# Patient Record
Sex: FEMALE | Race: WHITE | Employment: OTHER | URBAN - METROPOLITAN AREA
[De-identification: names, ages, dates, MRNs, and addresses within clinical notes are randomized per-mention and may not be internally consistent; named-entity substitution may affect disease eponyms.]

---

## 2017-01-16 DIAGNOSIS — I48.92 ATRIAL FLUTTER (H): Primary | ICD-10-CM

## 2017-01-16 RX ORDER — DILTIAZEM HYDROCHLORIDE 180 MG/1
180 CAPSULE, COATED, EXTENDED RELEASE ORAL 2 TIMES DAILY
Qty: 180 CAPSULE | Refills: 2 | Status: SHIPPED | OUTPATIENT
Start: 2017-01-16 | End: 2017-09-29

## 2017-01-17 ENCOUNTER — ANTICOAGULATION THERAPY VISIT (OUTPATIENT)
Dept: ANTICOAGULATION | Facility: CLINIC | Age: 77
End: 2017-01-17
Payer: MEDICARE

## 2017-01-17 VITALS — HEART RATE: 90 BPM | DIASTOLIC BLOOD PRESSURE: 77 MMHG | SYSTOLIC BLOOD PRESSURE: 139 MMHG

## 2017-01-17 DIAGNOSIS — I48.91 ATRIAL FIBRILLATION WITH RVR (H): ICD-10-CM

## 2017-01-17 DIAGNOSIS — Z79.01 LONG-TERM (CURRENT) USE OF ANTICOAGULANTS: Primary | ICD-10-CM

## 2017-01-17 DIAGNOSIS — Z95.2 S/P AVR (AORTIC VALVE REPLACEMENT): ICD-10-CM

## 2017-01-17 LAB — INR PPP: 3.3

## 2017-01-17 PROCEDURE — 99207 ZZC NO CHARGE NURSE ONLY: CPT

## 2017-01-17 NOTE — PROGRESS NOTES
ANTICOAGULATION FOLLOW-UP CLINIC VISIT    Patient Name:  Khalida Rouse  Date:  1/17/2017  Contact Type:  Face to Face    SUBJECTIVE:     Patient Findings     Positives Diet Changes (less greens recently- will increase them)           OBJECTIVE    INR   Date Value Ref Range Status   01/17/2017 3.3  Final       ASSESSMENT / PLAN  INR assessment SUPRA    Recheck INR In: 4 WEEKS    INR Location Clinic      Anticoagulation Summary as of 1/17/2017     INR goal 2.0-3.0   Selected INR 3.3! (1/17/2017)   Maintenance plan 2.5 mg (2.5 mg x 1) on Tue, Thu, Sat; 5 mg (2.5 mg x 2) all other days   Full instructions 2.5 mg on Tue, Thu, Sat; 5 mg all other days   Weekly total 27.5 mg   No change documented Venessa Moura RN   Plan last modified Venessa Moura RN (9/30/2016)   Next INR check 2/14/2017   Target end date     Indications   Long-term (current) use of anticoagulants [Z79.01] [Z79.01]  Atrial fibrillation with RVR (H) [I48.91]  S/P AVR (aortic valve replacement) [Z95.2]         Anticoagulation Episode Summary     INR check location     Preferred lab     Send INR reminders to [facility] POOL    Comments 2.5 mg tablets      Anticoagulation Care Providers     Provider Role Specialty Phone number    Dorcas Fisher MD [facility] [phone]            See the Encounter Report to view Anticoagulation Flowsheet and Dosing Calendar (Go to Encounters tab in chart review, and find the Anticoagulation Therapy Visit)    Dosage adjustment made based on physician directed care plan.    Will increase greens and keep dose the same      Venessa Moura RN

## 2017-02-14 ENCOUNTER — ANTICOAGULATION THERAPY VISIT (OUTPATIENT)
Dept: ANTICOAGULATION | Facility: CLINIC | Age: 77
End: 2017-02-14
Payer: MEDICARE

## 2017-02-14 VITALS — DIASTOLIC BLOOD PRESSURE: 90 MMHG | HEART RATE: 88 BPM | SYSTOLIC BLOOD PRESSURE: 135 MMHG

## 2017-02-14 DIAGNOSIS — I48.91 ATRIAL FIBRILLATION WITH RVR (H): ICD-10-CM

## 2017-02-14 DIAGNOSIS — Z95.2 S/P AVR (AORTIC VALVE REPLACEMENT): ICD-10-CM

## 2017-02-14 DIAGNOSIS — Z79.01 LONG-TERM (CURRENT) USE OF ANTICOAGULANTS: ICD-10-CM

## 2017-02-14 LAB — INR POINT OF CARE: 3.2 (ref 0.86–1.14)

## 2017-02-14 PROCEDURE — 36416 COLLJ CAPILLARY BLOOD SPEC: CPT

## 2017-02-14 PROCEDURE — 85610 PROTHROMBIN TIME: CPT | Mod: QW

## 2017-02-14 PROCEDURE — 99207 ZZC NO CHARGE NURSE ONLY: CPT

## 2017-02-14 RX ORDER — WARFARIN SODIUM 2.5 MG/1
TABLET ORAL
Qty: 135 TABLET | Refills: 2 | COMMUNITY
Start: 2017-02-14 | End: 2017-03-02

## 2017-02-14 NOTE — PROGRESS NOTES
ANTICOAGULATION FOLLOW-UP CLINIC VISIT    Patient Name:  Khalida Rouse  Date:  2/14/2017  Contact Type:  Face to Face    SUBJECTIVE:     Patient Findings     Positives No Problem Findings           OBJECTIVE    INR Protime   Date Value Ref Range Status   02/14/2017 3.2 (A) 0.86 - 1.14 Final       ASSESSMENT / PLAN  INR assessment THER    Recheck INR In: 4 WEEKS    INR Location Clinic      Anticoagulation Summary as of 2/14/2017     INR goal 2.0-3.0   Today's INR 3.2!   Maintenance plan 5 mg (2.5 mg x 2) on Mon, Wed, Fri; 2.5 mg (2.5 mg x 1) all other days   Full instructions 5 mg on Mon, Wed, Fri; 2.5 mg all other days   Weekly total 25 mg   Plan last modified Venessa Moura RN (2/14/2017)   Next INR check 3/14/2017   Target end date     Indications   Long-term (current) use of anticoagulants [Z79.01] [Z79.01]  Atrial fibrillation with RVR (H) [I48.91]  S/P AVR (aortic valve replacement) [Z95.2]         Anticoagulation Episode Summary     INR check location     Preferred lab     Send INR reminders to [place] [facility] POOL    Comments 2.5 mg tablets      Anticoagulation Care Providers     Provider Role Specialty Phone number    Dorcas Fisher MD [facility] [phone]            See the Encounter Report to view Anticoagulation Flowsheet and Dosing Calendar (Go to Encounters tab in chart review, and find the Anticoagulation Therapy Visit)    Dosage adjustment made based on physician directed care plan.    Dose slightly decrease since INR was 3.3 at last visit and 3.2 today      Venessa Moura RN

## 2017-02-14 NOTE — MR AVS SNAPSHOT
Khalida ARMAAN Rouse   2/14/2017 1:30 PM   Anticoagulation Therapy Visit    Description:  76 year old female   Provider:  JOSE ANTI COAG   Department:  [facility]           INR as of 2/14/2017     Today's INR 3.2!      Anticoagulation Summary as of 2/14/2017     INR goal 2.0-3.0   Today's INR 3.2!   Full instructions 5 mg on Mon, Wed, Fri; 2.5 mg all other days   Next INR check 3/14/2017    Indications   Long-term (current) use of anticoagulants [Z79.01] [Z79.01]  Atrial fibrillation with RVR (H) [I48.91]  S/P AVR (aortic valve replacement) [Z95.2]         Your next Anticoagulation Clinic appointment(s)     Mar 14, 2017  1:00 PM CDT   Anticoagulation [facility] with JOSE ANTI COANOBLE   [facility]    [address] [place] [address]   [phone]              Contact Numbers     Clinic Number:         February 2017 Details    Sun Mon Tue Wed Thu Fri Sat        1               2               3               4                 5               6               7               8               9               10               11                 12               13               14      2.5 mg   See details      15      5 mg         16      2.5 mg         17      5 mg         18      2.5 mg           19      2.5 mg         20      5 mg         21      2.5 mg         22      5 mg         23      2.5 mg         24      5 mg         25      2.5 mg           26      2.5 mg         27      5 mg         28      2.5 mg              Date Details   02/14 This INR check               How to take your warfarin dose     To take:  2.5 mg Take 1 of the 2.5 mg tablets.    To take:  5 mg Take 2 of the 2.5 mg tablets.           March 2017 Details    Sun Mon Tue Wed Thu Fri Sat        1      5 mg         2      2.5 mg         3      5 mg         4      2.5 mg           5      2.5 mg         6      5 mg         7      2.5 mg         8      5 mg         9      2.5 mg         10      5 mg         11       2.5 mg           12      2.5 mg         13      5 mg         14            15               16               17               18                 19               20               21               22               23               24               25                 26               27               28               29               30               31                 Date Details   No additional details    Date of next INR:  3/14/2017         How to take your warfarin dose     To take:  2.5 mg Take 1 of the 2.5 mg tablets.    To take:  5 mg Take 2 of the 2.5 mg tablets.

## 2017-02-18 DIAGNOSIS — I48.91 ATRIAL FIBRILLATION WITH RVR (H): Primary | ICD-10-CM

## 2017-02-20 NOTE — TELEPHONE ENCOUNTER
Atenolol        Last Written Prescription Date: 9/29/16  Last Fill Quantity: 90, # refills: 2    Last Office Visit with G, P or [facility] prescribing provider:  6/30/16   Future Office Visit:        BP Readings from Last 3 Encounters:   02/14/17 135/90   01/17/17 139/77   10/19/16 136/70

## 2017-02-21 RX ORDER — ATENOLOL 50 MG/1
TABLET ORAL
Qty: 90 TABLET | Refills: 1 | Status: SHIPPED | OUTPATIENT
Start: 2017-02-21 | End: 2017-05-14

## 2017-03-02 DIAGNOSIS — Z79.01 LONG-TERM (CURRENT) USE OF ANTICOAGULANTS: ICD-10-CM

## 2017-03-02 DIAGNOSIS — I48.91 ATRIAL FIBRILLATION WITH RVR (H): Primary | ICD-10-CM

## 2017-03-02 RX ORDER — WARFARIN SODIUM 2.5 MG/1
TABLET ORAL
Qty: 135 TABLET | Refills: 2 | Status: SHIPPED | OUTPATIENT
Start: 2017-03-02 | End: 2017-03-14

## 2017-03-02 NOTE — TELEPHONE ENCOUNTER
warfarin (COUMADIN) 2.5 MG tablet  Last Written Prescription Date: 2/14/17  Last Fill Qty: 135, # refills: 2  Last Office Visit with G, P or [facility] prescribing provider: 6/30/16       Date and Result of Last PT/INR:   Lab Results   Component Value Date    INR 3.2 02/14/2017    INR 3.3 01/17/2017    INR 2.6 12/12/2016    INR 3.51 09/20/2016    INR 3.1 06/09/2014    INR 3.6 04/06/2014

## 2017-03-14 ENCOUNTER — ANTICOAGULATION THERAPY VISIT (OUTPATIENT)
Dept: ANTICOAGULATION | Facility: CLINIC | Age: 77
End: 2017-03-14
Payer: MEDICARE

## 2017-03-14 VITALS — SYSTOLIC BLOOD PRESSURE: 123 MMHG | HEART RATE: 74 BPM | DIASTOLIC BLOOD PRESSURE: 72 MMHG

## 2017-03-14 DIAGNOSIS — Z79.01 LONG-TERM (CURRENT) USE OF ANTICOAGULANTS: ICD-10-CM

## 2017-03-14 DIAGNOSIS — Z95.2 S/P AVR (AORTIC VALVE REPLACEMENT): ICD-10-CM

## 2017-03-14 DIAGNOSIS — I48.91 ATRIAL FIBRILLATION WITH RVR (H): ICD-10-CM

## 2017-03-14 LAB — INR POINT OF CARE: 1.5 (ref 0.86–1.14)

## 2017-03-14 PROCEDURE — 85610 PROTHROMBIN TIME: CPT | Mod: QW

## 2017-03-14 PROCEDURE — 36416 COLLJ CAPILLARY BLOOD SPEC: CPT

## 2017-03-14 PROCEDURE — 99207 ZZC NO CHARGE NURSE ONLY: CPT

## 2017-03-14 RX ORDER — WARFARIN SODIUM 2.5 MG/1
TABLET ORAL
Qty: 135 TABLET | Refills: 2 | COMMUNITY
Start: 2017-03-14 | End: 2017-04-11

## 2017-03-14 NOTE — MR AVS SNAPSHOT
Khalida Rouse   3/14/2017 1:00 PM   Anticoagulation Therapy Visit    Description:  77 year old female   Provider:  JOSE ANTI LESYL   Department:  [facility]           INR as of 3/14/2017     Today's INR 1.5!      Anticoagulation Summary as of 3/14/2017     INR goal 2.0-3.0   Today's INR 1.5!   Full instructions 3/14: 7.5 mg; Otherwise 2.5 mg on Tue, Thu, Sat; 5 mg all other days   Next INR check 4/11/2017    Indications   Long-term (current) use of anticoagulants [Z79.01] [Z79.01]  Atrial fibrillation with RVR (H) [I48.91]  S/P AVR (aortic valve replacement) [Z95.2]         Your next Anticoagulation Clinic appointment(s)     Apr 11, 2017  1:00 PM CDT   Anticoagulation [facility] with JOSE ANTI LESLY   [facility]    [address] [place] [id number]   [phone]              Contact Numbers     Clinic Number:         March 2017 Details    Sun Mon Tue Wed Thu Fri Sat        1               2               3               4                 5               6               7               8               9               10               11                 12               13               14      7.5 mg   See details      15      5 mg         16      2.5 mg         17      5 mg         18      2.5 mg           19      5 mg         20      5 mg         21      2.5 mg         22      5 mg         23      2.5 mg         24      5 mg         25      2.5 mg           26      5 mg         27      5 mg         28      2.5 mg         29      5 mg         30      2.5 mg         31      5 mg           Date Details   03/14 This INR check               How to take your warfarin dose     To take:  2.5 mg Take 1 of the 2.5 mg tablets.    To take:  5 mg Take 2 of the 2.5 mg tablets.    To take:  7.5 mg Take 3 of the 2.5 mg tablets.           April 2017 Details    Sun Mon Tue Wed Thu Fri Sat           1      2.5 mg           2      5 mg         3      5 mg         4      2.5 mg          5      5 mg         6      2.5 mg         7      5 mg         8      2.5 mg           9      5 mg         10      5 mg         11            12               13               14               15                 16               17               18               19               20               21               22                 23               24               25               26               27               28               29                 30                      Date Details   No additional details    Date of next INR:  4/11/2017         How to take your warfarin dose     To take:  2.5 mg Take 1 of the 2.5 mg tablets.    To take:  5 mg Take 2 of the 2.5 mg tablets.

## 2017-04-11 ENCOUNTER — ANTICOAGULATION THERAPY VISIT (OUTPATIENT)
Dept: ANTICOAGULATION | Facility: CLINIC | Age: 77
End: 2017-04-11
Payer: MEDICARE

## 2017-04-11 ENCOUNTER — TELEPHONE (OUTPATIENT)
Dept: ANTICOAGULATION | Facility: CLINIC | Age: 77
End: 2017-04-11

## 2017-04-11 DIAGNOSIS — Z79.01 LONG-TERM (CURRENT) USE OF ANTICOAGULANTS: ICD-10-CM

## 2017-04-11 DIAGNOSIS — I48.20 CHRONIC ATRIAL FIBRILLATION (H): Primary | ICD-10-CM

## 2017-04-11 DIAGNOSIS — Z95.2 S/P AVR (AORTIC VALVE REPLACEMENT): ICD-10-CM

## 2017-04-11 DIAGNOSIS — Z95.2 HEART VALVE REPLACED: ICD-10-CM

## 2017-04-11 DIAGNOSIS — I48.91 ATRIAL FIBRILLATION WITH RVR (H): ICD-10-CM

## 2017-04-11 LAB — INR POINT OF CARE: 1.6 (ref 0.86–1.14)

## 2017-04-11 PROCEDURE — 99207 ZZC NO CHARGE NURSE ONLY: CPT

## 2017-04-11 PROCEDURE — 85610 PROTHROMBIN TIME: CPT | Mod: QW

## 2017-04-11 PROCEDURE — 36416 COLLJ CAPILLARY BLOOD SPEC: CPT

## 2017-04-11 RX ORDER — WARFARIN SODIUM 2.5 MG/1
TABLET ORAL
Qty: 135 TABLET | Refills: 2 | COMMUNITY
Start: 2017-04-11 | End: 2017-04-25

## 2017-04-11 NOTE — PROGRESS NOTES
ANTICOAGULATION FOLLOW-UP CLINIC VISIT    Patient Name:  Khalida Rouse  Date:  4/11/2017  Contact Type:  Face to Face    SUBJECTIVE:     Patient Findings     Positives Change in diet/appetite (wants to increase greens ), No Problem Findings           OBJECTIVE    INR Protime   Date Value Ref Range Status   04/11/2017 1.6 (A) 0.86 - 1.14 Final       ASSESSMENT / PLAN  INR assessment SUB    Recheck INR In: 2 WEEKS    INR Location Clinic      Anticoagulation Summary as of 4/11/2017     INR goal 2.0-3.0   Today's INR 1.6!   Maintenance plan 2.5 mg (2.5 mg x 1) on Mon; 5 mg (2.5 mg x 2) all other days   Full instructions 2.5 mg on Mon; 5 mg all other days   Weekly total 32.5 mg   Plan last modified Venessa Moura RN (4/11/2017)   Next INR check 4/25/2017   Target end date     Indications   Long-term (current) use of anticoagulants [Z79.01] [Z79.01]  Atrial fibrillation with RVR (H) [I48.91]  S/P AVR (aortic valve replacement) [Z95.2]         Anticoagulation Episode Summary     INR check location     Preferred lab     Send INR reminders to [facility]    Comments 2.5 mg tablets      Anticoagulation Care Providers     Provider Role Specialty Phone number    Dorcas Fisher MD [facility] [phone]            See the Encounter Report to view Anticoagulation Flowsheet and Dosing Calendar (Go to Encounters tab in chart review, and find the Anticoagulation Therapy Visit)    Dosage adjustment made based on physician directed care plan.        Venessa Moura RN

## 2017-04-11 NOTE — TELEPHONE ENCOUNTER
Please review and renew this patients INR referral, orders pending. Thank you!      Venessa Moura RN

## 2017-04-17 ENCOUNTER — HOSPITAL ENCOUNTER (OUTPATIENT)
Dept: CARDIOLOGY | Facility: CLINIC | Age: 77
Discharge: HOME | End: 2017-04-17
Attending: INTERNAL MEDICINE | Admitting: INTERNAL MEDICINE
Payer: MEDICARE

## 2017-04-17 DIAGNOSIS — Z95.2 S/P AVR (AORTIC VALVE REPLACEMENT): ICD-10-CM

## 2017-04-17 PROCEDURE — 93306 TTE W/DOPPLER COMPLETE: CPT | Mod: 26 | Performed by: INTERNAL MEDICINE

## 2017-04-17 PROCEDURE — 93306 TTE W/DOPPLER COMPLETE: CPT

## 2017-04-18 ENCOUNTER — OFFICE VISIT (OUTPATIENT)
Dept: CARDIOLOGY | Facility: CLINIC | Age: 77
End: 2017-04-18
Attending: INTERNAL MEDICINE
Payer: MEDICARE

## 2017-04-18 VITALS
HEART RATE: 78 BPM | HEIGHT: 66 IN | DIASTOLIC BLOOD PRESSURE: 66 MMHG | SYSTOLIC BLOOD PRESSURE: 126 MMHG | WEIGHT: 139.2 LBS | BODY MASS INDEX: 22.37 KG/M2

## 2017-04-18 DIAGNOSIS — Z95.2 S/P AVR (AORTIC VALVE REPLACEMENT): ICD-10-CM

## 2017-04-18 PROCEDURE — 99214 OFFICE O/P EST MOD 30 MIN: CPT | Performed by: INTERNAL MEDICINE

## 2017-04-18 NOTE — PROGRESS NOTES
HPI and Plan:   See dictation(#[id number])    Orders Placed This Encounter   Procedures     Follow-Up with Cardiologist     Echocardiogram       No orders of the defined types were placed in this encounter.      There are no discontinued medications.      Encounter Diagnosis   Name Primary?     S/P AVR (aortic valve replacement)        CURRENT MEDICATIONS:  Current Outpatient Prescriptions   Medication Sig Dispense Refill     warfarin (COUMADIN) 2.5 MG tablet Take 2.5 mg on Monday and 5 mg all other days, or as directed by the coumadin clinic. 135 tablet 2     atenolol (TENORMIN) 50 MG tablet TAKE ONE TABLET BY MOUTH TWO TIMES A DAY 90 tablet 1     diltiazem 180 MG 24 hr capsule Take 1 capsule (180 mg) by mouth 2 times daily 180 capsule 2     calcium carbonate-vitamin D 500-400 MG-UNIT TABS tablt Take 1 tablet by mouth 3 times daily       multivitamins with minerals (CERTAVITE) LIQD Take 15 mLs by mouth daily       aspirin EC 81 MG tablet Take 1 tablet (81 mg) by mouth daily 30 tablet 6     torsemide (DEMADEX) 20 MG tablet TAKE ONE TABLET BY MOUTH ONCE DAILY AS NEEDED (Patient not taking: Reported on 4/18/2017) 30 tablet 11       ALLERGIES     Allergies   Allergen Reactions     Xarelto [Rivaroxaban] Diarrhea     Ace Inhibitors Cough       PAST MEDICAL HISTORY:  Past Medical History:   Diagnosis Date     Aortic valve stenosis      Atrial flutter (H)      Cardiomyopathy (H)      Severe aortic stenosis 4/7/2014     Shoulder fracture, left 3/22/15       PAST SURGICAL HISTORY:  Past Surgical History:   Procedure Laterality Date     CATARACT IOL, RT/LT Right      ENT SURGERY      glaucoma surgery     REPLACE VALVE AORTIC  4/10/2014    Procedure: REPLACE VALVE AORTIC;  Median sternotomy, Replace Aortic Valve on pump oxygenation. ;  Surgeon: Wesley Fong MD;  Location:  OR       FAMILY HISTORY:  Family History   Problem Relation Age of Onset     Dementia Brother      Alzheimer Disease Sister        SOCIAL  "HISTORY:  Social History     Social History     Marital status:      Spouse name: N/A     Number of children: N/A     Years of education: N/A     Social History Main Topics     Smoking status: Never Smoker     Smokeless tobacco: Never Used     Alcohol use No     Drug use: No     Sexual activity: No     Other Topics Concern     Caffeine Concern No     Special Diet No     Exercise No     walking     Social History Narrative       Review of Systems:  Skin:  Negative       Eyes:  Positive for glasses    ENT:  Negative      Respiratory:  Negative       Cardiovascular:  Negative      Gastroenterology: Negative      Genitourinary:  Negative      Musculoskeletal:  Negative      Neurologic:  Negative      Psychiatric:  Negative      Heme/Lymph/Imm:  Positive for allergies    Endocrine:  Negative        Physical Exam:  Vitals: /66 (BP Location: Left arm, Patient Position: Fowlers, Cuff Size: Adult Regular)  Pulse 78  Ht 1.676 m (5' 6\")  Wt 63.1 kg (139 lb 3.2 oz)  BMI 22.47 kg/m2    Constitutional:           Skin:           Head:           Eyes:           ENT:           Neck:           Chest:             Cardiac:                    Abdomen:           Vascular:                                          Extremities and Back:                 Neurological:                 CC  Jason Flanagan MD   [facility]  [address]  [place] [address]                  "

## 2017-04-18 NOTE — MR AVS SNAPSHOT
"              After Visit Summary   4/18/2017    Khalida Rouse    MRN: [id number]           Patient Information     Date Of Birth          1940        Visit Information        Provider Department      4/18/2017 11:30 AM Jason Flanagan MD [facility]        Today's Diagnoses     S/P AVR (aortic valve replacement)           Follow-ups after your visit        Additional Services     Follow-Up with Cardiologist                 Your next 10 appointments already scheduled     Apr 25, 2017  1:00 PM CDT   Anticoagulation Visit with PH ANTI COAG   [facility]    [address] [place] [id number]   [phone]              Future tests that were ordered for you today     Open Future Orders        Priority Expected Expires Ordered    Echocardiogram Routine 11/15/2017 4/18/2018 4/18/2017    Follow-Up with Cardiologist Routine 11/15/2017 4/18/2018 4/18/2017            Who to contact     If you have questions or need follow up information about today's clinic visit or your schedule please contact [facility] directly at [phone].  Normal or non-critical lab and imaging results will be communicated to you by CyberSponsehart, letter or phone within 4 business days after the clinic has received the results. If you do not hear from us within 7 days, please contact the clinic through [employer]Sp[employer]hart or phone. If you have a critical or abnormal lab result, we will notify you by phone as soon as possible.  Submit refill requests through VYou or call your pharmacy and they will forward the refill request to us. Please allow 3 business days for your refill to be completed.          Additional Information About Your Visit        MyChart Information     VYou lets you send messages to your doctor, view your test results, renew your prescriptions, schedule appointments and more. To sign up, go to [email or web address] . Click on \"Log in\" on the left side " "of the screen, which will take you to the Welcome page. Then click on \"Sign up Now\" on the right side of the page.     You will be asked to enter the access code listed below, as well as some personal information. Please follow the directions to create your username and password.     Your access code is: [email or web address]  Expires: 2017 12:13 PM     Your access code will  in 90 days. If you need help or a new code, please call your [facility] clinic or [phone].        Care EveryWhere ID     This is your Care EveryWhere ID. This could be used by other organizations to access your [facility] medical records  [id number]        Your Vitals Were     Pulse Height BMI (Body Mass Index)             78 1.676 m (5' 6\") 22.47 kg/m2          Blood Pressure from Last 3 Encounters:   17 126/66   17 123/72   17 135/90    Weight from Last 3 Encounters:   17 63.1 kg (139 lb 3.2 oz)   10/11/16 62.6 kg (138 lb 1.6 oz)   16 63 kg (139 lb)              We Performed the Following     Follow-Up with Cardiologist        Primary Care Provider Office Phone # Fax #    Dorcas Starks Mai, [phone]       [address] [place] [address]        Thank you!     Thank you for choosing [facility]  for your care. Our goal is always to provide you with excellent care. Hearing back from our patients is one way we can continue to improve our services. Please take a few minutes to complete the written survey that you may receive in the mail after your visit with us. Thank you!             Your Updated Medication List - Protect others around you: Learn how to safely use, store and throw away your medicines at www.disposemymeds.org.          This list is accurate as of: 17 12:16 PM.  Always use your most recent med list.                   Brand Name Dispense Instructions for use    aspirin EC 81 MG EC tablet     30 tablet    Take 1 tablet (81 mg) by mouth " daily       atenolol 50 MG tablet    TENORMIN    90 tablet    TAKE ONE TABLET BY MOUTH TWO TIMES A DAY       calcium carbonate-vitamin D 500-400 MG-UNIT Tabs per tablet      Take 1 tablet by mouth 3 times daily       diltiazem 180 MG 24 hr capsule     180 capsule    Take 1 capsule (180 mg) by mouth 2 times daily       multivitamins with minerals Liqd liquid      Take 15 mLs by mouth daily       torsemide 20 MG tablet    DEMADEX    30 tablet    TAKE ONE TABLET BY MOUTH ONCE DAILY AS NEEDED       warfarin 2.5 MG tablet    COUMADIN    135 tablet    Take 2.5 mg on Monday and 5 mg all other days, or as directed by the coumadin clinic.

## 2017-04-18 NOTE — LETTER
4/18/2017      RE: Khalida Rouse  [address]  [facility] [address]       Dear Colleague,    Thank you for the opportunity to participate in the care of your patient, Khalida Rouse, at the [facility]. Please see a copy of my visit note below.    HPI and Plan:   See dictation(#[id number])    Orders Placed This Encounter   Procedures     Follow-Up with Cardiologist     Echocardiogram       No orders of the defined types were placed in this encounter.      There are no discontinued medications.      Encounter Diagnosis   Name Primary?     S/P AVR (aortic valve replacement)        CURRENT MEDICATIONS:  Current Outpatient Prescriptions   Medication Sig Dispense Refill     warfarin (COUMADIN) 2.5 MG tablet Take 2.5 mg on Monday and 5 mg all other days, or as directed by the coumadin clinic. 135 tablet 2     atenolol (TENORMIN) 50 MG tablet TAKE ONE TABLET BY MOUTH TWO TIMES A DAY 90 tablet 1     diltiazem 180 MG 24 hr capsule Take 1 capsule (180 mg) by mouth 2 times daily 180 capsule 2     calcium carbonate-vitamin D 500-400 MG-UNIT TABS tablt Take 1 tablet by mouth 3 times daily       multivitamins with minerals (CERTAVITE) LIQD Take 15 mLs by mouth daily       aspirin EC 81 MG tablet Take 1 tablet (81 mg) by mouth daily 30 tablet 6     torsemide (DEMADEX) 20 MG tablet TAKE ONE TABLET BY MOUTH ONCE DAILY AS NEEDED (Patient not taking: Reported on 4/18/2017) 30 tablet 11       ALLERGIES     Allergies   Allergen Reactions     Xarelto [Rivaroxaban] Diarrhea     Ace Inhibitors Cough       PAST MEDICAL HISTORY:  Past Medical History:   Diagnosis Date     Aortic valve stenosis      Atrial flutter (H)      Cardiomyopathy (H)      Severe aortic stenosis 4/7/2014     Shoulder fracture, left 3/22/15       PAST SURGICAL HISTORY:  Past Surgical History:   Procedure Laterality Date     CATARACT IOL, RT/LT Right      ENT SURGERY      glaucoma surgery     REPLACE VALVE  "AORTIC  4/10/2014    Procedure: REPLACE VALVE AORTIC;  Median sternotomy, Replace Aortic Valve on pump oxygenation. ;  Surgeon: Wesley Fong MD;  Location:  OR       FAMILY HISTORY:  Family History   Problem Relation Age of Onset     Dementia Brother      Alzheimer Disease Sister        SOCIAL HISTORY:  Social History     Social History     Marital status:      Spouse name: N/A     Number of children: N/A     Years of education: N/A     Social History Main Topics     Smoking status: Never Smoker     Smokeless tobacco: Never Used     Alcohol use No     Drug use: No     Sexual activity: No     Other Topics Concern     Caffeine Concern No     Special Diet No     Exercise No     walking     Social History Narrative       Review of Systems:  Skin:  Negative       Eyes:  Positive for glasses    ENT:  Negative      Respiratory:  Negative       Cardiovascular:  Negative      Gastroenterology: Negative      Genitourinary:  Negative      Musculoskeletal:  Negative      Neurologic:  Negative      Psychiatric:  Negative      Heme/Lymph/Imm:  Positive for allergies    Endocrine:  Negative        Physical Exam:  Vitals: /66 (BP Location: Left arm, Patient Position: Fowlers, Cuff Size: Adult Regular)  Pulse 78  Ht 1.676 m (5' 6\")  Wt 63.1 kg (139 lb 3.2 oz)  BMI 22.47 kg/m2    Constitutional:           Skin:           Head:           Eyes:           ENT:           Neck:           Chest:             Cardiac:                    Abdomen:           Vascular:                                          Extremities and Back:                 Neurological:             CC  Jason Flanagan MD   [facility]  [address] [facility] [address]  [place] [address]        Please do not hesitate to contact me if you have any questions/concerns.     Sincerely,     Jason Flanagan MD    "

## 2017-04-19 NOTE — PROGRESS NOTES
REASON FOR CLINIC VISIT:  Followup AVR, tricuspid regurgitation.      HISTORY OF PRESENT ILLNESS:  Ms. 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  Today, I am seeing the patient for routine followup.  To be noted, patient had a recent echocardiogram yesterday that showed normal functioning bioprosthetic aortic valve with LVEF of 55%-60%.  There is 3 to 4+ tricuspid regurgitation with normal RV size and function, and there is severe biatrial enlargement.  Compared to prior echo dated 01/2016, no significant changes are seen.  The patient is coming to clinic accompanied by her daughter.  She tells me overall she is doing quite well.  She is living with her daughter, and they have a very steep stair, about 17 steps, and she climbs them up and down all the time without any shortness of breath or any chest pain or any fatigue.  She also does a lot of gardening and picks some cherries and berries without any symptoms.  No tobacco use, no daytime somnolence or snoring to suggest sleep apnea.      PHYSICAL EXAMINATION:   VITAL SIGNS:  Blood pressure 126/66, heart rate 78 regular, weight 139 pounds, BMI 22.51.   GENERAL:  The patient appears pleasant, comfortable.   NECK:  No bruit.  There is positive c-v waves.   CARDIOVASCULAR SYSTEM:  Grade 3/6 systolic murmur heard best over the left lower sternal border, no S3, S4, rub or gallop.   RESPIRATORY SYSTEM:  Clear to auscultation bilaterally.   ABDOMEN:  Soft, nontender.   EXTREMITIES:  No pitting pedal edema.   NEUROLOGICAL:  Alert, oriented x3.   PSYCH:  Normal affect.   SKIN:  No obvious rash.   HEENT:  No pallor or icterus.      Echocardiogram images personally reviewed by me and also compared to previous echocardiogram.  Normal LVEF, borderline reduced RV  systolic function to normal RV systolic function, 3 to 4+ tricuspid regurgitation, biatrial severe enlargement.  There is tricuspid annulus dilatation, normal functioning prosthetic aortic valve.  RVSP is 35 mmHg plus RA.      IMPRESSION AND PLAN:  A very delightful 77-year-old female status post bioprosthetic aortic valve replacement, chronic atrial fibrillation on rate control strategy on Coumadin for stroke prophylaxis, now with 3 to 4+ tricuspid regurgitation with borderline reduced RV systolic function to normal RV systolic function on echocardiogram.  I had a long discussion with the patient regarding the echocardiogram findings, especially 3 to 4+ tricuspid regurgitation.  I think part of the reason why tricuspid regurgitation is there is because of annular dilatation likely because of severe right atrial enlargement in the setting of atrial fibrillation.  She is not having any symptoms suggestive of right-sided congestive heart failure, no symptoms of dyspnea or fatigue on exertion.  I had a long discussion with patient and her daughter regarding the tricuspid regurgitation and indication for tricuspid valve surgery.  I also discussed option of right heart catheterization in view of pulmonary hypertension noted on echocardiogram.  Right heart catheterization will help us delineate the intensity of pulmonary hypertension and the etiology.  We discussed risk of right heart catheterization.  Patient tells me that she does not want to undergo right heart catheterization at this time.  If she changes her mind, she will let us know.  I am planning to see her back in about 6 months' time with a repeat echocardiogram.  In the interim, if patient notices any pedal edema or dyspnea or fatigue on exertion, she will call us and we would like to see her sooner.      1.  Status post bioprosthetic aortic valve replacement.  Normal functioning aortic valve.  Normal LVEF.   2.  Chronic atrial fibrillation.  CHADS2-VASc score  of 3, on Coumadin for stroke prophylaxis, on rate control strategy.  Asymptomatic from atrial fibrillation.  To be noted, she is on both atenolol and diltiazem, and she is tolerating the combination quite well without any evidence of bradyarrhythmias or any dizziness, presyncope or syncope.   3.  Three to 4+ tricuspid regurgitation with near normal RV systolic function.  Clinically, not in congestive heart failure.   4.  Pulmonary hypertension.  Likely from left side, as evidence of elevated E:E prime on echocardiogram.  I discussed option of right heart catheterization, which patient declined as noted above.      RECOMMENDATIONS:  Follow up in Cardiology Clinic in 6 months' time with a repeat echocardiogram.         MADI TEJEDA MD             D: 2017 13:02   T: 2017 02:42   MT: HERMINIA      Name:     JJ RAMACHANDRAN   MRN:      [id number]        Account:      [id number]   :      1940           Service Date: 2017      Document: [id number]

## 2017-04-21 ENCOUNTER — TELEPHONE (OUTPATIENT)
Dept: FAMILY MEDICINE | Facility: CLINIC | Age: 77
End: 2017-04-21

## 2017-04-21 NOTE — TELEPHONE ENCOUNTER
Reason for Call:  Other call back    Detailed comments: Venessa put a note on her instructions and she has a question about it. The note said to take 2 tablets daily instead of 3 days a week. She is wondering if she needs to do that all of the time now, or is it just for a certain amount of time. Please call her back as soon as you are able.     Phone Number Patient can be reached at: Home number on file [phone] (home)    Best Time: any    Can we leave a detailed message on this number? YES    Call taken on 4/21/2017 at 8:50 AM by Carla Lorenzana

## 2017-04-25 ENCOUNTER — ANTICOAGULATION THERAPY VISIT (OUTPATIENT)
Dept: ANTICOAGULATION | Facility: CLINIC | Age: 77
End: 2017-04-25
Payer: MEDICARE

## 2017-04-25 VITALS — HEART RATE: 75 BPM | DIASTOLIC BLOOD PRESSURE: 74 MMHG | SYSTOLIC BLOOD PRESSURE: 125 MMHG

## 2017-04-25 DIAGNOSIS — Z79.01 LONG-TERM (CURRENT) USE OF ANTICOAGULANTS: ICD-10-CM

## 2017-04-25 DIAGNOSIS — Z95.2 S/P AVR (AORTIC VALVE REPLACEMENT): ICD-10-CM

## 2017-04-25 DIAGNOSIS — I48.91 ATRIAL FIBRILLATION WITH RVR (H): ICD-10-CM

## 2017-04-25 LAB — INR POINT OF CARE: 3 (ref 0.86–1.14)

## 2017-04-25 PROCEDURE — 85610 PROTHROMBIN TIME: CPT | Mod: QW

## 2017-04-25 PROCEDURE — 36416 COLLJ CAPILLARY BLOOD SPEC: CPT

## 2017-04-25 PROCEDURE — 99207 ZZC NO CHARGE NURSE ONLY: CPT

## 2017-04-25 RX ORDER — WARFARIN SODIUM 2.5 MG/1
TABLET ORAL
Qty: 144 TABLET | Refills: 0 | Status: SHIPPED | OUTPATIENT
Start: 2017-04-25 | End: 2017-05-14

## 2017-04-25 RX ORDER — WARFARIN SODIUM 2.5 MG/1
TABLET ORAL
Qty: 144 TABLET | Refills: 0 | Status: SHIPPED | OUTPATIENT
Start: 2017-04-25 | End: 2017-04-25

## 2017-04-25 NOTE — MR AVS SNAPSHOT
Khalida Rouse   4/25/2017 1:00 PM   Anticoagulation Therapy Visit    Description:  77 year old female   Provider:  JOSE ANTI COANOBLE   Department:  [facility]           INR as of 4/25/2017     Today's INR 3.0      Anticoagulation Summary as of 4/25/2017     INR goal 2.0-3.0   Today's INR 3.0   Full instructions 2.5 mg on Mon, Fri; 5 mg all other days   Next INR check 5/16/2017    Indications   Long-term (current) use of anticoagulants [Z79.01] [Z79.01]  Atrial fibrillation with RVR (H) [I48.91]  S/P AVR (aortic valve replacement) [Z95.2]         Your next Anticoagulation Clinic appointment(s)     May 16, 2017  1:00 PM CDT   [facility]    [address] [place] [id number]   [phone]              Contact Numbers     Clinic Number:         April 2017 Details    Sun Mon Tue Wed Thu Fri Sat           1                 2               3               4               5               6               7               8                 9               10               11               12               13               14               15                 16               17               18               19               20               21               22                 23               24               25      5 mg   See details      26      5 mg         27      5 mg         28      2.5 mg         29      5 mg           30      5 mg                Date Details   04/25 This INR check               How to take your warfarin dose     To take:  2.5 mg Take 1 of the 2.5 mg tablets.    To take:  5 mg Take 2 of the 2.5 mg tablets.           May 2017 Details    Sun Mon Tue Wed Thu Fri Sat      1      2.5 mg         2      5 mg         3      5 mg         4      5 mg         5      2.5 mg         6      5 mg           7      5 mg         8      2.5 mg         9      5 mg         10      5 mg         11      5 mg         12      2.5  mg         13      5 mg           14      5 mg         15      2.5 mg         16            17               18               19               20                 21               22               23               24               25               26               27                 28               29               30               31                   Date Details   No additional details    Date of next INR:  5/16/2017         How to take your warfarin dose     To take:  2.5 mg Take 1 of the 2.5 mg tablets.    To take:  5 mg Take 2 of the 2.5 mg tablets.

## 2017-04-25 NOTE — PROGRESS NOTES
ANTICOAGULATION FOLLOW-UP CLINIC VISIT    Patient Name:  Khalida Rouse  Date:  4/25/2017  Contact Type:  Face to Face    SUBJECTIVE:        OBJECTIVE    INR Protime   Date Value Ref Range Status   04/25/2017 3.0 (A) 0.86 - 1.14 Final       ASSESSMENT / PLAN  INR assessment THER    Recheck INR In: 3 WEEKS    INR Location Clinic      Anticoagulation Summary as of 4/25/2017     INR goal 2.0-3.0   Today's INR 3.0   Maintenance plan 2.5 mg (2.5 mg x 1) on Mon, Fri; 5 mg (2.5 mg x 2) all other days   Full instructions 2.5 mg on Mon, Fri; 5 mg all other days   Weekly total 30 mg   Plan last modified Venessa Moura RN (4/25/2017)   Next INR check 5/16/2017   Target end date     Indications   Long-term (current) use of anticoagulants [Z79.01] [Z79.01]  Atrial fibrillation with RVR (H) [I48.91]  S/P AVR (aortic valve replacement) [Z95.2]         Anticoagulation Episode Summary     INR check location     Preferred lab     Send INR reminders to [facility] JOMAR    Comments 2.5 mg tablets      Anticoagulation Care Providers     Provider Role Specialty Phone number    Dorcas Fisher MD [facility] [phone]            See the Encounter Report to view Anticoagulation Flowsheet and Dosing Calendar (Go to Encounters tab in chart review, and find the Anticoagulation Therapy Visit)    Dosage adjustment made based on physician directed care plan.      Venessa Moura RN

## 2017-05-14 DIAGNOSIS — I48.91 ATRIAL FIBRILLATION WITH RVR (H): ICD-10-CM

## 2017-05-14 DIAGNOSIS — Z79.01 LONG-TERM (CURRENT) USE OF ANTICOAGULANTS: ICD-10-CM

## 2017-05-15 RX ORDER — ATENOLOL 50 MG/1
TABLET ORAL
Qty: 90 TABLET | Refills: 0 | Status: SHIPPED | OUTPATIENT
Start: 2017-05-15 | End: 2017-06-29

## 2017-05-15 RX ORDER — WARFARIN SODIUM 2.5 MG/1
TABLET ORAL
Qty: 144 TABLET | Refills: 0 | Status: SHIPPED | OUTPATIENT
Start: 2017-05-15 | End: 2017-06-28

## 2017-05-15 NOTE — TELEPHONE ENCOUNTER
Atenolol,   Last Written Prescription Date: 2/21/17  Last Fill Quantity: 90, # refills: 1    Last Office Visit with [facility], [facility] or [facility] prescribing provider:  6/30/16   Future Office Visit:        BP Readings from Last 3 Encounters:   04/25/17 125/74   04/18/17 126/66   03/14/17 123/72         Warfarin        Last Written Prescription Date: 4/25/17  Last Fill Qty: 144, # refills: 0  Last Office Visit with [facility], [facility] or [facility] prescribing provider: 6/30/16       Date and Result of Last PT/INR:   Lab Results   Component Value Date    INR 3.0 04/25/2017    INR 1.6 04/11/2017    INR 3.3 01/17/2017    INR 3.51 09/20/2016

## 2017-05-16 ENCOUNTER — ANTICOAGULATION THERAPY VISIT (OUTPATIENT)
Dept: ANTICOAGULATION | Facility: CLINIC | Age: 77
End: 2017-05-16
Payer: MEDICARE

## 2017-05-16 VITALS — HEART RATE: 76 BPM | SYSTOLIC BLOOD PRESSURE: 123 MMHG | DIASTOLIC BLOOD PRESSURE: 68 MMHG

## 2017-05-16 DIAGNOSIS — Z79.01 LONG-TERM (CURRENT) USE OF ANTICOAGULANTS: ICD-10-CM

## 2017-05-16 DIAGNOSIS — Z95.2 S/P AVR (AORTIC VALVE REPLACEMENT): ICD-10-CM

## 2017-05-16 DIAGNOSIS — I48.91 ATRIAL FIBRILLATION WITH RVR (H): ICD-10-CM

## 2017-05-16 LAB — INR POINT OF CARE: 4.4 (ref 0.86–1.14)

## 2017-05-16 PROCEDURE — 99207 ZZC NO CHARGE NURSE ONLY: CPT

## 2017-05-16 PROCEDURE — 85610 PROTHROMBIN TIME: CPT | Mod: QW

## 2017-05-16 PROCEDURE — 36416 COLLJ CAPILLARY BLOOD SPEC: CPT

## 2017-05-16 NOTE — PROGRESS NOTES
ANTICOAGULATION FOLLOW-UP CLINIC VISIT    Patient Name:  Khalida Rouse  Date:  5/16/2017  Contact Type:  Face to Face    SUBJECTIVE:     Patient Findings     Positives Change in diet/appetite (change in appetite - eating less after having a cold)           OBJECTIVE    INR Protime   Date Value Ref Range Status   05/16/2017 4.4 (A) 0.86 - 1.14 Final       ASSESSMENT / PLAN  INR assessment SUPRA    Recheck INR In: 10 DAYS    INR Location Clinic      Anticoagulation Summary as of 5/16/2017     INR goal 2.0-3.0   Today's INR 4.4!   Maintenance plan 2.5 mg (2.5 mg x 1) on Mon, Wed, Fri; 5 mg (2.5 mg x 2) all other days   Full instructions 5/23: Hold; Otherwise 2.5 mg on Mon, Wed, Fri; 5 mg all other days   Weekly total 27.5 mg   Plan last modified Venessa Moura RN (5/16/2017)   Next INR check 5/26/2017   Target end date     Indications   Long-term (current) use of anticoagulants [Z79.01] [Z79.01]  Atrial fibrillation with RVR (H) [I48.91]  S/P AVR (aortic valve replacement) [Z95.2]         Anticoagulation Episode Summary     INR check location     Preferred lab     Send INR reminders to [facility] JOMAR    Comments 2.5 mg tablets      Anticoagulation Care Providers     Provider Role Specialty Phone number    Dorcas Fisher MD [facility] [phone]            See the Encounter Report to view Anticoagulation Flowsheet and Dosing Calendar (Go to Encounters tab in chart review, and find the Anticoagulation Therapy Visit)    Dosage adjustment made based on physician directed care plan.    Hold Tuesday then decrease to 2.5 mg Mon, Wed, Fri and 5 mg ROW    Venessa Moura, RN

## 2017-05-16 NOTE — MR AVS SNAPSHOT
Khalida Rouse   5/16/2017 1:00 PM   Anticoagulation Therapy Visit    Description:  77 year old female   Provider:  PH ANTI COAG   Department:  [facility]           INR as of 5/16/2017     Today's INR 4.4!      Anticoagulation Summary as of 5/16/2017     INR goal 2.0-3.0   Today's INR 4.4!   Full instructions 5/23: Hold; Otherwise 2.5 mg on Mon, Wed, Fri; 5 mg all other days   Next INR check 5/26/2017    Indications   Long-term (current) use of anticoagulants [Z79.01] [Z79.01]  Atrial fibrillation with RVR (H) [I48.91]  S/P AVR (aortic valve replacement) [Z95.2]         Your next Anticoagulation Clinic appointment(s)     May 26, 2017  1:00 PM CDT   An[facility]agulation [facility] with PH ANTI COAG   [facility]    [address] [place] [id number]   [phone]              Contact Numbers     Clinic Number:         May 2017 Details    Sun Mon Tue Wed Thu Fri Sat      1               2               3               4               5               6                 7               8               9               10               11               12               13                 14               15               16      5 mg   See details      17      2.5 mg         18      5 mg         19      2.5 mg         20      5 mg           21      5 mg         22      2.5 mg         23      Hold         24      2.5 mg         25      5 mg         26            27                 28               29               30               31                   Date Details   05/16 This INR check       Date of next INR:  5/26/2017         How to take your warfarin dose     To take:  2.5 mg Take 1 of the 2.5 mg tablets.    To take:  5 mg Take 2 of the 2.5 mg tablets.    Hold Do not take your warfarin dose. See the Details table to the right for additional instructions.

## 2017-05-31 ENCOUNTER — ANTICOAGULATION THERAPY VISIT (OUTPATIENT)
Dept: ANTICOAGULATION | Facility: CLINIC | Age: 77
End: 2017-05-31
Payer: MEDICARE

## 2017-05-31 VITALS — SYSTOLIC BLOOD PRESSURE: 129 MMHG | HEART RATE: 80 BPM | DIASTOLIC BLOOD PRESSURE: 80 MMHG

## 2017-05-31 DIAGNOSIS — I48.91 ATRIAL FIBRILLATION WITH RVR (H): ICD-10-CM

## 2017-05-31 DIAGNOSIS — Z79.01 LONG-TERM (CURRENT) USE OF ANTICOAGULANTS: ICD-10-CM

## 2017-05-31 DIAGNOSIS — Z95.2 S/P AVR (AORTIC VALVE REPLACEMENT): ICD-10-CM

## 2017-05-31 LAB — INR POINT OF CARE: 3.7 (ref 0.86–1.14)

## 2017-05-31 PROCEDURE — 85610 PROTHROMBIN TIME: CPT | Mod: QW

## 2017-05-31 PROCEDURE — 36416 COLLJ CAPILLARY BLOOD SPEC: CPT

## 2017-05-31 PROCEDURE — 99207 ZZC NO CHARGE NURSE ONLY: CPT

## 2017-05-31 NOTE — PROGRESS NOTES
ANTICOAGULATION FOLLOW-UP CLINIC VISIT    Patient Name:  Khalida Rouse  Date:  5/31/2017  Contact Type:  Face to Face    SUBJECTIVE:     Patient Findings     Positives Unexplained INR or factor level change           OBJECTIVE    INR Protime   Date Value Ref Range Status   05/31/2017 3.7 (A) 0.86 - 1.14 Final       ASSESSMENT / PLAN  INR assessment SUPRA    Recheck INR In: 10 DAYS    INR Location Clinic      Anticoagulation Summary as of 5/31/2017     INR goal 2.0-3.0   Today's INR 3.7!   Maintenance plan 5 mg (2.5 mg x 2) on Mon, Fri; 2.5 mg (2.5 mg x 1) all other days   Full instructions 5 mg on Mon, Fri; 2.5 mg all other days   Weekly total 22.5 mg   Plan last modified Venessa Moura RN (5/31/2017)   Next INR check 6/9/2017   Target end date     Indications   Long-term (current) use of anticoagulants [Z79.01] [Z79.01]  Atrial fibrillation with RVR (H) [I48.91]  S/P AVR (aortic valve replacement) [Z95.2]         Anticoagulation Episode Summary     INR check location     Preferred lab     Send INR reminders to [facility] POOL    Comments 2.5 mg tablets      Anticoagulation Care Providers     Provider Role Specialty Phone number    Dorcas Fisher MD [facility] [phone]            See the Encounter Report to view Anticoagulation Flowsheet and Dosing Calendar (Go to Encounters tab in chart review, and find the Anticoagulation Therapy Visit)    Dosage adjustment made based on physician directed care plan.      Venessa Moura RN

## 2017-05-31 NOTE — MR AVS SNAPSHOT
Khalida Rouse   5/31/2017 3:00 PM   Anticoagulation Therapy Visit    Description:  77 year old female   Provider:  PH ANTI COAG   Department:  [facility]           INR as of 5/31/2017     Today's INR 3.7!      Anticoagulation Summary as of 5/31/2017     INR goal 2.0-3.0   Today's INR 3.7!   Full instructions 5 mg on Mon, Fri; 2.5 mg all other days   Next INR check 6/9/2017    Indications   Long-term (current) use of anticoagulants [Z79.01] [Z79.01]  Atrial fibrillation with RVR (H) [I48.91]  S/P AVR (aortic valve replacement) [Z95.2]         Your next Anticoagulation Clinic appointment(s)     May 31, 2017  3:00 PM CDT   Anticoagulation Visit with PH ANTI COAG   [facility] ([address] [address]   [phone]            Jun 09, 2017  1:00 PM CDT   Anticoagulation Visit with PH ANTI COAG   [facility] ([address] [address]   [phone]              Contact Numbers     Clinic Number:         May 2017 Details    Sun Mon Tue Wed Thu Fri Sat      1               2               3               4               5               6                 7               8               9               10               11               12               13                 14               15               16               17               18               19               20                 21               22               23               24               25               26               27                 28               29               30               31      2.5 mg   See details          Date Details   05/31 This INR check               How to take your warfarin dose     To take:  2.5 mg Take 1 of the 2.5 mg tablets.           June 2017 Details    Sun Mon Tue Wed Thu Fri Sat         1      2.5 mg         2      5 mg         3      2.5 mg           4      2.5 mg         5      5 mg          6      2.5 mg         7      2.5 mg         8      2.5 mg         9            10                 11               12               13               14               15               16               17                 18               19               20               21               22               23               24                 25               26               27               28               29               30                 Date Details   No additional details    Date of next INR:  6/9/2017         How to take your warfarin dose     To take:  2.5 mg Take 1 of the 2.5 mg tablets.    To take:  5 mg Take 2 of the 2.5 mg tablets.

## 2017-06-14 ENCOUNTER — ANTICOAGULATION THERAPY VISIT (OUTPATIENT)
Dept: ANTICOAGULATION | Facility: CLINIC | Age: 77
End: 2017-06-14
Payer: MEDICARE

## 2017-06-14 DIAGNOSIS — I48.91 ATRIAL FIBRILLATION WITH RVR (H): ICD-10-CM

## 2017-06-14 DIAGNOSIS — Z79.01 LONG-TERM (CURRENT) USE OF ANTICOAGULANTS: ICD-10-CM

## 2017-06-14 DIAGNOSIS — Z95.2 S/P AVR (AORTIC VALVE REPLACEMENT): ICD-10-CM

## 2017-06-14 LAB — INR POINT OF CARE: 2.5 (ref 0.86–1.14)

## 2017-06-14 PROCEDURE — 85610 PROTHROMBIN TIME: CPT | Mod: QW

## 2017-06-14 PROCEDURE — 36416 COLLJ CAPILLARY BLOOD SPEC: CPT

## 2017-06-14 PROCEDURE — 99207 ZZC NO CHARGE NURSE ONLY: CPT

## 2017-06-14 NOTE — MR AVS SNAPSHOT
Khalida ARMAAN Merrillman   6/14/2017 11:00 AM   Anticoagulation Therapy Visit    Description:  77 year old female   Provider:  JOSE ANTI LESLY   Department:  [facility]           INR as of 6/14/2017     Today's INR 2.5      Anticoagulation Summary as of 6/14/2017     INR goal 2.0-3.0   Today's INR 2.5   Full instructions 5 mg on Mon, Fri; 2.5 mg all other days   Next INR check 6/28/2017    Indications   Long-term (current) use of anticoagulants [Z79.01] [Z79.01]  Atrial fibrillation with RVR (H) [I48.91]  S/P AVR (aortic valve replacement) [Z95.2]         Your next Anticoagulation Clinic appointment(s)     Jun 28, 2017  1:00 PM CDT   Anticoagulation Visit with PH ANTI COAG   [facility]    [address] [place] [phone]   [phone]              Contact Numbers     Clinic Number:         June 2017 Details    Sun Mon Tue Wed Thu Fri Sat         1               2               3                 4               5               6               7               8               9               10                 11               12               13               14      2.5 mg   See details      15      2.5 mg         16      5 mg         17      2.5 mg           18      2.5 mg         19      5 mg         20      2.5 mg         21      2.5 mg         22      2.5 mg         23      5 mg         24      2.5 mg           25      2.5 mg         26      5 mg         27      2.5 mg         28            29               30                 Date Details   06/14 This INR check       Date of next INR:  6/28/2017         How to take your warfarin dose     To take:  2.5 mg Take 1 of the 2.5 mg tablets.    To take:  5 mg Take 2 of the 2.5 mg tablets.

## 2017-06-14 NOTE — PROGRESS NOTES
ANTICOAGULATION FOLLOW-UP CLINIC VISIT    Patient Name:  Khalida Rouse  Date:  6/14/2017  Contact Type:  Face to Face    SUBJECTIVE:     Patient Findings     Positives No Problem Findings           OBJECTIVE    INR Protime   Date Value Ref Range Status   06/14/2017 2.5 (A) 0.86 - 1.14 Final       ASSESSMENT / PLAN  INR assessment THER    Recheck INR In: 2 WEEKS    INR Location Clinic      Anticoagulation Summary as of 6/14/2017     INR goal 2.0-3.0   Today's INR 2.5   Maintenance plan 5 mg (2.5 mg x 2) on Mon, Fri; 2.5 mg (2.5 mg x 1) all other days   Full instructions 5 mg on Mon, Fri; 2.5 mg all other days   Weekly total 22.5 mg   No change documented Venessa Hood RN   Plan last modified Venessa Hood RN (5/31/2017)   Next INR check 6/28/2017   Target end date     Indications   Long-term (current) use of anticoagulants [Z79.01] [Z79.01]  Atrial fibrillation with RVR (H) [I48.91]  S/P AVR (aortic valve replacement) [Z95.2]         Anticoagulation Episode Summary     INR check location     Preferred lab     Send INR reminders to [facility]    Comments 2.5 mg tablets      Anticoagulation Care Providers     Provider Role Specialty Phone number    Dorcas Fisher MD [facility] [phone]            See the Encounter Report to view Anticoagulation Flowsheet and Dosing Calendar (Go to Encounters tab in chart review, and find the Anticoagulation Therapy Visit)    Dosage adjustment made based on physician directed care plan.      Venessa Hood RN

## 2017-06-28 ENCOUNTER — ANTICOAGULATION THERAPY VISIT (OUTPATIENT)
Dept: ANTICOAGULATION | Facility: CLINIC | Age: 77
End: 2017-06-28
Payer: MEDICARE

## 2017-06-28 DIAGNOSIS — Z95.2 S/P AVR (AORTIC VALVE REPLACEMENT): ICD-10-CM

## 2017-06-28 DIAGNOSIS — I48.91 ATRIAL FIBRILLATION WITH RVR (H): ICD-10-CM

## 2017-06-28 DIAGNOSIS — Z79.01 LONG-TERM (CURRENT) USE OF ANTICOAGULANTS: ICD-10-CM

## 2017-06-28 LAB — INR POINT OF CARE: 1.6 (ref 0.86–1.14)

## 2017-06-28 PROCEDURE — 36416 COLLJ CAPILLARY BLOOD SPEC: CPT

## 2017-06-28 PROCEDURE — 99207 ZZC NO CHARGE NURSE ONLY: CPT

## 2017-06-28 PROCEDURE — 85610 PROTHROMBIN TIME: CPT | Mod: QW

## 2017-06-28 RX ORDER — WARFARIN SODIUM 2.5 MG/1
TABLET ORAL
Qty: 144 TABLET | Refills: 0 | COMMUNITY
Start: 2017-06-28 | End: 2018-10-02

## 2017-06-28 NOTE — MR AVS SNAPSHOT
Khalidasal Rouse   6/28/2017 1:00 PM   Anticoagulation Therapy Visit    Description:  77 year old female   Provider:  JOSE ANTI COAG   Department:  [facility]           INR as of 6/28/2017     Today's INR 1.6!      Anticoagulation Summary as of 6/28/2017     INR goal 2.0-3.0   Today's INR 1.6!   Full instructions 5 mg on Mon, Wed, Fri; 2.5 mg all other days   Next INR check 7/12/2017    Indications   Long-term (current) use of anticoagulants [Z79.01] [Z79.01]  Atrial fibrillation with RVR (H) [I48.91]  S/P AVR (aortic valve replacement) [Z95.2]         Your next Anticoagulation Clinic appointment(s)     Jul 12, 2017  1:15 PM CDT   Anticoagulation Visit with JOSE ANTI LESLY   [facility]    [address] [place] [phone]   [phone]              Contact Numbers     Clinic Number:         June 2017 Details    Sun Mon Tue Wed Thu Fri Sat         1               2               3                 4               5               6               7               8               9               10                 11               12               13               14               15               16               17                 18               19               20               21               22               23               24                 25               26               27               28      5 mg   See details      29      2.5 mg         30      5 mg           Date Details   06/28 This INR check               How to take your warfarin dose     To take:  2.5 mg Take 1 of the 2.5 mg tablets.    To take:  5 mg Take 2 of the 2.5 mg tablets.           July 2017 Details    Sun Mon Tue Wed Thu Fri Sat           1      2.5 mg           2      2.5 mg         3      5 mg         4      2.5 mg         5      5 mg         6      2.5 mg         7      5 mg         8      2.5 mg           9      2.5 mg         10      5 mg         11      2.5 mg         12             13               14               15                 16               17               18               19               20               21               22                 23               24               25               26               27               28               29                 30               31                     Date Details   No additional details    Date of next INR:  7/12/2017         How to take your warfarin dose     To take:  2.5 mg Take 1 of the 2.5 mg tablets.    To take:  5 mg Take 2 of the 2.5 mg tablets.

## 2017-06-28 NOTE — PROGRESS NOTES
ANTICOAGULATION FOLLOW-UP CLINIC VISIT    Patient Name:  Khalida Rouse  Date:  6/28/2017  Contact Type:  Face to Face    SUBJECTIVE:     Patient Findings     Positives Change in diet/appetite (more greens- anticipates keeping this up)           OBJECTIVE    INR Protime   Date Value Ref Range Status   06/28/2017 1.6 (A) 0.86 - 1.14 Final       ASSESSMENT / PLAN  INR assessment SUB    Recheck INR In: 2 WEEKS    INR Location Clinic      Anticoagulation Summary as of 6/28/2017     INR goal 2.0-3.0   Today's INR 1.6!   Maintenance plan 5 mg (2.5 mg x 2) on Mon, Wed, Fri; 2.5 mg (2.5 mg x 1) all other days   Full instructions 5 mg on Mon, Wed, Fri; 2.5 mg all other days   Weekly total 25 mg   Plan last modified Venessa Hood RN (6/28/2017)   Next INR check 7/12/2017   Target end date     Indications   Long-term (current) use of anticoagulants [Z79.01] [Z79.01]  Atrial fibrillation with RVR (H) [I48.91]  S/P AVR (aortic valve replacement) [Z95.2]         Anticoagulation Episode Summary     INR check location     Preferred lab     Send INR reminders to [facility] POOL    Comments 2.5 mg tablets      Anticoagulation Care Providers     Provider Role Specialty Phone number    Dorcas Fisher MD [facility] [phone]            See the Encounter Report to view Anticoagulation Flowsheet and Dosing Calendar (Go to Encounters tab in chart review, and find the Anticoagulation Therapy Visit)    Dosage adjustment made based on physician directed care plan.        Venessa Hood RN

## 2017-06-29 DIAGNOSIS — I48.91 ATRIAL FIBRILLATION WITH RVR (H): ICD-10-CM

## 2017-06-29 RX ORDER — ATENOLOL 50 MG/1
TABLET ORAL
Qty: 90 TABLET | Refills: 0 | Status: SHIPPED | OUTPATIENT
Start: 2017-06-29 | End: 2017-08-12

## 2017-06-29 NOTE — TELEPHONE ENCOUNTER
Medication is being filled for 1 time refill only due to:  Has been a year since LOV. Need annual visit for med use.   CAITLIN Courtney

## 2017-06-29 NOTE — TELEPHONE ENCOUNTER
Atenolol       Last Written Prescription Date: 5/15/2017  Last Fill Quantity: 90, # refills: 0    Last Office Visit with [facility], [facility] or [facility] prescribing provider:  6/30/2016   Future Office Visit:        BP Readings from Last 3 Encounters:   05/31/17 129/80   05/16/17 123/68   04/25/17 125/74

## 2017-07-12 ENCOUNTER — ANTICOAGULATION THERAPY VISIT (OUTPATIENT)
Dept: ANTICOAGULATION | Facility: CLINIC | Age: 77
End: 2017-07-12
Payer: MEDICARE

## 2017-07-12 DIAGNOSIS — Z95.2 S/P AVR (AORTIC VALVE REPLACEMENT): ICD-10-CM

## 2017-07-12 DIAGNOSIS — Z79.01 LONG-TERM (CURRENT) USE OF ANTICOAGULANTS: ICD-10-CM

## 2017-07-12 DIAGNOSIS — I48.91 ATRIAL FIBRILLATION WITH RVR (H): ICD-10-CM

## 2017-07-12 LAB — INR POINT OF CARE: 2.2 (ref 0.86–1.14)

## 2017-07-12 PROCEDURE — 85610 PROTHROMBIN TIME: CPT | Mod: QW

## 2017-07-12 PROCEDURE — 99207 ZZC NO CHARGE NURSE ONLY: CPT

## 2017-07-12 PROCEDURE — 36416 COLLJ CAPILLARY BLOOD SPEC: CPT

## 2017-07-12 NOTE — PROGRESS NOTES
ANTICOAGULATION FOLLOW-UP CLINIC VISIT    Patient Name:  Khalida Rouse  Date:  7/12/2017  Contact Type:  Face to Face    SUBJECTIVE:     Patient Findings     Positives No Problem Findings           OBJECTIVE    INR Protime   Date Value Ref Range Status   07/12/2017 2.2 (A) 0.86 - 1.14 Final       ASSESSMENT / PLAN  INR assessment THER    Recheck INR In: 3 WEEKS    INR Location Clinic      Anticoagulation Summary as of 7/12/2017     INR goal 2.0-3.0   Today's INR 2.2   Maintenance plan 5 mg (2.5 mg x 2) on Mon, Wed, Fri; 2.5 mg (2.5 mg x 1) all other days   Full instructions 5 mg on Mon, Wed, Fri; 2.5 mg all other days   Weekly total 25 mg   No change documented Venessa Hood RN   Plan last modified Venessa Hood RN (6/28/2017)   Next INR check 8/2/2017   Target end date     Indications   Long-term (current) use of anticoagulants [Z79.01] [Z79.01]  Atrial fibrillation with RVR (H) [I48.91]  S/P AVR (aortic valve replacement) [Z95.2]         Anticoagulation Episode Summary     INR check location     Preferred lab     Send INR reminders to [facility] POOL    Comments 2.5 mg tablets      Anticoagulation Care Providers     Provider Role Specialty Phone number    Dorcas Fisher MD [facility] [phone]            See the Encounter Report to view Anticoagulation Flowsheet and Dosing Calendar (Go to Encounters tab in chart review, and find the Anticoagulation Therapy Visit)    Dosage adjustment made based on physician directed care plan.    Took a fall a week ago. Went to an out of network up [place]- no breaks found.     Venessa Hood RN

## 2017-07-31 ENCOUNTER — OFFICE VISIT (OUTPATIENT)
Dept: INTERNAL MEDICINE | Facility: CLINIC | Age: 77
End: 2017-07-31
Payer: MEDICARE

## 2017-07-31 VITALS
OXYGEN SATURATION: 95 % | TEMPERATURE: 97.7 F | HEART RATE: 99 BPM | DIASTOLIC BLOOD PRESSURE: 62 MMHG | SYSTOLIC BLOOD PRESSURE: 112 MMHG | WEIGHT: 139 LBS | BODY MASS INDEX: 22.44 KG/M2

## 2017-07-31 DIAGNOSIS — Z79.01 LONG-TERM (CURRENT) USE OF ANTICOAGULANTS: ICD-10-CM

## 2017-07-31 DIAGNOSIS — Z12.31 ENCOUNTER FOR SCREENING MAMMOGRAM FOR BREAST CANCER: ICD-10-CM

## 2017-07-31 DIAGNOSIS — I48.91 ATRIAL FIBRILLATION WITH RVR (H): ICD-10-CM

## 2017-07-31 DIAGNOSIS — Z12.11 SPECIAL SCREENING FOR MALIGNANT NEOPLASMS, COLON: Primary | ICD-10-CM

## 2017-07-31 DIAGNOSIS — Z95.2 S/P AVR (AORTIC VALVE REPLACEMENT): ICD-10-CM

## 2017-07-31 LAB
ANION GAP SERPL CALCULATED.3IONS-SCNC: 6 MMOL/L (ref 3–14)
BUN SERPL-MCNC: 17 MG/DL (ref 7–30)
CALCIUM SERPL-MCNC: 8.7 MG/DL (ref 8.5–10.1)
CHLORIDE SERPL-SCNC: 104 MMOL/L (ref 94–109)
CO2 SERPL-SCNC: 32 MMOL/L (ref 20–32)
CREAT SERPL-MCNC: 0.61 MG/DL (ref 0.52–1.04)
ERYTHROCYTE [DISTWIDTH] IN BLOOD BY AUTOMATED COUNT: 15.2 % (ref 10–15)
GFR SERPL CREATININE-BSD FRML MDRD: ABNORMAL ML/MIN/1.7M2
GLUCOSE SERPL-MCNC: 127 MG/DL (ref 70–99)
HCT VFR BLD AUTO: 42.9 % (ref 35–47)
HGB BLD-MCNC: 13.7 G/DL (ref 11.7–15.7)
MCH RBC QN AUTO: 28.7 PG (ref 26.5–33)
MCHC RBC AUTO-ENTMCNC: 31.9 G/DL (ref 31.5–36.5)
MCV RBC AUTO: 90 FL (ref 78–100)
PLATELET # BLD AUTO: 153 10E9/L (ref 150–450)
POTASSIUM SERPL-SCNC: 4.3 MMOL/L (ref 3.4–5.3)
RBC # BLD AUTO: 4.77 10E12/L (ref 3.8–5.2)
SODIUM SERPL-SCNC: 142 MMOL/L (ref 133–144)
WBC # BLD AUTO: 4.6 10E9/L (ref 4–11)

## 2017-07-31 PROCEDURE — 36415 COLL VENOUS BLD VENIPUNCTURE: CPT | Performed by: INTERNAL MEDICINE

## 2017-07-31 PROCEDURE — 99213 OFFICE O/P EST LOW 20 MIN: CPT | Performed by: INTERNAL MEDICINE

## 2017-07-31 PROCEDURE — 80048 BASIC METABOLIC PNL TOTAL CA: CPT | Performed by: INTERNAL MEDICINE

## 2017-07-31 PROCEDURE — 85027 COMPLETE CBC AUTOMATED: CPT | Performed by: INTERNAL MEDICINE

## 2017-07-31 ASSESSMENT — PAIN SCALES - GENERAL: PAINLEVEL: NO PAIN (0)

## 2017-07-31 NOTE — LETTER
Khalida Rouse  [address] [facility] [address]        August 8, 2017          Dear ,    We are writing to inform you of your test results.  Resulted Orders   CBC with platelets   Result Value Ref Range    WBC 4.6 4.0 - 11.0 10e9/L    RBC Count 4.77 3.8 - 5.2 10e12/L    Hemoglobin 13.7 11.7 - 15.7 g/dL    Hematocrit 42.9 35.0 - 47.0 %    MCV 90 78 - 100 fl    MCH 28.7 26.5 - 33.0 pg    MCHC 31.9 31.5 - 36.5 g/dL    RDW 15.2 (H) 10.0 - 15.0 %    Platelet Count 153 150 - 450 10e9/L   Basic metabolic panel   Result Value Ref Range    Sodium 142 133 - 144 mmol/L    Potassium 4.3 3.4 - 5.3 mmol/L    Chloride 104 94 - 109 mmol/L    Carbon Dioxide 32 20 - 32 mmol/L    Anion Gap 6 3 - 14 mmol/L    Glucose 127 (H) 70 - 99 mg/dL    Urea Nitrogen 17 7 - 30 mg/dL    Creatinine 0.61 0.52 - 1.04 mg/dL    GFR Estimate >90  Non  GFR Calc   >60 mL/min/1.7m2    GFR Estimate If Black >90   GFR Calc   >60 mL/min/1.7m2    Calcium 8.7 8.5 - 10.1 mg/dL         Notes Recorded by Mazin Larsen DO on 8/7/2017 at 5:38 PM  The chemistry panel showed a blood sugar to be mildly elevated at 127.  The blood cell count was normal. No anemia or infection is noted.      If you have any questions or concerns, please call the clinic at the number listed above.           Thank you.  DO RAYNE SabillonOI

## 2017-07-31 NOTE — MR AVS SNAPSHOT
After Visit Summary   7/31/2017    Khalida Rouse    MRN: [id number]           Patient Information     Date Of Birth          1940        Visit Information        Provider Department      7/31/2017 1:40 PM Mazin Larsen DO [facility]        Today's Diagnoses     Special screening for malignant neoplasms, colon    -  1    Atrial fibrillation with RVR (H)        S/P AVR (aortic valve replacement)        Long-term (current) use of anticoagulants [Z79.01]        Encounter for screening mammogram for breast cancer          Care Instructions     Patient Instructions:  ++++++++++++++++++++++++++++++++++++++++++++   I have asked the patient to :    We will contact you to set up your colonoscopy at a time of your convenience    Please allow us to help set up your mammogram    Please continue your medications as current    Please stop by the laboratory before you leave    Thank you for allowing me to participate in your medical care.  Have a great day. Suzie                Follow-ups after your visit        Additional Services     GASTROENTEROLOGY ADULT REF PROCEDURE ONLY       Last Lab Result: Creatinine (mg/dL)       Date                     Value                 09/20/2016               0.61             ----------  Body mass index is 22.44 kg/(m^2).     Needed:  No  Language:  English    Patient will be contacted to schedule procedure.     Please be aware that coverage of these services is subject to the terms and limitations of your health insurance plan.  Call member services at your health plan with any benefit or coverage questions.  Any procedures must be performed at a [place] facility OR coordinated by your clinic's referral office.    Please bring the following with you to your appointment:    (1) Any X-Rays, CTs or MRIs which have been performed.  Contact the facility where they were done to arrange for  prior to your scheduled appointment.     (2) List of current medications   (3) This referral request   (4) Any documents/labs given to you for this referral                  Your next 10 appointments already scheduled     Aug 02, 2017  1:00 PM CDT   [facility] with PH ANTI COAG   [facility]    [address] [place] [address] [id number] [phone]            Aug 28, 2017 10:30 AM CDT   MA SCREENING DIGITAL BILATERAL with PHMA1   [facility] ([facility])    [address] [place] [id number] [phone]           Do not use any powder, lotion or deodorant under your arms or on your breast. If you do, we will ask you to remove it before your exam.  Wear comfortable, two-piece clothing.  If you have any allergies, tell your care team.  Bring any previous mammograms from other facilities or have them mailed to the breast center.              Future tests that were ordered for you today     Open Future Orders        Priority Expected Expires Ordered    *MA Screening Digital Bilateral Routine  7/31/2018 7/31/2017            Who to contact     If you have questions or need follow up information about today's clinic visit or your schedule please contact [facility] directly at [phone].  Normal or non-critical lab and imaging results will be communicated to you by IntroBridgehart, letter or phone within 4 business days after the clinic has received the results. If you do not hear from us within 7 days, please contact the clinic through IntroBridgehart or phone. If you have a critical or abnormal lab result, we will notify you by phone as soon as possible.  Submit refill requests through Mad Mimi or call your pharmacy and they will forward the refill request to us. Please allow 3 business days for your refill to be completed.          Additional Information About Your Visit        Mad Mimi Information     Mad Mimi lets you send messages to your doctor, view  "your test results, renew your prescriptions, schedule appointments and more. To sign up, go to [email or web address] . Click on \"Log in\" on the left side of the screen, which will take you to the Welcome page. Then click on \"Sign up Now\" on the right side of the page.     You will be asked to enter the access code listed below, as well as some personal information. Please follow the directions to create your username and password.     Your access code is: [email or web address]  Expires: 10/29/2017  2:06 PM     Your access code will  in 90 days. If you need help or a new code, please call your [place] clinic or [phone].        Care EveryWhere ID     This is your Care EveryWhere ID. This could be used by other organizations to access your [place] medical records  [id number]        Your Vitals Were     Pulse Temperature Pulse Oximetry BMI (Body Mass Index)          99 97.7  F (36.5  C) (Temporal) 95% 22.44 kg/m2         Blood Pressure from Last 3 Encounters:   17 112/62   17 129/80   17 123/68    Weight from Last 3 Encounters:   17 139 lb (63 kg)   17 139 lb 3.2 oz (63.1 kg)   10/11/16 138 lb 1.6 oz (62.6 kg)              We Performed the Following     Basic metabolic panel     CBC with platelets     GASTROENTEROLOGY ADULT REF PROCEDURE ONLY        Primary Care Provider    None Specified       No primary provider on file.        Equal Access to Services     RAJWINDER HERNANDEZ : Hadii irene nowako Donna, waaxda shenqadaha, qaybta kaalmada amira, nubia romero . So [facility] [phone].    ATENCIÓN: Si habla español, tiene a louis disposición servicios gratuitos de asistencia lingüística. Llame al [phone].    We comply with applicable federal civil rights laws and [place] laws. We do not discriminate on the basis of race, color, national origin, age, disability sex, sexual orientation or gender identity.            Thank you!     Thank you for choosing " [facility]  for your care. Our goal is always to provide you with excellent care. Hearing back from our patients is one way we can continue to improve our services. Please take a few minutes to complete the written survey that you may receive in the mail after your visit with us. Thank you!             Your Updated Medication List - Protect others around you: Learn how to safely use, store and throw away your medicines at www.disposemymeds.org.          This list is accurate as of: 7/31/17  2:06 PM.  Always use your most recent med list.                   Brand Name Dispense Instructions for use Diagnosis    aspirin EC 81 MG EC tablet     30 tablet    Take 1 tablet (81 mg) by mouth daily    S/P AVR (aortic valve replacement), Atrial flutter (H)       atenolol 50 MG tablet    TENORMIN    90 tablet    TAKE ONE TABLET BY MOUTH TWICE A DAY    Atrial fibrillation with RVR (H)       calcium carbonate-vitamin D 500-400 MG-UNIT Tabs per tablet      Take 1 tablet by mouth 3 times daily    Medicare annual wellness visit, subsequent, Osteoporosis       diltiazem 180 MG 24 hr capsule     180 capsule    Take 1 capsule (180 mg) by mouth 2 times daily    Atrial flutter (H)       multivitamins with minerals Liqd liquid      Take 15 mLs by mouth daily    Medicare annual wellness visit, subsequent       torsemide 20 MG tablet    DEMADEX    30 tablet    TAKE ONE TABLET BY MOUTH ONCE DAILY AS NEEDED    Acute pulmonary edema (H)       warfarin 2.5 MG tablet    COUMADIN    144 tablet    Take 5 mg on Monday, Wednesday, Friday and 2.5 mg all other days, or as directed by the coumadin clinic.    Long-term (current) use of anticoagulants, Atrial fibrillation with RVR (H)

## 2017-07-31 NOTE — PATIENT INSTRUCTIONS
Patient Instructions:  ++++++++++++++++++++++++++++++++++++++++++++   I have asked the patient to :    We will contact you to set up your colonoscopy at a time of your convenience    Please allow us to help set up your mammogram    Please continue your medications as current    Please stop by the laboratory before you leave    Thank you for allowing me to participate in your medical care.  Have a great day. Suzie

## 2017-07-31 NOTE — PROGRESS NOTES
SUBJECTIVE:                                                    Khalida Rouse is a 77 year old female who presents to clinic today for the following health issues:    New Patient/Transfer of Care        CHIEF COMPLAINT:    The patient is a pleasant 77-year-old male who appears to be substantially younger than her stated age. She does have a history of chronic atrial fibrillation as well as a bioprosthetic aortic valve. She is on warfarin despite the recommendations of her natural path. We discussed atrial fibrillation in detail and the risks and benefits of chronic anticoagulation and she would like to continue with it. She is a little bit behind on her health maintenance, and would like to set up a colonoscopy and mammogram. She takes her medications compliantly and has had excellent rate control as well as blood pressure control. She's had no syncope or nursing BP. She's had no chest pain, bruising or bleeding.                         PAST, FAMILY,SOCIAL HISTORY:     Medical  History:   has a past medical history of Aortic valve stenosis; Atrial flutter (H); Cardiomyopathy (H); Severe aortic stenosis (4/7/2014); and Shoulder fracture, left (3/22/15).     Surgical History:   has a past surgical history that includes ENT surgery; Replace valve aortic (4/10/2014); and cataract iol, rt/lt (Right).     Social History:   reports that she has never smoked. She has never used smokeless tobacco. She reports that she does not drink alcohol or use illicit drugs.     Family History:  family history includes Alzheimer Disease in her sister; Dementia in her brother.            MEDICATIONS  Current Outpatient Prescriptions   Medication Sig Dispense Refill     atenolol (TENORMIN) 50 MG tablet TAKE ONE TABLET BY MOUTH TWICE A DAY 90 tablet 0     warfarin (COUMADIN) 2.5 MG tablet Take 5 mg on Monday, Wednesday, Friday and 2.5 mg all other days, or as directed by the coumadin clinic. 144 tablet 0     diltiazem 180 MG 24 hr capsule  Take 1 capsule (180 mg) by mouth 2 times daily 180 capsule 2     torsemide (DEMADEX) 20 MG tablet TAKE ONE TABLET BY MOUTH ONCE DAILY AS NEEDED 30 tablet 11     calcium carbonate-vitamin D 500-400 MG-UNIT TABS tablt Take 1 tablet by mouth 3 times daily       multivitamins with minerals (CERTAVITE) LIQD Take 15 mLs by mouth daily       aspirin EC 81 MG tablet Take 1 tablet (81 mg) by mouth daily 30 tablet 6         --------------------------------------------------------------------------------------------------------------------                          REVIEW OF SYSTEMS:         LUNGS: Pt denies: cough,excess sputum, hemoptysis, or shortness of breath.   HEART: Pt denies: chest pain, arrythmia, syncope, tachy or bradyarrhythmia or excess edema.   GI: Pt denies: nausea, vomitting, diarrhea, constipation, melena, or hematochezia.   NEURO: Pt denies: seizures, strokes, diplopia, weakness, paraesthesias, or paralysis.   SKIN: Pt denies: itching, rashes, discoloration, or specific lesions of concern. Denies recent hair loss.                          EXAMINATION:         /62 (BP Location: Left arm, Patient Position: Chair, Cuff Size: Adult Regular)  Pulse 99  Temp 97.7  F (36.5  C) (Temporal)  Wt 139 lb (63 kg)  SpO2 95%  BMI 22.44 kg/m2   Constitutional: The patient appears to be in no acute distress. The patient appears to be adequately hydrated. No acute respiratory or hemodynamic distress is noted at this time.   LUNGS: clear bilaterally, airflow is brisk, no intercostal retraction or stridor is noted. No coughing is noted during visit.   HEART:  Irregular without rubs, clicks, gallops, or murmurs. PMI is nondisplaced. Upstrokes are brisk. S1,S2 are heard.   GI: Abdomen is soft, without rebound, guarding or tenderness. Bowel sounds are appropriate. No renal bruits are heard.    NEURO: Pt is alert and appropriate. No neurologic lateralization is noted. Cranial nerves 2-12 are intact. Peripheral sensory and  motor function are grossly normal                        DECISION MAKIN. Special screening for malignant neoplasms, colon  We'll set up colonoscopy  - GASTROENTEROLOGY ADULT REF PROCEDURE ONLY    2. Atrial fibrillation with RVR (H)  Continue anticoagulation  - Basic metabolic panel    3. S/P AVR (aortic valve replacement)  Currently stable, follows with cardiology    4. Long-term (current) use of anticoagulants [Z79.01]  Check for occult anemia  - CBC with platelets    5. Encounter for screening mammogram for breast cancer  Set up mammography  - *MA Screening Digital Bilateral; Future                             FOLLOW UP    I have asked the patient to make an appointment for follow up with me in 6 months or as needed        I have carefully explained the diagnosis and treatment options with the patient. The patient has displayed an understanding of the above, and all subsequent questions were answered.         DO MARIA D Sabillon    Portions of this note were produced using [employer]  Although every attempt at real-time proof reading has been made, occasional grammar/syntax errors may have been missed.

## 2017-07-31 NOTE — NURSING NOTE
"Chief Complaint   Patient presents with     Establish Care       Initial /62 (BP Location: Left arm, Patient Position: Chair, Cuff Size: Adult Regular)  Pulse 99  Temp 97.7  F (36.5  C) (Temporal)  Wt 139 lb (63 kg)  SpO2 95%  BMI 22.44 kg/m2 Estimated body mass index is 22.44 kg/(m^2) as calculated from the following:    Height as of 4/18/17: 5' 6\" (1.676 m).    Weight as of this encounter: 139 lb (63 kg).  Medication Reconciliation: complete  Krupa MORENO    "

## 2017-08-01 ENCOUNTER — TELEPHONE (OUTPATIENT)
Dept: INTERNAL MEDICINE | Facility: CLINIC | Age: 77
End: 2017-08-01

## 2017-08-01 NOTE — LETTER
Deakarishma Gaxiola,    At [facility] we care about your health and are committed to providing quality patient care, which includes staying current on preventive cancer screenings.  You can increase your chances of finding and treating cancers through regular screenings.     Our records indicate you may be due for the following preventive screening(s):    Colonoscopy    Colonoscopy is recommended every ten years for everyone age 50 and older. We strongly urge our patient's to consider having a colonoscopy done, which is the best screening test available and only needs to be done every 10 years if results are normal. If you are unwilling or unable to have a colonoscopy then we recommend the annual stool testing for blood. This test is called a FIT test and it looks for blood in the stool.       To schedule your colonoscopy, you may contact us by phone at [phone]    If you have had any of the screenings listed above at another facility, please call us so that we may update your chart.          Thank you,    Your [facility] Team

## 2017-08-01 NOTE — TELEPHONE ENCOUNTER
Called patient to schedule scope. Patient wasn't at home so she was unable to schedule. States she will call later.

## 2017-08-02 ENCOUNTER — ANTICOAGULATION THERAPY VISIT (OUTPATIENT)
Dept: ANTICOAGULATION | Facility: CLINIC | Age: 77
End: 2017-08-02
Payer: MEDICARE

## 2017-08-02 VITALS — DIASTOLIC BLOOD PRESSURE: 71 MMHG | HEART RATE: 79 BPM | SYSTOLIC BLOOD PRESSURE: 123 MMHG

## 2017-08-02 DIAGNOSIS — I48.91 ATRIAL FIBRILLATION WITH RVR (H): ICD-10-CM

## 2017-08-02 DIAGNOSIS — Z79.01 LONG-TERM (CURRENT) USE OF ANTICOAGULANTS: ICD-10-CM

## 2017-08-02 DIAGNOSIS — Z95.2 S/P AVR (AORTIC VALVE REPLACEMENT): ICD-10-CM

## 2017-08-02 LAB — INR POINT OF CARE: 2.4 (ref 0.86–1.14)

## 2017-08-02 PROCEDURE — 36416 COLLJ CAPILLARY BLOOD SPEC: CPT

## 2017-08-02 PROCEDURE — 99207 ZZC NO CHARGE NURSE ONLY: CPT

## 2017-08-02 PROCEDURE — 85610 PROTHROMBIN TIME: CPT | Mod: QW

## 2017-08-02 NOTE — PROGRESS NOTES
ANTICOAGULATION FOLLOW-UP CLINIC VISIT    Patient Name:  Khalida Rouse  Date:  8/2/2017  Contact Type:  Face to Face    SUBJECTIVE:     Patient Findings     Positives No Problem Findings           OBJECTIVE    INR Protime   Date Value Ref Range Status   08/02/2017 2.4 (A) 0.86 - 1.14 Final       ASSESSMENT / PLAN  INR assessment THER    Recheck INR In: 3 WEEKS    INR Location Clinic      Anticoagulation Summary as of 8/2/2017     INR goal 2.0-3.0   Today's INR 2.4   Maintenance plan 5 mg (2.5 mg x 2) on Mon, Wed, Fri; 2.5 mg (2.5 mg x 1) all other days   Full instructions 5 mg on Mon, Wed, Fri; 2.5 mg all other days   Weekly total 25 mg   No change documented Venessa Hood RN   Plan last modified Venessa Hood RN (6/28/2017)   Next INR check 8/28/2017   Target end date     Indications   Long-term (current) use of anticoagulants [Z79.01] [Z79.01]  Atrial fibrillation with RVR (H) [I48.91]  S/P AVR (aortic valve replacement) [Z95.2]         Anticoagulation Episode Summary     INR check location     Preferred lab     Send INR reminders to [facility] POOL    Comments 2.5 mg tablets      Anticoagulation Care Providers     Provider Role Specialty Phone number    Dorcas Fisher MD [facility] [phone]            See the Encounter Report to view Anticoagulation Flowsheet and Dosing Calendar (Go to Encounters tab in chart review, and find the Anticoagulation Therapy Visit)    Dosage adjustment made based on physician directed care plan.      Venessa Hood RN

## 2017-08-02 NOTE — MR AVS SNAPSHOT
Khalida ARMAAN Rouse   8/2/2017 1:00 PM   Anticoagulation Therapy Visit    Description:  77 year old female   Provider:  JOSE ANTI COAG   Department:  [facility]           INR as of 8/2/2017     Today's INR 2.4      Anticoagulation Summary as of 8/2/2017     INR goal 2.0-3.0   Today's INR 2.4   Full instructions 5 mg on Mon, Wed, Fri; 2.5 mg all other days   Next INR check 8/28/2017    Indications   Long-term (current) use of anticoagulants [Z79.01] [Z79.01]  Atrial fibrillation with RVR (H) [I48.91]  S/P AVR (aortic valve replacement) [Z95.2]         Your next Anticoagulation Clinic appointment(s)     Aug 28, 2017 10:15 AM CDT   Anticoagulation Visit with PH ANTI COAG   [facility]    [address] [place] [id number]   [phone]              Contact Numbers     Clinic Number:         August 2017 Details    Sun Mon Tue Wed Thu Fri Sat       1               2      5 mg   See details      3      2.5 mg         4      5 mg         5      2.5 mg           6      2.5 mg         7      5 mg         8      2.5 mg         9      5 mg         10      2.5 mg         11      5 mg         12      2.5 mg           13      2.5 mg         14      5 mg         15      2.5 mg         16      5 mg         17      2.5 mg         18      5 mg         19      2.5 mg           20      2.5 mg         21      5 mg         22      2.5 mg         23      5 mg         24      2.5 mg         25      5 mg         26      2.5 mg           27      2.5 mg         28            29               30               31                  Date Details   08/02 This INR check       Date of next INR:  8/28/2017         How to take your warfarin dose     To take:  2.5 mg Take 1 of the 2.5 mg tablets.    To take:  5 mg Take 2 of the 2.5 mg tablets.

## 2017-08-07 NOTE — TELEPHONE ENCOUNTER
Left message for patient to return call to schedule colonoscopy or EGD. If Juliane or Laura are unavailable, please transfer to the surgery center.     OK to schedule with Cooper

## 2017-08-07 NOTE — PROGRESS NOTES
Please contact the patient and notify her of the following:  The chemistry panel showed a blood sugar to be mildly elevated at 127.  The blood cell count was normal. No anemia or infection is noted.  Thank you.  DO MARIA D Sabillon

## 2017-08-11 NOTE — TELEPHONE ENCOUNTER
Left message for patient to return call to schedule EGD/colonoscopy. If Laura or Juliane are not available, please transfer to same day surgery        X3, letter sent

## 2017-08-12 DIAGNOSIS — I48.91 ATRIAL FIBRILLATION WITH RVR (H): ICD-10-CM

## 2017-08-14 NOTE — TELEPHONE ENCOUNTER
atenolol (TENORMIN) 50 MG tablet           Last Written Prescription Date: 6/29/17  Last Fill Quantity: 90, # refills: 0    Last Office Visit with [facility], [facility] or [facility] prescribing provider:  7/31/17       Future Office Visit:        BP Readings from Last 3 Encounters:   08/02/17 123/71   07/31/17 112/62   05/31/17 129/80

## 2017-08-16 RX ORDER — ATENOLOL 50 MG/1
TABLET ORAL
Qty: 90 TABLET | Refills: 8 | Status: SHIPPED | OUTPATIENT
Start: 2017-08-16 | End: 2017-10-10

## 2017-08-16 NOTE — TELEPHONE ENCOUNTER
Prescription approved per [facility] [place].  CAITLIN Courtney

## 2017-08-23 ENCOUNTER — TELEPHONE (OUTPATIENT)
Dept: SURGERY | Facility: CLINIC | Age: 77
End: 2017-08-23

## 2017-08-23 DIAGNOSIS — Z12.11 ENCOUNTER FOR SCREENING COLONOSCOPY: Primary | ICD-10-CM

## 2017-08-23 RX ORDER — BISACODYL 5 MG/1
15 TABLET, DELAYED RELEASE ORAL ONCE
Qty: 4 TABLET | Refills: 0 | Status: SHIPPED | OUTPATIENT
Start: 2017-08-23 | End: 2017-08-23

## 2017-08-23 NOTE — TELEPHONE ENCOUNTER
Khalida is scheduled for her Colonoscopy 10/11/17 w/Fercho. Arrival 900am, Procedure 1000am. Please mail prep instructions.

## 2017-08-26 DIAGNOSIS — I48.91 ATRIAL FIBRILLATION WITH RVR (H): ICD-10-CM

## 2017-08-26 DIAGNOSIS — Z79.01 LONG-TERM (CURRENT) USE OF ANTICOAGULANTS: ICD-10-CM

## 2017-08-28 RX ORDER — WARFARIN SODIUM 2.5 MG/1
TABLET ORAL
Qty: 144 TABLET | Refills: 0 | Status: SHIPPED | OUTPATIENT
Start: 2017-08-28 | End: 2018-04-10

## 2017-09-06 ENCOUNTER — HOSPITAL ENCOUNTER (OUTPATIENT)
Dept: MAMMOGRAPHY | Facility: CLINIC | Age: 77
Discharge: HOME | End: 2017-09-06
Attending: INTERNAL MEDICINE | Admitting: INTERNAL MEDICINE
Payer: MEDICARE

## 2017-09-06 ENCOUNTER — ANTICOAGULATION THERAPY VISIT (OUTPATIENT)
Dept: ANTICOAGULATION | Facility: CLINIC | Age: 77
End: 2017-09-06
Payer: MEDICARE

## 2017-09-06 VITALS — HEART RATE: 83 BPM | DIASTOLIC BLOOD PRESSURE: 68 MMHG | SYSTOLIC BLOOD PRESSURE: 124 MMHG

## 2017-09-06 DIAGNOSIS — Z95.2 S/P AVR (AORTIC VALVE REPLACEMENT): ICD-10-CM

## 2017-09-06 DIAGNOSIS — Z12.31 VISIT FOR SCREENING MAMMOGRAM: ICD-10-CM

## 2017-09-06 DIAGNOSIS — Z79.01 LONG-TERM (CURRENT) USE OF ANTICOAGULANTS: ICD-10-CM

## 2017-09-06 DIAGNOSIS — I48.91 ATRIAL FIBRILLATION WITH RVR (H): ICD-10-CM

## 2017-09-06 LAB — INR POINT OF CARE: 2.1 (ref 0.86–1.14)

## 2017-09-06 PROCEDURE — 99207 ZZC NO CHARGE NURSE ONLY: CPT

## 2017-09-06 PROCEDURE — 85610 PROTHROMBIN TIME: CPT | Mod: QW

## 2017-09-06 PROCEDURE — G0202 SCR MAMMO BI INCL CAD: HCPCS

## 2017-09-06 PROCEDURE — 36416 COLLJ CAPILLARY BLOOD SPEC: CPT

## 2017-09-06 NOTE — PROGRESS NOTES
ANTICOAGULATION FOLLOW-UP CLINIC VISIT    Patient Name:  Khalida Rouse  Date:  9/6/2017  Contact Type:  Face to Face    SUBJECTIVE:     Patient Findings     Positives No Problem Findings           OBJECTIVE    INR Protime   Date Value Ref Range Status   09/06/2017 2.1 (A) 0.86 - 1.14 Final       ASSESSMENT / PLAN  INR assessment THER    Recheck INR In: 4 WEEKS    INR Location Clinic      Anticoagulation Summary as of 9/6/2017     INR goal 2.0-3.0   Today's INR 2.1   Maintenance plan 5 mg (2.5 mg x 2) on Mon, Wed, Fri; 2.5 mg (2.5 mg x 1) all other days   Full instructions 5 mg on Mon, Wed, Fri; 2.5 mg all other days   Weekly total 25 mg   No change documented Venessa Hood RN   Plan last modified Venessa Hood RN (6/28/2017)   Next INR check 10/4/2017   Target end date     Indications   Long-term (current) use of anticoagulants [Z79.01] [Z79.01]  Atrial fibrillation with RVR (H) [I48.91]  S/P AVR (aortic valve replacement) [Z95.2]         Anticoagulation Episode Summary     INR check location     Preferred lab     Send INR reminders to [facility] POOL    Comments 2.5 mg tablets      Anticoagulation Care Providers     Provider Role Specialty Phone number    Dorcas Fisher MD [facility] [phone]            See the Encounter Report to view Anticoagulation Flowsheet and Dosing Calendar (Go to Encounters tab in chart review, and find the Anticoagulation Therapy Visit)    Dosage adjustment made based on physician directed care plan.        Venessa Hood RN

## 2017-09-06 NOTE — PROGRESS NOTES
Appropriate assistive devices provided during their visit.yes(Yes, No, N/A) cane (list device)    Exam table and/or cart  placed in the lowest position. na (Yes, No, N/A)    Brakes on tables/carts/wheelchairs used at all times. na (Yes, No, N/A)    Non slip footwear applied. na (Yes, No, NA)    Patient was accompanied by staff throughout visit. yes (Yes, No, N/A)    Equipment safety straps used. na (Yes, No, N/A)    Assist with toileting. na (Yes, No, N/A)

## 2017-09-06 NOTE — MR AVS SNAPSHOT
Khalida CROOK Desiraeman   9/6/2017 11:30 AM   Anticoagulation Therapy Visit    Description:  77 year old female   Provider:  JOSE ANTI COAG   Department:  [facility]           INR as of 9/6/2017     Today's INR 2.1      Anticoagulation Summary as of 9/6/2017     INR goal 2.0-3.0   Today's INR 2.1   Full instructions 5 mg on Mon, Wed, Fri; 2.5 mg all other days   Next INR check 10/4/2017    Indications   Long-term (current) use of anticoagulants [Z79.01] [Z79.01]  Atrial fibrillation with RVR (H) [I48.91]  S/P AVR (aortic valve replacement) [Z95.2]         Your next Anticoagulation Clinic appointment(s)     Oct 04, 2017  1:00 PM CDT   Anticoagulation Visit with JOSE ANTI LESLY   [facility]    [address] [place] [id number]   [phone]              Contact Numbers     Clinic Number:         September 2017 Details    Sun Mon Tue Wed Thu Fri Sat          1               2                 3               4               5               6      5 mg   See details      7      2.5 mg         8      5 mg         9      2.5 mg           10      2.5 mg         11      5 mg         12      2.5 mg         13      5 mg         14      2.5 mg         15      5 mg         16      2.5 mg           17      2.5 mg         18      5 mg         19      2.5 mg         20      5 mg         21      2.5 mg         22      5 mg         23      2.5 mg           24      2.5 mg         25      5 mg         26      2.5 mg         27      5 mg         28      2.5 mg         29      5 mg         30      2.5 mg          Date Details   09/06 This INR check               How to take your warfarin dose     To take:  2.5 mg Take 1 of the 2.5 mg tablets.    To take:  5 mg Take 2 of the 2.5 mg tablets.           October 2017 Details    Sun Mon Tue Wed Thu Fri Sat     1      2.5 mg         2      5 mg         3      2.5 mg         4            5               6               7                 8                9               10               11               12               13               14                 15               16               17               18               19               20               21                 22               23               24               25               26               27               28                 29               30               31                    Date Details   No additional details    Date of next INR:  10/4/2017         How to take your warfarin dose     To take:  2.5 mg Take 1 of the 2.5 mg tablets.    To take:  5 mg Take 2 of the 2.5 mg tablets.

## 2017-09-29 DIAGNOSIS — I48.92 ATRIAL FLUTTER (H): ICD-10-CM

## 2017-09-29 RX ORDER — DILTIAZEM HYDROCHLORIDE 180 MG/1
180 CAPSULE, COATED, EXTENDED RELEASE ORAL 2 TIMES DAILY
Qty: 180 CAPSULE | Refills: 0 | Status: SHIPPED | OUTPATIENT
Start: 2017-09-29 | End: 2017-12-26

## 2017-10-04 ENCOUNTER — ANTICOAGULATION THERAPY VISIT (OUTPATIENT)
Dept: ANTICOAGULATION | Facility: CLINIC | Age: 77
End: 2017-10-04
Payer: MEDICARE

## 2017-10-04 ENCOUNTER — HOSPITAL ENCOUNTER (OUTPATIENT)
Dept: CARDIOLOGY | Facility: CLINIC | Age: 77
Discharge: HOME | End: 2017-10-04
Attending: INTERNAL MEDICINE | Admitting: INTERNAL MEDICINE
Payer: MEDICARE

## 2017-10-04 DIAGNOSIS — Z79.01 LONG-TERM (CURRENT) USE OF ANTICOAGULANTS: ICD-10-CM

## 2017-10-04 DIAGNOSIS — Z95.2 S/P AVR (AORTIC VALVE REPLACEMENT): ICD-10-CM

## 2017-10-04 DIAGNOSIS — I48.91 ATRIAL FIBRILLATION WITH RVR (H): ICD-10-CM

## 2017-10-04 LAB — INR POINT OF CARE: 2 (ref 0.86–1.14)

## 2017-10-04 PROCEDURE — 85610 PROTHROMBIN TIME: CPT | Mod: QW

## 2017-10-04 PROCEDURE — 93306 TTE W/DOPPLER COMPLETE: CPT

## 2017-10-04 PROCEDURE — 93306 TTE W/DOPPLER COMPLETE: CPT | Mod: 26 | Performed by: INTERNAL MEDICINE

## 2017-10-04 PROCEDURE — 99207 ZZC NO CHARGE NURSE ONLY: CPT

## 2017-10-04 PROCEDURE — 36416 COLLJ CAPILLARY BLOOD SPEC: CPT

## 2017-10-04 NOTE — MR AVS SNAPSHOT
Khalida ARMAAN Rouse   10/4/2017 1:00 PM   Anticoagulation Therapy Visit    Description:  77 year old female   Provider:  JOSE ANTI COAG   Department:  [facility]           INR as of 10/4/2017     Today's INR 2.0      Anticoagulation Summary as of 10/4/2017     INR goal 2.0-3.0   Today's INR 2.0   Full instructions 5 mg on Mon, Wed, Fri; 2.5 mg all other days   Next INR check 11/1/2017    Indications   Long-term (current) use of anticoagulants [Z79.01] [Z79.01]  Atrial fibrillation with RVR (H) [I48.91]  S/P AVR (aortic valve replacement) [Z95.2]         Your next Anticoagulation Clinic appointment(s)     Nov 01, 2017  1:15 PM CDT   Anticoagulation Visit with JOSE ANTI LESLY   [facility]    [address] [place] [id number]   [phone]              Contact Numbers     Clinic Number:         October 2017 Details    Sun Mon Tue Wed Thu Fri Sat     1               2               3               4      5 mg   See details      5      2.5 mg         6      5 mg         7      2.5 mg           8      2.5 mg         9      5 mg         10      2.5 mg         11      5 mg         12      2.5 mg         13      5 mg         14      2.5 mg           15      2.5 mg         16      5 mg         17      2.5 mg         18      5 mg         19      2.5 mg         20      5 mg         21      2.5 mg           22      2.5 mg         23      5 mg         24      2.5 mg         25      5 mg         26      2.5 mg         27      5 mg         28      2.5 mg           29      2.5 mg         30      5 mg         31      2.5 mg              Date Details   10/04 This INR check               How to take your warfarin dose     To take:  2.5 mg Take 1 of the 2.5 mg tablets.    To take:  5 mg Take 2 of the 2.5 mg tablets.           November 2017 Details    Sun Mon Tue Wed Thu Fri Sat        1            2               3               4                 5               6               7                8               9               10               11                 12               13               14               15               16               17               18                 19               20               21               22               23               24               25                 26               27               28               29               30                  Date Details   No additional details    Date of next INR:  11/1/2017         How to take your warfarin dose     To take:  5 mg Take 2 of the 2.5 mg tablets.

## 2017-10-04 NOTE — PROGRESS NOTES
ANTICOAGULATION FOLLOW-UP CLINIC VISIT    Patient Name:  Khalida Rouse  Date:  10/4/2017  Contact Type:  Face to Face    SUBJECTIVE:     Patient Findings     Positives No Problem Findings           OBJECTIVE    INR Protime   Date Value Ref Range Status   10/04/2017 2.0 (A) 0.86 - 1.14 Final       ASSESSMENT / PLAN  INR assessment THER    Recheck INR In: 4 WEEKS    INR Location Clinic      Anticoagulation Summary as of 10/4/2017     INR goal 2.0-3.0   Today's INR 2.0   Maintenance plan 5 mg (2.5 mg x 2) on Mon, Wed, Fri; 2.5 mg (2.5 mg x 1) all other days   Full instructions 5 mg on Mon, Wed, Fri; 2.5 mg all other days   Weekly total 25 mg   No change documented Venessa Hood RN   Plan last modified Venessa Hood RN (6/28/2017)   Next INR check 11/1/2017   Target end date     Indications   Long-term (current) use of anticoagulants [Z79.01] [Z79.01]  Atrial fibrillation with RVR (H) [I48.91]  S/P AVR (aortic valve replacement) [Z95.2]         Anticoagulation Episode Summary     INR check location     Preferred lab     Send INR reminders to [facility] POOL    Comments 2.5 mg tablets      Anticoagulation Care Providers     Provider Role Specialty Phone number    Dorcas Fisher MD [facility] [phone]            See the Encounter Report to view Anticoagulation Flowsheet and Dosing Calendar (Go to Encounters tab in chart review, and find the Anticoagulation Therapy Visit)    Dosage adjustment made based on physician directed care plan.      Venessa Hood RN

## 2017-10-10 ENCOUNTER — OFFICE VISIT (OUTPATIENT)
Dept: CARDIOLOGY | Facility: CLINIC | Age: 77
End: 2017-10-10
Payer: MEDICARE

## 2017-10-10 VITALS
OXYGEN SATURATION: 96 % | HEART RATE: 68 BPM | WEIGHT: 134.9 LBS | HEIGHT: 66 IN | SYSTOLIC BLOOD PRESSURE: 138 MMHG | BODY MASS INDEX: 21.68 KG/M2 | DIASTOLIC BLOOD PRESSURE: 84 MMHG

## 2017-10-10 DIAGNOSIS — I48.20 CHRONIC ATRIAL FIBRILLATION (H): Primary | ICD-10-CM

## 2017-10-10 DIAGNOSIS — Z95.2 S/P AVR (AORTIC VALVE REPLACEMENT): ICD-10-CM

## 2017-10-10 PROCEDURE — 99214 OFFICE O/P EST MOD 30 MIN: CPT | Performed by: INTERNAL MEDICINE

## 2017-10-10 RX ORDER — METOPROLOL TARTRATE 50 MG
50 TABLET ORAL 2 TIMES DAILY
Qty: 180 TABLET | Refills: 3 | Status: SHIPPED | OUTPATIENT
Start: 2017-10-10 | End: 2018-04-10

## 2017-10-10 NOTE — LETTER
10/10/2017    Mazin Larsen, DO  [address] [place] [address]    RE: Khalida ARMAAN Desiraealfonso       Dear Colleague,    I had the pleasure of seeing Khalida Rouse in the [facility].    REASON FOR CLINIC VISIT:  Followup AVR, tricuspid regurgitation.      Ms. Rouse is a pleasant 77-year-old female with history of bioprosthetic aortic valve replacement in 04/2014 for severe aortic stenosis with a 21 mm bioprosthetic aortic valve, chronic atrial fibrillation on rate control strategy on Coumadin for stroke prophylaxis with 3+ tricuspid regurgitation.  Today, the patient is coming for routine followup.  She had a repeat echocardiogram a few days ago that showed normal LVEF, normal gradient across the prosthetic aortic valve, 3+ tricuspid regurgitation with mildly dilated RV and mildly reduced RV systolic function.  On direct personal comparison to echocardiogram in 04/2017, no significant changes are seen.  The RVSP average of 5 beats is around 39 mmHg, similar to previous echocardiogram.  Overall, patient tells me she is doing quite well.  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  She is also very active, doing laundry, going up and down the stairs several times a day.  No shortness of breath at rest or with physical activity.  Weights are stable.  No orthopnea, PND or any pedal edema.  To be noted, we have discussed about option of right heart catheterization, but the patient has declined that.  Overall, patient tells me she is very happy with her quality of life.  She is tolerating Coumadin along with baby aspirin quite well.  She is presently on diltiazem and atenolol for hypertension and rate control and due to shortage of atenolol she is requesting an alternative for atenolol.      PHYSICAL EXAMINATION:   VITAL SIGNS:  Blood pressure 138/84, heart rate 68 irregular, weight 134 pounds, BMI 21.82.   GENERAL:  The patient appears pleasant,  "comfortable.   NECK:  Normal JVP, no bruit.   CARDIOVASCULAR SYSTEM:  Irregular, normal rate, grade 3/6 systolic murmur heard best over the left lower sternal border, no S3, S4, rub or gallop.   RESPIRATORY SYSTEM:  Clear to auscultation bilaterally.   GASTROINTESTINAL:  Abdomen is soft and nontender.   EXTREMITIES:  No pitting pedal edema.   NEUROLOGICAL:  Alert, oriented x3.   PSYCHIATRIC:  Normal affect.   SKIN:  No obvious rash.   HEENT:  No pallor or icterus.      Echocardiogram done recently, images personally reviewed and compared with previous echocardiogram in 04/2017.  Overall, no significant changes.  There appears to be normal LV function, borderline to mildly reduced RV systolic function, 3+ tricuspid regurgitation, severe biatrial enlargement, normal gradients across the aortic valve, mild pulmonary hypertension with average RVSP of around 39 mmHg plus RA.      Outpatient Encounter Prescriptions as of 10/10/2017   Medication Sig Dispense Refill     metoprolol (LOPRESSOR) 50 MG tablet Take 1 tablet (50 mg) by mouth 2 times daily 180 tablet 3     diltiazem 180 MG 24 hr capsule Take 1 capsule (180 mg) by mouth 2 times daily 180 capsule 0     warfarin (COUMADIN) 2.5 MG tablet Take 5 mg Mon, Wed, Friday and take 2.5 mg all other days or as directed by the coumadin clinic. 144 tablet 0     polyethylene glycol (GOLYTELY/NULYTELY) 236 G suspension Refer to \"Getting Ready for a Colonoscopy\" instruction handout 4000 mL 0     warfarin (COUMADIN) 2.5 MG tablet Take 5 mg on Monday, Wednesday, Friday and 2.5 mg all other days, or as directed by the coumadin clinic. 144 tablet 0     torsemide (DEMADEX) 20 MG tablet TAKE ONE TABLET BY MOUTH ONCE DAILY AS NEEDED 30 tablet 11     calcium carbonate-vitamin D 500-400 MG-UNIT TABS tablt Take 1 tablet by mouth 3 times daily       multivitamins with minerals (CERTAVITE) LIQD Take 15 mLs by mouth daily       aspirin EC 81 MG tablet Take 1 tablet (81 mg) by mouth daily 30 " tablet 6     [DISCONTINUED] atenolol (TENORMIN) 50 MG tablet TAKE ONE TABLET BY MOUTH TWICE A DAY 90 tablet 8     No facility-administered encounter medications on file as of 10/10/2017.      IMPRESSION AND PLAN:  A very pleasant 77-year-old female status post bioprosthetic aortic valve replacement, chronic atrial fibrillation on rate control strategy on Coumadin for stroke prophylaxis with 3+ tricuspid regurgitation.  Overall, cardiac status-wise she is doing well without any symptoms of angina or congestive heart failure either left-sided or right-sided congestive heart failure.  As noted above, option of a right heart catheterization has been discussed with the patient and declined by patient.  For now, given her stable cardiac status, we will continue conservative management and I can see her back in 9 months with a repeat echocardiogram, sooner if any changes in clinical status. Will replace atneolol with metoprolol.           Again, thank you for allowing me to participate in the care of your patient.      Sincerely,    Jason Flanagan MD     [facility]

## 2017-10-10 NOTE — PROGRESS NOTES
"HPI and Plan:   See dictation (#[id number])    Orders Placed This Encounter   Procedures     Follow-Up with Cardiologist     Echocardiogram       Orders Placed This Encounter   Medications     metoprolol (LOPRESSOR) 50 MG tablet     Sig: Take 1 tablet (50 mg) by mouth 2 times daily     Dispense:  180 tablet     Refill:  3       Medications Discontinued During This Encounter   Medication Reason     atenolol (TENORMIN) 50 MG tablet          Encounter Diagnoses   Name Primary?     S/P AVR (aortic valve replacement)      Chronic atrial fibrillation (H) Yes       CURRENT MEDICATIONS:  Current Outpatient Prescriptions   Medication Sig Dispense Refill     metoprolol (LOPRESSOR) 50 MG tablet Take 1 tablet (50 mg) by mouth 2 times daily 180 tablet 3     diltiazem 180 MG 24 hr capsule Take 1 capsule (180 mg) by mouth 2 times daily 180 capsule 0     warfarin (COUMADIN) 2.5 MG tablet Take 5 mg Mon, Wed, Friday and take 2.5 mg all other days or as directed by the coumadin clinic. 144 tablet 0     polyethylene glycol (GOLYTELY/NULYTELY) 236 G suspension Refer to \"Getting Ready for a Colonoscopy\" instruction handout 4000 mL 0     warfarin (COUMADIN) 2.5 MG tablet Take 5 mg on Monday, Wednesday, Friday and 2.5 mg all other days, or as directed by the coumadin clinic. 144 tablet 0     torsemide (DEMADEX) 20 MG tablet TAKE ONE TABLET BY MOUTH ONCE DAILY AS NEEDED 30 tablet 11     calcium carbonate-vitamin D 500-400 MG-UNIT TABS tablt Take 1 tablet by mouth 3 times daily       multivitamins with minerals (CERTAVITE) LIQD Take 15 mLs by mouth daily       aspirin EC 81 MG tablet Take 1 tablet (81 mg) by mouth daily 30 tablet 6       ALLERGIES     Allergies   Allergen Reactions     Xarelto [Rivaroxaban] Diarrhea     Ace Inhibitors Cough       PAST MEDICAL HISTORY:  Past Medical History:   Diagnosis Date     Aortic valve stenosis      Atrial flutter (H)      Cardiomyopathy (H)      Severe aortic stenosis 4/7/2014     Shoulder fracture, left " "3/22/15       PAST SURGICAL HISTORY:  Past Surgical History:   Procedure Laterality Date     CATARACT IOL, RT/LT Right      ENT SURGERY      glaucoma surgery     REPLACE VALVE AORTIC  4/10/2014    Procedure: REPLACE VALVE AORTIC;  Median sternotomy, Replace Aortic Valve on pump oxygenation. ;  Surgeon: Wesley Fong MD;  Location:  OR       FAMILY HISTORY:  Family History   Problem Relation Age of Onset     Dementia Brother      Alzheimer Disease Sister        SOCIAL HISTORY:  Social History     Social History     Marital status:      Spouse name: N/A     Number of children: N/A     Years of education: N/A     Social History Main Topics     Smoking status: Never Smoker     Smokeless tobacco: Never Used     Alcohol use No     Drug use: No     Sexual activity: No     Other Topics Concern     Caffeine Concern No     Special Diet No     Exercise No     walking     Social History Narrative       Review of Systems:  Skin:  Negative       Eyes:  Positive for glasses    ENT:  Negative      Respiratory:  Negative       Cardiovascular:  Negative for;palpitations;chest pain;lightheadedness;dizziness      Gastroenterology: Negative      Genitourinary:  Negative      Musculoskeletal:  Negative      Neurologic:  Negative      Psychiatric:  Negative      Heme/Lymph/Imm:  Positive for allergies    Endocrine:  Negative        Physical Exam:  Vitals: /84 (BP Location: Right arm, Patient Position: Fowlers, Cuff Size: Adult Large)  Pulse 68  Ht 1.676 m (5' 6\")  Wt 61.2 kg (134 lb 14.4 oz)  SpO2 96%  BMI 21.77 kg/m2    Constitutional:           Skin:           Head:           Eyes:           ENT:           Neck:           Chest:             Cardiac:                    Abdomen:           Vascular:                                          Extremities and Back:                 Neurological:                 CC  No referring provider defined for this encounter.                  "

## 2017-10-10 NOTE — LETTER
"10/10/2017      RE: Khalida Rouse  [address]  [facility] [address]       Dear Colleague,    Thank you for the opportunity to participate in the care of your patient, Khalida Rouse, at the [facility]. Please see a copy of my visit note below.    HPI and Plan:   See dictation (#[id number])    Orders Placed This Encounter   Procedures     Follow-Up with Cardiologist     Echocardiogram       Orders Placed This Encounter   Medications     metoprolol (LOPRESSOR) 50 MG tablet     Sig: Take 1 tablet (50 mg) by mouth 2 times daily     Dispense:  180 tablet     Refill:  3       Medications Discontinued During This Encounter   Medication Reason     atenolol (TENORMIN) 50 MG tablet          Encounter Diagnoses   Name Primary?     S/P AVR (aortic valve replacement)      Chronic atrial fibrillation (H) Yes       CURRENT MEDICATIONS:  Current Outpatient Prescriptions   Medication Sig Dispense Refill     metoprolol (LOPRESSOR) 50 MG tablet Take 1 tablet (50 mg) by mouth 2 times daily 180 tablet 3     diltiazem 180 MG 24 hr capsule Take 1 capsule (180 mg) by mouth 2 times daily 180 capsule 0     warfarin (COUMADIN) 2.5 MG tablet Take 5 mg Mon, Wed, Friday and take 2.5 mg all other days or as directed by the coumadin clinic. 144 tablet 0     polyethylene glycol (GOLYTELY/NULYTELY) 236 G suspension Refer to \"Getting Ready for a Colonoscopy\" instruction handout 4000 mL 0     warfarin (COUMADIN) 2.5 MG tablet Take 5 mg on Monday, Wednesday, Friday and 2.5 mg all other days, or as directed by the coumadin clinic. 144 tablet 0     torsemide (DEMADEX) 20 MG tablet TAKE ONE TABLET BY MOUTH ONCE DAILY AS NEEDED 30 tablet 11     calcium carbonate-vitamin D 500-400 MG-UNIT TABS tablt Take 1 tablet by mouth 3 times daily       multivitamins with minerals (CERTAVITE) LIQD Take 15 mLs by mouth daily       aspirin EC 81 MG tablet Take 1 tablet (81 mg) by mouth daily 30 tablet 6 " "      ALLERGIES     Allergies   Allergen Reactions     Xarelto [Rivaroxaban] Diarrhea     Ace Inhibitors Cough       PAST MEDICAL HISTORY:  Past Medical History:   Diagnosis Date     Aortic valve stenosis      Atrial flutter (H)      Cardiomyopathy (H)      Severe aortic stenosis 4/7/2014     Shoulder fracture, left 3/22/15       PAST SURGICAL HISTORY:  Past Surgical History:   Procedure Laterality Date     CATARACT IOL, RT/LT Right      ENT SURGERY      glaucoma surgery     REPLACE VALVE AORTIC  4/10/2014    Procedure: REPLACE VALVE AORTIC;  Median sternotomy, Replace Aortic Valve on pump oxygenation. ;  Surgeon: Wesley Fong MD;  Location:  OR       FAMILY HISTORY:  Family History   Problem Relation Age of Onset     Dementia Brother      Alzheimer Disease Sister        SOCIAL HISTORY:  Social History     Social History     Marital status:      Spouse name: N/A     Number of children: N/A     Years of education: N/A     Social History Main Topics     Smoking status: Never Smoker     Smokeless tobacco: Never Used     Alcohol use No     Drug use: No     Sexual activity: No     Other Topics Concern     Caffeine Concern No     Special Diet No     Exercise No     walking     Social History Narrative       Review of Systems:  Skin:  Negative       Eyes:  Positive for glasses    ENT:  Negative      Respiratory:  Negative       Cardiovascular:  Negative for;palpitations;chest pain;lightheadedness;dizziness      Gastroenterology: Negative      Genitourinary:  Negative      Musculoskeletal:  Negative      Neurologic:  Negative      Psychiatric:  Negative      Heme/Lymph/Imm:  Positive for allergies    Endocrine:  Negative        Physical Exam:  Vitals: /84 (BP Location: Right arm, Patient Position: Fowlers, Cuff Size: Adult Large)  Pulse 68  Ht 1.676 m (5' 6\")  Wt 61.2 kg (134 lb 14.4 oz)  SpO2 96%  BMI 21.77 kg/m2    Constitutional:           Skin:           Head:           Eyes:           ENT: "           Neck:           Chest:             Cardiac:                    Abdomen:           Vascular:                                          Extremities and Back:                 Neurological:           Jason Flanagan MD

## 2017-10-10 NOTE — MR AVS SNAPSHOT
After Visit Summary   10/10/2017    Khalida Rouse    MRN: [id number]           Patient Information     Date Of Birth          1940        Visit Information        Provider Department      10/10/2017 1:00 PM Jason Flanagan MD [facility]        Today's Diagnoses     Chronic atrial fibrillation (H)    -  1    S/P AVR (aortic valve replacement)           Follow-ups after your visit        Additional Services     Follow-Up with Cardiologist                 Your next 10 appointments already scheduled     Nov 01, 2017  1:15 PM CDT   Anticoagulation Visit with PH ANTI COAG   [facility] ([facility]    [address] [place] [address]   [phone]              Future tests that were ordered for you today     Open Future Orders        Priority Expected Expires Ordered    Follow-Up with Cardiologist Routine 7/7/2018 10/10/2018 10/10/2017    Echocardiogram Routine 7/7/2018 10/10/2018 10/10/2017            Who to contact     If you have questions or need follow up information about today's clinic visit or your schedule please contact [facility] directly at [phone].  Normal or non-critical lab and imaging results will be communicated to you by InMobihart, letter or phone within 4 business days after the clinic has received the results. If you do not hear from us within 7 days, please contact the clinic through InMobihart or phone. If you have a critical or abnormal lab result, we will notify you by phone as soon as possible.  Submit refill requests through VeedMe or call your pharmacy and they will forward the refill request to us. Please allow 3 business days for your refill to be completed.          Additional Information About Your Visit        MyChart Information     VeedMe lets you send messages to your doctor, view your test results, renew your prescriptions, schedule appointments and more. To sign up, go to  [email or web address] . Click on \"Log in\" on the left side of the screen, which will take you to the Welcome page. Then click on \"Sign up Now\" on the right side of the page.     You will be asked to enter the access code listed below, as well as some personal information. Please follow the directions to create your username and password.     Your access code is: [email or web address]  Expires: 10/29/2017  2:06 PM     Your access code will  in 90 days. If you need help or a new code, please call your [facility] clinic or [phone].        Care EveryWhere ID     This is your Care EveryWhere ID. This could be used by other organizations to access your [facility] medical records  [id number]        Your Vitals Were     Pulse Height Pulse Oximetry BMI (Body Mass Index)          68 1.676 m (5' 6\") 96% 21.77 kg/m2         Blood Pressure from Last 3 Encounters:   10/10/17 138/84   17 124/68   17 123/71    Weight from Last 3 Encounters:   10/10/17 61.2 kg (134 lb 14.4 oz)   17 63 kg (139 lb)   17 63.1 kg (139 lb 3.2 oz)              We Performed the Following     Follow-Up with Cardiologist          Today's Medication Changes          These changes are accurate as of: 10/10/17  1:27 PM.  If you have any questions, ask your nurse or doctor.               Start taking these medicines.        Dose/Directions    metoprolol 50 MG tablet   Commonly known as:  LOPRESSOR   Used for:  S/P AVR (aortic valve replacement), Chronic atrial fibrillation (H)        Dose:  50 mg   Take 1 tablet (50 mg) by mouth 2 times daily   Quantity:  180 tablet   Refills:  3         Stop taking these medicines if you haven't already. Please contact your care team if you have questions.     atenolol 50 MG tablet   Commonly known as:  TENORMIN                Where to get your medicines      These medications were sent to [facility] [place] - [address]  [address] [facility] [address]     Phone:  [phone]     " metoprolol 50 MG tablet                Primary Care Provider Office Phone # Fax #    Mazin Larsen [phone] [address] [place] [address]        Equal Access to Services     RAJWINDER HERNANDEZ : Hadii irene ku hadxuano Soomaali, waaxda luqadaha, qaybta kaalmada adeegyada, waxelke ortezn dereck kerr nick victoria. So [facility] [phone].    ATENCIÓN: Si habla español, tiene a louis disposición servicios gratuitos de asistencia lingüística. Ll[facility] [phone].    We comply with applicable federal civil rights laws and [place] laws. We do not discriminate on the basis of race, color, national origin, age, disability, sex, sexual orientation, or gender identity.            Thank you!     Thank you for choosing [facility]  for your care. Our goal is always to provide you with excellent care. Hearing back from our patients is one way we can continue to improve our services. Please take a few minutes to complete the written survey that you may receive in the mail after your visit with us. Thank you!             Your Updated Medication List - Protect others around you: Learn how to safely use, store and throw away your medicines at www.disposemymeds.org.          This list is accurate as of: 10/10/17  1:27 PM.  Always use your most recent med list.                   Brand Name Dispense Instructions for use Diagnosis    aspirin EC 81 MG EC tablet     30 tablet    Take 1 tablet (81 mg) by mouth daily    S/P AVR (aortic valve replacement), Atrial flutter (H)       calcium carbonate-vitamin D 500-400 MG-UNIT Tabs per tablet      Take 1 tablet by mouth 3 times daily    Medicare annual wellness visit, subsequent, Osteoporosis       diltiazem 180 MG 24 hr capsule     180 capsule    Take 1 capsule (180 mg) by mouth 2 times daily    Atrial flutter (H)       metoprolol 50 MG tablet    LOPRESSOR    180 tablet    Take 1 tablet (50 mg) by mouth 2 times daily    S/P AVR (aortic valve  "replacement), Chronic atrial fibrillation (H)       multivitamins with minerals Liqd liquid      Take 15 mLs by mouth daily    Medicare annual wellness visit, subsequent       polyethylene glycol 236 G suspension    GoLYTELY/NuLYTELY    4000 mL    Refer to \"Getting Ready for a Colonoscopy\" instruction handout    Encounter for screening colonoscopy       torsemide 20 MG tablet    DEMADEX    30 tablet    TAKE ONE TABLET BY MOUTH ONCE DAILY AS NEEDED    Acute pulmonary edema (H)       * warfarin 2.5 MG tablet    COUMADIN    144 tablet    Take 5 mg on Monday, Wednesday, Friday and 2.5 mg all other days, or as directed by the coumadin clinic.    Long-term (current) use of anticoagulants, Atrial fibrillation with RVR (H)       * warfarin 2.5 MG tablet    COUMADIN    144 tablet    Take 5 mg Mon, Wed, Friday and take 2.5 mg all other days or as directed by the coumadin clinic.    Long-term (current) use of anticoagulants, Atrial fibrillation with RVR (H)       * Notice:  This list has 2 medication(s) that are the same as other medications prescribed for you. Read the directions carefully, and ask your doctor or other care provider to review them with you.      "

## 2017-10-10 NOTE — PROGRESS NOTES
REASON FOR CLINIC VISIT:  Followup AVR, tricuspid regurgitation.      HISTORY OF PRESENT ILLNESS:  Ms. Rouse is a pleasant 77-year-old female with history of bioprosthetic aortic valve replacement in 04/2014 for severe aortic stenosis with a 21 mm bioprosthetic aortic valve, chronic atrial fibrillation on rate control strategy on Coumadin for stroke prophylaxis with 3+ tricuspid regurgitation.  Today, the patient is coming for routine followup.  She had a repeat echocardiogram a few days ago that showed normal LVEF, normal gradient across the prosthetic aortic valve, 3+ tricuspid regurgitation with mildly dilated RV and mildly reduced RV systolic function.  On direct personal comparison to echocardiogram in 04/2017, no significant changes are seen.  The RVSP average of 5 beats is around 39 mmHg, similar to previous echocardiogram.  Overall, patient tells me she is doing quite well.  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  She is also very active, doing laundry, going up and down the stairs several times a day.  No shortness of breath at rest or with physical activity.  Weights are stable.  No orthopnea, PND or any pedal edema.  To be noted, we have discussed about option of right heart catheterization, but the patient has declined that.  Overall, patient tells me she is very happy with her quality of life.  She is tolerating Coumadin along with baby aspirin quite well.  She is presently on diltiazem and atenolol for hypertension and rate control and due to shortage of atenolol she is requesting an alternative for atenolol.      PHYSICAL EXAMINATION:   VITAL SIGNS:  Blood pressure 138/84, heart rate 68 irregular, weight 134 pounds, BMI 21.82.   GENERAL:  The patient appears pleasant, comfortable.   NECK:  Normal JVP, no bruit.   CARDIOVASCULAR SYSTEM:  Irregular, normal rate, grade 3/6 systolic murmur heard best over the left lower sternal border, no S3, S4, rub or gallop.    RESPIRATORY SYSTEM:  Clear to auscultation bilaterally.   GASTROINTESTINAL:  Abdomen is soft and nontender.   EXTREMITIES:  No pitting pedal edema.   NEUROLOGICAL:  Alert, oriented x3.   PSYCHIATRIC:  Normal affect.   SKIN:  No obvious rash.   HEENT:  No pallor or icterus.      Echocardiogram done recently, images personally reviewed and compared with previous echocardiogram in 2017.  Overall, no significant changes.  There appears to be normal LV function, borderline to mildly reduced RV systolic function, 3+ tricuspid regurgitation, severe biatrial enlargement, normal gradients across the aortic valve, mild pulmonary hypertension with average RVSP of around 39 mmHg plus RA.      IMPRESSION AND PLAN:  A very pleasant 77-year-old female status post bioprosthetic aortic valve replacement, chronic atrial fibrillation on rate control strategy on Coumadin for stroke prophylaxis with 3+ tricuspid regurgitation.  Overall, cardiac status-wise she is doing well without any symptoms of angina or congestive heart failure either left-sided or right-sided congestive heart failure.  As noted above, option of a right heart catheterization has been discussed with the patient and declined by patient.  For now, given her stable cardiac status, we will continue conservative management and I can see her back in 9 months with a repeat echocardiogram, sooner if any changes in clinical status. Will replace atneolol with metoprolol.        MADI TEJEDA MD             D: 10/10/2017 13:33   T: 10/10/2017 15:54   MT: HERMINIA      Name:     JJ RAMACHANDRAN   MRN:      [id number]        Account:      [id number]   :      1940           Service Date: 10/10/2017      Document: [id number]

## 2017-11-06 ENCOUNTER — ANTICOAGULATION THERAPY VISIT (OUTPATIENT)
Dept: ANTICOAGULATION | Facility: CLINIC | Age: 77
End: 2017-11-06
Payer: MEDICARE

## 2017-11-06 DIAGNOSIS — Z95.2 S/P AVR (AORTIC VALVE REPLACEMENT): ICD-10-CM

## 2017-11-06 DIAGNOSIS — I48.91 ATRIAL FIBRILLATION WITH RVR (H): ICD-10-CM

## 2017-11-06 DIAGNOSIS — Z79.01 LONG-TERM (CURRENT) USE OF ANTICOAGULANTS: ICD-10-CM

## 2017-11-06 LAB — INR POINT OF CARE: 2.2 (ref 0.86–1.14)

## 2017-11-06 PROCEDURE — 99207 ZZC NO CHARGE NURSE ONLY: CPT

## 2017-11-06 PROCEDURE — 36416 COLLJ CAPILLARY BLOOD SPEC: CPT

## 2017-11-06 PROCEDURE — 85610 PROTHROMBIN TIME: CPT | Mod: QW

## 2017-11-06 NOTE — MR AVS SNAPSHOT
Khalidasal Rouse   11/6/2017 1:15 PM   Anticoagulation Therapy Visit    Description:  77 year old female   Provider:  PH ANTI COAG   Department:  Ph Anticoag           INR as of 11/6/2017     Today's INR 2.2      Anticoagulation Summary as of 11/6/2017     INR goal 2.0-3.0   Today's INR 2.2   Full instructions 5 mg on Mon, Wed, Fri; 2.5 mg all other days   Next INR check 12/4/2017    Indications   Long-term (current) use of anticoagulants [Z79.01] [Z79.01]  Atrial fibrillation with RVR (H) [I48.91]  S/P AVR (aortic valve replacement) [Z95.2]         Your next Anticoagulation Clinic appointment(s)     Nov 06, 2017  1:15 PM CST   Anticoagulation Visit with PH ANTI COAG   [facility] ([address] [address]   [phone]            Dec 04, 2017  1:00 PM CST   Anticoagulation Visit with PH ANTI COAG   [facility] ([address] [address]   [phone]              Contact Numbers     Clinic Number:         November 2017 Details    Sun Mon Tue Wed Thu Fri Sat        1               2               3               4                 5               6      5 mg   See details      7      2.5 mg         8      5 mg         9      2.5 mg         10      5 mg         11      2.5 mg           12      2.5 mg         13      5 mg         14      2.5 mg         15      5 mg         16      2.5 mg         17      5 mg         18      2.5 mg           19      2.5 mg         20      5 mg         21      2.5 mg         22      5 mg         23      2.5 mg         24      5 mg         25      2.5 mg           26      2.5 mg         27      5 mg         28      2.5 mg         29      5 mg         30      2.5 mg            Date Details   11/06 This INR check               How to take your warfarin dose     To take:  2.5 mg Take 1 of the 2.5 mg tablets.    To take:  5 mg Take 2 of the 2.5 mg tablets.            December 2017 Details    Sun Mon Tue Wed Thu Fri Sat          1      5 mg         2      2.5 mg           3      2.5 mg         4            5               6               7               8               9                 10               11               12               13               14               15               16                 17               18               19               20               21               22               23                 24               25               26               27               28               29               30                 31                      Date Details   No additional details    Date of next INR:  12/4/2017         How to take your warfarin dose     To take:  2.5 mg Take 1 of the 2.5 mg tablets.    To take:  5 mg Take 2 of the 2.5 mg tablets.

## 2017-11-06 NOTE — PROGRESS NOTES
ANTICOAGULATION FOLLOW-UP CLINIC VISIT    Patient Name:  Khalida Rouse  Date:  11/6/2017  Contact Type:  Face to Face    SUBJECTIVE:     Patient Findings     Positives No Problem Findings           OBJECTIVE    INR Protime   Date Value Ref Range Status   11/06/2017 2.2 (A) 0.86 - 1.14 Final       ASSESSMENT / PLAN  INR assessment THER    Recheck INR In: 4 WEEKS    INR Location Clinic      Anticoagulation Summary as of 11/6/2017     INR goal 2.0-3.0   Today's INR 2.2   Maintenance plan 5 mg (2.5 mg x 2) on Mon, Wed, Fri; 2.5 mg (2.5 mg x 1) all other days   Full instructions 5 mg on Mon, Wed, Fri; 2.5 mg all other days   Weekly total 25 mg   No change documented Venessa Hood RN   Plan last modified Venessa Hood RN (6/28/2017)   Next INR check 12/4/2017   Target end date     Indications   Long-term (current) use of anticoagulants [Z79.01] [Z79.01]  Atrial fibrillation with RVR (H) [I48.91]  S/P AVR (aortic valve replacement) [Z95.2]         Anticoagulation Episode Summary     INR check location     Preferred lab     Send INR reminders to [facility] POOL    Comments 2.5 mg tablets      Anticoagulation Care Providers     Provider Role Specialty Phone number    Dorcas Fisher MD [facility] [phone]            See the Encounter Report to view Anticoagulation Flowsheet and Dosing Calendar (Go to Encounters tab in chart review, and find the Anticoagulation Therapy Visit)    Dosage adjustment made based on physician directed care plan.      Venessa Hood RN

## 2017-12-04 ENCOUNTER — ANTICOAGULATION THERAPY VISIT (OUTPATIENT)
Dept: ANTICOAGULATION | Facility: CLINIC | Age: 77
End: 2017-12-04
Payer: MEDICARE

## 2017-12-04 DIAGNOSIS — Z95.2 S/P AVR (AORTIC VALVE REPLACEMENT): ICD-10-CM

## 2017-12-04 DIAGNOSIS — Z79.01 LONG-TERM (CURRENT) USE OF ANTICOAGULANTS: ICD-10-CM

## 2017-12-04 DIAGNOSIS — I48.91 ATRIAL FIBRILLATION WITH RVR (H): ICD-10-CM

## 2017-12-04 LAB — INR POINT OF CARE: 2.1 (ref 0.86–1.14)

## 2017-12-04 PROCEDURE — 85610 PROTHROMBIN TIME: CPT | Mod: QW

## 2017-12-04 PROCEDURE — 36416 COLLJ CAPILLARY BLOOD SPEC: CPT

## 2017-12-04 PROCEDURE — 99207 ZZC NO CHARGE NURSE ONLY: CPT

## 2017-12-04 NOTE — MR AVS SNAPSHOT
Khalida ARMAAN Merrillalfonso   12/4/2017 1:00 PM   Anticoagulation Therapy Visit    Description:  77 year old female   Provider:  JOSE ANTI COAG   Department:  [facility] Antico[facility]           INR as of 12/4/2017     Today's INR 2.1      Anticoagulation Summary as of 12/4/2017     INR goal 2.0-3.0   Today's INR 2.1   Full instructions 5 mg on Mon, Wed, Fri; 2.5 mg all other days   Next INR check 1/8/2018    Indications   Long-term (current) use of anticoagulants [Z79.01] [Z79.01]  Atrial fibrillation with RVR (H) [I48.91]  S/P AVR (aortic valve replacement) [Z95.2]         Your next Anticoagulation Clinic appointment(s)     Jan 08, 2018  1:00 PM CST   Anticoagulation Visit with JOSE ANTI LESLY   [facility]    [address] [place] [address]   [phone]              Contact Numbers     Clinic Number:         December 2017 Details    Sun Mon Tue Wed Thu Fri Sat          1               2                 3               4      5 mg   See details      5      2.5 mg         6      5 mg         7      2.5 mg         8      5 mg         9      2.5 mg           10      2.5 mg         11      5 mg         12      2.5 mg         13      5 mg         14      2.5 mg         15      5 mg         16      2.5 mg           17      2.5 mg         18      5 mg         19      2.5 mg         20      5 mg         21      2.5 mg         22      5 mg         23      2.5 mg           24      2.5 mg         25      5 mg         26      2.5 mg         27      5 mg         28      2.5 mg         29      5 mg         30      2.5 mg           31      2.5 mg                Date Details   12/04 This INR check               How to take your warfarin dose     To take:  2.5 mg Take 1 of the 2.5 mg tablets.    To take:  5 mg Take 2 of the 2.5 mg tablets.           January 2018 Details    Sun Mon Tue Wed Thu Fri Sat      1      5 mg         2      2.5 mg         3      5 mg         4      2.5 mg         5      5 mg          6      2.5 mg           7      2.5 mg         8            9               10               11               12               13                 14               15               16               17               18               19               20                 21               22               23               24               25               26               27                 28               29               30               31                   Date Details   No additional details    Date of next INR:  1/8/2018         How to take your warfarin dose     To take:  2.5 mg Take 1 of the 2.5 mg tablets.    To take:  5 mg Take 2 of the 2.5 mg tablets.

## 2017-12-04 NOTE — PROGRESS NOTES
ANTICOAGULATION FOLLOW-UP CLINIC VISIT    Patient Name:  Khalida Rouse  Date:  12/4/2017  Contact Type:  Face to Face    SUBJECTIVE:     Patient Findings     Positives No Problem Findings           OBJECTIVE    INR Protime   Date Value Ref Range Status   12/04/2017 2.1 (A) 0.86 - 1.14 Final       ASSESSMENT / PLAN  INR assessment THER    Recheck INR In: 5 WEEKS    INR Location Clinic      Anticoagulation Summary as of 12/4/2017     INR goal 2.0-3.0   Today's INR 2.1   Maintenance plan 5 mg (2.5 mg x 2) on Mon, Wed, Fri; 2.5 mg (2.5 mg x 1) all other days   Full instructions 5 mg on Mon, Wed, Fri; 2.5 mg all other days   Weekly total 25 mg   No change documented Venessa Hood RN   Plan last modified Venessa Hood RN (6/28/2017)   Next INR check 1/8/2018   Target end date     Indications   Long-term (current) use of anticoagulants [Z79.01] [Z79.01]  Atrial fibrillation with RVR (H) [I48.91]  S/P AVR (aortic valve replacement) [Z95.2]         Anticoagulation Episode Summary     INR check location     Preferred lab     Send INR reminders to [facility] POOL    Comments 2.5 mg tablets      Anticoagulation Care Providers     Provider Role Specialty Phone number    Dorcas Fisher MD [facility] [phone]            See the Encounter Report to view Anticoagulation Flowsheet and Dosing Calendar (Go to Encounters tab in chart review, and find the Anticoagulation Therapy Visit)    Dosage adjustment made based on physician directed care plan.        Venessa Hood RN

## 2017-12-18 DIAGNOSIS — Z79.01 LONG-TERM (CURRENT) USE OF ANTICOAGULANTS: ICD-10-CM

## 2017-12-18 DIAGNOSIS — I48.91 ATRIAL FIBRILLATION WITH RVR (H): ICD-10-CM

## 2017-12-18 RX ORDER — WARFARIN SODIUM 2.5 MG/1
TABLET ORAL
Qty: 144 TABLET | Refills: 0 | Status: SHIPPED | OUTPATIENT
Start: 2017-12-18 | End: 2018-03-27

## 2017-12-18 NOTE — TELEPHONE ENCOUNTER
Requested Prescriptions   Pending Prescriptions Disp Refills     warfarin (COUMADIN) 2.5 MG tablet [Pharmacy Med Name: WARFARIN SODIUM 2.5MG TABS] 144 tablet 0     Sig: TAKE 1 TABLET BY MOUTH ON MONDAY AND FRIDAY THEN 2 TABLETS ALL OTHER DAYS, OR AS DIRECTED BY THE COUMADIN CLINIC    Vitamin K Antagonists Failed    12/18/2017  9:57 AM       Failed - INR is within goal in the past 6 weeks    Confirm INR is within goal in the past 6 weeks.     Recent Labs   Lab Test 12/04/17   INR  2.1*                      Passed - Recent or future visit with authorizing provider's specialty    Patient had office visit in the last year or has a visit in the next 30 days with authorizing provider.  See chart review.              Passed - Patient is 18 years of age or older       Passed - Patient is not pregnant       Passed - No positive pregnancy on file in past 12 months

## 2017-12-26 DIAGNOSIS — I48.91 ATRIAL FIBRILLATION (H): Primary | ICD-10-CM

## 2017-12-26 RX ORDER — DILTIAZEM HYDROCHLORIDE 180 MG/1
180 CAPSULE, COATED, EXTENDED RELEASE ORAL 2 TIMES DAILY
Qty: 180 CAPSULE | Refills: 3 | Status: SHIPPED | OUTPATIENT
Start: 2017-12-26 | End: 2018-04-10

## 2018-01-08 ENCOUNTER — ANTICOAGULATION THERAPY VISIT (OUTPATIENT)
Dept: ANTICOAGULATION | Facility: CLINIC | Age: 78
End: 2018-01-08
Payer: MEDICARE

## 2018-01-08 VITALS — DIASTOLIC BLOOD PRESSURE: 82 MMHG | SYSTOLIC BLOOD PRESSURE: 131 MMHG | HEART RATE: 84 BPM

## 2018-01-08 DIAGNOSIS — Z95.2 S/P AVR (AORTIC VALVE REPLACEMENT): ICD-10-CM

## 2018-01-08 DIAGNOSIS — I48.91 ATRIAL FIBRILLATION WITH RVR (H): ICD-10-CM

## 2018-01-08 DIAGNOSIS — Z79.01 LONG-TERM (CURRENT) USE OF ANTICOAGULANTS: ICD-10-CM

## 2018-01-08 LAB — INR POINT OF CARE: 2.4 (ref 0.86–1.14)

## 2018-01-08 PROCEDURE — 85610 PROTHROMBIN TIME: CPT | Mod: QW

## 2018-01-08 PROCEDURE — 99207 ZZC NO CHARGE NURSE ONLY: CPT

## 2018-01-08 PROCEDURE — 36416 COLLJ CAPILLARY BLOOD SPEC: CPT

## 2018-01-08 NOTE — PROGRESS NOTES
ANTICOAGULATION FOLLOW-UP CLINIC VISIT    Patient Name:  Khalida Rouse  Date:  1/8/2018  Contact Type:  Face to Face    SUBJECTIVE:     Patient Findings     Positives No Problem Findings           OBJECTIVE    INR Protime   Date Value Ref Range Status   01/08/2018 2.4 (A) 0.86 - 1.14 Final       ASSESSMENT / PLAN  INR assessment THER    Recheck INR In: 5 WEEKS    INR Location Clinic      Anticoagulation Summary as of 1/8/2018     INR goal 2.0-3.0   Today's INR 2.4   Maintenance plan 5 mg (2.5 mg x 2) on Mon, Wed, Fri; 2.5 mg (2.5 mg x 1) all other days   Full instructions 5 mg on Mon, Wed, Fri; 2.5 mg all other days   Weekly total 25 mg   No change documented Venessa Hood RN   Plan last modified Venessa Hood RN (6/28/2017)   Next INR check 2/12/2018   Target end date     Indications   Long-term (current) use of anticoagulants [Z79.01] [Z79.01]  Atrial fibrillation with RVR (H) [I48.91]  S/P AVR (aortic valve replacement) [Z95.2]         Anticoagulation Episode Summary     INR check location     Preferred lab     Send INR reminders to [facility] POOL    Comments 2.5 mg tablets      Anticoagulation Care Providers     Provider Role Specialty Phone number    Dorcas Fisher MD [facility] [phone]            See the Encounter Report to view Anticoagulation Flowsheet and Dosing Calendar (Go to Encounters tab in chart review, and find the Anticoagulation Therapy Visit)    Dosage adjustment made based on physician directed care plan.      Venessa Hood RN

## 2018-01-08 NOTE — MR AVS SNAPSHOT
Khalida ARMAAN Rouse   1/8/2018 1:00 PM   Anticoagulation Therapy Visit    Description:  77 year old female   Provider:  JOSE ANTI COAG   Department:  [facility]           INR as of 1/8/2018     Today's INR 2.4      Anticoagulation Summary as of 1/8/2018     INR goal 2.0-3.0   Today's INR 2.4   Full instructions 5 mg on Mon, Wed, Fri; 2.5 mg all other days   Next INR check 2/12/2018    Indications   Long-term (current) use of anticoagulants [Z79.01] [Z79.01]  Atrial fibrillation with RVR (H) [I48.91]  S/P AVR (aortic valve replacement) [Z95.2]         Your next Anticoagulation Clinic appointment(s)     Feb 12, 2018  1:00 PM CST   [facility] with JOSE ANTI COAG   [facility]    [address] [place] [id number]   [phone]              Contact Numbers     Clinic Number:         January 2018 Details    Sun Mon Tue Wed Thu Fri Sat      1               2               3               4               5               6                 7               8      5 mg   See details      9      2.5 mg         10      5 mg         11      2.5 mg         12      5 mg         13      2.5 mg           14      2.5 mg         15      5 mg         16      2.5 mg         17      5 mg         18      2.5 mg         19      5 mg         20      2.5 mg           21      2.5 mg         22      5 mg         23      2.5 mg         24      5 mg         25      2.5 mg         26      5 mg         27      2.5 mg           28      2.5 mg         29      5 mg         30      2.5 mg         31      5 mg             Date Details   01/08 This INR check               How to take your warfarin dose     To take:  2.5 mg Take 1 of the 2.5 mg tablets.    To take:  5 mg Take 2 of the 2.5 mg tablets.           February 2018 Details    Sun Mon Tue Wed Thu Fri Sat         1      2.5 mg         2      5 mg         3      2.5 mg           4      2.5 mg         5      5 mg         6      2.5 mg          7      5 mg         8      2.5 mg         9      5 mg         10      2.5 mg           11      2.5 mg         12            13               14               15               16               17                 18               19               20               21               22               23               24                 25               26               27               28                   Date Details   No additional details    Date of next INR:  2/12/2018         How to take your warfarin dose     To take:  2.5 mg Take 1 of the 2.5 mg tablets.    To take:  5 mg Take 2 of the 2.5 mg tablets.

## 2018-02-12 ENCOUNTER — ANTICOAGULATION THERAPY VISIT (OUTPATIENT)
Dept: ANTICOAGULATION | Facility: CLINIC | Age: 78
End: 2018-02-12
Payer: MEDICARE

## 2018-02-12 VITALS — DIASTOLIC BLOOD PRESSURE: 69 MMHG | HEART RATE: 82 BPM | SYSTOLIC BLOOD PRESSURE: 140 MMHG

## 2018-02-12 DIAGNOSIS — I48.91 ATRIAL FIBRILLATION WITH RVR (H): ICD-10-CM

## 2018-02-12 DIAGNOSIS — Z79.01 LONG-TERM (CURRENT) USE OF ANTICOAGULANTS: ICD-10-CM

## 2018-02-12 DIAGNOSIS — Z95.2 S/P AVR (AORTIC VALVE REPLACEMENT): ICD-10-CM

## 2018-02-12 LAB — INR POINT OF CARE: 2.1 (ref 0.86–1.14)

## 2018-02-12 PROCEDURE — 85610 PROTHROMBIN TIME: CPT | Mod: QW

## 2018-02-12 PROCEDURE — 36416 COLLJ CAPILLARY BLOOD SPEC: CPT

## 2018-02-12 PROCEDURE — 99207 ZZC NO CHARGE NURSE ONLY: CPT

## 2018-02-12 NOTE — MR AVS SNAPSHOT
Khalida ARMAAN Rouse   2/12/2018 1:00 PM   Anticoagulation Therapy Visit    Description:  77 year old female   Provider:  JOSE ANTI COAG   Department:  [facility]           INR as of 2/12/2018     Today's INR 2.1      Anticoagulation Summary as of 2/12/2018     INR goal 2.0-3.0   Today's INR 2.1   Full instructions 5 mg on Mon, Wed, Fri; 2.5 mg all other days   Next INR check 3/26/2018    Indications   Long-term (current) use of anticoagulants [Z79.01] [Z79.01]  Atrial fibrillation with RVR (H) [I48.91]  S/P AVR (aortic valve replacement) [Z95.2]         Your next Anticoagulation Clinic appointment(s)     Mar 26, 2018  1:00 PM CDT   Anticoagulation Visit with JOSE ANTI LESLY   [facility]    [address] [place] [id number]   [phone]              Contact Numbers     Clinic Number:         February 2018 Details    Sun Mon Tue Wed Thu Fri Sat         1               2               3                 4               5               6               7               8               9               10                 11               12      5 mg   See details      13      2.5 mg         14      5 mg         15      2.5 mg         16      5 mg         17      2.5 mg           18      2.5 mg         19      5 mg         20      2.5 mg         21      5 mg         22      2.5 mg         23      5 mg         24      2.5 mg           25      2.5 mg         26      5 mg         27      2.5 mg         28      5 mg             Date Details   02/12 This INR check               How to take your warfarin dose     To take:  2.5 mg Take 1 of the 2.5 mg tablets.    To take:  5 mg Take 2 of the 2.5 mg tablets.           March 2018 Details    Sun Mon Tue Wed Thu Fri Sat         1      2.5 mg         2      5 mg         3      2.5 mg           4      2.5 mg         5      5 mg         6      2.5 mg         7      5 mg         8      2.5 mg         9      5 mg         10      2.5 mg            11      2.5 mg         12      5 mg         13      2.5 mg         14      5 mg         15      2.5 mg         16      5 mg         17      2.5 mg           18      2.5 mg         19      5 mg         20      2.5 mg         21      5 mg         22      2.5 mg         23      5 mg         24      2.5 mg           25      2.5 mg         26            27               28               29               30               31                Date Details   No additional details    Date of next INR:  3/26/2018         How to take your warfarin dose     To take:  2.5 mg Take 1 of the 2.5 mg tablets.    To take:  5 mg Take 2 of the 2.5 mg tablets.

## 2018-02-12 NOTE — PROGRESS NOTES
ANTICOAGULATION FOLLOW-UP CLINIC VISIT    Patient Name:  Khalida Rouse  Date:  2/12/2018  Contact Type:  Face to Face    SUBJECTIVE:     Patient Findings     Positives No Problem Findings           OBJECTIVE    INR Protime   Date Value Ref Range Status   02/12/2018 2.1 (A) 0.86 - 1.14 Final       ASSESSMENT / PLAN  No question data found.  Anticoagulation Summary as of 2/12/2018     INR goal 2.0-3.0   Today's INR 2.1   Maintenance plan 5 mg (2.5 mg x 2) on Mon, Wed, Fri; 2.5 mg (2.5 mg x 1) all other days   Full instructions 5 mg on Mon, Wed, Fri; 2.5 mg all other days   Weekly total 25 mg   No change documented Venessa Hood RN   Plan last modified Venessa Hood RN (6/28/2017)   Next INR check 3/26/2018   Target end date     Indications   Long-term (current) use of anticoagulants [Z79.01] [Z79.01]  Atrial fibrillation with RVR (H) [I48.91]  S/P AVR (aortic valve replacement) [Z95.2]         Anticoagulation Episode Summary     INR check location     Preferred lab     Send INR reminders to [facility] POOL    Comments 2.5 mg tablets      Anticoagulation Care Providers     Provider Role Specialty Phone number    Dorcas Fisher MD [facility] [phone]            See the Encounter Report to view Anticoagulation Flowsheet and Dosing Calendar (Go to Encounters tab in chart review, and find the Anticoagulation Therapy Visit)    Dosage adjustment made based on physician directed care plan.        Venessa Hood RN

## 2018-03-27 ENCOUNTER — ANTICOAGULATION THERAPY VISIT (OUTPATIENT)
Dept: ANTICOAGULATION | Facility: CLINIC | Age: 78
End: 2018-03-27
Payer: MEDICARE

## 2018-03-27 VITALS — SYSTOLIC BLOOD PRESSURE: 122 MMHG | DIASTOLIC BLOOD PRESSURE: 81 MMHG | HEART RATE: 81 BPM

## 2018-03-27 DIAGNOSIS — I48.91 ATRIAL FIBRILLATION WITH RVR (H): ICD-10-CM

## 2018-03-27 DIAGNOSIS — Z79.01 LONG-TERM (CURRENT) USE OF ANTICOAGULANTS: ICD-10-CM

## 2018-03-27 DIAGNOSIS — Z95.2 S/P AVR (AORTIC VALVE REPLACEMENT): ICD-10-CM

## 2018-03-27 LAB — INR POINT OF CARE: 2.4 (ref 0.86–1.14)

## 2018-03-27 PROCEDURE — 99207 ZZC NO CHARGE NURSE ONLY: CPT

## 2018-03-27 PROCEDURE — 36416 COLLJ CAPILLARY BLOOD SPEC: CPT

## 2018-03-27 PROCEDURE — 85610 PROTHROMBIN TIME: CPT | Mod: QW

## 2018-03-27 RX ORDER — WARFARIN SODIUM 2.5 MG/1
TABLET ORAL
Qty: 130 TABLET | Refills: 1 | Status: SHIPPED | OUTPATIENT
Start: 2018-03-27 | End: 2018-04-10

## 2018-03-27 NOTE — MR AVS SNAPSHOT
Khalida Rouse   3/27/2018 1:15 PM   Anticoagulation Therapy Visit    Description:  78 year old female   Provider:  JOSE ANTI COAG   Department:  [facility]           INR as of 3/27/2018     Today's INR 2.4      Anticoagulation Summary as of 3/27/2018     INR goal 2.0-3.0   Today's INR 2.4   Full instructions 5 mg on Mon, Wed, Fri; 2.5 mg all other days   Next INR check 5/8/2018    Indications   Long-term (current) use of anticoagulants [Z79.01] [Z79.01]  Atrial fibrillation with RVR (H) [I48.91]  S/P AVR (aortic valve replacement) [Z95.2]         Your next Anticoagulation Clinic appointment(s)     May 08, 2018  1:15 PM CDT   An[facility]agulation [facility] with JOSE ANTI LESLY   [facility]    [address] [place] [id number]   [phone]              Contact Numbers     Clinic Number:         March 2018 Details    Sun Mon Tue Wed Thu Fri Sat         1               2               3                 4               5               6               7               8               9               10                 11               12               13               14               15               16               17                 18               19               20               21               22               23               24                 25               26               27      2.5 mg   See details      28      5 mg         29      2.5 mg         30      5 mg         31      2.5 mg          Date Details   03/27 This INR check               How to take your warfarin dose     To take:  2.5 mg Take 1 of the 2.5 mg tablets.    To take:  5 mg Take 2 of the 2.5 mg tablets.           April 2018 Details    Sun Mon Tue Wed Thu Fri Sat     1      2.5 mg         2      5 mg         3      2.5 mg         4      5 mg         5      2.5 mg         6      5 mg         7      2.5 mg           8      2.5 mg         9      5 mg         10      2.5 mg         11      5 mg          12      2.5 mg         13      5 mg         14      2.5 mg           15      2.5 mg         16      5 mg         17      2.5 mg         18      5 mg         19      2.5 mg         20      5 mg         21      2.5 mg           22      2.5 mg         23      5 mg         24      2.5 mg         25      5 mg         26      2.5 mg         27      5 mg         28      2.5 mg           29      2.5 mg         30      5 mg               Date Details   No additional details            How to take your warfarin dose     To take:  2.5 mg Take 1 of the 2.5 mg tablets.    To take:  5 mg Take 2 of the 2.5 mg tablets.           May 2018 Details    Sun Mon Tue Wed Thu Fri Sat       1      2.5 mg         2      5 mg         3      2.5 mg         4      5 mg         5      2.5 mg           6      2.5 mg         7      5 mg         8            9               10               11               12                 13               14               15               16               17               18               19                 20               21               22               23               24               25               26                 27               28               29               30               31                  Date Details   No additional details    Date of next INR:  5/8/2018         How to take your warfarin dose     To take:  2.5 mg Take 1 of the 2.5 mg tablets.    To take:  5 mg Take 2 of the 2.5 mg tablets.

## 2018-03-27 NOTE — PROGRESS NOTES
ANTICOAGULATION FOLLOW-UP CLINIC VISIT    Patient Name:  Khalida Rouse  Date:  3/27/2018  Contact Type:  Face to Face    SUBJECTIVE:     Patient Findings     Positives No Problem Findings           OBJECTIVE    INR Protime   Date Value Ref Range Status   03/27/2018 2.4 (A) 0.86 - 1.14 Final       ASSESSMENT / PLAN  INR assessment THER    Recheck INR In: 6 WEEKS    INR Location Clinic      Anticoagulation Summary as of 3/27/2018     INR goal 2.0-3.0   Today's INR 2.4   Maintenance plan 5 mg (2.5 mg x 2) on Mon, Wed, Fri; 2.5 mg (2.5 mg x 1) all other days   Full instructions 5 mg on Mon, Wed, Fri; 2.5 mg all other days   Weekly total 25 mg   No change documented Venessa Hood RN   Plan last modified Venessa Hood RN (6/28/2017)   Next INR check 5/8/2018   Target end date     Indications   Long-term (current) use of anticoagulants [Z79.01] [Z79.01]  Atrial fibrillation with RVR (H) [I48.91]  S/P AVR (aortic valve replacement) [Z95.2]         Anticoagulation Episode Summary     INR check location     Preferred lab     Send INR reminders to [facility] POOL    Comments 2.5 mg tablets, book, BP, PM dose      Anticoagulation Care Providers     Provider Role Specialty Phone number    Dorcas Fisher MD [facility] [phone]            See the Encounter Report to view Anticoagulation Flowsheet and Dosing Calendar (Go to Encounters tab in chart review, and find the Anticoagulation Therapy Visit)    Dosage adjustment made based on physician directed care plan.      Venessa Hood RN

## 2018-04-10 ENCOUNTER — OFFICE VISIT (OUTPATIENT)
Dept: FAMILY MEDICINE | Facility: OTHER | Age: 78
End: 2018-04-10
Payer: MEDICARE

## 2018-04-10 VITALS
HEIGHT: 66 IN | OXYGEN SATURATION: 97 % | RESPIRATION RATE: 16 BRPM | WEIGHT: 130.3 LBS | SYSTOLIC BLOOD PRESSURE: 128 MMHG | BODY MASS INDEX: 20.94 KG/M2 | TEMPERATURE: 97.6 F | HEART RATE: 63 BPM | DIASTOLIC BLOOD PRESSURE: 72 MMHG

## 2018-04-10 DIAGNOSIS — Z95.2 S/P AVR (AORTIC VALVE REPLACEMENT): ICD-10-CM

## 2018-04-10 DIAGNOSIS — E78.5 HYPERLIPIDEMIA LDL GOAL <130: Primary | ICD-10-CM

## 2018-04-10 DIAGNOSIS — Z79.01 LONG-TERM (CURRENT) USE OF ANTICOAGULANTS: ICD-10-CM

## 2018-04-10 DIAGNOSIS — I48.20 CHRONIC ATRIAL FIBRILLATION (H): ICD-10-CM

## 2018-04-10 DIAGNOSIS — J81.0 ACUTE PULMONARY EDEMA (H): ICD-10-CM

## 2018-04-10 DIAGNOSIS — I48.91 ATRIAL FIBRILLATION WITH RVR (H): ICD-10-CM

## 2018-04-10 LAB
ANION GAP SERPL CALCULATED.3IONS-SCNC: 6 MMOL/L (ref 3–14)
BUN SERPL-MCNC: 15 MG/DL (ref 7–30)
CALCIUM SERPL-MCNC: 8.9 MG/DL (ref 8.5–10.1)
CHLORIDE SERPL-SCNC: 102 MMOL/L (ref 94–109)
CHOLEST SERPL-MCNC: 175 MG/DL
CO2 SERPL-SCNC: 32 MMOL/L (ref 20–32)
CREAT SERPL-MCNC: 0.6 MG/DL (ref 0.52–1.04)
ERYTHROCYTE [DISTWIDTH] IN BLOOD BY AUTOMATED COUNT: 14.7 % (ref 10–15)
GFR SERPL CREATININE-BSD FRML MDRD: >90 ML/MIN/1.7M2
GLUCOSE SERPL-MCNC: 91 MG/DL (ref 70–99)
HCT VFR BLD AUTO: 42.8 % (ref 35–47)
HDLC SERPL-MCNC: 69 MG/DL
HGB BLD-MCNC: 14.1 G/DL (ref 11.7–15.7)
LDLC SERPL CALC-MCNC: 88 MG/DL
MCH RBC QN AUTO: 29.6 PG (ref 26.5–33)
MCHC RBC AUTO-ENTMCNC: 32.9 G/DL (ref 31.5–36.5)
MCV RBC AUTO: 90 FL (ref 78–100)
NONHDLC SERPL-MCNC: 106 MG/DL
PLATELET # BLD AUTO: 140 10E9/L (ref 150–450)
POTASSIUM SERPL-SCNC: 4.2 MMOL/L (ref 3.4–5.3)
RBC # BLD AUTO: 4.77 10E12/L (ref 3.8–5.2)
SODIUM SERPL-SCNC: 140 MMOL/L (ref 133–144)
TRIGL SERPL-MCNC: 89 MG/DL
WBC # BLD AUTO: 5 10E9/L (ref 4–11)

## 2018-04-10 PROCEDURE — 36415 COLL VENOUS BLD VENIPUNCTURE: CPT | Performed by: INTERNAL MEDICINE

## 2018-04-10 PROCEDURE — 80061 LIPID PANEL: CPT | Performed by: INTERNAL MEDICINE

## 2018-04-10 PROCEDURE — 85027 COMPLETE CBC AUTOMATED: CPT | Performed by: INTERNAL MEDICINE

## 2018-04-10 PROCEDURE — 99213 OFFICE O/P EST LOW 20 MIN: CPT | Performed by: INTERNAL MEDICINE

## 2018-04-10 PROCEDURE — 80048 BASIC METABOLIC PNL TOTAL CA: CPT | Performed by: INTERNAL MEDICINE

## 2018-04-10 RX ORDER — DILTIAZEM HYDROCHLORIDE 180 MG/1
180 CAPSULE, COATED, EXTENDED RELEASE ORAL 2 TIMES DAILY
Qty: 180 CAPSULE | Refills: 3 | Status: SHIPPED | OUTPATIENT
Start: 2018-04-10 | End: 2018-12-18

## 2018-04-10 RX ORDER — METOPROLOL TARTRATE 50 MG
50 TABLET ORAL 2 TIMES DAILY
Qty: 180 TABLET | Refills: 3 | Status: SHIPPED | OUTPATIENT
Start: 2018-04-10 | End: 2019-02-12

## 2018-04-10 RX ORDER — TORSEMIDE 20 MG/1
TABLET ORAL
Qty: 90 TABLET | Refills: 3 | Status: ON HOLD | OUTPATIENT
Start: 2018-04-10 | End: 2019-05-23

## 2018-04-10 RX ORDER — WARFARIN SODIUM 2.5 MG/1
TABLET ORAL
Qty: 144 TABLET | Refills: 0 | Status: SHIPPED | OUTPATIENT
Start: 2018-04-10 | End: 2018-10-03

## 2018-04-10 ASSESSMENT — PAIN SCALES - GENERAL: PAINLEVEL: NO PAIN (0)

## 2018-04-10 NOTE — NURSING NOTE
"Chief Complaint   Patient presents with     RECHECK     warfarin recheck       Initial /72  Pulse 63  Temp 97.6  F (36.4  C) (Temporal)  Resp 16  Ht 5' 6\" (1.676 m)  Wt 130 lb 4.8 oz (59.1 kg)  SpO2 97%  BMI 21.03 kg/m2 Estimated body mass index is 21.03 kg/(m^2) as calculated from the following:    Height as of this encounter: 5' 6\" (1.676 m).    Weight as of this encounter: 130 lb 4.8 oz (59.1 kg).  Medication Reconciliation: complete  Casie Tubbs CMA (AAMA)    "

## 2018-04-10 NOTE — MR AVS SNAPSHOT
"              After Visit Summary   4/10/2018    Khalida Rouse    MRN: [id number]           Patient Information     Date Of Birth          1940        Visit Information        Provider Department      4/10/2018 1:00 PM Mazin Larsen DO [facility]        Today's Diagnoses     Hyperlipidemia LDL goal <130    -  1    S/P AVR (aortic valve replacement)        Chronic atrial fibrillation (H)        Long-term (current) use of anticoagulants [Z79.01]        Atrial fibrillation with RVR (H)        Acute pulmonary edema (H)           Follow-ups after your visit        Follow-up notes from your care team     Return in about 6 months (around 10/10/2018).      Your next 10 appointments already scheduled     May 08, 2018  1:15 PM CDT   Anticoagulation Visit with PH ANTI COAG   [facility]    [address] [place] [id number] [phone]              Who to contact     If you have questions or need follow up information about today's clinic visit or your schedule please contact [facility] directly at [phone].  Normal or non-critical lab and imaging results will be communicated to you by [employer], letter or phone within 4 business days after the clinic has received the results. If you do not hear from us within 7 days, please contact the clinic through [employer] or phone. If you have a critical or abnormal lab result, we will notify you by phone as soon as possible.  Submit refill requests through [employer] or call your pharmacy and they will forward the refill request to us. Please allow 3 business days for your refill to be completed.          Additional Information About Your Visit        MyChart Information     [employer] lets you send messages to your doctor, view your test results, renew your prescriptions, schedule appointments and more. To sign up, go to [email or web address] . Click on \"Log in\" on the left side of the " "screen, which will take you to the Welcome page. Then click on \"Sign up Now\" on the right side of the page.     You will be asked to enter the access code listed below, as well as some personal information. Please follow the directions to create your username and password.     Your access code is: [email or web address]  Expires: 2018  1:36 PM     Your access code will  in 90 days. If you need help or a new code, please call your [facility] or [phone].        Care EveryWhere ID     This is your Care EveryWhere ID. This could be used by other organizations to access your [facility] medical records  [id number]        Your Vitals Were     Pulse Temperature Respirations Height Pulse Oximetry BMI (Body Mass Index)    63 97.6  F (36.4  C) (Temporal) 16 5' 6\" (1.676 m) 97% 21.03 kg/m2       Blood Pressure from Last 3 Encounters:   04/10/18 128/72   18 122/81   18 140/69    Weight from Last 3 Encounters:   04/10/18 130 lb 4.8 oz (59.1 kg)   10/10/17 134 lb 14.4 oz (61.2 kg)   17 139 lb (63 kg)              We Performed the Following     Basic metabolic panel     CBC with platelets     Lipid Profile          Today's Medication Changes          These changes are accurate as of 4/10/18  1:36 PM.  If you have any questions, ask your nurse or doctor.               These medicines have changed or have updated prescriptions.        Dose/Directions    torsemide 20 MG tablet   Commonly known as:  DEMADEX   This may have changed:  See the new instructions.   Used for:  Acute pulmonary edema (H)   Changed by:  Mazin Larsen, DO        TAKE ONE TABLET BY MOUTH ONCE DAILY AS NEEDED   Quantity:  90 tablet   Refills:  3       * warfarin 2.5 MG tablet   Commonly known as:  COUMADIN   This may have changed:  Another medication with the same name was removed. Continue taking this medication, and follow the directions you see here.   Used for:  Long-term (current) use of anticoagulants, Atrial " fibrillation with RVR (H)   Changed by:  Mazin Larsen DO        Take 5 mg on Monday, Wednesday, Friday and 2.5 mg all other days, or as directed by the coumadin clinic.   Quantity:  144 tablet   Refills:  0       * warfarin 2.5 MG tablet   Commonly known as:  COUMADIN   This may have changed:  Another medication with the same name was removed. Continue taking this medication, and follow the directions you see here.   Used for:  Long-term (current) use of anticoagulants, Atrial fibrillation with RVR (H)   Changed by:  Mazin Larsen DO        Take 5 mg Mon, Wed, Friday and take 2.5 mg all other days or as directed by the coumadin clinic.   Quantity:  144 tablet   Refills:  0       * Notice:  This list has 2 medication(s) that are the same as other medications prescribed for you. Read the directions carefully, and ask your doctor or other care provider to review them with you.      Stop taking these medicines if you haven't already. Please contact your care team if you have questions.     aspirin EC 81 MG EC tablet   Stopped by:  Mazin Larsen DO                Where to get your medicines      These medications were sent [address]  [address] [facility] [address]     Phone:  [phone]     diltiazem 180 MG 24 hr capsule    metoprolol tartrate 50 MG tablet    torsemide 20 MG tablet    warfarin 2.5 MG tablet                Primary Care Provider Office Phone # Fax #    Mazin Larsen [phone]       [address] [facility]   [facility] [place] [address]        Equal Access to Services     [facility] AH: Hadii aad ku hadasho Soomaali, waaxda luqadaha, qaybta kaalmada adeegyada, nubia hazel haymaite romero . So [facility] [phone].    ATENCIÓN: Si habla español, tiene a louis disposición servicios gratuitos de asistencia lingüística. Llame al [phone].    We comply with applicable federal civil rights laws and  "[place] laws. We do not discriminate on the basis of race, color, national origin, age, disability, sex, sexual orientation, or gender identity.            Thank you!     Thank you for choosing [facility]  for your care. Our goal is always to provide you with excellent care. Hearing back from our patients is one way we can continue to improve our services. Please take a few minutes to complete the written survey that you may receive in the mail after your visit with us. Thank you!             Your Updated Medication List - Protect others around you: Learn how to safely use, store and throw away your medicines at www.disposemymeds.org.          This list is accurate as of 4/10/18  1:36 PM.  Always use your most recent med list.                   Brand Name Dispense Instructions for use Diagnosis    calcium carbonate-vitamin D 500-400 MG-UNIT Tabs per tablet      Take 1 tablet by mouth 3 times daily    Medicare annual wellness visit, subsequent, Osteoporosis       diltiazem 180 MG 24 hr capsule     180 capsule    Take 1 capsule (180 mg) by mouth 2 times daily    Chronic atrial fibrillation (H)       metoprolol tartrate 50 MG tablet    LOPRESSOR    180 tablet    Take 1 tablet (50 mg) by mouth 2 times daily    S/P AVR (aortic valve replacement), Chronic atrial fibrillation (H)       multivitamins with minerals Liqd liquid      Take 15 mLs by mouth daily    Medicare annual wellness visit, subsequent       polyethylene glycol 236 g suspension    GoLYTELY/NuLYTELY    4000 mL    Refer to \"Getting Ready for a Colonoscopy\" instruction handout    Encounter for screening colonoscopy       torsemide 20 MG tablet    DEMADEX    90 tablet    TAKE ONE TABLET BY MOUTH ONCE DAILY AS NEEDED    Acute pulmonary edema (H)       * warfarin 2.5 MG tablet    COUMADIN    144 tablet    Take 5 mg on Monday, Wednesday, Friday and 2.5 mg all other days, or as directed by the coumadin clinic.    Long-term (current) use of " anticoagulants, Atrial fibrillation with RVR (H)       * warfarin 2.5 MG tablet    COUMADIN    144 tablet    Take 5 mg Mon, Wed, Friday and take 2.5 mg all other days or as directed by the coumadin clinic.    Long-term (current) use of anticoagulants, Atrial fibrillation with RVR (H)       * Notice:  This list has 2 medication(s) that are the same as other medications prescribed for you. Read the directions carefully, and ask your doctor or other care provider to review them with you.

## 2018-04-10 NOTE — PROGRESS NOTES
Chief Complaint   Patient presents with     RECHECK     warfarin recheck     CHIEF COMPLAINT:    The patient is a pleasant 78-year-old female who appears to be substantially younger than her stated age. She presents primarily to get her medications refilled. She was last seen here several months ago. She notes that she is doing quite well. She goes to a natural path of who has given her vitamin E to take instead of her aspirin. She is on warfarin. Otherwise, the patient notes that she has no need for any medical care at this time. She canceled her previous colonoscopy and is refusing any ongoing health maintenance evaluation.                         PAST, FAMILY,SOCIAL HISTORY:     Medical  History:   has a past medical history of Aortic valve stenosis; Atrial flutter (H); Cardiomyopathy (H); Severe aortic stenosis (4/7/2014); and Shoulder fracture, left (3/22/15).     Surgical History:   has a past surgical history that includes ENT surgery; Replace valve aortic (4/10/2014); and cataract iol, rt/lt (Right).     Social History:   reports that she has never smoked. She has never used smokeless tobacco. She reports that she does not drink alcohol or use illicit drugs.     Family History:  family history includes Alzheimer Disease in her sister; Dementia in her brother.            MEDICATIONS  Current Outpatient Prescriptions   Medication Sig Dispense Refill     metoprolol tartrate (LOPRESSOR) 50 MG tablet Take 1 tablet (50 mg) by mouth 2 times daily 180 tablet 3     warfarin (COUMADIN) 2.5 MG tablet Take 5 mg Mon, Wed, Friday and take 2.5 mg all other days or as directed by the coumadin clinic. 144 tablet 0     torsemide (DEMADEX) 20 MG tablet TAKE ONE TABLET BY MOUTH ONCE DAILY AS NEEDED 90 tablet 3     diltiazem 180 MG 24 hr capsule Take 1 capsule (180 mg) by mouth 2 times daily 180 capsule 3     warfarin (COUMADIN) 2.5 MG tablet Take 5 mg on Monday, Wednesday, Friday and 2.5 mg all other days, or as directed by the  "coumadin clinic. 144 tablet 0     calcium carbonate-vitamin D 500-400 MG-UNIT TABS tablt Take 1 tablet by mouth 3 times daily       multivitamins with minerals (CERTAVITE) LIQD Take 15 mLs by mouth daily       [DISCONTINUED] warfarin (COUMADIN) 2.5 MG tablet Take 5 mg (2 tabs) Mon, Wed, Friday and 2.5 mg (1 tab) all other days or as directed by the coumadin clinic. 130 tablet 1     [DISCONTINUED] diltiazem 180 MG 24 hr capsule Take 1 capsule (180 mg) by mouth 2 times daily 180 capsule 3     [DISCONTINUED] metoprolol (LOPRESSOR) 50 MG tablet Take 1 tablet (50 mg) by mouth 2 times daily 180 tablet 3     [DISCONTINUED] warfarin (COUMADIN) 2.5 MG tablet Take 5 mg Mon, Wed, Friday and take 2.5 mg all other days or as directed by the coumadin clinic. 144 tablet 0     polyethylene glycol (GOLYTELY/NULYTELY) 236 G suspension Refer to \"Getting Ready for a Colonoscopy\" instruction handout (Patient not taking: Reported on 4/10/2018) 4000 mL 0     [DISCONTINUED] torsemide (DEMADEX) 20 MG tablet TAKE ONE TABLET BY MOUTH ONCE DAILY AS NEEDED (Patient not taking: Reported on 4/10/2018) 30 tablet 11         --------------------------------------------------------------------------------------------------------------------                          REVIEW OF SYSTEMS:         LUNGS: Pt denies: cough,excess sputum, hemoptysis, or shortness of breath.   HEART: Pt denies: chest pain, sensed arrythmia, syncope, tachy or bradyarrhythmia or excess edema.   GI: Pt denies: nausea, vomitting, diarrhea, constipation, melena, or hematochezia.   NEURO: Pt denies: seizures, strokes, diplopia, weakness, paraesthesias, or paralysis.   SKIN: Pt denies: itching, rashes, discoloration, or specific lesions of concern. Denies recent hair loss.                          EXAMINATION:         /72  Pulse 63  Temp 97.6  F (36.4  C) (Temporal)  Resp 16  Ht 5' 6\" (1.676 m)  Wt 130 lb 4.8 oz (59.1 kg)  SpO2 97%  BMI 21.03 kg/m2   Constitutional: The " patient appears to be in no acute distress. The patient appears to be adequately hydrated. No acute respiratory or hemodynamic distress is noted at this time.   LUNGS: clear bilaterally, airflow is brisk, no intercostal retraction or stridor is noted. No coughing is noted during visit.   HEART:  Irregularly irregular without rubs,  gallops, or murmurs. PMI is nondisplaced. Upstrokes are brisk. S1,S2 are heard.   GI: Abdomen is soft, without rebound, guarding or tenderness. Bowel sounds are appropriate. No renal bruits are heard.                         DECISION MAKIN. S/P AVR (aortic valve replacement)  Continue anticoagulation  - metoprolol tartrate (LOPRESSOR) 50 MG tablet; Take 1 tablet (50 mg) by mouth 2 times daily  Dispense: 180 tablet; Refill: 3    2. Chronic atrial fibrillation (H)  Continue anticoagulation  - metoprolol tartrate (LOPRESSOR) 50 MG tablet; Take 1 tablet (50 mg) by mouth 2 times daily  Dispense: 180 tablet; Refill: 3  - diltiazem 180 MG 24 hr capsule; Take 1 capsule (180 mg) by mouth 2 times daily  Dispense: 180 capsule; Refill: 3    3. Long-term (current) use of anticoagulants [Z79.01]  Follow INR  - warfarin (COUMADIN) 2.5 MG tablet; Take 5 mg Mon, Wed, Friday and take 2.5 mg all other days or as directed by the coumadin clinic.  Dispense: 144 tablet; Refill: 0  - CBC with platelets    4. Atrial fibrillation with RVR (H)  Somewhat redundant  - warfarin (COUMADIN) 2.5 MG tablet; Take 5 mg Mon, Wed, Friday and take 2.5 mg all other days or as directed by the coumadin clinic.  Dispense: 144 tablet; Refill: 0    5. Acute pulmonary edema (H)  Controlled with diuretic therapy  - torsemide (DEMADEX) 20 MG tablet; TAKE ONE TABLET BY MOUTH ONCE DAILY AS NEEDED  Dispense: 90 tablet; Refill: 3    6. Hyperlipidemia LDL goal <130  Check lipid levels  - Basic metabolic panel  - Lipid Profile                         FOLLOW UP    I have asked the patient to make an appointment for follow up with  me in 6 months        I have carefully explained the diagnosis and treatment options with the patient. The patient has displayed an understanding of the above, and all subsequent questions were answered.         DO MARIA D Sabillon    Portions of this note were produced using [employer]  Although every attempt at real-time proof reading has been made, occasional grammar/syntax errors may have been missed.

## 2018-04-10 NOTE — LETTER
April 13, 2018      Khalida Rouse  [address] [facility] [address]        Dear ,    We are writing to inform you of your test results.    The chemistry panel including a blood sugar and kidney function is normal.   Cholesterol is well controlled with an LDL of 88.   Blood cell count is normal.     Resulted Orders   CBC with platelets   Result Value Ref Range    WBC 5.0 4.0 - 11.0 10e9/L    RBC Count 4.77 3.8 - 5.2 10e12/L    Hemoglobin 14.1 11.7 - 15.7 g/dL    Hematocrit 42.8 35.0 - 47.0 %    MCV 90 78 - 100 fl    MCH 29.6 26.5 - 33.0 pg    MCHC 32.9 31.5 - 36.5 g/dL    RDW 14.7 10.0 - 15.0 %    Platelet Count 140 (L) 150 - 450 10e9/L   Basic metabolic panel   Result Value Ref Range    Sodium 140 133 - 144 mmol/L    Potassium 4.2 3.4 - 5.3 mmol/L    Chloride 102 94 - 109 mmol/L    Carbon Dioxide 32 20 - 32 mmol/L    Anion Gap 6 3 - 14 mmol/L    Glucose 91 70 - 99 mg/dL      Comment:      Non Fasting    Urea Nitrogen 15 7 - 30 mg/dL    Creatinine 0.60 0.52 - 1.04 mg/dL    GFR Estimate >90 >60 mL/min/1.7m2      Comment:      Non  GFR Calc    GFR Estimate If Black >90 >60 mL/min/1.7m2      Comment:       GFR Calc    Calcium 8.9 8.5 - 10.1 mg/dL   Lipid Profile   Result Value Ref Range    Cholesterol 175 <200 mg/dL    Triglycerides 89 <150 mg/dL      Comment:      Non Fasting    HDL Cholesterol 69 >49 mg/dL    LDL Cholesterol Calculated 88 <100 mg/dL      Comment:      Desirable:       <100 mg/dl    Non HDL Cholesterol 106 <130 mg/dL       If you have any questions or concerns, please call the clinic at the number listed above.       Sincerely,        Mazin Larsen DO

## 2018-04-13 NOTE — PROGRESS NOTES
Please contact the patient and notify her of the following:  The chemistry panel including a blood sugar and kidney function is normal.  Cholesterol is well controlled with an LDL of 88.  Blood cell count is normal.    Thank you.   DO MARIA D Sabillon

## 2018-05-09 ENCOUNTER — ANTICOAGULATION THERAPY VISIT (OUTPATIENT)
Dept: ANTICOAGULATION | Facility: CLINIC | Age: 78
End: 2018-05-09
Payer: MEDICARE

## 2018-05-09 ENCOUNTER — TELEPHONE (OUTPATIENT)
Dept: ANTICOAGULATION | Facility: CLINIC | Age: 78
End: 2018-05-09

## 2018-05-09 VITALS — SYSTOLIC BLOOD PRESSURE: 133 MMHG | HEART RATE: 78 BPM | DIASTOLIC BLOOD PRESSURE: 77 MMHG

## 2018-05-09 DIAGNOSIS — Z79.01 LONG-TERM (CURRENT) USE OF ANTICOAGULANTS: ICD-10-CM

## 2018-05-09 DIAGNOSIS — I48.91 ATRIAL FIBRILLATION WITH RVR (H): ICD-10-CM

## 2018-05-09 DIAGNOSIS — I48.20 CHRONIC ATRIAL FIBRILLATION (H): Primary | ICD-10-CM

## 2018-05-09 DIAGNOSIS — Z95.2 S/P AVR (AORTIC VALVE REPLACEMENT): ICD-10-CM

## 2018-05-09 LAB — INR POINT OF CARE: 2.6 (ref 0.86–1.14)

## 2018-05-09 PROCEDURE — 36416 COLLJ CAPILLARY BLOOD SPEC: CPT

## 2018-05-09 PROCEDURE — 99207 ZZC NO CHARGE NURSE ONLY: CPT

## 2018-05-09 PROCEDURE — 85610 PROTHROMBIN TIME: CPT | Mod: QW

## 2018-05-09 NOTE — MR AVS SNAPSHOT
Khalida ARMAAN Rouse   5/9/2018 1:00 PM   Anticoagulation Therapy Visit    Description:  78 year old female   Provider:  JOSE ANTI COAG   Department:  [facility]           INR as of 5/9/2018     Today's INR 2.6      Anticoagulation Summary as of 5/9/2018     INR goal 2.0-3.0   Today's INR 2.6   Full instructions 5 mg on Mon, Wed, Fri; 2.5 mg all other days   Next INR check 6/20/2018    Indications   Long-term (current) use of anticoagulants [Z79.01] [Z79.01]  Atrial fibrillation with RVR (H) [I48.91]  S/P AVR (aortic valve replacement) [Z95.2]         Your next Anticoagulation Clinic appointment(s)     Jun 20, 2018  1:00 PM CDT   Anticoagulation [facility] with JOSE ANTI LESLY   [facility]    [address] [place] [id number]   [phone]              Contact Numbers     Clinic Number:         May 2018 Details    Sun Mon Tue Wed Thu Fri Sat       1               2               3               4               5                 6               7               8               9      5 mg   See details      10      2.5 mg         11      5 mg         12      2.5 mg           13      2.5 mg         14      5 mg         15      2.5 mg         16      5 mg         17      2.5 mg         18      5 mg         19      2.5 mg           20      2.5 mg         21      5 mg         22      2.5 mg         23      5 mg         24      2.5 mg         25      5 mg         26      2.5 mg           27      2.5 mg         28      5 mg         29      2.5 mg         30      5 mg         31      2.5 mg            Date Details   05/09 This INR check               How to take your warfarin dose     To take:  2.5 mg Take 1 of the 2.5 mg tablets.    To take:  5 mg Take 2 of the 2.5 mg tablets.           June 2018 Details    Sun Mon Tue Wed Thu Fri Sat          1      5 mg         2      2.5 mg           3      2.5 mg         4      5 mg         5      2.5 mg         6      5 mg         7       2.5 mg         8      5 mg         9      2.5 mg           10      2.5 mg         11      5 mg         12      2.5 mg         13      5 mg         14      2.5 mg         15      5 mg         16      2.5 mg           17      2.5 mg         18      5 mg         19      2.5 mg         20            21               22               23                 24               25               26               27               28               29               30                Date Details   No additional details    Date of next INR:  6/20/2018         How to take your warfarin dose     To take:  2.5 mg Take 1 of the 2.5 mg tablets.    To take:  5 mg Take 2 of the 2.5 mg tablets.

## 2018-05-09 NOTE — TELEPHONE ENCOUNTER
Please review and renew this patients INR referral, orders pending. Thank you!      Has the patient previously taken warfarin? yes  If yes, for what indication? Atrial Fibrillation, valve replacement     Does the patient have any of the following indications for a higher range of 2.5-3.5:    Mitral position mechanical valve? no    Caden-Shiley, Ball and Cage or Monoleaflet valve (regardless of position) no    Other (if yes, please explain) no      Venessa Hood RN

## 2018-05-09 NOTE — PROGRESS NOTES
ANTICOAGULATION FOLLOW-UP CLINIC VISIT    Patient Name:  Khalida Rouse  Date:  5/9/2018  Contact Type:  Face to Face    SUBJECTIVE:     Patient Findings     Positives No Problem Findings           OBJECTIVE    INR Protime   Date Value Ref Range Status   05/09/2018 2.6 (A) 0.86 - 1.14 Final       ASSESSMENT / PLAN  INR assessment THER    Recheck INR In: 6 WEEKS    INR Location Clinic      Anticoagulation Summary as of 5/9/2018     INR goal 2.0-3.0   Today's INR 2.6   Maintenance plan 5 mg (2.5 mg x 2) on Mon, Wed, Fri; 2.5 mg (2.5 mg x 1) all other days   Full instructions 5 mg on Mon, Wed, Fri; 2.5 mg all other days   Weekly total 25 mg   No change documented Venessa Hood RN   Plan last modified Venessa Hood RN (6/28/2017)   Next INR check 6/20/2018   Target end date     Indications   Long-term (current) use of anticoagulants [Z79.01] [Z79.01]  Atrial fibrillation with RVR (H) [I48.91]  S/P AVR (aortic valve replacement) [Z95.2]         Anticoagulation Episode Summary     INR check location     Preferred lab     Send INR reminders to [facility] POOL    Comments 2.5 mg tablets, book, BP, PM dose      Anticoagulation Care Providers     Provider Role Specialty Phone number    Dorcas Fisher MD [facility] [phone]            See the Encounter Report to view Anticoagulation Flowsheet and Dosing Calendar (Go to Encounters tab in chart review, and find the Anticoagulation Therapy Visit)    Dosage adjustment made based on physician directed care plan.      Venessa Hood RN

## 2018-06-19 ENCOUNTER — HOSPITAL ENCOUNTER (OUTPATIENT)
Dept: CARDIOLOGY | Facility: CLINIC | Age: 78
Discharge: HOME | End: 2018-06-19
Attending: INTERNAL MEDICINE | Admitting: INTERNAL MEDICINE
Payer: MEDICARE

## 2018-06-19 ENCOUNTER — TELEPHONE (OUTPATIENT)
Dept: CARDIOLOGY | Facility: CLINIC | Age: 78
End: 2018-06-19

## 2018-06-19 DIAGNOSIS — Z95.2 S/P AVR (AORTIC VALVE REPLACEMENT): ICD-10-CM

## 2018-06-19 DIAGNOSIS — I48.20 CHRONIC ATRIAL FIBRILLATION (H): ICD-10-CM

## 2018-06-19 PROCEDURE — 93306 TTE W/DOPPLER COMPLETE: CPT | Mod: 26 | Performed by: INTERNAL MEDICINE

## 2018-06-19 PROCEDURE — 93306 TTE W/DOPPLER COMPLETE: CPT

## 2018-06-19 NOTE — LETTER
June 19, 2018       TO: Khalida Rouse   [address] [facility] [address]       Dear Ms. Rouse,    The results of your recent echocardiogram.    Dr. Flanagan reviewed your echocardiogram and there are no changes from your previous echocardiogram.       Will review results in more detail at your upcoming Office Visit in August.  Your test results fall within the expected range(s) or remain unchanged from previous results.  Please continue with current treatment plan.      Sincerely,    [facility] [place]

## 2018-06-19 NOTE — TELEPHONE ENCOUNTER
Follow up Echocardiogram has OV 8-8-2018 with Dr. Flanagan  Interpretation Summary     There is a bioprosthetic aortic valve.  The gradient is normal for this prosthetic aortic valve.  Left ventricular systolic function is normal.  The visual ejection fraction is estimated at 60-65%.  There is mild concentric left ventricular hypertrophy.  The right ventricle is normal in structure, function and size.  There is mod-severe biatrial enlargement.  Right ventricular systolic pressure is elevated, consistent with moderate  pulmonary hypertension.  The rhythm was atrial fibrillation.     No significant change since 10/4/2017

## 2018-06-20 ENCOUNTER — ANTICOAGULATION THERAPY VISIT (OUTPATIENT)
Dept: ANTICOAGULATION | Facility: CLINIC | Age: 78
End: 2018-06-20
Payer: MEDICARE

## 2018-06-20 VITALS — SYSTOLIC BLOOD PRESSURE: 116 MMHG | HEART RATE: 90 BPM | DIASTOLIC BLOOD PRESSURE: 74 MMHG

## 2018-06-20 DIAGNOSIS — I48.91 ATRIAL FIBRILLATION WITH RVR (H): ICD-10-CM

## 2018-06-20 DIAGNOSIS — Z79.01 LONG-TERM (CURRENT) USE OF ANTICOAGULANTS: ICD-10-CM

## 2018-06-20 DIAGNOSIS — Z95.2 S/P AVR (AORTIC VALVE REPLACEMENT): ICD-10-CM

## 2018-06-20 LAB — INR POINT OF CARE: 2.3 (ref 0.86–1.14)

## 2018-06-20 PROCEDURE — 36416 COLLJ CAPILLARY BLOOD SPEC: CPT

## 2018-06-20 PROCEDURE — 85610 PROTHROMBIN TIME: CPT | Mod: QW

## 2018-06-20 PROCEDURE — 99207 ZZC NO CHARGE NURSE ONLY: CPT

## 2018-06-20 NOTE — MR AVS SNAPSHOT
Khalida ARMAAN Rouse   6/20/2018 1:00 PM   Anticoagulation Therapy Visit    Description:  78 year old female   Provider:  JOSE ANTI COAG   Department:  [facility]           INR as of 6/20/2018     Today's INR 2.3      Anticoagulation Summary as of 6/20/2018     INR goal 2.0-3.0   Today's INR 2.3   Full warfarin instructions 5 mg on Mon, Wed, Fri; 2.5 mg all other days   Next INR check 8/3/2018    Indications   Long-term (current) use of anticoagulants [Z79.01] [Z79.01]  Atrial fibrillation with RVR (H) [I48.91]  S/P AVR (aortic valve replacement) [Z95.2]         Your next Anticoagulation Clinic appointment(s)     Aug 03, 2018  1:30 PM CDT   Anticoagulation Visit with JOSE ANTI LESLY   [facility] ([facility])    [address] [place] [id number]   [phone]              Contact Numbers     Clinic Number:         June 2018 Details    Sun Mon Tue Wed Thu Fri Sat          1               2                 3               4               5               6               7               8               9                 10               11               12               13               14               15               16                 17               18               19               20      5 mg   See details      21      2.5 mg         22      5 mg         23      2.5 mg           24      2.5 mg         25      5 mg         26      2.5 mg         27      5 mg         28      2.5 mg         29      5 mg         30      2.5 mg          Date Details   06/20 This INR check               How to take your warfarin dose     To take:  2.5 mg Take 1 of the 2.5 mg tablets.    To take:  5 mg Take 2 of the 2.5 mg tablets.           July 2018 Details    Sun Mon Tue Wed Thu Fri Sat     1      2.5 mg         2      5 mg         3      2.5 mg         4      5 mg         5      2.5 mg         6      5 mg         7      2.5 mg           8      2.5 mg         9      5 mg         10      2.5 mg          11      5 mg         12      2.5 mg         13      5 mg         14      2.5 mg           15      2.5 mg         16      5 mg         17      2.5 mg         18      5 mg         19      2.5 mg         20      5 mg         21      2.5 mg           22      2.5 mg         23      5 mg         24      2.5 mg         25      5 mg         26      2.5 mg         27      5 mg         28      2.5 mg           29      2.5 mg         30      5 mg         31      2.5 mg              Date Details   No additional details            How to take your warfarin dose     To take:  2.5 mg Take 1 of the 2.5 mg tablets.    To take:  5 mg Take 2 of the 2.5 mg tablets.           August 2018 Details    Sun Mon Tue Wed Thu Fri Sat        1      5 mg         2      2.5 mg         3            4                 5               6               7               8               9               10               11                 12               13               14               15               16               17               18                 19               20               21               22               23               24               25                 26               27               28               29               30               31                 Date Details   No additional details    Date of next INR:  8/3/2018         How to take your warfarin dose     To take:  2.5 mg Take 1 of the 2.5 mg tablets.    To take:  5 mg Take 2 of the 2.5 mg tablets.

## 2018-06-20 NOTE — PROGRESS NOTES
ANTICOAGULATION FOLLOW-UP CLINIC VISIT    Patient Name:  Khalida Rouse  Date:  6/20/2018  Contact Type:  Face to Face    SUBJECTIVE:     Patient Findings     Positives No Problem Findings           OBJECTIVE    INR Protime   Date Value Ref Range Status   06/20/2018 2.3 (A) 0.86 - 1.14 Final       ASSESSMENT / PLAN  INR assessment THER    Recheck INR In: 6 WEEKS    INR Location Clinic      Anticoagulation Summary as of 6/20/2018     INR goal 2.0-3.0   Today's INR 2.3   Warfarin maintenance plan 5 mg (2.5 mg x 2) on Mon, Wed, Fri; 2.5 mg (2.5 mg x 1) all other days   Full warfarin instructions 5 mg on Mon, Wed, Fri; 2.5 mg all other days   Weekly warfarin total 25 mg   No change documented Venessa Hood RN   Plan last modified Venessa Hood RN (6/28/2017)   Next INR check 8/3/2018   Target end date     Indications   Long-term (current) use of anticoagulants [Z79.01] [Z79.01]  Atrial fibrillation with RVR (H) [I48.91]  S/P AVR (aortic valve replacement) [Z95.2]         Anticoagulation Episode Summary     INR check location     Preferred lab     Send INR reminders to [facility] POOL    Comments 2.5 mg tablets, book, BP, PM dose      Anticoagulation Care Providers     Provider Role Specialty Phone number    Dorcas Fisher MD [facility] [phone]            See the Encounter Report to view Anticoagulation Flowsheet and Dosing Calendar (Go to Encounters tab in chart review, and find the Anticoagulation Therapy Visit)    Dosage adjustment made based on physician directed care plan.      Venessa Hood RN

## 2018-08-08 ENCOUNTER — ANTICOAGULATION THERAPY VISIT (OUTPATIENT)
Dept: ANTICOAGULATION | Facility: CLINIC | Age: 78
End: 2018-08-08
Payer: MEDICARE

## 2018-08-08 ENCOUNTER — OFFICE VISIT (OUTPATIENT)
Dept: CARDIOLOGY | Facility: CLINIC | Age: 78
End: 2018-08-08
Attending: INTERNAL MEDICINE
Payer: MEDICARE

## 2018-08-08 VITALS
OXYGEN SATURATION: 93 % | SYSTOLIC BLOOD PRESSURE: 132 MMHG | DIASTOLIC BLOOD PRESSURE: 72 MMHG | BODY MASS INDEX: 20.93 KG/M2 | HEART RATE: 72 BPM | HEIGHT: 66 IN | WEIGHT: 130.2 LBS

## 2018-08-08 DIAGNOSIS — Z95.2 S/P AVR (AORTIC VALVE REPLACEMENT): ICD-10-CM

## 2018-08-08 DIAGNOSIS — I48.91 ATRIAL FIBRILLATION WITH RVR (H): ICD-10-CM

## 2018-08-08 DIAGNOSIS — Z79.01 LONG-TERM (CURRENT) USE OF ANTICOAGULANTS: ICD-10-CM

## 2018-08-08 DIAGNOSIS — I48.20 CHRONIC ATRIAL FIBRILLATION (H): ICD-10-CM

## 2018-08-08 LAB — INR POINT OF CARE: 2.2 (ref 0.86–1.14)

## 2018-08-08 PROCEDURE — 36416 COLLJ CAPILLARY BLOOD SPEC: CPT

## 2018-08-08 PROCEDURE — 85610 PROTHROMBIN TIME: CPT | Mod: QW

## 2018-08-08 PROCEDURE — 99213 OFFICE O/P EST LOW 20 MIN: CPT | Performed by: INTERNAL MEDICINE

## 2018-08-08 PROCEDURE — 99207 ZZC NO CHARGE NURSE ONLY: CPT

## 2018-08-08 NOTE — LETTER
8/8/2018      Mazin Larsen, DO  [address] [place] [address]      RE: Khalida Rouse       Dear Colleague,    I had the pleasure of seeing Khalida Rouse in the [facility].    Service Date: 08/08/2018      REASON FOR CARDIOLOGY VISIT:  Followup AVR, tricuspid regurgitation.      HISTORY OF PRESENT ILLNESS:  Ms. Rouse is a pleasant 78-year-old female with a history of bioprosthetic aortic valve replacement in 04/2014 for severe aortic stenosis with a 21 mm bioprosthetic aortic valve, chronic atrial fibrillation on rate control strategy on Coumadin for stroke prophylaxis, moderate to moderately severe tricuspid regurgitation.  Today, the patient is coming for routine followup.  She had a repeat echocardiogram done last month, overall appears unchanged compared to previous echocardiogram, normal LV function, normally functioning bioprosthetic valve, normal gradients across the bioprosthetic valve.  Moderate pulmonary hypertension.  Although not reported, tricuspid regurgitation also appears similar to previous echocardiogram 2 to 3+.  The patient tells me, cardiac health-wise she is doing quite well.  She still regularly does laundry, climbing up and down the stairs, taking care of her grandkids, age 2-1/2 and 1-1/2 years.  No chest discomfort or shortness of breath at rest or with physical activity.  Since yesterday she developed some swelling over her left eyelid, and she has an Ophthalmology appointment later today.  She is tolerating Coumadin quite well without any issues.  To be noted, I have discussed the option of right heart catheterization with the patient in the past, given pulmonary hypertension and tricuspid regurgitation, but she had declined that.      PHYSICAL EXAMINATION:   VITAL SIGNS:  Blood pressure 132/72, heart rate 72 irregular, weight 130 pounds, BMI 21.   GENERAL:  The patient appears pleasant, comfortable.   NECK:  Normal JVP, no bruit.    CARDIOVASCULAR SYSTEM:  Irregular, normal rate, grade 2/6 systolic murmur heard best over the left lower sternal border, no S3, S4, rub or gallop.   RESPIRATORY SYSTEM:  Clear to auscultation bilaterally.   GASTROINTESTINAL SYSTEM:  Abdomen soft, nontender.   EXTREMITIES:  No pitting pedal edema.   NEUROLOGICAL:  Alert, oriented x3.   PSYCHIATRIC:  Normal affect.   SKIN:  No obvious rash.   HEENT:  Left upper eyelid swollen.   PSYCHIATRIC:  Normal affect.   SKIN:  No obvious rash.      IMPRESSION AND PLAN:  A pleasant 78-year-old female status post bioprosthetic aortic valve replacement, chronic atrial fibrillation on rate control strategy, ventricular rates well controlled, on Coumadin for stroke prophylaxis, with 2 to 3+ tricuspid regurgitation and pulmonary hypertension.  Overall, cardiac status-wise she is doing quite well without any symptoms of angina or congestive heart failure or even right-sided congestive heart failure.  As noted above, the patient has declined a right heart catheterization.  For now we will continue conservative management and then plan to see her back in 1 year with a repeat echocardiogram, sooner if she notices any change in clinical status, especially if any exertional-related symptoms or leg swelling or fluid retention.         MADI TEJEDA MD             D: 2018   T: 2018   MT: HERMINIA      Name:     JJ RAMACHANDRAN   MRN:      [id number]        Account:      [id number]   :      1940           Service Date: 2018      Document: [id number]         Outpatient Encounter Prescriptions as of 2018   Medication Sig Dispense Refill     calcium carbonate-vitamin D 500-400 MG-UNIT TABS tablt Take 1 tablet by mouth 3 times daily       diltiazem 180 MG 24 hr capsule Take 1 capsule (180 mg) by mouth 2 times daily 180 capsule 3     metoprolol tartrate (LOPRESSOR) 50 MG tablet Take 1 tablet (50 mg) by mouth 2 times daily 180 tablet 3     multivitamins with minerals  "(CERTAVITE) LIQD Take 15 mLs by mouth daily       warfarin (COUMADIN) 2.5 MG tablet Take 5 mg Mon, Wed, Friday and take 2.5 mg all other days or as directed by the coumadin clinic. 144 tablet 0     warfarin (COUMADIN) 2.5 MG tablet Take 5 mg on Monday, Wednesday, Friday and 2.5 mg all other days, or as directed by the coumadin clinic. 144 tablet 0     polyethylene glycol (GOLYTELY/NULYTELY) 236 G suspension Refer to \"Getting Ready for a Colonoscopy\" instruction handout (Patient not taking: Reported on 4/10/2018) 4000 mL 0     torsemide (DEMADEX) 20 MG tablet TAKE ONE TABLET BY MOUTH ONCE DAILY AS NEEDED (Patient not taking: Reported on 8/8/2018) 90 tablet 3     No facility-administered encounter medications on file as of 8/8/2018.        Again, thank you for allowing me to participate in the care of your patient.      Sincerely,    Jason Flanagan MD     [facility] [place]    "

## 2018-08-08 NOTE — MR AVS SNAPSHOT
Khalida Rouse   8/8/2018 11:15 AM   Anticoagulation Therapy Visit    Description:  78 year old female   Provider:  JOSE ANTI COAG   Department:   Anticoag           INR as of 8/8/2018     Today's INR 2.2      Anticoagulation Summary as of 8/8/2018     INR goal 2.0-3.0   Today's INR 2.2   Full warfarin instructions 5 mg on Mon, Wed, Fri; 2.5 mg all other days   Next INR check 9/19/2018    Indications   Long-term (current) use of anticoagulants [Z79.01] [Z79.01]  Atrial fibrillation with RVR (H) [I48.91]  S/P AVR (aortic valve replacement) [Z95.2]         Your next Anticoagulation Clinic appointment(s)     Aug 08, 2018 11:15 AM CDT   Anticoagulation Visit with PH ANTI COAG   [facility] ([address] [address]   [phone]            Sep 19, 2018  9:15 AM CDT   Anticoagulation Visit with PH ANTI COAG   [facility] ([address] [address]   [phone]              Contact Numbers     Clinic Number:         August 2018 Details    Sun Mon Tue Wed Thu Fri Sat        1               2               3               4                 5               6               7               8      5 mg   See details      9      2.5 mg         10      5 mg         11      2.5 mg           12      2.5 mg         13      5 mg         14      2.5 mg         15      5 mg         16      2.5 mg         17      5 mg         18      2.5 mg           19      2.5 mg         20      5 mg         21      2.5 mg         22      5 mg         23      2.5 mg         24      5 mg         25      2.5 mg           26      2.5 mg         27      5 mg         28      2.5 mg         29      5 mg         30      2.5 mg         31      5 mg           Date Details   08/08 This INR check               How to take your warfarin dose     To take:  2.5 mg Take 1 of the 2.5 mg tablets.    To take:  5 mg Take 2 of the 2.5  mg tablets.           September 2018 Details    Sun Mon Tue Wed Thu Fri Sat           1      2.5 mg           2      2.5 mg         3      5 mg         4      2.5 mg         5      5 mg         6      2.5 mg         7      5 mg         8      2.5 mg           9      2.5 mg         10      5 mg         11      2.5 mg         12      5 mg         13      2.5 mg         14      5 mg         15      2.5 mg           16      2.5 mg         17      5 mg         18      2.5 mg         19            20               21               22                 23               24               25               26               27               28               29                 30                      Date Details   No additional details    Date of next INR:  9/19/2018         How to take your warfarin dose     To take:  2.5 mg Take 1 of the 2.5 mg tablets.    To take:  5 mg Take 2 of the 2.5 mg tablets.

## 2018-08-08 NOTE — MR AVS SNAPSHOT
After Visit Summary   8/8/2018    Khalida Rouse    MRN: [id number]           Patient Information     Date Of Birth          1940        Visit Information        Provider Department      8/8/2018 9:30 AM Jason Flanagan MD [facility]        Today's Diagnoses     S/P AVR (aortic valve replacement)        Chronic atrial fibrillation (H)           Follow-ups after your visit        Additional Services     Follow-Up with Cardiologist                 Your next 10 appointments already scheduled     Aug 08, 2018 11:15 AM CDT   [facility] with [facility]AG   [facility]    [address] [place] [id number] [phone]              Future tests that were ordered for you today     Open Future Orders        Priority Expected Expires Ordered    Echocardiogram Routine 8/8/2019 8/9/2019 8/8/2018    Follow-Up with Cardiologist Routine 8/8/2019 8/9/2019 8/8/2018            Who to contact     If you have questions or need follow up information about today's clinic visit or your schedule please contact [facility] directly at [phone].  Normal or non-critical lab and imaging results will be communicated to you by MyChart, letter or phone within 4 business days after the clinic has received the results. If you do not hear from us within 7 days, please contact the clinic through MyChart or phone. If you have a critical or abnormal lab result, we will notify you by phone as soon as possible.  Submit refill requests through [employer] or call your pharmacy and they will forward the refill request to us. Please allow 3 business days for your refill to be completed.          Additional Information About Your Visit        Care EveryWhere ID     This is your Care EveryWhere ID. This could be used by other organizations to access your [facility]  "records  [id number]        Your Vitals Were     Pulse Height Pulse Oximetry BMI (Body Mass Index)          72 1.676 m (5' 6\") 93% 21.01 kg/m2         Blood Pressure from Last 3 Encounters:   08/08/18 132/72   06/20/18 116/74   05/09/18 133/77    Weight from Last 3 Encounters:   08/08/18 59.1 kg (130 lb 3.2 oz)   04/10/18 59.1 kg (130 lb 4.8 oz)   10/10/17 61.2 kg (134 lb 14.4 oz)              We Performed the Following     Follow-Up with Cardiologist        Primary Care Provider Office Phone # Fax #    Mazin Ganga Larsen, [phone]        [address] [place] [address]        Equal Access to Services     RAJWINDER HERNANDEZ : Hadii aad ku hadasho Soomaali, waaxda luqadaha, qaybta kaalmada adeegyada, nubia romero . So [facility] [phone].    ATENCIÓN: Si habla español, tiene a louis disposición servicios gratuitos de asistencia lingüística. Jossy al [phone].    We comply with applicable federal civil rights laws and [place] laws. We do not discriminate on the basis of race, color, national origin, age, disability, sex, sexual orientation, or gender identity.            Thank you!     Thank you for choosing [facility]  for your care. Our goal is always to provide you with excellent care. Hearing back from our patients is one way we can continue to improve our services. Please take a few minutes to complete the written survey that you may receive in the mail after your visit with us. Thank you!             Your Updated Medication List - Protect others around you: Learn how to safely use, store and throw away your medicines at www.disposemymeds.org.          This list is accurate as of 8/8/18  9:50 AM.  Always use your most recent med list.                   Brand Name Dispense Instructions for use Diagnosis    calcium carbonate-vitamin D 500-400 MG-UNIT Tabs per tablet      Take 1 tablet by mouth 3 times daily    " "Medicare annual wellness visit, subsequent, Osteoporosis       diltiazem 180 MG 24 hr capsule     180 capsule    Take 1 capsule (180 mg) by mouth 2 times daily    Chronic atrial fibrillation (H)       metoprolol tartrate 50 MG tablet    LOPRESSOR    180 tablet    Take 1 tablet (50 mg) by mouth 2 times daily    S/P AVR (aortic valve replacement), Chronic atrial fibrillation (H)       multivitamins with minerals Liqd liquid      Take 15 mLs by mouth daily    Medicare annual wellness visit, subsequent       polyethylene glycol 236 g suspension    GoLYTELY/NuLYTELY    4000 mL    Refer to \"Getting Ready for a Colonoscopy\" instruction handout    Encounter for screening colonoscopy       torsemide 20 MG tablet    DEMADEX    90 tablet    TAKE ONE TABLET BY MOUTH ONCE DAILY AS NEEDED    Acute pulmonary edema (H)       * warfarin 2.5 MG tablet    COUMADIN    144 tablet    Take 5 mg on Monday, Wednesday, Friday and 2.5 mg all other days, or as directed by the coumadin clinic.    Long-term (current) use of anticoagulants, Atrial fibrillation with RVR (H)       * warfarin 2.5 MG tablet    COUMADIN    144 tablet    Take 5 mg Mon, Wed, Friday and take 2.5 mg all other days or as directed by the coumadin clinic.    Long-term (current) use of anticoagulants, Atrial fibrillation with RVR (H)       * Notice:  This list has 2 medication(s) that are the same as other medications prescribed for you. Read the directions carefully, and ask your doctor or other care provider to review them with you.      "

## 2018-08-08 NOTE — PROGRESS NOTES
Service Date: 08/08/2018      REASON FOR CARDIOLOGY VISIT:  Followup AVR, tricuspid regurgitation.      HISTORY OF PRESENT ILLNESS:  Ms. Rouse is a pleasant 78-year-old female with a history of bioprosthetic aortic valve replacement in 04/2014 for severe aortic stenosis with a 21 mm bioprosthetic aortic valve, chronic atrial fibrillation on rate control strategy on Coumadin for stroke prophylaxis, moderate to moderately severe tricuspid regurgitation.  Today, the patient is coming for routine followup.  She had a repeat echocardiogram done last month, overall appears unchanged compared to previous echocardiogram, normal LV function, normally functioning bioprosthetic valve, normal gradients across the bioprosthetic valve.  Moderate pulmonary hypertension.  Although not reported, tricuspid regurgitation also appears similar to previous echocardiogram 2 to 3+.  The patient tells me, cardiac health-wise she is doing quite well.  She still regularly does laundry, climbing up and down the stairs, taking care of her grandkids, age 2-1/2 and 1-1/2 years.  No chest discomfort or shortness of breath at rest or with physical activity.  Since yesterday she developed some swelling over her left eyelid, and she has an Ophthalmology appointment later today.  She is tolerating Coumadin quite well without any issues.  To be noted, I have discussed the option of right heart catheterization with the patient in the past, given pulmonary hypertension and tricuspid regurgitation, but she had declined that.      PHYSICAL EXAMINATION:   VITAL SIGNS:  Blood pressure 132/72, heart rate 72 irregular, weight 130 pounds, BMI 21.   GENERAL:  The patient appears pleasant, comfortable.   NECK:  Normal JVP, no bruit.   CARDIOVASCULAR SYSTEM:  Irregular, normal rate, grade 2/6 systolic murmur heard best over the left lower sternal border, no S3, S4, rub or gallop.   RESPIRATORY SYSTEM:  Clear to auscultation bilaterally.   GASTROINTESTINAL  SYSTEM:  Abdomen soft, nontender.   EXTREMITIES:  No pitting pedal edema.   NEUROLOGICAL:  Alert, oriented x3.   PSYCHIATRIC:  Normal affect.   SKIN:  No obvious rash.   HEENT:  Left upper eyelid swollen.   PSYCHIATRIC:  Normal affect.   SKIN:  No obvious rash.      IMPRESSION AND PLAN:  A pleasant 78-year-old female status post bioprosthetic aortic valve replacement, chronic atrial fibrillation on rate control strategy, ventricular rates well controlled, on Coumadin for stroke prophylaxis, with 2 to 3+ tricuspid regurgitation and pulmonary hypertension.  Overall, cardiac status-wise she is doing quite well without any symptoms of angina or congestive heart failure or even right-sided congestive heart failure.  As noted above, the patient has declined a right heart catheterization.  For now we will continue conservative management and then plan to see her back in 1 year with a repeat echocardiogram, sooner if she notices any change in clinical status, especially if any exertional-related symptoms or leg swelling or fluid retention.         MADI TEJEDA MD             D: 2018   T: 2018   MT: HERMINIA      Name:     JJ RAMACHANDRAN   MRN:      [id number]        Account:      [id number]   :      1940           Service Date: 2018      Document: [id number]

## 2018-08-08 NOTE — PROGRESS NOTES
ANTICOAGULATION FOLLOW-UP CLINIC VISIT    Patient Name:  Khalida Rouse  Date:  8/8/2018  Contact Type:  Face to Face    SUBJECTIVE:     Patient Findings     Positives No Problem Findings           OBJECTIVE    INR Protime   Date Value Ref Range Status   08/08/2018 2.2 (A) 0.86 - 1.14 Final       ASSESSMENT / PLAN  INR assessment THER    Recheck INR In: 6 WEEKS    INR Location Clinic      Anticoagulation Summary as of 8/8/2018     INR goal 2.0-3.0   Today's INR 2.2   Warfarin maintenance plan 5 mg (2.5 mg x 2) on Mon, Wed, Fri; 2.5 mg (2.5 mg x 1) all other days   Full warfarin instructions 5 mg on Mon, Wed, Fri; 2.5 mg all other days   Weekly warfarin total 25 mg   No change documented Vera Martino RN   Plan last modified Venessa Hood RN (6/28/2017)   Next INR check 9/19/2018   Target end date     Indications   Long-term (current) use of anticoagulants [Z79.01] [Z79.01]  Atrial fibrillation with RVR (H) [I48.91]  S/P AVR (aortic valve replacement) [Z95.2]         Anticoagulation Episode Summary     INR check location     Preferred lab     Send INR reminders to [facility] POOL    Comments 2.5 mg tablets, book, BP, PM dose      Anticoagulation Care Providers     Provider Role Specialty Phone number    Dorcas Fisher MD [facility] [phone]            See the Encounter Report to view Anticoagulation Flowsheet and Dosing Calendar (Go to Encounters tab in chart review, and find the Anticoagulation Therapy Visit)    Dosage adjustment made based on physician directed care plan.    Vera Martino RN

## 2018-08-08 NOTE — PROGRESS NOTES
"HPI and Plan:   See dictation(#[id number])    Orders Placed This Encounter   Procedures     Follow-Up with Cardiologist     Echocardiogram       No orders of the defined types were placed in this encounter.      There are no discontinued medications.      Encounter Diagnoses   Name Primary?     S/P AVR (aortic valve replacement)      Chronic atrial fibrillation (H)        CURRENT MEDICATIONS:  Current Outpatient Prescriptions   Medication Sig Dispense Refill     calcium carbonate-vitamin D 500-400 MG-UNIT TABS tablt Take 1 tablet by mouth 3 times daily       diltiazem 180 MG 24 hr capsule Take 1 capsule (180 mg) by mouth 2 times daily 180 capsule 3     metoprolol tartrate (LOPRESSOR) 50 MG tablet Take 1 tablet (50 mg) by mouth 2 times daily 180 tablet 3     multivitamins with minerals (CERTAVITE) LIQD Take 15 mLs by mouth daily       warfarin (COUMADIN) 2.5 MG tablet Take 5 mg Mon, Wed, Friday and take 2.5 mg all other days or as directed by the coumadin clinic. 144 tablet 0     warfarin (COUMADIN) 2.5 MG tablet Take 5 mg on Monday, Wednesday, Friday and 2.5 mg all other days, or as directed by the coumadin clinic. 144 tablet 0     polyethylene glycol (GOLYTELY/NULYTELY) 236 G suspension Refer to \"Getting Ready for a Colonoscopy\" instruction handout (Patient not taking: Reported on 4/10/2018) 4000 mL 0     torsemide (DEMADEX) 20 MG tablet TAKE ONE TABLET BY MOUTH ONCE DAILY AS NEEDED (Patient not taking: Reported on 8/8/2018) 90 tablet 3       ALLERGIES     Allergies   Allergen Reactions     Xarelto [Rivaroxaban] Diarrhea     Ace Inhibitors Cough       PAST MEDICAL HISTORY:  Past Medical History:   Diagnosis Date     Aortic valve stenosis      Atrial flutter (H)      Cardiomyopathy (H)      Severe aortic stenosis 4/7/2014     Shoulder fracture, left 3/22/15       PAST SURGICAL HISTORY:  Past Surgical History:   Procedure Laterality Date     CATARACT IOL, RT/LT Right      ENT SURGERY      glaucoma surgery     REPLACE " "VALVE AORTIC  4/10/2014    Procedure: REPLACE VALVE AORTIC;  Median sternotomy, Replace Aortic Valve on pump oxygenation. ;  Surgeon: Wesley Fong MD;  Location:  OR       FAMILY HISTORY:  Family History   Problem Relation Age of Onset     Dementia Brother      Alzheimer Disease Sister        SOCIAL HISTORY:  Social History     Social History     Marital status:      Spouse name: N/A     Number of children: N/A     Years of education: N/A     Social History Main Topics     Smoking status: Never Smoker     Smokeless tobacco: Never Used     Alcohol use No     Drug use: No     Sexual activity: No     Other Topics Concern     Caffeine Concern No     Special Diet No     Exercise No     walking     Social History Narrative       Review of Systems:  Skin:          Eyes:  Positive for glasses    ENT:  Negative      Respiratory:  Negative       Cardiovascular:  Negative for;palpitations;chest pain;edema lightheadedness;dizziness;Positive for    Gastroenterology: Negative      Genitourinary:  Negative      Musculoskeletal:  Negative      Neurologic:  Negative      Psychiatric:  Negative      Heme/Lymph/Imm:  Positive for allergies    Endocrine:  Negative        Physical Exam:  Vitals: /72 (BP Location: Left arm, Patient Position: Fowlers, Cuff Size: Adult Regular)  Pulse 72  Ht 1.676 m (5' 6\")  Wt 59.1 kg (130 lb 3.2 oz)  SpO2 93%  BMI 21.01 kg/m2    Constitutional:           Skin:             Head:           Eyes:           Lymph:      ENT:           Neck:           Respiratory:            Cardiac:                                                           GI:           Extremities and Muscular Skeletal:                 Neurological:           Psych:             AGUILA Flanagan MD  [address]  [place] [address]                  "

## 2018-08-08 NOTE — LETTER
"8/8/2018    Mazin Larsen, DO  [address] [place] [address]    RE: Khalida Rouse       Dear Colleague,    I had the pleasure of seeing Khalida Rouse in the [facility].    HPI and Plan:   See dictation(#[id number])    Orders Placed This Encounter   Procedures     Follow-Up with Cardiologist     Echocardiogram       No orders of the defined types were placed in this encounter.      There are no discontinued medications.      Encounter Diagnoses   Name Primary?     S/P AVR (aortic valve replacement)      Chronic atrial fibrillation (H)        CURRENT MEDICATIONS:  Current Outpatient Prescriptions   Medication Sig Dispense Refill     calcium carbonate-vitamin D 500-400 MG-UNIT TABS tablt Take 1 tablet by mouth 3 times daily       diltiazem 180 MG 24 hr capsule Take 1 capsule (180 mg) by mouth 2 times daily 180 capsule 3     metoprolol tartrate (LOPRESSOR) 50 MG tablet Take 1 tablet (50 mg) by mouth 2 times daily 180 tablet 3     multivitamins with minerals (CERTAVITE) LIQD Take 15 mLs by mouth daily       warfarin (COUMADIN) 2.5 MG tablet Take 5 mg Mon, Wed, Friday and take 2.5 mg all other days or as directed by the coumadin clinic. 144 tablet 0     warfarin (COUMADIN) 2.5 MG tablet Take 5 mg on Monday, Wednesday, Friday and 2.5 mg all other days, or as directed by the coumadin clinic. 144 tablet 0     polyethylene glycol (GOLYTELY/NULYTELY) 236 G suspension Refer to \"Getting Ready for a Colonoscopy\" instruction handout (Patient not taking: Reported on 4/10/2018) 4000 mL 0     torsemide (DEMADEX) 20 MG tablet TAKE ONE TABLET BY MOUTH ONCE DAILY AS NEEDED (Patient not taking: Reported on 8/8/2018) 90 tablet 3       ALLERGIES     Allergies   Allergen Reactions     Xarelto [Rivaroxaban] Diarrhea     Ace Inhibitors Cough       PAST MEDICAL HISTORY:  Past Medical History:   Diagnosis Date     Aortic valve stenosis      Atrial flutter (H)      Cardiomyopathy (H)      Severe " "aortic stenosis 4/7/2014     Shoulder fracture, left 3/22/15       PAST SURGICAL HISTORY:  Past Surgical History:   Procedure Laterality Date     CATARACT IOL, RT/LT Right      ENT SURGERY      glaucoma surgery     REPLACE VALVE AORTIC  4/10/2014    Procedure: REPLACE VALVE AORTIC;  Median sternotomy, Replace Aortic Valve on pump oxygenation. ;  Surgeon: Wesley Fong MD;  Location:  OR       FAMILY HISTORY:  Family History   Problem Relation Age of Onset     Dementia Brother      Alzheimer Disease Sister        SOCIAL HISTORY:  Social History     Social History     Marital status:      Spouse name: N/A     Number of children: N/A     Years of education: N/A     Social History Main Topics     Smoking status: Never Smoker     Smokeless tobacco: Never Used     Alcohol use No     Drug use: No     Sexual activity: No     Other Topics Concern     Caffeine Concern No     Special Diet No     Exercise No     walking     Social History Narrative       Review of Systems:  Skin:          Eyes:  Positive for glasses    ENT:  Negative      Respiratory:  Negative       Cardiovascular:  Negative for;palpitations;chest pain;edema lightheadedness;dizziness;Positive for    Gastroenterology: Negative      Genitourinary:  Negative      Musculoskeletal:  Negative      Neurologic:  Negative      Psychiatric:  Negative      Heme/Lymph/Imm:  Positive for allergies    Endocrine:  Negative        Physical Exam:  Vitals: /72 (BP Location: Left arm, Patient Position: Fowlers, Cuff Size: Adult Regular)  Pulse 72  Ht 1.676 m (5' 6\")  Wt 59.1 kg (130 lb 3.2 oz)  SpO2 93%  BMI 21.01 kg/m2    Constitutional:           Skin:             Head:           Eyes:           Lymph:      ENT:           Neck:           Respiratory:            Cardiac:                                                           GI:           Extremities and Muscular Skeletal:                 Neurological:           Psych:             CC  Jason " MD [facility]  [address]  [place] [address]                    Thank you for allowing me to participate in the care of your patient.      Sincerely,     Jason Flanagan MD     [facility]    cc:   Jason Flanagan MD  [address]  [place] [address]

## 2018-08-30 ENCOUNTER — TELEPHONE (OUTPATIENT)
Dept: CARDIOLOGY | Facility: CLINIC | Age: 78
End: 2018-08-30

## 2018-08-30 NOTE — TELEPHONE ENCOUNTER
Voice message received stating she was scheduled for eye surgery tomorrow and would like a call back to discuss what she should due about her medications.     Called back to phone number left. Daughter, Leah answered. Stated patient was not available to talk. When asked what kind of eye surgery, stated they were planning on removal of her eye ball due to an infection which they have not been able to get under control. 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 Daughter was unaware where, she was unable to attend the office visit today at [facility] in [place].   History of AVR(aortic valve replacement with an Segal Magna 3000TFX 21 mm on 4/10/2014 ) and atrial fib and is on Warfarin.     Instructed to first call back to [facility] concerning awareness of artificial valve and on Warfarin. And clarification on what procedure they are going to do and where.   The warfarin is managed by her PMD clinic. They will need to be called.  Verbalized understanding.     Will message Dr. Flanagan for any further recommendations for any needed follow up from us.

## 2018-08-31 NOTE — TELEPHONE ENCOUNTER
Pt informed of message below. She will start holding Sunday 9/2 and then restart on Friday 9/7 after surgery. Recheck INR as planned on 9/19/18.  Patient verbalized understanding and agrees with plan of care. Pt had no further questions or concerns at this time. Vera Martino, RN, BSN

## 2018-08-31 NOTE — TELEPHONE ENCOUNTER
Received another voice message from patient. Wanting to know how much coumadin was okay to take prior to surgery, one or 2 pills.   INR is done through primary clinc. Notified by phone call Dr. Fisher's clinic, however no INR personal was available at this time. Left message for them to connect with patient. Okay per Dr. Flanagan to hold coumadin. Normally surgery is requesting a 4-5 day hold. Will need to have follow up after surgery with getting her INR theraputic again. Message left for INR personal.    Patient called with update, should be receiving a follow up phone call from INR clinic and that it is okay to hold Warfarin prior to surgery without bridging. Will want to restart Warfarin when safe per surgery.  Verbalized understanding that she will need to talk with INR clinic.

## 2018-08-31 NOTE — TELEPHONE ENCOUNTER
Jyothi a nurse from the [facility] asked a message be added onto this existing message for INR to see detail but Jyothi is requesting INR team to call to discuss with patient prior to surgery ok to hold coumadin per Dr Flanagan & then when to restart.  Please advise with patient.  Thank you,  Anna Michel  Patient Representative

## 2018-09-06 DIAGNOSIS — Z79.01 LONG TERM CURRENT USE OF ANTICOAGULANT THERAPY: Primary | ICD-10-CM

## 2018-09-06 LAB — INR BLD: 1.1 (ref 0.86–1.14)

## 2018-09-06 PROCEDURE — 85610 PROTHROMBIN TIME: CPT | Mod: QW | Performed by: INTERNAL MEDICINE

## 2018-09-06 PROCEDURE — 36416 COLLJ CAPILLARY BLOOD SPEC: CPT | Performed by: INTERNAL MEDICINE

## 2018-09-16 DIAGNOSIS — I48.91 ATRIAL FIBRILLATION WITH RVR (H): ICD-10-CM

## 2018-09-17 RX ORDER — ATENOLOL 50 MG/1
50 TABLET ORAL 2 TIMES DAILY
Qty: 30 TABLET | Refills: 0 | Status: SHIPPED | OUTPATIENT
Start: 2018-09-17 | End: 2018-10-02

## 2018-09-17 NOTE — TELEPHONE ENCOUNTER
"Requested Prescriptions   Pending Prescriptions Disp Refills     atenolol (TENORMIN) 50 MG tablet [Pharmacy Med Name: ATENOLOL 50MG TABS] 90 tablet 8    Last Written Prescription Date:  10/10/17  Last Fill Quantity: 90,  # refills: 8   Last office visit: 4/10/2018 with prescribing provider:  4/10/18   Future Office Visit:     Sig: TAKE ONE TABLET BY MOUTH TWICE A DAY    Beta-Blockers Protocol Passed    9/16/2018  6:19 PM       Passed - Blood pressure under 140/90 in past 12 months    BP Readings from Last 3 Encounters:   08/08/18 132/72   06/20/18 116/74   05/09/18 133/77                Passed - Patient is age 6 or older       Passed - Recent (12 mo) or future (30 days) visit within the authorizing provider's specialty    Patient had office visit in the last 12 months or has a visit in the next 30 days with authorizing provider or within the authorizing provider's specialty.  See \"Patient Info\" tab in inbasket, or \"Choose Columns\" in Meds & Orders section of the refill encounter.              "

## 2018-09-17 NOTE — TELEPHONE ENCOUNTER
Medication is being filled for 1 time refill only due to:  Dr. Larsen's last note said to follow up in 6 months, please call to schedule the patient     Martha Brandt RN. . .  9/17/2018, 3:44 PM

## 2018-09-19 ENCOUNTER — ANTICOAGULATION THERAPY VISIT (OUTPATIENT)
Dept: ANTICOAGULATION | Facility: CLINIC | Age: 78
End: 2018-09-19

## 2018-09-19 DIAGNOSIS — Z79.01 LONG-TERM (CURRENT) USE OF ANTICOAGULANTS: ICD-10-CM

## 2018-09-19 DIAGNOSIS — Z79.01 LONG TERM CURRENT USE OF ANTICOAGULANT THERAPY: ICD-10-CM

## 2018-09-19 DIAGNOSIS — I48.91 ATRIAL FIBRILLATION WITH RVR (H): ICD-10-CM

## 2018-09-19 DIAGNOSIS — Z95.2 S/P AVR (AORTIC VALVE REPLACEMENT): ICD-10-CM

## 2018-09-19 LAB — INR BLD: 3.6 (ref 0.86–1.14)

## 2018-09-19 PROCEDURE — 36416 COLLJ CAPILLARY BLOOD SPEC: CPT | Performed by: INTERNAL MEDICINE

## 2018-09-19 PROCEDURE — 85610 PROTHROMBIN TIME: CPT | Mod: QW | Performed by: INTERNAL MEDICINE

## 2018-09-19 PROCEDURE — 99207 ZZC NO CHARGE NURSE ONLY: CPT | Performed by: INTERNAL MEDICINE

## 2018-09-19 NOTE — MR AVS SNAPSHOT
Khalida Rouse   9/19/2018   Anticoagulation Therapy Visit    Description:  78 year old female   Provider:  Mazin Larsen DO   Department:  [facility]           INR as of 9/19/2018     Today's INR 3.6!      Anticoagulation Summary as of 9/19/2018     INR goal 2.0-3.0   Today's INR 3.6!   Full warfarin instructions 9/19: 2.5 mg; Otherwise 5 mg on Mon, Wed, Fri; 2.5 mg all other days   Next INR check 10/3/2018    Indications   Long-term (current) use of anticoagulants [Z79.01] [Z79.01]  Atrial fibrillation with RVR (H) [I48.91]  S/P AVR (aortic valve replacement) [Z95.2]         Your next Anticoagulation Clinic appointment(s)     Oct 03, 2018  9:15 AM CDT   [facility]   [facility]    [address] [place] [id number]   [phone]              Contact Numbers     Clinic Number:         September 2018 Details    Sun Mon Tue Wed Thu Fri Sat           1                 2               3               4               5               6               7               8                 9               10               11               12               13               14               15                 16               17               18               19      2.5 mg   See details      20      2.5 mg         21      5 mg         22      2.5 mg           23      2.5 mg         24      5 mg         25      2.5 mg         26      5 mg         27      2.5 mg         28      5 mg         29      2.5 mg           30      2.5 mg                Date Details   09/19 This INR check               How to take your warfarin dose     To take:  2.5 mg Take 1 of the 2.5 mg tablets.    To take:  5 mg Take 2 of the 2.5 mg tablets.           October 2018 Details    Sun Mon Tue Wed Thu Fri Sat      1      5 mg         2      2.5 mg         3            4               5               6                 7               8               9                10               11               12               13                 14               15               16               17               18               19               20                 21               22               23               24               25               26               27                 28               29               30               31                   Date Details   No additional details    Date of next INR:  10/3/2018         How to take your warfarin dose     To take:  2.5 mg Take 1 of the 2.5 mg tablets.    To take:  5 mg Take 2 of the 2.5 mg tablets.

## 2018-09-19 NOTE — PROGRESS NOTES
ANTICOAGULATION FOLLOW-UP CLINIC VISIT    Patient Name:  Khalida Rouse  Date:  9/19/2018  Contact Type:  Telephone    SUBJECTIVE:     Patient Findings     Positives Unexplained INR or factor level change           OBJECTIVE    INR Point of Care   Date Value Ref Range Status   09/19/2018 3.6 (H) 0.86 - 1.14 Final     Comment:     This test is intended for monitoring Coumadin therapy.  Results are not   accurate in patients with prolonged INR due to factor deficiency.         ASSESSMENT / PLAN  INR assessment SUPRA    Recheck INR In: 2 WEEKS    INR Location Outside lab      Anticoagulation Summary as of 9/19/2018     INR goal 2.0-3.0   Today's INR 3.6!   Warfarin maintenance plan 5 mg (2.5 mg x 2) on Mon, Wed, Fri; 2.5 mg (2.5 mg x 1) all other days   Full warfarin instructions 9/19: 2.5 mg; Otherwise 5 mg on Mon, Wed, Fri; 2.5 mg all other days   Weekly warfarin total 25 mg   Plan last modified Venessa Hood RN (6/28/2017)   Next INR check 10/3/2018   Target end date     Indications   Long-term (current) use of anticoagulants [Z79.01] [Z79.01]  Atrial fibrillation with RVR (H) [I48.91]  S/P AVR (aortic valve replacement) [Z95.2]         Anticoagulation Episode Summary     INR check location     Preferred lab     Send INR reminders to [facility]    Comments 2.5 mg tablets, book, BP, PM dose      Anticoagulation Care Providers     Provider Role Specialty Phone number    Dorcas Fisher MD [facility] [phone]            See the Encounter Report to view Anticoagulation Flowsheet and Dosing Calendar (Go to Encounters tab in chart review, and find the Anticoagulation Therapy Visit)    Dosage adjustment made based on physician directed care plan.    Vera Martino, RN

## 2018-10-02 ENCOUNTER — OFFICE VISIT (OUTPATIENT)
Dept: FAMILY MEDICINE | Facility: OTHER | Age: 78
End: 2018-10-02
Payer: MEDICARE

## 2018-10-02 VITALS
RESPIRATION RATE: 16 BRPM | HEART RATE: 76 BPM | SYSTOLIC BLOOD PRESSURE: 126 MMHG | TEMPERATURE: 98.1 F | BODY MASS INDEX: 21.21 KG/M2 | WEIGHT: 131.4 LBS | DIASTOLIC BLOOD PRESSURE: 62 MMHG | OXYGEN SATURATION: 96 %

## 2018-10-02 DIAGNOSIS — Z79.01 LONG TERM CURRENT USE OF ANTICOAGULANT THERAPY: ICD-10-CM

## 2018-10-02 DIAGNOSIS — I48.91 ATRIAL FIBRILLATION WITH RVR (H): ICD-10-CM

## 2018-10-02 DIAGNOSIS — Z95.2 S/P AVR (AORTIC VALVE REPLACEMENT): Primary | ICD-10-CM

## 2018-10-02 PROCEDURE — 99214 OFFICE O/P EST MOD 30 MIN: CPT | Performed by: INTERNAL MEDICINE

## 2018-10-02 ASSESSMENT — PAIN SCALES - GENERAL: PAINLEVEL: NO PAIN (0)

## 2018-10-02 NOTE — PROGRESS NOTES
SUBJECTIVE:   Khalida Rouse is a 78 year old female who presents to clinic today for the following health issues:      Chief Complaint   Patient presents with     RECHECK     AFIB     H/o a-fib on coumadin and rate controled w/ diltiazen, metoprolol and atenolol.  Denies palpitations, CP or orthostasis.  Follows w/ INR clinic.    She refuses flu vaccine or other health maintenance such as colonoscopy.    Problem list and histories reviewed & adjusted, as indicated.  Additional history: as documented    Patient Active Problem List   Diagnosis     Atrial flutter     S/P AVR (aortic valve replacement)     Advanced directives, counseling/discussion     Osteoporosis     Atrial fibrillation with RVR (H)     Long-term (current) use of anticoagulants [Z79.01]     Past Surgical History:   Procedure Laterality Date     CATARACT IOL, RT/LT Right      ENT SURGERY      glaucoma surgery     REPLACE VALVE AORTIC  4/10/2014    Procedure: REPLACE VALVE AORTIC;  Median sternotomy, Replace Aortic Valve on pump oxygenation. ;  Surgeon: Wesley Fong MD;  Location:  OR       Social History   Substance Use Topics     Smoking status: Never Smoker     Smokeless tobacco: Never Used     Alcohol use No     Family History   Problem Relation Age of Onset     Dementia Brother      Alzheimer Disease Sister          Current Outpatient Prescriptions   Medication Sig Dispense Refill     atenolol (TENORMIN) 50 MG tablet Take 1 tablet (50 mg) by mouth 2 times daily Due for an office visit with primary care provider 30 tablet 0     calcium carbonate-vitamin D 500-400 MG-UNIT TABS tablt Take 1 tablet by mouth 3 times daily       diltiazem 180 MG 24 hr capsule Take 1 capsule (180 mg) by mouth 2 times daily 180 capsule 3     metoprolol tartrate (LOPRESSOR) 50 MG tablet Take 1 tablet (50 mg) by mouth 2 times daily 180 tablet 3     multivitamins with minerals (CERTAVITE) LIQD Take 15 mLs by mouth daily       warfarin (COUMADIN) 2.5 MG tablet  Take 5 mg Mon, Wed, Friday and take 2.5 mg all other days or as directed by the coumadin clinic. 144 tablet 0     warfarin (COUMADIN) 2.5 MG tablet Take 5 mg on Monday, Wednesday, Friday and 2.5 mg all other days, or as directed by the coumadin clinic. 144 tablet 0     torsemide (DEMADEX) 20 MG tablet TAKE ONE TABLET BY MOUTH ONCE DAILY AS NEEDED (Patient not taking: Reported on 10/2/2018) 90 tablet 3     Allergies   Allergen Reactions     Xarelto [Rivaroxaban] Diarrhea     Ace Inhibitors Cough       Reviewed and updated as needed this visit by clinical staff  Tobacco  Allergies  Meds  Med Hx  Surg Hx  Fam Hx  Soc Hx      Reviewed and updated as needed this visit by Provider         ROS:  CONSTITUTIONAL: NEGATIVE for fever, chills, change in weight  INTEGUMENTARY/SKIN: NEGATIVE for worrisome rashes, moles or lesions  RESP: NEGATIVE for significant cough or SOB  CV: NEGATIVE for chest pain, palpitations or peripheral edema  GI: NEGATIVE for nausea, abdominal pain, heartburn, or change in bowel habits  : NEGATIVE for frequency, dysuria, or hematuria  MUSCULOSKELETAL: NEGATIVE for significant arthralgias or myalgia  NEURO: NEGATIVE for weakness, dizziness or paresthesias  ENDOCRINE: NEGATIVE for temperature intolerance, skin/hair changes  HEME: NEGATIVE for bleeding problems  PSYCHIATRIC: NEGATIVE for changes in mood or affect    OBJECTIVE:     /62 (BP Location: Left arm, Patient Position: Sitting, Cuff Size: Adult Regular)  Pulse 76  Temp 98.1  F (36.7  C) (Temporal)  Resp 16  Wt 131 lb 6.4 oz (59.6 kg)  SpO2 96%  BMI 21.21 kg/m2  Body mass index is 21.21 kg/(m^2).  GENERAL: healthy, alert and no distress  EYES: Eyes grossly normal to inspection, PERRL and conjunctivae and sclerae normal  NECK: no adenopathy, no asymmetry, masses, or scars and thyroid normal to palpation  RESP: lungs clear to auscultation - no rales, rhonchi or wheezes  CV: Irregularly irregular rhythm, normal S1 S2, no S3 or S4,  no murmur, or rub, no peripheral edema and peripheral pulses strong.  ABDOMEN: soft, nontender, no hepatosplenomegaly, no masses and bowel sounds normal  MS: no gross musculoskeletal defects noted, no edema  SKIN: no suspicious lesions or rashes  NEURO: Normal strength and tone, mentation intact and speech normal  PSYCH: mentation appears normal, affect normal/bright      ASSESSMENT/PLAN:     (I48.91) Atrial fibrillation with RVR (H)  Comment: Continue coumadin and rate control.  Continue follow up with INR for dose adjustment.  Plan: atenolol (TENORMIN) 50 MG tablet, warfarin         (COUMADIN) 2.5 MG tablet    2. S/P AVR (aortic valve replacement)  Bioprosthetic valve in place.    3. Long term current use of anticoagulant therapy  Follow INR closely    She refuses flu vaccine or other health maintenance such as colonoscopy.       This patient has been interviewed, examined, diagnosed, and informed of the above by me personally.  Medical records and available pertinent information has been reviewed by me personally.  All decisions and discussion have been between myself and the patient/family.  This was done in the presence of Brian Moritz PA-s  , who acted as a medical scribe and recorded the events above.  No diagnosis or decision making was made by the above-mentioned scribe.  The patient, and or his/her ensurors will not be billed for the presence or actions of this scribe.  The information recorded by the scribe has been reviewed by me and found to be accurate.                        FOLLOW UP   I have asked the patient to make an appointment for followup with me in 6 months    Mazin Larsen,   [facility]

## 2018-10-02 NOTE — MR AVS SNAPSHOT
After Visit Summary   10/2/2018    Khalida Rouse    MRN: [id number]           Patient Information     Date Of Birth          1940        Visit Information        Provider Department      10/2/2018 1:40 PM Mazin Larsen DO [facility]        Today's Diagnoses     S/P AVR (aortic valve replacement)    -  1    Atrial fibrillation with RVR (H)        Long term current use of anticoagulant therapy           Follow-ups after your visit        Follow-up notes from your care team     Return in about 6 months (around 4/2/2019) for follow up, Oh, yes we can !.      Your next 10 appointments already scheduled     Oct 03, 2018  9:15 AM CDT   [facility]    [address] [place] [id number] [phone]              Who to contact     If you have questions or need follow up information about today's clinic visit or your schedule please contact [facility] directly at [phone].  Normal or non-critical lab and imaging results will be communicated to you by MyChart, letter or phone within 4 business days after the clinic has received the results. If you do not hear from us within 7 days, please contact the clinic through MyChart or phone. If you have a critical or abnormal lab result, we will notify you by phone as soon as possible.  Submit refill requests through [employer] or call your pharmacy and they will forward the refill request to us. Please allow 3 business days for your refill to be completed.          Additional Information About Your Visit        Care EveryWhere ID     This is your Care EveryWhere ID. This could be used by other organizations to access your [place] medical records  [id number]        Your Vitals Were     Pulse Temperature Respirations Pulse Oximetry BMI (Body Mass Index)       76 98.1  F (36.7  C) (Temporal) 16 96% 21.21 kg/m2        Blood  Pressure from Last 3 Encounters:   10/02/18 126/62   08/08/18 132/72   06/20/18 116/74    Weight from Last 3 Encounters:   10/02/18 131 lb 6.4 oz (59.6 kg)   08/08/18 130 lb 3.2 oz (59.1 kg)   04/10/18 130 lb 4.8 oz (59.1 kg)              Today, you had the following     No orders found for display         Where to get your medicines      These medications were sent to [address]  [address], [facility] [address]     Phone:  [phone]     atenolol 50 MG tablet    warfarin 2.5 MG tablet          Primary Care Provider Office Phone # Fax #    Mazin Stilljohana,  [phone]       [address] [facility] [facility]   [place] [place] [address]        Equal Access to Services     [facility]: Hadii irene gould hadasho Soomaali, waaxda luqadaha, qaybta kaalmada adeegyada, nubia romero . So [facility] [phone].    ATENCIÓN: Si habla español, tiene a louis disposición servicios gratuitos de asistencia lingüística. Llame al [phone].    We comply with applicable federal civil rights laws and [place] laws. We do not discriminate on the basis of race, color, national origin, age, disability, sex, sexual orientation, or gender identity.            Thank you!     Thank you for choosing [facility]  for your care. Our goal is always to provide you with excellent care. Hearing back from our patients is one way we can continue to improve our services. Please take a few minutes to complete the written survey that you may receive in the mail after your visit with us. Thank you!             Your Updated Medication List - Protect others around you: Learn how to safely use, store and throw away your medicines at www.disposemymeds.org.          This list is accurate as of 10/2/18 11:59 PM.  Always use your most recent med list.                   Brand Name Dispense Instructions for use Diagnosis    atenolol 50 MG tablet    TENORMIN    180 tablet    Take 1 tablet  (50 mg) by mouth 2 times daily Due for an office visit with primary care provider    Atrial fibrillation with RVR (H)       calcium carbonate-vitamin D 500-400 MG-UNIT Tabs per tablet      Take 1 tablet by mouth 3 times daily    Medicare annual wellness visit, subsequent, Osteoporosis       diltiazem 180 MG 24 hr capsule     180 capsule    Take 1 capsule (180 mg) by mouth 2 times daily    Chronic atrial fibrillation (H)       metoprolol tartrate 50 MG tablet    LOPRESSOR    180 tablet    Take 1 tablet (50 mg) by mouth 2 times daily    S/P AVR (aortic valve replacement), Chronic atrial fibrillation (H)       multivitamins with minerals Liqd liquid      Take 15 mLs by mouth daily    Medicare annual wellness visit, subsequent       torsemide 20 MG tablet    DEMADEX    90 tablet    TAKE ONE TABLET BY MOUTH ONCE DAILY AS NEEDED    Acute pulmonary edema (H)       * warfarin 2.5 MG tablet    COUMADIN    144 tablet    Take 5 mg Mon, Wed, Friday and take 2.5 mg all other days or as directed by the coumadin clinic.    Long-term (current) use of anticoagulants, Atrial fibrillation with RVR (H)       * warfarin 2.5 MG tablet    COUMADIN    144 tablet    Take 5 mg on Monday, Wednesday, Friday and 2.5 mg all other days, or as directed by the coumadin clinic.    Atrial fibrillation with RVR (H)       * Notice:  This list has 2 medication(s) that are the same as other medications prescribed for you. Read the directions carefully, and ask your doctor or other care provider to review them with you.

## 2018-10-03 ENCOUNTER — ANTICOAGULATION THERAPY VISIT (OUTPATIENT)
Dept: ANTICOAGULATION | Facility: CLINIC | Age: 78
End: 2018-10-03
Payer: MEDICARE

## 2018-10-03 VITALS — SYSTOLIC BLOOD PRESSURE: 131 MMHG | DIASTOLIC BLOOD PRESSURE: 75 MMHG | HEART RATE: 82 BPM

## 2018-10-03 DIAGNOSIS — Z79.01 LONG TERM CURRENT USE OF ANTICOAGULANT THERAPY: ICD-10-CM

## 2018-10-03 DIAGNOSIS — I48.91 ATRIAL FIBRILLATION WITH RVR (H): ICD-10-CM

## 2018-10-03 DIAGNOSIS — Z95.2 S/P AVR (AORTIC VALVE REPLACEMENT): ICD-10-CM

## 2018-10-03 LAB — INR POINT OF CARE: 3.8 (ref 0.86–1.14)

## 2018-10-03 PROCEDURE — 99207 ZZC NO CHARGE NURSE ONLY: CPT

## 2018-10-03 PROCEDURE — 36416 COLLJ CAPILLARY BLOOD SPEC: CPT

## 2018-10-03 PROCEDURE — 85610 PROTHROMBIN TIME: CPT | Mod: QW

## 2018-10-03 RX ORDER — WARFARIN SODIUM 2.5 MG/1
TABLET ORAL
Qty: 144 TABLET | Refills: 0 | Status: SHIPPED | OUTPATIENT
Start: 2018-10-03 | End: 2018-12-18

## 2018-10-03 RX ORDER — WARFARIN SODIUM 2.5 MG/1
TABLET ORAL
Qty: 144 TABLET | Refills: 0 | COMMUNITY
Start: 2018-10-03 | End: 2018-10-24

## 2018-10-03 RX ORDER — ATENOLOL 50 MG/1
50 TABLET ORAL 2 TIMES DAILY
Qty: 180 TABLET | Refills: 1 | Status: SHIPPED | OUTPATIENT
Start: 2018-10-03 | End: 2019-04-02

## 2018-10-03 NOTE — PROGRESS NOTES
ANTICOAGULATION FOLLOW-UP CLINIC VISIT    Patient Name:  Khalida Rouse  Date:  10/3/2018  Contact Type:  Face to Face    SUBJECTIVE:     Patient Findings     Positives Change in diet/appetite (pt has not been eating as many greens- will decrease dose )           OBJECTIVE    INR Protime   Date Value Ref Range Status   10/03/2018 3.8 (A) 0.86 - 1.14 Corrected       ASSESSMENT / PLAN  INR assessment SUPRA    Recheck INR In: 9 DAYS    INR Location Clinic      Anticoagulation Summary as of 10/3/2018     INR goal 2.0-3.0   Today's INR 3.8!   Warfarin maintenance plan 5 mg (2.5 mg x 2) on Mon, Fri; 2.5 mg (2.5 mg x 1) all other days   Full warfarin instructions 10/3: 1.25 mg; Otherwise 5 mg on Mon, Fri; 2.5 mg all other days   Weekly warfarin total 22.5 mg   Plan last modified Venessa Hood RN (10/3/2018)   Next INR check 10/12/2018   Target end date     Indications   Long term current use of anticoagulant therapy [Z79.01]  Atrial fibrillation with RVR (H) [I48.91]  S/P AVR (aortic valve replacement) [Z95.2]         Anticoagulation Episode Summary     INR check location     Preferred lab     Send INR reminders to [facility] POOL    Comments 2.5 mg tablets, book, BP, PM dose      Anticoagulation Care Providers     Provider Role Specialty Phone number    Dorcas Fisher MD [facility] [phone]            See the Encounter Report to view Anticoagulation Flowsheet and Dosing Calendar (Go to Encounters tab in chart review, and find the Anticoagulation Therapy Visit)    Dosage adjustment made based on physician directed care plan.      Venessa Hood RN

## 2018-10-03 NOTE — MR AVS SNAPSHOT
Khalida ARMAAN Rouse   10/3/2018 9:15 AM   Anticoagulation Therapy Visit    Description:  78 year old female   Provider:  JOSE ANTI LESLY   Department:  [facility]           INR as of 10/3/2018     Today's INR 3.8!      Anticoagulation Summary as of 10/3/2018     INR goal 2.0-3.0   Today's INR 3.8!   Full warfarin instructions 10/3: 1.25 mg; Otherwise 5 mg on Mon, Fri; 2.5 mg all other days   Next INR check 10/12/2018    Indications   Long term current use of anticoagulant therapy [Z79.01]  Atrial fibrillation with RVR (H) [I48.91]  S/P AVR (aortic valve replacement) [Z95.2]         Your next Anticoagulation Clinic appointment(s)     Oct 12, 2018  4:00 PM CDT   Anticoagulation Visit with PH ANTI COAG   [facility]    [address] [place] [id number]   [phone]              Contact Numbers     Clinic Number:         October 2018 Details    Sun Mon Tue Wed Thu Fri Sat      1               2               3      1.25 mg   See details      4      2.5 mg         5      5 mg         6      2.5 mg           7      2.5 mg         8      5 mg         9      2.5 mg         10      2.5 mg         11      2.5 mg         12            13                 14               15               16               17               18               19               20                 21               22               23               24               25               26               27                 28               29               30               31                   Date Details   10/03 This INR check       Date of next INR:  10/12/2018         How to take your warfarin dose     To take:  1.25 mg Take 0.5 of a 2.5 mg tablet.    To take:  2.5 mg Take 1 of the 2.5 mg tablets.    To take:  5 mg Take 2 of the 2.5 mg tablets.

## 2018-10-12 ENCOUNTER — ANTICOAGULATION THERAPY VISIT (OUTPATIENT)
Dept: ANTICOAGULATION | Facility: CLINIC | Age: 78
End: 2018-10-12
Payer: MEDICARE

## 2018-10-12 DIAGNOSIS — I48.91 ATRIAL FIBRILLATION WITH RVR (H): ICD-10-CM

## 2018-10-12 DIAGNOSIS — Z95.2 S/P AVR (AORTIC VALVE REPLACEMENT): ICD-10-CM

## 2018-10-12 DIAGNOSIS — Z79.01 LONG TERM CURRENT USE OF ANTICOAGULANT THERAPY: ICD-10-CM

## 2018-10-12 LAB — INR POINT OF CARE: 2.3 (ref 0.86–1.14)

## 2018-10-12 PROCEDURE — 99207 ZZC NO CHARGE NURSE ONLY: CPT

## 2018-10-12 PROCEDURE — 36416 COLLJ CAPILLARY BLOOD SPEC: CPT

## 2018-10-12 PROCEDURE — 85610 PROTHROMBIN TIME: CPT | Mod: QW

## 2018-10-12 NOTE — PROGRESS NOTES
ANTICOAGULATION FOLLOW-UP CLINIC VISIT    Patient Name:  Khalida Rouse  Date:  10/12/2018  Contact Type:  Face to Face    SUBJECTIVE:     Patient Findings     Positives Change in diet/appetite (has not been eating as many greens since she didnt have water to wash them with, but now has water and will go back to eating her normal amount. ), No Problem Findings           OBJECTIVE    INR Protime   Date Value Ref Range Status   10/12/2018 2.3 (A) 0.86 - 1.14 Final       ASSESSMENT / PLAN  INR assessment THER    Recheck INR In: 2 WEEKS    INR Location Clinic      Anticoagulation Summary as of 10/12/2018     INR goal 2.0-3.0   Today's INR 2.3   Warfarin maintenance plan 5 mg (2.5 mg x 2) on Mon, Wed, Fri; 2.5 mg (2.5 mg x 1) all other days   Full warfarin instructions 5 mg on Mon, Wed, Fri; 2.5 mg all other days   Weekly warfarin total 25 mg   Plan last modified Venessa Hood RN (10/12/2018)   Next INR check 10/24/2018   Target end date     Indications   Long term current use of anticoagulant therapy [Z79.01]  Atrial fibrillation with RVR (H) [I48.91]  S/P AVR (aortic valve replacement) [Z95.2]         Anticoagulation Episode Summary     INR check location     Preferred lab     Send INR reminders to [facility] POOL    Comments 2.5 mg tablets, book, BP, PM dose      Anticoagulation Care Providers     Provider Role Specialty Phone number    Doracs Fisher MD [facility] [phone]            See the Encounter Report to view Anticoagulation Flowsheet and Dosing Calendar (Go to Encounters tab in chart review, and find the Anticoagulation Therapy Visit)    Dosage adjustment made based on physician directed care plan.      Venessa Hood, CAITLIN

## 2018-10-12 NOTE — MR AVS SNAPSHOT
Khalida Rouse   10/12/2018 4:00 PM   Anticoagulation Therapy Visit    Description:  78 year old female   Provider:  JOSE ANTI COAG   Department:  [facility]           INR as of 10/12/2018     Today's INR 2.3      Anticoagulation Summary as of 10/12/2018     INR goal 2.0-3.0   Today's INR 2.3   Full warfarin instructions 5 mg on Mon, Wed, Fri; 2.5 mg all other days   Next INR check 10/24/2018    Indications   Long term current use of anticoagulant therapy [Z79.01]  Atrial fibrillation with RVR (H) [I48.91]  S/P AVR (aortic valve replacement) [Z95.2]         Your next Anticoagulation Clinic appointment(s)     Oct 12, 2018  4:00 PM CDT   Anticoagulation Visit with PH ANTI COAG   [facility] ([address] [address]   [phone]            Oct 24, 2018  1:30 PM CDT   Anticoagulation Visit with PH ANTI COAG   [facility] ([address] [address]   [phone]              Contact Numbers     Clinic Number:         October 2018 Details    Sun Mon Tue Wed Thu Fri Sat      1               2               3               4               5               6                 7               8               9               10               11               12      5 mg   See details      13      2.5 mg           14      2.5 mg         15      5 mg         16      2.5 mg         17      5 mg         18      2.5 mg         19      5 mg         20      2.5 mg           21      2.5 mg         22      5 mg         23      2.5 mg         24            25               26               27                 28               29               30               31                   Date Details   10/12 This INR check       Date of next INR:  10/24/2018         How to take your warfarin dose     To take:  2.5 mg Take 1 of the 2.5 mg tablets.    To take:  5 mg Take 2 of the 2.5 mg tablets.

## 2018-10-24 ENCOUNTER — ANTICOAGULATION THERAPY VISIT (OUTPATIENT)
Dept: ANTICOAGULATION | Facility: CLINIC | Age: 78
End: 2018-10-24
Payer: MEDICARE

## 2018-10-24 VITALS — DIASTOLIC BLOOD PRESSURE: 71 MMHG | SYSTOLIC BLOOD PRESSURE: 135 MMHG | HEART RATE: 86 BPM

## 2018-10-24 DIAGNOSIS — I48.91 ATRIAL FIBRILLATION WITH RVR (H): ICD-10-CM

## 2018-10-24 DIAGNOSIS — Z95.2 S/P AVR (AORTIC VALVE REPLACEMENT): ICD-10-CM

## 2018-10-24 DIAGNOSIS — Z79.01 LONG TERM CURRENT USE OF ANTICOAGULANT THERAPY: ICD-10-CM

## 2018-10-24 LAB — INR POINT OF CARE: 2.7 (ref 0.86–1.14)

## 2018-10-24 PROCEDURE — 85610 PROTHROMBIN TIME: CPT | Mod: QW

## 2018-10-24 PROCEDURE — 36416 COLLJ CAPILLARY BLOOD SPEC: CPT

## 2018-10-24 PROCEDURE — 99207 ZZC NO CHARGE NURSE ONLY: CPT

## 2018-10-24 RX ORDER — WARFARIN SODIUM 2.5 MG/1
TABLET ORAL
Qty: 144 TABLET | Refills: 0 | COMMUNITY
Start: 2018-10-24 | End: 2019-05-19

## 2018-10-24 NOTE — MR AVS SNAPSHOT
Khalidasal Rouse   10/24/2018 1:30 PM   Anticoagulation Therapy Visit    Description:  78 year old female   Provider:  PH ANTI COAG   Department:  [facility]           INR as of 10/24/2018     Today's INR 2.7      Anticoagulation Summary as of 10/24/2018     INR goal 2.0-3.0   Today's INR 2.7   Full warfarin instructions 5 mg on Mon, Wed, Fri; 2.5 mg all other days   Next INR check 11/21/2018    Indications   Long term current use of anticoagulant therapy [Z79.01]  Atrial fibrillation with RVR (H) [I48.91]  S/P AVR (aortic valve replacement) [Z95.2]         Your next Anticoagulation Clinic appointment(s)     Nov 21, 2018  1:45 PM CST   Anticoagulation Visit with PH ANTI COAG   [facility] ([address] [address]   [phone]              Contact Numbers     Clinic Number:         October 2018 Details    Sun Mon Tue Wed Thu Fri Sat      1               2               3               4               5               6                 7               8               9               10               11               12               13                 14               15               16               17               18               19               20                 21               22               23               24      5 mg   See details      25      2.5 mg         26      5 mg         27      2.5 mg           28      2.5 mg         29      5 mg         30      2.5 mg         31      5 mg             Date Details   10/24 This INR check               How to take your warfarin dose     To take:  2.5 mg Take 1 of the 2.5 mg tablets.    To take:  5 mg Take 2 of the 2.5 mg tablets.           November 2018 Details    Sun Mon Tue Wed Thu Fri Sat         1      2.5 mg         2      5 mg         3      2.5 mg           4      2.5 mg         5      5 mg         6      2.5 mg         7      5 mg         8      2.5 mg         9      5 mg         10       2.5 mg           11      2.5 mg         12      5 mg         13      2.5 mg         14      5 mg         15      2.5 mg         16      5 mg         17      2.5 mg           18      2.5 mg         19      5 mg         20      2.5 mg         21            22               23               24                 25               26               27               28               29               30                 Date Details   No additional details    Date of next INR:  11/21/2018         How to take your warfarin dose     To take:  2.5 mg Take 1 of the 2.5 mg tablets.    To take:  5 mg Take 2 of the 2.5 mg tablets.

## 2018-10-24 NOTE — PROGRESS NOTES
ANTICOAGULATION FOLLOW-UP CLINIC VISIT    Patient Name:  Khalida Rouse  Date:  10/24/2018  Contact Type:  Face to Face    SUBJECTIVE:     Patient Findings     Positives No Problem Findings           OBJECTIVE    INR Protime   Date Value Ref Range Status   10/24/2018 2.7 (A) 0.86 - 1.14 Final       ASSESSMENT / PLAN  INR assessment THER    Recheck INR In: 4 WEEKS    INR Location Clinic      Anticoagulation Summary as of 10/24/2018     INR goal 2.0-3.0   Today's INR 2.7   Warfarin maintenance plan 5 mg (2.5 mg x 2) on Mon, Wed, Fri; 2.5 mg (2.5 mg x 1) all other days   Full warfarin instructions 5 mg on Mon, Wed, Fri; 2.5 mg all other days   Weekly warfarin total 25 mg   No change documented Venessa Hood RN   Plan last modified Venessa Hood RN (10/12/2018)   Next INR check 11/21/2018   Target end date     Indications   Long term current use of anticoagulant therapy [Z79.01]  Atrial fibrillation with RVR (H) [I48.91]  S/P AVR (aortic valve replacement) [Z95.2]         Anticoagulation Episode Summary     INR check location     Preferred lab     Send INR reminders to hospitals    Comments 2.5 mg tablets, book, BP, PM dose      Anticoagulation Care Providers     Provider Role Specialty Phone number    Dorcas Fisher MD [facility] [phone]            See the Encounter Report to view Anticoagulation Flowsheet and Dosing Calendar (Go to Encounters tab in chart review, and find the Anticoagulation Therapy Visit)    Dosage adjustment made based on physician directed care plan.      Venessa Hood RN

## 2018-11-21 ENCOUNTER — ANTICOAGULATION THERAPY VISIT (OUTPATIENT)
Dept: ANTICOAGULATION | Facility: CLINIC | Age: 78
End: 2018-11-21
Payer: MEDICARE

## 2018-11-21 VITALS — SYSTOLIC BLOOD PRESSURE: 141 MMHG | DIASTOLIC BLOOD PRESSURE: 69 MMHG | HEART RATE: 74 BPM

## 2018-11-21 DIAGNOSIS — I48.91 ATRIAL FIBRILLATION WITH RVR (H): ICD-10-CM

## 2018-11-21 DIAGNOSIS — Z95.2 S/P AVR (AORTIC VALVE REPLACEMENT): ICD-10-CM

## 2018-11-21 DIAGNOSIS — Z79.01 LONG TERM CURRENT USE OF ANTICOAGULANT THERAPY: ICD-10-CM

## 2018-11-21 LAB — INR POINT OF CARE: 2.5 (ref 0.86–1.14)

## 2018-11-21 PROCEDURE — 99207 ZZC NO CHARGE NURSE ONLY: CPT

## 2018-11-21 PROCEDURE — 36416 COLLJ CAPILLARY BLOOD SPEC: CPT

## 2018-11-21 PROCEDURE — 85610 PROTHROMBIN TIME: CPT | Mod: QW

## 2018-11-21 NOTE — MR AVS SNAPSHOT
Khalida ARMAAN Merrillalfonso   11/21/2018 1:45 PM   Anticoagulation Therapy Visit    Description:  78 year old female   Provider:  PH ANTI COAG   Department:  [facility]           INR as of 11/21/2018     Today's INR 2.5      Anticoagulation Summary as of 11/21/2018     INR goal 2.0-3.0   Today's INR 2.5   Full warfarin instructions 5 mg on Mon, Wed, Fri; 2.5 mg all other days   Next INR check 12/19/2018    Indications   Long term current use of anticoagulant therapy [Z79.01]  Atrial fibrillation with RVR (H) [I48.91]  S/P AVR (aortic valve replacement) [Z95.2]         Your next Anticoagulation Clinic appointment(s)     Dec 19, 2018  1:00 PM CST   [facility]agulation [facility] with PH ANTI COAG   [facility]    [address] [place] [address]   [phone]              Contact Numbers     Clinic Number:         November 2018 Details    Sun Mon Tue Wed Thu Fri Sat         1               2               3                 4               5               6               7               8               9               10                 11               12               13               14               15               16               17                 18               19               20               21      5 mg   See details      22      2.5 mg         23      5 mg         24      2.5 mg           25      2.5 mg         26      5 mg         27      2.5 mg         28      5 mg         29      2.5 mg         30      5 mg           Date Details   11/21 This INR check               How to take your warfarin dose     To take:  2.5 mg Take 1 of the 2.5 mg tablets.    To take:  5 mg Take 2 of the 2.5 mg tablets.           December 2018 Details    Sun Mon Tue Wed Thu Fri Sat           1      2.5 mg           2      2.5 mg         3      5 mg         4      2.5 mg         5      5 mg         6      2.5 mg         7      5 mg         8      2.5 mg           9      2.5 mg         10       5 mg         11      2.5 mg         12      5 mg         13      2.5 mg         14      5 mg         15      2.5 mg           16      2.5 mg         17      5 mg         18      2.5 mg         19            20               21               22                 23               24               25               26               27               28               29                 30               31                     Date Details   No additional details    Date of next INR:  12/19/2018         How to take your warfarin dose     To take:  2.5 mg Take 1 of the 2.5 mg tablets.    To take:  5 mg Take 2 of the 2.5 mg tablets.

## 2018-11-21 NOTE — PROGRESS NOTES
ANTICOAGULATION FOLLOW-UP CLINIC VISIT    Patient Name:  Khalida Rouse  Date:  11/21/2018  Contact Type:  Face to Face    SUBJECTIVE:     Patient Findings     Positives No Problem Findings           OBJECTIVE    INR Protime   Date Value Ref Range Status   11/21/2018 2.5 (A) 0.86 - 1.14 Final       ASSESSMENT / PLAN  INR assessment THER    Recheck INR In: 4 WEEKS    INR Location Clinic      Anticoagulation Summary as of 11/21/2018     INR goal 2.0-3.0   Today's INR 2.5   Warfarin maintenance plan 5 mg (2.5 mg x 2) on Mon, Wed, Fri; 2.5 mg (2.5 mg x 1) all other days   Full warfarin instructions 5 mg on Mon, Wed, Fri; 2.5 mg all other days   Weekly warfarin total 25 mg   No change documented Venessa Hood RN   Plan last modified Venessa Hood RN (10/12/2018)   Next INR check 12/19/2018   Target end date     Indications   Long term current use of anticoagulant therapy [Z79.01]  Atrial fibrillation with RVR (H) [I48.91]  S/P AVR (aortic valve replacement) [Z95.2]         Anticoagulation Episode Summary     INR check location     Preferred lab     Send INR reminders to [facility]    Comments 2.5 mg tablets, book, BP, PM dose      Anticoagulation Care Providers     Provider Role Specialty Phone number    Dorcas Fisher MD [facility] [phone]            See the Encounter Report to view Anticoagulation Flowsheet and Dosing Calendar (Go to Encounters tab in chart review, and find the Anticoagulation Therapy Visit)    Dosage adjustment made based on physician directed care plan.      Venessa Hood RN

## 2018-12-18 ENCOUNTER — OFFICE VISIT (OUTPATIENT)
Dept: FAMILY MEDICINE | Facility: OTHER | Age: 78
End: 2018-12-18
Payer: MEDICARE

## 2018-12-18 VITALS
SYSTOLIC BLOOD PRESSURE: 122 MMHG | OXYGEN SATURATION: 95 % | TEMPERATURE: 99.8 F | DIASTOLIC BLOOD PRESSURE: 70 MMHG | HEART RATE: 64 BPM | BODY MASS INDEX: 21.11 KG/M2 | RESPIRATION RATE: 16 BRPM | WEIGHT: 130.8 LBS

## 2018-12-18 DIAGNOSIS — I48.20 CHRONIC ATRIAL FIBRILLATION (H): ICD-10-CM

## 2018-12-18 DIAGNOSIS — Z79.01 LONG TERM CURRENT USE OF ANTICOAGULANT THERAPY: ICD-10-CM

## 2018-12-18 DIAGNOSIS — I48.91 ATRIAL FIBRILLATION WITH RVR (H): ICD-10-CM

## 2018-12-18 DIAGNOSIS — M81.0 AGE-RELATED OSTEOPOROSIS WITHOUT CURRENT PATHOLOGICAL FRACTURE: Primary | ICD-10-CM

## 2018-12-18 DIAGNOSIS — Z95.2 S/P AVR (AORTIC VALVE REPLACEMENT): ICD-10-CM

## 2018-12-18 PROCEDURE — 99214 OFFICE O/P EST MOD 30 MIN: CPT | Performed by: INTERNAL MEDICINE

## 2018-12-18 RX ORDER — DILTIAZEM HYDROCHLORIDE 180 MG/1
180 CAPSULE, COATED, EXTENDED RELEASE ORAL 2 TIMES DAILY
Qty: 180 CAPSULE | Refills: 3 | Status: SHIPPED | OUTPATIENT
Start: 2018-12-18 | End: 2019-07-18

## 2018-12-18 ASSESSMENT — PAIN SCALES - GENERAL: PAINLEVEL: MODERATE PAIN (5)

## 2018-12-18 NOTE — PROGRESS NOTES
Chief Complaint   Patient presents with     RECHECK     A-Fib     RECHECK     osteoporosis                     Chief Complaint         The patient is a pleasant 78-year-old female who presents today for follow-up of her hypertension.  She is currently on diltiazem in combination with atenolol, torsemide.  She notes that her blood pressure has been controlled and she is not having any muscle cramps, lightheadedness, or orthostatic symptoms.  She does have a history of chronic atrial fibrillation.  She is on anticoagulation and has had no bruising or bleeding.                         PAST, FAMILY,SOCIAL HISTORY:     Medical  History:   has a past medical history of Aortic valve stenosis, Atrial flutter (H), Cardiomyopathy (H), Severe aortic stenosis (4/7/2014), and Shoulder fracture, left (3/22/15).     Surgical History:   has a past surgical history that includes ENT surgery; Replace valve aortic (4/10/2014); and cataract iol, rt/lt (Right).     Social History:   reports that  has never smoked. she has never used smokeless tobacco. She reports that she does not drink alcohol or use drugs.     Family History:  family history includes Alzheimer Disease in her sister; Dementia in her brother.            MEDICATIONS  Current Outpatient Medications   Medication Sig Dispense Refill     atenolol (TENORMIN) 50 MG tablet Take 1 tablet (50 mg) by mouth 2 times daily Due for an office visit with primary care provider 180 tablet 1     calcium carbonate-vitamin D 500-400 MG-UNIT TABS tablt Take 1 tablet by mouth 3 times daily       diltiazem ER COATED BEADS (CARDIZEM CD/CARTIA XT) 180 MG 24 hr capsule Take 1 capsule (180 mg) by mouth 2 times daily 180 capsule 3     multivitamins with minerals (CERTAVITE) LIQD Take 15 mLs by mouth daily       torsemide (DEMADEX) 20 MG tablet TAKE ONE TABLET BY MOUTH ONCE DAILY AS NEEDED 90 tablet 3     metoprolol tartrate (LOPRESSOR) 50 MG tablet Take 1 tablet (50 mg) by mouth 2 times daily  (Patient not taking: Reported on 12/18/2018) 180 tablet 3     warfarin (COUMADIN) 2.5 MG tablet Take 5 mg Mon, Wed, Friday and take 2.5 mg all other days or as directed by the coumadin clinic. 144 tablet 0         --------------------------------------------------------------------------------------------------------------------                              Review of Systems     LUNGS: Pt denies: cough, excess sputum, hemoptysis, or shortness of breath.   HEART: Pt denies: chest pain,  syncope, tachy or bradyarrhythmia.   GI: Pt denies: nausea, vomiting, diarrhea, constipation, melena, or hematochezia.   NEURO: Pt denies: seizures, strokes, diplopia, weakness, paraesthesias, or paralysis.   SKIN: Pt denies: itching, rashes, discoloration, or specific lesions of concern. Denies recent hair loss.                                     Examination      /70   Pulse 64   Temp 99.8  F (37.7  C) (Temporal)   Resp 16   Wt 59.3 kg (130 lb 12.8 oz)   SpO2 95%   BMI 21.11 kg/m     Constitutional: The patient appears to be in no acute distress. The patient appears to be adequately hydrated. No acute respiratory or hemodynamic distress is noted at this time.   LUNGS: clear bilaterally, airflow is brisk, no intercostal retraction or stridor is noted. No coughing is noted during visit.   HEART:  regular without rubs, clicks, gallops, or murmurs. PMI is nondisplaced. Upstrokes are brisk. S1,S2 are heard.   GI: Abdomen is soft, without rebound, guarding or tenderness. Bowel sounds are appropriate. No renal bruits are heard.   NEURO: Pt is alert and appropriate. No neurologic lateralization is noted. Cranial nerves 2-12 are intact. Peripheral sensory and motor function are grossly normal.                                            Decision Making    1. Chronic atrial fibrillation (H)  Continue rate control and anticoagulation  - diltiazem ER COATED BEADS (CARDIZEM CD/CARTIA XT) 180 MG 24 hr capsule; Take 1 capsule (180 mg)  by mouth 2 times daily  Dispense: 180 capsule; Refill: 3    2. Age-related osteoporosis without current pathological fracture  Check bone density studies and recommend therapy as appropriate.   - DX Hip/Pelvis/Spine; Future                      FOLLOW UP   I have asked the patient to make an appointment for followup with me in 4 months            I have carefully explained the diagnosis and treatment options to the patient.  The patient has displayed an understanding of the above, and all subsequent questions were answered.      DO MARIA D Sabillon    Portions of this note were produced using [employer]  Although every attempt at real-time proof reading has been made, occasional grammar/syntax errors may have been missed.

## 2018-12-19 ENCOUNTER — ANTICOAGULATION THERAPY VISIT (OUTPATIENT)
Dept: ANTICOAGULATION | Facility: CLINIC | Age: 78
End: 2018-12-19
Payer: MEDICARE

## 2018-12-19 VITALS — SYSTOLIC BLOOD PRESSURE: 119 MMHG | HEART RATE: 67 BPM | DIASTOLIC BLOOD PRESSURE: 64 MMHG

## 2018-12-19 DIAGNOSIS — Z79.01 LONG TERM CURRENT USE OF ANTICOAGULANT THERAPY: ICD-10-CM

## 2018-12-19 DIAGNOSIS — I48.91 ATRIAL FIBRILLATION WITH RVR (H): ICD-10-CM

## 2018-12-19 DIAGNOSIS — Z95.2 S/P AVR (AORTIC VALVE REPLACEMENT): ICD-10-CM

## 2018-12-19 LAB — INR POINT OF CARE: 2.9 (ref 0.86–1.14)

## 2018-12-19 PROCEDURE — 99207 ZZC NO CHARGE NURSE ONLY: CPT

## 2018-12-19 PROCEDURE — 36416 COLLJ CAPILLARY BLOOD SPEC: CPT

## 2018-12-19 PROCEDURE — 85610 PROTHROMBIN TIME: CPT | Mod: QW

## 2018-12-19 NOTE — PROGRESS NOTES
ANTICOAGULATION FOLLOW-UP CLINIC VISIT    Patient Name:  Khalida Rouse  Date:  2018  Contact Type:  Face to Face    SUBJECTIVE:     Patient Findings     Positives:   No Problem Findings           OBJECTIVE    INR Protime   Date Value Ref Range Status   2018 2.9 (A) 0.86 - 1.14 Final       ASSESSMENT / PLAN  INR assessment THER    Recheck INR In: 5 WEEKS    INR Location Clinic      Anticoagulation Summary  As of 2018    INR goal:   2.0-3.0   TTR:   78.3 % (2.7 y)   INR used for dosin.9 (2018)   Warfarin maintenance plan:   5 mg (2.5 mg x 2) every Mon, Wed, Fri; 2.5 mg (2.5 mg x 1) all other days   Full warfarin instructions:   5 mg every Mon, Wed, Fri; 2.5 mg all other days   Weekly warfarin total:   25 mg   No change documented:   Venessa Hood RN   Plan last modified:   Venessa Hood RN (10/12/2018)   Next INR check:   2019   Target end date:       Indications    Long term current use of anticoagulant therapy [Z79.01]  Atrial fibrillation with RVR (H) [I48.91]  S/P AVR (aortic valve replacement) [Z95.2]             Anticoagulation Episode Summary     INR check location:       Preferred lab:       Send INR reminders to:   [facility]    Comments:   2.5 mg tablets, book, BP, PM dose      Anticoagulation Care Providers     Provider Role Specialty Phone number    Dorcas Fisher MD [facility] [phone]            See the Encounter Report to view Anticoagulation Flowsheet and Dosing Calendar (Go to Encounters tab in chart review, and find the Anticoagulation Therapy Visit)    Dosage adjustment made based on physician directed care plan.      Venessa Hood RN

## 2019-01-02 ENCOUNTER — HOSPITAL ENCOUNTER (OUTPATIENT)
Dept: BONE DENSITY | Facility: CLINIC | Age: 79
Discharge: HOME | End: 2019-01-02
Attending: INTERNAL MEDICINE | Admitting: INTERNAL MEDICINE
Payer: MEDICARE

## 2019-01-02 DIAGNOSIS — M81.0 AGE-RELATED OSTEOPOROSIS WITHOUT CURRENT PATHOLOGICAL FRACTURE: ICD-10-CM

## 2019-01-02 PROCEDURE — 77080 DXA BONE DENSITY AXIAL: CPT

## 2019-01-12 DIAGNOSIS — I48.91 ATRIAL FIBRILLATION WITH RVR (H): ICD-10-CM

## 2019-01-13 NOTE — RESULT ENCOUNTER NOTE
Please contact the patient and notify her of the following:  Osteoporosis is still noted.  No significant change in bone density is seen compared to the previous test.  The patient should continue her vitamin D and calcium supplementation.  If not previously tried, alendronate may be of some benefit.  If she would like to start this medication (Fosamax) please let me know and I will place the order.        Thank you.   DO MARIA D Sabillon

## 2019-01-14 ENCOUNTER — TELEPHONE (OUTPATIENT)
Dept: INTERNAL MEDICINE | Facility: CLINIC | Age: 79
End: 2019-01-14

## 2019-01-14 ENCOUNTER — NURSE TRIAGE (OUTPATIENT)
Dept: NURSING | Facility: CLINIC | Age: 79
End: 2019-01-14

## 2019-01-14 RX ORDER — WARFARIN SODIUM 2.5 MG/1
TABLET ORAL
Qty: 144 TABLET | Refills: 0 | Status: SHIPPED | OUTPATIENT
Start: 2019-01-14 | End: 2019-05-19

## 2019-01-14 NOTE — TELEPHONE ENCOUNTER
----- Message from Mazin Larsen DO sent at 1/12/2019  7:29 PM CST -----  Please contact the patient and notify her of the following:  Osteoporosis is still noted.  No significant change in bone density is seen compared to the previous test.  The patient should continue her vitamin D and calcium supplementation.  If not previously tried, alendronate may be of some benefit.  If she would like to start this medication (Fosamax) please let me know and I will place the order.        Thank you.   DO MARIA D Sabillon

## 2019-01-14 NOTE — TELEPHONE ENCOUNTER
"  Reason for call; Pt called for Bone scan results  1/2/19 and advised Pt that \"IMPRESSION: Osteoporosis with high fracture risk. There has been no  statistically significant change in the average bone mineral density\" .  Declines triage but requesting a appt with her PCP meenakshi to discuss her warfin blood thinner Rx .   Recommendation / teaching ; [facility] sent her to  for appt .      Caller Verbalizes understanding and denies further questions and will call back if further symptoms to triage or questions  .  Claudette Mortensen RN  - [place] Nurse Advisor                  "

## 2019-01-14 NOTE — TELEPHONE ENCOUNTER
Spoke with patient and informed of results below. Patient understood and will think about the medication of Fosamax and call back if she would like to start this.   Jessica Butler MA

## 2019-01-16 DIAGNOSIS — I48.91 ATRIAL FIBRILLATION WITH RVR (H): ICD-10-CM

## 2019-01-16 RX ORDER — WARFARIN SODIUM 2.5 MG/1
TABLET ORAL
Qty: 120 TABLET | Refills: 0 | Status: SHIPPED | OUTPATIENT
Start: 2019-01-16 | End: 2019-04-08

## 2019-01-16 NOTE — TELEPHONE ENCOUNTER
"warfarin  Last Written Prescription Date:  1/14/2019  Last Fill Quantity: 144,  # refills: 0   Last office visit: 12/18/2018 with prescribing provider:  12/18/2018   Future Office Visit:   Next 5 appointments (look out 90 days)    Jan 24, 2019  8:00 AM CST  Office Visit with Mazin Larsen DO  [facility] ([facility]) [address] [facility], located within [facility] [address] [phone]           Requested Prescriptions   Pending Prescriptions Disp Refills     warfarin (COUMADIN) 2.5 MG tablet [Pharmacy Med Name: WARFARIN SODIUM 2.5MG TABS] 144 tablet 0     Sig: TAKE 2 TABLETS BY MOUTH MON, WED, FRI AND TAKE 1 TABLET ALL OTHER DAYS, OR AS DIRECTED BY COUMADIN CLINIC    Vitamin K Antagonists Failed - 1/16/2019 10:49 AM       Failed - INR is within goal in the past 6 weeks    Confirm INR is within goal in the past 6 weeks.     Recent Labs   Lab Test 12/19/18   INR 2.9*                      Passed - Recent (12 mo) or future (30 days) visit within the authorizing provider's specialty    Patient had office visit in the last 12 months or has a visit in the next 30 days with authorizing provider or within the authorizing provider's specialty.  See \"Patient Info\" tab in inbasket, or \"Choose Columns\" in Meds & Orders section of the refill encounter.             Passed - Medication is active on med list       Passed - Patient is 18 years of age or older       Passed - Patient is not pregnant       Passed - No positive pregnancy on file in past 12 months        Kenisha Seymour RN on 1/16/2019 at 2:16 PM    "

## 2019-02-05 ENCOUNTER — ANTICOAGULATION THERAPY VISIT (OUTPATIENT)
Dept: ANTICOAGULATION | Facility: CLINIC | Age: 79
End: 2019-02-05
Payer: MEDICARE

## 2019-02-05 VITALS — DIASTOLIC BLOOD PRESSURE: 74 MMHG | HEART RATE: 75 BPM | SYSTOLIC BLOOD PRESSURE: 121 MMHG

## 2019-02-05 DIAGNOSIS — I48.91 ATRIAL FIBRILLATION WITH RVR (H): ICD-10-CM

## 2019-02-05 DIAGNOSIS — Z79.01 LONG TERM CURRENT USE OF ANTICOAGULANT THERAPY: ICD-10-CM

## 2019-02-05 DIAGNOSIS — Z95.2 S/P AVR (AORTIC VALVE REPLACEMENT): ICD-10-CM

## 2019-02-05 LAB — INR POINT OF CARE: 2.2 (ref 0.9–0.1)

## 2019-02-05 PROCEDURE — 99207 ZZC NO CHARGE NURSE ONLY: CPT

## 2019-02-05 PROCEDURE — 36416 COLLJ CAPILLARY BLOOD SPEC: CPT

## 2019-02-05 PROCEDURE — 85610 PROTHROMBIN TIME: CPT | Mod: QW

## 2019-02-05 NOTE — PROGRESS NOTES
ANTICOAGULATION FOLLOW-UP CLINIC VISIT    Patient Name:  Khalida Rouse  Date:  2019  Contact Type:  Face to Face    SUBJECTIVE:     Patient Findings     Positives:   No Problem Findings           OBJECTIVE    INR Protime   Date Value Ref [facility] Status   2019 2.2 (A) 0.9 - 0.1 Final       ASSESSMENT / PLAN  INR assessment THER    Recheck INR In: 6 WEEKS    INR Location Clinic      Anticoagulation Summary  As of 2019    INR goal:   2.0-3.0   TTR:   79.3 % (2.9 y)   INR used for dosin.2 (2019)   Warfarin maintenance plan:   5 mg (2.5 mg x 2) every Mon, Wed, Fri; 2.5 mg (2.5 mg x 1) all other days   Full warfarin instructions:   5 mg every Mon, Wed, Fri; 2.5 mg all other days   Weekly warfarin total:   25 mg   No change documented:   Venessa Hood RN   Plan last modified:   Venessa Hood RN (10/12/2018)   Next INR check:   3/18/2019   Target end date:       Indications    Long term current use of anticoagulant therapy [Z79.01]  Atrial fibrillation with RVR (H) [I48.91]  S/P AVR (aortic valve replacement) [Z95.2]             Anticoagulation Episode Summary     INR check location:       Preferred lab:       Send INR reminders to:   GIOVANNA HADLEY    Comments:   2.5 mg tablets, book, BP, PM dose      Anticoagulation Care Providers     Provider Role Specialty Phone number    Dorcas Fisher MD [facility] [phone]            See the Encounter Report to view Anticoagulation Flowsheet and Dosing Calendar (Go to Encounters tab in chart review, and find the Anticoagulation Therapy Visit)    Dosage adjustment made based on physician directed care plan.      Venessa Hood RN

## 2019-02-12 ENCOUNTER — OFFICE VISIT (OUTPATIENT)
Dept: FAMILY MEDICINE | Facility: OTHER | Age: 79
End: 2019-02-12
Payer: MEDICARE

## 2019-02-12 VITALS
OXYGEN SATURATION: 95 % | HEIGHT: 66 IN | DIASTOLIC BLOOD PRESSURE: 70 MMHG | RESPIRATION RATE: 16 BRPM | SYSTOLIC BLOOD PRESSURE: 112 MMHG | WEIGHT: 136.2 LBS | TEMPERATURE: 97.3 F | HEART RATE: 70 BPM | BODY MASS INDEX: 21.89 KG/M2

## 2019-02-12 DIAGNOSIS — Z79.01 LONG TERM CURRENT USE OF ANTICOAGULANT THERAPY: ICD-10-CM

## 2019-02-12 DIAGNOSIS — Z95.2 S/P AVR (AORTIC VALVE REPLACEMENT): Primary | ICD-10-CM

## 2019-02-12 DIAGNOSIS — I10 ESSENTIAL HYPERTENSION: ICD-10-CM

## 2019-02-12 DIAGNOSIS — I48.20 CHRONIC ATRIAL FIBRILLATION (H): ICD-10-CM

## 2019-02-12 DIAGNOSIS — E78.5 HYPERLIPIDEMIA LDL GOAL <130: ICD-10-CM

## 2019-02-12 LAB
ALBUMIN SERPL-MCNC: 3.6 G/DL (ref 3.4–5)
ALP SERPL-CCNC: 89 U/L (ref 40–150)
ALT SERPL W P-5'-P-CCNC: 26 U/L (ref 0–50)
ANION GAP SERPL CALCULATED.3IONS-SCNC: 5 MMOL/L (ref 3–14)
AST SERPL W P-5'-P-CCNC: 29 U/L (ref 0–45)
BILIRUB DIRECT SERPL-MCNC: <0.1 MG/DL (ref 0–0.2)
BILIRUB SERPL-MCNC: 0.4 MG/DL (ref 0.2–1.3)
BUN SERPL-MCNC: 21 MG/DL (ref 7–30)
CALCIUM SERPL-MCNC: 8.6 MG/DL (ref 8.5–10.1)
CHLORIDE SERPL-SCNC: 104 MMOL/L (ref 94–109)
CO2 SERPL-SCNC: 30 MMOL/L (ref 20–32)
CREAT SERPL-MCNC: 0.62 MG/DL (ref 0.52–1.04)
ERYTHROCYTE [DISTWIDTH] IN BLOOD BY AUTOMATED COUNT: 14.6 % (ref 10–15)
GFR SERPL CREATININE-BSD FRML MDRD: 85 ML/MIN/{1.73_M2}
GLUCOSE SERPL-MCNC: 94 MG/DL (ref 70–99)
HCT VFR BLD AUTO: 41.7 % (ref 35–47)
HGB BLD-MCNC: 13.5 G/DL (ref 11.7–15.7)
MCH RBC QN AUTO: 28.8 PG (ref 26.5–33)
MCHC RBC AUTO-ENTMCNC: 32.4 G/DL (ref 31.5–36.5)
MCV RBC AUTO: 89 FL (ref 78–100)
PLATELET # BLD AUTO: 142 10E9/L (ref 150–450)
POTASSIUM SERPL-SCNC: 4.4 MMOL/L (ref 3.4–5.3)
PROT SERPL-MCNC: 7.2 G/DL (ref 6.8–8.8)
RBC # BLD AUTO: 4.68 10E12/L (ref 3.8–5.2)
SODIUM SERPL-SCNC: 139 MMOL/L (ref 133–144)
WBC # BLD AUTO: 3.6 10E9/L (ref 4–11)

## 2019-02-12 PROCEDURE — 99214 OFFICE O/P EST MOD 30 MIN: CPT | Performed by: INTERNAL MEDICINE

## 2019-02-12 PROCEDURE — 85027 COMPLETE CBC AUTOMATED: CPT | Performed by: INTERNAL MEDICINE

## 2019-02-12 PROCEDURE — 36415 COLL VENOUS BLD VENIPUNCTURE: CPT | Performed by: INTERNAL MEDICINE

## 2019-02-12 PROCEDURE — 80048 BASIC METABOLIC PNL TOTAL CA: CPT | Performed by: INTERNAL MEDICINE

## 2019-02-12 PROCEDURE — 80076 HEPATIC FUNCTION PANEL: CPT | Performed by: INTERNAL MEDICINE

## 2019-02-12 ASSESSMENT — MIFFLIN-ST. JEOR: SCORE: 1109.55

## 2019-02-12 ASSESSMENT — PAIN SCALES - GENERAL: PAINLEVEL: NO PAIN (0)

## 2019-02-12 NOTE — LETTER
February 20, 2019      Khalida ARMAAN Desiraealfonso  [address] [facility] [address]        Dear ,    We are writing to inform you of your test results.    The chemistry panel was normal.   Kidney function is normal.   Liver tests are normal.   The white blood cell count is minimally decreased in the platelet count is stable.     Feel free to contact me via the office or My Chart if you have any questions regarding the above.     Resulted Orders   CBC with platelets   Result Value Ref [facility]    WBC 3.6 (L) 4.0 - 11.0 10e9/L    RBC Count 4.68 3.8 - 5.2 10e12/L    Hemoglobin 13.5 11.7 - 15.7 g/dL    Hematocrit 41.7 35.0 - 47.0 %    MCV 89 78 - 100 fl    MCH 28.8 26.5 - 33.0 pg    MCHC 32.4 31.5 - 36.5 g/dL    RDW 14.6 10.0 - 15.0 %    Platelet Count 142 (L) 150 - 450 10e9/L   Basic metabolic panel   Result Value Ref [facility]    Sodium 139 133 - 144 mmol/L    Potassium 4.4 3.4 - 5.3 mmol/L    Chloride 104 94 - 109 mmol/L    Carbon Dioxide 30 20 - 32 mmol/L    Anion Gap 5 3 - 14 mmol/L    Glucose 94 70 - 99 mg/dL    Urea Nitrogen 21 7 - 30 mg/dL    Creatinine 0.62 0.52 - 1.04 mg/dL    GFR Estimate 85 >60 mL/min/[1.73_m2]      Comment:      Non  GFR Calc  Starting 12/18/2018, serum creatinine based estimated GFR (eGFR) will be   calculated using the Chronic Kidney Disease Epidemiology Collaboration   (CKD-EPI) equation.      GFR Estimate If Black >90 >60 mL/min/[1.73_m2]      Comment:       GFR Calc  Starting 12/18/2018, serum creatinine based estimated GFR (eGFR) will be   calculated using the Chronic Kidney Disease Epidemiology Collaboration   (CKD-EPI) equation.      Calcium 8.6 8.5 - 10.1 mg/dL   Hepatic panel   Result Value Ref [facility]    Bilirubin Direct <0.1 0.0 - 0.2 mg/dL    Bilirubin Total 0.4 0.2 - 1.3 mg/dL    Albumin 3.6 3.4 - 5.0 g/dL    Protein Total 7.2 6.8 - 8.8 g/dL    Alkaline Phosphatase 89 40 - 150 U/L    ALT 26 0 - 50 U/L    AST 29 0 - 45 U/L       If you have any questions or  concerns, please call the clinic at the number listed above.       Sincerely,        Mazin Larsen, DO

## 2019-02-12 NOTE — PROGRESS NOTES
SUBJECTIVE:   Khalida Rouse is a 78 year old female who presents to clinic today for the following health issues:      Medication Followup of Cartia    Taking Medication as prescribed: yes    Side Effects:  None    Medication Helping Symptoms:  yes                         Chief Complaint         The patient is a pleasant 78-year-old female with a history of atrial fibrillation and a prosthetic aortic valve who presents today for evaluation.  She is initially a little irritated that she has to come in once a year.  I have explained to her that she has by all rights a rather complicated history and yearly evaluation is probably not unreasonable.  She notes that she has most of her care taking care of by her homeopath.  She takes tinctures of this and aliquots of that and she stays healthy because of it.  She does note that she takes her medications compliantly including the atenolol and diltiazem for rate control as well as warfarin.  She is no longer on diuretics.  She notes that she follows her INR closely through the Coumadin clinic.                         PAST, FAMILY,SOCIAL HISTORY:     Medical  History:   has a past medical history of Aortic valve stenosis, Atrial flutter (H), Cardiomyopathy (H), Severe aortic stenosis (4/7/2014), and Shoulder fracture, left (3/22/15).     Surgical History:   has a past surgical history that includes ENT surgery; Replace valve aortic (4/10/2014); and cataract iol, rt/lt (Right).     Social History:   reports that  has never smoked. she has never used smokeless tobacco. She reports that she does not drink alcohol or use drugs.     Family History:  family history includes Alzheimer Disease in her sister; Dementia in her brother.            MEDICATIONS  Current Outpatient Medications   Medication Sig Dispense Refill     atenolol (TENORMIN) 50 MG tablet Take 1 tablet (50 mg) by mouth 2 times daily Due for an office visit with primary care provider 180 tablet 1     calcium  carbonate-vitamin D 500-400 MG-UNIT TABS tablt Take 1 tablet by mouth 3 times daily       diltiazem ER COATED BEADS (CARDIZEM CD/CARTIA XT) 180 MG 24 hr capsule Take 1 capsule (180 mg) by mouth 2 times daily 180 capsule 3     multivitamins with minerals (CERTAVITE) LIQD Take 15 mLs by mouth daily       warfarin (COUMADIN) 2.5 MG tablet Take 5 mg Mon, Wed, Friday and take 2.5 mg all other days or as directed by the coumadin clinic. 144 tablet 0     torsemide (DEMADEX) 20 MG tablet TAKE ONE TABLET BY MOUTH ONCE DAILY AS NEEDED (Patient not taking: Reported on 2/12/2019) 90 tablet 3     warfarin (COUMADIN) 2.5 MG tablet Take 5 mg on Mon, Wed, Fri and 2.5 mg all other days, or as directed by the Coumadin Clinic. 120 tablet 0     warfarin (COUMADIN) 2.5 MG tablet TAKE 2 TABLETS BY MOUTH MON, WED, FRI AND TAKE 1 TABLET ALL OTHER DAYS, OR AS DIRECTED BY COUMADIN CLINIC 144 tablet 0         --------------------------------------------------------------------------------------------------------------------                              Review of Systems       LUNGS: Pt denies: cough, excess sputum, hemoptysis, or shortness of breath.   HEART: Pt denies: chest pain, symptomatic arrhythmia, syncope, tachy or bradyarrhythmia.   GI: Pt denies: nausea, vomiting, diarrhea, constipation, melena, or hematochezia.   NEURO: Pt denies: seizures, strokes, diplopia, weakness, paraesthesias, or paralysis.   SKIN: Pt denies: itching, rashes, discoloration, or specific lesions of concern. Denies recent hair loss.   PSYCH: The patient denies significant depression, anxiety, mood imbalance. Specifically denies any suicidal ideation.                                     Examination         LUNGS: clear bilaterally, airflow is brisk, no intercostal retraction or stridor is noted. No coughing is noted during visit.   HEART: Irregularly irregular without rubs, valvular click is consistent with mechanical aortic valve gallops, or murmurs. PMI is  nondisplaced. Upstrokes are brisk. S1,S2 are heard.   GI: Abdomen is soft, without rebound, guarding or tenderness. Bowel sounds are appropriate. No renal bruits are heard.   NEURO: Pt is alert and appropriate. No neurologic lateralization is noted. Cranial nerves 2-12 are intact. Peripheral sensory and motor function are grossly normal.    SKIN:  warm and dry. No erythema, or rashes are noted. No specific lesions of concern are noted.    PSYCH: The patient appears grossly appropriate. Maintains good eye contact, does not have any jittery or atypical motion. Displays appropriate affect.                                           Decision Making    1. Chronic atrial fibrillation (H)  Rate controlled.  Adequately anticoagulated.  No evidence of recent embolic phenomenon.  - Basic metabolic panel    2. S/P AVR (aortic valve replacement)  Continue anticoagulation    3. Long term current use of anticoagulant therapy  Check hemoglobin and continue INR surveillance  - CBC with platelets  - *UA reflex to Microscopic and Culture ([facility] (except [place] and [place])    4. Hyperlipidemia LDL goal <130  Check lipids and adjust accordingly  Recommend continued low-fat diet  - Hepatic panel    5. Essential hypertension  Currently controlled on medication                         FOLLOW UP   I have asked the patient to make an appointment for followup with me in 1 year.  (I have recommended 6 months visits but the patient believes this is unnecessary.)  She will consider colonoscopy and mammography.  She will discuss these with her homeopath          DO MARIA D Sabillon    Portions of this note were produced using [employer]  Although every attempt at real-time proof reading has been made, occasional grammar/syntax errors may have been missed.

## 2019-02-20 NOTE — RESULT ENCOUNTER NOTE
Dear Khalida, your recent test results are attached.  The chemistry panel was normal.  Kidney function is normal.  Liver tests are normal.  The white blood cell count is minimally decreased in the platelet count is stable.    Feel free to contact me via the office or My Chart if you have any questions regarding the above.

## 2019-02-21 ENCOUNTER — ALLIED HEALTH/NURSE VISIT (OUTPATIENT)
Dept: FAMILY MEDICINE | Facility: OTHER | Age: 79
End: 2019-02-21
Payer: MEDICARE

## 2019-02-21 VITALS — HEART RATE: 60 BPM | DIASTOLIC BLOOD PRESSURE: 58 MMHG | SYSTOLIC BLOOD PRESSURE: 118 MMHG

## 2019-02-21 DIAGNOSIS — Z01.30 BP CHECK: Primary | ICD-10-CM

## 2019-02-21 PROCEDURE — 99207 ZZC NO CHARGE NURSE ONLY: CPT

## 2019-02-21 NOTE — PROGRESS NOTES
Khalida Rouse is a 79 year old patient who comes in today for a Blood Pressure check.  Initial BP:  /58 (Cuff Size: Adult Regular)   Pulse 60      60  Disposition: Huddled with provider Talat Goode as patient stated she had an unusual headache that is no longer present. This is why patient came in for BP check.    Patient was offered appointment with provider which she declined as BP is in range and headache is no longer present. Patient was given instruction to follow up with PCP and to seek care in the emergency room if headache returns especially if it comes with vision changes.   Jacquelin Biswas CMA ([facility])

## 2019-02-22 ENCOUNTER — APPOINTMENT (OUTPATIENT)
Dept: GENERAL RADIOLOGY | Facility: CLINIC | Age: 79
End: 2019-02-22
Attending: EMERGENCY MEDICINE
Payer: MEDICARE

## 2019-02-22 ENCOUNTER — HOSPITAL ENCOUNTER (EMERGENCY)
Facility: CLINIC | Age: 79
Discharge: HOME | End: 2019-02-22
Attending: EMERGENCY MEDICINE | Admitting: EMERGENCY MEDICINE
Payer: MEDICARE

## 2019-02-22 VITALS
DIASTOLIC BLOOD PRESSURE: 100 MMHG | HEART RATE: 81 BPM | OXYGEN SATURATION: 95 % | TEMPERATURE: 96.7 F | SYSTOLIC BLOOD PRESSURE: 143 MMHG | BODY MASS INDEX: 22.73 KG/M2 | WEIGHT: 140.8 LBS | RESPIRATION RATE: 12 BRPM

## 2019-02-22 DIAGNOSIS — S80.02XA CONTUSION OF LEFT KNEE, INITIAL ENCOUNTER: ICD-10-CM

## 2019-02-22 PROCEDURE — 99283 EMERGENCY DEPT VISIT LOW MDM: CPT | Performed by: EMERGENCY MEDICINE

## 2019-02-22 PROCEDURE — 73564 X-RAY EXAM KNEE 4 OR MORE: CPT | Mod: TC,LT

## 2019-02-22 PROCEDURE — 99283 EMERGENCY DEPT VISIT LOW MDM: CPT | Mod: Z6 | Performed by: EMERGENCY MEDICINE

## 2019-02-22 NOTE — ED TRIAGE NOTES
Pt fell going up the stairs, after she caught her toe on the lip of the stairs, She has pain and redness and swelling to left knee.

## 2019-02-22 NOTE — ED AVS SNAPSHOT
[facility]  [address] [place] [address]  Phone:  [phone]  Fax:  [phone]                                    Khalida Rouse   MRN: [id number]    Department:  [facility]   Date of Visit:  2/22/2019           After Visit Summary Signature Page    I have received my discharge instructions, and my questions have been answered. I have discussed any challenges I see with this plan with the nurse or doctor.    ..........................................................................................................................................  Patient/Patient Representative Signature      ..........................................................................................................................................  Patient Representative Print Name and Relationship to Patient    ..................................................               ................................................  Date                                   Time    ..........................................................................................................................................  Reviewed by Signature/Title    ...................................................              ..............................................  Date                                               Time          22EPIC Rev 08/18

## 2019-02-22 NOTE — ED PROVIDER NOTES
History     Chief Complaint   Patient presents with     Knee Pain     The history is provided by the patient.     Khalida Rouse is a 79 year old female with a history of atrial fibrillation who presents to the emergency department with left knee pain and right shoulder pain after she tripped while walking up a flight of stairs 2 hours ago. The patient landed directly on her left knee. She has been able to walk and bear weight on her knee. After sitting in her apartment for a while she noticed that her right shoulder was starting to bother her. She does not think that she landed on the shoulder and does not feel like it is broken.    Allergies:  Allergies   Allergen Reactions     Xarelto [Rivaroxaban] Diarrhea     Ace Inhibitors Cough       Problem List:    Patient Active Problem List    Diagnosis Date Noted     Long term current use of anticoagulant therapy 03/11/2016     Priority: Medium     Acute pulmonary edema (H) 01/12/2016     Priority: Medium     Atrial fibrillation with RVR (H) 01/11/2016     Priority: Medium     Osteoporosis 10/26/2015     Priority: Medium     Advanced directives, counseling/discussion 10/03/2014     Priority: Medium     Declined       S/P AVR (aortic valve replacement) 04/10/2014     Priority: Medium     Atrial flutter 02/04/2014     Priority: Medium        Past Medical History:    Past Medical History:   Diagnosis Date     Aortic valve stenosis      Atrial flutter (H)      Cardiomyopathy (H)      Severe aortic stenosis 4/7/2014     Shoulder fracture, left 3/22/15       Past Surgical History:    Past Surgical History:   Procedure Laterality Date     CATARACT IOL, RT/LT Right      ENT SURGERY      glaucoma surgery     REPLACE VALVE AORTIC  4/10/2014    Procedure: REPLACE VALVE AORTIC;  Median sternotomy, Replace Aortic Valve on pump oxygenation. ;  Surgeon: Wesley Fong MD;  Location:  OR       Family History:    Family History   Problem Relation Age of Onset     Dementia  Brother      Alzheimer Disease Sister        Social History:  Marital Status:   [5]  Social History     Tobacco Use     Smoking status: Never Smoker     Smokeless tobacco: Never Used   Substance Use Topics     Alcohol use: No     Alcohol/week: 0.0 oz     Drug use: No        Medications:      atenolol (TENORMIN) 50 MG tablet   calcium carbonate-vitamin D 500-400 MG-UNIT TABS tablt   diltiazem ER COATED BEADS (CARDIZEM CD/CARTIA XT) 180 MG 24 hr capsule   multivitamins with minerals (CERTAVITE) LIQD   torsemide (DEMADEX) 20 MG tablet   warfarin (COUMADIN) 2.5 MG tablet   warfarin (COUMADIN) 2.5 MG tablet   warfarin (COUMADIN) 2.5 MG tablet         Review of Systems   All other systems reviewed and are negative.      Physical Exam   BP: (!) 151/138  Pulse: 100  Temp: 96.7  F (35.9  C)  Resp: 12  Weight: 63.9 kg (140 lb 12.8 oz)  SpO2: 95 %      Physical Exam   Constitutional: She appears well-developed and well-nourished. No distress.   HENT:   Head: Normocephalic and atraumatic.   Right Ear: External ear normal.   Left Ear: External ear normal.   Nose: Nose normal.   Mouth/Throat: Oropharynx is clear and moist. No oropharyngeal exudate.   Eyes: EOM are normal. Pupils are equal, round, and reactive to light. Right eye exhibits no discharge. Left eye exhibits no discharge. No scleral icterus.   Neck: Normal range of motion. Neck supple.   Cardiovascular: Normal rate. An irregularly irregular rhythm present. Exam reveals no gallop and no friction rub.   Murmur heard.   Systolic murmur is present with a grade of 3/6.  Pulmonary/Chest: Effort normal and breath sounds normal. No stridor. No respiratory distress. She has no wheezes. She has no rales. She exhibits no tenderness.   Abdominal: Soft. She exhibits no distension and no mass. There is no tenderness. There is no rebound and no guarding. No hernia.   Musculoskeletal: Normal range of motion. She exhibits no edema, tenderness or deformity.        Right shoulder:  She exhibits normal range of motion and no deformity.   Ecchymosis over right medial patella. No deformity. No crepitus. No effusion. Normal range of motion. CMS to foot is normal.   Neurological: She is alert. No cranial nerve deficit.   Skin: Skin is warm and dry. No rash noted. She is not diaphoretic. No erythema. No pallor.   Psychiatric: She has a normal mood and affect. Her behavior is normal. Judgment and thought content normal.   Nursing note and vitals reviewed.      ED Course        Procedures               Critical Care time:  none               Results for orders placed or performed during the hospital encounter of 02/22/19 (from the past 24 hour(s))   XR Knee Left G/E 4 Views    Narrative    KNEE LEFT FOUR OR MORE VIEWS    2/22/2019 4:40 PM     HISTORY: Trauma.     COMPARISON: None.      Impression    IMPRESSION: No evidence of acute fracture or subluxation. No joint  effusion. Joint spaces appear well-preserved. Diffuse osteopenia.       Medications - No data to display    Assessments & Plan (with Medical Decision Making)  79-year-old female with left knee contusion.  X-rays negative.  Ice, rest, Tylenol as needed.     I have reviewed the nursing notes.    I have reviewed the findings, diagnosis, plan and need for follow up with the patient.          Medication List      There are no discharge medications for this visit.         Final diagnoses:   Contusion of left knee, initial encounter     This document serves as a record of services personally performed by Singh Hall MD. It was created on their behalf by Sasha Live, a trained medical scribe. The creation of this record is based on the provider's personal observations and the statements of the patient. This document has been checked and approved by the attending provider.  Note: Chart documentation done in part with Dragon Voice Recognition software. Although reviewed after completion, some word and grammatical errors may  remain.    2/22/2019   [facility] EMERGENCY DEPARTMENT     Singh Hall MD  02/22/19 1745

## 2019-03-19 ENCOUNTER — ANTICOAGULATION THERAPY VISIT (OUTPATIENT)
Dept: ANTICOAGULATION | Facility: CLINIC | Age: 79
End: 2019-03-19
Payer: MEDICARE

## 2019-03-19 DIAGNOSIS — I48.91 ATRIAL FIBRILLATION WITH RVR (H): ICD-10-CM

## 2019-03-19 DIAGNOSIS — Z95.2 S/P AVR (AORTIC VALVE REPLACEMENT): ICD-10-CM

## 2019-03-19 DIAGNOSIS — Z79.01 LONG TERM CURRENT USE OF ANTICOAGULANT THERAPY: ICD-10-CM

## 2019-03-19 LAB — INR POINT OF CARE: 3 (ref 0.9–1.1)

## 2019-03-19 PROCEDURE — 99207 ZZC NO CHARGE NURSE ONLY: CPT

## 2019-03-19 PROCEDURE — 36416 COLLJ CAPILLARY BLOOD SPEC: CPT

## 2019-03-19 PROCEDURE — 85610 PROTHROMBIN TIME: CPT | Mod: QW

## 2019-03-19 NOTE — PROGRESS NOTES
ANTICOAGULATION FOLLOW-UP CLINIC VISIT    Patient Name:  Khalida Rouse  Date:  3/19/2019  Contact Type:  Face to Face    SUBJECTIVE:     Patient Findings     Comments:   The patient was assessed for diet, medication, and activity level changes, missed or extra doses, bruising or bleeding, with no problem findings.  Venessa Hood RN             OBJECTIVE    INR Protime   Date Value Ref [facility] Status   03/19/2019 3.0 (A) 0.9 - 1.1 Final       ASSESSMENT / PLAN  INR assessment THER    Recheck INR In: 6 WEEKS    INR Location Clinic      Anticoagulation Summary  As of 3/19/2019    INR goal:   2.0-3.0   TTR:   80.0 % (3 y)   INR used for dosing:   3.0 (3/19/2019)   Warfarin maintenance plan:   5 mg (2.5 mg x 2) every Mon, Wed, Fri; 2.5 mg (2.5 mg x 1) all other days   Full warfarin instructions:   5 mg every Mon, Wed, Fri; 2.5 mg all other days   Weekly warfarin total:   25 mg   No change documented:   Venessa Hood RN   Plan last modified:   Venessa Hood RN (10/12/2018)   Next INR check:   4/30/2019   Target end date:       Indications    Long term current use of anticoagulant therapy [Z79.01]  Atrial fibrillation with RVR (H) [I48.91]  S/P AVR (aortic valve replacement) [Z95.2]             Anticoagulation Episode Summary     INR check location:       Preferred lab:       Send INR reminders to:   GIOVANNA HADLEY    Comments:   2.5 mg tablets, book, BP, PM dose      Anticoagulation Care Providers     Provider Role Specialty Phone number    Dorcas Fisher MD [facility] [phone]            See the Encounter Report to view Anticoagulation Flowsheet and Dosing Calendar (Go to Encounters tab in chart review, and find the Anticoagulation Therapy Visit)    Dosage adjustment made based on physician directed care plan.      Venessa Hood RN

## 2019-04-02 DIAGNOSIS — I48.91 ATRIAL FIBRILLATION WITH RVR (H): ICD-10-CM

## 2019-04-02 RX ORDER — ATENOLOL 50 MG/1
50 TABLET ORAL 2 TIMES DAILY
Qty: 180 TABLET | Refills: 0 | Status: SHIPPED | OUTPATIENT
Start: 2019-04-02 | End: 2019-06-18

## 2019-04-02 NOTE — TELEPHONE ENCOUNTER
"Requested Prescriptions   Pending Prescriptions Disp Refills     atenolol (TENORMIN) 50 MG tablet [Pharmacy Med Name: ATENOLOL 50MG TABS] 180 tablet 1    Last Written Prescription Date:  10/3/18  Last Fill Quantity: 180,  # refills: 1   Last office visit: 2/21/2019 with prescribing provider:  2/12/18   Future Office Visit:     Sig: TAKE ONE TABLET BY MOUTH TWICE A DAY ...MUST SEE DOCTOR FOR MORE REFILLS...    Beta-Blockers Protocol Failed - 4/2/2019 10:45 AM       Failed - Blood pressure under 140/90 in past 12 months    BP Readings from Last 3 Encounters:   02/22/19 (!) 143/100   02/21/19 118/58   02/12/19 112/70                Passed - Patient is age 6 or older       Passed - Recent (12 mo) or future (30 days) visit within the authorizing provider's specialty    Patient had office visit in the last 12 months or has a visit in the next 30 days with authorizing provider or within the authorizing provider's specialty.  See \"Patient Info\" tab in inbasket, or \"Choose Columns\" in Meds & Orders section of the refill encounter.             Passed - Medication is active on med list        "

## 2019-04-02 NOTE — TELEPHONE ENCOUNTER
Routing refill request to provider for review/approval because:  Labs out of range:  BP    SANA PearsonN, RN  [facility]

## 2019-04-08 DIAGNOSIS — I48.91 ATRIAL FIBRILLATION WITH RVR (H): ICD-10-CM

## 2019-04-08 RX ORDER — WARFARIN SODIUM 2.5 MG/1
TABLET ORAL
Qty: 120 TABLET | Refills: 0 | Status: SHIPPED | OUTPATIENT
Start: 2019-04-08 | End: 2019-06-18

## 2019-04-08 NOTE — TELEPHONE ENCOUNTER
"Warfarin  Last Written Prescription Date:  1/16/2019  Last Fill Quantity: 120,  # refills: 0   Last office visit: 2/21/2019 with prescribing provider:  2/12/2019   Future Office Visit:      Requested Prescriptions   Pending Prescriptions Disp Refills     warfarin (COUMADIN) 2.5 MG tablet [Pharmacy Med Name: WARFARIN SODIUM 2.5MG TABS] 120 tablet 0     Sig: TAKE TWO TABLETS BY MOUTH ON MONDAY, WEDNESDAY, FRIDAY AND ONE TABLET BY MOUTH ALL OTHER DAYS, OR AS DIRECTED BY THE COUMADIN CLINIC       Vitamin K Antagonists Failed - 4/8/2019 10:39 AM        Failed - INR is within goal in the past 6 weeks     Confirm INR is within goal in the past 6 weeks.     Recent Labs   Lab Test 03/19/19   INR 3.0*                       Passed - Recent (12 mo) or future (30 days) visit within the authorizing provider's specialty     Patient had office visit in the last 12 months or has a visit in the next 30 days with authorizing provider or within the authorizing provider's specialty.  See \"Patient Info\" tab in inbasket, or \"Choose Columns\" in Meds & Orders section of the refill encounter.              Passed - Medication is active on med list        Passed - Patient is 18 years of age or older        Passed - Patient is not pregnant        Passed - No positive pregnancy on file in past 12 months          Kenisha Seymour RN on 4/8/2019 at 10:45 AM    "

## 2019-05-07 ENCOUNTER — ANTICOAGULATION THERAPY VISIT (OUTPATIENT)
Dept: ANTICOAGULATION | Facility: CLINIC | Age: 79
End: 2019-05-07
Payer: MEDICARE

## 2019-05-07 DIAGNOSIS — Z79.01 LONG TERM CURRENT USE OF ANTICOAGULANT THERAPY: ICD-10-CM

## 2019-05-07 DIAGNOSIS — Z95.2 S/P AVR (AORTIC VALVE REPLACEMENT): ICD-10-CM

## 2019-05-07 DIAGNOSIS — I48.91 ATRIAL FIBRILLATION WITH RVR (H): ICD-10-CM

## 2019-05-07 LAB — INR POINT OF CARE: 2.8 (ref 0.9–1.1)

## 2019-05-07 PROCEDURE — 85610 PROTHROMBIN TIME: CPT | Mod: QW

## 2019-05-07 PROCEDURE — 36416 COLLJ CAPILLARY BLOOD SPEC: CPT

## 2019-05-07 PROCEDURE — 99207 ZZC NO CHARGE NURSE ONLY: CPT

## 2019-05-07 NOTE — PROGRESS NOTES
ANTICOAGULATION FOLLOW-UP CLINIC VISIT    Patient Name:  Khalida Rouse  Date:  2019  Contact Type:  Face to Face    SUBJECTIVE:     Patient Findings     Comments:   The patient was assessed for diet, medication, and activity level changes, missed or extra doses, bruising or bleeding, with no problem findings.  Venessa Hood RN             OBJECTIVE    INR Protime   Date Value Ref Range Status   2019 2.8 (A) 0.9 - 1.1 Final       ASSESSMENT / PLAN  INR assessment THER    Recheck INR In: 6 WEEKS    INR Location Clinic      Anticoagulation Summary  As of 2019    INR goal:   2.0-3.0   TTR:   80.9 % (3.1 y)   INR used for dosin.8 (2019)   Warfarin maintenance plan:   5 mg (2.5 mg x 2) every Mon, Wed, Fri; 2.5 mg (2.5 mg x 1) all other days   Full warfarin instructions:   5 mg every Mon, Wed, Fri; 2.5 mg all other days   Weekly warfarin total:   25 mg   No change documented:   Venessa Hood RN   Plan last modified:   Venessa Hood RN (10/12/2018)   Next INR check:   2019   Target end date:       Indications    Long term current use of anticoagulant therapy [Z79.01]  Atrial fibrillation with RVR (H) [I48.91]  S/P AVR (aortic valve replacement) [Z95.2]             Anticoagulation Episode Summary     INR check location:       Preferred lab:       Send INR reminders to:   GIOVANNA HADLEY    Comments:   2.5 mg tablets, book, BP, PM dose      Anticoagulation Care Providers     Provider Role Specialty Phone number    Dorcas Fisher MD [facility] [phone]            See the Encounter Report to view Anticoagulation Flowsheet and Dosing Calendar (Go to Encounters tab in chart review, and find the Anticoagulation Therapy Visit)    Dosage adjustment made based on physician directed care plan.      Venessa Hood RN

## 2019-05-14 ENCOUNTER — OFFICE VISIT (OUTPATIENT)
Dept: FAMILY MEDICINE | Facility: OTHER | Age: 79
End: 2019-05-14
Payer: MEDICARE

## 2019-05-14 VITALS
HEART RATE: 66 BPM | TEMPERATURE: 98 F | WEIGHT: 133.8 LBS | SYSTOLIC BLOOD PRESSURE: 132 MMHG | BODY MASS INDEX: 21.6 KG/M2 | RESPIRATION RATE: 16 BRPM | DIASTOLIC BLOOD PRESSURE: 70 MMHG | OXYGEN SATURATION: 94 %

## 2019-05-14 DIAGNOSIS — I48.91 ATRIAL FIBRILLATION WITH RVR (H): ICD-10-CM

## 2019-05-14 DIAGNOSIS — M81.0 AGE-RELATED OSTEOPOROSIS WITHOUT CURRENT PATHOLOGICAL FRACTURE: ICD-10-CM

## 2019-05-14 DIAGNOSIS — I27.29 OTHER SECONDARY PULMONARY HYPERTENSION (H): ICD-10-CM

## 2019-05-14 DIAGNOSIS — M17.0 PRIMARY OSTEOARTHRITIS OF BOTH KNEES: Primary | ICD-10-CM

## 2019-05-14 DIAGNOSIS — Z95.2 S/P AVR (AORTIC VALVE REPLACEMENT): ICD-10-CM

## 2019-05-14 PROCEDURE — 99214 OFFICE O/P EST MOD 30 MIN: CPT | Performed by: INTERNAL MEDICINE

## 2019-05-14 RX ORDER — ACETAMINOPHEN 500 MG
1000 TABLET ORAL EVERY 6 HOURS PRN
Qty: 180 TABLET | Refills: 3 | Status: SHIPPED | OUTPATIENT
Start: 2019-05-14 | End: 2021-04-27

## 2019-05-14 ASSESSMENT — PAIN SCALES - GENERAL: PAINLEVEL: MODERATE PAIN (4)

## 2019-05-14 NOTE — PROGRESS NOTES
Chief Complaint   Patient presents with     RECHECK     osteoporosis                     Chief Complaint         The patient is a pleasant 79-year-old female who presents today with some discomfort in her knees.  She has some crepitance and notes that after a long day of walking, this is sometimes worse.  She is concerned that this may be osteoporosis.  I have educated her on the difference between osteoporosis and osteoarthritis.  She is on chronic anticoagulation and I recommended against use of over-the-counter anti-inflammatories.  She has hypertension which is currently well controlled on her medications and overall she is doing well.  Her atrial fibrillation is not associated with any syncope or near syncope.  She has had no embolic event.  She is status post aortic valve replacement and this is working well.  She is able to walk moderate distances without discomfort but when she first gets up in the morning or after a long day on her feet, her knees are both painful bilaterally.  No acute swelling or inflammation is related.                         PAST, FAMILY,SOCIAL HISTORY:     Medical  History:   has a past medical history of Aortic valve stenosis, Atrial flutter (H), Cardiomyopathy (H), Severe aortic stenosis (4/7/2014), and Shoulder fracture, left (3/22/15).     Surgical History:   has a past surgical history that includes ENT surgery; Replace valve aortic (4/10/2014); and cataract iol, rt/lt (Right).     Social History:   reports that she has never smoked. She has never used smokeless tobacco. She reports that she does not drink alcohol or use drugs.     Family History:  family history includes Alzheimer Disease in her sister; Dementia in her brother.            MEDICATIONS  Current Outpatient Medications   Medication Sig Dispense Refill     acetaminophen (TYLENOL) 500 MG tablet Take 2 tablets (1,000 mg) by mouth every 6 hours as needed for mild pain 180 tablet 3     atenolol (TENORMIN) 50 MG tablet  Take 1 tablet (50 mg) by mouth 2 times daily 180 tablet 0     calcium carbonate-vitamin D 500-400 MG-UNIT TABS tablt Take 1 tablet by mouth 3 times daily       diltiazem ER COATED BEADS (CARDIZEM CD/CARTIA XT) 180 MG 24 hr capsule Take 1 capsule (180 mg) by mouth 2 times daily 180 capsule 3     multivitamins with minerals (CERTAVITE) LIQD Take 15 mLs by mouth daily       torsemide (DEMADEX) 20 MG tablet TAKE ONE TABLET BY MOUTH ONCE DAILY AS NEEDED 90 tablet 3     warfarin (COUMADIN) 2.5 MG tablet Take 5 mg Mon, Wed, Friday and take 2.5 mg all other days or as directed by the coumadin clinic. 144 tablet 0     warfarin (COUMADIN) 2.5 MG tablet TAKE TWO TABLETS BY MOUTH ON MONDAY, WEDNESDAY, FRIDAY AND ONE TABLET BY MOUTH ALL OTHER DAYS, OR AS DIRECTED BY THE COUMADIN CLINIC 120 tablet 0     warfarin (COUMADIN) 2.5 MG tablet TAKE 2 TABLETS BY MOUTH MON, WED, FRI AND TAKE 1 TABLET ALL OTHER DAYS, OR AS DIRECTED BY COUMADIN CLINIC 144 tablet 0         --------------------------------------------------------------------------------------------------------------------                              Review of Systems       LUNGS: Pt denies: cough, excess sputum, hemoptysis, or shortness of breath.   HEART: Pt denies: chest pain, arrhythmia, syncope, tachy or bradyarrhythmia.   GI: Pt denies: nausea, vomiting, diarrhea, constipation, melena, or hematochezia.   NEURO: Pt denies: seizures, strokes, diplopia, weakness, paraesthesias, or paralysis.   SKIN: Pt denies: itching, rashes, discoloration, or specific lesions of concern. Denies recent hair loss.   PSYCH: The patient denies significant depression, anxiety, mood imbalance. Specifically denies any suicidal ideation.                                     Examination  /70   Pulse 66   Temp 98  F (36.7  C) (Temporal)   Resp 16   Wt 60.7 kg (133 lb 12.8 oz)   SpO2 94%   BMI 21.60 kg/m     Constitutional: The patient appears to be in no acute distress. The patient  appears to be adequately hydrated. No acute respiratory or hemodynamic distress is noted at this time.   LUNGS: clear bilaterally, airflow is brisk, no intercostal retraction or stridor is noted. No coughing is noted during visit.   HEART: Irregularly irregular without rubs, clicks, gallops, or murmurs. PMI is nondisplaced. Upstrokes are brisk. S1,S2 are heard.   GI: Abdomen is soft, without rebound, guarding or tenderness. Bowel sounds are appropriate. No renal bruits are heard.   NEURO: Pt is alert and appropriate. No neurologic lateralization is noted. Cranial nerves 2-12 are intact. Peripheral sensory and motor function are grossly normal.    SKIN:  warm and dry. No erythema, or rashes are noted. No specific lesions of concern are noted.    PSYCH: The patient appears grossly appropriate. Maintains good eye contact, does not have any jittery or atypical motion. Displays appropriate affect.   MS: Moderate crepitance is noted in the knees. No deformity is present. Muscle strength is appropriate and equal bilaterally. No acute joint erythema or swelling is present.                                           Decision Making  1. Primary osteoarthritis of both knees  Recommend Tylenol 2000 mg daily  - acetaminophen (TYLENOL) 500 MG tablet; Take 2 tablets (1,000 mg) by mouth every 6 hours as needed for mild pain  Dispense: 180 tablet; Refill: 3    2. S/P AVR (aortic valve replacement)  Bioprosthetic valve in place, doing well.  Follow with cardiology,  Echocardiogram next year    3. Age-related osteoporosis without current pathological fracture  Recommend continue calcium vitamin D supplementation.  No progression with recent testing    4. Atrial fibrillation with RVR (H)  Continue anticoagulation and follow INR    And she has secondary pulmonary hypertension as well.  This is well controlled.                        FOLLOW UP   I have asked the patient to make an appointment for followup with me in 6 months or as  needed.        I have carefully explained the diagnosis and treatment options to the patient.  The patient has displayed an understanding of the above, and all subsequent questions were answered.      DO MARIA D Sabillon    Portions of this note were produced using [employer]  Although every attempt at real-time proof reading has been made, occasional grammar/syntax errors may have been missed.

## 2019-05-19 ENCOUNTER — APPOINTMENT (OUTPATIENT)
Dept: GENERAL RADIOLOGY | Facility: CLINIC | Age: 79
DRG: 194 | End: 2019-05-19
Attending: EMERGENCY MEDICINE
Payer: MEDICARE

## 2019-05-19 ENCOUNTER — HOSPITAL ENCOUNTER (INPATIENT)
Facility: CLINIC | Age: 79
LOS: 2 days | Discharge: HOME | DRG: 194 | End: 2019-05-21
Attending: EMERGENCY MEDICINE | Admitting: HOSPITALIST
Payer: MEDICARE

## 2019-05-19 DIAGNOSIS — I48.20 CHRONIC ATRIAL FIBRILLATION WITH RVR (H): ICD-10-CM

## 2019-05-19 DIAGNOSIS — Z95.2 STATUS POST AORTIC VALVE REPLACEMENT: ICD-10-CM

## 2019-05-19 DIAGNOSIS — J18.9 COMMUNITY ACQUIRED PNEUMONIA OF RIGHT LOWER LOBE OF LUNG: ICD-10-CM

## 2019-05-19 LAB
ALBUMIN SERPL-MCNC: 3.6 G/DL (ref 3.4–5)
ALP SERPL-CCNC: 72 U/L (ref 40–150)
ALT SERPL W P-5'-P-CCNC: 28 U/L (ref 0–50)
ANION GAP SERPL CALCULATED.3IONS-SCNC: 8 MMOL/L (ref 3–14)
AST SERPL W P-5'-P-CCNC: 20 U/L (ref 0–45)
BASOPHILS # BLD AUTO: 0 10E9/L (ref 0–0.2)
BASOPHILS NFR BLD AUTO: 0.3 %
BILIRUB SERPL-MCNC: 1.4 MG/DL (ref 0.2–1.3)
BUN SERPL-MCNC: 15 MG/DL (ref 7–30)
CALCIUM SERPL-MCNC: 8.6 MG/DL (ref 8.5–10.1)
CHLORIDE SERPL-SCNC: 106 MMOL/L (ref 94–109)
CO2 SERPL-SCNC: 27 MMOL/L (ref 20–32)
CREAT SERPL-MCNC: 0.51 MG/DL (ref 0.52–1.04)
DIFFERENTIAL METHOD BLD: ABNORMAL
EOSINOPHIL NFR BLD AUTO: 0 %
ERYTHROCYTE [DISTWIDTH] IN BLOOD BY AUTOMATED COUNT: 15 % (ref 10–15)
GFR SERPL CREATININE-BSD FRML MDRD: >90 ML/MIN/{1.73_M2}
GLUCOSE SERPL-MCNC: 128 MG/DL (ref 70–99)
HCT VFR BLD AUTO: 40.6 % (ref 35–47)
HGB BLD-MCNC: 13.5 G/DL (ref 11.7–15.7)
IMM GRANULOCYTES # BLD: 0 10E9/L (ref 0–0.4)
IMM GRANULOCYTES NFR BLD: 0.4 %
INR PPP: 3.04 (ref 0.86–1.14)
LACTATE BLD-SCNC: 1 MMOL/L (ref 0.7–2)
LYMPHOCYTES # BLD AUTO: 0.3 10E9/L (ref 0.8–5.3)
LYMPHOCYTES NFR BLD AUTO: 3 %
MCH RBC QN AUTO: 29.5 PG (ref 26.5–33)
MCHC RBC AUTO-ENTMCNC: 33.3 G/DL (ref 31.5–36.5)
MCV RBC AUTO: 89 FL (ref 78–100)
MONOCYTES # BLD AUTO: 1 10E9/L (ref 0–1.3)
MONOCYTES NFR BLD AUTO: 9.1 %
NEUTROPHILS # BLD AUTO: 9.2 10E9/L (ref 1.6–8.3)
NEUTROPHILS NFR BLD AUTO: 87.2 %
NRBC # BLD AUTO: 0 10*3/UL
NRBC BLD AUTO-RTO: 0 /100
NT-PROBNP SERPL-MCNC: 1167 PG/ML (ref 0–1800)
PLATELET # BLD AUTO: 127 10E9/L (ref 150–450)
POTASSIUM SERPL-SCNC: 3.5 MMOL/L (ref 3.4–5.3)
PROCALCITONIN SERPL-MCNC: 0.06 NG/ML
PROT SERPL-MCNC: 7.1 G/DL (ref 6.8–8.8)
RBC # BLD AUTO: 4.57 10E12/L (ref 3.8–5.2)
SODIUM SERPL-SCNC: 141 MMOL/L (ref 133–144)
TROPONIN I SERPL-MCNC: <0.015 UG/L (ref 0–0.04)
WBC # BLD AUTO: 10.6 10E9/L (ref 4–11)

## 2019-05-19 PROCEDURE — 25000125 ZZHC RX 250: Performed by: EMERGENCY MEDICINE

## 2019-05-19 PROCEDURE — 99285 EMERGENCY DEPT VISIT HI MDM: CPT | Mod: 25

## 2019-05-19 PROCEDURE — 99285 EMERGENCY DEPT VISIT HI MDM: CPT | Mod: 25 | Performed by: EMERGENCY MEDICINE

## 2019-05-19 PROCEDURE — 87040 BLOOD CULTURE FOR BACTERIA: CPT | Performed by: EMERGENCY MEDICINE

## 2019-05-19 PROCEDURE — 96365 THER/PROPH/DIAG IV INF INIT: CPT

## 2019-05-19 PROCEDURE — 99221 1ST HOSP IP/OBS SF/LOW 40: CPT | Mod: AI | Performed by: HOSPITALIST

## 2019-05-19 PROCEDURE — 87081 CULTURE SCREEN ONLY: CPT | Performed by: HOSPITALIST

## 2019-05-19 PROCEDURE — 93005 ELECTROCARDIOGRAM TRACING: CPT

## 2019-05-19 PROCEDURE — 85610 PROTHROMBIN TIME: CPT | Performed by: EMERGENCY MEDICINE

## 2019-05-19 PROCEDURE — 84484 ASSAY OF TROPONIN QUANT: CPT | Performed by: EMERGENCY MEDICINE

## 2019-05-19 PROCEDURE — 71046 X-RAY EXAM CHEST 2 VIEWS: CPT | Mod: TC

## 2019-05-19 PROCEDURE — 25000128 H RX IP 250 OP 636: Performed by: EMERGENCY MEDICINE

## 2019-05-19 PROCEDURE — A9270 NON-COVERED ITEM OR SERVICE: HCPCS | Performed by: HOSPITALIST

## 2019-05-19 PROCEDURE — 96375 TX/PRO/DX INJ NEW DRUG ADDON: CPT

## 2019-05-19 PROCEDURE — 80053 COMPREHEN METABOLIC PANEL: CPT | Performed by: EMERGENCY MEDICINE

## 2019-05-19 PROCEDURE — 93010 ELECTROCARDIOGRAM REPORT: CPT | Mod: Z6 | Performed by: EMERGENCY MEDICINE

## 2019-05-19 PROCEDURE — 85025 COMPLETE CBC W/AUTO DIFF WBC: CPT | Performed by: EMERGENCY MEDICINE

## 2019-05-19 PROCEDURE — 25800030 ZZH RX IP 258 OP 636: Performed by: EMERGENCY MEDICINE

## 2019-05-19 PROCEDURE — 99207 ZZC CDG-HISTORY COMP: MEETS EXP. PROBLEM FOCUSED - DOWN CODED LACK OF HPI: CPT | Performed by: HOSPITALIST

## 2019-05-19 PROCEDURE — 12000000 ZZH R&B MED SURG/OB

## 2019-05-19 PROCEDURE — 83880 ASSAY OF NATRIURETIC PEPTIDE: CPT | Performed by: EMERGENCY MEDICINE

## 2019-05-19 PROCEDURE — 25800030 ZZH RX IP 258 OP 636: Performed by: HOSPITALIST

## 2019-05-19 PROCEDURE — 84145 PROCALCITONIN (PCT): CPT | Performed by: HOSPITALIST

## 2019-05-19 PROCEDURE — 25000132 ZZH RX MED GY IP 250 OP 250 PS 637: Performed by: HOSPITALIST

## 2019-05-19 PROCEDURE — 83605 ASSAY OF LACTIC ACID: CPT | Performed by: EMERGENCY MEDICINE

## 2019-05-19 RX ORDER — DILTIAZEM HYDROCHLORIDE 180 MG/1
180 CAPSULE, COATED, EXTENDED RELEASE ORAL 2 TIMES DAILY
Status: DISCONTINUED | OUTPATIENT
Start: 2019-05-19 | End: 2019-05-21 | Stop reason: HOSPADM

## 2019-05-19 RX ORDER — ONDANSETRON 4 MG/1
4 TABLET, ORALLY DISINTEGRATING ORAL EVERY 6 HOURS PRN
Status: DISCONTINUED | OUTPATIENT
Start: 2019-05-19 | End: 2019-05-21 | Stop reason: HOSPADM

## 2019-05-19 RX ORDER — ATENOLOL 25 MG/1
50 TABLET ORAL 2 TIMES DAILY
Status: DISCONTINUED | OUTPATIENT
Start: 2019-05-19 | End: 2019-05-21 | Stop reason: HOSPADM

## 2019-05-19 RX ORDER — WARFARIN SODIUM 1 MG/1
1 TABLET ORAL
Status: COMPLETED | OUTPATIENT
Start: 2019-05-19 | End: 2019-05-19

## 2019-05-19 RX ORDER — METOPROLOL TARTRATE 1 MG/ML
5 INJECTION, SOLUTION INTRAVENOUS ONCE
Status: COMPLETED | OUTPATIENT
Start: 2019-05-19 | End: 2019-05-19

## 2019-05-19 RX ORDER — CEFTRIAXONE 2 G/1
2 INJECTION, POWDER, FOR SOLUTION INTRAMUSCULAR; INTRAVENOUS EVERY 24 HOURS
Status: DISCONTINUED | OUTPATIENT
Start: 2019-05-19 | End: 2019-05-20

## 2019-05-19 RX ORDER — ACETAMINOPHEN 500 MG
1000 TABLET ORAL EVERY 6 HOURS PRN
Status: DISCONTINUED | OUTPATIENT
Start: 2019-05-19 | End: 2019-05-21 | Stop reason: HOSPADM

## 2019-05-19 RX ORDER — NALOXONE HYDROCHLORIDE 0.4 MG/ML
.1-.4 INJECTION, SOLUTION INTRAMUSCULAR; INTRAVENOUS; SUBCUTANEOUS
Status: DISCONTINUED | OUTPATIENT
Start: 2019-05-19 | End: 2019-05-21 | Stop reason: HOSPADM

## 2019-05-19 RX ORDER — ONDANSETRON 2 MG/ML
4 INJECTION INTRAMUSCULAR; INTRAVENOUS EVERY 6 HOURS PRN
Status: DISCONTINUED | OUTPATIENT
Start: 2019-05-19 | End: 2019-05-21 | Stop reason: HOSPADM

## 2019-05-19 RX ORDER — CEFTRIAXONE 1 G/1
1 INJECTION, POWDER, FOR SOLUTION INTRAMUSCULAR; INTRAVENOUS EVERY 24 HOURS
Status: DISCONTINUED | OUTPATIENT
Start: 2019-05-20 | End: 2019-05-20

## 2019-05-19 RX ORDER — SODIUM CHLORIDE 9 MG/ML
INJECTION, SOLUTION INTRAVENOUS CONTINUOUS
Status: DISCONTINUED | OUTPATIENT
Start: 2019-05-19 | End: 2019-05-20

## 2019-05-19 RX ADMIN — CEFTRIAXONE SODIUM 2 G: 2 INJECTION, POWDER, FOR SOLUTION INTRAMUSCULAR; INTRAVENOUS at 11:45

## 2019-05-19 RX ADMIN — AZITHROMYCIN MONOHYDRATE 500 MG: 500 INJECTION, POWDER, LYOPHILIZED, FOR SOLUTION INTRAVENOUS at 12:31

## 2019-05-19 RX ADMIN — SODIUM CHLORIDE: 9 INJECTION, SOLUTION INTRAVENOUS at 13:39

## 2019-05-19 RX ADMIN — METOPROLOL TARTRATE 5 MG: 5 INJECTION, SOLUTION INTRAVENOUS at 10:42

## 2019-05-19 RX ADMIN — DILTIAZEM HYDROCHLORIDE 180 MG: 180 CAPSULE, EXTENDED RELEASE ORAL at 22:25

## 2019-05-19 RX ADMIN — ATENOLOL 50 MG: 25 TABLET ORAL at 22:25

## 2019-05-19 RX ADMIN — WARFARIN SODIUM 1 MG: 1 TABLET ORAL at 19:24

## 2019-05-19 ASSESSMENT — ACTIVITIES OF DAILY LIVING (ADL)
COGNITION: 0 - NO COGNITION ISSUES REPORTED
NUMBER_OF_TIMES_PATIENT_HAS_FALLEN_WITHIN_LAST_SIX_MONTHS: 1
RETIRED_COMMUNICATION: 0-->UNDERSTANDS/COMMUNICATES WITHOUT DIFFICULTY
TRANSFERRING: 0-->INDEPENDENT
FALL_HISTORY_WITHIN_LAST_SIX_MONTHS: YES
BATHING: 0-->INDEPENDENT
TOILETING: 0-->INDEPENDENT
DRESS: 0-->INDEPENDENT
AMBULATION: 0-->INDEPENDENT
ADLS_ACUITY_SCORE: 11
SWALLOWING: 0-->SWALLOWS FOODS/LIQUIDS WITHOUT DIFFICULTY
ADLS_ACUITY_SCORE: 11
RETIRED_EATING: 0-->INDEPENDENT

## 2019-05-19 ASSESSMENT — ENCOUNTER SYMPTOMS
GASTROINTESTINAL NEGATIVE: 1
APPETITE CHANGE: 0
WEAKNESS: 0
BRUISES/BLEEDS EASILY: 0
CHOKING: 0
DIAPHORESIS: 0
PALPITATIONS: 0
CHILLS: 0
COUGH: 1
LIGHT-HEADEDNESS: 0
MUSCULOSKELETAL NEGATIVE: 1
FEVER: 0
WHEEZING: 0
PSYCHIATRIC NEGATIVE: 1
CHEST TIGHTNESS: 0
SHORTNESS OF BREATH: 0
FATIGUE: 0
ACTIVITY CHANGE: 1

## 2019-05-19 ASSESSMENT — MIFFLIN-ST. JEOR: SCORE: 1105.63

## 2019-05-19 NOTE — ED PROVIDER NOTES
History     Chief Complaint   Patient presents with     Hemoptysis     HPI  Khaldia Rouse is a 79 year old female significant past medical history for severe aortic stenosis status post AVR (2014), atrial fibrillation with intermittent rvr-on long-term anticoagulation to warfarin.  Presents with 2 days for cough.  Cough is clear to white occasionally tan phlegm in description.  She noted this morning a few streaks of blood after some harsh coughing.  She denies fever, chills, night sweats.  She has had no chest discomfort.  She does not complain of any weakness or lightheadedness.  Her primary concern was being on warfarin and having the blood-streaked streaked sputum.  No wish for any lung disease.  She is a non-smoker.  She has not had easy bruising.  INR value = 2.8 on 5/7.  Has had no complaint of shortness of breath at rest or with activity.    Allergies:  Allergies   Allergen Reactions     Xarelto [Rivaroxaban] Diarrhea     Ace Inhibitors Cough       Problem List:    Patient Active Problem List    Diagnosis Date Noted     Other secondary pulmonary hypertension (H) 05/14/2019     Priority: Medium     Long term current use of anticoagulant therapy 03/11/2016     Priority: Medium     Atrial fibrillation with RVR (H) 01/11/2016     Priority: Medium     Osteoporosis 10/26/2015     Priority: Medium     Advanced directives, counseling/discussion 10/03/2014     Priority: Medium     Declined       S/P AVR (aortic valve replacement) 04/10/2014     Priority: Medium     Atrial flutter 02/04/2014     Priority: Medium        Past Medical History:    Past Medical History:   Diagnosis Date     Aortic valve stenosis      Atrial flutter (H)      Cardiomyopathy (H)      Severe aortic stenosis 4/7/2014     Shoulder fracture, left 3/22/15       Past Surgical History:    Past Surgical History:   Procedure Laterality Date     CATARACT IOL, RT/LT Right      ENT SURGERY      glaucoma surgery     REPLACE VALVE AORTIC  4/10/2014     Procedure: REPLACE VALVE AORTIC;  Median sternotomy, Replace Aortic Valve on pump oxygenation. ;  Surgeon: Wesley Fong MD;  Location:  OR       Family History:    Family History   Problem Relation Age of Onset     Dementia Brother      Alzheimer Disease Sister        Social History:  Marital Status:   [5]  Social History     Tobacco Use     Smoking status: Never Smoker     Smokeless tobacco: Never Used   Substance Use Topics     Alcohol use: No     Alcohol/week: 0.0 oz     Drug use: No        Medications:      acetaminophen (TYLENOL) 500 MG tablet   atenolol (TENORMIN) 50 MG tablet   calcium carbonate-vitamin D 500-400 MG-UNIT TABS tablt   diltiazem ER COATED BEADS (CARDIZEM CD/CARTIA XT) 180 MG 24 hr capsule   multivitamins with minerals (CERTAVITE) LIQD   torsemide (DEMADEX) 20 MG tablet   warfarin (COUMADIN) 2.5 MG tablet   warfarin (COUMADIN) 2.5 MG tablet   warfarin (COUMADIN) 2.5 MG tablet         Review of Systems   Constitutional: Positive for activity change. Negative for appetite change, chills, diaphoresis, fatigue and fever.   HENT: Negative.    Respiratory: Positive for cough. Negative for choking, chest tightness, shortness of breath and wheezing.    Cardiovascular: Negative for chest pain, palpitations and leg swelling.   Gastrointestinal: Negative.    Genitourinary: Negative.    Musculoskeletal: Negative.    Skin: Negative.    Neurological: Negative for weakness and light-headedness.   Hematological: Does not bruise/bleed easily.   Psychiatric/Behavioral: Negative.    All other systems reviewed and are negative.      Physical Exam   BP: 128/80  Pulse: 121  Temp: 98.9  F (37.2  C)  Resp: 22  Weight: 60.3 kg (133 lb)  SpO2: 90 %      Physical Exam   Constitutional: She is oriented to person, place, and time. She appears well-nourished. No distress.   SAO2= 89 % when talking otherwise 90-92%   HENT:   Head: Normocephalic.   Nose: Nose normal.   Mouth/Throat: Oropharynx is clear and  moist.   Eyes: Pupils are equal, round, and reactive to light. Conjunctivae and EOM are normal. Right eye exhibits no discharge. Left eye exhibits no discharge.   Neck: Normal range of motion. Neck supple. No JVD present.   Cardiovascular: Intact distal pulses. An irregularly irregular rhythm present. Tachycardia present. PMI is not displaced. Exam reveals no S3.   Murmur heard.   Systolic murmur is present with a grade of 1/6.  Pulmonary/Chest: Effort normal. No stridor. She has no wheezes. She has rales (Increased crackles right lung base.  Few crackles left lung base.).   RR=18-20   Abdominal: Soft. She exhibits no distension. There is no tenderness. There is no guarding.   Musculoskeletal: Normal range of motion. She exhibits no edema.   Lymphadenopathy:     She has no cervical adenopathy.   Neurological: She is alert and oriented to person, place, and time. She exhibits normal muscle tone.   Skin: Skin is warm and dry. Capillary refill takes less than 2 seconds.   Psychiatric: She has a normal mood and affect. Her behavior is normal.   Nursing note and vitals reviewed.      ED Course        Procedures          EKG:  Interpretation by Blayne Castanon DO.   Indication: Cough.  Atrial fibrillation.  Rate 109 bpm  Left anterior fascicular block  Nonspecific T normalities diffusely with slow R wave progression.  No acute ST-T repolarization changes.  Impressions: Abnormal EKG               Assessments & Plan (with Medical Decision Making)  79-year-old female with significant past medical history for atrial fibrillation, severe aortic stenosis status post AVR who presents with generalized weakness, cough and concerns for blood-streaked sputum.  She is on long-term anticoagulation for atrial fibrillation.  Patient presented with pulse of 1 15-1 20.  EKG and cardiac monitor confirmed atrial fibrillation with RVR.  She also had intermittent periods of hypoxia.  When she is up walking oxygen saturation was 88 to 89%.   When she was resting but talking it dropped to 89%.  Not on oxygen at rest her baseline was 90%.  She was placed on 2 L nasal cannula.  Her examination noted crackles at both lung bases but more pronounced on the right and a chest x-ray confirmed infiltrate concerning for community acquired pneumonia.  Recommendations are to hospitalize the patient for treatment of her CAP.  At this time requiring oxygen.  Initiate antibiotic therapy in the ED with ceftriaxone and azithromycin.  2 blood cultures sent.  Discussed admission with .  Agrees with admission.  Patient in agreement.       I have reviewed the nursing notes.    I have reviewed the findings, diagnosis, plan and need for follow up with the patient.         Medication List      There are no discharge medications for this visit.         Final diagnoses:   Community acquired pneumonia of right lower lobe of lung (H)   Chronic atrial fibrillation with RVR (H)   Status post aortic valve replacement       5/19/2019   [facility] [place] EMERGENCY DEPARTMENT     Blayne Castanon,   05/19/19 1144

## 2019-05-19 NOTE — PHARMACY-ANTICOAGULATION SERVICE
Clinical Pharmacy - Warfarin Dosing Consult     Pharmacy has been consulted to manage this patient s warfarin therapy.  Indication: Atrial Fibrillation;Mechanical Aortic Valve Replacement  Therapy Goal: INR 2-3  Warfarin Prior to Admission: Yes  Warfarin PTA Regimen: 5 mg on Mon, Wed, Fri and 2.5 mg all other days of the week  Recent documented change in oral intake/nutrition: Unknown    INR   Date Value Ref Range Status   05/19/2019 3.04 (H) 0.86 - 1.14 Final     INR Protime   Date Value Ref Range Status   05/07/2019 2.8 (A) 0.9 - 1.1 Final       Recommend warfarin 1 mg today.  Pharmacy will monitor Khalida Rouse daily and order warfarin doses to achieve specified goal.      Please contact pharmacy as soon as possible if the warfarin needs to be held for a procedure or if the warfarin goals change.

## 2019-05-19 NOTE — ED NOTES
ED Nursing criteria listed below was addressed during verbal handoff:     Abnormal vitals: Yes  Abnormal results: Yes  Med Reconciliation completed: Yes  Meds given in ED: Yes  Any Overdue Meds: N/A  Core Measures: N/A  Isolation: Yes ( droplet )  Special needs: N/A  Skin assessment: Yes    Observation Patient  Education provided: N/A

## 2019-05-19 NOTE — H&P
[facility]    History and Physical  Hospitalist       Date of Admission:  5/19/2019    Assessment & Plan   Principal Problem:    Pneumonia    Assessment: cxr show pneumonia. Wbc is high normal. hypoxia    Plan: will start azithromycin and ceftriaxone. Will put on prn nebs. Tylenol prn.     Active Problems:    S/P AVR (aortic valve replacement)    Assessment: on coumadin INR is 3.0,     Plan: continue coumadin       Atrial fibrillation with RVR (H)    Assessment: rate is around 120 on arrival, now is 100, on diltiazem and atenolol at home. Was given 1x dose of iv metoprolol in the ER    Plan: continue atenolol and diltiazem, first dose is later tonight. bp is rather low.       Long term current use of anticoagulant therapy    Assessment: on coumadin, INR is 3    Plan: continue coumadin, pharmacy to dose      Khalida Rouse is a 79 year old female who presents with shortness of breath. Started 2 days ago. Has chill but not sure if she has fever or not. No chest pain. cxr show pneumonia. Wbc is normal high pt is hypoxic,       # Pain Assessment:  Current Pain Score 5/19/2019   Pain score (0-10) 0   Pain location -   Pain descriptors -   Khalida zendejas pain level was assessed and she currently denies pain.      DVT Prophylaxis: Warfarin  Code Status: Full Code    Disposition: Expected discharge in 2-3 days once breathing improve, abx change to oral.    Kendall Andrews    Primary Care Physician   Mazin Larsen    Chief Complaint   Shortness of breath.     History is obtained from the patient    History of Present Illness   Khalida Rouse is a 79 year old female who presents with shortness of breath. Started 2 days ago. Has chill but not sure if she has fever or not. No chest pain. cxr show pneumonia. Wbc is normal high pt is hypoxic,     Past Medical History    I have reviewed this patient's medical history and updated it with pertinent information if needed.   Past Medical History:    Diagnosis Date     Aortic valve stenosis      Atrial flutter (H)      Cardiomyopathy (H)      Severe aortic stenosis 4/7/2014     Shoulder fracture, left 3/22/15       Past Surgical History   I have reviewed this patient's surgical history and updated it with pertinent information if needed.  Past Surgical History:   Procedure Laterality Date     CATARACT IOL, RT/LT Right      ENT SURGERY      glaucoma surgery     REPLACE VALVE AORTIC  4/10/2014    Procedure: REPLACE VALVE AORTIC;  Median sternotomy, Replace Aortic Valve on pump oxygenation. ;  Surgeon: Ajit, Wesley Robison MD;  Location: [facility] OR       Prior to Admission Medications   Prior to Admission Medications   Prescriptions Last Dose Informant Patient Reported? Taking?   acetaminophen (TYLENOL) 500 MG tablet More than a month at Unknown time  No No   Sig: Take 2 tablets (1,000 mg) by mouth every 6 hours as needed for mild pain   atenolol (TENORMIN) 50 MG tablet 5/18/2019 at 1800  No Yes   Sig: Take 1 tablet (50 mg) by mouth 2 times daily   calcium carbonate-vitamin D 500-400 MG-UNIT TABS tablt 5/18/2019 at 1800  No Yes   Sig: Take 1 tablet by mouth 3 times daily   diltiazem ER COATED BEADS (CARDIZEM CD/CARTIA XT) 180 MG 24 hr capsule 5/18/2019 at 1800  No Yes   Sig: Take 1 capsule (180 mg) by mouth 2 times daily   multivitamins with minerals (CERTAVITE) LIQD 5/18/2019 at 1800  No Yes   Sig: Take 15 mLs by mouth daily   torsemide (DEMADEX) 20 MG tablet More than a month at Unknown time  No No   Sig: TAKE ONE TABLET BY MOUTH ONCE DAILY AS NEEDED   warfarin (COUMADIN) 2.5 MG tablet 5/18/2019 at 1800  No Yes   Sig: TAKE TWO TABLETS BY MOUTH ON MONDAY, WEDNESDAY, FRIDAY AND ONE TABLET BY MOUTH ALL OTHER DAYS, OR AS DIRECTED BY THE COUMADIN CLINIC      Facility-Administered Medications: None     Allergies   Allergies   Allergen Reactions     Xarelto [Rivaroxaban] Diarrhea     Ace Inhibitors Cough       Social History   I have reviewed this patient's social  history and updated it with pertinent information if needed. Khalida Rouse  reports that she has never smoked. She has never used smokeless tobacco. She reports that she does not drink alcohol or use drugs.    Family History   I have reviewed this patient's family history and updated it with pertinent information if needed.   Family History   Problem Relation Age of Onset     Dementia Brother      Alzheimer Disease Sister        Review of Systems   The 10 point Review of Systems is negative other than noted in the HPI or here.     Physical Exam   Temp: 98.9  F (37.2  C) Temp src: Oral BP: 125/73 Pulse: 100 Heart Rate: 96 Resp: 30 SpO2: 95 % O2 Device: Nasal cannula Oxygen Delivery: 2 LPM  Vital Signs with Ranges  Temp:  [98.9  F (37.2  C)] 98.9  F (37.2  C)  Pulse:  [] 100  Heart Rate:  [] 96  Resp:  [20-34] 30  BP: (109-128)/(66-85) 125/73  SpO2:  [89 %-95 %] 95 %  133 lbs 0 oz    Constitutional: alert, oriented, moderate distress  Eyes: PERRLA  Respiratory: crackles noted on bilateral lung, no wheezing noted  Cardiovascular: irregular irregular rate of 100s  GI: supple non tender non distended positive bowel sound  Lymph/Hematologic: no lymph node enlargement  Genitourinary: not examined  Skin: no rash or bruise  Musculoskeletal: no leg edema  Neurologic: alert, oriented. Normal speech, no abnormal movement  Psychiatric: normal affect     Data     Data reviewed today:    Recent Results (from the past 168 hour(s))   CBC with platelets differential    Collection Time: 05/19/19 10:36 AM   Result Value Ref Range    WBC 10.6 4.0 - 11.0 10e9/L    RBC Count 4.57 3.8 - 5.2 10e12/L    Hemoglobin 13.5 11.7 - 15.7 g/dL    Hematocrit 40.6 35.0 - 47.0 %    MCV 89 78 - 100 fl    MCH 29.5 26.5 - 33.0 pg    MCHC 33.3 31.5 - 36.5 g/dL    RDW 15.0 10.0 - 15.0 %    Platelet Count 127 (L) 150 - 450 10e9/L    Diff Method Automated Method     % Neutrophils 87.2 %    % Lymphocytes 3.0 %    % Monocytes 9.1 %    % Eosinophils  0.0 %    % Basophils 0.3 %    % Immature Granulocytes 0.4 %    Nucleated RBCs 0 0 /100    Absolute Neutrophil 9.2 (H) 1.6 - 8.3 10e9/L    Absolute Lymphocytes 0.3 (L) 0.8 - 5.3 10e9/L    Absolute Monocytes 1.0 0.0 - 1.3 10e9/L    Absolute Basophils 0.0 0.0 - 0.2 10e9/L    Abs Immature Granulocytes 0.0 0 - 0.4 10e9/L    Absolute Nucleated RBC 0.0    Comprehensive metabolic panel    Collection Time: 05/19/19 10:36 AM   Result Value Ref Range    Sodium 141 133 - 144 mmol/L    Potassium 3.5 3.4 - 5.3 mmol/L    Chloride 106 94 - 109 mmol/L    Carbon Dioxide 27 20 - 32 mmol/L    Anion Gap 8 3 - 14 mmol/L    Glucose 128 (H) 70 - 99 mg/dL    Urea Nitrogen 15 7 - 30 mg/dL    Creatinine 0.51 (L) 0.52 - 1.04 mg/dL    GFR Estimate >90 >60 mL/min/[1.73_m2]    GFR Estimate If Black >90 >60 mL/min/[1.73_m2]    Calcium 8.6 8.5 - 10.1 mg/dL    Bilirubin Total 1.4 (H) 0.2 - 1.3 mg/dL    Albumin 3.6 3.4 - 5.0 g/dL    Protein Total 7.1 6.8 - 8.8 g/dL    Alkaline Phosphatase 72 40 - 150 U/L    ALT 28 0 - 50 U/L    AST 20 0 - 45 U/L   Troponin I    Collection Time: 05/19/19 10:36 AM   Result Value Ref Range    Troponin I ES <0.015 0.000 - 0.045 ug/L   Lactic acid whole blood    Collection Time: 05/19/19 10:36 AM   Result Value Ref Range    Lactic Acid 1.0 0.7 - 2.0 mmol/L   Nt probnp inpatient (BNP)    Collection Time: 05/19/19 10:36 AM   Result Value Ref Range    N-Terminal Pro BNP Inpatient 1,167 0 - 1,800 pg/mL   INR    Collection Time: 05/19/19 10:36 AM   Result Value Ref Range    INR 3.04 (H) 0.86 - 1.14      Recent Results (from the past 24 hour(s))   XR Chest 2 Views    Narrative    CHEST TWO VIEWS  5/19/2019 11:12 AM     HISTORY: Cough. Sepsis.    COMPARISON: Chest x-ray from 3/5/2016.      Impression    IMPRESSION: New area of infiltrate in the right lung base anteriorly  suspicious for pneumonia involving the right middle lobe. This is new  since the prior study. There is a small right pleural effusion as  well.    Previous  sternotomy and valve replacement. Heart size is upper normal  to mildly enlarged. Pulmonary vasculature is not distended.

## 2019-05-19 NOTE — LETTER
Transition Communication Hand-off for Care Transitions to Next Level of Care Provider    Name: Khalida Rouse  : 1940  MRN #: [id number]  Primary Care Provider: Mazin Larsen  Primary Care MD Name: Suzie  Primary Clinic: [address] [place] [address] DR LAISHA RAOCH [id number]  Primary Care Clinic Name: [facility]  Reason for Hospitalization:  Status post aortic valve replacement [Z95.2]  Chronic atrial fibrillation with RVR (H) [I48.2]  Community acquired pneumonia of right lower lobe of lung (H) [J18.1]  Admit Date/Time: 2019  9:56 AM  Discharge Date: 19  Payor Source: Payor: MEDICARE / Plan: MEDICARE / Product Type: Medicare /     Readmission Assessment Measure (SHRADDHA) Risk Score/category: Average         Reason for Communication Hand-off Referral: Admission diagnoses: PN    Discharge Plan: Home - No Needs     Concern for non-adherence with plan of care:   Y/N : No    Discharge Needs Assessment:  Needs      Most Recent Value   Anticipated Changes Related to Illness  none   # of Referrals Placed by [facility]  Internal Clinic Care Coordination        Follow-up plan:    Future Appointments   Date Time Provider Department Center   2019 11:30 AM [facility] ANTI COAG [facility] MEDIC   2019  2:00 PM Mazin Larsen DO [facility] MEDIC   2019  9:45 AM  ANTI COAG [facility] Laisha Me        [facility] Recommendations:  Patient admitted with Pneumonia.  She will discharge home.  No identified discharge needs at this time.    SALVADOR Chopra  [facility] [phone]/ [facility] [phone]  AVS/Discharge Summary is the source of truth; this is a helpful guide for improved communication of patient story

## 2019-05-20 PROBLEM — D69.6 THROMBOCYTOPENIA (H): Status: ACTIVE | Noted: 2019-05-20

## 2019-05-20 LAB
ANION GAP SERPL CALCULATED.3IONS-SCNC: 5 MMOL/L (ref 3–14)
BACTERIA SPEC CULT: NORMAL
BUN SERPL-MCNC: 10 MG/DL (ref 7–30)
CALCIUM SERPL-MCNC: 7.9 MG/DL (ref 8.5–10.1)
CHLORIDE SERPL-SCNC: 110 MMOL/L (ref 94–109)
CO2 SERPL-SCNC: 26 MMOL/L (ref 20–32)
CREAT SERPL-MCNC: 0.44 MG/DL (ref 0.52–1.04)
ERYTHROCYTE [DISTWIDTH] IN BLOOD BY AUTOMATED COUNT: 15 % (ref 10–15)
GFR SERPL CREATININE-BSD FRML MDRD: >90 ML/MIN/{1.73_M2}
GLUCOSE SERPL-MCNC: 90 MG/DL (ref 70–99)
HCT VFR BLD AUTO: 37.3 % (ref 35–47)
HGB BLD-MCNC: 12.3 G/DL (ref 11.7–15.7)
INR PPP: 2.7 (ref 0.86–1.14)
MCH RBC QN AUTO: 29.6 PG (ref 26.5–33)
MCHC RBC AUTO-ENTMCNC: 33 G/DL (ref 31.5–36.5)
MCV RBC AUTO: 90 FL (ref 78–100)
PLATELET # BLD AUTO: 113 10E9/L (ref 150–450)
POTASSIUM SERPL-SCNC: 3.8 MMOL/L (ref 3.4–5.3)
RBC # BLD AUTO: 4.16 10E12/L (ref 3.8–5.2)
SODIUM SERPL-SCNC: 141 MMOL/L (ref 133–144)
SPECIMEN SOURCE: NORMAL
WBC # BLD AUTO: 7.1 10E9/L (ref 4–11)

## 2019-05-20 PROCEDURE — 25800030 ZZH RX IP 258 OP 636: Performed by: HOSPITALIST

## 2019-05-20 PROCEDURE — A9270 NON-COVERED ITEM OR SERVICE: HCPCS | Performed by: HOSPITALIST

## 2019-05-20 PROCEDURE — 85027 COMPLETE CBC AUTOMATED: CPT | Performed by: HOSPITALIST

## 2019-05-20 PROCEDURE — 25000128 H RX IP 250 OP 636: Performed by: HOSPITALIST

## 2019-05-20 PROCEDURE — 99232 SBSQ HOSP IP/OBS MODERATE 35: CPT | Performed by: FAMILY MEDICINE

## 2019-05-20 PROCEDURE — 25000132 ZZH RX MED GY IP 250 OP 250 PS 637: Performed by: HOSPITALIST

## 2019-05-20 PROCEDURE — 80048 BASIC METABOLIC PNL TOTAL CA: CPT | Performed by: HOSPITALIST

## 2019-05-20 PROCEDURE — 12000000 ZZH R&B MED SURG/OB

## 2019-05-20 PROCEDURE — 85610 PROTHROMBIN TIME: CPT | Performed by: HOSPITALIST

## 2019-05-20 PROCEDURE — 36415 COLL VENOUS BLD VENIPUNCTURE: CPT | Performed by: HOSPITALIST

## 2019-05-20 RX ORDER — AZITHROMYCIN 250 MG/1
250 TABLET, FILM COATED ORAL DAILY
Status: DISCONTINUED | OUTPATIENT
Start: 2019-05-21 | End: 2019-05-21 | Stop reason: HOSPADM

## 2019-05-20 RX ORDER — WARFARIN SODIUM 2.5 MG/1
2.5 TABLET ORAL
Status: COMPLETED | OUTPATIENT
Start: 2019-05-20 | End: 2019-05-20

## 2019-05-20 RX ORDER — CEFDINIR 300 MG/1
300 CAPSULE ORAL EVERY 12 HOURS SCHEDULED
Status: DISCONTINUED | OUTPATIENT
Start: 2019-05-21 | End: 2019-05-21 | Stop reason: HOSPADM

## 2019-05-20 RX ADMIN — DILTIAZEM HYDROCHLORIDE 180 MG: 180 CAPSULE, EXTENDED RELEASE ORAL at 20:39

## 2019-05-20 RX ADMIN — SODIUM CHLORIDE: 9 INJECTION, SOLUTION INTRAVENOUS at 04:19

## 2019-05-20 RX ADMIN — DILTIAZEM HYDROCHLORIDE 180 MG: 180 CAPSULE, EXTENDED RELEASE ORAL at 07:44

## 2019-05-20 RX ADMIN — CEFTRIAXONE SODIUM 1 G: 1 INJECTION, POWDER, FOR SOLUTION INTRAMUSCULAR; INTRAVENOUS at 13:32

## 2019-05-20 RX ADMIN — AZITHROMYCIN MONOHYDRATE 500 MG: 500 INJECTION, POWDER, LYOPHILIZED, FOR SOLUTION INTRAVENOUS at 10:58

## 2019-05-20 RX ADMIN — WARFARIN SODIUM 2.5 MG: 2.5 TABLET ORAL at 17:08

## 2019-05-20 RX ADMIN — ATENOLOL 50 MG: 25 TABLET ORAL at 20:39

## 2019-05-20 RX ADMIN — ATENOLOL 50 MG: 25 TABLET ORAL at 07:44

## 2019-05-20 ASSESSMENT — ACTIVITIES OF DAILY LIVING (ADL)
ADLS_ACUITY_SCORE: 11

## 2019-05-20 NOTE — PROVIDER NOTIFICATION
DATE:  5/20/2019   TIME OF RECEIPT FROM LAB:  1034  LAB TEST:  procalcitonin  LAB VALUE:  63.48  RESULTS GIVEN WITH READ-BACK TO (PROVIDER):  Dr. LUIS Rodríguez  TIME LAB VALUE REPORTED TO PROVIDER:   1031

## 2019-05-20 NOTE — PROGRESS NOTES
S: shift report    B: PNA    A: Tmax 99.6, room air, cough and productive sputum frequently, up to walk in halls and did not have dyspnea, tolerated well without O2, up to void prn, eating well, IVF running    R: Monitor for pain and fever and cough

## 2019-05-20 NOTE — PHARMACY-ANTICOAGULATION SERVICE
Clinical Pharmacy - Warfarin Dosing Consult     Pharmacy has been consulted to manage this patient s warfarin therapy.  Indication: Atrial Fibrillation;Mechanical Aortic Valve Replacement  Therapy Goal: INR 2-3  Warfarin Prior to Admission: Yes  Warfarin PTA Regimen: 5 mg on Mon, Wed, Fri and 2.5 mg all other days of the week  Recent documented change in oral intake/nutrition: Unknown    INR   Date Value Ref Range Status   05/20/2019 2.70 (H) 0.86 - 1.14 Final   05/19/2019 3.04 (H) 0.86 - 1.14 Final       Patient is taking zithromax, so recommend the lower warfarin dose of 2.5 mg today.  Pharmacy will monitor Khalida Rouse daily and order warfarin doses to achieve specified goal.      Please contact pharmacy as soon as possible if the warfarin needs to be held for a procedure or if the warfarin goals change.

## 2019-05-20 NOTE — PROGRESS NOTES
[facility]    Medicine Progress Note - Hospitalist Service       Date of Admission:  5/19/2019  Assessment & Plan   Principal Problem:    Pneumonia    Assessment: Patient presented with community-acquired pneumonia, hypoxia, and weakness.  Patient has improved greatly following initiation of Rocephin and azithromycin and has been weaned to room air this morning at rest.  Afebrile since midnight with energy and appetite significantly improved    Plan:  We will transition patient to oral Omnicef and azithromycin, saline lock IV fluids and continue to monitor for ongoing clinical improvement.  Possible discharge in the next 1 to 2 days as patient continues to improve.    Active Problems:    Thrombocytopenia    Assessment: Present at baseline and normally mild in the 140 range, with slight worsening during this acute illness, likely secondary to pneumonia.  Platelets today were 113    Plan:    We will continue with treatment of acute infection as above, avoid Lovenox and recheck platelets tomorrow.      S/P AVR (aortic valve replacement)    Assessment: On Coumadin With INR therapeutic and valve functioning well    Plan: No acute intervention needed, pharmacy to assist in ongoing Coumadin management      Atrial fibrillation with RVR (H)    Assessment: Was present initially with heart rate in the 120s, however as infection has been appropriately treated the heart rate has now decreased into the 60s to 70s range with home regimen    Plan: We will continue with home regimen of metoprolol and diltiazem and monitor for ongoing rate control, pharmacy to assist in Coumadin management.      Long term current use of anticoagulant therapy    Assessment: INR continues to be therapeutic    Plan: Pharmacy to assist in ongoing management      Diet: Combination Diet Low Saturated Fat Na <2400mg Diet    DVT Prophylaxis: Warfarin  Dupree Catheter: not present  Code Status: Full Code      Disposition Plan    Expected discharge: 1-2 days, recommended to prior living arrangement once adequate pain management/ tolerating PO medications, antibiotic plan established and oxygen needs resolve and patient is afebrile for over 24 hours.  Entered: Mavis Rodríguez MD 05/20/2019, 2:12 PM       The patient's care was discussed with the Bedside Nurse and Patient.    Mavis Rodríguez MD  Hospitalist Service  [facility]    ______________________________________________________________________    Interval History   Patient is starting to improve with increased appetite and energy as well as overall strength, breathing is much better than yesterday.  Patient did have a temperature of 100.2 last evening but has been afebrile so far today and currently is doing well on room air at rest.  Tolerating medications without significant side effects.  No new concerns    Data reviewed today: I reviewed all medications, new labs and imaging results over the last 24 hours.    Physical Exam   Vital Signs: Temp: 99.5  F (37.5  C) Temp src: Oral BP: 103/47 Pulse: 97 Heart Rate: 67 Resp: 20 SpO2: 93 % O2 Device: None (Room air) Oxygen Delivery: 2 LPM  Weight: 132 lbs 15 oz  Constitutional: awake, alert, cooperative, no apparent distress, and appears stated age  Respiratory: No increased work of breathing, good air exchange, mild crackles in right more than left lung base, no wheezing  Cardiovascular: irregularly irregular rhythm with HR currently in the 70s  GI: bowel sounds present, abdomen soft and non-tender  Skin: normal skin color, texture, turgor and no redness, warmth, or swelling  Musculoskeletal: no lower extremity pitting edema present  Neurologic: Awake, alert, oriented to name, place and time.      Data   Recent Labs   Lab 05/20/19  0518 05/19/19  1036   WBC 7.1 10.6   HGB 12.3 13.5   MCV 90 89   * 127*   INR 2.70* 3.04*    141   POTASSIUM 3.8 3.5   CHLORIDE 110* 106   CO2  26 27   BUN 10 15   CR 0.44* 0.51*   ANIONGAP 5 8   JOSEFINA 7.9* 8.6   GLC 90 128*   ALBUMIN  --  3.6   PROTTOTAL  --  7.1   BILITOTAL  --  1.4*   ALKPHOS  --  72   ALT  --  28   AST  --  20   TROPI  --  <0.015

## 2019-05-20 NOTE — PLAN OF CARE
Max temp overnight of 100.2. It was not treated and later check was 98.2. She has required 1L of oxygen to maintain sats. Pt was 87-88% on room air. Crackles noted on right side. Pt denies feeling short of breath at rest or with activity. No complaints of pain.

## 2019-05-20 NOTE — CONSULTS
CARE TRANSITION SOCIAL WORK INITIAL ASSESSMENT:      Met with: Patient.    DATA  Principal Problem:    Pneumonia  Active Problems:    S/P AVR (aortic valve replacement)    Atrial fibrillation with RVR (H)    Long term current use of anticoagulant therapy       Primary Care Clinic [facility]: [facility]  Primary Care MD Name: Suzie MAK  Cognitive Status: awake, alert and oriented.      Description of Support System: Involved, Supportive   Who is your support system?: Children       Insurance Concerns: No Insurance issues identified     This writer met with pt introduced self and role. Discussed discharge planning and medicare guidelines in regards to home care and SNF benefits.  Patient lives alone in an apartment.  She is independent at home, cooks, drives, cleans.  She does not receive any in-home services.  Patient states her daughter, Leah, lives down the road.  Patient states she does not have any discharge needs/concerns at this time.    PLAN    Home    SALVADOR Chopra  [facility] [phone]/ [facility] [phone]

## 2019-05-20 NOTE — PROGRESS NOTES
SPIRITUAL HEALTH SERVICES  SPIRITUAL ASSESSMENT Progress Note  [facility]      PRIMARY FOCUS:     Support for coping - Pt request    ILLNESS CIRCUMSTANCES:     Reviewed documentation.     Context of Serious Illness/Symptoms - Pneumonia    Resources for Support - Children, members of [employer].  Pt mentioned her son was a non-Baptist  at [employer] and said her grandson (his son) may be joining him as a .     DISTRESS:     Emotional/Spiritual/Existential Distress - Pt talked about the challenges of no longer being able to drive.    [employer]ist Distress - Not Applicable    Social/Cultural/Economic Distress - Not Discussed    SPIRITUAL/Sabianism COPING:     Restorationist/Julia - Non-Nondenominational Baptism    Spiritual Practice - Pentecostal, prayer.   provided a prayer and sang a verse of Amazing Joleen.    GOALS OF CARE:    Goals of Care - Address pneumonia    Meaning/Sense-Making - Pt's julia is central to her meaning-making.    PLAN:  is available for pt/family needs.    Armando Escalante M.Div., [facility]  Staff   Office tel: [phone]

## 2019-05-21 VITALS
HEIGHT: 67 IN | BODY MASS INDEX: 20.87 KG/M2 | RESPIRATION RATE: 26 BRPM | SYSTOLIC BLOOD PRESSURE: 103 MMHG | HEART RATE: 80 BPM | TEMPERATURE: 98.7 F | OXYGEN SATURATION: 88 % | WEIGHT: 132.94 LBS | DIASTOLIC BLOOD PRESSURE: 62 MMHG

## 2019-05-21 LAB
ANION GAP SERPL CALCULATED.3IONS-SCNC: 4 MMOL/L (ref 3–14)
BUN SERPL-MCNC: 11 MG/DL (ref 7–30)
CALCIUM SERPL-MCNC: 8.2 MG/DL (ref 8.5–10.1)
CHLORIDE SERPL-SCNC: 108 MMOL/L (ref 94–109)
CO2 SERPL-SCNC: 27 MMOL/L (ref 20–32)
CREAT SERPL-MCNC: 0.53 MG/DL (ref 0.52–1.04)
GFR SERPL CREATININE-BSD FRML MDRD: 90 ML/MIN/{1.73_M2}
GLUCOSE SERPL-MCNC: 96 MG/DL (ref 70–99)
INR PPP: 2 (ref 0.86–1.14)
PLATELET # BLD AUTO: 120 10E9/L (ref 150–450)
POTASSIUM SERPL-SCNC: 3.9 MMOL/L (ref 3.4–5.3)
SODIUM SERPL-SCNC: 139 MMOL/L (ref 133–144)
WBC # BLD AUTO: 5.9 10E9/L (ref 4–11)

## 2019-05-21 PROCEDURE — 25000132 ZZH RX MED GY IP 250 OP 250 PS 637: Performed by: FAMILY MEDICINE

## 2019-05-21 PROCEDURE — 80048 BASIC METABOLIC PNL TOTAL CA: CPT | Performed by: HOSPITALIST

## 2019-05-21 PROCEDURE — 85049 AUTOMATED PLATELET COUNT: CPT | Performed by: HOSPITALIST

## 2019-05-21 PROCEDURE — 85610 PROTHROMBIN TIME: CPT | Performed by: HOSPITALIST

## 2019-05-21 PROCEDURE — 25000132 ZZH RX MED GY IP 250 OP 250 PS 637: Performed by: HOSPITALIST

## 2019-05-21 PROCEDURE — 99239 HOSP IP/OBS DSCHRG MGMT >30: CPT | Performed by: FAMILY MEDICINE

## 2019-05-21 PROCEDURE — 85048 AUTOMATED LEUKOCYTE COUNT: CPT | Performed by: HOSPITALIST

## 2019-05-21 PROCEDURE — 36415 COLL VENOUS BLD VENIPUNCTURE: CPT | Performed by: HOSPITALIST

## 2019-05-21 PROCEDURE — A9270 NON-COVERED ITEM OR SERVICE: HCPCS | Performed by: HOSPITALIST

## 2019-05-21 PROCEDURE — A9270 NON-COVERED ITEM OR SERVICE: HCPCS | Performed by: FAMILY MEDICINE

## 2019-05-21 RX ORDER — CEFDINIR 300 MG/1
300 CAPSULE ORAL EVERY 12 HOURS
Qty: 16 CAPSULE | Refills: 0 | Status: SHIPPED | OUTPATIENT
Start: 2019-05-21 | End: 2019-06-10

## 2019-05-21 RX ORDER — AZITHROMYCIN 250 MG/1
250 TABLET, FILM COATED ORAL DAILY
Qty: 3 TABLET | Refills: 0 | Status: SHIPPED | OUTPATIENT
Start: 2019-05-22 | End: 2019-05-29

## 2019-05-21 RX ORDER — WARFARIN SODIUM 5 MG/1
5 TABLET ORAL
Status: DISCONTINUED | OUTPATIENT
Start: 2019-05-21 | End: 2019-05-21 | Stop reason: HOSPADM

## 2019-05-21 RX ADMIN — CEFDINIR 300 MG: 300 CAPSULE ORAL at 09:34

## 2019-05-21 RX ADMIN — AZITHROMYCIN 250 MG: 250 TABLET, FILM COATED ORAL at 09:34

## 2019-05-21 RX ADMIN — DILTIAZEM HYDROCHLORIDE 180 MG: 180 CAPSULE, EXTENDED RELEASE ORAL at 09:34

## 2019-05-21 RX ADMIN — ATENOLOL 50 MG: 25 TABLET ORAL at 09:34

## 2019-05-21 ASSESSMENT — ACTIVITIES OF DAILY LIVING (ADL)
ADLS_ACUITY_SCORE: 11

## 2019-05-21 NOTE — DISCHARGE SUMMARY
[facility]  Hospitalist Discharge Summary       Date of Admission:  5/19/2019  Date of Discharge:  5/21/2019  Discharging Provider: Mavis Rodríguez MD  Discharge Diagnoses   Principal Problem:    Pneumonia  Active Problems:    S/P AVR (aortic valve replacement)    Atrial fibrillation with RVR (H)    Long term current use of anticoagulant therapy    Thrombocytopenia (H)    Follow-ups Needed After Discharge   Follow-up Appointments     Follow-up and recommended labs and tests       Follow up with primary care provider, Mazin Larsen, or one   of his partners within 7 days for hospital follow- up.   Follow up with Coumadin nurse later this week as scheduled.           Discharge Disposition   Discharged to home  Condition at discharge: Stable    Hospital Course   Principal Problem:    Pneumonia    Assessment: Patient presented with community-acquired pneumonia, hypoxia, and weakness.  Patient has improved greatly following initiation of Rocephin and azithromycin and has been weaned to room air this morning at rest and with activity, has transitioned to PO Omnicef and azithromycin with ongoing improvement and no side effects.      Plan: Patient will discharge home with ongoing Omnicef for a total of 10-day course completion and azithromycin for total of 5-day course completion.  She will have close follow-up with her primary care provider within the week to ensure ongoing improvement following discharge.    Active Problems:    Thrombocytopenia    Assessment: Present at baseline and normally mild in the 140 range, with slight worsening during this acute illness, likely secondary to pneumonia.  Platelet low was 113 and is trending upward at time of discharge    Plan:    Would recommend  recheck at hospitalization follow-up to ensure return to previous baseline      S/P AVR (aortic valve replacement)    Assessment: On Coumadin With INR therapeutic and valve functioning  well    Plan: No acute intervention needed, pharmacy ongoing is recommending INR clinic later this week to ensure appropriate INR given antibiotic administration      Atrial fibrillation with RVR (H)    Assessment: Was present initially with heart rate in the 120s, however as infection has been appropriately treated the heart rate has now decreased into the 60s to 70s range with home regimen    Plan: We will continue with home regimen of metoprolol and diltiazem at time of discharge, Coumadin as per home regimen with close clinical follow-up with INR nurse as recommended by pharmacy.        Long term current use of anticoagulant therapy    Assessment: INR continues to be therapeutic    Plan:   Discharge without change to home regimen but patient will have close follow-up with Coumadin clinic nurse later this week for reassessment.    Consultations This Hospital Stay   PHARMACY TO DOSE WARFARIN  CARE TRANSITION RN/SW IP CONSULT    Code Status   Full Code    Time Spent on this Encounter   I, Mavis Rodríguez, personally saw the patient today and spent greater than 30 minutes discharging this patient.       Mavis Rodríguez MD  [facility]  ______________________________________________________________________    Physical Exam   Vital Signs: Temp: 98.7  F (37.1  C) Temp src: Oral BP: 103/62 Pulse: 80   Resp: 26 SpO2: (!) 88 %(Pt. went from 87 - 89% walking in the halls on room air.) O2 Device: None (Room air) Oxygen Delivery: 2 LPM  Weight: 132 lbs 15 oz  Constitutional: awake, alert, cooperative, no apparent distress, and appears stated age  Respiratory: No increased work of breathing, good air exchange, clear to auscultation bilaterally, no crackles or wheezing  Cardiovascular: irregularly irregular rhythm with HR in the 70s currently  GI: Bowel sounds present, abdomen soft and nontender  Skin: normal skin color, texture, turgor  Musculoskeletal: no lower extremity  pitting edema present  Neurologic: Awake, alert, oriented to name, place and time.         Primary Care Physician   Mazin Larsen    Discharge Orders      Reason for your hospital stay    Pneumonia which has improved greatly with the antibiotics given to you during this hospital stay.  Please continue on the two antibiotics (cefnidir and azithromycin) as you leave - your first dose of cefnidir will be this evening (as this medication is twice a day) and first dose of azithromycin will be tomorrow morning (as this medication is once a day).  Enjoy going home!     Follow-up and recommended labs and tests     Follow up with primary care provider, Mazin Larsen, or one of his partners within 7 days for hospital follow- up.   Follow up with Coumadin nurse later this week as scheduled.     Activity    Your activity upon discharge: activity as tolerated - remember to take breaks when you need to and that walking more slowly than normal will allow you to do more at one time than if you try to walk at normal speed.     Diet    Follow this diet upon discharge: Regular     Discharge Medications   Current Discharge Medication List      START taking these medications    Details   azithromycin (ZITHROMAX) 250 MG tablet Take 1 tablet (250 mg) by mouth daily for 3 days  Qty: 3 tablet, Refills: 0    Associated Diagnoses: Community acquired pneumonia of right lower lobe of lung (H)      cefdinir (OMNICEF) 300 MG capsule Take 1 capsule (300 mg) by mouth every 12 hours for 8 days  Qty: 16 capsule, Refills: 0    Associated Diagnoses: Community acquired pneumonia of right lower lobe of lung (H)         CONTINUE these medications which have NOT CHANGED    Details   atenolol (TENORMIN) 50 MG tablet Take 1 tablet (50 mg) by mouth 2 times daily  Qty: 180 tablet, Refills: 0    Associated Diagnoses: Atrial fibrillation with RVR (H)      calcium carbonate-vitamin D 500-400 MG-UNIT TABS tablt Take 1 tablet by mouth 3  times daily    Associated Diagnoses: Medicare annual wellness visit, subsequent; Osteoporosis      diltiazem ER COATED BEADS (CARDIZEM CD/CARTIA XT) 180 MG 24 hr capsule Take 1 capsule (180 mg) by mouth 2 times daily  Qty: 180 capsule, Refills: 3    Associated Diagnoses: Chronic atrial fibrillation (H)      multivitamins with minerals (CERTAVITE) LIQD Take 15 mLs by mouth daily    Associated Diagnoses: Medicare annual wellness visit, subsequent      warfarin (COUMADIN) 2.5 MG tablet TAKE TWO TABLETS BY MOUTH ON MONDAY, WEDNESDAY, FRIDAY AND ONE TABLET BY MOUTH ALL OTHER DAYS, OR AS DIRECTED BY THE COUMADIN CLINIC  Qty: 120 tablet, Refills: 0    Associated Diagnoses: Atrial fibrillation with RVR (H)      acetaminophen (TYLENOL) 500 MG tablet Take 2 tablets (1,000 mg) by mouth every 6 hours as needed for mild pain  Qty: 180 tablet, Refills: 3    Associated Diagnoses: Primary osteoarthritis of both knees      torsemide (DEMADEX) 20 MG tablet TAKE ONE TABLET BY MOUTH ONCE DAILY AS NEEDED  Qty: 90 tablet, Refills: 3    Associated Diagnoses: Acute pulmonary edema (H)           Allergies   Allergies   Allergen Reactions     Xarelto [Rivaroxaban] Diarrhea     Ace Inhibitors Cough

## 2019-05-21 NOTE — PHARMACY-ANTICOAGULATION SERVICE
Clinical Pharmacy - Warfarin Dosing Consult     Pharmacy has been consulted to manage this patient s warfarin therapy.  Indication: Atrial Fibrillation;Mechanical Aortic Valve Replacement  Therapy Goal: INR 2-3  Warfarin Prior to Admission: Yes  Warfarin PTA Regimen: 5 mg on Mon, Wed, Fri and 2.5 mg all other days of the week  Recent documented change in oral intake/nutrition: Unknown    INR   Date Value Ref Range Status   05/21/2019 2.00 (H) 0.86 - 1.14 Final   05/20/2019 2.70 (H) 0.86 - 1.14 Final       Recommend warfarin 5 mg today. Large drop in INR from yesterday with no clear reason. Patient has been very stable on 25 mg/week since 2017. Continuing home dosing for now. Pharmacy will monitor Khalida Rouse daily and order warfarin doses to achieve specified goal.      Please contact pharmacy as soon as possible if the warfarin needs to be held for a procedure or if the warfarin goals change.

## 2019-05-21 NOTE — PROGRESS NOTES
S-(situation): Patient discharged to home via car with daughter    B-(background): Patient admitted with pneumonia.    A-(assessment): Patient is on room air in 90's for oxygen saturation. Breath sounds are clear in all fields.     R-(recommendations): Discharge instructions reviewed with patient and daughter. Listed belongings gathered and returned to patient. yes         Discharge Nursing Criteria:     Care Plan and Patient education resolved: Yes    New Medications- pt has been educated about purpose and side effects: Yes    Vaccines  Influenza status verified at discharge:  Yes      MISC  Prescriptions if needed, hard copies sent with patient  NA  Home and hospital aquired medications returned to patient: NA  Medication Bin checked and emptied on discharge Yes  Patient reports post-discharge pain management plan is effective: Yes

## 2019-05-21 NOTE — PLAN OF CARE
S-(situation): End of shift report.    B-(background): Pneumonia    A-(assessment): A&O x4, afebrile, VSS.  Denies pain/dyspnea on exertion.  Ambulating in halls.  Intake and output adequate.  Lungs with crackles in RLL, otherwise clear.  Antibiotics changed to PO.    R-(recommendations): Continue to monitor.

## 2019-05-21 NOTE — PLAN OF CARE
Pt alert/orientated, ambulated in hallway x 2, denies pain, up independent/SBA, SL, VSS, afebrile, on 2 liters nc at night, LS clear,azithromycin/omnicef PO daily, planning to discontinue home today.

## 2019-05-22 ENCOUNTER — PATIENT OUTREACH (OUTPATIENT)
Dept: CARE COORDINATION | Facility: CLINIC | Age: 79
End: 2019-05-22

## 2019-05-23 ENCOUNTER — APPOINTMENT (OUTPATIENT)
Dept: GENERAL RADIOLOGY | Facility: CLINIC | Age: 79
End: 2019-05-23
Attending: FAMILY MEDICINE
Payer: MEDICARE

## 2019-05-23 ENCOUNTER — HOSPITAL ENCOUNTER (OUTPATIENT)
Facility: CLINIC | Age: 79
Setting detail: OBSERVATION
Discharge: HOME | End: 2019-05-23
Attending: FAMILY MEDICINE | Admitting: FAMILY MEDICINE
Payer: MEDICARE

## 2019-05-23 VITALS
WEIGHT: 136 LBS | HEIGHT: 67 IN | RESPIRATION RATE: 16 BRPM | OXYGEN SATURATION: 94 % | TEMPERATURE: 97.8 F | DIASTOLIC BLOOD PRESSURE: 60 MMHG | BODY MASS INDEX: 21.35 KG/M2 | HEART RATE: 63 BPM | SYSTOLIC BLOOD PRESSURE: 105 MMHG

## 2019-05-23 DIAGNOSIS — J81.0 ACUTE PULMONARY EDEMA (H): ICD-10-CM

## 2019-05-23 DIAGNOSIS — J18.9 PNEUMONIA OF RIGHT LUNG DUE TO INFECTIOUS ORGANISM, UNSPECIFIED PART OF LUNG: ICD-10-CM

## 2019-05-23 DIAGNOSIS — R09.02 HYPOXIA: ICD-10-CM

## 2019-05-23 DIAGNOSIS — I48.20 CHRONIC ATRIAL FIBRILLATION WITH RVR (H): ICD-10-CM

## 2019-05-23 LAB
ANION GAP SERPL CALCULATED.3IONS-SCNC: 7 MMOL/L (ref 3–14)
BASOPHILS # BLD AUTO: 0 10E9/L (ref 0–0.2)
BASOPHILS NFR BLD AUTO: 0.6 %
BUN SERPL-MCNC: 14 MG/DL (ref 7–30)
CALCIUM SERPL-MCNC: 9 MG/DL (ref 8.5–10.1)
CHLORIDE SERPL-SCNC: 107 MMOL/L (ref 94–109)
CO2 SERPL-SCNC: 29 MMOL/L (ref 20–32)
CREAT SERPL-MCNC: 0.46 MG/DL (ref 0.52–1.04)
DIFFERENTIAL METHOD BLD: NORMAL
EOSINOPHIL NFR BLD AUTO: 3.2 %
ERYTHROCYTE [DISTWIDTH] IN BLOOD BY AUTOMATED COUNT: 14.5 % (ref 10–15)
GFR SERPL CREATININE-BSD FRML MDRD: >90 ML/MIN/{1.73_M2}
GLUCOSE SERPL-MCNC: 95 MG/DL (ref 70–99)
HCT VFR BLD AUTO: 41.1 % (ref 35–47)
HGB BLD-MCNC: 13.5 G/DL (ref 11.7–15.7)
IMM GRANULOCYTES # BLD: 0 10E9/L (ref 0–0.4)
IMM GRANULOCYTES NFR BLD: 0.6 %
INR PPP: 1.66 (ref 0.86–1.14)
LACTATE BLD-SCNC: 0.5 MMOL/L (ref 0.7–2)
LACTATE BLD-SCNC: NORMAL MMOL/L (ref 0.7–2)
LYMPHOCYTES # BLD AUTO: 0.9 10E9/L (ref 0.8–5.3)
LYMPHOCYTES NFR BLD AUTO: 17.3 %
MAGNESIUM SERPL-MCNC: 1.8 MG/DL (ref 1.6–2.3)
MCH RBC QN AUTO: 29.2 PG (ref 26.5–33)
MCHC RBC AUTO-ENTMCNC: 32.8 G/DL (ref 31.5–36.5)
MCV RBC AUTO: 89 FL (ref 78–100)
MONOCYTES # BLD AUTO: 0.6 10E9/L (ref 0–1.3)
MONOCYTES NFR BLD AUTO: 11.8 %
NEUTROPHILS # BLD AUTO: 3.3 10E9/L (ref 1.6–8.3)
NEUTROPHILS NFR BLD AUTO: 66.5 %
NRBC # BLD AUTO: 0 10*3/UL
NRBC BLD AUTO-RTO: 0 /100
PLATELET # BLD AUTO: 174 10E9/L (ref 150–450)
POTASSIUM SERPL-SCNC: 3.4 MMOL/L (ref 3.4–5.3)
RBC # BLD AUTO: 4.62 10E12/L (ref 3.8–5.2)
SODIUM SERPL-SCNC: 143 MMOL/L (ref 133–144)
TSH SERPL DL<=0.005 MIU/L-ACNC: 1.7 MU/L (ref 0.4–4)
WBC # BLD AUTO: 5 10E9/L (ref 4–11)

## 2019-05-23 PROCEDURE — A9270 NON-COVERED ITEM OR SERVICE: HCPCS | Performed by: FAMILY MEDICINE

## 2019-05-23 PROCEDURE — 99235 HOSP IP/OBS SAME DATE MOD 70: CPT | Performed by: FAMILY MEDICINE

## 2019-05-23 PROCEDURE — 93005 ELECTROCARDIOGRAM TRACING: CPT

## 2019-05-23 PROCEDURE — 25000132 ZZH RX MED GY IP 250 OP 250 PS 637: Performed by: FAMILY MEDICINE

## 2019-05-23 PROCEDURE — 84443 ASSAY THYROID STIM HORMONE: CPT | Performed by: FAMILY MEDICINE

## 2019-05-23 PROCEDURE — G0378 HOSPITAL OBSERVATION PER HR: HCPCS

## 2019-05-23 PROCEDURE — 99285 EMERGENCY DEPT VISIT HI MDM: CPT | Mod: 25

## 2019-05-23 PROCEDURE — 99285 EMERGENCY DEPT VISIT HI MDM: CPT | Mod: 25 | Performed by: FAMILY MEDICINE

## 2019-05-23 PROCEDURE — 85025 COMPLETE CBC W/AUTO DIFF WBC: CPT | Performed by: FAMILY MEDICINE

## 2019-05-23 PROCEDURE — 83605 ASSAY OF LACTIC ACID: CPT | Performed by: FAMILY MEDICINE

## 2019-05-23 PROCEDURE — 93010 ELECTROCARDIOGRAM REPORT: CPT | Mod: Z6 | Performed by: FAMILY MEDICINE

## 2019-05-23 PROCEDURE — 99207 ZZC CDG-CODE CATEGORY CHANGED: CPT | Performed by: FAMILY MEDICINE

## 2019-05-23 PROCEDURE — 25000128 H RX IP 250 OP 636: Performed by: FAMILY MEDICINE

## 2019-05-23 PROCEDURE — 80048 BASIC METABOLIC PNL TOTAL CA: CPT | Performed by: FAMILY MEDICINE

## 2019-05-23 PROCEDURE — 96374 THER/PROPH/DIAG INJ IV PUSH: CPT

## 2019-05-23 PROCEDURE — 83735 ASSAY OF MAGNESIUM: CPT | Performed by: FAMILY MEDICINE

## 2019-05-23 PROCEDURE — 25000125 ZZHC RX 250: Performed by: FAMILY MEDICINE

## 2019-05-23 PROCEDURE — 25800030 ZZH RX IP 258 OP 636: Performed by: FAMILY MEDICINE

## 2019-05-23 PROCEDURE — 71045 X-RAY EXAM CHEST 1 VIEW: CPT | Mod: TC

## 2019-05-23 PROCEDURE — 85610 PROTHROMBIN TIME: CPT | Performed by: FAMILY MEDICINE

## 2019-05-23 PROCEDURE — 96361 HYDRATE IV INFUSION ADD-ON: CPT

## 2019-05-23 PROCEDURE — 36415 COLL VENOUS BLD VENIPUNCTURE: CPT | Performed by: FAMILY MEDICINE

## 2019-05-23 RX ORDER — NALOXONE HYDROCHLORIDE 0.4 MG/ML
.1-.4 INJECTION, SOLUTION INTRAMUSCULAR; INTRAVENOUS; SUBCUTANEOUS
Status: DISCONTINUED | OUTPATIENT
Start: 2019-05-23 | End: 2019-05-23 | Stop reason: HOSPADM

## 2019-05-23 RX ORDER — LIDOCAINE 40 MG/G
CREAM TOPICAL
Status: DISCONTINUED | OUTPATIENT
Start: 2019-05-23 | End: 2019-05-23 | Stop reason: HOSPADM

## 2019-05-23 RX ORDER — TORSEMIDE 20 MG/1
TABLET ORAL
Qty: 90 TABLET | Refills: 0 | Status: SHIPPED | OUTPATIENT
Start: 2019-05-23 | End: 2019-07-18

## 2019-05-23 RX ORDER — DILTIAZEM HYDROCHLORIDE 180 MG/1
180 CAPSULE, COATED, EXTENDED RELEASE ORAL ONCE
Status: COMPLETED | OUTPATIENT
Start: 2019-05-23 | End: 2019-05-23

## 2019-05-23 RX ORDER — WARFARIN SODIUM 5 MG/1
5 TABLET ORAL
Status: COMPLETED | OUTPATIENT
Start: 2019-05-23 | End: 2019-05-23

## 2019-05-23 RX ORDER — AZITHROMYCIN 250 MG/1
250 TABLET, FILM COATED ORAL DAILY
Status: DISCONTINUED | OUTPATIENT
Start: 2019-05-23 | End: 2019-05-23 | Stop reason: HOSPADM

## 2019-05-23 RX ORDER — ATENOLOL 25 MG/1
50 TABLET ORAL ONCE
Status: COMPLETED | OUTPATIENT
Start: 2019-05-23 | End: 2019-05-23

## 2019-05-23 RX ORDER — AMOXICILLIN 250 MG
2 CAPSULE ORAL 2 TIMES DAILY PRN
Status: DISCONTINUED | OUTPATIENT
Start: 2019-05-23 | End: 2019-05-23 | Stop reason: HOSPADM

## 2019-05-23 RX ORDER — DILTIAZEM HYDROCHLORIDE 5 MG/ML
15 INJECTION INTRAVENOUS ONCE
Status: COMPLETED | OUTPATIENT
Start: 2019-05-23 | End: 2019-05-23

## 2019-05-23 RX ORDER — POLYETHYLENE GLYCOL 3350 17 G/17G
17 POWDER, FOR SOLUTION ORAL DAILY PRN
Status: DISCONTINUED | OUTPATIENT
Start: 2019-05-23 | End: 2019-05-23 | Stop reason: HOSPADM

## 2019-05-23 RX ORDER — ACETAMINOPHEN 500 MG
1000 TABLET ORAL EVERY 6 HOURS PRN
Status: DISCONTINUED | OUTPATIENT
Start: 2019-05-23 | End: 2019-05-23 | Stop reason: HOSPADM

## 2019-05-23 RX ORDER — CEFDINIR 300 MG/1
300 CAPSULE ORAL EVERY 12 HOURS
Status: DISCONTINUED | OUTPATIENT
Start: 2019-05-23 | End: 2019-05-23 | Stop reason: HOSPADM

## 2019-05-23 RX ORDER — ONDANSETRON 4 MG/1
4 TABLET, ORALLY DISINTEGRATING ORAL EVERY 6 HOURS PRN
Status: DISCONTINUED | OUTPATIENT
Start: 2019-05-23 | End: 2019-05-23 | Stop reason: HOSPADM

## 2019-05-23 RX ORDER — AMOXICILLIN 250 MG
1 CAPSULE ORAL 2 TIMES DAILY PRN
Status: DISCONTINUED | OUTPATIENT
Start: 2019-05-23 | End: 2019-05-23 | Stop reason: HOSPADM

## 2019-05-23 RX ORDER — SODIUM CHLORIDE 9 MG/ML
1000 INJECTION, SOLUTION INTRAVENOUS CONTINUOUS
Status: DISCONTINUED | OUTPATIENT
Start: 2019-05-23 | End: 2019-05-23

## 2019-05-23 RX ORDER — ONDANSETRON 2 MG/ML
4 INJECTION INTRAMUSCULAR; INTRAVENOUS EVERY 6 HOURS PRN
Status: DISCONTINUED | OUTPATIENT
Start: 2019-05-23 | End: 2019-05-23 | Stop reason: HOSPADM

## 2019-05-23 RX ORDER — DILTIAZEM HYDROCHLORIDE 180 MG/1
180 CAPSULE, COATED, EXTENDED RELEASE ORAL 2 TIMES DAILY
Status: DISCONTINUED | OUTPATIENT
Start: 2019-05-23 | End: 2019-05-23 | Stop reason: HOSPADM

## 2019-05-23 RX ORDER — ATENOLOL 25 MG/1
50 TABLET ORAL 2 TIMES DAILY
Status: DISCONTINUED | OUTPATIENT
Start: 2019-05-23 | End: 2019-05-23 | Stop reason: HOSPADM

## 2019-05-23 RX ADMIN — SODIUM CHLORIDE 500 ML: 9 INJECTION, SOLUTION INTRAVENOUS at 04:25

## 2019-05-23 RX ADMIN — CEFDINIR 300 MG: 300 CAPSULE ORAL at 09:00

## 2019-05-23 RX ADMIN — ATENOLOL 50 MG: 25 TABLET ORAL at 06:13

## 2019-05-23 RX ADMIN — DILTIAZEM HYDROCHLORIDE 180 MG: 180 CAPSULE, EXTENDED RELEASE ORAL at 06:13

## 2019-05-23 RX ADMIN — DILTIAZEM HYDROCHLORIDE 15 MG: 5 INJECTION INTRAVENOUS at 03:51

## 2019-05-23 RX ADMIN — WARFARIN SODIUM 5 MG: 5 TABLET ORAL at 18:00

## 2019-05-23 RX ADMIN — AZITHROMYCIN 250 MG: 250 TABLET, FILM COATED ORAL at 09:00

## 2019-05-23 RX ADMIN — SODIUM CHLORIDE 1000 ML: 9 INJECTION, SOLUTION INTRAVENOUS at 09:02

## 2019-05-23 ASSESSMENT — MIFFLIN-ST. JEOR: SCORE: 1119.52

## 2019-05-23 NOTE — DISCHARGE INSTRUCTIONS
As a system [facility] wants to ensure that across the care continuum that you have support through care coordination services that include nurses and social workers in the outpatient setting.  Due to your recent hospitalization, I feel it is important you have this support when you discharge.  They will be calling you within 24-48 hours of your discharge.   A brochure describing the services was provided.  If you have questions you can reach out to your clinic directly and ask for the Care Coordinator assigned to your care

## 2019-05-23 NOTE — PROGRESS NOTES
S-(situation): Patient discharged to home via ambulation with family    B-(background): Observation goals met     A-(assessment): Pt is A&O.  VSS.  Afebrile.  Denies pain.  Controlled A-fib.  Info given on meds and A-fib.    R-(recommendations): Discharge instructions reviewed with pt a. Listed belongings gathered and returned to patient.  Patient Education resolved: Yes  New medications-Pt. Has been educated about reason of use and side effects NA  Home and hospital acquired medications returned to patient NA  Medication Bin checked and emptied on discharge No

## 2019-05-23 NOTE — DISCHARGE SUMMARY
[facility]  Hospitalist Discharge Summary       Date of Admission:  5/23/2019  Date of Discharge:  5/23/2019  Discharging Provider: Tam Chandler MD      Discharge Diagnoses   Principal Problem:    Atrial fibrillation with RVR (H)  Active Problems:    Hypoxemia    Pneumonia    S/P AVR (aortic valve replacement)    Long term current use of anticoagulant therapy        Follow-ups Needed After Discharge   Follow-up Appointments     Follow-up and recommended labs and tests       Follow up with primary care provider, Mazin Larsen, within 7   days for hospital follow- up.  No follow up labs or test are needed.             Unresulted Labs Ordered in the Past 30 Days of this Admission     Date and Time Order Name Status Description    5/19/2019 1130 Blood culture Preliminary     5/19/2019 1130 Blood culture Preliminary       These results will be followed up by Dr. Larsen    Discharge Disposition   Discharged to home  Condition at discharge: Good    Hospital Course   79-year-old patient presented to the emergency department with complaints of acute onset of rapid heart rate.  This was associated with increased shortness of breath but no chest pain.  On arrival to the ED she was in atrial fibrillation with RVR.  There is no signs of any ischemia.  She was mildly hypoxemic requiring some supplemental oxygen.  Recent history significant for recent discharge from the hospital for treatment of community-acquired pneumonia.  Lab work and imaging showed no worsening of this process.  In the emergency department she was given a dose of IV diltiazem followed by her daily dose of atenolol and diltiazem.  She was registered observation and brought to the medical floor.  Over several hours her heart rate and rhythm normalized.  She was asymptomatic with normal oxygenation.  She was monitored during the day and by evening was asking to go home.  She will be discharged home and will  finish out her course of Omnicef and azithromycin for the previously diagnosed community-acquired pneumonia.  Will continue on her home dosing of atenolol and diltiazem.  She has scheduled follow-up with her primary care provider within the next week and should make these appointments.  She is on Coumadin and will continue her current dosing per pharmacy.    Consultations This Hospital Stay   PHARMACY TO DOSE WARFARIN  RESPIRATORY CARE IP CONSULT  CARE TRANSITION RN/SW IP CONSULT    Code Status   Full Code    Time Spent on this Encounter   I, Tam Chandler, personally saw the patient today and spent less than or equal to 30 minutes discharging this patient.       Tam Chandler MD  [facility]  ______________________________________________________________________    Physical Exam   Vital Signs: Temp: 97.8  F (36.6  C) Temp src: Oral BP: 105/60 Pulse: 63 Heart Rate: 75 Resp: 16 SpO2: 94 % O2 Device: None (Room air) Oxygen Delivery: 2 LPM  Weight: 136 lbs 0 oz  Constitutional: awake, alert, cooperative, no apparent distress, and appears stated age  Respiratory: No increased work of breathing, good air exchange, clear to auscultation bilaterally, no crackles or wheezing  Cardiovascular: regular rate and rhythm  GI: normal bowel sounds, soft, non-distended and non-tender       Primary Care Physician   Mazin Larsen    Discharge Orders      Reason for your hospital stay    You came in with a rapid heart rate     Follow-up and recommended labs and tests     Follow up with primary care provider, Mazin Larsen, within 7 days for hospital follow- up.  No follow up labs or test are needed.     Activity    Your activity upon discharge: activity as tolerated     Full Code     Diet    Follow this diet upon discharge: Orders Placed This Encounter      Regular Diet Adult       Significant Results and Procedures   Results for orders placed or performed during the  hospital encounter of 05/23/19   XR Chest Port 1 View    Narrative    CHEST SINGLE VIEW PORTABLE  5/23/2019 4:11 AM     HISTORY: Recent pneumonia. Tachycardia.    COMPARISON: 5/19/2019 at 1107 hours.    FINDINGS: Hyperinflated lungs, suggestive of chronic obstructive  pulmonary disease. A moderate-sized region of patchy opacities in the  mid and lower right lung, unchanged. The lungs are otherwise clear.  Blunting of the right costophrenic angle, likely representing a small  right pleural effusion. Probable cardiomegaly. Atherosclerotic  calcification in the thoracic aorta. Prior median sternotomy and valve  replacement.      Impression    IMPRESSION:  1. No significant interval change since 5/19/2019.  2. Patchy opacities in the mid and lower right lung, most likely  representing pneumonia.  3. Small right pleural effusion.    KEVAN FELICIANO MD   CBC with platelets differential   Result Value Ref Range    WBC 5.0 4.0 - 11.0 10e9/L    RBC Count 4.62 3.8 - 5.2 10e12/L    Hemoglobin 13.5 11.7 - 15.7 g/dL    Hematocrit 41.1 35.0 - 47.0 %    MCV 89 78 - 100 fl    MCH 29.2 26.5 - 33.0 pg    MCHC 32.8 31.5 - 36.5 g/dL    RDW 14.5 10.0 - 15.0 %    Platelet Count 174 150 - 450 10e9/L    Diff Method Automated Method     % Neutrophils 66.5 %    % Lymphocytes 17.3 %    % Monocytes 11.8 %    % Eosinophils 3.2 %    % Basophils 0.6 %    % Immature Granulocytes 0.6 %    Nucleated RBCs 0 0 /100    Absolute Neutrophil 3.3 1.6 - 8.3 10e9/L    Absolute Lymphocytes 0.9 0.8 - 5.3 10e9/L    Absolute Monocytes 0.6 0.0 - 1.3 10e9/L    Absolute Basophils 0.0 0.0 - 0.2 10e9/L    Abs Immature Granulocytes 0.0 0 - 0.4 10e9/L    Absolute Nucleated RBC 0.0    INR   Result Value Ref Range    INR 1.66 (H) 0.86 - 1.14   Basic metabolic panel   Result Value Ref Range    Sodium 143 133 - 144 mmol/L    Potassium 3.4 3.4 - 5.3 mmol/L    Chloride 107 94 - 109 mmol/L    Carbon Dioxide 29 20 - 32 mmol/L    Anion Gap 7 3 - 14 mmol/L    Glucose 95 70 - 99  mg/dL    Urea Nitrogen 14 7 - 30 mg/dL    Creatinine 0.46 (L) 0.52 - 1.04 mg/dL    GFR Estimate >90 >60 mL/min/[1.73_m2]    GFR Estimate If Black >90 >60 mL/min/[1.73_m2]    Calcium 9.0 8.5 - 10.1 mg/dL   Lactic acid level STAT for sepsis protocol   Result Value Ref Range    Lactate for Sepsis Protocol 0.5 (L) 0.7 - 2.0 mmol/L   Lactic acid level STAT for sepsis protocol   Result Value Ref Range    Lactate for Sepsis Protocol Canceled, Test credited 0.7 - 2.0 mmol/L   Magnesium    Result Value Ref Range    Magnesium 1.8 1.6 - 2.3 mg/dL   TSH with free T4 reflex   Result Value Ref Range    TSH 1.70 0.40 - 4.00 mU/L   Care Transition RN/SW IP Consult    Narrative    Lucila Welsh RN     5/23/2019 11:36 AM  Care Transition Initial Assessment - RN      Met with: Patient.    DATA   Principal Problem:    Atrial fibrillation with RVR (H)  Active Problems:    S/P AVR (aortic valve replacement)    Pneumonia    Long term current use of anticoagulant therapy    [facility]       Primary Care Clinic Name: [facility]  Primary Care MD Name: Mazin Larsen DO  Contact information and PCP information verified: Yes    ASSESSMENTCognitive Status: awake, alert and oriented.  Who is your support system?: Children   Insurance Concerns: No Insurance issues identified    This writer met with pt, introduced self and role. Care   Transitions is consulted for readmission risk discharge planning.   Pt lives independantly in the community. Pt has no services at   home and she does have family support.  Patient is in agreement   and has a goal of going home  upon discharge.  Transportation   provided by family.    Patient is agreeable to [facility]. As a system [facility] wants to ensure   that across the care continuum that you have support through care   coordination services that include nurses and social workers in   the outpatient setting.  Due to your recent hospitalization, I   feel it is important you have this support when  you discharge.    They will be calling you within 24-48 hours of your discharge.     A brochure describing the services was provided.  If you have   questions you can reach out to your clinic directly and ask for   the Care Coordinator assigned to your care    CTS note. Chart reviewed and pt discussed in interdisciplinary   rounds. No anticipated discharge needs at this time. CTS team   will continue to monitor daily in interdisciplinary rounds and   will intervene as needed. If immediate needs arise, please   contact CTS team at [phone].     PLAN  Home      Lucila Welsh RN Care Coordinator  [facility] [phone]  [facility] [phone]         Discharge Medications   Current Discharge Medication List      CONTINUE these medications which have NOT CHANGED    Details   acetaminophen (TYLENOL) 500 MG tablet Take 2 tablets (1,000 mg) by mouth every 6 hours as needed for mild pain  Qty: 180 tablet, Refills: 3    Associated Diagnoses: Primary osteoarthritis of both knees      atenolol (TENORMIN) 50 MG tablet Take 1 tablet (50 mg) by mouth 2 times daily  Qty: 180 tablet, Refills: 0    Associated Diagnoses: Atrial fibrillation with RVR (H)      azithromycin (ZITHROMAX) 250 MG tablet Take 1 tablet (250 mg) by mouth daily for 3 days  Qty: 3 tablet, Refills: 0    Associated Diagnoses: Community acquired pneumonia of right lower lobe of lung (H)      calcium carbonate-vitamin D 500-400 MG-UNIT TABS tablt Take 1 tablet by mouth 3 times daily    Associated Diagnoses: Medicare annual wellness visit, subsequent; Osteoporosis      cefdinir (OMNICEF) 300 MG capsule Take 1 capsule (300 mg) by mouth every 12 hours for 8 days  Qty: 16 capsule, Refills: 0    Associated Diagnoses: Community acquired pneumonia of right lower lobe of lung (H)      diltiazem ER COATED BEADS (CARDIZEM CD/CARTIA XT) 180 MG 24 hr capsule Take 1 capsule (180 mg) by mouth 2 times daily  Qty: 180 capsule, Refills: 3    Associated Diagnoses:  Chronic atrial fibrillation (H)      multivitamins with minerals (CERTAVITE) LIQD Take 15 mLs by mouth daily    Associated Diagnoses: Medicare annual wellness visit, subsequent      torsemide (DEMADEX) 20 MG tablet TAKE ONE TABLET BY MOUTH ONCE DAILY AS NEEDED  Qty: 90 tablet, Refills: 3    Associated Diagnoses: Acute pulmonary edema (H)      warfarin (COUMADIN) 2.5 MG tablet TAKE TWO TABLETS BY MOUTH ON MONDAY, WEDNESDAY, FRIDAY AND ONE TABLET BY MOUTH ALL OTHER DAYS, OR AS DIRECTED BY THE COUMADIN CLINIC  Qty: 120 tablet, Refills: 0    Associated Diagnoses: Atrial fibrillation with RVR (H)           Allergies   Allergies   Allergen Reactions     Xarelto [Rivaroxaban] Diarrhea     Ace Inhibitors Cough

## 2019-05-23 NOTE — ED PROVIDER NOTES
History     Chief Complaint   Patient presents with     Tachycardia     HPI  Khalida Rouse is a 79 year old female who was just discharged from the hospital on 5/21/2019 after being in for a couple of days with pneumonia.  She has chronic A. fib that has been controlled with atenolol and diltiazem.  Status post aortic valve replacement on Coumadin.  She was discharged on Omnicef and Zithromax orally and awoke this morning to go to the bathroom and felt like her heart was beating rapidly.  He denies any chest pain or pressure.  No shortness of breath over the baseline that she is been experiencing with her pneumonia.  No dizziness or lightheadedness.  No fevers.  Here with her daughter.  Has had some mild lower extremity edema but nothing over baseline.    Allergies:  Allergies   Allergen Reactions     Xarelto [Rivaroxaban] Diarrhea     Ace Inhibitors Cough       Problem List:    Patient Active Problem List    Diagnosis Date Noted     Hypoxemia 05/23/2019     Priority: Medium     Thrombocytopenia (H) 05/20/2019     Priority: Medium     Other secondary pulmonary hypertension (H) 05/14/2019     Priority: Medium     Long term current use of anticoagulant therapy 03/11/2016     Priority: Medium     Atrial fibrillation with RVR (H) 01/11/2016     Priority: Medium     Pneumonia 01/09/2016     Priority: Medium     Osteoporosis 10/26/2015     Priority: Medium     Advanced directives, counseling/discussion 10/03/2014     Priority: Medium     Declined       S/P AVR (aortic valve replacement) 04/10/2014     Priority: Medium     Atrial flutter 02/04/2014     Priority: Medium        Past Medical History:    Past Medical History:   Diagnosis Date     Aortic valve stenosis      Atrial flutter (H)      Cardiomyopathy (H)      Severe aortic stenosis 4/7/2014     Shoulder fracture, left 3/22/15       Past Surgical History:    Past Surgical History:   Procedure Laterality Date     CATARACT IOL, RT/LT Right      ENT SURGERY       glaucoma surgery     REPLACE VALVE AORTIC  4/10/2014    Procedure: REPLACE VALVE AORTIC;  Median sternotomy, Replace Aortic Valve on pump oxygenation. ;  Surgeon: Ajit, Wesley Robison MD;  Location:  OR       Family History:    Family History   Problem Relation Age of Onset     Dementia Brother      Alzheimer Disease Sister        Social History:  Marital Status:   [5]  Social History     Tobacco Use     Smoking status: Never Smoker     Smokeless tobacco: Never Used   Substance Use Topics     Alcohol use: No     Alcohol/week: 0.0 oz     Drug use: No        Medications:      acetaminophen (TYLENOL) 500 MG tablet   atenolol (TENORMIN) 50 MG tablet   azithromycin (ZITHROMAX) 250 MG tablet   calcium carbonate-vitamin D 500-400 MG-UNIT TABS tablt   cefdinir (OMNICEF) 300 MG capsule   diltiazem ER COATED BEADS (CARDIZEM CD/CARTIA XT) 180 MG 24 hr capsule   multivitamins with minerals (CERTAVITE) LIQD   torsemide (DEMADEX) 20 MG tablet   warfarin (COUMADIN) 2.5 MG tablet         Review of Systems   All other systems reviewed and are negative.      Physical Exam   BP: (!) 141/101  Pulse: 110  Heart Rate: 134  Temp: 97.8  F (36.6  C)  Resp: (!) 7  Weight: 61.2 kg (135 lb)  SpO2: 92 %      Physical Exam   Constitutional: She appears well-developed and well-nourished. No distress.   HENT:   Mouth/Throat: Oropharynx is clear and moist.   Cardiovascular: An irregularly irregular rhythm present. Tachycardia present.   Pulmonary/Chest: Effort normal. No respiratory distress.   Abdominal: Soft. There is no tenderness.   Musculoskeletal: Normal range of motion. She exhibits edema (trace bilateral).   Skin: Skin is warm and dry.   Psychiatric: She has a normal mood and affect.       ED Course  (with Medical Decision Making)    79-year-old female recently hospitalized with pneumonia has a chronic A. fib and on Coumadin for that and aortic valve replacement.  Woke tonight to go to the bathroom and noticed that her heart was  beating rapidly.  EKG shows A. fib with rapid ventricular response with heart rate at 139.  She has no associated shortness of breath or chest pain.    Currently on atenolol and Cardizem.  She was given a bolus of diltiazem 15 mg IV to see if we can slow her down.  May need a second bolus and possibly a drip.  Goal is rate control as she has been in A. fib chronically.      She did slow her heart rate down to the 90s.  Has required supplemental O2 because she keeps dropping her sats into the 88% range.  Heart rate now climbing back up a bit into the 110-120 range.  She is already on max dose of oral diltiazem as she takes 180 mg twice a day.  She feels better now than when it was going in the 140-150 range.    I think she is going to need to stay in the hospital for supplemental O2 and we can monitor her rate control.  Rapid heart rate may be due to her hypoxia and underlying pneumonia that is in the process of improving.  I spoke with Dr. Chandler, the hospitalist on call.  He has assumed her care and will write orders.  She was given her morning dose of atenolol 50 mg and Cardizem  mg orally.          Procedures               EKG Interpretation:      Interpreted by Og Burciaga  Time reviewed: 0337  Symptoms at time of EKG: rapid heart beat   Rhythm: Atrial fibrillation with RVR  Rate: 139 bpm  Axis: Left axis deviation  Ectopy: none  Conduction: Left anterior fascicular block, poor R wave progression across the precordium.  ST Segments/ T Waves: Mild ST depression laterally in V6, 1 and L slightly more prominent than EKG from 5/19/2018.  Q Waves: none  Comparison to prior: Slightly increased ST depression laterally as noted above.    Clinical Impression: Atrial fibrillation with RVR at 139 bpm.  Mild lateral ST depression likely related to rate and some mild hypoxia.          Critical Care time:  none               Results for orders placed or performed during the hospital encounter of 05/23/19 (from  the past 24 hour(s))   CBC with platelets differential   Result Value Ref Range    WBC 5.0 4.0 - 11.0 10e9/L    RBC Count 4.62 3.8 - 5.2 10e12/L    Hemoglobin 13.5 11.7 - 15.7 g/dL    Hematocrit 41.1 35.0 - 47.0 %    MCV 89 78 - 100 fl    MCH 29.2 26.5 - 33.0 pg    MCHC 32.8 31.5 - 36.5 g/dL    RDW 14.5 10.0 - 15.0 %    Platelet Count 174 150 - 450 10e9/L    Diff Method Automated Method     % Neutrophils 66.5 %    % Lymphocytes 17.3 %    % Monocytes 11.8 %    % Eosinophils 3.2 %    % Basophils 0.6 %    % Immature Granulocytes 0.6 %    Nucleated RBCs 0 0 /100    Absolute Neutrophil 3.3 1.6 - 8.3 10e9/L    Absolute Lymphocytes 0.9 0.8 - 5.3 10e9/L    Absolute Monocytes 0.6 0.0 - 1.3 10e9/L    Absolute Basophils 0.0 0.0 - 0.2 10e9/L    Abs Immature Granulocytes 0.0 0 - 0.4 10e9/L    Absolute Nucleated RBC 0.0    INR   Result Value Ref Range    INR 1.66 (H) 0.86 - 1.14   Basic metabolic panel   Result Value Ref Range    Sodium 143 133 - 144 mmol/L    Potassium 3.4 3.4 - 5.3 mmol/L    Chloride 107 94 - 109 mmol/L    Carbon Dioxide 29 20 - 32 mmol/L    Anion Gap 7 3 - 14 mmol/L    Glucose 95 70 - 99 mg/dL    Urea Nitrogen 14 7 - 30 mg/dL    Creatinine 0.46 (L) 0.52 - 1.04 mg/dL    GFR Estimate >90 >60 mL/min/[1.73_m2]    GFR Estimate If Black >90 >60 mL/min/[1.73_m2]    Calcium 9.0 8.5 - 10.1 mg/dL   XR Chest Port 1 View    Narrative    CHEST SINGLE VIEW PORTABLE  5/23/2019 4:11 AM     HISTORY: Recent pneumonia. Tachycardia.    COMPARISON: 5/19/2019 at 1107 hours.    FINDINGS: Hyperinflated lungs, suggestive of chronic obstructive  pulmonary disease. A moderate-sized region of patchy opacities in the  mid and lower right lung, unchanged. The lungs are otherwise clear.  Blunting of the right costophrenic angle, likely representing a small  right pleural effusion. Probable cardiomegaly. Atherosclerotic  calcification in the thoracic aorta. Prior median sternotomy and valve  replacement.      Impression    IMPRESSION:  1. No  significant interval change since 5/19/2019.  2. Patchy opacities in the mid and lower right lung, most likely  representing pneumonia.  3. Small right pleural effusion.    KEVAN FELICIANO MD       Medications   0.9% sodium chloride BOLUS (0 mLs Intravenous Stopped 5/23/19 0525)     Followed by   sodium chloride 0.9% infusion (has no administration in time range)   diltiazem (CARDIZEM) injection 15 mg (15 mg Intravenous Given 5/23/19 0351)   atenolol (TENORMIN) tablet 50 mg (50 mg Oral Given 5/23/19 0613)   diltiazem ER COATED BEADS (CARDIZEM CD/CARTIA XT) 24 hr capsule 180 mg (180 mg Oral Given 5/23/19 0613)       Assessments & Plan     I have reviewed the nursing notes.    I have reviewed the findings, diagnosis, plan and need for follow up with the patient.       ED to Inpatient Handoff:    Discussed with Dr Chandler at 0600  Patient accepted for Observation Stay  Pending studies include none  Code Status: Not Addressed              Medication List      There are no discharge medications for this visit.         Final diagnoses:   Hypoxia   Chronic atrial fibrillation with RVR (H)   Pneumonia of right lung due to infectious organism, unspecified part of lung       5/23/2019   [facility] EMERGENCY DEPARTMENT     Og Burciaga MD  05/23/19 0613

## 2019-05-23 NOTE — PLAN OF CARE
S-(situation): shift note    B-(background): New A-Fib    A-(assessment): Pt is A&O.  VSS.  Afebrile.  Denies discomfort.  Telemetry on and monitored - still in A-Fib but is controlled with a rate of 71-75.  Is independent in transferring and walking.      R-(recommendations): Will cont to monitor the above.

## 2019-05-23 NOTE — ASSESSMENT & PLAN NOTE
Recently hospitalized and treated for right-sided pneumonia  Sent home on oral azithromycin and Omnicef  Clinically no signs of worsening symptoms, chest x-ray shows stable pneumonia, CBC is normal  Continue home dosing of azithromycin and Omnicef

## 2019-05-23 NOTE — ASSESSMENT & PLAN NOTE
History of bioprosthetic aortic valve replacement for aortic stenosis  Clinically doing well, followed by cardiology

## 2019-05-23 NOTE — PROGRESS NOTES
Patient arrived on floor at 0645.  Settled patient in room.  Took one set of vitals, started on telemetry, put on 2 L of oxygen and OBS Profile completed.

## 2019-05-23 NOTE — CONSULTS
Care Transition Initial Assessment - RN      Met with: Patient.    DATA   Principal Problem:    Atrial fibrillation with RVR (H)  Active Problems:    S/P AVR (aortic valve replacement)    Pneumonia    Long term current use of anticoagulant therapy    [facility]       Primary Care Clinic Name: [facility]  Primary Care MD Name: Mazin Larsen DO  Contact information and PCP information verified: Yes    ASSESSMENT  Cognitive Status: awake, alert and oriented.  Who is your support system?: Children   Insurance Concerns: No Insurance issues identified    This writer met with pt, introduced self and role. Care Transitions is consulted for readmission risk discharge planning. Pt lives independantly in the community. Pt has no services at home and she does have family support.  Patient is in agreement and has a goal of going home  upon discharge.  Transportation provided by family.    Patient is agreeable to [facility]. As a system [place] wants to ensure that across the care continuum that you have support through care coordination services that include nurses and social workers in the outpatient setting.  Due to your recent hospitalization, I feel it is important you have this support when you discharge.  They will be calling you within 24-48 hours of your discharge.   A brochure describing the services was provided.  If you have questions you can reach out to your clinic directly and ask for the Care Coordinator assigned to your care    CTS note. Chart reviewed and pt discussed in interdisciplinary rounds. No anticipated discharge needs at this time. CTS team will continue to monitor daily in interdisciplinary rounds and will intervene as needed. If immediate needs arise, please contact CTS team at [phone].     PLAN  Home      Lucila Welsh RN Care Coordinator  [facility] [phone]  [facility] [phone]

## 2019-05-23 NOTE — H&P
[facility]    History and Physical - Hospitalist Service       Date of Admission:  5/23/2019    Assessment & Plan   Khalida Rouse is a 79 year old female admitted on 5/23/2019. She presents after awakening with rapid heart rate.  On arrival to the ED she had heart rate in the 140s with atrial fibrillation with RVR.  Has received an IV dose of diltiazem with rates now into the 90s and low 100s and clinically feeling better.  Known history of atrial fibrillation on rate control with diltiazem and atenolol.  Recently hospitalized and discharged for treatment of right-sided pneumonia, currently on Omnicef and azithromycin..  In the emergency room was mildly hypoxemic in the upper 80s, but denies shortness of breath, increased cough.  X-ray showing no worsening symptoms of pneumonia.  Patient will be registered observation and brought in for evaluation of her atrial fibrillation.  This morning we will ask the ER physician to give her morning dose of diltiazem and atenolol and monitor for rate control on the medical floor.  If not improving, may need to be transferred into the unit for diltiazem infusion.  While in the ER her oxygen saturations were mildly low and will monitor and ask RT to evaluate whether she might benefit with home oxygen.  For now we will continue on home dosing of Omnicef and azithromycin.  Expect she will be in hospital for 1 day.    Atrial fibrillation with RVR (H)  Waking from sleep with rapid heart rate  On arrival to the ED with heart rate in the 140s with atrial fibrillation  Known history of atrial fibrillation in the past, only normally rate controlled with atenolol and diltiazem  Better rate after IV diltiazem  We will administer her morning meds, follow rate control on the floor with telemetry  Hold diltiazem drip for now    Hypoxemia  Mildly hypoxemic in the ED at 88%  Recent admission and diagnosis of pneumonia currently on antibiotics with no sign of  worsening symptoms  In the hospital several days ago was having mild hypoxemia, improving with rest  Monitor, will have RT evaluate whether she may need home oxygen short-term    Pneumonia  Recently hospitalized and treated for right-sided pneumonia  Sent home on oral azithromycin and Omnicef  Clinically no signs of worsening symptoms, chest x-ray shows stable pneumonia, CBC is normal  Continue home dosing of azithromycin and Omnicef    S/P AVR (aortic valve replacement)  History of bioprosthetic aortic valve replacement for aortic stenosis  Clinically doing well, followed by cardiology    Long term current use of anticoagulant therapy  Daily Coumadin for history of atrial fibrillation  Pharmacy to manage, today mildly subtherapeutic         Diet: Regular  DVT Prophylaxis: Warfarin  Dupree Catheter: not present  Code Status: Full code    Disposition Plan   Expected discharge: Tomorrow, recommended to prior living arrangement once Heart rate controlled, oxygenation improved.  Entered: Tam Chandler MD 05/23/2019, 6:12 AM     The patient's care was discussed with the Patient.    Tam Chandler MD  [facility]    ______________________________________________________________________    Chief Complaint   79-year-old female presenting with rapid heart rate    History is obtained from the patient, electronic health record and emergency department physician    History of Present Illness   Khalida Rouse is a 79 year old female who presents with increased heart rate.  She was sleeping at home when she awoke with her heart racing.  This was not associated with chest pain, increased shortness of breath.  She was recently hospitalized and discharged on May 21 from [facility] with right-sided pneumonia, sent home on oral azithromycin and Omnicef.  He has a known history of atrial fibrillation, normally rate controlled taking twice daily atenolol and diltiazem.  She denies increasing  shortness of breath at home, increased cough, abdominal pain, nausea or vomiting, change in stools.  She takes Coumadin for history of atrial fibrillation as well as history of aortic valve replacement with a bio prosthetic valve.    Review of Systems    The 10 point Review of Systems is negative other than noted in the HPI or here.     Past Medical History    I have reviewed this patient's medical history and updated it with pertinent information if needed.   Past Medical History:   Diagnosis Date     Aortic valve stenosis      Atrial flutter (H)      Cardiomyopathy (H)      Severe aortic stenosis 4/7/2014     Shoulder fracture, left 3/22/15       Past Surgical History   I have reviewed this patient's surgical history and updated it with pertinent information if needed.  Past Surgical History:   Procedure Laterality Date     CATARACT IOL, RT/LT Right      ENT SURGERY      glaucoma surgery     REPLACE VALVE AORTIC  4/10/2014    Procedure: REPLACE VALVE AORTIC;  Median sternotomy, Replace Aortic Valve on pump oxygenation. ;  Surgeon: Wesley Fong MD;  Location: [facility] OR       Social History   I have reviewed this patient's social history and updated it with pertinent information if needed.  Social History     Tobacco Use     Smoking status: Never Smoker     Smokeless tobacco: Never Used   Substance Use Topics     Alcohol use: No     Alcohol/week: 0.0 oz     Drug use: No       Family History   I have reviewed this patient's family history and updated it with pertinent information if needed.   Family History   Problem Relation Age of Onset     Dementia Brother      Alzheimer Disease Sister        Prior to Admission Medications   Prior to Admission Medications   Prescriptions Last Dose Informant Patient Reported? Taking?   acetaminophen (TYLENOL) 500 MG tablet Past Week at Unknown time  No Yes   Sig: Take 2 tablets (1,000 mg) by mouth every 6 hours as needed for mild pain   atenolol (TENORMIN) 50 MG tablet  5/22/2019 at PM  No Yes   Sig: Take 1 tablet (50 mg) by mouth 2 times daily   azithromycin (ZITHROMAX) 250 MG tablet Past Week at Unknown time  No Yes   Sig: Take 1 tablet (250 mg) by mouth daily for 3 days   calcium carbonate-vitamin D 500-400 MG-UNIT TABS tablt 5/22/2019 at AM  No Yes   Sig: Take 1 tablet by mouth 3 times daily   cefdinir (OMNICEF) 300 MG capsule 5/22/2019 at PM  No Yes   Sig: Take 1 capsule (300 mg) by mouth every 12 hours for 8 days   diltiazem ER COATED BEADS (CARDIZEM CD/CARTIA XT) 180 MG 24 hr capsule 5/22/2019 at PM  No Yes   Sig: Take 1 capsule (180 mg) by mouth 2 times daily   multivitamins with minerals (CERTAVITE) LIQD 5/22/2019 at AM  No Yes   Sig: Take 15 mLs by mouth daily   torsemide (DEMADEX) 20 MG tablet 5/22/2019 at AM  No Yes   Sig: TAKE ONE TABLET BY MOUTH ONCE DAILY AS NEEDED   warfarin (COUMADIN) 2.5 MG tablet 5/22/2019 at AM  No Yes   Sig: TAKE TWO TABLETS BY MOUTH ON MONDAY, WEDNESDAY, FRIDAY AND ONE TABLET BY MOUTH ALL OTHER DAYS, OR AS DIRECTED BY THE COUMADIN CLINIC      Facility-Administered Medications: None     Allergies   Allergies   Allergen Reactions     Xarelto [Rivaroxaban] Diarrhea     Ace Inhibitors Cough       Physical Exam   Vital Signs: Temp: 97.8  F (36.6  C) Temp src: Oral BP: 136/82 Pulse: 108 Heart Rate: 120 Resp: 18 SpO2: 90 % O2 Device: None (Room air)    Weight: 135 lbs 0 oz    Constitutional: awake, alert, cooperative, no apparent distress, and appears stated age and appears somewhat fatigued  Eyes: Lids and lashes normal, pupils equal, round and reactive to light, extra ocular muscles intact, sclera clear, conjunctiva normal  Hematologic / Lymphatic: no cervical lymphadenopathy and no supraclavicular lymphadenopathy  Respiratory: Moving her air pretty well.  She does have some rhonchi and mild wheezing on the right side  Cardiovascular: irregularly irregular rhythm  GI: normal bowel sounds, soft, non-distended and non-tender  Skin: no bruising or  bleeding, normal skin color, texture, turgor and no redness, warmth, or swelling  Musculoskeletal: There is no redness, warmth, or swelling of the joints.  Full range of motion noted.  Motor strength is 5 out of 5 all extremities bilaterally.  Tone is normal.  Neurologic: Awake, alert, oriented to name, place and time.  Cranial nerves II-XII are grossly intact.  Motor is 5 out of 5 bilaterally.    Data   Data reviewed today: I reviewed all medications, new labs and imaging results over the last 24 hours. I personally reviewed the EKG tracing showing Atrial fibrillation with rates initially in the 140s, no other acute changes and the chest x-ray image(s) showing Some residual infiltrate on the right side, improved since last film.    Results for orders placed or performed during the hospital encounter of 05/23/19   XR Chest Port 1 View    Narrative    CHEST SINGLE VIEW PORTABLE  5/23/2019 4:11 AM     HISTORY: Recent pneumonia. Tachycardia.    COMPARISON: 5/19/2019 at 1107 hours.    FINDINGS: Hyperinflated lungs, suggestive of chronic obstructive  pulmonary disease. A moderate-sized region of patchy opacities in the  mid and lower right lung, unchanged. The lungs are otherwise clear.  Blunting of the right costophrenic angle, likely representing a small  right pleural effusion. Probable cardiomegaly. Atherosclerotic  calcification in the thoracic aorta. Prior median sternotomy and valve  replacement.      Impression    IMPRESSION:  1. No significant interval change since 5/19/2019.  2. Patchy opacities in the mid and lower right lung, most likely  representing pneumonia.  3. Small right pleural effusion.    KEVAN FELICIANO MD   CBC with platelets differential   Result Value Ref Range    WBC 5.0 4.0 - 11.0 10e9/L    RBC Count 4.62 3.8 - 5.2 10e12/L    Hemoglobin 13.5 11.7 - 15.7 g/dL    Hematocrit 41.1 35.0 - 47.0 %    MCV 89 78 - 100 fl    MCH 29.2 26.5 - 33.0 pg    MCHC 32.8 31.5 - 36.5 g/dL    RDW 14.5 10.0 - 15.0 %     Platelet Count 174 150 - 450 10e9/L    Diff Method Automated Method     % Neutrophils 66.5 %    % Lymphocytes 17.3 %    % Monocytes 11.8 %    % Eosinophils 3.2 %    % Basophils 0.6 %    % Immature Granulocytes 0.6 %    Nucleated RBCs 0 0 /100    Absolute Neutrophil 3.3 1.6 - 8.3 10e9/L    Absolute Lymphocytes 0.9 0.8 - 5.3 10e9/L    Absolute Monocytes 0.6 0.0 - 1.3 10e9/L    Absolute Basophils 0.0 0.0 - 0.2 10e9/L    Abs Immature Granulocytes 0.0 0 - 0.4 10e9/L    Absolute Nucleated RBC 0.0    INR   Result Value Ref Range    INR 1.66 (H) 0.86 - 1.14   Basic metabolic panel   Result Value Ref Range    Sodium 143 133 - 144 mmol/L    Potassium 3.4 3.4 - 5.3 mmol/L    Chloride 107 94 - 109 mmol/L    Carbon Dioxide 29 20 - 32 mmol/L    Anion Gap 7 3 - 14 mmol/L    Glucose 95 70 - 99 mg/dL    Urea Nitrogen 14 7 - 30 mg/dL    Creatinine 0.46 (L) 0.52 - 1.04 mg/dL    GFR Estimate >90 >60 mL/min/[1.73_m2]    GFR Estimate If Black >90 >60 mL/min/[1.73_m2]    Calcium 9.0 8.5 - 10.1 mg/dL

## 2019-05-23 NOTE — ED NOTES
ED Nursing criteria listed below was addressed during verbal handoff:     Abnormal vitals: Yes  Abnormal results: Yes  Med Reconciliation completed: Yes  Meds given in ED: Yes  Any Overdue Meds: No  Core Measures: N/A  Isolation: N/A  Special needs: N/A  Skin assessment: Yes, intact but some bruises.    Observation Patient  Education provided: Yes

## 2019-05-23 NOTE — PROGRESS NOTES
Clinic Care Coordination Contact  Care Team Conversations    Pt discharged from hospital on 521 for pneumonia and A. Fib.  Patient is back in the hospital.  We will continue to monitor for discharge.      Pratibha OGDEN, RN, PHN  Care Coordination    [facility]  [address] [place] [address]  Office: [phone]  Fax [phone]   [facility]  [address] [place] [address]  Office: [phone] Fax [phone]  [email or web address]   [email or web address]   Connect with [facility] on social media.

## 2019-05-23 NOTE — ASSESSMENT & PLAN NOTE
Waking from sleep with rapid heart rate  On arrival to the ED with heart rate in the 140s with atrial fibrillation  Known history of atrial fibrillation in the past, only normally rate controlled with atenolol and diltiazem  Better rate after IV diltiazem  We will administer her morning meds, follow rate control on the floor with telemetry  Hold diltiazem drip for now

## 2019-05-23 NOTE — PROGRESS NOTES
Discharge medication review for this patient is complete. Pharmacist assisted with medication reconciliation of discharge medications with prior to admission medications.     The following changes were made to the discharge medication list based on pharmacist review:  Added:  none  Discontinued: none  Changed: none      Patient's Discharge Medication List  - medications as listed on After Visit Summary (AVS)     Review of your medicines        CONTINUE these medicines which have NOT CHANGED        Dose / Directions   acetaminophen 500 MG tablet  Commonly known as:  TYLENOL  Used for:  Primary osteoarthritis of both knees      Dose:  1000 mg  Take 2 tablets (1,000 mg) by mouth every 6 hours as needed for mild pain  Quantity:  180 tablet  Refills:  3     atenolol 50 MG tablet  Commonly known as:  TENORMIN  Used for:  Atrial fibrillation with RVR (H)      Dose:  50 mg  Take 1 tablet (50 mg) by mouth 2 times daily  Quantity:  180 tablet  Refills:  0     azithromycin 250 MG tablet  Commonly known as:  ZITHROMAX  Indication:  Community Acquired Pneumonia  Used for:  Community acquired pneumonia of right lower lobe of lung (H)      Dose:  250 mg  Take 1 tablet (250 mg) by mouth daily for 3 days  Quantity:  3 tablet  Refills:  0     calcium carbonate-vitamin D 500-400 MG-UNIT tablet  Commonly known as:  OS-JOSEFINA  Used for:  Medicare annual wellness visit, subsequent, Osteoporosis      Dose:  1 tablet  Take 1 tablet by mouth 3 times daily  Refills:  0     cefdinir 300 MG capsule  Commonly known as:  OMNICEF  Indication:  Community Acquired Pneumonia  Used for:  Community acquired pneumonia of right lower lobe of lung (H)      Dose:  300 mg  Take 1 capsule (300 mg) by mouth every 12 hours for 8 days  Quantity:  16 capsule  Refills:  0     diltiazem ER COATED BEADS 180 MG 24 hr capsule  Commonly known as:  CARDIZEM CD/CARTIA XT  Used for:  Chronic atrial fibrillation (H)      Dose:  180 mg  Take 1 capsule (180 mg) by mouth 2  times daily  Quantity:  180 capsule  Refills:  3     multivitamins w/minerals liquid  Used for:  Medicare annual wellness visit, subsequent      Dose:  15 mL  Take 15 mLs by mouth daily  Refills:  0     torsemide 20 MG tablet  Commonly known as:  DEMADEX  Used for:  Acute pulmonary edema (H)      TAKE ONE TABLET BY MOUTH ONCE DAILY AS NEEDED  Quantity:  90 tablet  Refills:  3     warfarin 2.5 MG tablet  Commonly known as:  COUMADIN  Used for:  Atrial fibrillation with RVR (H)      Take as directed. If you are unsure how to take this medication, talk to your nurse or doctor.  Original instructions:  TAKE TWO TABLETS BY MOUTH ON MONDAY, WEDNESDAY, FRIDAY AND ONE TABLET BY MOUTH ALL OTHER DAYS, OR AS DIRECTED BY THE COUMADIN CLINIC  Quantity:  120 tablet  Refills:  0                  Greg George RPH on 5/23/2019 at 5:35 PM

## 2019-05-23 NOTE — ASSESSMENT & PLAN NOTE
Mildly hypoxemic in the ED at 88%  Recent admission and diagnosis of pneumonia currently on antibiotics with no sign of worsening symptoms  In the hospital several days ago was having mild hypoxemia, improving with rest  Monitor, will have RT evaluate whether she may need home oxygen short-term

## 2019-05-23 NOTE — PHARMACY-ANTICOAGULATION SERVICE
Clinical Pharmacy - Warfarin Dosing Consult     Pharmacy has been consulted to manage this patient s warfarin therapy.  Indication: Atrial Fibrillation;Mechanical Aortic Valve Replacement  Therapy Goal: INR 2-3  Warfarin Prior to Admission: Yes  Warfarin PTA Regimen: 5 mg Monday, Wednesday, Friday and 2.5 mg all other days of the week  Recent documented change in oral intake/nutrition: Unknown    INR   Date Value Ref Range Status   05/23/2019 1.66 (H) 0.86 - 1.14 Final   05/21/2019 2.00 (H) 0.86 - 1.14 Final       Recommend warfarin 5 mg today.  Pharmacy will monitor Khalida Rouse daily and order warfarin doses to achieve specified goal.      Please contact pharmacy as soon as possible if the warfarin needs to be held for a procedure or if the warfarin goals change.

## 2019-05-23 NOTE — ASSESSMENT & PLAN NOTE
Daily Coumadin for history of atrial fibrillation  Pharmacy to manage, today mildly subtherapeutic

## 2019-05-24 ENCOUNTER — PATIENT OUTREACH (OUTPATIENT)
Dept: CARE COORDINATION | Facility: CLINIC | Age: 79
End: 2019-05-24

## 2019-05-24 NOTE — PROGRESS NOTES
Clinic Care Coordination Contact  [facility]/Voicemail       Clinical Data: Care Coordinator Outreach  Outreach attempted x 1.  Left message on voicemail with call back information and requested return call.  Plan:  Care Coordinator will try to reach patient again in 1-2 business days.      Pratibha OGDEN, RN, PHN  Care Coordination    [facility]  [address] [place] [address]  Office: [phone]  Fax [phone]   [facility]  [address] [place] [address]  Office: [phone] Fax [phone]  [email or web address]   [email or web address]   Connect with [facility] on social media.

## 2019-05-24 NOTE — LETTER
[place] CARE COORDINATION    May 29, 2019    Khalida Rouse  [address]  [facility] [address]      Dear Khalida,    I am a clinic care coordinator who works with Mazin Larsen DO at [phone]. I have been trying to reach you recently to introduce Clinic Care Coordination and to see if there was anything I could assist you with.  I wanted to introduce myself and provide you with my contact information so that you can call me with questions or concerns about your health care. Below is a description of clinic care coordination and how I can further assist you.     The clinic care coordinator is a registered nurse and/or  who understand the health care system. The goal of clinic care coordination is to help you manage your health and improve access to the [facility] system in the most efficient manner. The registered nurse can assist you in meeting your health care goals by providing education, coordinating services, and strengthening the communication among your providers. The  can assist you with financial, behavioral, psychosocial, chemical dependency, counseling, and/or psychiatric resources.    Please feel free to contact me at [phone], with any questions or concerns. We at [facility] are focused on providing you with the highest-quality healthcare experience possible and that all starts with you.     Sincerely,           Pratibha OGDEN, RN, PHN  Care Coordination    [facility]  [address] [place] [address]  Office: [phone]  Fax [phone]   [facility]  [address] [place] [address]  Office: [phone] Fax [phone]  [email or web address]   [email or web address]   Connect with [facility] on social media.

## 2019-05-25 LAB
BACTERIA SPEC CULT: NO GROWTH
BACTERIA SPEC CULT: NO GROWTH
Lab: NORMAL
Lab: NORMAL
SPECIMEN SOURCE: NORMAL
SPECIMEN SOURCE: NORMAL

## 2019-05-29 ENCOUNTER — OFFICE VISIT (OUTPATIENT)
Dept: FAMILY MEDICINE | Facility: CLINIC | Age: 79
End: 2019-05-29
Payer: MEDICARE

## 2019-05-29 VITALS
WEIGHT: 129 LBS | HEIGHT: 66 IN | OXYGEN SATURATION: 97 % | DIASTOLIC BLOOD PRESSURE: 56 MMHG | RESPIRATION RATE: 14 BRPM | BODY MASS INDEX: 20.73 KG/M2 | TEMPERATURE: 98 F | HEART RATE: 66 BPM | SYSTOLIC BLOOD PRESSURE: 108 MMHG

## 2019-05-29 DIAGNOSIS — Z09 HOSPITAL DISCHARGE FOLLOW-UP: Primary | ICD-10-CM

## 2019-05-29 DIAGNOSIS — J81.0 ACUTE PULMONARY EDEMA (H): ICD-10-CM

## 2019-05-29 DIAGNOSIS — J18.9 PNEUMONIA DUE TO INFECTIOUS ORGANISM, UNSPECIFIED LATERALITY, UNSPECIFIED PART OF LUNG: ICD-10-CM

## 2019-05-29 DIAGNOSIS — I48.20 CHRONIC ATRIAL FIBRILLATION (H): ICD-10-CM

## 2019-05-29 LAB
ANION GAP SERPL CALCULATED.3IONS-SCNC: 2 MMOL/L (ref 3–14)
BUN SERPL-MCNC: 12 MG/DL (ref 7–30)
CALCIUM SERPL-MCNC: 9.3 MG/DL (ref 8.5–10.1)
CHLORIDE SERPL-SCNC: 101 MMOL/L (ref 94–109)
CO2 SERPL-SCNC: 33 MMOL/L (ref 20–32)
CREAT SERPL-MCNC: 0.55 MG/DL (ref 0.52–1.04)
GFR SERPL CREATININE-BSD FRML MDRD: 89 ML/MIN/{1.73_M2}
GLUCOSE SERPL-MCNC: 127 MG/DL (ref 70–99)
POTASSIUM SERPL-SCNC: 3.6 MMOL/L (ref 3.4–5.3)
SODIUM SERPL-SCNC: 136 MMOL/L (ref 133–144)

## 2019-05-29 PROCEDURE — 99214 OFFICE O/P EST MOD 30 MIN: CPT | Performed by: FAMILY MEDICINE

## 2019-05-29 PROCEDURE — 36415 COLL VENOUS BLD VENIPUNCTURE: CPT | Performed by: FAMILY MEDICINE

## 2019-05-29 PROCEDURE — 80048 BASIC METABOLIC PNL TOTAL CA: CPT | Performed by: FAMILY MEDICINE

## 2019-05-29 RX ORDER — TORSEMIDE 20 MG/1
TABLET ORAL
Qty: 90 TABLET | Refills: 0 | Status: CANCELLED | OUTPATIENT
Start: 2019-05-29

## 2019-05-29 ASSESSMENT — MIFFLIN-ST. JEOR: SCORE: 1071.89

## 2019-05-29 NOTE — LETTER
May 29, 2019      Khalida Rouse  [address] [facility] [address]        Dear ,    We are writing to inform you of your test results.  Please inform patient that test result was within normal parameters except for her blood sugar that was slightly elevated, if she was not fasting she should not need to worry about this.   Thank you.     Resulted Orders   Basic metabolic panel   Result Value Ref Range    Sodium 136 133 - 144 mmol/L    Potassium 3.6 3.4 - 5.3 mmol/L    Chloride 101 94 - 109 mmol/L    Carbon Dioxide 33 (H) 20 - 32 mmol/L    Anion Gap 2 (L) 3 - 14 mmol/L    Glucose 127 (H) 70 - 99 mg/dL    Urea Nitrogen 12 7 - 30 mg/dL    Creatinine 0.55 0.52 - 1.04 mg/dL    GFR Estimate 89 >60 mL/min/[1.73_m2]      Comment:      Non  GFR Calc  Starting 12/18/2018, serum creatinine based estimated GFR (eGFR) will be   calculated using the Chronic Kidney Disease Epidemiology Collaboration   (CKD-EPI) equation.      GFR Estimate If Black >90 >60 mL/min/[1.73_m2]      Comment:       GFR Calc  Starting 12/18/2018, serum creatinine based estimated GFR (eGFR) will be   calculated using the Chronic Kidney Disease Epidemiology Collaboration   (CKD-EPI) equation.      Calcium 9.3 8.5 - 10.1 mg/dL   If you have any questions or concerns, please call the clinic at the number listed above.     Sincerely,  Mireille Mclain MD

## 2019-05-29 NOTE — PROGRESS NOTES
Sandra Rouse is a 79 year old female who presents to clinic today for the following health issues:    Patient is a 79-year-old female who comes in for a hospital follow-up.  She was admitted on 5 /19-5/ 21 for pneumonia, atrial fibrillation with RVR and was prescribed antibiotics for the pneumonia but then was readmitted into the hospital on 5/23 for atrial fibrillation with RVR. Patient  states she is doing much better today denies any cough or shortness of breath. Vitals look much better her oxygen is 97% she does not have a temperature. Her blood pressures was within acceptable range.      She has a past medical history significant for atrial fibrillation and she has been on warfarin and rate control medications for about 10 years according to her.  Overall she reports that she is doing much better and breathing much better.    HPI       Hospital Follow-up Visit:    Hospital/Nursing Home/IP Rehab Facility: [facility]  Date of Admission: 5-19/5-22 5-23/5-24  Date of Discharge: 5-19 pneumonia 5-23 a-fib   Reason(s) for Admission: Patient is feeling better, does have a ? About the torsemide if she needs to stay on medication was given in ER, she has no swelling             Problems taking medications regularly:  None       Medication changes since discharge: Patient finished Zithromax is still taking the omnicef and torsemide        Problems adhering to non-medication therapy:  None    Summary of hospitalization:  [facility] discharge summary reviewed  Diagnostic Tests/Treatments reviewed.  Follow up needed: none  Other Healthcare Providers Involved in Patient s Care:         None  Update since discharge: improved.     Post Discharge Medication Reconciliation: discharge medications reconciled, continue medications without change.  Plan of care communicated with patient     Coding guidelines for this visit:  Type of Medical   Decision Making Face-to-Face Visit       within 7 Days of discharge  Face-to-Face Visit        within 14 days of discharge   Moderate Complexity 15505 [id number]   High Complexity 75622 [id number]                       Patient Active Problem List   Diagnosis     Atrial flutter     S/P AVR (aortic valve replacement)     Advanced directives, counseling/discussion     Osteoporosis     Pneumonia     Atrial fibrillation with RVR (H)     Long term current use of anticoagulant therapy     Other secondary pulmonary hypertension (H)     Thrombocytopenia (H)     Hypoxemia     Past Surgical History:   Procedure Laterality Date     CATARACT IOL, RT/LT Right      ENT SURGERY      glaucoma surgery     REPLACE VALVE AORTIC  4/10/2014    Procedure: REPLACE VALVE AORTIC;  Median sternotomy, Replace Aortic Valve on pump oxygenation. ;  Surgeon: Wesley Fong MD;  Location:  OR       Social History     Tobacco Use     Smoking status: Never Smoker     Smokeless tobacco: Never Used   Substance Use Topics     Alcohol use: No     Alcohol/week: 0.0 oz     Family History   Problem Relation Age of Onset     Dementia Brother      Alzheimer Disease Sister          Current Outpatient Medications   Medication Sig Dispense Refill     atenolol (TENORMIN) 50 MG tablet Take 1 tablet (50 mg) by mouth 2 times daily 180 tablet 0     calcium carbonate-vitamin D 500-400 MG-UNIT TABS tablt Take 1 tablet by mouth 3 times daily       cefdinir (OMNICEF) 300 MG capsule Take 1 capsule (300 mg) by mouth every 12 hours for 8 days 16 capsule 0     diltiazem ER COATED BEADS (CARDIZEM CD/CARTIA XT) 180 MG 24 hr capsule Take 1 capsule (180 mg) by mouth 2 times daily 180 capsule 3     torsemide (DEMADEX) 20 MG tablet TAKE ONE TABLET BY MOUTH ONCE DAILY AS NEEDED 90 tablet 0     warfarin (COUMADIN) 2.5 MG tablet TAKE TWO TABLETS BY MOUTH ON MONDAY, WEDNESDAY, FRIDAY AND ONE TABLET BY MOUTH ALL OTHER DAYS, OR AS DIRECTED BY THE COUMADIN CLINIC 120 tablet 0     acetaminophen (TYLENOL) 500 MG tablet Take 2 tablets (1,000 mg) by mouth  "every 6 hours as needed for mild pain (Patient not taking: Reported on 5/29/2019) 180 tablet 3     multivitamins with minerals (CERTAVITE) LIQD Take 15 mLs by mouth daily (Patient not taking: Reported on 5/29/2019)       Allergies   Allergen Reactions     Xarelto [Rivaroxaban] Diarrhea     Ace Inhibitors Cough     BP Readings from Last 3 Encounters:   05/29/19 108/56   05/23/19 105/60   05/21/19 103/62    Wt Readings from Last 3 Encounters:   05/29/19 58.5 kg (129 lb)   05/23/19 61.7 kg (136 lb)   05/19/19 60.3 kg (132 lb 15 oz)           Reviewed and updated as needed this visit by Provider         Review of Systems   ROS COMP: CONSTITUTIONAL:NEGATIVE for fever, chills, change in weight  INTEGUMENTARY/SKIN: NEGATIVE for worrisome rashes, moles or lesions  EYES: NEGATIVE for vision changes or irritation  RESP:NEGATIVE for significant cough or SOB  CV: NEGATIVE for chest pain, palpitations or peripheral edema  GI: NEGATIVE for nausea, abdominal pain, heartburn, or change in bowel habits  MUSCULOSKELETAL: NEGATIVE for significant arthralgias or myalgia  NEURO: NEGATIVE for weakness, dizziness or paresthesias      Objective    /56   Pulse 66   Temp 98  F (36.7  C) (Tympanic)   Resp 14   Ht 1.676 m (5' 6\")   Wt 58.5 kg (129 lb)   SpO2 97%   BMI 20.82 kg/m    Body mass index is 20.82 kg/m .  Physical Exam   GENERAL: healthy, alert and no distress  EYES: Eyes grossly normal to inspection, PERRL and conjunctivae and sclerae normal  HENT: ear canals and TM's normal, nose and mouth without ulcers or lesions  NECK: no adenopathy, no asymmetry, masses, or scars and thyroid normal to palpation  RESP: rales bibasilar  CV: regular rate and rhythm, normal S1 S2, no S3 or S4, no murmur, click or rub, no peripheral edema and peripheral pulses strong  ABDOMEN: soft, nontender, no hepatosplenomegaly, no masses and bowel sounds normal  MS: no gross musculoskeletal defects noted, no edema    Diagnostic Test Results:  Labs " reviewed in Epic        Assessment & Plan     (Z09) Hospital discharge follow-up  (primary encounter diagnosis)  Comment: Patient appears to be improving .   Plan: Recommend that she complete her antibiotics     (J81.0) Acute pulmonary edema (H)  Comment: Was found to be in mild heart failure in the second part of her admission. Started on Torsemide. She was wondering if she should continue. I reviewed her weight and she appears to be back at baseline. Patient was asked to use this only as needed  Plan: Basic metabolic panel            (J18.9) Pneumonia due to infectious organism, unspecified laterality, unspecified part of lung  Comment: Feeling better, encouraged that she complete her antibiotics   Plan: As above    (I48.2) Chronic atrial fibrillation (H)  Comment: Rate is controlled  Plan: Continue medication for atrial fibrillation.        FUTURE APPOINTMENTS:       - Follow-up visit in one month with regular PCP .    No follow-ups on file.    Mireille Mclain MD  [facility]

## 2019-05-29 NOTE — PATIENT INSTRUCTIONS
Thank you for choosing [facility].  You may be receiving an email and/or telephone survey request from [facility] [employer] regarding your visit today.  Please take a few minutes to respond to the survey to let us know how we are doing.      If you have questions or concerns, please contact us via [employer] or you can contact your care team at [phone].    Our Clinic hours are:  Monday 6:40 am  to 7:00 pm  Tuesday -Friday 6:40 am to 5:00 pm    The [place] outpatient lab hours are:  Monday - Friday 6:10 am to 4:45 pm  Saturdays 7:00 am to 11:00 am  Appointments are required, call [phone]    If you have clinical questions after hours or would like to schedule an appointment,  call the clinic at [phone].  Patient Education     Pulmonary Edema  Your healthcare provider has told you that you have pulmonary edema. Read on to learn more about pulmonary edema and how it can be treated.  What is pulmonary edema?     Pulmonary edema is fluid in the air sacs (alveoli) in the lungs.   Pulmonary edema occurs when the air sacs (alveoli) in your lungs fill with fluid. The fluid buildup makes it hard for the lungs to do their job, including getting oxygen from the air you breathe. This can make it hard to breathe. The most common cause of pulmonary edema is heart failure. When the heart doesn t work properly, it can cause pressure to rise in the veins (blood vessels) of the lungs. As pressure builds, fluid leaks out of the congested veins. It fills the alveoli. The extra fluid prevents oxygen from moving through the lungs as it should. But heart failure isn t the only cause of pulmonary edema. Damage to the lungs or kidney failure can also cause fluid to fill the lungs. And in some cases, living or exercising at high altitudes can lead to fluid buildup in the lungs.  How is pulmonary edema diagnosed?  Your healthcare provider does an exam and asks about your health history. You may also have one or  more of the following:    Blood tests to take samples of blood.    Imaging tests to take detailed pictures of inside the body. These may include a chest X-ray and ultrasound.    Electrocardiography (ECG)  and echocardiogram to test how well the heart is functioning.  How is pulmonary edema treated?  Treatment usually depends on what s causing the edema. For instance, if it s because of heart failure, treating the heart condition will treat the edema. Treatment can also ease symptoms. Therapy often includes the following:    Oxygen. This may be given through a mask that goes over the nose. It may be given through a small tube that sits under the nose. Sometimes, pressurized air will be needed through a tight fitting mask, using a machine called CPAP or BiPAP. Or it may be given through a tube placed into the windpipe (trachea). If a tube is required, a ventilator, often called a breathing machine or respirator will be used.    Medicines. These may include water pills (diuretics) to help your body get rid of extra fluid. The fluid passes out of your body as urine. You may also need medicines to treat the heart. These can help your heart work better. This helps reduce fluid buildup in the lungs.  What are the long-term concerns?  If treated right away, pulmonary edema can be improved. It may even be cured. But in some cases, ongoing treatment is needed to help control the problem. This may require having procedures or taking medicines for months or years. In some cases, you may need to use oxygen or breathing equipment for a long time. This can lead to complications such as damage to lung tissue. Your healthcare provider can tell you more if needed.  Call 911  Call 911 if any of these occur:    Chest pain    Severe trouble breathing    Coughing up blood    Skin turns blue      When to call your healthcare provider  Call your healthcare provider right away if you have any of the following:    Unusual or irregular  heartbeat    Unable to speak full sentences before running out of breath    Sweating more than usual   Date Last Reviewed: 2/1/2017 2000-2018 [employer]. [address] [facility], [place] [address]. All rights reserved. This information is not intended as a substitute for professional medical care. Always follow your healthcare professional's instructions.

## 2019-05-29 NOTE — PROGRESS NOTES
Clinic Care Coordination Contact  [facility]/Voicemail    Referral Source: IP Handoff  Clinical Data: Care Coordinator Outreach  Outreach attempted x 2.  Left message on voicemail with call back information and requested return call.  Plan: Care Coordinator will mail out care coordination introduction letter with care coordinator contact information and explanation of care coordination services. Care Coordinator will do no further outreaches at this time.      Pratibha HOODN, RN, PHN  Care Coordination    [facility]  [address] [place] [address]  Office: [phone]  Fax [phone]   [facility]  [address] [place] [address]  Office: [phone] Fax [phone]  [email or web address]   [email or web address]   Connect with [facility] on social media.

## 2019-05-29 NOTE — RESULT ENCOUNTER NOTE
Please inform patient that test result was within normal parameters except for her blood sugar that was slightly elevated, if she was not fasting she should not need to worry about this.   Thank you.     Mireille Mclain M.D.

## 2019-06-01 NOTE — PROGRESS NOTES
"Khalida Rouse  Gender: female  : 1940  [address]  [facility] [address]  [phone] (home)     Medical Record: [id number]  Pharmacy: [facility]  - [place], [place] - [address]  Primary Care Provider: Mazin Larsen    Parent's names are: Data Unavailable (mother) and Data Unavailable (father).      [facility]  May 31, 2019     Discharge Phone Call:  Key Words/Key Times      Introduction - AIDET (Acknowledge, Introduce, Duration, Explanation)      Empathy-   We are calling to see how you are since your recent stay in the hospital?     Call back COMMENTS: Doing very well.       Clinical Questions -  (f/u appts, medication side effects/purpose, ability to care for self at home) \"For your safety, it is important to us that you understand the purpose and side effects of your medications, can you tell me what your new medications are?\"     Call back COMMENTS: Nothing.       Staff Recognition -  We like to recognize staff and physicians who have done an excellent job.  Do you remember any people from your care team that you would like recognize?     Call back COMMENTS: No, I don't remember any names. Everyone was good.       Very Good Care -  We want to provide very good care to all patients.  How was your care?     Call back COMMENTS: Good.       Opportunities for Improvement -  Our goal is to be the best.  Do you have any suggestions for things that we could improve upon?     Call back COMMENTS: None.       Thank You         "

## 2019-06-10 ENCOUNTER — ANCILLARY PROCEDURE (OUTPATIENT)
Dept: GENERAL RADIOLOGY | Facility: OTHER | Age: 79
End: 2019-06-10
Attending: INTERNAL MEDICINE
Payer: MEDICARE

## 2019-06-10 ENCOUNTER — OFFICE VISIT (OUTPATIENT)
Dept: FAMILY MEDICINE | Facility: OTHER | Age: 79
End: 2019-06-10
Payer: MEDICARE

## 2019-06-10 VITALS
TEMPERATURE: 97.3 F | HEART RATE: 70 BPM | RESPIRATION RATE: 16 BRPM | SYSTOLIC BLOOD PRESSURE: 128 MMHG | OXYGEN SATURATION: 95 % | BODY MASS INDEX: 21.95 KG/M2 | DIASTOLIC BLOOD PRESSURE: 60 MMHG | WEIGHT: 136 LBS

## 2019-06-10 DIAGNOSIS — Z95.2 S/P AVR (AORTIC VALVE REPLACEMENT): ICD-10-CM

## 2019-06-10 DIAGNOSIS — J18.9 PNEUMONIA OF RIGHT MIDDLE LOBE DUE TO INFECTIOUS ORGANISM: ICD-10-CM

## 2019-06-10 DIAGNOSIS — I48.91 ATRIAL FIBRILLATION WITH RVR (H): ICD-10-CM

## 2019-06-10 DIAGNOSIS — J18.9 PNEUMONIA OF RIGHT MIDDLE LOBE DUE TO INFECTIOUS ORGANISM: Primary | ICD-10-CM

## 2019-06-10 PROCEDURE — 99213 OFFICE O/P EST LOW 20 MIN: CPT | Performed by: INTERNAL MEDICINE

## 2019-06-10 PROCEDURE — 71046 X-RAY EXAM CHEST 2 VIEWS: CPT | Mod: FY

## 2019-06-10 ASSESSMENT — PAIN SCALES - GENERAL: PAINLEVEL: NO PAIN (0)

## 2019-06-10 NOTE — PROGRESS NOTES
Subjective     Khalida Rouse is a 79 year old female who presents to clinic today for the following health issues:    HPI   Chief Complaint   Patient presents with     Follow Up     pnuemonia and Afib                     Chief Complaint         The patient is a pleasant 79-year-old female who was recently hospitalized with a right middle lobe pneumonia.  She was seen by me a few days previous and had no symptoms or findings on examination.  She subsequently developed a pneumonia which was associated with hypoxia and weakness.  She was discharged on antibiotics and subsequently readmitted with tachycardia associated with uncontrolled atrial fibrillation.  She has a long-standing history of atrial fibrillation but generally the rate has been controlled.  She was treated in the hospital with IV diltiazem and this resolved, she was stabilized and subsequently sent home.  She is now feeling well and doing well.                         PAST, FAMILY,SOCIAL HISTORY:     Medical  History:   has a past medical history of Aortic valve stenosis, Atrial flutter (H), Cardiomyopathy (H), Severe aortic stenosis (4/7/2014), and Shoulder fracture, left (3/22/15).     Surgical History:   has a past surgical history that includes ENT surgery; Replace valve aortic (4/10/2014); and cataract iol, rt/lt (Right).     Social History:   reports that she has never smoked. She has never used smokeless tobacco. She reports that she does not drink alcohol or use drugs.     Family History:  family history includes Alzheimer Disease in her sister; Dementia in her brother.            MEDICATIONS  Current Outpatient Medications   Medication Sig Dispense Refill     acetaminophen (TYLENOL) 500 MG tablet Take 2 tablets (1,000 mg) by mouth every 6 hours as needed for mild pain 180 tablet 3     atenolol (TENORMIN) 50 MG tablet Take 1 tablet (50 mg) by mouth 2 times daily 180 tablet 0     calcium carbonate-vitamin D 500-400 MG-UNIT TABS tablt Take 1 tablet  by mouth 3 times daily       diltiazem ER COATED BEADS (CARDIZEM CD/CARTIA XT) 180 MG 24 hr capsule Take 1 capsule (180 mg) by mouth 2 times daily 180 capsule 3     multivitamins with minerals (CERTAVITE) LIQD Take 15 mLs by mouth daily       warfarin (COUMADIN) 2.5 MG tablet TAKE TWO TABLETS BY MOUTH ON MONDAY, WEDNESDAY, FRIDAY AND ONE TABLET BY MOUTH ALL OTHER DAYS, OR AS DIRECTED BY THE COUMADIN CLINIC 120 tablet 0     torsemide (DEMADEX) 20 MG tablet TAKE ONE TABLET BY MOUTH ONCE DAILY AS NEEDED (Patient not taking: Reported on 6/10/2019) 90 tablet 0         --------------------------------------------------------------------------------------------------------------------                              Review of Systems       LUNGS: Pt denies: cough, excess sputum, hemoptysis, or shortness of breath.   HEART: Pt denies: chest pain, sensed arrhythmia, syncope, tachy or bradyarrhythmia.  No edema is noted by the patient   GI: Pt denies: nausea, vomiting, diarrhea, constipation, melena, or hematochezia.   NEURO: Pt denies: seizures, strokes, diplopia, weakness, paraesthesias, or paralysis.   SKIN: Pt denies: itching, rashes, discoloration, or specific lesions of concern. Denies recent hair loss.   PSYCH: The patient denies significant depression, anxiety, mood imbalance. Specifically denies any suicidal ideation.                                     Examination    /60 (BP Location: Left arm, Patient Position: Sitting, Cuff Size: Adult Regular)   Pulse 70   Temp 97.3  F (36.3  C) (Temporal)   Resp 16   Wt 61.7 kg (136 lb)   SpO2 95%   BMI 21.95 kg/m       Constitutional: The patient appears to be in no acute distress. The patient appears to be adequately hydrated. No acute respiratory or hemodynamic distress is noted at this time.   LUNGS:airflow is brisk, no intercostal retraction or stridor is noted. No coughing is noted during visit.  Breath sounds demonstrate occasional rhonchi in the lower right  fields.  This does generally clear with cough.  No stridor is noted   HEART:  without rubs, clicks, gallops. PMI is nondisplaced. Upstrokes are brisk. S1,S2 are heard.  Slight valvular closing sound consistent with a bioprosthetic valve is noted.   GI: Abdomen is soft, without rebound, guarding or tenderness. Bowel sounds are appropriate. No renal bruits are heard.   NEURO: Pt is alert and appropriate. No neurologic lateralization is noted. Cranial nerves 2-12 are intact. Peripheral sensory and motor function are grossly normal.    SKIN:  warm and dry. No erythema, or rashes are noted. No specific lesions of concern are noted.    PSYCH: The patient appears grossly appropriate. Maintains good eye contact, does not have any jittery or atypical motion. Displays appropriate affect.                                           Decision Making    1. Pneumonia of right middle lobe due to infectious organism (H)  Repeat chest x-ray for clearance.    - XR Chest 2 Views; Future    2. Atrial fibrillation with RVR (H)  Rate currently controlled with beta-blocker    3. S/P AVR (aortic valve replacement)  Stable not requiring anticoagulation                         FOLLOW UP   I have asked the patient to make an appointment for followup with me In 1 month            I have carefully explained the diagnosis and treatment options to the patient.  The patient has displayed an understanding of the above, and all subsequent questions were answered.      DO MARIA D Sabillon    Portions of this note were produced using [employer]  Although every attempt at real-time proof reading has been made, occasional grammar/syntax errors may have been missed.

## 2019-06-10 NOTE — LETTER
June 11, 2019      Khalida Rouse  [address] [facility] [address]        Dear Khalida, your recent test results are attached.   Chest x-ray demonstrates mild chronic congestive heart failure which has improved.  The heart is somewhat enlarged.     Feel free to contact me via the office or My Chart if you have any questions regarding the above.     Sincerely,    Mazin Larsen, DO      Resulted Orders   XR Chest 2 Views    Narrative    CHEST TWO VIEWS   6/10/2019 2:42 PM     HISTORY: Pneumonia of right middle lobe due to infectious organism  (H).    COMPARISON: Chest x-rays dated 5/23/2019, 3/5/2016 and 5/19/2019.    FINDINGS:  Postop changes status post aortic valve replacement are  again noted. The patchy opacity in the mid right lung has  significantly improved and nearly completely resolved since prior  study. There is persistent mild vascular congestion. There are small  bilateral pleural fluid collections. These have decreased in size  since the prior study. Heart remains enlarged. No pneumothorax. No new  infiltrate. Irregularity proximal left humerus likely represents a  chronic healed fracture. Superior endplate compression of the  approximate L2 vertebral body was also seen previously on 5/19/2019  and 3/5/2016 but not previous to 3/5/2016. No acute fracture is seen.      Impression    IMPRESSION:  1. Findings of mild chronic congestive heart failure have improved  since prior study.  2. Patchy opacity which could have represented an infiltrate versus  pseudotumor in the mid right lung has resolved.  3. Cardiomegaly is again noted.  4. Postop changes of the chest as described.  5. No acute fracture or malalignment.    BRIAN MCKEON MD

## 2019-06-11 NOTE — RESULT ENCOUNTER NOTE
Dear Khalida, your recent test results are attached.  Chest x-ray demonstrates mild chronic congestive heart failure which has improved.  The heart is somewhat enlarged.    Feel free to contact me via the office or My Chart if you have any questions regarding the above.

## 2019-06-12 NOTE — MR AVS SNAPSHOT
Khalida ARMAAN Rouse   7/12/2017 1:15 PM   Anticoagulation Therapy Visit    Description:  77 year old female   Provider:  JOSE ANTI COAG   Department:  [facility]           INR as of 7/12/2017     Today's INR 2.2      Anticoagulation Summary as of 7/12/2017     INR goal 2.0-3.0   Today's INR 2.2   Full instructions 5 mg on Mon, Wed, Fri; 2.5 mg all other days   Next INR check 8/2/2017    Indications   Long-term (current) use of anticoagulants [Z79.01] [Z79.01]  Atrial fibrillation with RVR (H) [I48.91]  S/P AVR (aortic valve replacement) [Z95.2]         Your next Anticoagulation Clinic appointment(s)     Aug 02, 2017  1:00 PM CDT   Anticoagulation Visit with JOSE ANTI LESLY   [facility] ([facility])    [address] [place] [id number]   [phone]              Contact Numbers     Clinic Number:         July 2017 Details    Sun Mon Tue Wed Thu Fri Sat           1                 2               3               4               5               6               7               8                 9               10               11               12      5 mg   See details      13      2.5 mg         14      5 mg         15      2.5 mg           16      2.5 mg         17      5 mg         18      2.5 mg         19      5 mg         20      2.5 mg         21      5 mg         22      2.5 mg           23      2.5 mg         24      5 mg         25      2.5 mg         26      5 mg         27      2.5 mg         28      5 mg         29      2.5 mg           30      2.5 mg         31      5 mg               Date Details   07/12 This INR check               How to take your warfarin dose     To take:  2.5 mg Take 1 of the 2.5 mg tablets.    To take:  5 mg Take 2 of the 2.5 mg tablets.           August 2017 Details    Sun Mon Tue Wed Thu Fri Sat       1      2.5 mg         2            3               4               5                 6               7               8               9                10               11               12                 13               14               15               16               17               18               19                 20               21               22               23               24               25               26                 27               28               29               30               31                  Date Details   No additional details    Date of next INR:  8/2/2017         How to take your warfarin dose     To take:  2.5 mg Take 1 of the 2.5 mg tablets.    To take:  5 mg Take 2 of the 2.5 mg tablets.            Alert-The patient is alert, awake and responds to voice. The patient is oriented to time, place, and person. The triage nurse is able to obtain subjective information.

## 2019-06-17 ENCOUNTER — APPOINTMENT (OUTPATIENT)
Dept: GENERAL RADIOLOGY | Facility: CLINIC | Age: 79
End: 2019-06-17
Attending: EMERGENCY MEDICINE
Payer: MEDICARE

## 2019-06-17 ENCOUNTER — NURSE TRIAGE (OUTPATIENT)
Dept: FAMILY MEDICINE | Facility: OTHER | Age: 79
End: 2019-06-17

## 2019-06-17 ENCOUNTER — HOSPITAL ENCOUNTER (EMERGENCY)
Facility: CLINIC | Age: 79
Discharge: HOME | End: 2019-06-17
Attending: EMERGENCY MEDICINE | Admitting: EMERGENCY MEDICINE
Payer: MEDICARE

## 2019-06-17 VITALS
WEIGHT: 134 LBS | BODY MASS INDEX: 21.53 KG/M2 | OXYGEN SATURATION: 98 % | DIASTOLIC BLOOD PRESSURE: 88 MMHG | RESPIRATION RATE: 18 BRPM | SYSTOLIC BLOOD PRESSURE: 139 MMHG | HEIGHT: 66 IN | HEART RATE: 87 BPM | TEMPERATURE: 98.2 F

## 2019-06-17 DIAGNOSIS — S60.10XA SUBUNGUAL HEMATOMA OF FINGER OF RIGHT HAND, INITIAL ENCOUNTER: ICD-10-CM

## 2019-06-17 DIAGNOSIS — I48.91 ATRIAL FIBRILLATION WITH CONTROLLED VENTRICULAR RATE (H): ICD-10-CM

## 2019-06-17 PROCEDURE — 73130 X-RAY EXAM OF HAND: CPT | Mod: TC,RT

## 2019-06-17 PROCEDURE — 93010 ELECTROCARDIOGRAM REPORT: CPT | Mod: 59 | Performed by: EMERGENCY MEDICINE

## 2019-06-17 PROCEDURE — 99284 EMERGENCY DEPT VISIT MOD MDM: CPT | Mod: 25 | Performed by: EMERGENCY MEDICINE

## 2019-06-17 PROCEDURE — 93005 ELECTROCARDIOGRAM TRACING: CPT | Mod: 59 | Performed by: EMERGENCY MEDICINE

## 2019-06-17 PROCEDURE — 11740 EVACUATION SUBUNGUAL HMTMA: CPT | Performed by: EMERGENCY MEDICINE

## 2019-06-17 PROCEDURE — 11740 EVACUATION SUBUNGUAL HMTMA: CPT | Mod: F8 | Performed by: EMERGENCY MEDICINE

## 2019-06-17 ASSESSMENT — MIFFLIN-ST. JEOR: SCORE: 1094.57

## 2019-06-17 NOTE — TELEPHONE ENCOUNTER
"Khalida Rouse is a 79 year old female who calls with heart palpitations.    NURSING ASSESSMENT:  Description:  Patient is calling at [facility] from 8:30 to 9:00 this morning she had heart palpitations constantly.  She stated she was unable to sleep last evening and was feeling \"different'.  She stated she was just diagnosed last week with some heart issues and has nothing prescribed at this time for her heart.  Patient states she wanted to know if she should go to the ER, she stated that she thought she should go.  Patient denies chest, neck, jaw, or arm pain, difficulty breathing, fainting. Advised patient to go to ER with someone else driving.  Advised patient that heart palpitations can be normal, but lasting 30 minutes is by protocol a symptoms to seek medical care now.  Patient states understanding    Onset/duration:  Less than 24 hours  Precip. factors:  Stated above  Associated symptoms:  Stated above  Improves/worsens symptoms:  NA  Pain scale (0-10)   0/10    Allergies:   Allergies   Allergen Reactions     Xarelto [Rivaroxaban] Diarrhea     Ace Inhibitors Cough         NURSING PLAN: Nursing advice to patient to ER    RECOMMENDED DISPOSITION:  To ED, another person to drive -   Will comply with recommendation: Yes  If further questions/concerns or if symptoms do not improve, worsen or new symptoms develop, call your PCP or [facility] as soon as possible.      Guideline used: Heart Rate Problems  Telephone Triage Protocols for Nurses, Fifth Edition, Ashly Stanford RN    Reason for Disposition    [1] Heart beating very rapidly (e.g., > 140 / minute) AND [2] present now  (Exception: during exercise)    [1] Heart beating very rapidly (e.g., > 140 / minute) AND [2] not present now  (Exception: during exercise)    [1] Skipped or extra beat(s) AND [2] occurs 4 or more times per minute    History of heart disease  (i.e., heart attack, bypass surgery, angina, angioplasty, CHF) (Exception: " "brief heart beat symptoms that went away and now feels well)    Additional Information    Negative: Passed out (i.e., lost consciousness, collapsed and was not responding)    Negative: Shock suspected (e.g., cold/pale/clammy skin, too weak to stand, low BP, rapid pulse)    Negative: Difficult to awaken or acting confused (e.g., disoriented, slurred speech)    Negative: Visible sweat on face or sweat dripping down face    Negative: Unable to walk, or can only walk with assistance (e.g., requires support)    Negative: [1] Received SHOCK from implantable cardiac defibrillator AND [2] persisting symptoms (i.e., palpitations, lightheadedness)    Negative: Sounds like a life-threatening emergency to the triager    Negative: Chest pain    Negative: Difficulty breathing    Negative: Dizziness, lightheadedness, or weakness    Negative: Heart beating very slowly (e.g., < 50 / minute)  (Exception: athlete)    Negative: New or worsened shortness of breath with activity (dyspnea on exertion)    Negative: Patient sounds very sick or weak to the triager    Negative: [1] Skipped or extra beat(s) AND [2] increases with exercise or exertion    Negative: New or worsened ankle swelling    Negative: Age > 60 years (Exception: brief heart beat symptoms that went away and now feels well)    Negative: Taking water pill (i.e., diuretic) or heart medication (e.g., digoxin)    Negative: Wearing a \"holter monitor\" or \"cardiac event monitor\"    Negative: [1] Received SHOCK from implantable cardiac defibrillator AND [2] now feels well    Negative: History of hyperthyroidism or taking thyroid medication    Negative: Known or suspected substance abuse (e.g., cocaine, alcohol abuse)    Negative: [1] Palpitations AND [2] no improvement after using CARE ADVICE    Negative: Problems with anxiety or stress    Negative: Palpitations are a chronic symptom (recurrent or ongoing AND present > 4 weeks)    Negative: [1] Skipped or extra beat(s) AND [2] " occurs < 4 times / minute    Protocols used: HEART RATE AND HEARTBEAT [id number]

## 2019-06-17 NOTE — ED TRIAGE NOTES
Patient presents with concerns for rapid heart rate off and on that is lasting for more than 30 minutes at a time. Recent dx of a-fib. Milady Friedman RN

## 2019-06-17 NOTE — ED PROVIDER NOTES
History     Chief Complaint   Patient presents with     Tachycardia     The history is provided by the patient.     Khalida Rouse is a 79 year old female who presents to the emergency department for tachycardia. Patient reports having a rapid heart beat since this morning. She states she ate breakfast, went to take a nap and when she woke up her heart was racing. She states this episode lasted for more than 30 minutes. She was recently diagnosed with A-fib and informed if these episodes last more than 30 minutes to be seen. She denies any chest pain or shortness of breath.    Allergies:  Allergies   Allergen Reactions     Xarelto [Rivaroxaban] Diarrhea     Ace Inhibitors Cough       Problem List:    Patient Active Problem List    Diagnosis Date Noted     Hypoxemia 05/23/2019     Priority: Medium     Thrombocytopenia (H) 05/20/2019     Priority: Medium     Other secondary pulmonary hypertension (H) 05/14/2019     Priority: Medium     Long term current use of anticoagulant therapy 03/11/2016     Priority: Medium     Atrial fibrillation with RVR (H) 01/11/2016     Priority: Medium     Pneumonia 01/09/2016     Priority: Medium     Osteoporosis 10/26/2015     Priority: Medium     Advanced directives, counseling/discussion 10/03/2014     Priority: Medium     Declined       S/P AVR (aortic valve replacement) 04/10/2014     Priority: Medium     Atrial flutter 02/04/2014     Priority: Medium        Past Medical History:    Past Medical History:   Diagnosis Date     Aortic valve stenosis      Atrial flutter (H)      Cardiomyopathy (H)      Severe aortic stenosis 4/7/2014     Shoulder fracture, left 3/22/15       Past Surgical History:    Past Surgical History:   Procedure Laterality Date     CATARACT IOL, RT/LT Right      ENT SURGERY      glaucoma surgery     REPLACE VALVE AORTIC  4/10/2014    Procedure: REPLACE VALVE AORTIC;  Median sternotomy, Replace Aortic Valve on pump oxygenation. ;  Surgeon: Wesley Fong,  "MD;  Location:  OR       Family History:    Family History   Problem Relation Age of Onset     Dementia Brother      Alzheimer Disease Sister        Social History:  Marital Status:   [5]  Social History     Tobacco Use     Smoking status: Never Smoker     Smokeless tobacco: Never Used   Substance Use Topics     Alcohol use: No     Alcohol/week: 0.0 oz     Drug use: No        Medications:      atenolol (TENORMIN) 50 MG tablet   diltiazem ER COATED BEADS (CARDIZEM CD/CARTIA XT) 180 MG 24 hr capsule   warfarin (COUMADIN) 2.5 MG tablet   acetaminophen (TYLENOL) 500 MG tablet   calcium carbonate-vitamin D 500-400 MG-UNIT TABS tablt   multivitamins with minerals (CERTAVITE) LIQD   torsemide (DEMADEX) 20 MG tablet         Review of Systems   All other systems reviewed and are negative.      Physical Exam   BP: 139/88  Pulse: 87(radial pulse; irregular)  Heart Rate: 65  Temp: 98.2  F (36.8  C)  Resp: 16  Height: 167.6 cm (5' 6\")  Weight: 60.8 kg (134 lb)  SpO2: 97 %      Physical Exam   Constitutional: She is oriented to person, place, and time. She appears well-developed and well-nourished. No distress.   HENT:   Head: Normocephalic and atraumatic.   Right Ear: External ear normal.   Left Ear: External ear normal.   Nose: Nose normal.   Mouth/Throat: Oropharynx is clear and moist. No oropharyngeal exudate.   Eyes: Pupils are equal, round, and reactive to light. Conjunctivae and EOM are normal. Right eye exhibits no discharge. Left eye exhibits no discharge. No scleral icterus.   She has left eye erythema along with some blood.    Neck: Normal range of motion. Neck supple. No thyromegaly present.   Cardiovascular: Normal rate, normal heart sounds and intact distal pulses. An irregularly irregular rhythm present. Exam reveals no friction rub.   No murmur heard.  She has an artificial heart valve.   Pulmonary/Chest: Effort normal and breath sounds normal. No respiratory distress. She has no wheezes. She has no " rales. She exhibits no tenderness.   Abdominal: Soft. Bowel sounds are normal. She exhibits no distension and no mass. There is no tenderness. There is no rebound and no guarding.   Musculoskeletal: Normal range of motion. She exhibits edema. She exhibits no tenderness or deformity.   Right hand has swelling and subungual hematoma of her ring finger and middle finger.   Lymphadenopathy:     She has no cervical adenopathy.   Neurological: She is alert and oriented to person, place, and time. No cranial nerve deficit.   Skin: Skin is warm and dry. No rash noted. She is not diaphoretic. No erythema.   Psychiatric: She has a normal mood and affect. Her behavior is normal. Thought content normal.   Nursing note and vitals reviewed.      ED Course        Procedures  Subungual hematomas of the right long and ring finger are drained with electrocautery.  Single defect made in each nail without difficulty.  Patient tolerated well.  Good pain relief.             Critical Care time:  none  EKG consistent with atrial fibrillation, ventricular rate of 60 bpm.  No acute ST segment or T wave changes noted.            Results for orders placed or performed during the hospital encounter of 06/17/19 (from the past 24 hour(s))   XR Hand Right 3 Views    Narrative    XR HAND RT G/E 3 VW 6/17/2019 10:53 AM     HISTORY: trauma to long and ring finger      Impression    IMPRESSION: Osteopenia. No apparent fracture or dislocation.    EVENS BRADFORD MD       Medications - No data to display    Assessments & Plan (with Medical Decision Making)     I have reviewed the nursing notes.    I have reviewed the findings, diagnosis, plan and need for follow up with the patient.          Medication List      There are no discharge medications for this visit.         Final diagnoses:   Atrial fibrillation with controlled ventricular rate (H)   Subungual hematoma of finger of right hand, initial encounter     This document serves as a record of  services personally performed by Singh Hall MD. It was created on their behalf by Shana Crespo, a trained medical scribe. The creation of this record is based on the provider's personal observations and the statements of the patient. This document has been checked and approved by the attending provider.    Note: Chart documentation done in part with Dragon Voice Recognition software. Although reviewed after completion, some word and grammatical errors may remain.    6/17/2019   [facility] EMERGENCY DEPARTMENT     Singh Hall MD  06/17/19 [id number]

## 2019-06-17 NOTE — ED AVS SNAPSHOT
[facility]  [address] [place] [address]  Phone:  [phone]  Fax:  [phone]                                    Khalida Rouse   MRN: [id number]    Department:  [facility]   Date of Visit:  6/17/2019           After Visit Summary Signature Page    I have received my discharge instructions, and my questions have been answered. I have discussed any challenges I see with this plan with the nurse or doctor.    ..........................................................................................................................................  Patient/Patient Representative Signature      ..........................................................................................................................................  Patient Representative Print Name and Relationship to Patient    ..................................................               ................................................  Date                                   Time    ..........................................................................................................................................  Reviewed by Signature/Title    ...................................................              ..............................................  Date                                               Time          22EPIC Rev 08/18

## 2019-06-18 ENCOUNTER — PATIENT OUTREACH (OUTPATIENT)
Dept: CARE COORDINATION | Facility: CLINIC | Age: 79
End: 2019-06-18

## 2019-06-18 ENCOUNTER — ANTICOAGULATION THERAPY VISIT (OUTPATIENT)
Dept: ANTICOAGULATION | Facility: CLINIC | Age: 79
End: 2019-06-18
Payer: MEDICARE

## 2019-06-18 DIAGNOSIS — Z95.2 S/P AVR (AORTIC VALVE REPLACEMENT): ICD-10-CM

## 2019-06-18 DIAGNOSIS — I48.91 ATRIAL FIBRILLATION WITH RVR (H): ICD-10-CM

## 2019-06-18 DIAGNOSIS — Z79.01 LONG TERM CURRENT USE OF ANTICOAGULANT THERAPY: ICD-10-CM

## 2019-06-18 LAB — INR POINT OF CARE: 2.6 (ref 0.9–1.1)

## 2019-06-18 PROCEDURE — 36416 COLLJ CAPILLARY BLOOD SPEC: CPT

## 2019-06-18 PROCEDURE — 85610 PROTHROMBIN TIME: CPT | Mod: QW

## 2019-06-18 PROCEDURE — 99207 ZZC NO CHARGE NURSE ONLY: CPT

## 2019-06-18 NOTE — PROGRESS NOTES
Clinic Care Coordination Contact  [facility]/Voicemail    [facility] Emergency Department 6/17/19, for Tachycardia.     Clinical Data: Care Coordinator Outreach  Outreach attempted x 1.  Left message on [employer] with call back information and requested return call.  Plan: Care Coordinator mailed out care coordination introduction letter on 5/29/19. Care Coordinator will try to reach patient again in 1-2 business days.    ALIN Bello RN Clinic Care Coordinator   [place], [place], [place]  Phone: [phone]

## 2019-06-18 NOTE — PROGRESS NOTES
ANTICOAGULATION FOLLOW-UP CLINIC VISIT    Patient Name:  Khalida Rouse  Date:  2019  Contact Type:  Face to Face    SUBJECTIVE:  Patient Findings     Comments:   The patient was assessed for diet, medication, and activity level changes, missed or extra doses, bruising or bleeding, with no problem findings.  Venessa Hood RN          Clinical Outcomes     Negatives:   Major bleeding event, Thromboembolic event, Anticoagulation-related hospital admission, Anticoagulation-related ED visit, Anticoagulation-related fatality    Comments:   The patient was assessed for diet, medication, and activity level changes, missed or extra doses, bruising or bleeding, with no problem findings.  Venessa Hood RN             OBJECTIVE    INR Protime   Date Value Ref Range Status   2019 2.6 (A) 0.9 - 1.1 Final       ASSESSMENT / PLAN  INR assessment THER    Recheck INR In: 6 WEEKS    INR Location Clinic      Anticoagulation Summary  As of 2019    INR goal:   2.0-3.0   TTR:   80.6 % (3.2 y)   INR used for dosin.6 (2019)   Warfarin maintenance plan:   5 mg (2.5 mg x 2) every Mon, Wed, Fri; 2.5 mg (2.5 mg x 1) all other days   Full warfarin instructions:   5 mg every Mon, Wed, Fri; 2.5 mg all other days   Weekly warfarin total:   25 mg   No change documented:   Venessa Hood RN   Plan last modified:   Venessa Hood RN (10/12/2018)   Next INR check:   2019   Target end date:       Indications    Long term current use of anticoagulant therapy [Z79.01]  Atrial fibrillation with RVR (H) [I48.91]  S/P AVR (aortic valve replacement) [Z95.2]             Anticoagulation Episode Summary     INR check location:       Preferred lab:       Send INR reminders to:   GIOVANNA HADLEY    Comments:   2.5 mg tablets, book, BP, PM dose      Anticoagulation Care Providers     Provider Role Specialty Phone number    Dorcas Fisher MD [facility] [phone]            See the Encounter Report to  view Anticoagulation Flowsheet and Dosing Calendar (Go to Encounters tab in chart review, and find the Anticoagulation Therapy Visit)    Dosage adjustment made based on physician directed care plan.    Venessa Hood RN

## 2019-06-20 RX ORDER — WARFARIN SODIUM 2.5 MG/1
TABLET ORAL
Qty: 120 TABLET | Refills: 0 | Status: SHIPPED | OUTPATIENT
Start: 2019-06-20 | End: 2019-09-01

## 2019-06-20 RX ORDER — ATENOLOL 50 MG/1
TABLET ORAL
Qty: 180 TABLET | Refills: 1 | Status: SHIPPED | OUTPATIENT
Start: 2019-06-20 | End: 2019-12-20

## 2019-06-20 NOTE — TELEPHONE ENCOUNTER
"Requested Prescriptions   Pending Prescriptions Disp Refills     atenolol (TENORMIN) 50 MG tablet [Pharmacy Med Name: ATENOLOL 50MG TABS] 180 tablet 0     Sig: TAKE ONE TABLET BY MOUTH TWICE A DAY   Last Written Prescription Date:  4/2/19  Last Fill Quantity: 180,  # refills: 0   Last office visit: 6/10/2019 with prescribing provider:  6/10/19   Future Office Visit:        Beta-Blockers Protocol Passed - 6/18/2019  2:16 PM        Passed - Blood pressure under 140/90 in past 12 months     BP Readings from Last 3 Encounters:   06/17/19 139/88   06/10/19 128/60   05/29/19 108/56                 Passed - Patient is age 6 or older        Passed - Recent (12 mo) or future (30 days) visit within the authorizing provider's specialty     Patient had office visit in the last 12 months or has a visit in the next 30 days with authorizing provider or within the authorizing provider's specialty.  See \"Patient Info\" tab in inbasket, or \"Choose Columns\" in Meds & Orders section of the refill encounter.              Passed - Medication is active on med list        warfarin (COUMADIN) 2.5 MG tablet [Pharmacy Med Name: WARFARIN SODIUM 2.5MG TABS] 120 tablet 0     Sig: TAKE 2 TABLETS BY MOUTH ON MON, WED, FRI AND 1 TABLET ALL OTHER DAYS OR AS DIRECTED BY THE COUMADIN CLINIC   Last Written Prescription Date:  4/8/19  Last Fill Quantity: 120,  # refills: 0   Last office visit: 6/10/2019 with prescribing provider:  6/10/19   Future Office Visit:        Vitamin K Antagonists Failed - 6/18/2019  2:16 PM        Failed - INR is within goal in the past 6 weeks     Confirm INR is within goal in the past 6 weeks.     Recent Labs   Lab Test 06/18/19   INR 2.6*                       Passed - Recent (12 mo) or future (30 days) visit within the authorizing provider's specialty     Patient had office visit in the last 12 months or has a visit in the next 30 days with authorizing provider or within the authorizing provider's specialty.  See \"Patient " "Info\" tab in inbasket, or \"Choose Columns\" in Meds & Orders section of the refill encounter.              Passed - Medication is active on med list        Passed - Patient is 18 years of age or older        Passed - Patient is not pregnant        Passed - No positive pregnancy on file in past 12 months        "

## 2019-06-20 NOTE — PROGRESS NOTES
Clinic Care Coordination Contact  [facility]/Voicemail    Referral Source: IP Handoff  Clinical Data: Care Coordinator Outreach  Outreach attempted x 2.  Left message on voicemail with call back information and requested return call.  Plan:   1. Care Coordinator mailed out care coordination introduction letter on 5/29/19.   2. Care Coordinator will do no further outreaches at this time.    ALIN Bello RN Clinic Care Coordinator   [place], [place], [place]  Phone: [phone]

## 2019-06-20 NOTE — TELEPHONE ENCOUNTER
Prescription approved per [facility] Refill Protocol (Atenolol).     Will route coumadin refill to Coumadin pool.     Akiko Camarena, SANAN, RN  [facility]

## 2019-07-13 ENCOUNTER — APPOINTMENT (OUTPATIENT)
Dept: GENERAL RADIOLOGY | Facility: CLINIC | Age: 79
End: 2019-07-13
Attending: FAMILY MEDICINE
Payer: MEDICARE

## 2019-07-13 ENCOUNTER — HOSPITAL ENCOUNTER (EMERGENCY)
Facility: CLINIC | Age: 79
Discharge: HOME | End: 2019-07-13
Attending: FAMILY MEDICINE | Admitting: FAMILY MEDICINE
Payer: MEDICARE

## 2019-07-13 VITALS
WEIGHT: 135 LBS | DIASTOLIC BLOOD PRESSURE: 83 MMHG | HEART RATE: 65 BPM | OXYGEN SATURATION: 96 % | BODY MASS INDEX: 21.79 KG/M2 | SYSTOLIC BLOOD PRESSURE: 125 MMHG | TEMPERATURE: 98.9 F | RESPIRATION RATE: 18 BRPM

## 2019-07-13 DIAGNOSIS — R30.0 DYSURIA: ICD-10-CM

## 2019-07-13 LAB
ALBUMIN UR-MCNC: NEGATIVE MG/DL
APPEARANCE UR: CLEAR
BACTERIA #/AREA URNS HPF: ABNORMAL /HPF
BILIRUB UR QL STRIP: NEGATIVE
COLOR UR AUTO: YELLOW
GLUCOSE UR STRIP-MCNC: NEGATIVE MG/DL
HGB UR QL STRIP: NEGATIVE
KETONES UR STRIP-MCNC: NEGATIVE MG/DL
LEUKOCYTE ESTERASE UR QL STRIP: ABNORMAL
MUCOUS THREADS #/AREA URNS LPF: PRESENT /LPF
NITRATE UR QL: NEGATIVE
PH UR STRIP: 6 PH (ref 5–7)
RBC #/AREA URNS AUTO: <1 /HPF (ref 0–2)
SOURCE: ABNORMAL
SP GR UR STRIP: 1.02 (ref 1–1.03)
SQUAMOUS #/AREA URNS AUTO: 1 /HPF (ref 0–1)
UROBILINOGEN UR STRIP-MCNC: 0 MG/DL (ref 0–2)
WBC #/AREA URNS AUTO: 5 /HPF (ref 0–5)

## 2019-07-13 PROCEDURE — 87086 URINE CULTURE/COLONY COUNT: CPT | Performed by: FAMILY MEDICINE

## 2019-07-13 PROCEDURE — 74019 RADEX ABDOMEN 2 VIEWS: CPT | Mod: TC

## 2019-07-13 PROCEDURE — 99285 EMERGENCY DEPT VISIT HI MDM: CPT | Mod: 25 | Performed by: FAMILY MEDICINE

## 2019-07-13 PROCEDURE — 51798 US URINE CAPACITY MEASURE: CPT | Performed by: FAMILY MEDICINE

## 2019-07-13 PROCEDURE — 81001 URINALYSIS AUTO W/SCOPE: CPT | Performed by: FAMILY MEDICINE

## 2019-07-13 PROCEDURE — 99284 EMERGENCY DEPT VISIT MOD MDM: CPT | Mod: Z6 | Performed by: FAMILY MEDICINE

## 2019-07-13 RX ORDER — CIPROFLOXACIN 250 MG/1
250 TABLET, FILM COATED ORAL 2 TIMES DAILY
Qty: 6 TABLET | Refills: 0 | Status: SHIPPED | OUTPATIENT
Start: 2019-07-13 | End: 2019-07-18

## 2019-07-13 NOTE — ED AVS SNAPSHOT
[facility] Emergency Department  [address] [place] [address]  Phone:  [phone]  Fax:  [phone]                                    Khalida Rouse   MRN: [id number]    Department:  [facility] Emergency Department   Date of Visit:  7/13/2019           After Visit Summary Signature Page    I have received my discharge instructions, and my questions have been answered. I have discussed any challenges I see with this plan with the nurse or doctor.    ..........................................................................................................................................  Patient/Patient Representative Signature      ..........................................................................................................................................  Patient Representative Print Name and Relationship to Patient    ..................................................               ................................................  Date                                   Time    ..........................................................................................................................................  Reviewed by Signature/Title    ...................................................              ..............................................  Date                                               Time          22EPIC Rev 08/18

## 2019-07-14 NOTE — ED TRIAGE NOTES
Pt c/o difficulty urinating today.  States when she can go its just a few drops.  Last time she was able to go was 1800.  She also reports a burning sensation.  Pt denies hematuria, flak pain, N/V, fevers.

## 2019-07-14 NOTE — DISCHARGE INSTRUCTIONS
Your urinalysis was borderline normal.  We are waiting on a urine culture.  Drink plenty of fluids throughout the day.  Begin Cipro 250 mg twice a day for 3 days.  Your abdominal x-ray reveals a large amount of retained stool.  Do a Fleet enema, followed by MiraLAX, 1 capful in a glass of water or Gatorade up to twice a day for the next week.  Follow-up in the clinic in 3-5 days if not improving.

## 2019-07-14 NOTE — ED NOTES
Pt was able to ambulate to and from the bathroom, did have a void and collection taken, plan to scan bladder post void

## 2019-07-14 NOTE — ED PROVIDER NOTES
ED Provider Note     Khalida Rouse  [id number]  July 13, 2019      CC:     Chief Complaint   Patient presents with     Urinary Retention          History is obtained from the patient.  She is accompanied by her daughter.    HPI: Khalida Rouse is a 79 year old female presenting with 2-day history of urinary frequency, dysuria with burning sensation, and suprapubic pressure.  She denies any fever, chills, nausea, vomiting, back or flank pain.  She is not prone to urinary tract infections and has not had one for many years.  She cannot even remember her last episode.  Patient has a history of atrial flutter and cardiomyopathy, status post aortic valve stenosis.  She has had a valve replacement and is currently on warfarin.  She had her INR level checked 2 weeks ago.  She has not noticed any gross hematuria.    PMH/Problem List:   Past Medical History:   Diagnosis Date     Aortic valve stenosis      Atrial flutter (H)      Cardiomyopathy (H)      Severe aortic stenosis 4/7/2014     Shoulder fracture, left 3/22/15       PSH:   Past Surgical History:   Procedure Laterality Date     CATARACT IOL, RT/LT Right      ENT SURGERY      glaucoma surgery     REPLACE VALVE AORTIC  4/10/2014    Procedure: REPLACE VALVE AORTIC;  Median sternotomy, Replace Aortic Valve on pump oxygenation. ;  Surgeon: Wesley Fong MD;  Location: [facility] OR       MEDS:     No current facility-administered medications on file prior to encounter.   Current Outpatient Medications on File Prior to Encounter:  acetaminophen (TYLENOL) 500 MG tablet Take 2 tablets (1,000 mg) by mouth every 6 hours as needed for mild pain   atenolol (TENORMIN) 50 MG tablet TAKE ONE TABLET BY MOUTH TWICE A DAY   calcium carbonate-vitamin D 500-400 MG-UNIT TABS tablt Take 1 tablet by mouth 3 times daily   diltiazem ER COATED BEADS (CARDIZEM CD/CARTIA XT) 180 MG 24 hr capsule Take 1 capsule (180 mg) by mouth 2 times  daily   multivitamins with minerals (CERTAVITE) LIQD Take 15 mLs by mouth daily   torsemide (DEMADEX) 20 MG tablet TAKE ONE TABLET BY MOUTH ONCE DAILY AS NEEDED (Patient not taking: Reported on 6/10/2019)   warfarin (COUMADIN) 2.5 MG tablet TAKE 2 TABLETS BY MOUTH ON MON, WED, FRI AND 1 TABLET ALL OTHER DAYS OR AS DIRECTED BY THE COUMADIN CLINIC       Allergies: Xarelto [rivaroxaban] and Ace inhibitors    Triage and nursing notes were reviewed.    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  HENT: oral exam benign;   NECK: no adenopathy or masses, trachea is midline  RESP: lungs clear to auscultation - no rales, rhonchi or wheezes  CV: regular rate and rhythm, grade 3/6 valve murmur  ABDOMEN: soft, mild suprapubic tenderness;, no masses with normal bowel sounds  EXT: Unremarkable  SKIN: no rash; as above  NEURO: mentation and speech normal; no focal deficits    Labs/Imaging Results:  Results for orders placed or performed during the hospital encounter of 07/13/19 (from the past 24 hour(s))   UA reflex to Microscopic   Result Value Ref Range    Color Urine Yellow     Appearance Urine Clear     Glucose Urine Negative NEG^Negative mg/dL    Bilirubin Urine Negative NEG^Negative    Ketones Urine Negative NEG^Negative mg/dL    Specific Gravity Urine 1.018 1.003 - 1.035    Blood Urine Negative NEG^Negative    pH Urine 6.0 5.0 - 7.0 pH    Protein Albumin Urine Negative NEG^Negative mg/dL    Urobilinogen mg/dL 0.0 0.0 - 2.0 mg/dL    Nitrite Urine Negative NEG^Negative    Leukocyte Esterase Urine Small (A) NEG^Negative    Source Midstream Urine     RBC Urine <1 0 - 2 /HPF    WBC Urine 5 0 - 5 /HPF    Bacteria Urine Few (A) NEG^Negative /HPF     Squamous Epithelial /HPF Urine 1 0 - 1 /HPF    Mucous Urine Present (A) NEG^Negative /LPF   Urine Culture   Result Value Ref Range    Specimen Description Midstream Urine     Special Requests Specimen received in preservative     Culture Micro PENDING    KUB XR    Narrative    XR KUB   7/13/2019 10:59 PM     HISTORY: Urinary frequency; suprapubic pressure.    COMPARISON: None.       Impression    IMPRESSION: The bowel gas pattern is unremarkable. Moderate amount of  stool in the colon. No calcifications are seen overlying the kidneys  or expected course of the ureters. A few left pelvic phleboliths.    ABDIRAHMAN MINOR MD           Impression:  Final diagnoses:   Dysuria         ED Course & Medical Decision Making (Plan):  Khalida Rouse is a 79 year old female with 2-day history of urinary frequency, dysuria with burning sensation and suprapubic pressure.  Patient was seen shortly after arrival.  She had some suprapubic distention and fullness, but no flank pain.  Urinalysis reveals less than 1 red blood cells and 5 white blood cell.  Urine culture is pending.  KUB x-ray reveals moderate amount of stool in the colon.  There is no calcifications seen over the kidneys or the course of the ureter.  There are a few pelvic phleboliths.  Patient was advised to drink more fluids throughout the day.  Begin Cipro 250 mg twice a day for 3 days while we wait on the urine culture.  Do a Fleet enema, and use MiraLAX 2 times a day for the next week.  Follow-up in the clinic in 3-5 days if not improving.  Return to the ED at any time if symptoms worsen.    Written after-visit summary and instructions were given at the time of discharge.          Disclaimer: This note consists of words and symbols derived from keyboarding and dictation using voice recognition software.  As a result, there may be errors that have gone undetected.  Please consider this when interpreting information found in this note.       Marlene Trejo,  MD  07/14/19 0633

## 2019-07-15 ENCOUNTER — PATIENT OUTREACH (OUTPATIENT)
Dept: CARE COORDINATION | Facility: CLINIC | Age: 79
End: 2019-07-15

## 2019-07-15 DIAGNOSIS — R33.9 URINARY RETENTION: Primary | ICD-10-CM

## 2019-07-15 LAB
BACTERIA SPEC CULT: NORMAL
Lab: NORMAL
SPECIMEN SOURCE: NORMAL

## 2019-07-15 NOTE — RESULT ENCOUNTER NOTE
Final urine culture report is NEGATIVE per [place] [facility] Result protocol.    If NEGATIVE result, no change in treatment, per [facility] Result protocol.

## 2019-07-15 NOTE — PROGRESS NOTES
Clinic Care Coordination Contact    Clinic Care Coordination Contact  OUTREACH    Referral Information:  Referral Source: IP Handoff    Primary Diagnosis: Genitourinary Disorders    Chief Complaint   Patient presents with     Clinic Care Coordination - Post Hospital     ED follow up     [place] Utilization:   Clinic Utilization  Difficulty keeping appointments:: No  Compliance Concerns: No  No-Show Concerns: No  No PCP office visit in Past Year: No  Utilization    Last refreshed: 7/15/2019 10:24 PM:  Hospital Admissions 2           Last refreshed: 7/15/2019 10:24 PM:  ED Visits 3           Last refreshed: 7/15/2019 10:24 PM:  No Show Count (past year) 2              Current as of: 7/15/2019 10:24 PM            Clinical Concerns:  Current Medical Concerns: Called and spoke with patient introduced self and role patient was recently in the ED with urinary symptoms.  Patient states they were not able to find much they did start her on Cipro and she finished her last dose today.  States her symptoms have all resolved completely.  She is very independent does not need any resources or feels she has any barriers to access decline care coordination  Current Behavioral Concerns: No current concerns  Education Provided to patient: Follow-up in clinic if symptoms return  Pain  Pain (GOAL):: No  Health Maintenance Reviewed: Not assessed  Clinical Pathway: None    Medication Management:  Self-management    Functional Status:  Dependent ADLs:: Independent  Dependent IADLs:: Independent  Mobility Status: Independent    Living Situation:   not addressed  Diet/Exercise/Sleep:   not addressed  Transportation:  Transportation concerns (GOAL):: No  Transportation means:: Regular car     Psychosocial:  Informal Support system:: Family     Financial/Insurance concerns (GOAL):: No     Resources and Interventions:  Current Resources:       Goals: no current goals    Patient/Caregiver understanding: Pt verbalizes understanding of follow  up instructions and when scheduled appt date and times.   Future Appointments              In 2 weeks PH ANTI COAG [facility] Me    In 1 month PHECHR1 [facility]    In 2 months Jason Flanagan MD [facility]          Plan:   Patient will call make appointment if symptoms return  No further outreach by care coordination patient declined any needs        Pratibha OGDEN, RN, PHN  Care Coordination    [id number] [place] [address]  Office: [phone]  Fax [phone]   [facility]  [address] [place] [address]  Office: [phone] Fax [phone]  [email or web address]   [email or web address]   Connect with [facility] on social media.

## 2019-07-16 ASSESSMENT — ACTIVITIES OF DAILY LIVING (ADL): DEPENDENT_IADLS:: INDEPENDENT

## 2019-07-17 ENCOUNTER — TELEPHONE (OUTPATIENT)
Dept: FAMILY MEDICINE | Facility: OTHER | Age: 79
End: 2019-07-17

## 2019-07-17 NOTE — TELEPHONE ENCOUNTER
Reason for call:  Other   Patient called regarding (reason for call): call back  Additional comments: Patient has an appt on 07/23/2019 - Patient states she was given a water pill in the emergency room and would like a refill on this until she sees the doctor. Please call to discuss.     Phone number to reach patient:  Home number on file [phone] (home)    Best Time:  ASAP     Can we leave a detailed message on this number?  YES

## 2019-07-18 ENCOUNTER — OFFICE VISIT (OUTPATIENT)
Dept: FAMILY MEDICINE | Facility: OTHER | Age: 79
End: 2019-07-18
Payer: MEDICARE

## 2019-07-18 VITALS
RESPIRATION RATE: 14 BRPM | TEMPERATURE: 97.5 F | DIASTOLIC BLOOD PRESSURE: 80 MMHG | HEART RATE: 94 BPM | SYSTOLIC BLOOD PRESSURE: 120 MMHG | WEIGHT: 139 LBS | BODY MASS INDEX: 22.44 KG/M2 | OXYGEN SATURATION: 93 %

## 2019-07-18 DIAGNOSIS — J81.0 ACUTE PULMONARY EDEMA (H): ICD-10-CM

## 2019-07-18 DIAGNOSIS — R33.9 URINARY RETENTION: Primary | ICD-10-CM

## 2019-07-18 DIAGNOSIS — I48.20 CHRONIC ATRIAL FIBRILLATION (H): ICD-10-CM

## 2019-07-18 PROCEDURE — 99213 OFFICE O/P EST LOW 20 MIN: CPT | Performed by: INTERNAL MEDICINE

## 2019-07-18 RX ORDER — TORSEMIDE 20 MG/1
TABLET ORAL
Qty: 90 TABLET | Refills: 3 | Status: ON HOLD | OUTPATIENT
Start: 2019-07-18 | End: 2021-04-13

## 2019-07-18 RX ORDER — DILTIAZEM HYDROCHLORIDE 180 MG/1
180 CAPSULE, COATED, EXTENDED RELEASE ORAL 2 TIMES DAILY
Qty: 180 CAPSULE | Refills: 3 | Status: SHIPPED | OUTPATIENT
Start: 2019-07-18 | End: 2020-07-20

## 2019-07-18 ASSESSMENT — PAIN SCALES - GENERAL: PAINLEVEL: MODERATE PAIN (5)

## 2019-07-18 NOTE — PROGRESS NOTES
"Sandra Rouse is a 79 year old female who presents to clinic today for the following health issues:    HPI   ED/UC Followup:    Facility:  [facility]  Date of visit: 7/13/2019  Reason for visit: UTI, urine retention   Current Status: symptoms are better                     Chief Complaint         Patient was recently in the emergency department with dysuria and urinary retention.  She had a urinary tract infection as well as some obstipation.  Both of these have resolved.  She is a little disgruntled today as I am running a little behind and she is in a big hurry to get out because her daughter is waiting in the car.  She states that she has no needs or concerns and she is leaving.  I apologize for my tardiness with and promised that I would do my best to avoid it in the future.  She is also irritated that she had to come to [place], there is not much I can do about that.                                           Decision Making    1. Urinary retention  Resolved    2. Acute pulmonary edema (H)  Controlled with diuretic  - torsemide (DEMADEX) 20 MG tablet; TAKE ONE TABLET BY MOUTH ONCE DAILY AS NEEDED  Dispense: 90 tablet; Refill: 3    3. Chronic atrial fibrillation (H)  Continue rate control and anticoagulation  - diltiazem ER COATED BEADS (CARDIZEM CD/CARTIA XT) 180 MG 24 hr capsule; Take 1 capsule (180 mg) by mouth 2 times daily  Dispense: 180 capsule; Refill: 3                           FOLLOW UP   I have asked the patient to make an appointment for followup with me or a physician that is closer and easier to get into in the upcoming 3 months for \"Annual Medicare Wellness Visit\"        I have carefully explained the diagnosis and treatment options to the patient.  The patient has displayed an understanding of the above, and all subsequent questions were answered.      DO MARIA D Sabillon    Portions of this note were produced using [employer]  Although every attempt at real-time proof " reading has been made, occasional grammar/syntax errors may have been missed.

## 2019-07-30 ENCOUNTER — ANTICOAGULATION THERAPY VISIT (OUTPATIENT)
Dept: ANTICOAGULATION | Facility: CLINIC | Age: 79
End: 2019-07-30
Payer: MEDICARE

## 2019-07-30 DIAGNOSIS — Z79.01 LONG TERM CURRENT USE OF ANTICOAGULANT THERAPY: ICD-10-CM

## 2019-07-30 DIAGNOSIS — I48.91 ATRIAL FIBRILLATION WITH RVR (H): ICD-10-CM

## 2019-07-30 DIAGNOSIS — Z95.2 S/P AVR (AORTIC VALVE REPLACEMENT): ICD-10-CM

## 2019-07-30 LAB — INR POINT OF CARE: 2.2 (ref 0.86–1.14)

## 2019-07-30 PROCEDURE — 36416 COLLJ CAPILLARY BLOOD SPEC: CPT

## 2019-07-30 PROCEDURE — 85610 PROTHROMBIN TIME: CPT | Mod: QW

## 2019-07-30 PROCEDURE — 99207 ZZC NO CHARGE NURSE ONLY: CPT

## 2019-07-30 NOTE — PROGRESS NOTES
ANTICOAGULATION FOLLOW-UP CLINIC VISIT    Patient Name:  Khalida Rouse  Date:  2019  Contact Type:  Face to Face    SUBJECTIVE:  Patient Findings     Comments:   The patient was assessed for diet, medication, and activity level changes, missed or extra doses, bruising or bleeding, with no problem findings.    Venessa Hood RN          Clinical Outcomes     Negatives:   Major bleeding event, Thromboembolic event, Anticoagulation-related hospital admission, Anticoagulation-related ED visit, Anticoagulation-related fatality    Comments:   The patient was assessed for diet, medication, and activity level changes, missed or extra doses, bruising or bleeding, with no problem findings.    Venessa Hood RN             OBJECTIVE    INR Protime   Date Value Ref Range Status   2019 2.2 (A) 0.86 - 1.14 Final       ASSESSMENT / PLAN  INR assessment THER    Recheck INR In: 6 WEEKS    INR Location Clinic      Anticoagulation Summary  As of 2019    INR goal:   2.0-3.0   TTR:   81.2 % (3.3 y)   INR used for dosin.2 (2019)   Warfarin maintenance plan:   5 mg (2.5 mg x 2) every Mon, Wed, Fri; 2.5 mg (2.5 mg x 1) all other days   Full warfarin instructions:   5 mg every Mon, Wed, Fri; 2.5 mg all other days   Weekly warfarin total:   25 mg   No change documented:   Venessa Hood RN   Plan last modified:   Venessa Hood RN (10/12/2018)   Next INR check:   9/10/2019   Target end date:       Indications    Long term current use of anticoagulant therapy [Z79.01]  Atrial fibrillation with RVR (H) [I48.91]  S/P AVR (aortic valve replacement) [Z95.2]             Anticoagulation Episode Summary     INR check location:       Preferred lab:       Send INR reminders to:   [facility] [place]    Comments:   2.5 mg tablets, book, BP, PM dose      Anticoagulation Care Providers     Provider Role Specialty Phone number    Dorcas Fisher MD [facility] [phone]            See the  Encounter Report to view Anticoagulation Flowsheet and Dosing Calendar (Go to Encounters tab in chart review, and find the Anticoagulation Therapy Visit)    Dosage adjustment made based on physician directed care plan.    Venessa Hood RN

## 2019-09-01 DIAGNOSIS — I48.91 ATRIAL FIBRILLATION WITH RVR (H): ICD-10-CM

## 2019-09-03 RX ORDER — WARFARIN SODIUM 2.5 MG/1
TABLET ORAL
Qty: 120 TABLET | Refills: 0 | Status: SHIPPED | OUTPATIENT
Start: 2019-09-03 | End: 2019-10-24

## 2019-09-09 ENCOUNTER — HOSPITAL ENCOUNTER (OUTPATIENT)
Dept: CARDIOLOGY | Facility: CLINIC | Age: 79
Discharge: HOME | End: 2019-09-09
Attending: INTERNAL MEDICINE | Admitting: INTERNAL MEDICINE
Payer: MEDICARE

## 2019-09-09 DIAGNOSIS — Z95.2 S/P AVR (AORTIC VALVE REPLACEMENT): ICD-10-CM

## 2019-09-09 PROCEDURE — 93306 TTE W/DOPPLER COMPLETE: CPT

## 2019-09-09 PROCEDURE — 93306 TTE W/DOPPLER COMPLETE: CPT | Mod: 26 | Performed by: INTERNAL MEDICINE

## 2019-09-10 ENCOUNTER — ANTICOAGULATION THERAPY VISIT (OUTPATIENT)
Dept: ANTICOAGULATION | Facility: CLINIC | Age: 79
End: 2019-09-10
Payer: MEDICARE

## 2019-09-10 VITALS — DIASTOLIC BLOOD PRESSURE: 65 MMHG | SYSTOLIC BLOOD PRESSURE: 121 MMHG | HEART RATE: 74 BPM

## 2019-09-10 DIAGNOSIS — I48.91 ATRIAL FIBRILLATION WITH RVR (H): ICD-10-CM

## 2019-09-10 DIAGNOSIS — Z79.01 LONG TERM CURRENT USE OF ANTICOAGULANT THERAPY: ICD-10-CM

## 2019-09-10 DIAGNOSIS — Z95.2 S/P AVR (AORTIC VALVE REPLACEMENT): ICD-10-CM

## 2019-09-10 LAB — INR POINT OF CARE: 2.4 (ref 0.9–1.1)

## 2019-09-10 PROCEDURE — 99207 ZZC NO CHARGE NURSE ONLY: CPT

## 2019-09-10 PROCEDURE — 85610 PROTHROMBIN TIME: CPT | Mod: QW

## 2019-09-10 PROCEDURE — 36416 COLLJ CAPILLARY BLOOD SPEC: CPT

## 2019-09-10 NOTE — PROGRESS NOTES
ANTICOAGULATION FOLLOW-UP CLINIC VISIT    Patient Name:  Khalida Rouse  Date:  9/10/2019  Contact Type:  Face to Face    SUBJECTIVE:  Patient Findings     Comments:   The patient was assessed for diet, medication, and activity level changes, missed or extra doses, bruising or bleeding, with no problem findings.  Venessa Hood RN          Clinical Outcomes     Negatives:   Major bleeding event, Thromboembolic event, Anticoagulation-related hospital admission, Anticoagulation-related ED visit, Anticoagulation-related fatality    Comments:   The patient was assessed for diet, medication, and activity level changes, missed or extra doses, bruising or bleeding, with no problem findings.  Venessa Hood RN             OBJECTIVE    INR Protime   Date Value Ref Range Status   09/10/2019 2.4 (A) 0.9 - 1.1 Final       ASSESSMENT / PLAN  INR assessment THER    Recheck INR In: 6 WEEKS    INR Location Clinic      Anticoagulation Summary  As of 9/10/2019    INR goal:   2.0-3.0   TTR:   81.8 % (3.5 y)   INR used for dosin.4 (9/10/2019)   Warfarin maintenance plan:   5 mg (2.5 mg x 2) every Mon, Wed, Fri; 2.5 mg (2.5 mg x 1) all other days   Full warfarin instructions:   5 mg every Mon, Wed, Fri; 2.5 mg all other days   Weekly warfarin total:   25 mg   No change documented:   Venessa Hood RN   Plan last modified:   Venessa Hood RN (10/12/2018)   Next INR check:   10/22/2019   Target end date:       Indications    Long term current use of anticoagulant therapy [Z79.01]  Atrial fibrillation with RVR (H) [I48.91]  S/P AVR (aortic valve replacement) [Z95.2]             Anticoagulation Episode Summary     INR check location:       Preferred lab:       Send INR reminders to:   [facility] [place]    Comments:   2.5 mg tablets, book, BP, PM dose      Anticoagulation Care Providers     Provider Role Specialty Phone number    Dorcas Fisher MD [facility] [phone]            See the Encounter  Report to view Anticoagulation Flowsheet and Dosing Calendar (Go to Encounters tab in chart review, and find the Anticoagulation Therapy Visit)    Dosage adjustment made based on physician directed care plan.      Venessa Hood RN

## 2019-09-17 ENCOUNTER — OFFICE VISIT (OUTPATIENT)
Dept: CARDIOLOGY | Facility: CLINIC | Age: 79
End: 2019-09-17
Payer: MEDICARE

## 2019-09-17 VITALS
BODY MASS INDEX: 22.48 KG/M2 | DIASTOLIC BLOOD PRESSURE: 62 MMHG | HEART RATE: 68 BPM | RESPIRATION RATE: 12 BRPM | WEIGHT: 139.9 LBS | OXYGEN SATURATION: 95 % | SYSTOLIC BLOOD PRESSURE: 132 MMHG | HEIGHT: 66 IN

## 2019-09-17 DIAGNOSIS — Z95.2 S/P AVR: Primary | ICD-10-CM

## 2019-09-17 PROCEDURE — 99213 OFFICE O/P EST LOW 20 MIN: CPT | Performed by: INTERNAL MEDICINE

## 2019-09-17 ASSESSMENT — MIFFLIN-ST. JEOR: SCORE: 1121.33

## 2019-09-17 ASSESSMENT — PAIN SCALES - GENERAL: PAINLEVEL: NO PAIN (0)

## 2019-09-17 NOTE — LETTER
9/17/2019      Mazin Larsen, DO  [address] [place] [address]      RE: Khalida Rouse       Dear Colleague,    I had the pleasure of seeing Khalida Rouse in the [facility].    Service Date: 09/17/2019      REASON FOR CARDIOLOGY VISIT:  Followup aortic valve replacement.        HISTORY OF PRESENT ILLNESS:  Ms. Rouse is a very pleasant 79-year-old female status post bioprosthetic aortic valve replacement in 04/2014 for severe aortic stenosis with 21 mm bioprosthetic aortic valve, chronic atrial fibrillation on rate control strategy on Coumadin for stroke prophylaxis, history of moderate to moderately severe tricuspid regurgitation and moderate pulmonary hypertension.        Today, the patient is coming for routine followup.  She has no specific cardiac complaints.  The patient tells me health-wise, she feels quite well.  She is fairly active, helping her daughter and taking care of small grandkids.  She regularly climbs up and down several steps of stairs, does laundry other activities of daily living, walking without any issues.  No chest discomfort or shortness of breath, dizziness, presyncope or syncope.  She had an echocardiogram done recently that showed normal LV function, normal functioning bioprosthetic valve with normal gradient across the aortic valve, no aortic regurgitation, moderate tricuspid regurgitation, moderate pulmonary hypertension, severe biatrial enlargement.  Overall, compared to previous study, no significant changes.        To be noted, we have discussed about further investigation of pulmonary hypertension by doing a right heart catheterization, but the patient has declined that.        The patient is tolerating Coumadin and baby aspirin quite well.  To be noted, she is on combination of atenolol and diltiazem and has done quite well on this combination in terms of ventricular rate controlled without any evidence suggestive of chronotropic  incompetence like any fatigue, shortness of breath with exertion or any dizziness or presyncope or syncope.  EKG done earlier this year showed atrial fibrillation with controlled ventricular rates.      PHYSICAL EXAMINATION:   VITAL SIGNS:  Blood pressure 132/62, heart rate 68 irregular, weight 139 pounds, BMI 22.58.   GENERAL:  The patient appears pleasant, comfortable.   NECK:  Normal JVP, no bruit.   CARDIOVASCULAR SYSTEM:  S1, S2 normal, no murmur, rub or gallop.   RESPIRATORY SYSTEM:  Clear to auscultation bilaterally.   GASTROINTESTINAL SYSTEM:  Abdomen soft, nontender.   EXTREMITIES:  No pitting pedal edema.   NEUROLOGICAL:  Alert, oriented x3.   PSYCHIATRIC:  Normal affect.   SKIN:  No obvious rash.   HEENT:  No pallor or icterus.      IMPRESSION AND PLAN:  A very pleasant 79-year-old female with a bioprosthetic aortic valve, normal functioning valve on recent echocardiogram, normal LV function, moderate tricuspid regurgitation, chronic atrial fibrillation, on Coumadin for stroke prophylaxis, on rate control.  Ventricular rates well controlled.        Overall, cardiac status-wise, I think she is doing reasonably well without any symptoms of angina or congestive heart failure or any symptoms of dizziness, presyncope or syncope.  At this time, I recommend continuing current cardiac medications.  I also again reminded about importance of predental workup antibiotic prophylaxis and if she continues to feel stable cardiac status-wise, we can see her back in clinic in a year, sooner if she notes any change in clinical status, especially if any exertion-related symptoms or any dizziness, presyncope or syncope.         MADI TEJEDA MD             D: 2019   T: 2019   MT: SUE      Name:     JJ RAMACHANDRAN   MRN:      [id number]        Account:      [id number]   :      1940           Service Date: 2019      Document: [id number]         Outpatient Encounter Medications as of 2019    Medication Sig Dispense Refill     acetaminophen (TYLENOL) 500 MG tablet Take 2 tablets (1,000 mg) by mouth every 6 hours as needed for mild pain 180 tablet 3     atenolol (TENORMIN) 50 MG tablet TAKE ONE TABLET BY MOUTH TWICE A  tablet 1     calcium carbonate-vitamin D 500-400 MG-UNIT TABS tablt Take 1 tablet by mouth 3 times daily       diltiazem ER COATED BEADS (CARDIZEM CD/CARTIA XT) 180 MG 24 hr capsule Take 1 capsule (180 mg) by mouth 2 times daily 180 capsule 3     torsemide (DEMADEX) 20 MG tablet TAKE ONE TABLET BY MOUTH ONCE DAILY AS NEEDED 90 tablet 3     warfarin (COUMADIN) 2.5 MG tablet TAKE 2 TABLETS BY MOUTH ON MON, WED, FRI AND 1 TABLET ALL OTHER DAYS OR AS DIRECTED BY COUMADIN CLINIC 120 tablet 0     No facility-administered encounter medications on file as of 9/17/2019.        Again, thank you for allowing me to participate in the care of your patient.      Sincerely,    Jason Flanagan MD     [facility]

## 2019-09-17 NOTE — LETTER
9/17/2019    Mazin Larsen, DO  [address] [place] [address]    RE: Khalida Rouse       Dear Colleague,    I had the pleasure of seeing Khalida Rouse in the [facility].    HPI and Plan:   See dictation(#[id number])    Orders Placed This Encounter   Procedures     Follow-Up with Cardiologist       No orders of the defined types were placed in this encounter.      There are no discontinued medications.      Encounter Diagnosis   Name Primary?     S/P AVR Yes       CURRENT MEDICATIONS:  Current Outpatient Medications   Medication Sig Dispense Refill     acetaminophen (TYLENOL) 500 MG tablet Take 2 tablets (1,000 mg) by mouth every 6 hours as needed for mild pain 180 tablet 3     atenolol (TENORMIN) 50 MG tablet TAKE ONE TABLET BY MOUTH TWICE A  tablet 1     calcium carbonate-vitamin D 500-400 MG-UNIT TABS tablt Take 1 tablet by mouth 3 times daily       diltiazem ER COATED BEADS (CARDIZEM CD/CARTIA XT) 180 MG 24 hr capsule Take 1 capsule (180 mg) by mouth 2 times daily 180 capsule 3     torsemide (DEMADEX) 20 MG tablet TAKE ONE TABLET BY MOUTH ONCE DAILY AS NEEDED 90 tablet 3     warfarin (COUMADIN) 2.5 MG tablet TAKE 2 TABLETS BY MOUTH ON MON, WED, FRI AND 1 TABLET ALL OTHER DAYS OR AS DIRECTED BY COUMADIN CLINIC 120 tablet 0       ALLERGIES     Allergies   Allergen Reactions     Xarelto [Rivaroxaban] Diarrhea     Ace Inhibitors Cough       PAST MEDICAL HISTORY:  Past Medical History:   Diagnosis Date     Aortic valve stenosis      Atrial flutter (H)      Cardiomyopathy (H)      Severe aortic stenosis 4/7/2014     Shoulder fracture, left 3/22/15       PAST SURGICAL HISTORY:  Past Surgical History:   Procedure Laterality Date     CATARACT IOL, RT/LT Right      ENT SURGERY      glaucoma surgery     REPLACE VALVE AORTIC  4/10/2014    Procedure: REPLACE VALVE AORTIC;  Median sternotomy, Replace Aortic Valve on pump oxygenation. ;  Surgeon: Wesley Fong MD;   Location:  OR       FAMILY HISTORY:  Family History   Problem Relation Age of Onset     Dementia Brother      Alzheimer Disease Sister        SOCIAL HISTORY:  Social History     Socioeconomic History     Marital status:      Spouse name: Not on file     Number of children: Not on file     Years of education: Not on file     Highest education level: Not on file   Occupational History     Not on file   Social Needs     Financial resource strain: Not on file     Food insecurity:     Worry: Not on file     Inability: Not on file     Transportation needs:     Medical: Not on file     Non-medical: Not on file   Tobacco Use     Smoking status: Never Smoker     Smokeless tobacco: Never Used   Substance and Sexual Activity     Alcohol use: No     Alcohol/week: 0.0 oz     Drug use: No     Sexual activity: Not Currently     Partners: Male   Lifestyle     Physical activity:     Days per week: Not on file     Minutes per session: Not on file     Stress: Not on file   Relationships     Social connections:     Talks on phone: Not on file     Gets together: Not on file     Attends Faith service: Not on file     Active member of club or organization: Not on file     Attends meetings of clubs or organizations: Not on file     Relationship status: Not on file     Intimate partner violence:     Fear of current or ex partner: Not on file     Emotionally abused: Not on file     Physically abused: Not on file     Forced sexual activity: Not on file   Other Topics Concern     Parent/sibling w/ CABG, MI or angioplasty before 65F 55M? Not Asked      Service Not Asked     Blood Transfusions Not Asked     Caffeine Concern No     Occupational Exposure Not Asked     Hobby Hazards Not Asked     Sleep Concern Not Asked     Stress Concern Not Asked     Weight Concern Not Asked     Special Diet No     Back Care Not Asked     Exercise No     Comment: walking     Bike Helmet Not Asked     Seat Belt Not Asked     Self-Exams Not  "Asked   Social History Narrative     Not on file       Review of Systems:  Skin:  Negative bruising     Eyes:  Positive for glasses    ENT:  Negative      Respiratory:  Negative       Cardiovascular:  Negative for;palpitations;chest pain;edema lightheadedness;dizziness;Positive for rare  Gastroenterology: Negative      Genitourinary:  Negative nocturia    Musculoskeletal:  Negative arthritis calcium and mag supplement  Neurologic:  Negative      Psychiatric:  Negative      Heme/Lymph/Imm:  Positive for allergies    Endocrine:  Negative        Physical Exam:  Vitals: /62 (BP Location: Right arm, Cuff Size: Adult Regular)   Pulse 68   Resp 12   Ht 1.676 m (5' 6\")   Wt 63.5 kg (139 lb 14.4 oz)   SpO2 95%   BMI 22.58 kg/m       Constitutional:           Skin:             Head:           Eyes:           Lymph:      ENT:           Neck:           Respiratory:            Cardiac:                                                           GI:           Extremities and Muscular Skeletal:                 Neurological:           Psych:             CC  No referring provider defined for this encounter.                    Thank you for allowing me to participate in the care of your patient.      Sincerely,     Jason Flanagan MD     [facility]    cc:   No referring provider defined for this encounter.        "

## 2019-09-17 NOTE — PROGRESS NOTES
HPI and Plan:   See dictation(#[id number])    Orders Placed This Encounter   Procedures     Follow-Up with Cardiologist       No orders of the defined types were placed in this encounter.      There are no discontinued medications.      Encounter Diagnosis   Name Primary?     S/P AVR Yes       CURRENT MEDICATIONS:  Current Outpatient Medications   Medication Sig Dispense Refill     acetaminophen (TYLENOL) 500 MG tablet Take 2 tablets (1,000 mg) by mouth every 6 hours as needed for mild pain 180 tablet 3     atenolol (TENORMIN) 50 MG tablet TAKE ONE TABLET BY MOUTH TWICE A  tablet 1     calcium carbonate-vitamin D 500-400 MG-UNIT TABS tablt Take 1 tablet by mouth 3 times daily       diltiazem ER COATED BEADS (CARDIZEM CD/CARTIA XT) 180 MG 24 hr capsule Take 1 capsule (180 mg) by mouth 2 times daily 180 capsule 3     torsemide (DEMADEX) 20 MG tablet TAKE ONE TABLET BY MOUTH ONCE DAILY AS NEEDED 90 tablet 3     warfarin (COUMADIN) 2.5 MG tablet TAKE 2 TABLETS BY MOUTH ON MON, WED, FRI AND 1 TABLET ALL OTHER DAYS OR AS DIRECTED BY COUMADIN CLINIC 120 tablet 0       ALLERGIES     Allergies   Allergen Reactions     Xarelto [Rivaroxaban] Diarrhea     Ace Inhibitors Cough       PAST MEDICAL HISTORY:  Past Medical History:   Diagnosis Date     Aortic valve stenosis      Atrial flutter (H)      Cardiomyopathy (H)      Severe aortic stenosis 4/7/2014     Shoulder fracture, left 3/22/15       PAST SURGICAL HISTORY:  Past Surgical History:   Procedure Laterality Date     CATARACT IOL, RT/LT Right      ENT SURGERY      glaucoma surgery     REPLACE VALVE AORTIC  4/10/2014    Procedure: REPLACE VALVE AORTIC;  Median sternotomy, Replace Aortic Valve on pump oxygenation. ;  Surgeon: Wesley Fong MD;  Location:  OR       FAMILY HISTORY:  Family History   Problem Relation Age of Onset     Dementia Brother      Alzheimer Disease Sister        SOCIAL HISTORY:  Social History     Socioeconomic History     Marital status:       Spouse name: Not on file     Number of children: Not on file     Years of education: Not on file     Highest education level: Not on file   Occupational History     Not on file   Social Needs     Financial resource strain: Not on file     Food insecurity:     Worry: Not on file     Inability: Not on file     Transportation needs:     Medical: Not on file     Non-medical: Not on file   Tobacco Use     Smoking status: Never Smoker     Smokeless tobacco: Never Used   Substance and Sexual Activity     Alcohol use: No     Alcohol/week: 0.0 oz     Drug use: No     Sexual activity: Not Currently     Partners: Male   Lifestyle     Physical activity:     Days per week: Not on file     Minutes per session: Not on file     Stress: Not on file   Relationships     Social connections:     Talks on phone: Not on file     Gets together: Not on file     Attends Bahai service: Not on file     Active member of club or organization: Not on file     Attends meetings of clubs or organizations: Not on file     Relationship status: Not on file     Intimate partner violence:     Fear of current or ex partner: Not on file     Emotionally abused: Not on file     Physically abused: Not on file     Forced sexual activity: Not on file   Other Topics Concern     Parent/sibling w/ CABG, MI or angioplasty before 65F 55M? Not Asked      Service Not Asked     Blood Transfusions Not Asked     Caffeine Concern No     Occupational Exposure Not Asked     Hobby Hazards Not Asked     Sleep Concern Not Asked     Stress Concern Not Asked     Weight Concern Not Asked     Special Diet No     Back Care Not Asked     Exercise No     Comment: walking     Bike Helmet Not Asked     Seat Belt Not Asked     Self-Exams Not Asked   Social History Narrative     Not on file       Review of Systems:  Skin:  Negative bruising     Eyes:  Positive for glasses    ENT:  Negative      Respiratory:  Negative       Cardiovascular:  Negative  "for;palpitations;chest pain;edema lightheadedness;dizziness;Positive for rare  Gastroenterology: Negative      Genitourinary:  Negative nocturia    Musculoskeletal:  Negative arthritis calcium and mag supplement  Neurologic:  Negative      Psychiatric:  Negative      Heme/Lymph/Imm:  Positive for allergies    Endocrine:  Negative        Physical Exam:  Vitals: /62 (BP Location: Right arm, Cuff Size: Adult Regular)   Pulse 68   Resp 12   Ht 1.676 m (5' 6\")   Wt 63.5 kg (139 lb 14.4 oz)   SpO2 95%   BMI 22.58 kg/m      Constitutional:           Skin:             Head:           Eyes:           Lymph:      ENT:           Neck:           Respiratory:            Cardiac:                                                           GI:           Extremities and Muscular Skeletal:                 Neurological:           Psych:             CC  No referring provider defined for this encounter.                  "

## 2019-09-17 NOTE — PROGRESS NOTES
Service Date: 09/17/2019      REASON FOR CARDIOLOGY VISIT:  Followup aortic valve replacement.        HISTORY OF PRESENT ILLNESS:  Ms. Rouse is a very pleasant 79-year-old female status post bioprosthetic aortic valve replacement in 04/2014 for severe aortic stenosis with 21 mm bioprosthetic aortic valve, chronic atrial fibrillation on rate control strategy on Coumadin for stroke prophylaxis, history of moderate to moderately severe tricuspid regurgitation and moderate pulmonary hypertension.        Today, the patient is coming for routine followup.  She has no specific cardiac complaints.  The patient tells me health-wise, she feels quite well.  She is fairly active, helping her daughter and taking care of small grandkids.  She regularly climbs up and down several steps of stairs, does laundry other activities of daily living, walking without any issues.  No chest discomfort or shortness of breath, dizziness, presyncope or syncope.  She had an echocardiogram done recently that showed normal LV function, normal functioning bioprosthetic valve with normal gradient across the aortic valve, no aortic regurgitation, moderate tricuspid regurgitation, moderate pulmonary hypertension, severe biatrial enlargement.  Overall, compared to previous study, no significant changes.        To be noted, we have discussed about further investigation of pulmonary hypertension by doing a right heart catheterization, but the patient has declined that.        The patient is tolerating Coumadin and baby aspirin quite well.  To be noted, she is on combination of atenolol and diltiazem and has done quite well on this combination in terms of ventricular rate controlled without any evidence suggestive of chronotropic incompetence like any fatigue, shortness of breath with exertion or any dizziness or presyncope or syncope.  EKG done earlier this year showed atrial fibrillation with controlled ventricular rates.      PHYSICAL EXAMINATION:    VITAL SIGNS:  Blood pressure 132/62, heart rate 68 irregular, weight 139 pounds, BMI 22.58.   GENERAL:  The patient appears pleasant, comfortable.   NECK:  Normal JVP, no bruit.   CARDIOVASCULAR SYSTEM:  S1, S2 normal, no murmur, rub or gallop.   RESPIRATORY SYSTEM:  Clear to auscultation bilaterally.   GASTROINTESTINAL SYSTEM:  Abdomen soft, nontender.   EXTREMITIES:  No pitting pedal edema.   NEUROLOGICAL:  Alert, oriented x3.   PSYCHIATRIC:  Normal affect.   SKIN:  No obvious rash.   HEENT:  No pallor or icterus.      IMPRESSION AND PLAN:  A very pleasant 79-year-old female with a bioprosthetic aortic valve, normal functioning valve on recent echocardiogram, normal LV function, moderate tricuspid regurgitation, chronic atrial fibrillation, on Coumadin for stroke prophylaxis, on rate control.  Ventricular rates well controlled.        Overall, cardiac status-wise, I think she is doing reasonably well without any symptoms of angina or congestive heart failure or any symptoms of dizziness, presyncope or syncope.  At this time, I recommend continuing current cardiac medications.  I also again reminded about importance of predental workup antibiotic prophylaxis and if she continues to feel stable cardiac status-wise, we can see her back in clinic in a year, sooner if she notes any change in clinical status, especially if any exertion-related symptoms or any dizziness, presyncope or syncope.         MADI TEJEDA MD             D: 2019   T: 2019   MT: SUE      Name:     JJ RAMACHANDRAN   MRN:      [id number]        Account:      [id number]   :      1940           Service Date: 2019      Document: [id number]

## 2019-09-21 ENCOUNTER — HOSPITAL ENCOUNTER (EMERGENCY)
Facility: CLINIC | Age: 79
Discharge: HOME | End: 2019-09-21
Attending: NURSE PRACTITIONER | Admitting: NURSE PRACTITIONER
Payer: MEDICARE

## 2019-09-21 ENCOUNTER — APPOINTMENT (OUTPATIENT)
Dept: CT IMAGING | Facility: CLINIC | Age: 79
End: 2019-09-21
Attending: NURSE PRACTITIONER
Payer: MEDICARE

## 2019-09-21 VITALS
OXYGEN SATURATION: 96 % | SYSTOLIC BLOOD PRESSURE: 134 MMHG | WEIGHT: 140 LBS | BODY MASS INDEX: 22.6 KG/M2 | HEART RATE: 66 BPM | DIASTOLIC BLOOD PRESSURE: 87 MMHG | TEMPERATURE: 98.4 F | RESPIRATION RATE: 14 BRPM

## 2019-09-21 DIAGNOSIS — R42 DIZZINESS: ICD-10-CM

## 2019-09-21 DIAGNOSIS — R82.71 BACTERIURIA WITH PYURIA: ICD-10-CM

## 2019-09-21 DIAGNOSIS — R82.81 BACTERIURIA WITH PYURIA: ICD-10-CM

## 2019-09-21 LAB
ALBUMIN UR-MCNC: NEGATIVE MG/DL
ANION GAP SERPL CALCULATED.3IONS-SCNC: 8 MMOL/L (ref 3–14)
APPEARANCE UR: CLEAR
BACTERIA #/AREA URNS HPF: ABNORMAL /HPF
BASOPHILS # BLD AUTO: 0 10E9/L (ref 0–0.2)
BASOPHILS NFR BLD AUTO: 0.5 %
BILIRUB UR QL STRIP: NEGATIVE
BUN SERPL-MCNC: 22 MG/DL (ref 7–30)
CALCIUM SERPL-MCNC: 9.1 MG/DL (ref 8.5–10.1)
CHLORIDE SERPL-SCNC: 107 MMOL/L (ref 94–109)
CO2 SERPL-SCNC: 27 MMOL/L (ref 20–32)
COLOR UR AUTO: YELLOW
CREAT SERPL-MCNC: 0.84 MG/DL (ref 0.52–1.04)
DIFFERENTIAL METHOD BLD: ABNORMAL
EOSINOPHIL NFR BLD AUTO: 4.2 %
ERYTHROCYTE [DISTWIDTH] IN BLOOD BY AUTOMATED COUNT: 14.3 % (ref 10–15)
GFR SERPL CREATININE-BSD FRML MDRD: 65 ML/MIN/{1.73_M2}
GLUCOSE SERPL-MCNC: 130 MG/DL (ref 70–99)
GLUCOSE UR STRIP-MCNC: NEGATIVE MG/DL
HCT VFR BLD AUTO: 41.9 % (ref 35–47)
HGB BLD-MCNC: 13.8 G/DL (ref 11.7–15.7)
HGB UR QL STRIP: NEGATIVE
IMM GRANULOCYTES # BLD: 0 10E9/L (ref 0–0.4)
IMM GRANULOCYTES NFR BLD: 0.2 %
INR PPP: 3.22 (ref 0.86–1.14)
KETONES UR STRIP-MCNC: NEGATIVE MG/DL
LEUKOCYTE ESTERASE UR QL STRIP: ABNORMAL
LYMPHOCYTES # BLD AUTO: 0.7 10E9/L (ref 0.8–5.3)
LYMPHOCYTES NFR BLD AUTO: 16.2 %
MAGNESIUM SERPL-MCNC: 2 MG/DL (ref 1.6–2.3)
MCH RBC QN AUTO: 29.2 PG (ref 26.5–33)
MCHC RBC AUTO-ENTMCNC: 32.9 G/DL (ref 31.5–36.5)
MCV RBC AUTO: 89 FL (ref 78–100)
MONOCYTES # BLD AUTO: 0.6 10E9/L (ref 0–1.3)
MONOCYTES NFR BLD AUTO: 14.5 %
MUCOUS THREADS #/AREA URNS LPF: PRESENT /LPF
NEUTROPHILS # BLD AUTO: 2.6 10E9/L (ref 1.6–8.3)
NEUTROPHILS NFR BLD AUTO: 64.4 %
NITRATE UR QL: NEGATIVE
NRBC # BLD AUTO: 0 10*3/UL
NRBC BLD AUTO-RTO: 0 /100
PH UR STRIP: 6 PH (ref 5–7)
PLATELET # BLD AUTO: 133 10E9/L (ref 150–450)
POTASSIUM SERPL-SCNC: 4.4 MMOL/L (ref 3.4–5.3)
RBC # BLD AUTO: 4.72 10E12/L (ref 3.8–5.2)
RBC #/AREA URNS AUTO: 1 /HPF (ref 0–2)
SODIUM SERPL-SCNC: 142 MMOL/L (ref 133–144)
SOURCE: ABNORMAL
SP GR UR STRIP: 1.01 (ref 1–1.03)
SQUAMOUS #/AREA URNS AUTO: 1 /HPF (ref 0–1)
UROBILINOGEN UR STRIP-MCNC: 0 MG/DL (ref 0–2)
WBC # BLD AUTO: 4.1 10E9/L (ref 4–11)
WBC #/AREA URNS AUTO: 17 /HPF (ref 0–5)

## 2019-09-21 PROCEDURE — 80048 BASIC METABOLIC PNL TOTAL CA: CPT | Performed by: NURSE PRACTITIONER

## 2019-09-21 PROCEDURE — 85610 PROTHROMBIN TIME: CPT | Performed by: NURSE PRACTITIONER

## 2019-09-21 PROCEDURE — 87086 URINE CULTURE/COLONY COUNT: CPT | Performed by: NURSE PRACTITIONER

## 2019-09-21 PROCEDURE — 25000132 ZZH RX MED GY IP 250 OP 250 PS 637: Mod: GY | Performed by: NURSE PRACTITIONER

## 2019-09-21 PROCEDURE — 99284 EMERGENCY DEPT VISIT MOD MDM: CPT | Mod: Z6 | Performed by: NURSE PRACTITIONER

## 2019-09-21 PROCEDURE — 83735 ASSAY OF MAGNESIUM: CPT | Performed by: NURSE PRACTITIONER

## 2019-09-21 PROCEDURE — 99284 EMERGENCY DEPT VISIT MOD MDM: CPT | Mod: 25 | Performed by: NURSE PRACTITIONER

## 2019-09-21 PROCEDURE — 70450 CT HEAD/BRAIN W/O DYE: CPT

## 2019-09-21 PROCEDURE — 85025 COMPLETE CBC W/AUTO DIFF WBC: CPT | Performed by: NURSE PRACTITIONER

## 2019-09-21 PROCEDURE — 81001 URINALYSIS AUTO W/SCOPE: CPT | Performed by: NURSE PRACTITIONER

## 2019-09-21 RX ORDER — NITROFURANTOIN 25; 75 MG/1; MG/1
100 CAPSULE ORAL 2 TIMES DAILY
Qty: 6 CAPSULE | Refills: 0 | Status: SHIPPED | OUTPATIENT
Start: 2019-09-21 | End: 2019-09-25

## 2019-09-21 RX ORDER — MECLIZINE HYDROCHLORIDE 25 MG/1
25 TABLET ORAL EVERY 8 HOURS PRN
Qty: 15 TABLET | Refills: 0 | Status: SHIPPED | OUTPATIENT
Start: 2019-09-21 | End: 2020-06-29

## 2019-09-21 RX ORDER — MECLIZINE HYDROCHLORIDE 25 MG/1
25 TABLET ORAL ONCE
Status: COMPLETED | OUTPATIENT
Start: 2019-09-21 | End: 2019-09-21

## 2019-09-21 RX ADMIN — MECLIZINE HYDROCHLORIDE 25 MG: 25 TABLET ORAL at 18:12

## 2019-09-21 ASSESSMENT — ENCOUNTER SYMPTOMS
CONSTITUTIONAL NEGATIVE: 1
HEADACHES: 0
CARDIOVASCULAR NEGATIVE: 1
COUGH: 0
TREMORS: 0
CONFUSION: 0
AGITATION: 0
NUMBNESS: 0
SPEECH DIFFICULTY: 0
HEMATURIA: 0
BRUISES/BLEEDS EASILY: 1
WEAKNESS: 0
FACIAL ASYMMETRY: 0
GASTROINTESTINAL NEGATIVE: 1
DYSURIA: 0

## 2019-09-21 NOTE — ED AVS SNAPSHOT
[facility]  [address] [place] [address]  Phone:  [phone]  Fax:  [phone]                                    Khalida Rouse   MRN: [id number]    Department:  [facility]   Date of Visit:  9/21/2019           After Visit Summary Signature Page    I have received my discharge instructions, and my questions have been answered. I have discussed any challenges I see with this plan with the nurse or doctor.    ..........................................................................................................................................  Patient/Patient Representative Signature      ..........................................................................................................................................  Patient Representative Print Name and Relationship to Patient    ..................................................               ................................................  Date                                   Time    ..........................................................................................................................................  Reviewed by Signature/Title    ...................................................              ..............................................  Date                                               Time          22EPIC Rev 08/18

## 2019-09-21 NOTE — ED PROVIDER NOTES
"  History     Chief Complaint   Patient presents with     Dizziness     HPI  Khalida Rouse is a 79 year old female who presents with symptoms of feeling like the room is spinning onset this morning when she awoke. Reports episodes last for about 5 min.  She reports became more frequent as the day progressed and \"worked through them\".  She she was eating dinner started to have another episode and called daughter to bring her to the ER.  She reports episode lasted for a few minutes and currently asymptomatic.  She has not noted gait disturbance but when having dizziness feels like she wants to go toward the right. She denies associated nausea, vomiting, fever, chills, change in hearing, tinnitus, recent URI, chest pain, heart fluttering, dysuria.    Reports she has had intermittent vertigo type symptoms for 40 years but has not had an episode in quite awhile.   PCP: Mazin Larsen     Allergies:  Allergies   Allergen Reactions     Xarelto [Rivaroxaban] Diarrhea     Ace Inhibitors Cough       Problem List:    Patient Active Problem List    Diagnosis Date Noted     Hypoxemia 05/23/2019     Priority: Medium     Thrombocytopenia (H) 05/20/2019     Priority: Medium     Other secondary pulmonary hypertension (H) 05/14/2019     Priority: Medium     Long term current use of anticoagulant therapy 03/11/2016     Priority: Medium     Atrial fibrillation with RVR (H) 01/11/2016     Priority: Medium     Pneumonia 01/09/2016     Priority: Medium     Osteoporosis 10/26/2015     Priority: Medium     Advanced directives, counseling/discussion 10/03/2014     Priority: Medium     Declined       S/P AVR (aortic valve replacement) 04/10/2014     Priority: Medium     Atrial flutter 02/04/2014     Priority: Medium        Past Medical History:    Past Medical History:   Diagnosis Date     Aortic valve stenosis      Atrial flutter (H)      Cardiomyopathy (H)      Severe aortic stenosis 4/7/2014     Shoulder fracture, left 3/22/15 "       Past Surgical History:    Past Surgical History:   Procedure Laterality Date     CATARACT IOL, RT/LT Right      ENT SURGERY      glaucoma surgery     REPLACE VALVE AORTIC  4/10/2014    Procedure: REPLACE VALVE AORTIC;  Median sternotomy, Replace Aortic Valve on pump oxygenation. ;  Surgeon: Wesley Fong MD;  Location:  OR       Family History:    Family History   Problem Relation Age of Onset     Dementia Brother      Alzheimer Disease Sister        Social History:  Marital Status:   [5]  Social History     Tobacco Use     Smoking status: Never Smoker     Smokeless tobacco: Never Used   Substance Use Topics     Alcohol use: No     Alcohol/week: 0.0 oz     Drug use: No        Medications:      meclizine (ANTIVERT) 25 MG tablet   nitroFURantoin macrocrystal-monohydrate (MACROBID) 100 MG capsule   acetaminophen (TYLENOL) 500 MG tablet   atenolol (TENORMIN) 50 MG tablet   calcium carbonate-vitamin D 500-400 MG-UNIT TABS tablt   diltiazem ER COATED BEADS (CARDIZEM CD/CARTIA XT) 180 MG 24 hr capsule   torsemide (DEMADEX) 20 MG tablet   warfarin (COUMADIN) 2.5 MG tablet         Review of Systems   Constitutional: Negative.    HENT: Negative.    Respiratory: Negative for cough.    Cardiovascular: Negative.    Gastrointestinal: Negative.    Genitourinary: Negative for dysuria and hematuria.   Musculoskeletal: Positive for gait problem.        Uses cane to assist with ambulation   Skin: Negative.    Allergic/Immunologic: Negative for immunocompromised state.   Neurological: Negative for tremors, syncope, facial asymmetry, speech difficulty, weakness, numbness and headaches.   Hematological: Bruises/bleeds easily.   Psychiatric/Behavioral: Negative for agitation and confusion.       Physical Exam   BP: 133/70  Pulse: 65  Temp: 98.4  F (36.9  C)  Resp: 14  Weight: 63.5 kg (140 lb)  SpO2: 93 %      Physical Exam   Constitutional: She is oriented to person, place, and time. She appears well-developed and  well-nourished. She is active and cooperative.  Non-toxic appearance. She does not have a sickly appearance. She does not appear ill. No distress.   Very pleasant elderly male in no distress sitting on edge of bed   HENT:   Head: Normocephalic and atraumatic.   Right Ear: Hearing, tympanic membrane, external ear and ear canal normal.   Left Ear: Hearing, tympanic membrane, external ear and ear canal normal.   Nose: Nose normal.   Mouth/Throat: Uvula is midline and oropharynx is clear and moist.   Eyes: Conjunctivae and lids are normal. Right eye exhibits nystagmus. Right eye exhibits normal extraocular motion.   Artificial left eye  Mild nystagmus to right   Neck: Full passive range of motion without pain and phonation normal. Neck supple. No hepatojugular reflux present. Carotid bruit is not present. No thyromegaly present.   Cardiovascular: Normal rate, S1 normal, S2 normal and intact distal pulses. An irregularly irregular rhythm present.   Murmur heard.   Systolic murmur is present with a grade of 3/6.  Pulmonary/Chest: Effort normal and breath sounds normal.   Abdominal: Soft.   Musculoskeletal: She exhibits no edema.   Neurological: She is alert and oriented to person, place, and time. She has normal strength. No cranial nerve deficit or sensory deficit. Coordination and gait normal. GCS eye subscore is 4. GCS verbal subscore is 5. GCS motor subscore is 6.   Skin: Skin is warm and dry. She is not diaphoretic.   Psychiatric: She has a normal mood and affect. Her behavior is normal. Thought content normal.   Nursing note and vitals reviewed.      ED Course  1917: Discussed with patient evaluation is unremarkable for acute causes of vertigo symptoms.  She has not had any symptoms in ER.  Gait is not ataxic and coordination intact.  She reports she has had intermittent vertigo symptoms for 40 years which I suspect this is.    Will place on short course of Macrobid. She is to take meclizine as needed. Diligent  follow up here in ER if worsening symptoms. At this time she has been asymptomatic.         Procedures               Critical Care time:  none               Results for orders placed or performed during the hospital encounter of 09/21/19 (from the past 24 hour(s))   CBC with platelets differential   Result Value Ref Range    WBC 4.1 4.0 - 11.0 10e9/L    RBC Count 4.72 3.8 - 5.2 10e12/L    Hemoglobin 13.8 11.7 - 15.7 g/dL    Hematocrit 41.9 35.0 - 47.0 %    MCV 89 78 - 100 fl    MCH 29.2 26.5 - 33.0 pg    MCHC 32.9 31.5 - 36.5 g/dL    RDW 14.3 10.0 - 15.0 %    Platelet Count 133 (L) 150 - 450 10e9/L    Diff Method Automated Method     % Neutrophils 64.4 %    % Lymphocytes 16.2 %    % Monocytes 14.5 %    % Eosinophils 4.2 %    % Basophils 0.5 %    % Immature Granulocytes 0.2 %    Nucleated RBCs 0 0 /100    Absolute Neutrophil 2.6 1.6 - 8.3 10e9/L    Absolute Lymphocytes 0.7 (L) 0.8 - 5.3 10e9/L    Absolute Monocytes 0.6 0.0 - 1.3 10e9/L    Absolute Basophils 0.0 0.0 - 0.2 10e9/L    Abs Immature Granulocytes 0.0 0 - 0.4 10e9/L    Absolute Nucleated RBC 0.0    INR   Result Value Ref Range    INR 3.22 (H) 0.86 - 1.14   Basic metabolic panel   Result Value Ref Range    Sodium 142 133 - 144 mmol/L    Potassium 4.4 3.4 - 5.3 mmol/L    Chloride 107 94 - 109 mmol/L    Carbon Dioxide 27 20 - 32 mmol/L    Anion Gap 8 3 - 14 mmol/L    Glucose 130 (H) 70 - 99 mg/dL    Urea Nitrogen 22 7 - 30 mg/dL    Creatinine 0.84 0.52 - 1.04 mg/dL    GFR Estimate 65 >60 mL/min/[1.73_m2]    GFR Estimate If Black 76 >60 mL/min/[1.73_m2]    Calcium 9.1 8.5 - 10.1 mg/dL   Magnesium   Result Value Ref Range    Magnesium 2.0 1.6 - 2.3 mg/dL   CT Head w/o Contrast    Narrative    CT SCAN OF THE HEAD WITHOUT CONTRAST   9/21/2019 6:31 PM     HISTORY: TIA, initial exam, vertigo intermittent.    TECHNIQUE:  Axial images of the head and coronal reformations without  IV contrast material. Radiation dose for this scan was reduced using  automated exposure  control, adjustment of the mA and/or kV according  to patient size, or iterative reconstruction technique.    COMPARISON: Head CT 9/20/2016.    FINDINGS: No evidence of acute intracranial hemorrhage. No mass effect  or midline shift. Marked periventricular white matter hypodensities  which are nonspecific, but likely related to chronic microvascular  ischemic disease. Ventricular size is within normal limits without  evidence of hydrocephalus. No abnormal extra axial fluid collection.    The visualized portions of the sinuses and mastoids appear normal. The  bony calvarium and bones of the skull base appear intact.     New changes of left globe prosthesis.      Impression    IMPRESSION:     1. No evidence of acute intracranial hemorrhage, mass, or herniation.  2. Marked nonspecific white matter changes likely due to chronic  microvascular ischemic disease.      RONY SMITH MD   UA reflex to Microscopic and Culture   Result Value Ref Range    Color Urine Yellow     Appearance Urine Clear     Glucose Urine Negative NEG^Negative mg/dL    Bilirubin Urine Negative NEG^Negative    Ketones Urine Negative NEG^Negative mg/dL    Specific Gravity Urine 1.014 1.003 - 1.035    Blood Urine Negative NEG^Negative    pH Urine 6.0 5.0 - 7.0 pH    Protein Albumin Urine Negative NEG^Negative mg/dL    Urobilinogen mg/dL 0.0 0.0 - 2.0 mg/dL    Nitrite Urine Negative NEG^Negative    Leukocyte Esterase Urine Moderate (A) NEG^Negative    Source Midstream Urine     RBC Urine 1 0 - 2 /HPF    WBC Urine 17 (H) 0 - 5 /HPF    Bacteria Urine Few (A) NEG^Negative /HPF    Squamous Epithelial /HPF Urine 1 0 - 1 /HPF    Mucous Urine Present (A) NEG^Negative /LPF       Medications   meclizine (ANTIVERT) tablet 25 mg (25 mg Oral Given 9/21/19 1812)       Assessments & Plan (with Medical Decision Making)     I have reviewed the nursing notes.    I have reviewed the findings, diagnosis, plan and need for follow up with the patient.       New  Prescriptions    MECLIZINE (ANTIVERT) 25 MG TABLET    Take 1 tablet (25 mg) by mouth every 8 hours as needed for dizziness    NITROFURANTOIN MACROCRYSTAL-MONOHYDRATE (MACROBID) 100 MG CAPSULE    Take 1 capsule (100 mg) by mouth 2 times daily for 3 days       Final diagnoses:   Dizziness   Bacteriuria with pyuria       9/21/2019   [facility] EMERGENCY DEPARTMENT     Carla Oshea, DARLENE YEE  09/21/19 2000

## 2019-09-22 LAB
BACTERIA SPEC CULT: NORMAL
Lab: NORMAL
SPECIMEN SOURCE: NORMAL

## 2019-09-23 ENCOUNTER — PATIENT OUTREACH (OUTPATIENT)
Dept: CARE COORDINATION | Facility: CLINIC | Age: 79
End: 2019-09-23

## 2019-09-23 NOTE — RESULT ENCOUNTER NOTE
Final urine culture report is NEGATIVE per [facility] Result protocol.    If NEGATIVE result, no change in treatment, per [facility] Result protocol.

## 2019-09-24 ENCOUNTER — OFFICE VISIT (OUTPATIENT)
Dept: FAMILY MEDICINE | Facility: OTHER | Age: 79
End: 2019-09-24
Payer: MEDICARE

## 2019-09-24 VITALS
BODY MASS INDEX: 22.53 KG/M2 | OXYGEN SATURATION: 94 % | SYSTOLIC BLOOD PRESSURE: 148 MMHG | DIASTOLIC BLOOD PRESSURE: 86 MMHG | RESPIRATION RATE: 16 BRPM | TEMPERATURE: 100 F | WEIGHT: 139.6 LBS | HEART RATE: 98 BPM

## 2019-09-24 DIAGNOSIS — Z79.01 LONG TERM CURRENT USE OF ANTICOAGULANT THERAPY: ICD-10-CM

## 2019-09-24 DIAGNOSIS — Z95.2 S/P AVR (AORTIC VALVE REPLACEMENT): ICD-10-CM

## 2019-09-24 DIAGNOSIS — Z00.00 MEDICARE ANNUAL WELLNESS VISIT, SUBSEQUENT: ICD-10-CM

## 2019-09-24 DIAGNOSIS — I48.91 ATRIAL FIBRILLATION WITH RVR (H): ICD-10-CM

## 2019-09-24 DIAGNOSIS — N30.00 ACUTE CYSTITIS WITHOUT HEMATURIA: Primary | ICD-10-CM

## 2019-09-24 LAB

## 2019-09-24 PROCEDURE — 81001 URINALYSIS AUTO W/SCOPE: CPT | Performed by: INTERNAL MEDICINE

## 2019-09-24 PROCEDURE — G0439 PPPS, SUBSEQ VISIT: HCPCS | Performed by: INTERNAL MEDICINE

## 2019-09-24 PROCEDURE — 99214 OFFICE O/P EST MOD 30 MIN: CPT | Mod: 25 | Performed by: INTERNAL MEDICINE

## 2019-09-24 ASSESSMENT — PAIN SCALES - GENERAL: PAINLEVEL: MODERATE PAIN (5)

## 2019-09-24 NOTE — PROGRESS NOTES
SUBJECTIVE:   Are you in the first 12 months of your Medicare coverage?  No    HPI  Do you feel safe in your environment?  Yes    Do you have a Health Care Directive? Yes: Advance Directive has been received and scanned.      Fall risk       Cognitive Screening   1) Repeat 3 items (Leader, Season, Table)    2) Clock draw: NORMAL  3) 3 item recall: Recalls 3 objects  Results: 3 items recalled: COGNITIVE IMPAIRMENT LESS LIKELY    Mini-CogTM Copyright DOUG Okeefe. Licensed by the author for use in [facility]; reprinted with permission (bronwyn@[facility]). All rights reserved.      Do you have sleep apnea, excessive snoring or daytime drowsiness?: no    Reviewed and updated as needed this visit by clinical staff  Tobacco  Allergies  Meds  Med Hx  Surg Hx  Fam Hx  Soc Hx        Reviewed and updated as needed this visit by Provider        Social History     Tobacco Use     Smoking status: Never Smoker     Smokeless tobacco: Never Used   Substance Use Topics     Alcohol use: No     Alcohol/week: 0.0 standard drinks     If you drink alcohol do you typically have >3 drinks per day or >7 drinks per week? No    Alcohol Use 10/26/2015   Prescreen: >3 drinks/day or >7 drinks/week? The patient does not drink >3 drinks per day nor >7 drinks per week.           -------------------------------------    Current providers sharing in care for this patient include:   Patient Care Team:  Mazin Larsen DO as PCP - General (Internal Medicine)  Mazin Larsen DO as Assigned PCP  Claudette Fonseca, RN as Clinic Care Coordinator (Primary Care - CC)    The following health maintenance items are reviewed in Epic and correct as of today:  Health Maintenance   Topic Date Due     ZOSTER IMMUNIZATION (1 of 2) 02/21/1989     PNEUMOCOCCAL IMMUNIZATION 65+ LOW/MEDIUM RISK (1 of 2 - PCV13) 02/21/2004     MEDICARE ANNUAL WELLNESS VISIT  10/26/2016     DTAP/TDAP/TD IMMUNIZATION (1 - Tdap) 07/06/2017     OP ANNUAL INR  "REFERRAL  05/09/2019     INFLUENZA VACCINE (1) 09/01/2019     FALL RISK ASSESSMENT  10/02/2019     ADVANCE CARE PLANNING  10/03/2019     LIPID  04/10/2023     DEXA  Completed     PHQ-2  Completed     IPV IMMUNIZATION  Aged Out     MENINGITIS IMMUNIZATION  Aged Out   End of Life Planning:  Patient currently has an advanced directive: Yes.  Practitioner is supportive of decision.    COUNSELING:  Reviewed preventive health counseling, as reflected in patient instructions       Regular exercise       Healthy diet/nutrition       Vision screening       Hearing screening       Dental care       Bladder control    Estimated body mass index is 22.53 kg/m  as calculated from the following:    Height as of 9/17/19: 1.676 m (5' 6\").    Weight as of this encounter: 63.3 kg (139 lb 9.6 oz).         reports that she has never smoked. She has never used smokeless tobacco.      Appropriate preventive services were discussed with this patient, including applicable screening as appropriate for cardiovascular disease, diabetes, osteopenia/osteoporosis, and glaucoma.  As appropriate for age/gender, discussed screening for colorectal cancer, prostate cancer, breast cancer, and cervical cancer. Checklist reviewing preventive services available has been given to the patient.    Reviewed patients plan of care and provided an AVS. The Basic Care Plan (routine screening as documented in Health Maintenance) for Khalida meets the Care Plan requirement. This Care Plan has been established and reviewed with the Patient.    Counseling Resources:  ATP IV Guidelines  Pooled Cohorts Equation Calculator  Breast Cancer Risk Calculator  FRAX Risk Assessment  ICSI Preventive Guidelines  Dietary Guidelines for Americans, 2010  USDA's MyPlate  ASA Prophylaxis  Lung CA Screening    Mazin Larsen,   [facility]    Identified Health Risks:  "

## 2019-09-24 NOTE — PROGRESS NOTES
Sandra Rouse is a 79 year old female who presents to clinic today for the following health issues:    HPI   Dizziness  Onset: x 3 days    Description:   Do you feel faint:  no   Does it feel like the surroundings (bed, room) are moving: no   Unsteady/off balance: no   Have you passed out or fallen: no     Intensity: mild    Progression of Symptoms:  improving    Accompanying Signs & Symptoms:  Heart palpitations: no   Nausea, vomiting: no   Weakness in arms or legs: no   Fatigue: no   Vision or speech changes: no   Ringing in ears (Tinnitus): no   Hearing Loss: no     History:   Head trauma/concussion hx: no   Previous similar symptoms: YES  Recent bleeding history: no     Precipitating factors:   Worse with activity or head movement: no   Any new medications (BP?): no   Alcohol/drug abuse/withdrawal: no     Alleviating factors:   Does staying in a fixed position give relief:  YES    Therapies Tried and outcome: meclizine                        Chief Complaint         The patient is a pleasant 79-year-old female who appears to be substantially younger than her stated age.  She was recently in the emergency department with episodes of nausea and vertigo.  During the course the work-up was found that she did have a urinary tract infection and she was started on both meclizine and nitrofurantoin.  She notes that she took the medication and now has had marked improvement in her symptoms.  She has a history of hypertension for which she takes diltiazem and atenolol, she also on chronic anticoagulation and she does have a history of both aortic valvuloplasty and chronic atrial fibrillation.  She is had no bleeding or bruising.                       PAST, FAMILY,SOCIAL HISTORY:     Medical  History:   has a past medical history of Aortic valve stenosis, Atrial flutter (H), Cardiomyopathy (H), Severe aortic stenosis (4/7/2014), and Shoulder fracture, left (3/22/15).     Surgical History:   has a past  surgical history that includes ENT surgery; Replace valve aortic (4/10/2014); and cataract iol, rt/lt (Right).     Social History:   reports that she has never smoked. She has never used smokeless tobacco. She reports that she does not drink alcohol or use drugs.     Family History:  family history includes Alzheimer Disease in her sister; Dementia in her brother.            MEDICATIONS  Current Outpatient Medications   Medication Sig Dispense Refill     acetaminophen (TYLENOL) 500 MG tablet Take 2 tablets (1,000 mg) by mouth every 6 hours as needed for mild pain 180 tablet 3     atenolol (TENORMIN) 50 MG tablet TAKE ONE TABLET BY MOUTH TWICE A  tablet 1     calcium carbonate-vitamin D 500-400 MG-UNIT TABS tablt Take 1 tablet by mouth 3 times daily       diltiazem ER COATED BEADS (CARDIZEM CD/CARTIA XT) 180 MG 24 hr capsule Take 1 capsule (180 mg) by mouth 2 times daily 180 capsule 3     meclizine (ANTIVERT) 25 MG tablet Take 1 tablet (25 mg) by mouth every 8 hours as needed for dizziness 15 tablet 0     nitroFURantoin macrocrystal-monohydrate (MACROBID) 100 MG capsule Take 1 capsule (100 mg) by mouth 2 times daily for 3 days 6 capsule 0     warfarin (COUMADIN) 2.5 MG tablet TAKE 2 TABLETS BY MOUTH ON MON, WED, FRI AND 1 TABLET ALL OTHER DAYS OR AS DIRECTED BY [facility] CLINIC 120 tablet 0     torsemide (DEMADEX) 20 MG tablet TAKE ONE TABLET BY MOUTH ONCE DAILY AS NEEDED (Patient not taking: Reported on 9/24/2019) 90 tablet 3         --------------------------------------------------------------------------------------------------------------------                              Review of Systems       LUNGS: Pt denies: cough, excess sputum, hemoptysis, or shortness of breath.   HEART: Pt denies: chest pain, arrhythmia, syncope, tachy or bradyarrhythmia.   GI: Pt denies: nausea, vomiting, diarrhea, constipation, melena, or hematochezia.   NEURO: Pt denies: seizures, strokes, diplopia, weakness, paraesthesias, or  paralysis.   SKIN: Pt denies: itching, rashes, discoloration, or specific lesions of concern. Denies recent hair loss.   PSYCH: The patient denies significant depression, anxiety, mood imbalance. Specifically denies any suicidal ideation.                                     Examination    BP (!) 148/86   Pulse 98   Temp 100  F (37.8  C) (Temporal)   Resp 16   Wt 63.3 kg (139 lb 9.6 oz)   SpO2 94%   BMI 22.53 kg/m       Constitutional: The patient appears to be in no acute distress. The patient appears to be adequately hydrated. No acute respiratory or hemodynamic distress is noted at this time.   LUNGS: clear bilaterally, airflow is brisk, no intercostal retraction or stridor is noted. No coughing is noted during visit.   HEART: Irregularly irregular without rubs,  gallops, or murmurs. PMI is nondisplaced. Upstrokes are brisk. S1,S2 are heard.   GI: Abdomen is soft, without rebound, guarding or tenderness. Bowel sounds are appropriate. No renal bruits are heard.   NEURO: Pt is alert and appropriate. No neurologic lateralization is noted. Cranial nerves 2-12 are intact. Peripheral sensory and motor function are grossly normal.    SKIN:  warm and dry. No erythema, or rashes are noted. No specific lesions of concern are noted.    PSYCH: The patient appears grossly appropriate. Maintains good eye contact, does not have any jittery or atypical motion. Displays appropriate affect.   ENT: Pharynx is non-erythemous, minimal PND, no significant nasal obstruction, TM's not red or retracted, hearing intact bilaterally. No carotid bruits are heard. No JVD seen. Thyroid is not nodular or enlarged.    Wellness exam portion   Are you in the first 12 months of your Medicare coverage?  No    HPI  Do you feel safe in your environment?  Yes    Do you have a Health Care Directive? Yes: Advance Directive has been received and scanned.  Cognitive Screening   1) Repeat 3 items (Leader, Season, Table)    2) Clock draw: NORMAL  3) 3  item recall: Recalls 3 objects  Results: 3 items recalled: COGNITIVE IMPAIRMENT LESS LIKELY    Mini-CogTM Copyright S Sary. Licensed by the author for use in [facility]; reprinted with permission ([email or web address]). All rights reserved.      Do you have sleep apnea, excessive snoring or daytime drowsiness?: no    Reviewed and updated as needed this visit by clinical staff  Tobacco  Allergies  Meds  Med Hx  Surg Hx  Fam Hx  Soc Hx        Reviewed and updated as needed this visit by Provider        Social History     Tobacco Use     Smoking status: Never Smoker     Smokeless tobacco: Never Used   Substance Use Topics     Alcohol use: No     Alcohol/week: 0.0 standard drinks     If you drink alcohol do you typically have >3 drinks per day or >7 drinks per week? No    Alcohol Use 10/26/2015   Prescreen: >3 drinks/day or >7 drinks/week? The patient does not drink >3 drinks per day nor >7 drinks per week.           -------------------------------------    Current providers sharing in care for this patient include:   Patient Care Team:  Mazin Larsen DO as PCP - General (Internal Medicine)  Mazin Larsen DO as Assigned PCP  Claudette Fonseca RN as Clinic Care Coordinator (Primary Care - CC)    The following health maintenance items are reviewed in Epic and correct as of today:  Health Maintenance   Topic Date Due     ZOSTER IMMUNIZATION (1 of 2) 02/21/1989     PNEUMOCOCCAL IMMUNIZATION 65+ LOW/MEDIUM RISK (1 of 2 - PCV13) 02/21/2004     MEDICARE ANNUAL WELLNESS VISIT  10/26/2016     DTAP/TDAP/TD IMMUNIZATION (1 - Tdap) 07/06/2017     OP ANNUAL INR REFERRAL  05/09/2019     INFLUENZA VACCINE (1) 09/01/2019     FALL RISK ASSESSMENT  10/02/2019     ADVANCE CARE PLANNING  10/03/2019     LIPID  04/10/2023     DEXA  Completed     PHQ-2  Completed     IPV IMMUNIZATION  Aged Out     MENINGITIS IMMUNIZATION  Aged Out   End of Life Planning:  Patient currently has an advanced directive:  "Yes.  Practitioner is supportive of decision.    COUNSELING:  Reviewed preventive health counseling, as reflected in patient instructions       Regular exercise       Healthy diet/nutrition       Vision screening       Hearing screening       Dental care       Bladder control    Estimated body mass index is 22.53 kg/m  as calculated from the following:    Height as of 9/17/19: 1.676 m (5' 6\").    Weight as of this encounter: 63.3 kg (139 lb 9.6 oz).         reports that she has never smoked. She has never used smokeless tobacco.      Appropriate preventive services were discussed with this patient, including applicable screening as appropriate for cardiovascular disease, diabetes, osteopenia/osteoporosis, and glaucoma.  As appropriate for age/gender, discussed screening for colorectal cancer, prostate cancer, breast cancer, and cervical cancer. Checklist reviewing preventive services available has been given to the patient.    Reviewed patients plan of care and provided an AVS. The Basic Care Plan (routine screening as documented in Health Maintenance) for Khalida meets the Care Plan requirement. This Care Plan has been established and reviewed with the Patient.    Counseling Resources:  ATP IV Guidelines  Pooled Cohorts Equation Calculator  Breast Cancer Risk Calculator  FRAX Risk Assessment  ICSI Preventive Guidelines  Dietary Guidelines for Americans, 2010  OMNI[employer] science's MyPlate  ASA Prophylaxis  Lung CA Screening                                        Decision Making    1. Acute cystitis without hematuria  We will recheck urinary analysis as she has completed the antibiotic.  - UA reflex to Microscopic    2. S/P AVR (aortic valve replacement)  Continue anticoagulation    3. Atrial fibrillation with RVR (H)  Continue anticoagulation    4. Long term current use of anticoagulant therapy  Follow INR and adjust accordingly                           FOLLOW UP   I have asked the patient to make an appointment for followup with me " in 6 months        I have carefully explained the diagnosis and treatment options to the patient.  The patient has displayed an understanding of the above, and all subsequent questions were answered.      DO MARIA D Sabillon    Portions of this note were produced using [employer]  Although every attempt at real-time proof reading has been made, occasional grammar/syntax errors may have been missed.

## 2019-09-25 ENCOUNTER — TELEPHONE (OUTPATIENT)
Dept: FAMILY MEDICINE | Facility: OTHER | Age: 79
End: 2019-09-25

## 2019-09-25 RX ORDER — NITROFURANTOIN 25; 75 MG/1; MG/1
100 CAPSULE ORAL 2 TIMES DAILY
Qty: 14 CAPSULE | Refills: 0 | Status: SHIPPED | OUTPATIENT
Start: 2019-09-25 | End: 2020-04-01

## 2019-09-25 ASSESSMENT — ACTIVITIES OF DAILY LIVING (ADL): DEPENDENT_IADLS:: INDEPENDENT

## 2019-09-25 NOTE — RESULT ENCOUNTER NOTE
Dear Khalida, your recent test results are attached.  Please continue the nitrofurantoin for a total of 10 days.  I have called over additional medication to your pharmacy.  We should recheck your urinary analysis upon completion of the antibiotic.    Feel free to contact me via the office or My Chart if you have any questions regarding the above.

## 2019-09-25 NOTE — TELEPHONE ENCOUNTER
Called and LM for patient to call back. Please relay results below to patient.     Jessica Butler MA

## 2019-09-25 NOTE — TELEPHONE ENCOUNTER
Clinic Care Coordination Contact  Care Team Conversations    Called and spoke with pt and notified her of the information below.     Pratibha OGDEN, RN, PHN, CCM  Primary Care Coordination    [facility] Office: [phone]   [facility] Office: [phone]  [email or web address]   [email or web address]   Connect with [facility] on social media.  9/25/2019 12:48 PM

## 2019-09-25 NOTE — TELEPHONE ENCOUNTER
----- Message from Mazin Larsen DO sent at 9/25/2019 10:04 AM CDT -----  Dear Khalida, your recent test results are attached.  Please continue the nitrofurantoin for a total of 10 days.  I have called over additional medication to your pharmacy.  We should recheck your urinary analysis upon completion of the antibiotic.    Feel free to contact me via the office or My Chart if you have any questions regarding the above.

## 2019-09-25 NOTE — PROGRESS NOTES
Clinic Care Coordination Contact    Clinic Care Coordination Contact  OUTREACH    Referral Information:  Referral Source: IP Report    Primary Diagnosis: ENT    Chief Complaint   Patient presents with     Clinic Care Coordination - Post Hospital     ED follow-up      [place] Utilization:   Clinic Utilization  Difficulty keeping appointments:: No  Compliance Concerns: No  No-Show Concerns: No  No PCP office visit in Past Year: No  Utilization    Last refreshed: 9/25/2019 11:34 AM:  Hospital Admissions 2           Last refreshed: 9/25/2019 11:34 AM:  ED Visits 4           Last refreshed: 9/25/2019 11:34 AM:  No Show Count (past year) 3              Current as of: 9/25/2019 11:34 AM            Clinical Concerns:  Current Medical Concerns: spoke with patient introduced self and role, was recently in the hospital for dizziness.  Patient states her dizziness has resolved and is not quite sure what caused it.  She was given some meclizine which did help.  Patient states she did follow-up with her PCP and that she may have a bladder infection.  He started her on antibiotics 10-day course.  Patient states her symptoms are starting to resolve.    Current Behavioral Concerns: No current concerns  Education Provided to patient: Follow-up if not feeling better  Pain  Pain (GOAL):: No  Health Maintenance Reviewed:    Clinical Pathway: None    Medication Management:  Management    Functional Status:  Dependent ADLs:: Independent  Dependent IADLs:: Independent  Bed or wheelchair confined:: No  Mobility Status: Independent    Living Situation:  Not discussed    Diet/Exercise/Sleep:  Not discussed    Transportation:  Transportation concerns (GOAL):: No  Transportation means:: Regular car     Psychosocial:  Financial/Insurance concerns (GOAL):: No     Resources and Interventions:  Current Resources:   Community Resources: None  Supplies used at home:: None      Goals: na    Patient/Caregiver understanding: Pt verbalizes  understanding of follow up instructions and when scheduled appt date and times.       Future Appointments              In 3 weeks PH ANTI COAG [facility]          Plan: Patient will follow-up if symptoms not resolved  No further involvement with RN CC patient declined needs      Pratibha OGDEN, RN, PHN, [facility]  Primary Care Coordination    [facility] Office: [phone]   [facility] Office: [phone]  [email or web address]   [email or web address]   Connect with [facility] on social media.  9/25/2019 1:24 PM

## 2019-09-30 ENCOUNTER — TELEPHONE (OUTPATIENT)
Dept: FAMILY MEDICINE | Facility: OTHER | Age: 79
End: 2019-09-30

## 2019-09-30 NOTE — LETTER
[facility]  [address] [facility] [address]  [phone]        October 2, 2019    Khalida Rouse  [facility] [place] [address] [facility] [address]          Dear Khaliad,    Last time you were in the clinic your blood pressure was high. We'd like you to have this rechecked with a float nurse.    You may call our office at [phone] to schedule an appointment with the float nurse.    Please disregard this notice if you have already had your labs drawn or made an appointment.    Sincerely,    Mazin Larsen MD

## 2019-10-02 NOTE — TELEPHONE ENCOUNTER
I have written and printed a letter to the pt with the information below. Casie Tubbs CMA ([facility])

## 2019-10-22 ENCOUNTER — TRANSFERRED RECORDS (OUTPATIENT)
Dept: HEALTH INFORMATION MANAGEMENT | Facility: CLINIC | Age: 79
End: 2019-10-22

## 2019-10-24 ENCOUNTER — ANTICOAGULATION THERAPY VISIT (OUTPATIENT)
Dept: ANTICOAGULATION | Facility: CLINIC | Age: 79
End: 2019-10-24
Payer: MEDICARE

## 2019-10-24 VITALS — HEART RATE: 84 BPM | DIASTOLIC BLOOD PRESSURE: 70 MMHG | SYSTOLIC BLOOD PRESSURE: 129 MMHG

## 2019-10-24 DIAGNOSIS — I48.91 ATRIAL FIBRILLATION WITH RVR (H): ICD-10-CM

## 2019-10-24 DIAGNOSIS — Z95.2 S/P AVR (AORTIC VALVE REPLACEMENT): ICD-10-CM

## 2019-10-24 DIAGNOSIS — Z79.01 LONG TERM CURRENT USE OF ANTICOAGULANT THERAPY: ICD-10-CM

## 2019-10-24 LAB — INR POINT OF CARE: 3.7 (ref 0.9–1.1)

## 2019-10-24 PROCEDURE — 36416 COLLJ CAPILLARY BLOOD SPEC: CPT

## 2019-10-24 PROCEDURE — 85610 PROTHROMBIN TIME: CPT | Mod: QW

## 2019-10-24 PROCEDURE — 99207 ZZC NO CHARGE NURSE ONLY: CPT

## 2019-10-24 RX ORDER — WARFARIN SODIUM 2.5 MG/1
TABLET ORAL
Qty: 100 TABLET | Refills: 0 | Status: SHIPPED | OUTPATIENT
Start: 2019-10-24 | End: 2020-03-11

## 2019-10-24 NOTE — PROGRESS NOTES
ANTICOAGULATION FOLLOW-UP CLINIC VISIT    Patient Name:  Khalida Rouse  Date:  10/24/2019  Contact Type:  Face to Face    SUBJECTIVE:  Patient Findings     Comments:   Patient denies any identifiable changes that caused the supratherapeutic INR.   Venessa Hood RN          Clinical Outcomes     Comments:   Patient denies any identifiable changes that caused the supratherapeutic INR.   Venessa Hood RN             OBJECTIVE    INR Protime   Date Value Ref Range Status   10/24/2019 3.7 (A) 0.9 - 1.1 Final       ASSESSMENT / PLAN  INR assessment SUPRA    Recheck INR In: 1 WEEK    INR Location Clinic      Anticoagulation Summary  As of 10/24/2019    INR goal:   2.0-3.0   TTR:   79.8 % (3.6 y)   INR used for dosing:   3.7! (10/24/2019)   Warfarin maintenance plan:   5 mg (2.5 mg x 2) every Mon; 2.5 mg (2.5 mg x 1) all other days   Full warfarin instructions:   5 mg every Mon; 2.5 mg all other days   Weekly warfarin total:   20 mg   Plan last modified:   Venessa Hood RN (10/24/2019)   Next INR check:   11/5/2019   Target end date:       Indications    Long term current use of anticoagulant therapy [Z79.01]  Atrial fibrillation with RVR (H) [I48.91]  S/P AVR (aortic valve replacement) [Z95.2]             Anticoagulation Episode Summary     INR check location:       Preferred lab:       Send INR reminders to:   [facility] [place]    Comments:   2.5 mg tablets, book, BP, PM dose      Anticoagulation Care Providers     Provider Role Specialty Phone number    Dorcas Fisher MD [facility] [phone]            See the Encounter Report to view Anticoagulation Flowsheet and Dosing Calendar (Go to Encounters tab in chart review, and find the Anticoagulation Therapy Visit)    Dosage adjustment made based on physician directed care plan.      Venessa Hood RN

## 2019-11-05 ENCOUNTER — ANTICOAGULATION THERAPY VISIT (OUTPATIENT)
Dept: ANTICOAGULATION | Facility: CLINIC | Age: 79
End: 2019-11-05
Payer: MEDICARE

## 2019-11-05 VITALS — SYSTOLIC BLOOD PRESSURE: 128 MMHG | HEART RATE: 86 BPM | DIASTOLIC BLOOD PRESSURE: 69 MMHG

## 2019-11-05 DIAGNOSIS — Z79.01 LONG TERM CURRENT USE OF ANTICOAGULANT THERAPY: ICD-10-CM

## 2019-11-05 DIAGNOSIS — I48.91 ATRIAL FIBRILLATION WITH RVR (H): ICD-10-CM

## 2019-11-05 DIAGNOSIS — Z95.2 S/P AVR (AORTIC VALVE REPLACEMENT): ICD-10-CM

## 2019-11-05 LAB — INR POINT OF CARE: 2.6 (ref 0.9–1.1)

## 2019-11-05 PROCEDURE — 99207 ZZC NO CHARGE NURSE ONLY: CPT

## 2019-11-05 PROCEDURE — 36416 COLLJ CAPILLARY BLOOD SPEC: CPT

## 2019-11-05 PROCEDURE — 85610 PROTHROMBIN TIME: CPT | Mod: QW

## 2019-11-05 NOTE — PROGRESS NOTES
ANTICOAGULATION FOLLOW-UP CLINIC VISIT    Patient Name:  Khalida Rouse  Date:  2019  Contact Type:  Face to Face    SUBJECTIVE:  Patient Findings     Comments:   The patient was assessed for diet, medication, and activity level changes, missed or extra doses, bruising or bleeding, with no problem findings.  Venessa Hodo RN          Clinical Outcomes     Negatives:   Major bleeding event, Thromboembolic event, Anticoagulation-related hospital admission, Anticoagulation-related ED visit, Anticoagulation-related fatality    Comments:   The patient was assessed for diet, medication, and activity level changes, missed or extra doses, bruising or bleeding, with no problem findings.  Venessa Hood RN             OBJECTIVE    INR Protime   Date Value Ref Range Status   2019 2.6 (A) 0.9 - 1.1 Final       ASSESSMENT / PLAN  INR assessment THER    Recheck INR In: 3 WEEKS    INR Location Clinic      Anticoagulation Summary  As of 2019    INR goal:   2.0-3.0   TTR:   79.4 % (3.6 y)   INR used for dosin.6 (2019)   Warfarin maintenance plan:   5 mg (2.5 mg x 2) every Mon; 2.5 mg (2.5 mg x 1) all other days   Full warfarin instructions:   5 mg every Mon; 2.5 mg all other days   Weekly warfarin total:   20 mg   No change documented:   Venessa Hood RN   Plan last modified:   Venessa Hood RN (10/24/2019)   Next INR check:   2019   Target end date:       Indications    Long term current use of anticoagulant therapy [Z79.01]  Atrial fibrillation with RVR (H) [I48.91]  S/P AVR (aortic valve replacement) [Z95.2]             Anticoagulation Episode Summary     INR check location:       Preferred lab:       Send INR reminders to:   [facility] [place]    Comments:   2.5 mg tablets, book, BP, PM dose      Anticoagulation Care Providers     Provider Role Specialty Phone number    Dorcas Fisher MD [facility] [phone]            See the Encounter Report to view  Anticoagulation Flowsheet and Dosing Calendar (Go to Encounters tab in chart review, and find the Anticoagulation Therapy Visit)    Dosage adjustment made based on physician directed care plan.      Venessa Hood RN

## 2019-12-03 ENCOUNTER — ANTICOAGULATION THERAPY VISIT (OUTPATIENT)
Dept: ANTICOAGULATION | Facility: CLINIC | Age: 79
End: 2019-12-03
Payer: MEDICARE

## 2019-12-03 VITALS — SYSTOLIC BLOOD PRESSURE: 124 MMHG | DIASTOLIC BLOOD PRESSURE: 82 MMHG | HEART RATE: 91 BPM

## 2019-12-03 DIAGNOSIS — Z79.01 LONG TERM CURRENT USE OF ANTICOAGULANT THERAPY: ICD-10-CM

## 2019-12-03 DIAGNOSIS — Z95.2 S/P AVR (AORTIC VALVE REPLACEMENT): ICD-10-CM

## 2019-12-03 DIAGNOSIS — I48.91 ATRIAL FIBRILLATION WITH RVR (H): ICD-10-CM

## 2019-12-03 LAB — INR POINT OF CARE: 1.7 (ref 0.9–1.1)

## 2019-12-03 PROCEDURE — 85610 PROTHROMBIN TIME: CPT | Mod: QW

## 2019-12-03 PROCEDURE — 99207 ZZC NO CHARGE NURSE ONLY: CPT

## 2019-12-03 PROCEDURE — 36416 COLLJ CAPILLARY BLOOD SPEC: CPT

## 2019-12-03 NOTE — PROGRESS NOTES
ANTICOAGULATION FOLLOW-UP CLINIC VISIT    Patient Name:  Khalida Rouse  Date:  12/3/2019  Contact Type:  Face to Face    SUBJECTIVE:  Patient Findings     Positives:   Change in medications (started taking a multivitamin from her homeopathic doctor - possibly contains Soco K)             OBJECTIVE    INR Protime   Date Value Ref Range Status   2019 1.7 (A) 0.9 - 1.1 Final       ASSESSMENT / PLAN  INR assessment SUB    Recheck INR In: 2 WEEKS    INR Location Clinic      Anticoagulation Summary  As of 12/3/2019    INR goal:   2.0-3.0   TTR:   82.3 % (1 y)   INR used for dosin.7! (12/3/2019)   Warfarin maintenance plan:   5 mg (2.5 mg x 2) every Mon, Fri; 2.5 mg (2.5 mg x 1) all other days   Full warfarin instructions:   12/3: 5 mg; Otherwise 5 mg every Mon, Fri; 2.5 mg all other days   Weekly warfarin total:   22.5 mg   Plan last modified:   Venessa Hood RN (12/3/2019)   Next INR check:   2019   Target end date:       Indications    Long term current use of anticoagulant therapy [Z79.01]  Atrial fibrillation with RVR (H) [I48.91]  S/P AVR (aortic valve replacement) [Z95.2]             Anticoagulation Episode Summary     INR check location:       Preferred lab:       Send INR reminders to:   [facility] [place]    Comments:   2.5 mg tablets, book, BP, PM dose      Anticoagulation Care Providers     Provider Role Specialty Phone number    Dorcas Fisher MD [facility] [phone]            See the Encounter Report to view Anticoagulation Flowsheet and Dosing Calendar (Go to Encounters tab in chart review, and find the Anticoagulation Therapy Visit)    Dosage adjustment made based on physician directed care plan.      Venessa Hood, CAITLIN

## 2019-12-17 ENCOUNTER — ANTICOAGULATION THERAPY VISIT (OUTPATIENT)
Dept: ANTICOAGULATION | Facility: CLINIC | Age: 79
End: 2019-12-17
Payer: MEDICARE

## 2019-12-17 VITALS — HEART RATE: 79 BPM | DIASTOLIC BLOOD PRESSURE: 76 MMHG | SYSTOLIC BLOOD PRESSURE: 123 MMHG

## 2019-12-17 DIAGNOSIS — I48.91 ATRIAL FIBRILLATION WITH RVR (H): ICD-10-CM

## 2019-12-17 DIAGNOSIS — Z95.2 S/P AVR (AORTIC VALVE REPLACEMENT): ICD-10-CM

## 2019-12-17 DIAGNOSIS — Z79.01 LONG TERM CURRENT USE OF ANTICOAGULANT THERAPY: ICD-10-CM

## 2019-12-17 LAB — INR POINT OF CARE: 2.4 (ref 0.9–1.1)

## 2019-12-17 PROCEDURE — 99207 ZZC NO CHARGE NURSE ONLY: CPT

## 2019-12-17 PROCEDURE — 36416 COLLJ CAPILLARY BLOOD SPEC: CPT

## 2019-12-17 PROCEDURE — 85610 PROTHROMBIN TIME: CPT | Mod: QW

## 2019-12-17 NOTE — PROGRESS NOTES
ANTICOAGULATION FOLLOW-UP CLINIC VISIT    Patient Name:  Khalida Rouse  Date:  2019  Contact Type:  Face to Face    SUBJECTIVE:  Patient Findings     Comments:   The patient was assessed for diet, medication, and activity level changes, missed or extra doses, bruising or bleeding, with no problem findings.  Venessa Hood RN          Clinical Outcomes     Negatives:   Major bleeding event, Thromboembolic event, Anticoagulation-related hospital admission, Anticoagulation-related ED visit, Anticoagulation-related fatality    Comments:   The patient was assessed for diet, medication, and activity level changes, missed or extra doses, bruising or bleeding, with no problem findings.  Venessa Hood RN             OBJECTIVE    INR Protime   Date Value Ref Range Status   2019 2.4 (A) 0.9 - 1.1 Final       ASSESSMENT / PLAN  INR assessment THER    Recheck INR In: 4 WEEKS    INR Location Clinic      Anticoagulation Summary  As of 2019    INR goal:   2.0-3.0   TTR:   80.7 % (1 y)   INR used for dosin.4 (2019)   Warfarin maintenance plan:   5 mg (2.5 mg x 2) every Mon, Fri; 2.5 mg (2.5 mg x 1) all other days   Full warfarin instructions:   5 mg every Mon, Fri; 2.5 mg all other days   Weekly warfarin total:   22.5 mg   No change documented:   Venessa Hood RN   Plan last modified:   Venessa Hood RN (12/3/2019)   Next INR check:   2020   Priority:   Maintenance   Target end date:       Indications    Long term current use of anticoagulant therapy [Z79.01]  Atrial fibrillation with RVR (H) [I48.91]  S/P AVR (aortic valve replacement) [Z95.2]             Anticoagulation Episode Summary     INR check location:       Preferred lab:       Send INR reminders to:   [facility] [place]    Comments:   2.5 mg tablets, book, BP, PM dose      Anticoagulation Care Providers     Provider Role Specialty Phone number    Dorcas Fisher MD [facility] [phone]            See  the Encounter Report to view Anticoagulation Flowsheet and Dosing Calendar (Go to Encounters tab in chart review, and find the Anticoagulation Therapy Visit)    Dosage adjustment made based on physician directed care plan.      Venessa Hood RN

## 2019-12-20 DIAGNOSIS — I48.91 ATRIAL FIBRILLATION WITH RVR (H): ICD-10-CM

## 2019-12-20 RX ORDER — ATENOLOL 50 MG/1
TABLET ORAL
Qty: 180 TABLET | Refills: 0 | Status: SHIPPED | OUTPATIENT
Start: 2019-12-20 | End: 2020-04-01

## 2019-12-20 NOTE — TELEPHONE ENCOUNTER
"Prescription approved per RN refill protocol.    Atenolol  Last Written Prescription Date:  6/20/2019  Last Fill Quantity: 180,  # refills: 1   Last office visit: 9/24/2019 with prescribing provider:  Suzie   Future Office Visit:      Requested Prescriptions   Pending Prescriptions Disp Refills     atenolol (TENORMIN) 50 MG tablet [Pharmacy Med Name: ATENOLOL 50MG TABS] 180 tablet 1     Sig: TAKE ONE TABLET BY MOUTH TWICE A DAY       Beta-Blockers Protocol Passed - 12/20/2019  1:05 AM        Passed - Blood pressure under 140/90 in past 12 months     BP Readings from Last 3 Encounters:   12/17/19 123/76   12/03/19 124/82   11/05/19 128/69                 Passed - Patient is age 6 or older        Passed - Recent (12 mo) or future (30 days) visit within the authorizing provider's specialty     Patient has had an office visit with the authorizing provider or a provider within the authorizing providers department within the previous 12 mos or has a future within next 30 days. See \"Patient Info\" tab in inbasket, or \"Choose Columns\" in Meds & Orders section of the refill encounter.              Passed - Medication is active on med list        Kenisha Seymour RN on 12/20/2019 at 9:16 AM    "

## 2019-12-21 DIAGNOSIS — I48.91 ATRIAL FIBRILLATION WITH RVR (H): ICD-10-CM

## 2019-12-23 RX ORDER — ATENOLOL 50 MG/1
TABLET ORAL
Qty: 180 TABLET | Refills: 1 | OUTPATIENT
Start: 2019-12-23

## 2019-12-23 NOTE — TELEPHONE ENCOUNTER
Prescription was sent 12/20/19 for #180 with 0 refills.  Pharmacy notified via E-Prescribe refusal.     SANA PearsonN, RN  [facility]

## 2019-12-23 NOTE — TELEPHONE ENCOUNTER
"Requested Prescriptions   Pending Prescriptions Disp Refills     atenolol (TENORMIN) 50 MG tablet [Pharmacy Med Name: ATENOLOL 50MG TABS] 180 tablet 1     Sig: TAKE ONE TABLET BY MOUTH TWICE A DAY   Last Written Prescription Date:  12/20/19  Last Fill Quantity: 180,  # refills: 0   Last office visit: 9/24/2019 with prescribing provider:  9/24/19   Future Office Visit:        Beta-Blockers Protocol Passed - 12/21/2019  1:07 AM        Passed - Blood pressure under 140/90 in past 12 months     BP Readings from Last 3 Encounters:   12/17/19 123/76   12/03/19 124/82   11/05/19 128/69                 Passed - Patient is age 6 or older        Passed - Recent (12 mo) or future (30 days) visit within the authorizing provider's specialty     Patient has had an office visit with the authorizing provider or a provider within the authorizing providers department within the previous 12 mos or has a future within next 30 days. See \"Patient Info\" tab in inbasket, or \"Choose Columns\" in Meds & Orders section of the refill encounter.              Passed - Medication is active on med list        "

## 2020-01-02 ENCOUNTER — TELEPHONE (OUTPATIENT)
Dept: FAMILY MEDICINE | Facility: OTHER | Age: 80
End: 2020-01-02

## 2020-01-02 NOTE — TELEPHONE ENCOUNTER
Pt has a prescription for Acetaminophen 500 mg tabs.   Advised pt that she may take 2 tabs every 6 hours for pain as needed. Not to exceed more than 4,000 mg daily.   Pt verbalizes an understanding. No further questions or concerns.     Kenisha Seymour, RN, BSN

## 2020-01-02 NOTE — TELEPHONE ENCOUNTER
Patient is having knee pain.  She is wondering if the medication she has at home is what she is supposed to be taking.  It is called Acetaminophen, patient was informed it is Tylenol.    Please contact her to advise how much to take.    Thank you,  Hellen FABIAN

## 2020-01-14 ENCOUNTER — ANTICOAGULATION THERAPY VISIT (OUTPATIENT)
Dept: ANTICOAGULATION | Facility: CLINIC | Age: 80
End: 2020-01-14
Payer: MEDICARE

## 2020-01-14 DIAGNOSIS — Z79.01 LONG TERM CURRENT USE OF ANTICOAGULANT THERAPY: ICD-10-CM

## 2020-01-14 DIAGNOSIS — I48.91 ATRIAL FIBRILLATION WITH RVR (H): ICD-10-CM

## 2020-01-14 DIAGNOSIS — Z95.2 S/P AVR (AORTIC VALVE REPLACEMENT): ICD-10-CM

## 2020-01-14 LAB — INR POINT OF CARE: 2.9 (ref 0.9–1.1)

## 2020-01-14 PROCEDURE — 85610 PROTHROMBIN TIME: CPT | Mod: QW

## 2020-01-14 PROCEDURE — 36416 COLLJ CAPILLARY BLOOD SPEC: CPT

## 2020-01-14 PROCEDURE — 99207 ZZC NO CHARGE NURSE ONLY: CPT

## 2020-01-14 NOTE — PROGRESS NOTES
ANTICOAGULATION FOLLOW-UP CLINIC VISIT    Patient Name:  Khalida Rouse  Date:  2020  Contact Type:  Face to Face    SUBJECTIVE:  Patient Findings     Comments:   The patient was assessed for diet, medication, and activity level changes, missed or extra doses, bruising or bleeding, with no problem findings.    Venessa Hood RN            Clinical Outcomes     Negatives:   Major bleeding event, Thromboembolic event, Anticoagulation-related hospital admission, Anticoagulation-related ED visit, Anticoagulation-related fatality    Comments:   The patient was assessed for diet, medication, and activity level changes, missed or extra doses, bruising or bleeding, with no problem findings.    Venessa Hood RN               OBJECTIVE    INR Protime   Date Value Ref Range Status   2020 2.9 (A) 0.9 - 1.1 Final       ASSESSMENT / PLAN  INR assessment THER    Recheck INR In: 4 WEEKS    INR Location Clinic      Anticoagulation Summary  As of 2020    INR goal:   2.0-3.0   TTR:   80.7 % (1 y)   INR used for dosin.9 (2020)   Warfarin maintenance plan:   5 mg (2.5 mg x 2) every Mon, Fri; 2.5 mg (2.5 mg x 1) all other days   Full warfarin instructions:   5 mg every Mon, Fri; 2.5 mg all other days   Weekly warfarin total:   22.5 mg   No change documented:   Venessa Hood RN   Plan last modified:   Venessa Hood RN (12/3/2019)   Next INR check:   2020   Priority:   Maintenance   Target end date:       Indications    Long term current use of anticoagulant therapy [Z79.01]  Atrial fibrillation with RVR (H) [I48.91]  S/P AVR (aortic valve replacement) [Z95.2]             Anticoagulation Episode Summary     INR check location:       Preferred lab:       Send INR reminders to:   [facility]    Comments:   2.5 mg tablets, book, BP, PM dose      Anticoagulation Care Providers     Provider Role Specialty Phone number    Dorcas Fisher MD [facility] [phone]             See the Encounter Report to view Anticoagulation Flowsheet and Dosing Calendar (Go to Encounters tab in chart review, and find the Anticoagulation Therapy Visit)    Dosage adjustment made based on physician directed care plan.    Venessa Hood RN

## 2020-02-11 ENCOUNTER — ANTICOAGULATION THERAPY VISIT (OUTPATIENT)
Dept: ANTICOAGULATION | Facility: CLINIC | Age: 80
End: 2020-02-11
Payer: MEDICARE

## 2020-02-11 DIAGNOSIS — Z79.01 LONG TERM CURRENT USE OF ANTICOAGULANT THERAPY: ICD-10-CM

## 2020-02-11 DIAGNOSIS — Z95.2 S/P AVR (AORTIC VALVE REPLACEMENT): ICD-10-CM

## 2020-02-11 DIAGNOSIS — I48.91 ATRIAL FIBRILLATION WITH RVR (H): ICD-10-CM

## 2020-02-11 LAB — INR POINT OF CARE: 1.7 (ref 0.9–1.1)

## 2020-02-11 PROCEDURE — 36416 COLLJ CAPILLARY BLOOD SPEC: CPT

## 2020-02-11 PROCEDURE — 99207 ZZC NO CHARGE NURSE ONLY: CPT

## 2020-02-11 PROCEDURE — 85610 PROTHROMBIN TIME: CPT | Mod: QW

## 2020-02-11 NOTE — PROGRESS NOTES
ANTICOAGULATION FOLLOW-UP CLINIC VISIT    Patient Name:  Khalida Rouse  Date:  2020  Contact Type:  Face to Face    SUBJECTIVE:  Patient Findings     Positives:   Change in diet/appetite (more [place] this week- plans to go back to normal amount  )             OBJECTIVE    INR Protime   Date Value Ref Range Status   2020 1.7 (A) 0.9 - 1.1 Final       ASSESSMENT / PLAN  INR assessment SUB    Recheck INR In: 4 WEEKS    INR Location Clinic      Anticoagulation Summary  As of 2020    INR goal:   2.0-3.0   TTR:   78.7 % (1 y)   INR used for dosin.7! (2020)   Warfarin maintenance plan:   5 mg (2.5 mg x 2) every Mon, Fri; 2.5 mg (2.5 mg x 1) all other days   Full warfarin instructions:   : 7.5 mg; Otherwise 5 mg every Mon, Fri; 2.5 mg all other days   Weekly warfarin total:   22.5 mg   Plan last modified:   Venessa Hood RN (12/3/2019)   Next INR check:   3/10/2020   Priority:   Maintenance   Target end date:       Indications    Long term current use of anticoagulant therapy [Z79.01]  Atrial fibrillation with RVR (H) [I48.91]  S/P AVR (aortic valve replacement) [Z95.2]             Anticoagulation Episode Summary     INR check location:       Preferred lab:       Send INR reminders to:   [facility]    Comments:   2.5 mg tablets, book, BP, PM dose      Anticoagulation Care Providers     Provider Role Specialty Phone number    Dorcas Fisher MD [facility] [phone]            See the Encounter Report to view Anticoagulation Flowsheet and Dosing Calendar (Go to Encounters tab in chart review, and find the Anticoagulation Therapy Visit)    Dosage adjustment made based on physician directed care plan.      Venessa Hood RN

## 2020-03-10 DIAGNOSIS — I48.91 ATRIAL FIBRILLATION WITH RVR (H): ICD-10-CM

## 2020-03-11 ENCOUNTER — ANTICOAGULATION THERAPY VISIT (OUTPATIENT)
Dept: ANTICOAGULATION | Facility: CLINIC | Age: 80
End: 2020-03-11
Payer: MEDICARE

## 2020-03-11 VITALS — HEART RATE: 101 BPM | DIASTOLIC BLOOD PRESSURE: 73 MMHG | SYSTOLIC BLOOD PRESSURE: 131 MMHG

## 2020-03-11 DIAGNOSIS — I48.91 ATRIAL FIBRILLATION WITH RVR (H): ICD-10-CM

## 2020-03-11 DIAGNOSIS — Z95.2 S/P AVR (AORTIC VALVE REPLACEMENT): ICD-10-CM

## 2020-03-11 DIAGNOSIS — Z79.01 LONG TERM CURRENT USE OF ANTICOAGULANT THERAPY: ICD-10-CM

## 2020-03-11 LAB — INR POINT OF CARE: 1.9 (ref 0.9–1.1)

## 2020-03-11 PROCEDURE — 36416 COLLJ CAPILLARY BLOOD SPEC: CPT

## 2020-03-11 PROCEDURE — 85610 PROTHROMBIN TIME: CPT | Mod: QW

## 2020-03-11 PROCEDURE — 99207 ZZC NO CHARGE NURSE ONLY: CPT

## 2020-03-11 RX ORDER — WARFARIN SODIUM 2.5 MG/1
TABLET ORAL
Qty: 110 TABLET | Refills: 0 | Status: SHIPPED | OUTPATIENT
Start: 2020-03-11 | End: 2020-06-11

## 2020-03-11 NOTE — TELEPHONE ENCOUNTER
"Requested Prescriptions   Pending Prescriptions Disp Refills     warfarin ANTICOAGULANT (COUMADIN) 2.5 MG tablet [Pharmacy Med Name: WARFARIN SODIUM 2.5MG TABS] 100 tablet 0     Sig: TAKE 2 TABLETS BY MOUTH ON MON, AND 1 TABLET BY MOUTH ALL OTHER DAYS OF THE WEEK. OR AS DIRECTED BY INR CLINIC       Vitamin K Antagonists Failed - 3/10/2020  4:22 PM        Failed - INR is within goal in the past 6 weeks     Confirm INR is within goal in the past 6 weeks.     Recent Labs   Lab Test 02/11/20   INR 1.7*                       Passed - Recent (12 mo) or future (30 days) visit within the authorizing provider's specialty     Patient has had an office visit with the authorizing provider or a provider within the authorizing providers department within the previous 12 mos or has a future within next 30 days. See \"Patient Info\" tab in inbasket, or \"Choose Columns\" in Meds & Orders section of the refill encounter.              Passed - Medication is active on med list        Passed - Patient is 18 years of age or older        Passed - Patient is not pregnant        Passed - No positive pregnancy on file in past 12 months           "

## 2020-03-11 NOTE — PROGRESS NOTES
ANTICOAGULATION FOLLOW-UP CLINIC VISIT    Patient Name:  Khalida Rouse  Date:  3/11/2020  Contact Type:  Face to Face    SUBJECTIVE:  Patient Findings     Comments:   The patient was assessed for diet, medication, and activity level changes, missed or extra doses, bruising or bleeding, with no problem findings.  Venessa Hood RN          Clinical Outcomes     Negatives:   Major bleeding event, Thromboembolic event, Anticoagulation-related hospital admission, Anticoagulation-related ED visit, Anticoagulation-related fatality    Comments:   The patient was assessed for diet, medication, and activity level changes, missed or extra doses, bruising or bleeding, with no problem findings.  Venessa Hood RN             OBJECTIVE    INR Protime   Date Value Ref Range Status   2020 1.9 (A) 0.9 - 1.1 Final       ASSESSMENT / PLAN  INR assessment SUB    Recheck INR In: 4 WEEKS    INR Location Clinic      Anticoagulation Summary  As of 3/11/2020    INR goal:   2.0-3.0   TTR:   70.7 % (1 y)   INR used for dosin.9! (3/11/2020)   Warfarin maintenance plan:   5 mg (2.5 mg x 2) every Mon, Wed, Fri; 2.5 mg (2.5 mg x 1) all other days   Full warfarin instructions:   5 mg every Mon, Wed, Fri; 2.5 mg all other days   Weekly warfarin total:   25 mg   Plan last modified:   Venessa Hood RN (3/11/2020)   Next INR check:   2020   Priority:   Maintenance   Target end date:       Indications    Long term current use of anticoagulant therapy [Z79.01]  Atrial fibrillation with RVR (H) [I48.91]  S/P AVR (aortic valve replacement) [Z95.2]             Anticoagulation Episode Summary     INR check location:       Preferred lab:       Send INR reminders to:   [facility]    Comments:   2.5 mg tablets, book, BP, PM dose      Anticoagulation Care Providers     Provider Role Specialty Phone number    Dorcas Fisher MD [facility] [phone]            See the Encounter Report to view Anticoagulation  Flowsheet and Dosing Calendar (Go to Encounters tab in chart review, and find the Anticoagulation Therapy Visit)    Dosage adjustment made based on physician directed care plan.      Venessa Hood RN

## 2020-04-01 ENCOUNTER — VIRTUAL VISIT (OUTPATIENT)
Dept: FAMILY MEDICINE | Facility: CLINIC | Age: 80
End: 2020-04-01
Payer: MEDICARE

## 2020-04-01 DIAGNOSIS — I10 ESSENTIAL HYPERTENSION: ICD-10-CM

## 2020-04-01 DIAGNOSIS — Z79.01 LONG TERM CURRENT USE OF ANTICOAGULANT THERAPY: ICD-10-CM

## 2020-04-01 DIAGNOSIS — Z95.2 S/P AVR (AORTIC VALVE REPLACEMENT): Primary | ICD-10-CM

## 2020-04-01 DIAGNOSIS — I48.91 ATRIAL FIBRILLATION WITH RVR (H): ICD-10-CM

## 2020-04-01 PROCEDURE — 99442 ZZC PHYSICIAN TELEPHONE EVALUATION 11-20 MIN: CPT | Performed by: INTERNAL MEDICINE

## 2020-04-01 RX ORDER — ATENOLOL 50 MG/1
50 TABLET ORAL 2 TIMES DAILY
Qty: 180 TABLET | Refills: 0 | Status: SHIPPED | OUTPATIENT
Start: 2020-04-01 | End: 2020-06-23

## 2020-04-01 NOTE — PROGRESS NOTES
"Subjective     Khalida Rouse is a 80 year old female who is being evaluated via a billable telephone visit.      The patient has been notified of following:     \"This telephone visit will be conducted via a call between you and your physician/provider. We have found that certain health care needs can be provided without the need for a physical exam.  This service lets us provide the care you need with a short phone conversation.  If a prescription is necessary we can send it directly to your pharmacy.  If lab work is needed we can place an order for that and you can then stop by our lab to have the test done at a later time.    If during the course of the call the physician/provider feels a telephone visit is not appropriate, you will not be charged for this service.\"     Patient has given verbal consent for Telephone visit?  Yes    Khalida Rouse complains of   Chief Complaint   Patient presents with     Hypertension       ALLERGIES  Xarelto [rivaroxaban] and Ace inhibitors    Hypertension Follow-up      Do you check your blood pressure regularly outside of the clinic? No     Are you following a low salt diet? Yes    Are your blood pressures ever more than 140 on the top number (systolic) OR more   than 90 on the bottom number (diastolic), for example 140/90? No                      Chief Complaint         The patient is a charming 80-year-old female who was seen virtually today for follow-up of her hypertension.  She does have a regular visiting nurse who comes by and notes that her blood pressures have generally been well controlled systolic values under 130.  She denies any side effects or complications from the medication including bradycardia or lightheadedness.  She also has a history of chronic atrial fibrillation and hypertension.  Additionally, she has a history of a previous aortic valvuloplasty and for these reasons is on chronic anticoagulation.                         PAST, FAMILY,SOCIAL HISTORY:     " Medical  History:   has a past medical history of Aortic valve stenosis, Atrial flutter (H), Cardiomyopathy (H), Severe aortic stenosis (4/7/2014), and Shoulder fracture, left (3/22/15).     Surgical History:   has a past surgical history that includes ENT surgery; Replace valve aortic (4/10/2014); and cataract iol, rt/lt (Right).     Social History:   reports that she has never smoked. She has never used smokeless tobacco. She reports that she does not drink alcohol or use drugs.     Family History:  family history includes Alzheimer Disease in her sister; Dementia in her brother.            MEDICATIONS  Current Outpatient Medications   Medication Sig Dispense Refill     acetaminophen (TYLENOL) 500 MG tablet Take 2 tablets (1,000 mg) by mouth every 6 hours as needed for mild pain 180 tablet 3     atenolol (TENORMIN) 50 MG tablet TAKE ONE TABLET BY MOUTH TWICE A  tablet 0     calcium carbonate-vitamin D 500-400 MG-UNIT TABS tablt Take 1 tablet by mouth 3 times daily       diltiazem ER COATED BEADS (CARDIZEM CD/CARTIA XT) 180 MG 24 hr capsule Take 1 capsule (180 mg) by mouth 2 times daily 180 capsule 3     meclizine (ANTIVERT) 25 MG tablet Take 1 tablet (25 mg) by mouth every 8 hours as needed for dizziness 15 tablet 0     warfarin ANTICOAGULANT (COUMADIN) 2.5 MG tablet Take 5 mg on Mon, Fri and 2.5 mg all other days, or as directed by the Coumadin Clinic. 110 tablet 0     torsemide (DEMADEX) 20 MG tablet TAKE ONE TABLET BY MOUTH ONCE DAILY AS NEEDED (Patient not taking: Reported on 9/24/2019) 90 tablet 3         --------------------------------------------------------------------------------------------------------------------                              Review of Systems       LUNGS: Pt denies: cough, excess sputum, hemoptysis, or shortness of breath.   HEART: Pt denies: chest pain, arrhythmia, syncope, tachy or bradyarrhythmia.   GI: Pt denies: nausea, vomiting, diarrhea, constipation, melena, or  hematochezia.   NEURO: Pt denies: seizures, strokes, diplopia, weakness, paraesthesias, or paralysis.   SKIN: Pt denies: itching, rashes, discoloration, or specific lesions of concern. Denies recent hair loss.   PSYCH: The patient denies significant depression, anxiety, mood imbalance. Specifically denies any suicidal ideation.                                    No examination is performed as this is a virtual visit.           Decision Making       1. Atrial fibrillation with RVR (H)  Continue beta-blocker for rate control, continue anticoagulation  - atenolol (TENORMIN) 50 MG tablet; Take 1 tablet (50 mg) by mouth 2 times daily  Dispense: 180 tablet; Refill: 0    2. S/P AVR (aortic valve replacement)  Aortic valve is bioprosthetic, patient on anticoagulation secondary to arrhythmia    3. Long term current use of anticoagulant therapy  Follow INR and adjust    4. Essential hypertension  Continue current medications                             FOLLOW UP   I have asked the patient to make an appointment for followup with me in 4 months      Time on phone: 12 minutes      I have carefully explained the diagnosis and treatment options to the patient.  The patient has displayed an understanding of the above, and all subsequent questions were answered.      DO MARIA D Sabillon    Portions of this note were produced using [email or web address]  Although every attempt at real-time proof reading has been made, occasional grammar/syntax errors may have been missed.

## 2020-04-06 DIAGNOSIS — Z79.01 LONG TERM CURRENT USE OF ANTICOAGULANT THERAPY: Primary | ICD-10-CM

## 2020-04-06 DIAGNOSIS — I48.91 ATRIAL FIBRILLATION WITH RVR (H): ICD-10-CM

## 2020-04-08 ENCOUNTER — ANTICOAGULATION THERAPY VISIT (OUTPATIENT)
Dept: ANTICOAGULATION | Facility: OTHER | Age: 80
End: 2020-04-08

## 2020-04-08 ENCOUNTER — TELEPHONE (OUTPATIENT)
Dept: ANTICOAGULATION | Facility: OTHER | Age: 80
End: 2020-04-08

## 2020-04-08 DIAGNOSIS — Z95.2 S/P AVR (AORTIC VALVE REPLACEMENT): ICD-10-CM

## 2020-04-08 DIAGNOSIS — I48.91 ATRIAL FIBRILLATION WITH RVR (H): ICD-10-CM

## 2020-04-08 DIAGNOSIS — Z79.01 LONG TERM CURRENT USE OF ANTICOAGULANT THERAPY: ICD-10-CM

## 2020-04-08 LAB
CAPILLARY BLOOD COLLECTION: NORMAL
INR BLD: 1.4 (ref 0.86–1.14)

## 2020-04-08 PROCEDURE — 36416 COLLJ CAPILLARY BLOOD SPEC: CPT | Performed by: INTERNAL MEDICINE

## 2020-04-08 PROCEDURE — 99207 ZZC NO CHARGE NURSE ONLY: CPT | Performed by: INTERNAL MEDICINE

## 2020-04-08 PROCEDURE — 85610 PROTHROMBIN TIME: CPT | Mod: QW | Performed by: INTERNAL MEDICINE

## 2020-04-08 NOTE — TELEPHONE ENCOUNTER
Attempted to contact pt regarding INR of 1.4 from 4/8. VM left to call [facility] back to discuss    Venessa Hood RN

## 2020-04-08 NOTE — TELEPHONE ENCOUNTER
Another VM left for pt asking her to call [facility] back.   I did ask that she take 7.5 mg (3 tablets) ashly Hood RN

## 2020-04-09 NOTE — PROGRESS NOTES
ANTICOAGULATION FOLLOW-UP CLINIC VISIT    Patient Name:  Khalida Rouse  Date:  2020  Contact Type:  Telephone    SUBJECTIVE:  Patient Findings     Positives:   Change in diet/appetite (Pt admits to eating more greens than normal this past week)             OBJECTIVE    INR Point of Care   Date Value Ref Range Status   2020 1.4 (H) 0.86 - 1.14 Final     Comment:     This test is intended for monitoring Coumadin therapy.  Results are not   accurate in patients with prolonged INR due to factor deficiency.         ASSESSMENT / PLAN  INR assessment SUB    Recheck INR In: 3 WEEKS    INR Location Outside lab      Anticoagulation Summary  As of 2020    INR goal:   2.0-3.0   TTR:   62.8 % (1 y)   INR used for dosin.4! (2020)   Warfarin maintenance plan:   5 mg (2.5 mg x 2) every Mon, Wed, Fri; 2.5 mg (2.5 mg x 1) all other days   Full warfarin instructions:   : 5 mg; Otherwise 5 mg every Mon, Wed, Fri; 2.5 mg all other days   Weekly warfarin total:   25 mg   Plan last modified:   Venessa Hood RN (3/11/2020)   Next INR check:   2020   Priority:   Maintenance   Target end date:       Indications    Long term current use of anticoagulant therapy [Z79.01]  Atrial fibrillation with RVR (H) [I48.91]  S/P AVR (aortic valve replacement) [Z95.2]             Anticoagulation Episode Summary     INR check location:       Preferred lab:       Send INR reminders to:   [facility]    Comments:   2.5 mg tablets, book, BP, PM dose      Anticoagulation Care Providers     Provider Role Specialty Phone number    Dorcas Fisher MD [facility] [phone]            See the Encounter Report to view Anticoagulation Flowsheet and Dosing Calendar (Go to Encounters tab in chart review, and find the Anticoagulation Therapy Visit)    Dosage adjustment made based on physician directed care plan.    Vera Martino RN

## 2020-04-24 ENCOUNTER — HOSPITAL ENCOUNTER (EMERGENCY)
Facility: CLINIC | Age: 80
Discharge: HOME | End: 2020-04-24
Attending: PHYSICIAN ASSISTANT | Admitting: PHYSICIAN ASSISTANT
Payer: MEDICARE

## 2020-04-24 VITALS
BODY MASS INDEX: 22.97 KG/M2 | TEMPERATURE: 98.2 F | WEIGHT: 142.3 LBS | DIASTOLIC BLOOD PRESSURE: 77 MMHG | SYSTOLIC BLOOD PRESSURE: 136 MMHG | RESPIRATION RATE: 18 BRPM | OXYGEN SATURATION: 97 %

## 2020-04-24 DIAGNOSIS — R25.1 SHAKINESS: ICD-10-CM

## 2020-04-24 DIAGNOSIS — I48.20 CHRONIC ATRIAL FIBRILLATION (H): ICD-10-CM

## 2020-04-24 LAB
ANION GAP SERPL CALCULATED.3IONS-SCNC: 2 MMOL/L (ref 3–14)
BASOPHILS # BLD AUTO: 0 10E9/L (ref 0–0.2)
BASOPHILS NFR BLD AUTO: 0.6 %
BUN SERPL-MCNC: 16 MG/DL (ref 7–30)
CALCIUM SERPL-MCNC: 8.3 MG/DL (ref 8.5–10.1)
CHLORIDE SERPL-SCNC: 102 MMOL/L (ref 94–109)
CO2 SERPL-SCNC: 30 MMOL/L (ref 20–32)
CREAT SERPL-MCNC: 0.6 MG/DL (ref 0.52–1.04)
DIFFERENTIAL METHOD BLD: ABNORMAL
EOSINOPHIL NFR BLD AUTO: 3.3 %
ERYTHROCYTE [DISTWIDTH] IN BLOOD BY AUTOMATED COUNT: 14.7 % (ref 10–15)
GFR SERPL CREATININE-BSD FRML MDRD: 85 ML/MIN/{1.73_M2}
GLUCOSE SERPL-MCNC: 129 MG/DL (ref 70–99)
HCT VFR BLD AUTO: 38.9 % (ref 35–47)
HGB BLD-MCNC: 12.9 G/DL (ref 11.7–15.7)
IMM GRANULOCYTES # BLD: 0 10E9/L (ref 0–0.4)
IMM GRANULOCYTES NFR BLD: 0.6 %
INR PPP: 2.76 (ref 0.86–1.14)
LYMPHOCYTES # BLD AUTO: 0.6 10E9/L (ref 0.8–5.3)
LYMPHOCYTES NFR BLD AUTO: 12.1 %
MCH RBC QN AUTO: 29.3 PG (ref 26.5–33)
MCHC RBC AUTO-ENTMCNC: 33.2 G/DL (ref 31.5–36.5)
MCV RBC AUTO: 88 FL (ref 78–100)
MONOCYTES # BLD AUTO: 0.7 10E9/L (ref 0–1.3)
MONOCYTES NFR BLD AUTO: 13.8 %
NEUTROPHILS # BLD AUTO: 3.6 10E9/L (ref 1.6–8.3)
NEUTROPHILS NFR BLD AUTO: 69.6 %
NRBC # BLD AUTO: 0 10*3/UL
NRBC BLD AUTO-RTO: 0 /100
PLATELET # BLD AUTO: 121 10E9/L (ref 150–450)
POTASSIUM SERPL-SCNC: 4.3 MMOL/L (ref 3.4–5.3)
RBC # BLD AUTO: 4.4 10E12/L (ref 3.8–5.2)
SODIUM SERPL-SCNC: 134 MMOL/L (ref 133–144)
TROPONIN I SERPL-MCNC: <0.015 UG/L (ref 0–0.04)
TSH SERPL DL<=0.005 MIU/L-ACNC: 1.46 MU/L (ref 0.4–4)
WBC # BLD AUTO: 5.1 10E9/L (ref 4–11)

## 2020-04-24 PROCEDURE — 99285 EMERGENCY DEPT VISIT HI MDM: CPT | Mod: 25 | Performed by: PHYSICIAN ASSISTANT

## 2020-04-24 PROCEDURE — 93010 ELECTROCARDIOGRAM REPORT: CPT | Mod: Z6 | Performed by: PHYSICIAN ASSISTANT

## 2020-04-24 PROCEDURE — 84443 ASSAY THYROID STIM HORMONE: CPT | Performed by: PHYSICIAN ASSISTANT

## 2020-04-24 PROCEDURE — 85610 PROTHROMBIN TIME: CPT | Performed by: PHYSICIAN ASSISTANT

## 2020-04-24 PROCEDURE — 85025 COMPLETE CBC W/AUTO DIFF WBC: CPT | Performed by: PHYSICIAN ASSISTANT

## 2020-04-24 PROCEDURE — 93005 ELECTROCARDIOGRAM TRACING: CPT | Performed by: PHYSICIAN ASSISTANT

## 2020-04-24 PROCEDURE — 80048 BASIC METABOLIC PNL TOTAL CA: CPT | Performed by: PHYSICIAN ASSISTANT

## 2020-04-24 PROCEDURE — 99284 EMERGENCY DEPT VISIT MOD MDM: CPT | Performed by: PHYSICIAN ASSISTANT

## 2020-04-24 PROCEDURE — 84484 ASSAY OF TROPONIN QUANT: CPT | Performed by: PHYSICIAN ASSISTANT

## 2020-04-24 NOTE — ED PROVIDER NOTES
"  History     Chief Complaint   Patient presents with     Irregular Heart Beat     HPI  Khalida Rouse is a 80 year old female who presents to the emergency department for concerns of an irregular heart beat.  The patient has a history of atrial fibrillation and is on Coumadin.  She takes diltiazem and atenolol for rate control.  She reports today that she was in her kitchen getting dinner ready and suddenly felt a little \"shaky.\"  She denies any lightheadedness, significant weakness or numbness, chest tightness or pain, or shortness of breath.  No nausea with this shaky feeling.  She sat down and checked her pulse and she felt like it might be a little slow so she called a relative who advised she come here to for evaluation.  She did eat dinner then come here.  On arrival she felt better and was able to ambulate in the room.  She denies feeling shaky since initial episode and currently feels her baseline.  She has not had any recent cough, fevers, or other infectious symptoms.  She takes her medications twice daily and has not taken her evening dose yet.      Allergies:  Allergies   Allergen Reactions     Xarelto [Rivaroxaban] Diarrhea     Ace Inhibitors Cough       Problem List:    Patient Active Problem List    Diagnosis Date Noted     Hypoxemia 05/23/2019     Priority: Medium     Thrombocytopenia (H) 05/20/2019     Priority: Medium     Other secondary pulmonary hypertension (H) 05/14/2019     Priority: Medium     Long term current use of anticoagulant therapy 03/11/2016     Priority: Medium     Atrial fibrillation with RVR (H) 01/11/2016     Priority: Medium     Pneumonia 01/09/2016     Priority: Medium     Osteoporosis 10/26/2015     Priority: Medium     Advanced directives, counseling/discussion 10/03/2014     Priority: Medium     Declined       S/P AVR (aortic valve replacement) 04/10/2014     Priority: Medium     Atrial flutter 02/04/2014     Priority: Medium        Past Medical History:    Past Medical " History:   Diagnosis Date     Aortic valve stenosis      Atrial flutter (H)      Cardiomyopathy (H)      Severe aortic stenosis 4/7/2014     Shoulder fracture, left 3/22/15       Past Surgical History:    Past Surgical History:   Procedure Laterality Date     CATARACT IOL, RT/LT Right      ENT SURGERY      glaucoma surgery     REPLACE VALVE AORTIC  4/10/2014    Procedure: REPLACE VALVE AORTIC;  Median sternotomy, Replace Aortic Valve on pump oxygenation. ;  Surgeon: Wesley Fong MD;  Location:  OR       Family History:    Family History   Problem Relation Age of Onset     Dementia Brother      Alzheimer Disease Sister        Social History:  Marital Status:   [5]  Social History     Tobacco Use     Smoking status: Never Smoker     Smokeless tobacco: Never Used   Substance Use Topics     Alcohol use: No     Alcohol/week: 0.0 standard drinks     Drug use: No        Medications:    acetaminophen (TYLENOL) 500 MG tablet  atenolol (TENORMIN) 50 MG tablet  calcium carbonate-vitamin D 500-400 MG-UNIT TABS tablt  diltiazem ER COATED BEADS (CARDIZEM CD/CARTIA XT) 180 MG 24 hr capsule  meclizine (ANTIVERT) 25 MG tablet  torsemide (DEMADEX) 20 MG tablet  warfarin ANTICOAGULANT (COUMADIN) 2.5 MG tablet          Review of Systems   All other systems reviewed and are negative.      Physical Exam   BP: (!) 145/74  Heart Rate: 74  Temp: 98.2  F (36.8  C)  Resp: 18  Weight: 64.5 kg (142 lb 4.8 oz)  SpO2: 99 %      Physical Exam  Vitals signs and nursing note reviewed.   Constitutional:       General: She is not in acute distress.     Appearance: Normal appearance. She is not ill-appearing, toxic-appearing or diaphoretic.   HENT:      Head: Normocephalic and atraumatic.      Nose: Nose normal.   Eyes:      Extraocular Movements: Extraocular movements intact.      Conjunctiva/sclera: Conjunctivae normal.   Neck:      Musculoskeletal: Neck supple.   Cardiovascular:      Rate and Rhythm: Normal rate. Rhythm irregular.  "     Heart sounds: Normal heart sounds.   Pulmonary:      Effort: Pulmonary effort is normal. No respiratory distress.      Breath sounds: Normal breath sounds.   Abdominal:      General: Abdomen is flat. There is no distension.      Tenderness: There is no abdominal tenderness. There is no guarding.   Genitourinary:     Rectum: Normal.   Musculoskeletal:         General: No deformity.   Skin:     General: Skin is warm and dry.   Neurological:      Mental Status: She is alert and oriented to person, place, and time. Mental status is at baseline.   Psychiatric:         Mood and Affect: Mood normal.         Behavior: Behavior normal.         ED Course        Procedures         EKG Interpretation:      Interpreted by Christiane Davis PA-C  Time reviewed: 1905  Symptoms at time of EKG: none, episode of shakiness and \"slow heart beat\"   Rhythm: atrial fibrillation - controlled  Rate: Normal  Axis: Left Axis Deviation  Ectopy: none  Conduction: left anterior fasciclar block  ST Segments/ T Waves: No acute ischemic changes  Q Waves: none  Comparison to prior: Unchanged    Clinical Impression: no acute changes, atrial fibrillation (chronic)      Results for orders placed or performed during the hospital encounter of 04/24/20 (from the past 24 hour(s))   CBC with platelets differential   Result Value Ref Range    WBC 5.1 4.0 - 11.0 10e9/L    RBC Count 4.40 3.8 - 5.2 10e12/L    Hemoglobin 12.9 11.7 - 15.7 g/dL    Hematocrit 38.9 35.0 - 47.0 %    MCV 88 78 - 100 fl    MCH 29.3 26.5 - 33.0 pg    MCHC 33.2 31.5 - 36.5 g/dL    RDW 14.7 10.0 - 15.0 %    Platelet Count 121 (L) 150 - 450 10e9/L    Diff Method Automated Method     % Neutrophils 69.6 %    % Lymphocytes 12.1 %    % Monocytes 13.8 %    % Eosinophils 3.3 %    % Basophils 0.6 %    % Immature Granulocytes 0.6 %    Nucleated RBCs 0 0 /100    Absolute Neutrophil 3.6 1.6 - 8.3 10e9/L    Absolute Lymphocytes 0.6 (L) 0.8 - 5.3 10e9/L    Absolute Monocytes 0.7 0.0 - 1.3 " 10e9/L    Absolute Basophils 0.0 0.0 - 0.2 10e9/L    Abs Immature Granulocytes 0.0 0 - 0.4 10e9/L    Absolute Nucleated RBC 0.0    Basic metabolic panel   Result Value Ref Range    Sodium 134 133 - 144 mmol/L    Potassium 4.3 3.4 - 5.3 mmol/L    Chloride 102 94 - 109 mmol/L    Carbon Dioxide 30 20 - 32 mmol/L    Anion Gap 2 (L) 3 - 14 mmol/L    Glucose 129 (H) 70 - 99 mg/dL    Urea Nitrogen 16 7 - 30 mg/dL    Creatinine 0.60 0.52 - 1.04 mg/dL    GFR Estimate 85 >60 mL/min/[1.73_m2]    GFR Estimate If Black >90 >60 mL/min/[1.73_m2]    Calcium 8.3 (L) 8.5 - 10.1 mg/dL   Troponin I   Result Value Ref Range    Troponin I ES <0.015 0.000 - 0.045 ug/L   TSH with free T4 reflex   Result Value Ref Range    TSH 1.46 0.40 - 4.00 mU/L   INR   Result Value Ref Range    INR 2.76 (H) 0.86 - 1.14       Medications - No data to display    Assessments & Plan (with Medical Decision Making)  Khalida Rouse is a 80 year old female who presented to the ED after a transient episode of feeling shaky.  When she checked her heart rate she felt it was slow so she came here for evaluation.  Denies any associated headache, nausea/vomiting, lightheadedness, chest pain, shortness of breath, or any other symptoms with this shakiness.  On arrival to the ED her vital signs notable for mildly elevated blood pressure.  Heart rate in the 70s.  Noted to have irregular rhythm on exam today but otherwise no acute abnormalities found.  EKG was obtained which demonstrated atrial fibrillation in a controlled rate.  Labs today demonstrated a normal white count and hemoglobin, no acute electrolyte abnormalities.  Normal TSH and troponin undetectable.  Her INR is in therapeutic range.  Patient remained on heart monitor here with no significant bradycardia demonstrated.  I suspect her irregular heart rhythm she felt at home was in relation to her atrial fibrillation.  Symptoms of shakiness were what sounds to be quite transient and had no warning symptoms such  as severe headache or chest tightness or lightheadedness so I have low concern for central nervous cause or cardiac cause of symptoms.  Possibly related to hypoglycemia because she did eat and feel better.  Patient was very comfortable with plan to discharge home and monitor things going forward there.  If symptoms recur she was advised to drink some juice but if things persist or she develops any worsening warning symptoms or signs she was advised to return to the ED.  All questions answered and patient otherwise medically suitable for discharge at this time.     I have reviewed the nursing notes.    I have reviewed the findings, diagnosis, plan and need for follow up with the patient.       New Prescriptions    No medications on file       Final diagnoses:   Shakiness   Chronic atrial fibrillation     Note: Chart documentation done in part with Dragon Voice Recognition software. Although reviewed after completion, some word and grammatical errors may remain.      4/24/2020   [facility] EMERGENCY DEPARTMENT     Christiane Davis PA-C  04/24/20 2021

## 2020-04-24 NOTE — ED AVS SNAPSHOT
[facility] Emergency Department  [address] [place] [address]  Phone:  [phone]  Fax:  [phone]                                    Khalida Rouse   MRN: [id number]    Department:  [facility] Emergency Department   Date of Visit:  4/24/2020           After Visit Summary Signature Page    I have received my discharge instructions, and my questions have been answered. I have discussed any challenges I see with this plan with the nurse or doctor.    ..........................................................................................................................................  Patient/Patient Representative Signature      ..........................................................................................................................................  Patient Representative Print Name and Relationship to Patient    ..................................................               ................................................  Date                                   Time    ..........................................................................................................................................  Reviewed by Signature/Title    ...................................................              ..............................................  Date                                               Time          22EPIC Rev 08/18

## 2020-04-24 NOTE — ED TRIAGE NOTES
Coming in today because she states she has been having a slow heart rate. In triage her heart rate ranged from 65-99

## 2020-04-25 NOTE — DISCHARGE INSTRUCTIONS
Your work-up today was very reassuring.  Your heart rate is irregular but that is related to your atrial fibrillation.  Please continue taking medications as prescribed.  If you have an episode of feeling shaky again, sit down and sip on juice.  As discussed if you develop any worsening symptoms however please do not hesitate to return to the emergency department.    Thank you for choosing [facility] Emergency Department. It was a pleasure taking care of you today. If you have any questions, please call [phone].    Christiane Davis PA-C

## 2020-04-27 ENCOUNTER — ANTICOAGULATION THERAPY VISIT (OUTPATIENT)
Dept: ANTICOAGULATION | Facility: CLINIC | Age: 80
End: 2020-04-27
Payer: MEDICARE

## 2020-04-27 ENCOUNTER — TELEPHONE (OUTPATIENT)
Dept: ANTICOAGULATION | Facility: OTHER | Age: 80
End: 2020-04-27

## 2020-04-27 DIAGNOSIS — I48.91 ATRIAL FIBRILLATION WITH RVR (H): ICD-10-CM

## 2020-04-27 DIAGNOSIS — Z79.01 LONG TERM CURRENT USE OF ANTICOAGULANT THERAPY: ICD-10-CM

## 2020-04-27 DIAGNOSIS — Z79.01 LONG TERM CURRENT USE OF ANTICOAGULANT THERAPY: Primary | ICD-10-CM

## 2020-04-27 DIAGNOSIS — Z95.2 S/P AVR (AORTIC VALVE REPLACEMENT): ICD-10-CM

## 2020-04-27 PROCEDURE — 99207 ZZC NO CHARGE NURSE ONLY: CPT | Performed by: INTERNAL MEDICINE

## 2020-04-27 NOTE — PROGRESS NOTES
ANTICOAGULATION FOLLOW-UP CLINIC VISIT    Patient Name:  Khalida Rouse  Date:  2020  Contact Type:  Telephone    SUBJECTIVE:  Patient Findings     Comments:   The patient was assessed for diet, medication, and activity level changes, missed or extra doses, bruising or bleeding, with no problem findings.  Venessa Hood RN          Clinical Outcomes     Negatives:   Major bleeding event, Thromboembolic event, Anticoagulation-related hospital admission, Anticoagulation-related ED visit, Anticoagulation-related fatality    Comments:   The patient was assessed for diet, medication, and activity level changes, missed or extra doses, bruising or bleeding, with no problem findings.  Venessa Hood RN             OBJECTIVE    INR   Date Value Ref Range Status   2020 2.76 (H) 0.86 - 1.14 Final       ASSESSMENT / PLAN  INR assessment THER    Recheck INR In: 6 WEEKS    INR Location Outside lab      Anticoagulation Summary  As of 2020    INR goal:   2.0-3.0   TTR:   60.6 % (12 mo)   INR used for dosin.76 (2020)   Warfarin maintenance plan:   5 mg (2.5 mg x 2) every Mon, Wed, Fri; 2.5 mg (2.5 mg x 1) all other days   Full warfarin instructions:   5 mg every Mon, Wed, Fri; 2.5 mg all other days   Weekly warfarin total:   25 mg   No change documented:   Venessa Hood RN   Plan last modified:   Venessa Hood RN (3/11/2020)   Next INR check:   6/3/2020   Priority:   Maintenance   Target end date:       Indications    Long term current use of anticoagulant therapy [Z79.01]  Atrial fibrillation with RVR (H) [I48.91]  S/P AVR (aortic valve replacement) [Z95.2]             Anticoagulation Episode Summary     INR check location:       Preferred lab:       Send INR reminders to:   [facility]    Comments:   2.5 mg tablets, book, BP, PM dose      Anticoagulation Care Providers     Provider Role Specialty Phone number    Dorcas Fisher MD [facility] [phone]             See the Encounter Report to view Anticoagulation Flowsheet and Dosing Calendar (Go to Encounters tab in chart review, and find the Anticoagulation Therapy Visit)    Dosage adjustment made based on physician directed care plan.      Venessa Hood RN

## 2020-04-27 NOTE — TELEPHONE ENCOUNTER
Please review and renew this patients INR referral, orders pending. Thank you!      Venessa Hood RN

## 2020-06-03 ENCOUNTER — ANTICOAGULATION THERAPY VISIT (OUTPATIENT)
Dept: ANTICOAGULATION | Facility: OTHER | Age: 80
End: 2020-06-03

## 2020-06-03 DIAGNOSIS — Z79.01 LONG TERM CURRENT USE OF ANTICOAGULANT THERAPY: ICD-10-CM

## 2020-06-03 DIAGNOSIS — I48.91 ATRIAL FIBRILLATION WITH RVR (H): ICD-10-CM

## 2020-06-03 DIAGNOSIS — Z95.2 S/P AVR (AORTIC VALVE REPLACEMENT): ICD-10-CM

## 2020-06-03 LAB — INR BLD: 2.3 (ref 0.86–1.14)

## 2020-06-03 PROCEDURE — 85610 PROTHROMBIN TIME: CPT | Mod: QW | Performed by: INTERNAL MEDICINE

## 2020-06-03 PROCEDURE — 36416 COLLJ CAPILLARY BLOOD SPEC: CPT | Performed by: INTERNAL MEDICINE

## 2020-06-03 PROCEDURE — 99207 ZZC NO CHARGE NURSE ONLY: CPT | Performed by: INTERNAL MEDICINE

## 2020-06-03 NOTE — PROGRESS NOTES
ANTICOAGULATION FOLLOW-UP CLINIC VISIT    Patient Name:  Khalida Rouse  Date:  6/3/2020  Contact Type:  Telephone    SUBJECTIVE:  Patient Findings     Comments:   The patient was assessed for diet, medication, and activity level changes, missed or extra doses, bruising or bleeding, with no problem findings.  Venessa Hood RN          Clinical Outcomes     Negatives:   Major bleeding event, Thromboembolic event, Anticoagulation-related hospital admission, Anticoagulation-related ED visit, Anticoagulation-related fatality    Comments:   The patient was assessed for diet, medication, and activity level changes, missed or extra doses, bruising or bleeding, with no problem findings.  Venessa Hood RN             OBJECTIVE    Recent labs: (last 7 days)     20  0954   INR 2.3*       ASSESSMENT / PLAN  INR assessment THER    Recheck INR In: 8 WEEKS    INR Location Outside lab      Anticoagulation Summary  As of 6/3/2020    INR goal:   2.0-3.0   TTR:   64.4 % (1 y)   Prior goal:   2.0-3.0   INR used for dosin.3 (6/3/2020)   Warfarin maintenance plan:   5 mg (2.5 mg x 2) every Mon, Wed, Fri; 2.5 mg (2.5 mg x 1) all other days   Full warfarin instructions:   5 mg every Mon, Wed, Fri; 2.5 mg all other days   Weekly warfarin total:   25 mg   No change documented:   Venessa Hood RN   Plan last modified:   Venessa Hood RN (3/11/2020)   Next INR check:   2020   Priority:   Maintenance   Target end date:   Indefinite    Indications    Long term current use of anticoagulant therapy [Z79.01]  Atrial fibrillation with RVR (H) [I48.91]  S/P AVR (aortic valve replacement) [Z95.2]             Anticoagulation Episode Summary     INR check location:       Preferred lab:       Send INR reminders to:   [facility]    Comments:   2.5 mg tablets, book, BP, PM dose      Anticoagulation Care Providers     Provider Role Specialty Phone number    Mazin Larsen DO Referring Internal  Medicine [phone]    Dorcas Fisher MD [facility] [phone]            See the Encounter Report to view Anticoagulation Flowsheet and Dosing Calendar (Go to Encounters tab in chart review, and find the Anticoagulation Therapy Visit)    Dosage adjustment made based on physician directed care plan.      Venessa Hood RN

## 2020-06-10 DIAGNOSIS — I48.91 ATRIAL FIBRILLATION WITH RVR (H): ICD-10-CM

## 2020-06-11 RX ORDER — WARFARIN SODIUM 2.5 MG/1
TABLET ORAL
Qty: 110 TABLET | Refills: 0 | Status: SHIPPED | OUTPATIENT
Start: 2020-06-11 | End: 2020-08-18

## 2020-06-22 DIAGNOSIS — I48.91 ATRIAL FIBRILLATION WITH RVR (H): ICD-10-CM

## 2020-06-23 RX ORDER — ATENOLOL 50 MG/1
TABLET ORAL
Qty: 180 TABLET | Refills: 0 | Status: SHIPPED | OUTPATIENT
Start: 2020-06-23 | End: 2020-09-23

## 2020-06-23 NOTE — TELEPHONE ENCOUNTER
Prescription approved per [facility] Refill Protocol.  Mabel Castanon RN

## 2020-06-29 ENCOUNTER — HOSPITAL ENCOUNTER (EMERGENCY)
Facility: CLINIC | Age: 80
Discharge: HOME | End: 2020-06-29
Attending: EMERGENCY MEDICINE | Admitting: EMERGENCY MEDICINE
Payer: MEDICARE

## 2020-06-29 ENCOUNTER — APPOINTMENT (OUTPATIENT)
Dept: CT IMAGING | Facility: CLINIC | Age: 80
End: 2020-06-29
Attending: EMERGENCY MEDICINE
Payer: MEDICARE

## 2020-06-29 VITALS
WEIGHT: 142.6 LBS | DIASTOLIC BLOOD PRESSURE: 74 MMHG | OXYGEN SATURATION: 96 % | HEART RATE: 68 BPM | SYSTOLIC BLOOD PRESSURE: 131 MMHG | RESPIRATION RATE: 18 BRPM | BODY MASS INDEX: 23.02 KG/M2 | TEMPERATURE: 98.5 F

## 2020-06-29 DIAGNOSIS — R51.9 NONINTRACTABLE EPISODIC HEADACHE, UNSPECIFIED HEADACHE TYPE: ICD-10-CM

## 2020-06-29 DIAGNOSIS — H10.9 CONJUNCTIVITIS OF RIGHT EYE, UNSPECIFIED CONJUNCTIVITIS TYPE: ICD-10-CM

## 2020-06-29 LAB
ANION GAP SERPL CALCULATED.3IONS-SCNC: 4 MMOL/L (ref 3–14)
BASOPHILS # BLD AUTO: 0 10E9/L (ref 0–0.2)
BASOPHILS NFR BLD AUTO: 0.9 %
BUN SERPL-MCNC: 17 MG/DL (ref 7–30)
CALCIUM SERPL-MCNC: 8.5 MG/DL (ref 8.5–10.1)
CHLORIDE SERPL-SCNC: 104 MMOL/L (ref 94–109)
CO2 SERPL-SCNC: 29 MMOL/L (ref 20–32)
CREAT SERPL-MCNC: 0.67 MG/DL (ref 0.52–1.04)
DIFFERENTIAL METHOD BLD: ABNORMAL
EOSINOPHIL NFR BLD AUTO: 3.6 %
ERYTHROCYTE [DISTWIDTH] IN BLOOD BY AUTOMATED COUNT: 14.5 % (ref 10–15)
GFR SERPL CREATININE-BSD FRML MDRD: 82 ML/MIN/{1.73_M2}
GLUCOSE SERPL-MCNC: 87 MG/DL (ref 70–99)
HCT VFR BLD AUTO: 41.5 % (ref 35–47)
HGB BLD-MCNC: 13.6 G/DL (ref 11.7–15.7)
IMM GRANULOCYTES # BLD: 0 10E9/L (ref 0–0.4)
IMM GRANULOCYTES NFR BLD: 0.4 %
LYMPHOCYTES # BLD AUTO: 0.9 10E9/L (ref 0.8–5.3)
LYMPHOCYTES NFR BLD AUTO: 18.4 %
MCH RBC QN AUTO: 29.2 PG (ref 26.5–33)
MCHC RBC AUTO-ENTMCNC: 32.8 G/DL (ref 31.5–36.5)
MCV RBC AUTO: 89 FL (ref 78–100)
MONOCYTES # BLD AUTO: 0.7 10E9/L (ref 0–1.3)
MONOCYTES NFR BLD AUTO: 14.5 %
NEUTROPHILS # BLD AUTO: 2.9 10E9/L (ref 1.6–8.3)
NEUTROPHILS NFR BLD AUTO: 62.2 %
NRBC # BLD AUTO: 0 10*3/UL
NRBC BLD AUTO-RTO: 0 /100
PLATELET # BLD AUTO: 134 10E9/L (ref 150–450)
POTASSIUM SERPL-SCNC: 3.9 MMOL/L (ref 3.4–5.3)
RBC # BLD AUTO: 4.65 10E12/L (ref 3.8–5.2)
SODIUM SERPL-SCNC: 137 MMOL/L (ref 133–144)
WBC # BLD AUTO: 4.7 10E9/L (ref 4–11)

## 2020-06-29 PROCEDURE — 99284 EMERGENCY DEPT VISIT MOD MDM: CPT | Mod: Z6 | Performed by: EMERGENCY MEDICINE

## 2020-06-29 PROCEDURE — 80048 BASIC METABOLIC PNL TOTAL CA: CPT | Performed by: EMERGENCY MEDICINE

## 2020-06-29 PROCEDURE — 96374 THER/PROPH/DIAG INJ IV PUSH: CPT | Performed by: EMERGENCY MEDICINE

## 2020-06-29 PROCEDURE — 25000128 H RX IP 250 OP 636: Performed by: EMERGENCY MEDICINE

## 2020-06-29 PROCEDURE — 70450 CT HEAD/BRAIN W/O DYE: CPT

## 2020-06-29 PROCEDURE — 85025 COMPLETE CBC W/AUTO DIFF WBC: CPT | Performed by: EMERGENCY MEDICINE

## 2020-06-29 PROCEDURE — 99285 EMERGENCY DEPT VISIT HI MDM: CPT | Mod: 25 | Performed by: EMERGENCY MEDICINE

## 2020-06-29 PROCEDURE — 36415 COLL VENOUS BLD VENIPUNCTURE: CPT | Performed by: EMERGENCY MEDICINE

## 2020-06-29 RX ORDER — ONDANSETRON 2 MG/ML
4 INJECTION INTRAMUSCULAR; INTRAVENOUS EVERY 30 MIN PRN
Status: DISCONTINUED | OUTPATIENT
Start: 2020-06-29 | End: 2020-06-29 | Stop reason: HOSPADM

## 2020-06-29 RX ORDER — POLYMYXIN B SULFATE AND TRIMETHOPRIM 1; 10000 MG/ML; [USP'U]/ML
1-2 SOLUTION OPHTHALMIC EVERY 4 HOURS
Qty: 5 ML | Refills: 0 | Status: ON HOLD | OUTPATIENT
Start: 2020-06-29 | End: 2021-04-13

## 2020-06-29 RX ORDER — HYDROMORPHONE HYDROCHLORIDE 1 MG/ML
0.5 INJECTION, SOLUTION INTRAMUSCULAR; INTRAVENOUS; SUBCUTANEOUS
Status: DISCONTINUED | OUTPATIENT
Start: 2020-06-29 | End: 2020-06-29 | Stop reason: HOSPADM

## 2020-06-29 RX ORDER — POLYMYXIN B SULFATE AND TRIMETHOPRIM 1; 10000 MG/ML; [USP'U]/ML
1-2 SOLUTION OPHTHALMIC EVERY 4 HOURS
Qty: 5 ML | Refills: 0 | Status: SHIPPED | OUTPATIENT
Start: 2020-06-29 | End: 2020-06-29

## 2020-06-29 RX ADMIN — ONDANSETRON 4 MG: 2 INJECTION INTRAMUSCULAR; INTRAVENOUS at 18:27

## 2020-06-29 NOTE — ED AVS SNAPSHOT
[facility] Emergency Department  [address] [place] [address]  Phone:  [phone]  Fax:  [phone]                                    Khalida Rouse   MRN: [id number]    Department:  [facility] Emergency Department   Date of Visit:  6/29/2020           After Visit Summary Signature Page    I have received my discharge instructions, and my questions have been answered. I have discussed any challenges I see with this plan with the nurse or doctor.    ..........................................................................................................................................  Patient/Patient Representative Signature      ..........................................................................................................................................  Patient Representative Print Name and Relationship to Patient    ..................................................               ................................................  Date                                   Time    ..........................................................................................................................................  Reviewed by Signature/Title    ...................................................              ..............................................  Date                                               Time          22EPIC Rev 08/18

## 2020-06-29 NOTE — ED TRIAGE NOTES
She has a headache that started in her nose and moved up to the top of her head. It started this afternoon and was severe but she took some tylenol and it is 5/10 now.

## 2020-06-29 NOTE — ED PROVIDER NOTES
History     Chief Complaint   Patient presents with     Headache     HPI  Khalida Rouse is a 80 year old female who presents with a headache.  This started at 5 pm roughly while making dinner and came on abruptly.  It went from her anterior neck into her head in the front bilaterally. No neck pain now.  Quality - sharp, throbbing  Radiation - from neck into head not down arms  Timing - constant, got better with tylenol  Severity - 5/10 now was much worse before tylenol  Associated Factors - none - no fever chills nausea vomiting or new stiff neck.  She is on blood thinners for history of atrial fibrillation and valve repair.  Pertinent negatives -no focal neurologic weakness, confusion, slurred speech, vomiting, photophobia, diaphoresis.    The patient has not had a headache like this before.  She has not recently had brain imaging. The patient is not photophobic, is not phonophobic, is not confused.  The patient has tried the following medications for it Tylenol.  The patient has not had any stroke symptoms.    No history of trauma to the head.  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        Allergies:  Allergies   Allergen Reactions     Xarelto [Rivaroxaban] Diarrhea     Ace Inhibitors Cough       Problem List:    Patient Active Problem List    Diagnosis Date Noted     Hypoxemia 05/23/2019     Priority: Medium     Thrombocytopenia (H) 05/20/2019     Priority: Medium     Other secondary pulmonary hypertension (H) 05/14/2019     Priority: Medium     Long term current use of anticoagulant therapy 03/11/2016     Priority: Medium     Atrial fibrillation with RVR (H) 01/11/2016     Priority: Medium     Pneumonia 01/09/2016     Priority: Medium     Osteoporosis 10/26/2015     Priority: Medium     Advanced directives, counseling/discussion 10/03/2014     Priority: Medium     Declined       S/P AVR (aortic valve replacement) 04/10/2014     Priority: Medium     Atrial flutter 02/04/2014     Priority: Medium         Past Medical History:    Past Medical History:   Diagnosis Date     Aortic valve stenosis      Atrial flutter (H)      Cardiomyopathy (H)      Severe aortic stenosis 4/7/2014     Shoulder fracture, left 3/22/15       Past Surgical History:    Past Surgical History:   Procedure Laterality Date     CATARACT IOL, RT/LT Right      ENT SURGERY      glaucoma surgery     REPLACE VALVE AORTIC  4/10/2014    Procedure: REPLACE VALVE AORTIC;  Median sternotomy, Replace Aortic Valve on pump oxygenation. ;  Surgeon: Wesley Fong MD;  Location:  OR       Family History:    Family History   Problem Relation Age of Onset     Dementia Brother      Alzheimer Disease Sister        Social History:  Marital Status:   [5]  Social History     Tobacco Use     Smoking status: Never Smoker     Smokeless tobacco: Never Used   Substance Use Topics     Alcohol use: No     Alcohol/week: 0.0 standard drinks     Drug use: No        Medications:    acetaminophen (TYLENOL) 500 MG tablet  atenolol (TENORMIN) 50 MG tablet  calcium carbonate-vitamin D 500-400 MG-UNIT TABS tablt  diltiazem ER COATED BEADS (CARDIZEM CD/CARTIA XT) 180 MG 24 hr capsule  trimethoprim-polymyxin b (POLYTRIM) 85681-4.1 UNIT/ML-% ophthalmic solution  warfarin ANTICOAGULANT (COUMADIN) 2.5 MG tablet  torsemide (DEMADEX) 20 MG tablet          Review of Systems   All other systems reviewed and are negative.      Physical Exam   BP: 133/73  Pulse: 67  Temp: 98.5  F (36.9  C)  Resp: 18  Weight: 64.7 kg (142 lb 9.6 oz)  SpO2: 96 %      Physical Exam  Vitals signs and nursing note reviewed.   Constitutional:       General: She is not in acute distress.     Appearance: She is well-developed. She is not diaphoretic.   HENT:      Head: Normocephalic and atraumatic.      Nose: Nose normal.   Eyes:      General: No scleral icterus.     Extraocular Movements: Extraocular movements intact.      Pupils: Pupils are equal, round, and reactive to light.      Comments: Glass  eye on the left, right side has conjunctival injection without discharge and normal reactive pupil.   Neck:      Musculoskeletal: Normal range of motion and neck supple.   Pulmonary:      Effort: Pulmonary effort is normal.      Breath sounds: Normal breath sounds.   Abdominal:      Tenderness: There is no abdominal tenderness.   Skin:     General: Skin is warm and dry.      Coloration: Skin is not pale.      Findings: No erythema or rash.   Neurological:      General: No focal deficit present.      Mental Status: She is alert and oriented to person, place, and time.      Cranial Nerves: No cranial nerve deficit.      Sensory: No sensory deficit.      Motor: No weakness.      Coordination: Coordination normal.   Psychiatric:         Mood and Affect: Mood normal.         ED Course        Procedures            Results for orders placed or performed during the hospital encounter of 06/29/20 (from the past 24 hour(s))   CBC with platelets differential   Result Value Ref Range    WBC 4.7 4.0 - 11.0 10e9/L    RBC Count 4.65 3.8 - 5.2 10e12/L    Hemoglobin 13.6 11.7 - 15.7 g/dL    Hematocrit 41.5 35.0 - 47.0 %    MCV 89 78 - 100 fl    MCH 29.2 26.5 - 33.0 pg    MCHC 32.8 31.5 - 36.5 g/dL    RDW 14.5 10.0 - 15.0 %    Platelet Count 134 (L) 150 - 450 10e9/L    Diff Method Automated Method     % Neutrophils 62.2 %    % Lymphocytes 18.4 %    % Monocytes 14.5 %    % Eosinophils 3.6 %    % Basophils 0.9 %    % Immature Granulocytes 0.4 %    Nucleated RBCs 0 0 /100    Absolute Neutrophil 2.9 1.6 - 8.3 10e9/L    Absolute Lymphocytes 0.9 0.8 - 5.3 10e9/L    Absolute Monocytes 0.7 0.0 - 1.3 10e9/L    Absolute Basophils 0.0 0.0 - 0.2 10e9/L    Abs Immature Granulocytes 0.0 0 - 0.4 10e9/L    Absolute Nucleated RBC 0.0    Basic metabolic panel   Result Value Ref Range    Sodium 137 133 - 144 mmol/L    Potassium 3.9 3.4 - 5.3 mmol/L    Chloride 104 94 - 109 mmol/L    Carbon Dioxide 29 20 - 32 mmol/L    Anion Gap 4 3 - 14 mmol/L     Glucose 87 70 - 99 mg/dL    Urea Nitrogen 17 7 - 30 mg/dL    Creatinine 0.67 0.52 - 1.04 mg/dL    GFR Estimate 82 >60 mL/min/[1.73_m2]    GFR Estimate If Black >90 >60 mL/min/[1.73_m2]    Calcium 8.5 8.5 - 10.1 mg/dL   CT Head w/o Contrast    Narrative    CT SCAN OF THE HEAD WITHOUT CONTRAST   6/29/2020 6:33 PM     HISTORY: Sudden severe frontal headache on blood thinners.    TECHNIQUE: Axial images of the head and coronal reformations without  IV contrast material. Radiation dose for this scan was reduced using  automated exposure control, adjustment of the mA and/or kV according  to patient size, or iterative reconstruction technique.    COMPARISON: 9/21/2019.    FINDINGS: There is no evidence of intracranial hemorrhage, mass, acute  infarct or anomaly. The ventricles are normal in size, shape and  configuration. Mild diffuse parenchymal volume loss. Moderately  extensive areas of hypodensity within the periventricular and deep  cerebral white matter are nonspecific, but likely related to chronic  microvascular ischemic disease.     Left globe prosthesis again noted. Right lens replacement. Visualized  orbits are otherwise normal. The visualized aspects of the paranasal  sinuses are clear. Mild sclerosis of the right mastoid tip, unchanged.  The mastoids and middle ear cavities otherwise appear within normal  limits. The bony calvarium and bones of the skull base appear intact.       Impression    IMPRESSION:     1. No evidence of acute intracranial hemorrhage, mass, or herniation.  2. Mild generalized brain parenchymal volume loss and moderately  extensive confluent areas of hypodensity in the cerebral white matter,  presumably representing chronic small vessel ischemic disease.  3. No significant change from prior exam.       Medications   HYDROmorphone (PF) (DILAUDID) injection 0.5 mg (has no administration in time range)   ondansetron (ZOFRAN) injection 4 mg (4 mg Intravenous Given 6/29/20 4322)        Assessments & Plan (with Medical Decision Making)  80-year-old with sudden onset of a headache it improved vastly with Tylenol.  Despite no trauma the patient did get a CT scan secondary to the abrupt onset and severe nature of this headache along with the fact that she is on Xarelto.  An IV was established and IV pain medicines given with improvement of her symptoms further.  Her headache is essentially gone at the time of discharge.  She felt quite well.  She was unaware of her red right eye but it does look like pinkeye so I decided to prescribe antibiotic drops.The diagnosis, treatment options, risks and follow-up discussed and all questions answered.     I have reviewed the nursing notes.    I have reviewed the findings, diagnosis, plan and need for follow up with the patient.      Current Discharge Medication List      START taking these medications    Details   trimethoprim-polymyxin b (POLYTRIM) 73002-6.1 UNIT/ML-% ophthalmic solution Place 1-2 drops into the right eye every 4 hours  Qty: 5 mL, Refills: 0             Final diagnoses:   Nonintractable episodic headache, unspecified headache type   Conjunctivitis of right eye, unspecified conjunctivitis type       6/29/2020   [facility] EMERGENCY DEPARTMENT     Blayne Hernandez MD  06/29/20 1912

## 2020-06-30 ENCOUNTER — PATIENT OUTREACH (OUTPATIENT)
Dept: CARE COORDINATION | Facility: CLINIC | Age: 80
End: 2020-06-30

## 2020-06-30 NOTE — DISCHARGE INSTRUCTIONS
About your headache is feeling better.  Your right eye looks like you have pinkeye.  Please use the drops as directed.  Follow-up with your doctor if you have persistent headaches or other new symptoms.

## 2020-06-30 NOTE — PROGRESS NOTES
Clinic Care Coordination Contact  [facility]/Voicemail       Clinical Data: CHW Outreach  Outreach attempted x 1. The patient didn't have a voicemail to leave a message at this time.     Plan: CHW will try to reach patient again in 1-2 business days.    Lala Tyler  [facility] Health Worker   [facility]  [email or web address]   Office: [phone]  Fax: [phone]        Referral: ED discharge report.  Patient was seen for Headaches    Notes:  Pink eye in right eye    Was given antibiotic drops

## 2020-07-02 NOTE — PROGRESS NOTES
Clinic Care Coordination Contact  Community Health Worker Initial Outreach            Patient accepts CC: No, The patient has all her resources at this time.     The Clinic Community Health Worker spoke with the patient today at the request of the PCP to discuss possible Clinic Care Coordination enrollment. The service was described to the patient and immediate needs were discussed. The patient declined enrollment at this time. The PCP is encouraged to refer in the future if the patient's needs change.    The patient stated that she hasn't had a headache since going to the ER. The CHW noticed that the ER gave her eye drops for pink eye and asked if the patient had any questions or concerns with the drops. The patient declined and said that everything is going good. She has no barriers or concerns at this time other then trying to stay cool.     Lala Tyler  [facility] Health Worker   [facility]  [email or web address]   Office: [phone]  Fax: [phone]

## 2020-07-19 DIAGNOSIS — I48.20 CHRONIC ATRIAL FIBRILLATION (H): ICD-10-CM

## 2020-07-20 RX ORDER — DILTIAZEM HYDROCHLORIDE 180 MG/1
CAPSULE, EXTENDED RELEASE ORAL
Qty: 180 CAPSULE | Refills: 1 | Status: SHIPPED | OUTPATIENT
Start: 2020-07-20 | End: 2021-01-19

## 2020-07-20 NOTE — TELEPHONE ENCOUNTER
Routing refill request to provider for review/approval because:  Labs not current:  SANA HusainN, RN  [facility]

## 2020-07-29 ENCOUNTER — ANTICOAGULATION THERAPY VISIT (OUTPATIENT)
Dept: ANTICOAGULATION | Facility: OTHER | Age: 80
End: 2020-07-29

## 2020-07-29 DIAGNOSIS — I48.91 ATRIAL FIBRILLATION WITH RVR (H): ICD-10-CM

## 2020-07-29 DIAGNOSIS — Z79.01 LONG TERM CURRENT USE OF ANTICOAGULANT THERAPY: ICD-10-CM

## 2020-07-29 DIAGNOSIS — Z95.2 S/P AVR (AORTIC VALVE REPLACEMENT): ICD-10-CM

## 2020-07-29 LAB
CAPILLARY BLOOD COLLECTION: NORMAL
INR BLD: 2.2 (ref 0.86–1.14)

## 2020-07-29 PROCEDURE — 85610 PROTHROMBIN TIME: CPT | Mod: QW | Performed by: INTERNAL MEDICINE

## 2020-07-29 PROCEDURE — 36416 COLLJ CAPILLARY BLOOD SPEC: CPT | Performed by: INTERNAL MEDICINE

## 2020-07-29 PROCEDURE — 99207 ZZC NO CHARGE NURSE ONLY: CPT | Performed by: INTERNAL MEDICINE

## 2020-07-29 NOTE — PROGRESS NOTES
ANTICOAGULATION FOLLOW-UP CLINIC VISIT    Patient Name:  Khalida Rouse  Date:  2020  Contact Type:  Telephone    SUBJECTIVE:  Patient Findings     Comments:   I left a detailed voicemail with the orders reflected in flowsheet. I have also requested a call back if there have been any missed doses, concerns, illness, fever, or if there have been any changes in medications, activity level, or diet  Venessa Hood RN            Clinical Outcomes     Comments:   I left a detailed voicemail with the orders reflected in flowsheet. I have also requested a call back if there have been any missed doses, concerns, illness, fever, or if there have been any changes in medications, activity level, or diet  Venessa Hood RN               OBJECTIVE    Recent labs: (last 7 days)     20  0946   INR 2.2*       ASSESSMENT / PLAN  INR assessment THER    Recheck INR In: 8 WEEKS    INR Location Outside lab      Anticoagulation Summary  As of 2020    INR goal:   2.0-3.0   TTR:   64.4 % (1 y)   INR used for dosin.2 (2020)   Warfarin maintenance plan:   5 mg (2.5 mg x 2) every Mon, Wed, Fri; 2.5 mg (2.5 mg x 1) all other days   Full warfarin instructions:   5 mg every Mon, Wed, Fri; 2.5 mg all other days   Weekly warfarin total:   25 mg   No change documented:   Venessa Hood RN   Plan last modified:   Venessa Hood RN (3/11/2020)   Next INR check:   2020   Priority:   Maintenance   Target end date:   Indefinite    Indications    Long term current use of anticoagulant therapy [Z79.01]  Atrial fibrillation with RVR (H) [I48.91]  S/P AVR (aortic valve replacement) [Z95.2]             Anticoagulation Episode Summary     INR check location:       Preferred lab:       Send INR reminders to:   [facility] [place]    Comments:   2.5 mg tablets, book, BP, PM dose      Anticoagulation Care Providers     Provider Role Specialty Phone number    Mazin Larsen DO Referring Internal Medicine  [phone]    Dorcas Fisher MD [facility] [phone]            See the Encounter Report to view Anticoagulation Flowsheet and Dosing Calendar (Go to Encounters tab in chart review, and find the Anticoagulation Therapy Visit)    Dosage adjustment made based on physician directed care plan.      Venessa Hood RN

## 2020-08-18 DIAGNOSIS — I48.91 ATRIAL FIBRILLATION WITH RVR (H): ICD-10-CM

## 2020-08-18 RX ORDER — WARFARIN SODIUM 2.5 MG/1
TABLET ORAL
Qty: 160 TABLET | Refills: 0 | Status: SHIPPED | OUTPATIENT
Start: 2020-08-18 | End: 2020-11-02

## 2020-09-23 DIAGNOSIS — I48.91 ATRIAL FIBRILLATION WITH RVR (H): ICD-10-CM

## 2020-09-23 RX ORDER — ATENOLOL 50 MG/1
TABLET ORAL
Qty: 180 TABLET | Refills: 1 | Status: SHIPPED | OUTPATIENT
Start: 2020-09-23 | End: 2021-03-31

## 2020-09-23 NOTE — TELEPHONE ENCOUNTER
Prescription approved per [facility] Refill Protocol.    Joanne Maza RN

## 2020-10-04 ENCOUNTER — HOSPITAL ENCOUNTER (EMERGENCY)
Facility: CLINIC | Age: 80
Discharge: HOME | End: 2020-10-04
Attending: EMERGENCY MEDICINE | Admitting: EMERGENCY MEDICINE
Payer: MEDICARE

## 2020-10-04 ENCOUNTER — APPOINTMENT (OUTPATIENT)
Dept: GENERAL RADIOLOGY | Facility: CLINIC | Age: 80
End: 2020-10-04
Attending: EMERGENCY MEDICINE
Payer: MEDICARE

## 2020-10-04 VITALS
OXYGEN SATURATION: 97 % | HEART RATE: 86 BPM | SYSTOLIC BLOOD PRESSURE: 154 MMHG | TEMPERATURE: 98.1 F | DIASTOLIC BLOOD PRESSURE: 89 MMHG | RESPIRATION RATE: 14 BRPM

## 2020-10-04 DIAGNOSIS — R10.13 EPIGASTRIC PAIN: ICD-10-CM

## 2020-10-04 DIAGNOSIS — R14.0 BLOATING: ICD-10-CM

## 2020-10-04 LAB
ALBUMIN SERPL-MCNC: 3.9 G/DL (ref 3.4–5)
ALP SERPL-CCNC: 78 U/L (ref 40–150)
ALT SERPL W P-5'-P-CCNC: 27 U/L (ref 0–50)
ANION GAP SERPL CALCULATED.3IONS-SCNC: 4 MMOL/L (ref 3–14)
AST SERPL W P-5'-P-CCNC: 30 U/L (ref 0–45)
BASOPHILS # BLD AUTO: 0 10E9/L (ref 0–0.2)
BASOPHILS NFR BLD AUTO: 0.8 %
BILIRUB SERPL-MCNC: 0.6 MG/DL (ref 0.2–1.3)
BUN SERPL-MCNC: 14 MG/DL (ref 7–30)
CALCIUM SERPL-MCNC: 9.1 MG/DL (ref 8.5–10.1)
CHLORIDE SERPL-SCNC: 104 MMOL/L (ref 94–109)
CO2 SERPL-SCNC: 28 MMOL/L (ref 20–32)
CREAT SERPL-MCNC: 0.68 MG/DL (ref 0.52–1.04)
D DIMER PPP FEU-MCNC: 0.3 UG/ML FEU (ref 0–0.5)
DIFFERENTIAL METHOD BLD: ABNORMAL
EOSINOPHIL NFR BLD AUTO: 3.7 %
ERYTHROCYTE [DISTWIDTH] IN BLOOD BY AUTOMATED COUNT: 14.1 % (ref 10–15)
GFR SERPL CREATININE-BSD FRML MDRD: 82 ML/MIN/{1.73_M2}
GLUCOSE SERPL-MCNC: 86 MG/DL (ref 70–99)
HCT VFR BLD AUTO: 39.9 % (ref 35–47)
HGB BLD-MCNC: 13.4 G/DL (ref 11.7–15.7)
IMM GRANULOCYTES # BLD: 0 10E9/L (ref 0–0.4)
IMM GRANULOCYTES NFR BLD: 0.4 %
INR PPP: 2.23 (ref 0.86–1.14)
LIPASE SERPL-CCNC: 99 U/L (ref 73–393)
LYMPHOCYTES # BLD AUTO: 0.8 10E9/L (ref 0.8–5.3)
LYMPHOCYTES NFR BLD AUTO: 17.3 %
MAGNESIUM SERPL-MCNC: 2.2 MG/DL (ref 1.6–2.3)
MCH RBC QN AUTO: 29.5 PG (ref 26.5–33)
MCHC RBC AUTO-ENTMCNC: 33.6 G/DL (ref 31.5–36.5)
MCV RBC AUTO: 88 FL (ref 78–100)
MONOCYTES # BLD AUTO: 0.6 10E9/L (ref 0–1.3)
MONOCYTES NFR BLD AUTO: 12.4 %
NEUTROPHILS # BLD AUTO: 3.2 10E9/L (ref 1.6–8.3)
NEUTROPHILS NFR BLD AUTO: 65.4 %
NRBC # BLD AUTO: 0 10*3/UL
NRBC BLD AUTO-RTO: 0 /100
PLATELET # BLD AUTO: 129 10E9/L (ref 150–450)
POTASSIUM SERPL-SCNC: 4 MMOL/L (ref 3.4–5.3)
PROT SERPL-MCNC: 7.5 G/DL (ref 6.8–8.8)
RBC # BLD AUTO: 4.55 10E12/L (ref 3.8–5.2)
SODIUM SERPL-SCNC: 136 MMOL/L (ref 133–144)
TROPONIN I SERPL-MCNC: <0.015 UG/L (ref 0–0.04)
WBC # BLD AUTO: 4.9 10E9/L (ref 4–11)

## 2020-10-04 PROCEDURE — 99284 EMERGENCY DEPT VISIT MOD MDM: CPT | Performed by: EMERGENCY MEDICINE

## 2020-10-04 PROCEDURE — 83735 ASSAY OF MAGNESIUM: CPT | Performed by: EMERGENCY MEDICINE

## 2020-10-04 PROCEDURE — 83690 ASSAY OF LIPASE: CPT | Performed by: EMERGENCY MEDICINE

## 2020-10-04 PROCEDURE — 99285 EMERGENCY DEPT VISIT HI MDM: CPT | Mod: 25 | Performed by: EMERGENCY MEDICINE

## 2020-10-04 PROCEDURE — 74019 RADEX ABDOMEN 2 VIEWS: CPT

## 2020-10-04 PROCEDURE — 85379 FIBRIN DEGRADATION QUANT: CPT | Performed by: EMERGENCY MEDICINE

## 2020-10-04 PROCEDURE — 85610 PROTHROMBIN TIME: CPT | Performed by: EMERGENCY MEDICINE

## 2020-10-04 PROCEDURE — 80053 COMPREHEN METABOLIC PANEL: CPT | Performed by: EMERGENCY MEDICINE

## 2020-10-04 PROCEDURE — 93005 ELECTROCARDIOGRAM TRACING: CPT | Performed by: EMERGENCY MEDICINE

## 2020-10-04 PROCEDURE — 85025 COMPLETE CBC W/AUTO DIFF WBC: CPT | Performed by: EMERGENCY MEDICINE

## 2020-10-04 PROCEDURE — 36415 COLL VENOUS BLD VENIPUNCTURE: CPT | Performed by: EMERGENCY MEDICINE

## 2020-10-04 PROCEDURE — 84484 ASSAY OF TROPONIN QUANT: CPT | Performed by: EMERGENCY MEDICINE

## 2020-10-04 PROCEDURE — 71046 X-RAY EXAM CHEST 2 VIEWS: CPT

## 2020-10-04 RX ORDER — POLYETHYLENE GLYCOL 3350 17 G/17G
POWDER, FOR SOLUTION ORAL
Qty: 527 G | Refills: 0 | Status: SHIPPED | OUTPATIENT
Start: 2020-10-04 | End: 2021-03-29

## 2020-10-04 NOTE — ED PROVIDER NOTES
"  History     Chief Complaint   Patient presents with     Abdominal Pain     The history is provided by the patient.     Khalida Rouse is a 80 year old female who presents to the emergency department with concerns of abdominal pain. The patient states that she has been having abdominal pain that is \"underneath\" her ribcage. She woke up with this pain this morning and notes that it was not present yesterday. She says it is a cramping and feels like \"something is there that should not be there\". Her pain is worse when sitting up. The pain does not radiate. She has felt full and bloated. She has a history of constipation and notes this has been going on for awhile. She was told to drink pop because it is carbonated, but notes this has not helped. She currently has 2-3 bowel movements a day. No bloody or black stools. She has felt like it is more difficult to button her pants recently, but is unsure if she has gained weight since she does not have a scale at home. She has felt more tired than usual and has some dizziness. She denies nausea, vomiting, shortness of breath, or feelings or reflux. No recent fever, chills, cough, or other cold symptoms. Eating and drinking normally. No urinary symptoms. No lower extremity swelling. The patient is not on water pills. She is on blood thinners. Non-smoker. No alcohol or street drug use. No abdominal surgical history. Normal colonoscopy results in the past. History of atrial fibrillation with RVR and atrial flutter. She has had an aortic valve replacement and is on Coumadin.    Allergies:  Allergies   Allergen Reactions     Xarelto [Rivaroxaban] Diarrhea     Ace Inhibitors Cough       Problem List:    Patient Active Problem List    Diagnosis Date Noted     Hypoxemia 05/23/2019     Priority: Medium     Thrombocytopenia (H) 05/20/2019     Priority: Medium     Other secondary pulmonary hypertension (H) 05/14/2019     Priority: Medium     Long term current use of anticoagulant " therapy 03/11/2016     Priority: Medium     Atrial fibrillation with RVR (H) 01/11/2016     Priority: Medium     Pneumonia 01/09/2016     Priority: Medium     Osteoporosis 10/26/2015     Priority: Medium     Advanced directives, counseling/discussion 10/03/2014     Priority: Medium     Declined       S/P AVR (aortic valve replacement) 04/10/2014     Priority: Medium     Atrial flutter 02/04/2014     Priority: Medium        Past Medical History:    Past Medical History:   Diagnosis Date     Aortic valve stenosis      Atrial flutter (H)      Cardiomyopathy (H)      Severe aortic stenosis 4/7/2014     Shoulder fracture, left 3/22/15       Past Surgical History:    Past Surgical History:   Procedure Laterality Date     CATARACT IOL, RT/LT Right      ENT SURGERY      glaucoma surgery     REPLACE VALVE AORTIC  4/10/2014    Procedure: REPLACE VALVE AORTIC;  Median sternotomy, Replace Aortic Valve on pump oxygenation. ;  Surgeon: Wesley Fong MD;  Location:  OR       Family History:    Family History   Problem Relation Age of Onset     Dementia Brother      Alzheimer Disease Sister        Social History:  Marital Status:   [5]  Social History     Tobacco Use     Smoking status: Never Smoker     Smokeless tobacco: Never Used   Substance Use Topics     Alcohol use: No     Alcohol/week: 0.0 standard drinks     Drug use: No        Medications:         polyethylene glycol (MIRALAX) 17 GM/Dose powder       acetaminophen (TYLENOL) 500 MG tablet       atenolol (TENORMIN) 50 MG tablet       calcium carbonate-vitamin D 500-400 MG-UNIT TABS tablt       CARTIA  MG 24 hr capsule       torsemide (DEMADEX) 20 MG tablet       trimethoprim-polymyxin b (POLYTRIM) 58210-8.1 UNIT/ML-% ophthalmic solution       warfarin ANTICOAGULANT (COUMADIN) 2.5 MG tablet          Review of Systems   All other systems reviewed and are negative.      Physical Exam   BP: (!) 145/125  Pulse: 77  Temp: 98.1  F (36.7  C)  Resp: 16  SpO2:  92 %      Physical Exam  Vitals signs and nursing note reviewed.   Constitutional:       Appearance: She is well-developed and normal weight.      Comments: Very pleasant, older female sitting in the bed   HENT:      Head: Normocephalic.      Mouth/Throat:      Mouth: Mucous membranes are moist.      Pharynx: Oropharynx is clear.   Eyes:      General: No scleral icterus.     Extraocular Movements: Extraocular movements intact.      Pupils: Pupils are equal, round, and reactive to light.      Comments: Left, glass eye in place.   Cardiovascular:      Rate and Rhythm: Normal rate.      Heart sounds: Normal heart sounds.      Comments: Irregularly irregular  Pulmonary:      Effort: Pulmonary effort is normal.      Breath sounds: Normal breath sounds.   Abdominal:      General: Bowel sounds are increased.      Palpations: Abdomen is soft.      Comments: Mild abdominal tenderness along the upper abdomen into the left upper quadrant/left lower quadrant   Musculoskeletal: Normal range of motion.      Comments: Trace lower extremity edema   Skin:     General: Skin is warm and dry.   Neurological:      General: No focal deficit present.      Mental Status: She is alert and oriented to person, place, and time.   Psychiatric:         Mood and Affect: Mood normal.         Behavior: Behavior normal.         ED Course (with Medical Decision Making)    Pt seen and examined by me.  RN and EPIC notes reviewed.       Patient with abdominal discomfort, upper abdomen.  She does have some bloating symptoms.  She states she has been having normal bowel movements with no blackness or blood.  She has hyperactive bowel sounds.  She is mildly hypertensive but otherwise vital signs are unremarkable.    Wondering if the patient is actually having gas pains based on her history and story and exam.   Plan IV, labs, EKG, abdominal and chest x-ray.    EKG shows atrial fibrillation, left axis, left anterior fascicular block, poor R wave  "progression.  Rate is 69.  This is consistent with a EKG done 4/24/2020.    Patient CBC, chemistry, lipase, troponin, d-dimer, magnesium are all normal.  INR is therapeutic at 2.23.  Chest x-ray shows some interstitial/fibrotic changes with no acute infiltrates.  Abdominal x-ray shows large amount of stool.    I discussed all the results with the patient.  We talked about carbonated beverages, bowel movements, I recommend that she start a regimen of MiraLAX to see if she can \"cleanout\" a little and hopefully this will help some of her abdominal discomfort.  If not, she needs to be seen for follow-up and may need a colonoscopy or other follow-up study.  Return at anytime for worsening, changes or concerns.        Procedures      Results for orders placed or performed during the hospital encounter of 10/04/20 (from the past 24 hour(s))   CBC with platelets differential   Result Value Ref Range    WBC 4.9 4.0 - 11.0 10e9/L    RBC Count 4.55 3.8 - 5.2 10e12/L    Hemoglobin 13.4 11.7 - 15.7 g/dL    Hematocrit 39.9 35.0 - 47.0 %    MCV 88 78 - 100 fl    MCH 29.5 26.5 - 33.0 pg    MCHC 33.6 31.5 - 36.5 g/dL    RDW 14.1 10.0 - 15.0 %    Platelet Count 129 (L) 150 - 450 10e9/L    Diff Method Automated Method     % Neutrophils 65.4 %    % Lymphocytes 17.3 %    % Monocytes 12.4 %    % Eosinophils 3.7 %    % Basophils 0.8 %    % Immature Granulocytes 0.4 %    Nucleated RBCs 0 0 /100    Absolute Neutrophil 3.2 1.6 - 8.3 10e9/L    Absolute Lymphocytes 0.8 0.8 - 5.3 10e9/L    Absolute Monocytes 0.6 0.0 - 1.3 10e9/L    Absolute Basophils 0.0 0.0 - 0.2 10e9/L    Abs Immature Granulocytes 0.0 0 - 0.4 10e9/L    Absolute Nucleated RBC 0.0    Comprehensive metabolic panel   Result Value Ref Range    Sodium 136 133 - 144 mmol/L    Potassium 4.0 3.4 - 5.3 mmol/L    Chloride 104 94 - 109 mmol/L    Carbon Dioxide 28 20 - 32 mmol/L    Anion Gap 4 3 - 14 mmol/L    Glucose 86 70 - 99 mg/dL    Urea Nitrogen 14 7 - 30 mg/dL    Creatinine 0.68 " 0.52 - 1.04 mg/dL    GFR Estimate 82 >60 mL/min/[1.73_m2]    GFR Estimate If Black >90 >60 mL/min/[1.73_m2]    Calcium 9.1 8.5 - 10.1 mg/dL    Bilirubin Total 0.6 0.2 - 1.3 mg/dL    Albumin 3.9 3.4 - 5.0 g/dL    Protein Total 7.5 6.8 - 8.8 g/dL    Alkaline Phosphatase 78 40 - 150 U/L    ALT 27 0 - 50 U/L    AST 30 0 - 45 U/L   Lipase   Result Value Ref Range    Lipase 99 73 - 393 U/L   Troponin I   Result Value Ref Range    Troponin I ES <0.015 0.000 - 0.045 ug/L   D dimer quantitative   Result Value Ref Range    D Dimer 0.3 0.0 - 0.50 ug/ml FEU   Magnesium   Result Value Ref Range    Magnesium 2.2 1.6 - 2.3 mg/dL   INR   Result Value Ref Range    INR 2.23 (H) 0.86 - 1.14   KUB XR    Narrative    ABDOMEN ONE VIEW  10/4/2020 6:36 PM    HISTORY: Abdominal bloating.    COMPARISON: July 13, 2019      Impression    IMPRESSION: Unremarkable bowel gas pattern. No definite renal calculi  over the kidneys or over the expected course of the mid to upper  ureter. Moderate to large amount of stool.    YOSELIN WHALEN MD   Chest XR,  PA & LAT    Narrative    CHEST TWO VIEWS 10/4/2020 6:36 PM     HISTORY: Epigastric/chest pain.    COMPARISON: [place] 10, 2019       Impression    IMPRESSION: Similar mild interstitial and/or fibrotic change. There  are no acute infiltrates. The cardiac silhouette is prominent but  stable. Pulmonary vasculature is unremarkable.    YOSELIN WHALEN MD       Medications - No data to display    Assessments & Plan      I have reviewed the findings, diagnosis, plan and need for follow up with the patient.    Discharge Medication List as of 10/4/2020  7:00 PM      START taking these medications    Details   polyethylene glycol (MIRALAX) 17 GM/Dose powder Take 1 capful mixed in liquid of your choice 1-3 times daily as needed for constipation/stooling and bloating., Disp-527 g, R-0, Local Print             Final diagnoses:   Epigastric pain   Bloating     Disposition: Patient discharged home in stable condition.   Plan as above.  Return for concerns.      This document serves as a record of services personally performed by Mignon Wagner MD*. It was created on their behalf by Sonya Davis, a trained medical scribe. The creation of this record is based on the provider's personal observations and the statements of the patient. This document has been checked and approved by the attending provider.  Note: Chart documentation done in part with Dragon Voice Recognition software. Although reviewed after completion, some word and grammatical errors may remain.  10/4/2020   [facility] EMERGENCY DEPT     Mignon Wagner MD  10/05/20 0019

## 2020-10-04 NOTE — ED NOTES
Patient walked to the bathroom with SBA. She voided into a hat, but threw toilet paper into the hat. Urine disposed of. She denies pain at this time.

## 2020-10-05 NOTE — DISCHARGE INSTRUCTIONS
Be sure to stay well-hydrated.     Take MiraLAX 1-3 times daily as needed for bloating, constipation/stooling issues.    If you have any worsening, changes or concerns please return to the ED at any time.  Alternatively, if you continue to have symptoms please make an appointment to see your primary care provider.    I hope that you start to feel much better quickly!!

## 2020-10-06 ENCOUNTER — HOSPITAL ENCOUNTER (EMERGENCY)
Facility: CLINIC | Age: 80
Discharge: HOME | End: 2020-10-06
Attending: NURSE PRACTITIONER | Admitting: NURSE PRACTITIONER
Payer: MEDICARE

## 2020-10-06 ENCOUNTER — APPOINTMENT (OUTPATIENT)
Dept: GENERAL RADIOLOGY | Facility: CLINIC | Age: 80
End: 2020-10-06
Attending: NURSE PRACTITIONER
Payer: MEDICARE

## 2020-10-06 VITALS
HEART RATE: 89 BPM | RESPIRATION RATE: 16 BRPM | DIASTOLIC BLOOD PRESSURE: 75 MMHG | TEMPERATURE: 98.6 F | SYSTOLIC BLOOD PRESSURE: 140 MMHG | OXYGEN SATURATION: 92 %

## 2020-10-06 DIAGNOSIS — K59.01 SLOW TRANSIT CONSTIPATION: ICD-10-CM

## 2020-10-06 DIAGNOSIS — N39.0 URINARY TRACT INFECTION IN FEMALE: ICD-10-CM

## 2020-10-06 LAB
ALBUMIN UR-MCNC: NEGATIVE MG/DL
APPEARANCE UR: ABNORMAL
BACTERIA #/AREA URNS HPF: ABNORMAL /HPF
BILIRUB UR QL STRIP: NEGATIVE
COLOR UR AUTO: YELLOW
GLUCOSE UR STRIP-MCNC: NEGATIVE MG/DL
HGB UR QL STRIP: NEGATIVE
HYALINE CASTS #/AREA URNS LPF: 1 /LPF (ref 0–2)
KETONES UR STRIP-MCNC: NEGATIVE MG/DL
LEUKOCYTE ESTERASE UR QL STRIP: ABNORMAL
MUCOUS THREADS #/AREA URNS LPF: PRESENT /LPF
NITRATE UR QL: NEGATIVE
PH UR STRIP: 6 PH (ref 5–7)
RBC #/AREA URNS AUTO: 0 /HPF (ref 0–2)
SOURCE: ABNORMAL
SP GR UR STRIP: 1.01 (ref 1–1.03)
SQUAMOUS #/AREA URNS AUTO: 1 /HPF (ref 0–1)
UROBILINOGEN UR STRIP-MCNC: 0 MG/DL (ref 0–2)
WBC #/AREA URNS AUTO: 28 /HPF (ref 0–5)

## 2020-10-06 PROCEDURE — 74019 RADEX ABDOMEN 2 VIEWS: CPT

## 2020-10-06 PROCEDURE — 99284 EMERGENCY DEPT VISIT MOD MDM: CPT | Performed by: NURSE PRACTITIONER

## 2020-10-06 PROCEDURE — 81001 URINALYSIS AUTO W/SCOPE: CPT | Performed by: NURSE PRACTITIONER

## 2020-10-06 PROCEDURE — 250N000013 HC RX MED GY IP 250 OP 250 PS 637: Mod: GY | Performed by: NURSE PRACTITIONER

## 2020-10-06 PROCEDURE — 87086 URINE CULTURE/COLONY COUNT: CPT | Performed by: NURSE PRACTITIONER

## 2020-10-06 RX ORDER — CEFDINIR 300 MG/1
300 CAPSULE ORAL ONCE
Status: COMPLETED | OUTPATIENT
Start: 2020-10-06 | End: 2020-10-06

## 2020-10-06 RX ORDER — MAGNESIUM CARB/ALUMINUM HYDROX 105-160MG
296 TABLET,CHEWABLE ORAL ONCE
Status: COMPLETED | OUTPATIENT
Start: 2020-10-06 | End: 2020-10-06

## 2020-10-06 RX ORDER — CEFDINIR 250 MG/5ML
300 POWDER, FOR SUSPENSION ORAL ONCE
Status: DISCONTINUED | OUTPATIENT
Start: 2020-10-06 | End: 2020-10-06 | Stop reason: CLARIF

## 2020-10-06 RX ORDER — CEFDINIR 300 MG/1
300 CAPSULE ORAL 2 TIMES DAILY
Qty: 20 CAPSULE | Refills: 0 | Status: SHIPPED | OUTPATIENT
Start: 2020-10-06 | End: 2020-10-16

## 2020-10-06 RX ADMIN — DOCUSATE SODIUM 286 ML: 50 LIQUID ORAL at 20:54

## 2020-10-06 RX ADMIN — CEFDINIR 300 MG: 300 CAPSULE ORAL at 20:46

## 2020-10-06 RX ADMIN — MAGNESIUM CITRATE 296 ML: 1.75 LIQUID ORAL at 20:47

## 2020-10-06 NOTE — ED AVS SNAPSHOT
[facility] Emergency Dept  [address] [place] [address]  Phone: [phone]  Fax: [phone]                                    Khalida Rouse   MRN: [id number]    Department: [facility] Emergency Dept   Date of Visit: 10/6/2020           After Visit Summary Signature Page    I have received my discharge instructions, and my questions have been answered. I have discussed any challenges I see with this plan with the nurse or doctor.    ..........................................................................................................................................  Patient/Patient Representative Signature      ..........................................................................................................................................  Patient Representative Print Name and Relationship to Patient    ..................................................               ................................................  Date                                   Time    ..........................................................................................................................................  Reviewed by Signature/Title    ...................................................              ..............................................  Date                                               Time          22EPIC Rev 08/18

## 2020-10-07 ENCOUNTER — ANTICOAGULATION THERAPY VISIT (OUTPATIENT)
Dept: ANTICOAGULATION | Facility: OTHER | Age: 80
End: 2020-10-07
Payer: MEDICARE

## 2020-10-07 ENCOUNTER — PATIENT OUTREACH (OUTPATIENT)
Dept: CARE COORDINATION | Facility: CLINIC | Age: 80
End: 2020-10-07

## 2020-10-07 DIAGNOSIS — Z95.2 S/P AVR (AORTIC VALVE REPLACEMENT): ICD-10-CM

## 2020-10-07 DIAGNOSIS — I48.91 ATRIAL FIBRILLATION WITH RVR (H): ICD-10-CM

## 2020-10-07 DIAGNOSIS — Z79.01 LONG TERM CURRENT USE OF ANTICOAGULANT THERAPY: ICD-10-CM

## 2020-10-07 PROCEDURE — 99207 PR NO CHARGE NURSE ONLY: CPT | Performed by: INTERNAL MEDICINE

## 2020-10-07 NOTE — LETTER
[place] CARE COORDINATION  [address]   [place] [address]    October 8, 2020    Khalida Rouse  [address]  [place] [address]      Dear Khalida,    I am a clinic community health worker who works with Mazin Larsen DO at [facility]. I have been trying to reach you recently to introduce Clinic Care Coordination and to see if there was anything I could assist you with.  Below is a description of clinic care coordination and how I can further assist you.      The clinic care coordination team is made up of a registered nurse,  and community health worker who understand the health care system. The goal of clinic care coordination is to help you manage your health and improve access to the health care system in the most efficient manner. The team can assist you in meeting your health care goals by providing education, coordinating services, strengthening the communication among your providers and supporting you with any resource needs.    Please feel free to contact me at [phone] with any questions or concerns. We are focused on providing you with the highest-quality healthcare experience possible and that all starts with you.     Sincerely,     MICHAEL Hamilton  Clinic Care Coordination   [phone]  [email or web address]

## 2020-10-07 NOTE — ED PROVIDER NOTES
"  History     Chief Complaint   Patient presents with     Constipation     HPI  Khalida Rouse is a 80 year old female who presents with concerns over constipation, decreased urination and left temporal pain.  Patient was seen here on the fourth, following work-up and MDM as noted.  Patient reports she is taken 1 dose of MiraLAX with little effect, she denies any dysuria, left temporal pain is gone currently, she has no tenderness to palpation.        Patient with abdominal discomfort, upper abdomen.  She does have some bloating symptoms.  She states she has been having normal bowel movements with no blackness or blood.  She has hyperactive bowel sounds.  She is mildly hypertensive but otherwise vital signs are unremarkable.     Wondering if the patient is actually having gas pains based on her history and story and exam.   Plan IV, labs, EKG, abdominal and chest x-ray.     EKG shows atrial fibrillation, left axis, left anterior fascicular block, poor R wave progression.  Rate is 69.  This is consistent with a EKG done 4/24/2020.     Patient CBC, chemistry, lipase, troponin, d-dimer, magnesium are all normal.  INR is therapeutic at 2.23.  Chest x-ray shows some interstitial/fibrotic changes with no acute infiltrates.  Abdominal x-ray shows large amount of stool.     I discussed all the results with the patient.  We talked about carbonated beverages, bowel movements, I recommend that she start a regimen of MiraLAX to see if she can \"cleanout\" a little and hopefully this will help some of her abdominal discomfort.  If not, she needs to be seen for follow-up and may need a colonoscopy or other follow-up study.  Return at anytime for worsening, changes or concerns.    Allergies:  Allergies   Allergen Reactions     Xarelto [Rivaroxaban] Diarrhea     Ace Inhibitors Cough       Problem List:    Patient Active Problem List    Diagnosis Date Noted     Hypoxemia 05/23/2019     Priority: Medium     Thrombocytopenia (H) " 05/20/2019     Priority: Medium     Other secondary pulmonary hypertension (H) 05/14/2019     Priority: Medium     Long term current use of anticoagulant therapy 03/11/2016     Priority: Medium     Atrial fibrillation with RVR (H) 01/11/2016     Priority: Medium     Pneumonia 01/09/2016     Priority: Medium     Osteoporosis 10/26/2015     Priority: Medium     Advanced directives, counseling/discussion 10/03/2014     Priority: Medium     Declined       S/P AVR (aortic valve replacement) 04/10/2014     Priority: Medium     Atrial flutter 02/04/2014     Priority: Medium        Past Medical History:    Past Medical History:   Diagnosis Date     Aortic valve stenosis      Atrial flutter (H)      Cardiomyopathy (H)      Severe aortic stenosis 4/7/2014     Shoulder fracture, left 3/22/15       Past Surgical History:    Past Surgical History:   Procedure Laterality Date     CATARACT IOL, RT/LT Right      ENT SURGERY      glaucoma surgery     REPLACE VALVE AORTIC  4/10/2014    Procedure: REPLACE VALVE AORTIC;  Median sternotomy, Replace Aortic Valve on pump oxygenation. ;  Surgeon: Wesley Fong MD;  Location:  OR       Family History:    Family History   Problem Relation Age of Onset     Dementia Brother      Alzheimer Disease Sister        Social History:  Marital Status:   [5]  Social History     Tobacco Use     Smoking status: Never Smoker     Smokeless tobacco: Never Used   Substance Use Topics     Alcohol use: No     Alcohol/week: 0.0 standard drinks     Drug use: No        Medications:         acetaminophen (TYLENOL) 500 MG tablet       atenolol (TENORMIN) 50 MG tablet       calcium carbonate-vitamin D 500-400 MG-UNIT TABS tablt       CARTIA  MG 24 hr capsule       polyethylene glycol (MIRALAX) 17 GM/Dose powder       torsemide (DEMADEX) 20 MG tablet       trimethoprim-polymyxin b (POLYTRIM) 20025-8.1 UNIT/ML-% ophthalmic solution       warfarin ANTICOAGULANT (COUMADIN) 2.5 MG  tablet          Review of Systems   All other systems reviewed and are negative.      Physical Exam   BP: (!) 140/75  Pulse: 89  Temp: 98.6  F (37  C)  Resp: 16  SpO2: 92 %      Physical Exam  Constitutional:       Appearance: Normal appearance.   HENT:      Head: Normocephalic.      Comments: No left temporal tenderness, no evidence of herpetic lesions, no other abnormalities on exam, exam is not consistent with findings such as a temporal arteritis.     Nose: Nose normal.      Mouth/Throat:      Mouth: Mucous membranes are moist.   Eyes:      Extraocular Movements: Extraocular movements intact.      Pupils: Pupils are equal, round, and reactive to light.      Comments: Left glass eye   Neck:      Musculoskeletal: Normal range of motion.   Cardiovascular:      Rate and Rhythm: Normal rate.      Pulses: Normal pulses.   Pulmonary:      Effort: Pulmonary effort is normal.   Abdominal:      General: Bowel sounds are normal. There is distension.      Palpations: Abdomen is soft.      Tenderness: There is no abdominal tenderness.   Skin:     General: Skin is warm.      Capillary Refill: Capillary refill takes less than 2 seconds.   Neurological:      General: No focal deficit present.      Mental Status: She is alert.   Psychiatric:         Mood and Affect: Mood normal.         ED Course        Procedures    Results for orders placed or performed during the hospital encounter of 10/06/20 (from the past 24 hour(s))   XR Abdomen 2 Views    Narrative    ABDOMEN TWO VIEWS 10/6/2020 7:54 PM     HISTORY: Constipation.    COMPARISON: 10/4/2020.      Impression    IMPRESSION: Moderate amount of stool in the ascending colon. Bowel gas  pattern is otherwise within normal limits. No free intraperitoneal  air. Cholelithiasis. Sternotomy and aortic valve prosthesis.   UA with Microscopic   Result Value Ref Range    Color Urine Yellow     Appearance Urine Slightly Cloudy     Glucose Urine Negative NEG^Negative mg/dL    Bilirubin  Urine Negative NEG^Negative    Ketones Urine Negative NEG^Negative mg/dL    Specific Gravity Urine 1.011 1.003 - 1.035    Blood Urine Negative NEG^Negative    pH Urine 6.0 5.0 - 7.0 pH    Protein Albumin Urine Negative NEG^Negative mg/dL    Urobilinogen mg/dL 0.0 0.0 - 2.0 mg/dL    Nitrite Urine Negative NEG^Negative    Leukocyte Esterase Urine Large (A) NEG^Negative    Source Midstream Urine     WBC Urine 28 (H) 0 - 5 /HPF    RBC Urine 0 0 - 2 /HPF    Bacteria Urine Few (A) NEG^Negative /HPF    Squamous Epithelial /HPF Urine 1 0 - 1 /HPF    Mucous Urine Present (A) NEG^Negative /LPF    Hyaline Casts 1 0 - 2 /LPF       Medications   pink lady enema (COMPOUNDED: docusate, magnesium citrate, mineral oil, sodium phosphate) (286 mLs Rectal Given 10/6/20 2054)   magnesium citrate solution 296 mL (296 mLs Oral Given 10/6/20 2047)   cefdinir (OMNICEF) capsule 300 mg (300 mg Oral Given 10/6/20 2046)       Assessments & Plan (with Medical Decision Making)  1.  Slow transit constipation and otherwise healthy 80-year-old female who is in no acute distress.  Abdominal x-ray shows a moderate amount of stool in ascending colon with no evidence of obstruction.  Patient given pink lady enema here was only able to retain a small amount and had very little results.  We will plan to send home with a bottle of mag citrate which she can drink tomorrow morning along with a dose of MiraLAX.  2.  UTI, culture pending, patient given a dose of Omnicef here in the emergency room will continue this at home tomorrow.  Patient encouraged to stay well-hydrated, follow-up with primary care as needed. Reasons to return to the emergency room discussed.  Patient agreeable to plan of care and discharged in stable condition.     I have reviewed the nursing notes.    I have reviewed the findings, diagnosis, plan and need for follow up with the patient.    New Prescriptions    CEFDINIR (OMNICEF) 300 MG CAPSULE    Take 1 capsule (300 mg) by mouth 2 times  daily for 10 days       Final diagnoses:   Slow transit constipation   Urinary tract infection in female       10/6/2020   [facility] EMERGENCY DEPT     Alicia Hall, APRN CNP  10/06/20 3247

## 2020-10-07 NOTE — ED NOTES
Administered enema, pt tolerated well. Unable to hold solution for very long but will continue to try and hold. Bedside commode provided and call light in reach.

## 2020-10-07 NOTE — ED NOTES
Pt had small to mod amount of stool/liquid in bedside commode. Pt passed gas and felt less bloated. Reviewed discharge instruction, verbalized understanding. No questions or concerns. Meds reviewed.

## 2020-10-07 NOTE — DISCHARGE INSTRUCTIONS
Drink Mag citrate in the morning along with capful of Miralax.  Continue Miralax daily for the next week, you can stop if you develop loose stool.  Fill the Omnicef tomorrow morning and start this as well for your UTI.  Drink plenty of fluids.

## 2020-10-07 NOTE — ED TRIAGE NOTES
"Pt presents with constipation. Pt states she was in the ED yesterday for similar. Pt mentions left temple pain also. Pt states \"I haven't peed my either.\"   "

## 2020-10-07 NOTE — PROGRESS NOTES
Clinic Care Coordination Contact  UT/Voicemail    Clinical Data: Care Coordinator Outreach  Outreach attempted x 1.  Left message on patient's voicemail with call back information and requested return call.  Plan: Care Coordinator will try to reach patient again in 1-2 business days.    Request Summary [[id number]]   Procedure: Referral to CC - CHW Status: Needs Scheduling   Requested appt date:   Authorizing: Mazin Larsen DO in  CARE COORDINATION   Referral: [id number] (Pending Review)           Priority: Routine   Diagnosis: Other specified counseling [Z71.89]   Order Details   Proc category: Referral Class: Local Print   Standing status: Normal Order status: Sent   Enc provider: Mazin Larsen DO Enc department:  Care Coordination   Order date: 10/7/2020 Order user: Vale Eastman RN   Visit type: CCC PHONE VISIT Appointment Window:     Lead Care Coordinator:     Comments   CHW to outreach  Referral made off of discharge report.

## 2020-10-07 NOTE — PROGRESS NOTES
ANTICOAGULATION FOLLOW-UP CLINIC VISIT    Patient Name:  Khalida Rouse  Date:  10/7/2020  Contact Type:  Telephone    SUBJECTIVE:  Patient Findings     Comments:  INR at the ER on 10/4 was 2.23  I left a detailed voicemail with the orders reflected in flowsheet. I have also requested a call back if there have been any missed doses, concerns, illness, fever, or if there have been any changes in medications, activity level, or diet  Venessa Hood RN          Clinical Outcomes     Comments:  INR at the ER on 10/4 was 2.23  I left a detailed voicemail with the orders reflected in flowsheet. I have also requested a call back if there have been any missed doses, concerns, illness, fever, or if there have been any changes in medications, activity level, or diet  Venessa Hood RN             OBJECTIVE    Recent labs: (last 7 days)     10/04/20  1818   INR 2.23*       ASSESSMENT / PLAN  INR assessment THER    Recheck INR In: 8 WEEKS    INR Location Outside lab      Anticoagulation Summary  As of 10/7/2020    INR goal:  2.0-3.0   TTR:  69.5 % (1 y)   INR used for dosin.23 (10/4/2020)   Warfarin maintenance plan:  5 mg (2.5 mg x 2) every Mon, Wed, Fri; 2.5 mg (2.5 mg x 1) all other days   Full warfarin instructions:  5 mg every Mon, Wed, Fri; 2.5 mg all other days   Weekly warfarin total:  25 mg   No change documented:  Venessa Hood RN   Plan last modified:  Venessa Hood RN (3/11/2020)   Next INR check:  2020   Priority:  Maintenance   Target end date:  Indefinite    Indications    Long term current use of anticoagulant therapy [Z79.01]  Atrial fibrillation with RVR (H) [I48.91]  S/P AVR (aortic valve replacement) [Z95.2]             Anticoagulation Episode Summary     INR check location:      Preferred lab:      Send INR reminders to:  [facility] [place]    Comments:  2.5 mg tablets, book, BP, PM dose      Anticoagulation Care Providers     Provider Role Specialty Phone number    Suzie  Mazin Schuler DO Referring Internal Medicine [phone]    Dorcas Fisher MD [facility] [phone]            See the Encounter Report to view Anticoagulation Flowsheet and Dosing Calendar (Go to Encounters tab in chart review, and find the Anticoagulation Therapy Visit)    Dosage adjustment made based on physician directed care plan.      Venessa Hood RN

## 2020-10-08 LAB
BACTERIA SPEC CULT: NORMAL
Lab: NORMAL
SPECIMEN SOURCE: NORMAL

## 2020-10-08 NOTE — RESULT ENCOUNTER NOTE
Final urine culture report is NEGATIVE per [facility] ED Lab Result protocol.    If NEGATIVE result, no change in treatment, per [facility] ED Lab Result protocol.

## 2020-10-08 NOTE — PROGRESS NOTES
Clinic Care Coordination Contact  [facility] [[email or web address]Voicemail    Clinical Data: Care Coordinator Outreach  Outreach attempted x 2.  Left message on patient's voicemail with call back information and requested return call.  Plan: Care Coordinator will send unable to contact letter with care coordinator contact information via mail. Care Coordinator will do no further outreaches at this time.

## 2020-10-13 ENCOUNTER — OFFICE VISIT (OUTPATIENT)
Dept: INTERNAL MEDICINE | Facility: CLINIC | Age: 80
End: 2020-10-13
Payer: MEDICARE

## 2020-10-13 VITALS
DIASTOLIC BLOOD PRESSURE: 76 MMHG | BODY MASS INDEX: 24.16 KG/M2 | SYSTOLIC BLOOD PRESSURE: 138 MMHG | RESPIRATION RATE: 16 BRPM | TEMPERATURE: 97.5 F | HEIGHT: 65 IN | WEIGHT: 145 LBS | HEART RATE: 96 BPM | OXYGEN SATURATION: 92 %

## 2020-10-13 DIAGNOSIS — K59.01 SLOW TRANSIT CONSTIPATION: ICD-10-CM

## 2020-10-13 DIAGNOSIS — I48.91 ATRIAL FIBRILLATION WITH RVR (H): Primary | ICD-10-CM

## 2020-10-13 DIAGNOSIS — Z95.2 S/P AVR (AORTIC VALVE REPLACEMENT): ICD-10-CM

## 2020-10-13 DIAGNOSIS — N30.00 ACUTE CYSTITIS WITHOUT HEMATURIA: ICD-10-CM

## 2020-10-13 LAB
ALBUMIN UR-MCNC: NEGATIVE MG/DL
APPEARANCE UR: ABNORMAL
BILIRUB UR QL STRIP: NEGATIVE
CAOX CRY #/AREA URNS HPF: ABNORMAL /HPF
COLOR UR AUTO: ABNORMAL
GLUCOSE UR STRIP-MCNC: NEGATIVE MG/DL
HGB UR QL STRIP: NEGATIVE
KETONES UR STRIP-MCNC: NEGATIVE MG/DL
LEUKOCYTE ESTERASE UR QL STRIP: ABNORMAL
MUCOUS THREADS #/AREA URNS LPF: PRESENT /LPF
NITRATE UR QL: NEGATIVE
PH UR STRIP: 6 PH (ref 5–7)
RBC #/AREA URNS AUTO: 3 /HPF (ref 0–2)
SOURCE: ABNORMAL
SP GR UR STRIP: 1.01 (ref 1–1.03)
SQUAMOUS #/AREA URNS AUTO: <1 /HPF (ref 0–1)
UROBILINOGEN UR STRIP-MCNC: 0 MG/DL (ref 0–2)
WBC #/AREA URNS AUTO: 3 /HPF (ref 0–5)

## 2020-10-13 PROCEDURE — 81001 URINALYSIS AUTO W/SCOPE: CPT | Performed by: INTERNAL MEDICINE

## 2020-10-13 PROCEDURE — 99214 OFFICE O/P EST MOD 30 MIN: CPT | Performed by: INTERNAL MEDICINE

## 2020-10-13 RX ORDER — DOCUSATE SODIUM 100 MG/1
100 CAPSULE, LIQUID FILLED ORAL DAILY
Qty: 90 CAPSULE | Refills: 3 | Status: ON HOLD | OUTPATIENT
Start: 2020-10-13 | End: 2021-05-14

## 2020-10-13 ASSESSMENT — MIFFLIN-ST. JEOR: SCORE: 1123.6

## 2020-10-13 ASSESSMENT — PAIN SCALES - GENERAL: PAINLEVEL: NO PAIN (0)

## 2020-10-13 NOTE — PROGRESS NOTES
Subjective     Khalida Rouse is a 80 year old female who presents to clinic today for the following health issues:    HPI         ED/UC Followup:    Facility:  [facility]  Date of visit: 10/4/2020 and 10/6/2020  Reason for visit: constipation, abdominal pain  Current Status: improving        EMR reviewed including: History of chief complaint, pertinent distant history, medications, concurrent diagnoses.             Chief Complaint:  #1   Follow-up on hospital visit for constipation  Improved following enema  Still notes stools are frequently hard.  No melena or hematochezia.  No colonoscopy in recent past.  Patient not interested at this time.    #2   Follow-up of urinary tract infection.  Has concluded antibiotic.  Notes no residual urinary symptoms.  Denies fever or chills.    #3   Follow-up on atrial fibrillation  No syncope or near syncope.  No sensed arrhythmia.  Continues anticoagulation without bruising or bleeding.    #4   Follow-up of aortic valvular replacement  As above in #3.  No signs of congestive heart failure  No signs of embolic phenomenon                         PAST, FAMILY,SOCIAL HISTORY:     Medical  History:   has a past medical history of Aortic valve stenosis, Atrial flutter (H), Cardiomyopathy (H), Severe aortic stenosis (4/7/2014), and Shoulder fracture, left (3/22/15).     Surgical History:   has a past surgical history that includes ENT surgery; Replace valve aortic (4/10/2014); and cataract iol, rt/lt (Right).     Social History:   reports that she has never smoked. She has never used smokeless tobacco. She reports that she does not drink alcohol or use drugs.     Family History:  family history includes Alzheimer Disease in her sister; Dementia in her brother.            MEDICATIONS  Current Outpatient Medications   Medication Sig Dispense Refill     acetaminophen (TYLENOL) 500 MG tablet Take 2 tablets (1,000 mg) by mouth every 6 hours as needed for mild pain 180 tablet 3     atenolol  (TENORMIN) 50 MG tablet TAKE ONE TABLET BY MOUTH TWICE A  tablet 1     calcium carbonate-vitamin D 500-400 MG-UNIT TABS tablt Take 1 tablet by mouth 3 times daily       CARTIA  MG 24 hr capsule TAKE ONE CAPSULE BY MOUTH TWICE A  capsule 1     cefdinir (OMNICEF) 300 MG capsule Take 1 capsule (300 mg) by mouth 2 times daily for 10 days 20 capsule 0     docusate sodium (COLACE) 100 MG capsule Take 1 capsule (100 mg) by mouth daily 90 capsule 3     methylcellulose (CITRUCEL) powder 1 scoop twice a day with big glass of water 850 g 3     polyethylene glycol (MIRALAX) 17 GM/Dose powder Take 1 capful mixed in liquid of your choice 1-3 times daily as needed for constipation/stooling and bloating. 527 g 0     torsemide (DEMADEX) 20 MG tablet TAKE ONE TABLET BY MOUTH ONCE DAILY AS NEEDED 90 tablet 3     trimethoprim-polymyxin b (POLYTRIM) 30221-4.1 UNIT/ML-% ophthalmic solution Place 1-2 drops into the right eye every 4 hours 5 mL 0     warfarin ANTICOAGULANT (COUMADIN) 2.5 MG tablet Take 5 mg Mon, Wed, Fri and 2.5 mg all other days, or as directed by the [facility]. 160 tablet 0         --------------------------------------------------------------------------------------------------------------------                              Review of Systems       LUNGS: Pt denies: cough, excess sputum, hemoptysis, or shortness of breath.   HEART: Pt denies: chest pain, arrhythmia, syncope, tachy or bradyarrhythmia.   GI: Pt denies: nausea, vomiting, diarrhea, further constipation, melena, or hematochezia.   NEURO: Pt denies: seizures, strokes, diplopia, weakness, paraesthesias, or paralysis.   SKIN: Pt denies: itching, rashes, discoloration, or specific lesions of concern. Denies recent hair loss.   PSYCH: The patient denies significant depression, anxiety, mood imbalance. Specifically denies any suicidal ideation.        Examination    /76 (BP Location: Right arm, Patient Position: Sitting, Cuff Size:  "Adult Regular)   Pulse 96   Temp 97.5  F (36.4  C) (Temporal)   Resp 16   Ht 1.651 m (5' 5\")   Wt 65.8 kg (145 lb)   SpO2 92%   BMI 24.13 kg/m      Constitutional: The patient appears to be in no acute distress. The patient appears to be adequately hydrated. No acute respiratory or hemodynamic distress is noted at this time.   LUNGS: clear bilaterally, airflow is brisk, no intercostal retraction or stridor is noted. No coughing is noted during visit.   HEART: Irregularly irregular without rubs, clicks, gallops, or murmurs. PMI is nondisplaced. Upstrokes are brisk. S1,S2 are heard.  Bioprosthetic valve appears to be crisp and clean.   GI: Abdomen is soft, without rebound, guarding or tenderness. Bowel sounds are appropriate. No renal bruits are heard.   NEURO: Pt is alert and appropriate. No neurologic lateralization is noted. Cranial nerves 2-12 are intact. Peripheral sensory and motor function are grossly normal.    SKIN:  warm and dry. No erythema, or rashes are noted. No specific lesions of concern are noted.    PSYCH: The patient appears grossly appropriate. Maintains good eye contact, does not have any jittery or atypical motion. Displays appropriate affect.   URINARY: Eric's punch is negative. No suprapubic tenderness is noted with palpation.         Decision Making       1. Atrial fibrillation with RVR (H)  Continue anticoagulation    2. S/P AVR (aortic valve replacement)  Continue anticoagulation    3. Slow transit constipation  Add stool bulking agent with stool softener will titrate to need  - methylcellulose (CITRUCEL) powder; 1 scoop twice a day with big glass of water  Dispense: 850 g; Refill: 3  - docusate sodium (COLACE) 100 MG capsule; Take 1 capsule (100 mg) by mouth daily  Dispense: 90 capsule; Refill: 3    4. Acute cystitis without hematuria  Recheck urinary analysis  - *UA reflex to Microscopic and Culture ([place]; [facility]; [facility]; [facility]; [facility]; [facility] " [facility]; [place]; [place])                                 FOLLOW UP   I have asked the patient to make an appointment for followup with me in 1 month for \"Annual Medicare Wellness Visit\"            I have carefully explained the diagnosis and treatment options to the patient.  The patient has displayed an understanding of the above, and all subsequent questions were answered.      DO MARIA D Sabillon    Portions of this note were produced using [employer]  Although every attempt at real-time proof reading has been made, occasional grammar/syntax errors may have been missed.    "

## 2020-10-13 NOTE — LETTER
October 19, 2020      Khalida Merrillalfonso  [address]  [facility] [address]        Dear ,    We are writing to inform you of your test results.    Urinary analysis is consistent with sampling contamination.  No infection is seen.     You will be contacted with any outstanding results as they are available.   Feel free to contact me via the office or My Chart if you have any questions regarding the above.     Resulted Orders   *UA reflex to Microscopic and Culture ([place]; [facility]; [facility]; [facility]; [facility]; [facility]; [place]; Range)   Result Value Ref Range    Color Urine Olivia     Appearance Urine Cloudy     Glucose Urine Negative NEG^Negative mg/dL    Bilirubin Urine Negative NEG^Negative    Ketones Urine Negative NEG^Negative mg/dL    Specific Gravity Urine 1.015 1.003 - 1.035    Blood Urine Negative NEG^Negative    pH Urine 6.0 5.0 - 7.0 pH    Protein Albumin Urine Negative NEG^Negative mg/dL    Urobilinogen mg/dL 0.0 0.0 - 2.0 mg/dL    Nitrite Urine Negative NEG^Negative    Leukocyte Esterase Urine Trace (A) NEG^Negative    Source Unspecified Urine     RBC Urine 3 (H) 0 - 2 /HPF    WBC Urine 3 0 - 5 /HPF    Squamous Epithelial /HPF Urine <1 0 - 1 /HPF    Mucous Urine Present (A) NEG^Negative /LPF    Calcium Oxalate Few (A) NEG^Negative /HPF       If you have any questions or concerns, please call the clinic at the number listed above.       Sincerely,        Mazin Larsen,

## 2020-10-19 NOTE — RESULT ENCOUNTER NOTE
Dear Khalida, your recent test results are attached.    Urinary analysis is consistent with sampling contamination.  No infection is seen.    You will be contacted with any outstanding results as they are available.  Feel free to contact me via the office or My Chart if you have any questions regarding the above.

## 2020-11-02 ENCOUNTER — ANTICOAGULATION THERAPY VISIT (OUTPATIENT)
Dept: ANTICOAGULATION | Facility: OTHER | Age: 80
End: 2020-11-02
Payer: MEDICARE

## 2020-11-02 ENCOUNTER — HOSPITAL ENCOUNTER (EMERGENCY)
Facility: CLINIC | Age: 80
Discharge: HOME | End: 2020-11-02
Attending: EMERGENCY MEDICINE | Admitting: EMERGENCY MEDICINE
Payer: MEDICARE

## 2020-11-02 VITALS
TEMPERATURE: 98.2 F | OXYGEN SATURATION: 95 % | SYSTOLIC BLOOD PRESSURE: 138 MMHG | DIASTOLIC BLOOD PRESSURE: 81 MMHG | BODY MASS INDEX: 24.13 KG/M2 | RESPIRATION RATE: 16 BRPM | WEIGHT: 145 LBS | HEART RATE: 62 BPM

## 2020-11-02 DIAGNOSIS — Z95.2 S/P AVR (AORTIC VALVE REPLACEMENT): ICD-10-CM

## 2020-11-02 DIAGNOSIS — I48.91 ATRIAL FIBRILLATION WITH RVR (H): ICD-10-CM

## 2020-11-02 DIAGNOSIS — R58 ECCHYMOSIS OF FOREARM: ICD-10-CM

## 2020-11-02 DIAGNOSIS — S61.309A AVULSION OF FINGERNAIL, INITIAL ENCOUNTER: ICD-10-CM

## 2020-11-02 DIAGNOSIS — Z79.01 LONG TERM CURRENT USE OF ANTICOAGULANT THERAPY: ICD-10-CM

## 2020-11-02 LAB
BASOPHILS # BLD AUTO: 0 10E9/L (ref 0–0.2)
BASOPHILS NFR BLD AUTO: 0.6 %
DIFFERENTIAL METHOD BLD: ABNORMAL
EOSINOPHIL NFR BLD AUTO: 3.4 %
ERYTHROCYTE [DISTWIDTH] IN BLOOD BY AUTOMATED COUNT: 14.5 % (ref 10–15)
HCT VFR BLD AUTO: 41.4 % (ref 35–47)
HGB BLD-MCNC: 13.6 G/DL (ref 11.7–15.7)
IMM GRANULOCYTES # BLD: 0 10E9/L (ref 0–0.4)
IMM GRANULOCYTES NFR BLD: 0.4 %
INR PPP: 2.11 (ref 0.86–1.14)
LYMPHOCYTES # BLD AUTO: 0.8 10E9/L (ref 0.8–5.3)
LYMPHOCYTES NFR BLD AUTO: 16.8 %
MCH RBC QN AUTO: 29.3 PG (ref 26.5–33)
MCHC RBC AUTO-ENTMCNC: 32.9 G/DL (ref 31.5–36.5)
MCV RBC AUTO: 89 FL (ref 78–100)
MONOCYTES # BLD AUTO: 0.6 10E9/L (ref 0–1.3)
MONOCYTES NFR BLD AUTO: 12.7 %
NEUTROPHILS # BLD AUTO: 3.1 10E9/L (ref 1.6–8.3)
NEUTROPHILS NFR BLD AUTO: 66.1 %
NRBC # BLD AUTO: 0 10*3/UL
NRBC BLD AUTO-RTO: 0 /100
PLATELET # BLD AUTO: 130 10E9/L (ref 150–450)
RBC # BLD AUTO: 4.64 10E12/L (ref 3.8–5.2)
WBC # BLD AUTO: 4.7 10E9/L (ref 4–11)

## 2020-11-02 PROCEDURE — 99283 EMERGENCY DEPT VISIT LOW MDM: CPT | Mod: 25 | Performed by: EMERGENCY MEDICINE

## 2020-11-02 PROCEDURE — 85025 COMPLETE CBC W/AUTO DIFF WBC: CPT | Performed by: EMERGENCY MEDICINE

## 2020-11-02 PROCEDURE — 11730 AVULSION NAIL PLATE SIMPLE 1: CPT | Performed by: EMERGENCY MEDICINE

## 2020-11-02 PROCEDURE — 85610 PROTHROMBIN TIME: CPT | Performed by: EMERGENCY MEDICINE

## 2020-11-02 PROCEDURE — 99207 PR NO CHARGE NURSE ONLY: CPT | Performed by: INTERNAL MEDICINE

## 2020-11-02 PROCEDURE — 99282 EMERGENCY DEPT VISIT SF MDM: CPT | Mod: 25 | Performed by: EMERGENCY MEDICINE

## 2020-11-02 RX ORDER — WARFARIN SODIUM 2.5 MG/1
2.5 TABLET ORAL EVERY EVENING
Status: ON HOLD | COMMUNITY
End: 2021-05-17

## 2020-11-02 RX ORDER — WARFARIN SODIUM 2.5 MG/1
5 TABLET ORAL DAILY
COMMUNITY
End: 2020-12-07

## 2020-11-02 NOTE — ED PROVIDER NOTES
History     Chief Complaint   Patient presents with     Musculoskeletal Problem     HPI  Khalida Rouse is a 80 year old female who presents with injury to her left thumbnail where the nail is almost completely tore off at the edge of the fingertip.  Did have some bleeding initially but that stopped.  Patient also noted that she had a bruise on her forearm without injury.  She denies pain associated.  No other bruising or bleeding are noted.  She is on Coumadin for A. fib/flutter.  Currently denies any chest pain, shortness of breath or cough.  No fever or chills.  Is right-hand dominant.  Able to move the arm without significant pain    Allergies:  Allergies   Allergen Reactions     Xarelto [Rivaroxaban] Diarrhea     Ace Inhibitors Cough       Problem List:    Patient Active Problem List    Diagnosis Date Noted     Hypoxemia 05/23/2019     Priority: Medium     Thrombocytopenia (H) 05/20/2019     Priority: Medium     Other secondary pulmonary hypertension (H) 05/14/2019     Priority: Medium     Long term current use of anticoagulant therapy 03/11/2016     Priority: Medium     Atrial fibrillation with RVR (H) 01/11/2016     Priority: Medium     Pneumonia 01/09/2016     Priority: Medium     Osteoporosis 10/26/2015     Priority: Medium     Advanced directives, counseling/discussion 10/03/2014     Priority: Medium     Declined       S/P AVR (aortic valve replacement) 04/10/2014     Priority: Medium     Atrial flutter 02/04/2014     Priority: Medium        Past Medical History:    Past Medical History:   Diagnosis Date     Aortic valve stenosis      Atrial flutter (H)      Cardiomyopathy (H)      Severe aortic stenosis 4/7/2014     Shoulder fracture, left 3/22/15       Past Surgical History:    Past Surgical History:   Procedure Laterality Date     CATARACT IOL, RT/LT Right      ENT SURGERY      glaucoma surgery     REPLACE VALVE AORTIC  4/10/2014    Procedure: REPLACE VALVE AORTIC;  Median sternotomy, Replace Aortic  Valve on pump oxygenation. ;  Surgeon: Wesley Fong MD;  Location: [facility] OR       Family History:    Family History   Problem Relation Age of Onset     Dementia Brother      Alzheimer Disease Sister        Social History:  Marital Status:   [5]  Social History     Tobacco Use     Smoking status: Never Smoker     Smokeless tobacco: Never Used   Substance Use Topics     Alcohol use: No     Alcohol/week: 0.0 standard drinks     Drug use: No        Medications:         atenolol (TENORMIN) 50 MG tablet       calcium carbonate-vitamin D 500-400 MG-UNIT TABS tablt       CARTIA  MG 24 hr capsule       trimethoprim-polymyxin b (POLYTRIM) 10717-4.1 UNIT/ML-% ophthalmic solution       warfarin ANTICOAGULANT (COUMADIN) 2.5 MG tablet       warfarin ANTICOAGULANT (COUMADIN) 2.5 MG tablet       acetaminophen (TYLENOL) 500 MG tablet       docusate sodium (COLACE) 100 MG capsule       methylcellulose (CITRUCEL) powder       polyethylene glycol (MIRALAX) 17 GM/Dose powder       torsemide (DEMADEX) 20 MG tablet          Review of Systems all other systems are reviewed and are negative.    Physical Exam   BP: 138/81  Pulse: 62  Temp: 98.2  F (36.8  C)  Resp: 16  Weight: 65.8 kg (145 lb)  SpO2: 95 %      Physical Exam alert cooperative female somewhat anxious.  On her forearm she has a circular superficial bruise that is nontender nonfluctuant.  No crepitus or bony step-off.  This is in the mid forearm.  Shoulder, elbow, wrist unaffected.  On her hand she has almost completely torn off the thumbnail at the tip of the finger.  No active bleeding.    ED Course        Procedures               Critical Care time:  none               Results for orders placed or performed during the hospital encounter of 11/02/20 (from the past 24 hour(s))   CBC with platelets differential   Result Value Ref Range    WBC 4.7 4.0 - 11.0 10e9/L    RBC Count 4.64 3.8 - 5.2 10e12/L    Hemoglobin 13.6 11.7 - 15.7 g/dL    Hematocrit 41.4 35.0 -  47.0 %    MCV 89 78 - 100 fl    MCH 29.3 26.5 - 33.0 pg    MCHC 32.9 31.5 - 36.5 g/dL    RDW 14.5 10.0 - 15.0 %    Platelet Count 130 (L) 150 - 450 10e9/L    Diff Method Automated Method     % Neutrophils 66.1 %    % Lymphocytes 16.8 %    % Monocytes 12.7 %    % Eosinophils 3.4 %    % Basophils 0.6 %    % Immature Granulocytes 0.4 %    Nucleated RBCs 0 0 /100    Absolute Neutrophil 3.1 1.6 - 8.3 10e9/L    Absolute Lymphocytes 0.8 0.8 - 5.3 10e9/L    Absolute Monocytes 0.6 0.0 - 1.3 10e9/L    Absolute Basophils 0.0 0.0 - 0.2 10e9/L    Abs Immature Granulocytes 0.0 0 - 0.4 10e9/L    Absolute Nucleated RBC 0.0    INR   Result Value Ref Range    INR 2.11 (H) 0.86 - 1.14       Medications - No data to display    Assessments & Plan (with Medical Decision Making)   Khalida Rouse is a 80 year old female who presents with injury to her left thumbnail where the nail is almost completely tore off at the edge of the fingertip.  Did have some bleeding initially but that stopped.  Patient also noted that she had a bruise on her forearm without injury.  She denies pain associated.  No other bruising or bleeding are noted.  She is on Coumadin for A. fib/flutter.  Currently denies any chest pain, shortness of breath or cough.  No fever or chills.  Is right-hand dominant.  Able to move the arm without significant pain.  On exam patient was afebrile and vitally stable.  She had a bruise on her forearm.  CBC was normal except platelets being slightly low at 130.  INR was 2.11.  Partially avulsed fingernail which was easily removed.  Antibiotic and dressing were applied to the finger.  I have reviewed the nursing notes.    I have reviewed the findings, diagnosis, plan and need for follow up with the patient.       New Prescriptions    No medications on file       Final diagnoses:   Avulsion of fingernail, initial encounter   Ecchymosis of forearm       11/2/2020   [facility] EMERGENCY DEPT     Edgar Arias,  MD  11/02/20 1556

## 2020-11-02 NOTE — DISCHARGE INSTRUCTIONS
Your INR today was 2.11  Watch for secondary infection on your torn thumbnail.  Keep it clean and covered.

## 2020-11-02 NOTE — ED AVS SNAPSHOT
[facility] Emergency Dept  [address] [place] [address]  Phone: [phone]  Fax: [phone]                                    Khalida Rouse   MRN: [id number]    Department: [facility] Emergency Dept   Date of Visit: 11/2/2020           After Visit Summary Signature Page    I have received my discharge instructions, and my questions have been answered. I have discussed any challenges I see with this plan with the nurse or doctor.    ..........................................................................................................................................  Patient/Patient Representative Signature      ..........................................................................................................................................  Patient Representative Print Name and Relationship to Patient    ..................................................               ................................................  Date                                   Time    ..........................................................................................................................................  Reviewed by Signature/Title    ...................................................              ..............................................  Date                                               Time          22EPIC Rev 08/18

## 2020-11-02 NOTE — PROGRESS NOTES
ANTICOAGULATION FOLLOW-UP CLINIC VISIT    Patient Name:  Khalida Rouse  Date:  2020  Contact Type:  Telephone    SUBJECTIVE:  Patient Findings     Comments:  I left a detailed voicemail with the orders reflected in flowsheet. I have also requested a call back if there have been any missed doses, concerns, illness, fever, or if there have been any changes in medications, activity level, or diet  Venessa Hood RN          Clinical Outcomes     Comments:  I left a detailed voicemail with the orders reflected in flowsheet. I have also requested a call back if there have been any missed doses, concerns, illness, fever, or if there have been any changes in medications, activity level, or diet  Venessa Hood RN             OBJECTIVE    Recent labs: (last 7 days)     20  1525   INR 2.11*       ASSESSMENT / PLAN  INR assessment THER    Recheck INR In: 6 WEEKS    INR Location Outside lab      Anticoagulation Summary  As of 2020    INR goal:  2.0-3.0   TTR:  76.2 % (1 y)   INR used for dosin.11 (2020)   Warfarin maintenance plan:  5 mg (2.5 mg x 2) every Mon, Wed, Fri; 2.5 mg (2.5 mg x 1) all other days   Full warfarin instructions:  5 mg every Mon, Wed, Fri; 2.5 mg all other days   Weekly warfarin total:  25 mg   No change documented:  Venessa Hood RN   Plan last modified:  Venessa Hood RN (3/11/2020)   Next INR check:  2020   Priority:  Maintenance   Target end date:  Indefinite    Indications    Long term current use of anticoagulant therapy [Z79.01]  Atrial fibrillation with RVR (H) [I48.91]  S/P AVR (aortic valve replacement) [Z95.2]             Anticoagulation Episode Summary     INR check location:      Preferred lab:      Send INR reminders to:  [facility] [place]    Comments:  2.5 mg tablets, book, BP, PM dose      Anticoagulation Care Providers     Provider Role Specialty Phone number    Mazin Larsen DO Referring Internal Medicine [phone]     Dorcas Fisher MD [facility] [phone]            See the Encounter Report to view Anticoagulation Flowsheet and Dosing Calendar (Go to Encounters tab in chart review, and find the Anticoagulation Therapy Visit)    Dosage adjustment made based on physician directed care plan.      Venessa Hood RN

## 2020-11-02 NOTE — ED TRIAGE NOTES
Pt presents with some discoloration of left forearm. Pt also has complaints of left thumb broken down really far.

## 2020-11-03 ENCOUNTER — PATIENT OUTREACH (OUTPATIENT)
Dept: CARE COORDINATION | Facility: CLINIC | Age: 80
End: 2020-11-03

## 2020-11-03 NOTE — LETTER
[facility]  [address]   [place] [address]    November 4, 2020    Khalida Rouse  [address]  [place] [address]      Dear Khalida,    I am a clinic community health worker who works with Mazin Larsen DO at [facility]. I have been trying to reach you recently to introduce Clinic Care Coordination and to see if there was anything I could assist you with.  Below is a description of clinic care coordination and how I can further assist you.      The clinic care coordination team is made up of a registered nurse,  and community health worker who understand the health care system. The goal of clinic care coordination is to help you manage your health and improve access to the health care system in the most efficient manner. The team can assist you in meeting your health care goals by providing education, coordinating services, strengthening the communication among your providers and supporting you with any resource needs.    Please feel free to contact me at [phone] with any questions or concerns. We are focused on providing you with the highest-quality healthcare experience possible and that all starts with you.     Sincerely,     MICHAEL Hamilton  Clinic Care Coordination   [phone]  [email or web address]

## 2020-11-03 NOTE — PROGRESS NOTES
Clinic Care Coordination Contact  [place]/Voicemail    Clinical Data: Care Coordinator Outreach  Outreach attempted x 1.  Left message on patient's voicemail with call back information and requested return call.  Plan: Care Coordinator will try to reach patient again in 1-2 business days.    Request Summary [[id number]]   Procedure: Referral to CC - CHW Status: Needs Scheduling   Requested appt date:   Authorizing: Mazin Larsen DO in  CARE COORDINATION   Referral: [id number] (Pending Review)           Priority: Routine   Diagnosis: Other specified counseling [Z71.89]   Order Details   Proc category: Referral Class: Local Print   Standing status: Normal Order status: Sent   Enc provider: Mazin Larsen DO Enc department:  Care Coordination   Order date: 11/3/2020 Order user: Vale Eastman RN   Visit type: CCC PHONE VISIT Appointment Window:     Lead Care Coordinator:     Comments   CHW to outreach  Referral made off of discharge report.

## 2020-11-04 NOTE — PROGRESS NOTES
Clinic Care Coordination Contact  [facility]/Voicemail    Clinical Data: Care Coordinator Outreach  Outreach attempted x 2.  Left message on patient's voicemail with call back information and requested return call.  Plan: Care Coordinator will send unable to contact letter with care coordinator contact information via mail. Care Coordinator will do no further outreaches at this time.

## 2020-11-11 ENCOUNTER — HOSPITAL ENCOUNTER (EMERGENCY)
Facility: CLINIC | Age: 80
Discharge: HOME | End: 2020-11-11
Attending: PHYSICIAN ASSISTANT | Admitting: PHYSICIAN ASSISTANT
Payer: MEDICARE

## 2020-11-11 VITALS
RESPIRATION RATE: 20 BRPM | HEART RATE: 78 BPM | OXYGEN SATURATION: 98 % | TEMPERATURE: 98.2 F | DIASTOLIC BLOOD PRESSURE: 81 MMHG | SYSTOLIC BLOOD PRESSURE: 150 MMHG

## 2020-11-11 DIAGNOSIS — N39.0 URINARY TRACT INFECTION: ICD-10-CM

## 2020-11-11 LAB
ALBUMIN UR-MCNC: NEGATIVE MG/DL
APPEARANCE UR: CLEAR
BILIRUB UR QL STRIP: NEGATIVE
COLOR UR AUTO: ABNORMAL
GLUCOSE UR STRIP-MCNC: NEGATIVE MG/DL
HGB UR QL STRIP: NEGATIVE
KETONES UR STRIP-MCNC: NEGATIVE MG/DL
LEUKOCYTE ESTERASE UR QL STRIP: ABNORMAL
MUCOUS THREADS #/AREA URNS LPF: PRESENT /LPF
NITRATE UR QL: NEGATIVE
PH UR STRIP: 6 PH (ref 5–7)
RBC #/AREA URNS AUTO: 1 /HPF (ref 0–2)
SOURCE: ABNORMAL
SP GR UR STRIP: 1 (ref 1–1.03)
SQUAMOUS #/AREA URNS AUTO: <1 /HPF (ref 0–1)
UROBILINOGEN UR STRIP-MCNC: 0 MG/DL (ref 0–2)
WBC #/AREA URNS AUTO: 6 /HPF (ref 0–5)

## 2020-11-11 PROCEDURE — 99284 EMERGENCY DEPT VISIT MOD MDM: CPT | Performed by: PHYSICIAN ASSISTANT

## 2020-11-11 PROCEDURE — 87086 URINE CULTURE/COLONY COUNT: CPT | Performed by: PHYSICIAN ASSISTANT

## 2020-11-11 PROCEDURE — 81001 URINALYSIS AUTO W/SCOPE: CPT | Performed by: FAMILY MEDICINE

## 2020-11-11 PROCEDURE — 99283 EMERGENCY DEPT VISIT LOW MDM: CPT | Performed by: PHYSICIAN ASSISTANT

## 2020-11-11 PROCEDURE — 250N000013 HC RX MED GY IP 250 OP 250 PS 637: Performed by: PHYSICIAN ASSISTANT

## 2020-11-11 RX ORDER — SULFAMETHOXAZOLE/TRIMETHOPRIM 800-160 MG
1 TABLET ORAL 2 TIMES DAILY
Qty: 5 TABLET | Refills: 0 | Status: SHIPPED | OUTPATIENT
Start: 2020-11-12 | End: 2020-11-16

## 2020-11-11 RX ORDER — PHENAZOPYRIDINE HYDROCHLORIDE 100 MG/1
100 TABLET, FILM COATED ORAL ONCE
Status: COMPLETED | OUTPATIENT
Start: 2020-11-11 | End: 2020-11-11

## 2020-11-11 RX ORDER — PHENAZOPYRIDINE HYDROCHLORIDE 100 MG/1
100 TABLET, FILM COATED ORAL 3 TIMES DAILY PRN
Qty: 6 TABLET | Refills: 0 | Status: ON HOLD | OUTPATIENT
Start: 2020-11-11 | End: 2021-04-13

## 2020-11-11 RX ORDER — SULFAMETHOXAZOLE/TRIMETHOPRIM 800-160 MG
1 TABLET ORAL ONCE
Status: COMPLETED | OUTPATIENT
Start: 2020-11-11 | End: 2020-11-11

## 2020-11-11 RX ADMIN — SULFAMETHOXAZOLE AND TRIMETHOPRIM 1 TABLET: 800; 160 TABLET ORAL at 23:32

## 2020-11-11 RX ADMIN — PHENAZOPYRIDINE HYDROCHLORIDE 100 MG: 100 TABLET, FILM COATED ORAL at 23:32

## 2020-11-11 ASSESSMENT — ENCOUNTER SYMPTOMS
HEMATURIA: 0
ABDOMINAL PAIN: 0
SHORTNESS OF BREATH: 0
DYSURIA: 0
FEVER: 0
CHILLS: 0
FLANK PAIN: 0

## 2020-11-11 NOTE — ED AVS SNAPSHOT
[facility] Emergency Dept  [address] [place] [address]  Phone: [phone]  Fax: [phone]                                    Khalida Rouse   MRN: [id number]    Department: [facility] Emergency Dept   Date of Visit: 11/11/2020           After Visit Summary Signature Page    I have received my discharge instructions, and my questions have been answered. I have discussed any challenges I see with this plan with the nurse or doctor.    ..........................................................................................................................................  Patient/Patient Representative Signature      ..........................................................................................................................................  Patient Representative Print Name and Relationship to Patient    ..................................................               ................................................  Date                                   Time    ..........................................................................................................................................  Reviewed by Signature/Title    ...................................................              ..............................................  Date                                               Time          22EPIC Rev 08/18

## 2020-11-12 ENCOUNTER — PATIENT OUTREACH (OUTPATIENT)
Dept: CARE COORDINATION | Facility: CLINIC | Age: 80
End: 2020-11-12

## 2020-11-12 DIAGNOSIS — Z71.89 OTHER SPECIFIED COUNSELING: ICD-10-CM

## 2020-11-12 NOTE — DISCHARGE INSTRUCTIONS
It was a pleasure working with you today!  I hope your condition improves rapidly!     Your urinalysis showed signs of developing bladder infection.  This likely is causing the irritation that you are experiencing and the sensation that you cannot empty her bladder.  I am hoping that the Pyridium helps relax the bladder right away.  This will cause the urine to be orange in color, but this will pass when you stop taking the medication.  You were given your first dose of antibiotic here in the emergency department, but take your second dose tomorrow morning when you pick it up from the pharmacy.

## 2020-11-12 NOTE — PROGRESS NOTES
Clinic Care Coordination Contact  [place]/Voicemail    Clinical Data: Care Coordinator Outreach  Outreach attempted x 1.  Left message on patient's voicemail with call back information and requested return call.  Plan: Care Coordinator will try to reach patient again in 1-2 business days.    Request Summary [[id number]]   Procedure: Referral to CC - CHW Status: Needs Scheduling   Requested appt date:   Authorizing: Mazin Larsen DO in  CARE COORDINATION   Referral: [id number] (Pending Review)           Priority: Routine   Diagnosis: Other specified counseling [Z71.89]   Order Details   Proc category: Referral Class: Local Print   Standing status: Normal Order status: Sent   Enc provider: Mazin Larsen DO Enc department:  Care Coordination   Order date: 11/12/2020 Order user: Vale Eastman RN   Visit type: CCC PHONE VISIT Appointment Window:     Lead Care Coordinator:     Comments   CHW to outreach  Referral made off of discharge report.

## 2020-11-12 NOTE — ED PROVIDER NOTES
History     Chief Complaint   Patient presents with     Urinary Retention     The history is provided by the patient.     Khalida Rouse is a 80 year old female who presents to the ED complaining of feeling like she is unable to empty her bladder of urine that started today. Patient does not have a history of bladder infections. She stated that she has drank the same amount of water like she normally does about 5-6 glasses. Patient tried to urinate after dinner around 6pm and felt like she couldn't go, she doesn't feel like her bladder is full. She had normal bowel movement today. Patient has not started any new medications and does not take any antihistamines. Denies noting any blood in her urine, no nausea, vomiting, chills and no flank pain.    Allergies:  Allergies   Allergen Reactions     Xarelto [Rivaroxaban] Diarrhea     Ace Inhibitors Cough       Problem List:    Patient Active Problem List    Diagnosis Date Noted     Hypoxemia 05/23/2019     Priority: Medium     Thrombocytopenia (H) 05/20/2019     Priority: Medium     Other secondary pulmonary hypertension (H) 05/14/2019     Priority: Medium     Long term current use of anticoagulant therapy 03/11/2016     Priority: Medium     Atrial fibrillation with RVR (H) 01/11/2016     Priority: Medium     Pneumonia 01/09/2016     Priority: Medium     Osteoporosis 10/26/2015     Priority: Medium     Advanced directives, counseling/discussion 10/03/2014     Priority: Medium     Declined       S/P AVR (aortic valve replacement) 04/10/2014     Priority: Medium     Atrial flutter 02/04/2014     Priority: Medium        Past Medical History:    Past Medical History:   Diagnosis Date     Aortic valve stenosis      Atrial flutter (H)      Cardiomyopathy (H)      Severe aortic stenosis 4/7/2014     Shoulder fracture, left 3/22/15       Past Surgical History:    Past Surgical History:   Procedure Laterality Date     CATARACT IOL, RT/LT Right      ENT SURGERY      glaucoma  "surgery     REPLACE VALVE AORTIC  4/10/2014    Procedure: REPLACE VALVE AORTIC;  Median sternotomy, Replace Aortic Valve on pump oxygenation. ;  Surgeon: Ajit, Wesley Robison MD;  Location:  OR       Family History:    Family History   Problem Relation Age of Onset     Dementia Brother      Alzheimer Disease Sister        Social History:  Marital Status:   [5]  Social History     Tobacco Use     Smoking status: Never Smoker     Smokeless tobacco: Never Used   Substance Use Topics     Alcohol use: No     Alcohol/week: 0.0 standard drinks     Drug use: No        Medications:         phenazopyridine (PYRIDIUM) 100 MG tablet       [START ON 11/12/2020] sulfamethoxazole-trimethoprim (BACTRIM DS) 800-160 MG tablet       acetaminophen (TYLENOL) 500 MG tablet       atenolol (TENORMIN) 50 MG tablet       calcium carbonate-vitamin D 500-400 MG-UNIT TABS tablt       CARTIA  MG 24 hr capsule       docusate sodium (COLACE) 100 MG capsule       methylcellulose (CITRUCEL) powder       polyethylene glycol (MIRALAX) 17 GM/Dose powder       torsemide (DEMADEX) 20 MG tablet       trimethoprim-polymyxin b (POLYTRIM) 94464-9.1 UNIT/ML-% ophthalmic solution       warfarin ANTICOAGULANT (COUMADIN) 2.5 MG tablet       warfarin ANTICOAGULANT (COUMADIN) 2.5 MG tablet          Review of Systems   Constitutional: Negative for chills and fever.   Respiratory: Negative for shortness of breath.    Cardiovascular: Negative for chest pain.   Gastrointestinal: Negative for abdominal pain.   Genitourinary: Positive for decreased urine volume (felt like \"bladder didnt want to empty\"). Negative for dysuria, flank pain, hematuria and urgency.   All other systems reviewed and are negative.      Physical Exam   BP: (!) 150/81  Pulse: 78  Temp: 98.2  F (36.8  C)  Resp: 20  SpO2: 98 %      Physical Exam  Vitals signs and nursing note reviewed.   Constitutional:       General: She is not in acute distress.     Appearance: She is not " diaphoretic.   HENT:      Head: Normocephalic and atraumatic.      Right Ear: External ear normal.      Left Ear: External ear normal.      Nose: Nose normal.      Mouth/Throat:      Pharynx: No oropharyngeal exudate.   Eyes:      General: No scleral icterus.        Right eye: No discharge.         Left eye: No discharge.      Conjunctiva/sclera: Conjunctivae normal.      Pupils: Pupils are equal, round, and reactive to light.   Neck:      Musculoskeletal: Normal range of motion and neck supple.      Thyroid: No thyromegaly.   Cardiovascular:      Rate and Rhythm: Normal rate and regular rhythm.      Heart sounds: Normal heart sounds. No murmur.   Pulmonary:      Effort: Pulmonary effort is normal. No respiratory distress.      Breath sounds: Normal breath sounds. No wheezing or rales.   Chest:      Chest wall: No tenderness.   Abdominal:      General: Bowel sounds are normal. There is no distension.      Palpations: Abdomen is soft. There is no mass.      Tenderness: There is no abdominal tenderness. There is no right CVA tenderness, left CVA tenderness, guarding or rebound.   Musculoskeletal: Normal range of motion.         General: No tenderness or deformity.   Lymphadenopathy:      Cervical: No cervical adenopathy.   Skin:     General: Skin is warm and dry.      Capillary Refill: Capillary refill takes less than 2 seconds.      Findings: No erythema or rash.   Neurological:      Mental Status: She is alert and oriented to person, place, and time.      Cranial Nerves: No cranial nerve deficit.   Psychiatric:         Behavior: Behavior normal.         Thought Content: Thought content normal.         ED Course        Procedures               Critical Care time:  none               Results for orders placed or performed during the hospital encounter of 11/11/20 (from the past 24 hour(s))   Routine UA with microscopic   Result Value Ref Range    Color Urine Straw     Appearance Urine Clear     Glucose Urine Negative  NEG^Negative mg/dL    Bilirubin Urine Negative NEG^Negative    Ketones Urine Negative NEG^Negative mg/dL    Specific Gravity Urine 1.005 1.003 - 1.035    Blood Urine Negative NEG^Negative    pH Urine 6.0 5.0 - 7.0 pH    Protein Albumin Urine Negative NEG^Negative mg/dL    Urobilinogen mg/dL 0.0 0.0 - 2.0 mg/dL    Nitrite Urine Negative NEG^Negative    Leukocyte Esterase Urine Small (A) NEG^Negative    Source Midstream Urine     WBC Urine 6 (H) 0 - 5 /HPF    RBC Urine 1 0 - 2 /HPF    Squamous Epithelial /HPF Urine <1 0 - 1 /HPF    Mucous Urine Present (A) NEG^Negative /LPF       Medications   sulfamethoxazole-trimethoprim (BACTRIM DS) 800-160 MG per tablet 1 tablet (has no administration in time range)   phenazopyridine (PYRIDIUM) tablet 100 mg (has no administration in time range)       Assessments & Plan (with Medical Decision Making)  Urinary tract infection     80 year old female presents for evaluation of possible urinary retention symptoms starting this morning.  Previous to this she did wonder if she had some frequency and urgency, but has not had any dysuria.  Denies any nausea, vomiting, fever, chills, or abdominal pain.  No recent change in medications.  On exam she does not have any abdominal discomfort or flank percussive discomfort.  Patient took quite a while to be able to void here in the emergency department.  When she was able to void, she did have about 60 mL of postvoid residual.  She did not require catheterization.  Urinalysis with small leukocyte esterase, 8 WBC, and mucus present.  Concern for early UTI.  Given the patient age at 80, I think it is wise to treat her for potential developing UTI rather than watchful waiting when symptoms could get much worse rapidly.  We gave her a dose of Bactrim here in the ED and sent her home with a 3-day Rx pending culture results.  Pyridium given as well.  Push clear fluids.  Indications for ED return reviewed.  Patient was in agreement with this plan and  she was suitable for discharge.     I have reviewed the nursing notes.    I have reviewed the findings, diagnosis, plan and need for follow up with the patient.       New Prescriptions    PHENAZOPYRIDINE (PYRIDIUM) 100 MG TABLET    Take 1 tablet (100 mg) by mouth 3 times daily as needed for urinary tract discomfort    SULFAMETHOXAZOLE-TRIMETHOPRIM (BACTRIM DS) 800-160 MG TABLET    Take 1 tablet by mouth 2 times daily         Final diagnoses:   Urinary tract infection   This document serves as a record of services personally performed by Gabino Che PA.  It was created on their behalf by Dora Marks, a trained medical scribe. The creation of this record is based on the provider's personal observations and the statements of the patient. This document has been checked and approved by the attending provider.    Disclaimer : This note consists of symbols derived from keyboarding, dictation and/or voice recognition software. As a result, there may be errors in the script that have gone undetected. Please consider this when interpreting information found in this chart.      11/11/2020   [facility] EMERGENCY DEPT     Gabino Che PA-C  11/11/20 0536

## 2020-11-12 NOTE — LETTER
[facility]  [address]   [place] [address]    November 13, 2020    Khalida Rouse  [address]  [place] [address]      Dear Khalida,    I am a clinic community health worker who works with Mazin Larsen DO at [facility]. I have been trying to reach you recently to introduce Clinic Care Coordination and to see if there was anything I could assist you with.  Below is a description of clinic care coordination and how I can further assist you.      The clinic care coordination team is made up of a registered nurse,  and community health worker who understand the health care system. The goal of clinic care coordination is to help you manage your health and improve access to the health care system in the most efficient manner. The team can assist you in meeting your health care goals by providing education, coordinating services, strengthening the communication among your providers and supporting you with any resource needs.    Please feel free to contact me at [phone] with any questions or concerns. We are focused on providing you with the highest-quality healthcare experience possible and that all starts with you.     Sincerely,     MICHAEL Hmailton  Clinic Care Coordination   [phone]  [email or web address]

## 2020-11-12 NOTE — ED TRIAGE NOTES
Pt reports today she has felt like she is urinating less frequently and voiding small amounts then normal. Pt states she was able to pass a small amount of urine about an hour ago. Pt denies any pain or burning with urination.

## 2020-11-13 LAB
BACTERIA SPEC CULT: NORMAL
Lab: NORMAL
SPECIMEN SOURCE: NORMAL

## 2020-11-13 NOTE — PROGRESS NOTES
Clinic Care Coordination Contact  [facility]/Voicemail    Clinical Data: Care Coordinator Outreach  Outreach attempted x 2.  Left message on patient's voicemail with call back information and requested return call.  Plan: Care Coordinator will send unable to contact letter with care coordinator contact information via mail. Care Coordinator will do no further outreaches at this time.

## 2020-11-16 ENCOUNTER — VIRTUAL VISIT (OUTPATIENT)
Dept: INTERNAL MEDICINE | Facility: CLINIC | Age: 80
End: 2020-11-16
Payer: MEDICARE

## 2020-11-16 DIAGNOSIS — I48.91 ATRIAL FIBRILLATION WITH RVR (H): ICD-10-CM

## 2020-11-16 DIAGNOSIS — M81.0 AGE-RELATED OSTEOPOROSIS WITHOUT CURRENT PATHOLOGICAL FRACTURE: ICD-10-CM

## 2020-11-16 DIAGNOSIS — I27.29 OTHER SECONDARY PULMONARY HYPERTENSION (H): ICD-10-CM

## 2020-11-16 DIAGNOSIS — Z95.2 S/P AVR (AORTIC VALVE REPLACEMENT): ICD-10-CM

## 2020-11-16 DIAGNOSIS — K59.01 SLOW TRANSIT CONSTIPATION: ICD-10-CM

## 2020-11-16 DIAGNOSIS — Z79.01 LONG TERM CURRENT USE OF ANTICOAGULANT THERAPY: Primary | ICD-10-CM

## 2020-11-16 PROCEDURE — 99443 PR PHYSICIAN TELEPHONE EVALUATION 21-30 MIN: CPT | Mod: 95 | Performed by: INTERNAL MEDICINE

## 2020-11-16 NOTE — PROGRESS NOTES
"Khalida Rouse is a 80 year old female who is being evaluated via a billable telephone visit.      The patient has been notified of following:     \"This telephone visit will be conducted via a call between you and your physician/provider. We have found that certain health care needs can be provided without the need for a physical exam.  This service lets us provide the care you need with a short phone conversation.  If a prescription is necessary we can send it directly to your pharmacy.  If lab work is needed we can place an order for that and you can then stop by our lab to have the test done at a later time.    Telephone visits are billed at different rates depending on your insurance coverage. During this emergency period, for some insurers they may be billed the same as an in-person visit.  Please reach out to your insurance provider with any questions.    If during the course of the call the physician/provider feels a telephone visit is not appropriate, you will not be charged for this service.\"    Patient has given verbal consent for Telephone visit?  Yes    What phone number would you like to be contacted at? [phone]    How would you like to obtain your AVS? Harshal    Subjective     Khalida Rouse is a 80 year old female who presents via phone visit today for the following health issues:    HPI     Discuss concerns with osteopetrosis, Swelling of joints. Discuss having a colon xray to check if constipation has cleared        EMR reviewed including: History of chief complaint, pertinent distant history, medications, concurrent diagnoses.             Chief Complaint:  #1   Patient has concerns regarding osteoporosis.  She is currently taking calcium and vitamin D.  Bone density studies performed last year demonstrated no significant change from prior studies.  Osteoporosis was noted.  Denies pathologic fracture.        #2   Ongoing atrial fibrillation.  Continues beta-blocker for rate control as well as " anticoagulation without difficulty.  Denies syncope or near syncope.  Denies tachycardia.    #3   Recent constipation.  Has had symptomatic resolution with current daily bowel movements.  No abdominal pain noted.  Taking Colace and Citrucel with good results.                           PAST, FAMILY,SOCIAL HISTORY:     Medical  History:   has a past medical history of Aortic valve stenosis, Atrial flutter (H), Cardiomyopathy (H), Severe aortic stenosis (4/7/2014), and Shoulder fracture, left (3/22/15).     Surgical History:   has a past surgical history that includes ENT surgery; Replace valve aortic (4/10/2014); and cataract iol, rt/lt (Right).     Social History:   reports that she has never smoked. She has never used smokeless tobacco. She reports that she does not drink alcohol or use drugs.     Family History:  family history includes Alzheimer Disease in her sister; Dementia in her brother.            MEDICATIONS  Current Outpatient Medications   Medication Sig Dispense Refill     acetaminophen (TYLENOL) 500 MG tablet Take 2 tablets (1,000 mg) by mouth every 6 hours as needed for mild pain 180 tablet 3     atenolol (TENORMIN) 50 MG tablet TAKE ONE TABLET BY MOUTH TWICE A DAY (Patient taking differently: Take 50 mg by mouth daily ) 180 tablet 1     calcium carbonate-vitamin D 500-400 MG-UNIT TABS tablt Take 1 tablet by mouth 3 times daily (Patient taking differently: Take 1 tablet by mouth 2 times daily )       CARTIA  MG 24 hr capsule TAKE ONE CAPSULE BY MOUTH TWICE A DAY (Patient taking differently: Take 180 mg by mouth 2 times daily ) 180 capsule 1     docusate sodium (COLACE) 100 MG capsule Take 1 capsule (100 mg) by mouth daily 90 capsule 3     methylcellulose (CITRUCEL) powder 1 scoop twice a day with big glass of water 850 g 3     phenazopyridine (PYRIDIUM) 100 MG tablet Take 1 tablet (100 mg) by mouth 3 times daily as needed for urinary tract discomfort 6 tablet 0     polyethylene glycol (MIRALAX)  17 GM/Dose powder Take 1 capful mixed in liquid of your choice 1-3 times daily as needed for constipation/stooling and bloating. 527 g 0     torsemide (DEMADEX) 20 MG tablet TAKE ONE TABLET BY MOUTH ONCE DAILY AS NEEDED 90 tablet 3     trimethoprim-polymyxin b (POLYTRIM) 84803-6.1 UNIT/ML-% ophthalmic solution Place 1-2 drops into the right eye every 4 hours (Patient taking differently: Place 1-2 drops into the right eye daily as needed ) 5 mL 0     warfarin ANTICOAGULANT (COUMADIN) 2.5 MG tablet Take 5 mg by mouth daily Mondays, wednesdays, and fridays.       warfarin ANTICOAGULANT (COUMADIN) 2.5 MG tablet Take 2.5 mg by mouth daily Tuesdays, Thursdays, Saturdays and Sundays           --------------------------------------------------------------------------------------------------------------------                              Review of Systems       LUNGS: Pt denies: cough, excess sputum, hemoptysis, or shortness of breath.   HEART: Pt denies: chest pain, arrhythmia, syncope, tachy or bradyarrhythmia.   GI: Pt denies: nausea, vomiting, diarrhea, constipation, melena, or hematochezia.   NEURO: Pt denies: seizures, strokes, diplopia, weakness, paraesthesias, or paralysis.   SKIN: Pt denies: itching, rashes, discoloration, or specific lesions of concern. Denies recent hair loss.   PSYCH: The patient denies significant depression, anxiety, mood imbalance. Specifically denies any suicidal ideation.                                    No physical examination is performed as this is a virtual visit.  The patient's voice is strong, there is no evidence of labored breathing or wheezing.  The patient is alert and appropriate and demonstrates no obvious behavioral irregularities.             Decision Making     1. Age-related osteoporosis without current pathological fracture  Discussed the addition of Boniva/alendronate etc.  Patient will consider given place and potential side effects    2. Slow transit  constipation  Resolved with stool softeners    3. Atrial fibrillation with RVR (H)  Asymptomatic.  Continue anticoagulation    4. Long term current use of anticoagulant therapy  Follow with INR clinic    5. S/P AVR (aortic valve replacement)  Bioprosthetic valve functioning well.  Follow with cardiology    6. Other secondary pulmonary hypertension (H)  Stable.                         FOLLOW UP   I have asked the patient to make an appointment for followup with me in 4 months or as needed            I have carefully explained the diagnosis and treatment options to the patient.  The patient has displayed an understanding of the above, and all subsequent questions were answered.      DO MARIA D Sabillon    Portions of this note were produced using [employer]  Although every attempt at real-time proof reading has been made, occasional grammar/syntax errors may have been missed.  Telephone time: 22 minutes

## 2020-11-30 PROBLEM — D69.6 THROMBOCYTOPENIA (H): Status: RESOLVED | Noted: 2019-05-20 | Resolved: 2020-11-30

## 2020-12-07 DIAGNOSIS — Z79.01 LONG TERM CURRENT USE OF ANTICOAGULANT THERAPY: Primary | ICD-10-CM

## 2020-12-07 RX ORDER — WARFARIN SODIUM 2.5 MG/1
TABLET ORAL
Qty: 160 TABLET | Refills: 0 | Status: SHIPPED | OUTPATIENT
Start: 2020-12-07 | End: 2021-03-29

## 2020-12-12 ENCOUNTER — HOSPITAL ENCOUNTER (EMERGENCY)
Facility: CLINIC | Age: 80
Discharge: HOME | End: 2020-12-12
Attending: EMERGENCY MEDICINE | Admitting: EMERGENCY MEDICINE
Payer: MEDICARE

## 2020-12-12 VITALS
DIASTOLIC BLOOD PRESSURE: 78 MMHG | OXYGEN SATURATION: 99 % | HEART RATE: 72 BPM | SYSTOLIC BLOOD PRESSURE: 120 MMHG | TEMPERATURE: 98.3 F | RESPIRATION RATE: 16 BRPM

## 2020-12-12 DIAGNOSIS — R52 BODY ACHES: ICD-10-CM

## 2020-12-12 PROCEDURE — 99282 EMERGENCY DEPT VISIT SF MDM: CPT | Performed by: EMERGENCY MEDICINE

## 2020-12-12 NOTE — ED PROVIDER NOTES
History     Chief Complaint   Patient presents with     Fatigue     HPI  Khalida Rouse is a 80 year old female who presents after some body aches earlier today.  No fever.  No cough.  No chest pain.  No difficulty breathing.  No rash.  No dysuria.  She is felt well otherwise.  She thought about taking a super strength Tylenol but has not taken anything yet.    Allergies:  Allergies   Allergen Reactions     Xarelto [Rivaroxaban] Diarrhea     Ace Inhibitors Cough       Problem List:    Patient Active Problem List    Diagnosis Date Noted     Hypoxemia 05/23/2019     Priority: Medium     Other secondary pulmonary hypertension (H) 05/14/2019     Priority: Medium     Long term current use of anticoagulant therapy 03/11/2016     Priority: Medium     Atrial fibrillation with RVR (H) 01/11/2016     Priority: Medium     Pneumonia 01/09/2016     Priority: Medium     Osteoporosis 10/26/2015     Priority: Medium     Advanced directives, counseling/discussion 10/03/2014     Priority: Medium     Declined       S/P AVR (aortic valve replacement) 04/10/2014     Priority: Medium     Atrial flutter 02/04/2014     Priority: Medium        Past Medical History:    Past Medical History:   Diagnosis Date     Aortic valve stenosis      Atrial flutter (H)      Cardiomyopathy (H)      Severe aortic stenosis 4/7/2014     Shoulder fracture, left 3/22/15       Past Surgical History:    Past Surgical History:   Procedure Laterality Date     CATARACT IOL, RT/LT Right      ENT SURGERY      glaucoma surgery     REPLACE VALVE AORTIC  4/10/2014    Procedure: REPLACE VALVE AORTIC;  Median sternotomy, Replace Aortic Valve on pump oxygenation. ;  Surgeon: Wesley Fong MD;  Location:  OR       Family History:    Family History   Problem Relation Age of Onset     Dementia Brother      Alzheimer Disease Sister        Social History:  Marital Status:   [5]  Social History     Tobacco Use     Smoking status: Never Smoker     Smokeless  tobacco: Never Used   Substance Use Topics     Alcohol use: No     Alcohol/week: 0.0 standard drinks     Drug use: No        Medications:         acetaminophen (TYLENOL) 500 MG tablet       atenolol (TENORMIN) 50 MG tablet       calcium carbonate-vitamin D 500-400 MG-UNIT TABS tablt       CARTIA  MG 24 hr capsule       docusate sodium (COLACE) 100 MG capsule       methylcellulose (CITRUCEL) powder       phenazopyridine (PYRIDIUM) 100 MG tablet       polyethylene glycol (MIRALAX) 17 GM/Dose powder       torsemide (DEMADEX) 20 MG tablet       trimethoprim-polymyxin b (POLYTRIM) 79391-6.1 UNIT/ML-% ophthalmic solution       warfarin ANTICOAGULANT (COUMADIN) 2.5 MG tablet       warfarin ANTICOAGULANT (COUMADIN) 2.5 MG tablet          Review of Systems  All other systems are reviewed and are negative    Physical Exam   BP: (!) 140/87  Pulse: 67  Temp: 98.3  F (36.8  C)  Resp: 18  SpO2: 95 %      Physical Exam  Vitals signs and nursing note reviewed.   Constitutional:       General: She is not in acute distress.     Appearance: She is well-developed. She is not diaphoretic.   HENT:      Head: Normocephalic and atraumatic.   Eyes:      General: No scleral icterus.     Pupils: Pupils are equal, round, and reactive to light.   Neck:      Musculoskeletal: Normal range of motion and neck supple.   Cardiovascular:      Rate and Rhythm: Normal rate and regular rhythm.      Heart sounds: Normal heart sounds. No murmur.   Pulmonary:      Effort: No respiratory distress.      Breath sounds: No stridor. No wheezing or rales.   Abdominal:      Palpations: Abdomen is soft.      Tenderness: There is no abdominal tenderness.   Musculoskeletal: Normal range of motion.         General: No tenderness.   Skin:     General: Skin is warm and dry.      Coloration: Skin is not pale.      Findings: No erythema or rash.   Neurological:      Mental Status: She is alert.      GCS: GCS eye subscore is 4. GCS verbal subscore is 5. GCS motor  subscore is 6.      Cranial Nerves: Cranial nerves are intact.      Sensory: Sensation is intact.      Motor: Motor function is intact.      Coordination: Coordination is intact.      Comments: Excellent strength in all extremities.  Affect is brisk without signs of deficit         ED Course        Procedures               Critical Care time:  none               No results found for this or any previous visit (from the past 24 hour(s)).    Medications - No data to display    Assessments & Plan (with Medical Decision Making)  80-year-old female who reports some body aches earlier today.  No other symptoms.  Offered Covid testing, urine testing or blood testing.  She declined this.  This seems reasonable.  No signs of significant illness or acute emergency medical condition.  Reassurance given for now.  May use Tylenol as needed for body aches.  Follow-up in clinic if not improving in 2 days.  Return anytime sooner to the emergency department if condition worsens or any other concern.     I have reviewed the nursing notes.    I have reviewed the findings, diagnosis, plan and need for follow up with the patient.       New Prescriptions    No medications on file       Final diagnoses:   Body aches       12/12/2020   [facility] EMERGENCY DEPT     Singh Hall MD  12/12/20 [id number]

## 2020-12-12 NOTE — ED AVS SNAPSHOT
[facility] Emergency Dept  [address] [place] [address]  Phone: [phone]  Fax: [phone]                                    Khalida Rouse   MRN: [id number]    Department: [facility] Emergency Dept   Date of Visit: 12/12/2020           After Visit Summary Signature Page    I have received my discharge instructions, and my questions have been answered. I have discussed any challenges I see with this plan with the nurse or doctor.    ..........................................................................................................................................  Patient/Patient Representative Signature      ..........................................................................................................................................  Patient Representative Print Name and Relationship to Patient    ..................................................               ................................................  Date                                   Time    ..........................................................................................................................................  Reviewed by Signature/Title    ...................................................              ..............................................  Date                                               Time          22EPIC Rev 08/18

## 2020-12-12 NOTE — DISCHARGE INSTRUCTIONS
May use 1-2 extra strength Tylenol 3 times a day as needed for discomfort.  Encourage plenty of fluids

## 2020-12-14 ENCOUNTER — PATIENT OUTREACH (OUTPATIENT)
Dept: CARE COORDINATION | Facility: CLINIC | Age: 80
End: 2020-12-14

## 2020-12-14 DIAGNOSIS — Z71.89 OTHER SPECIFIED COUNSELING: ICD-10-CM

## 2020-12-14 NOTE — LETTER
[facility]  [address]   [place] [address]    December 15, 2020    Khalida Rouse  [address]  [place] [address]      Dear Khalida,    I am a clinic community health worker who works with Mazin Larsen DO at [facility]. I have been trying to reach you recently to introduce Clinic Care Coordination and to see if there was anything I could assist you with.  Below is a description of clinic care coordination and how I can further assist you.      The clinic care coordination team is made up of a registered nurse,  and community health worker who understand the health care system. The goal of clinic care coordination is to help you manage your health and improve access to the health care system in the most efficient manner. The team can assist you in meeting your health care goals by providing education, coordinating services, strengthening the communication among your providers and supporting you with any resource needs.    Please feel free to contact the Community Health Worker at [phone] with any questions or concerns. We are focused on providing you with the highest-quality healthcare experience possible and that all starts with you.     Sincerely,       [facility]  Clinic Care Coordination

## 2020-12-14 NOTE — PROGRESS NOTES
Clinic Care Coordination Contact  [facility]/Voicemail       Clinical Data: Care Coordinator Outreach  Outreach attempted x 1.  Left message on patient's voicemail with call back information and requested return call.  Plan: Care Coordinator will try to reach patient again in 1-2 business days.

## 2020-12-15 NOTE — PROGRESS NOTES
Clinic Care Coordination Contact  [facility]/Voicemail       Clinical Data: Care Coordinator Outreach  Outreach attempted x 2.  Left message on patient's voicemail with call back information and requested return call.  Plan: Care Coordinator will do no further outreaches at this time.

## 2021-01-10 ENCOUNTER — HOSPITAL ENCOUNTER (EMERGENCY)
Facility: CLINIC | Age: 81
Discharge: HOME | End: 2021-01-10
Attending: EMERGENCY MEDICINE | Admitting: EMERGENCY MEDICINE
Payer: MEDICARE

## 2021-01-10 ENCOUNTER — APPOINTMENT (OUTPATIENT)
Dept: CT IMAGING | Facility: CLINIC | Age: 81
End: 2021-01-10
Attending: EMERGENCY MEDICINE
Payer: MEDICARE

## 2021-01-10 ENCOUNTER — TELEPHONE (OUTPATIENT)
Dept: ANTICOAGULATION | Facility: CLINIC | Age: 81
End: 2021-01-10

## 2021-01-10 VITALS
SYSTOLIC BLOOD PRESSURE: 143 MMHG | RESPIRATION RATE: 17 BRPM | OXYGEN SATURATION: 93 % | TEMPERATURE: 97.8 F | HEART RATE: 91 BPM | DIASTOLIC BLOOD PRESSURE: 87 MMHG

## 2021-01-10 DIAGNOSIS — R42 DIZZINESS: ICD-10-CM

## 2021-01-10 LAB
ALBUMIN SERPL-MCNC: 3.8 G/DL (ref 3.4–5)
ALP SERPL-CCNC: 73 U/L (ref 40–150)
ALT SERPL W P-5'-P-CCNC: 26 U/L (ref 0–50)
ANION GAP SERPL CALCULATED.3IONS-SCNC: 3 MMOL/L (ref 3–14)
AST SERPL W P-5'-P-CCNC: 27 U/L (ref 0–45)
BASOPHILS # BLD AUTO: 0 10E9/L (ref 0–0.2)
BASOPHILS NFR BLD AUTO: 0.8 %
BILIRUB SERPL-MCNC: 0.4 MG/DL (ref 0.2–1.3)
BUN SERPL-MCNC: 14 MG/DL (ref 7–30)
CALCIUM SERPL-MCNC: 9.7 MG/DL (ref 8.5–10.1)
CHLORIDE SERPL-SCNC: 106 MMOL/L (ref 94–109)
CO2 SERPL-SCNC: 30 MMOL/L (ref 20–32)
CREAT SERPL-MCNC: 0.58 MG/DL (ref 0.52–1.04)
DIFFERENTIAL METHOD BLD: NORMAL
EOSINOPHIL NFR BLD AUTO: 3.6 %
ERYTHROCYTE [DISTWIDTH] IN BLOOD BY AUTOMATED COUNT: 14 % (ref 10–15)
GFR SERPL CREATININE-BSD FRML MDRD: 86 ML/MIN/{1.73_M2}
GLUCOSE SERPL-MCNC: 88 MG/DL (ref 70–99)
HCT VFR BLD AUTO: 43 % (ref 35–47)
HGB BLD-MCNC: 14.2 G/DL (ref 11.7–15.7)
IMM GRANULOCYTES # BLD: 0 10E9/L (ref 0–0.4)
IMM GRANULOCYTES NFR BLD: 0.4 %
INR PPP: 3.81 (ref 0.86–1.14)
LYMPHOCYTES # BLD AUTO: 0.8 10E9/L (ref 0.8–5.3)
LYMPHOCYTES NFR BLD AUTO: 17.3 %
MCH RBC QN AUTO: 29.5 PG (ref 26.5–33)
MCHC RBC AUTO-ENTMCNC: 33 G/DL (ref 31.5–36.5)
MCV RBC AUTO: 89 FL (ref 78–100)
MONOCYTES # BLD AUTO: 0.7 10E9/L (ref 0–1.3)
MONOCYTES NFR BLD AUTO: 14.2 %
NEUTROPHILS # BLD AUTO: 3 10E9/L (ref 1.6–8.3)
NEUTROPHILS NFR BLD AUTO: 63.7 %
NRBC # BLD AUTO: 0 10*3/UL
NRBC BLD AUTO-RTO: 0 /100
PLATELET # BLD AUTO: 150 10E9/L (ref 150–450)
POTASSIUM SERPL-SCNC: 4.2 MMOL/L (ref 3.4–5.3)
PROT SERPL-MCNC: 7.5 G/DL (ref 6.8–8.8)
RBC # BLD AUTO: 4.82 10E12/L (ref 3.8–5.2)
SODIUM SERPL-SCNC: 139 MMOL/L (ref 133–144)
WBC # BLD AUTO: 4.7 10E9/L (ref 4–11)

## 2021-01-10 PROCEDURE — 85610 PROTHROMBIN TIME: CPT | Performed by: EMERGENCY MEDICINE

## 2021-01-10 PROCEDURE — 99284 EMERGENCY DEPT VISIT MOD MDM: CPT | Mod: 25 | Performed by: EMERGENCY MEDICINE

## 2021-01-10 PROCEDURE — 70450 CT HEAD/BRAIN W/O DYE: CPT | Mod: MG

## 2021-01-10 PROCEDURE — 80053 COMPREHEN METABOLIC PANEL: CPT | Performed by: EMERGENCY MEDICINE

## 2021-01-10 PROCEDURE — 85025 COMPLETE CBC W/AUTO DIFF WBC: CPT | Performed by: EMERGENCY MEDICINE

## 2021-01-10 PROCEDURE — 99284 EMERGENCY DEPT VISIT MOD MDM: CPT | Performed by: EMERGENCY MEDICINE

## 2021-01-10 NOTE — ED PROVIDER NOTES
History     Chief Complaint   Patient presents with     Dizziness     HPI  Khalida Rouse is a 80 year old female who presents to the emergency department secondary to almost 24 hours of dizzy.  She feels like she is spinning not the room.  She has never had anything quite like this in the past.  She is on chronic anticoagulation secondary to history of atrial flutter.  She does not have a headache.  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  She has not had any double vision, change in vision or blurry vision.  No ear pain, stuffiness in her ears, recent viral infection.  No chest pain palpitations shortness of breath abdominal pain nausea vomiting or diarrhea.  This does get worse with standing up.  This morning when she went to get up she had to sit back down in the bed quickly.  She has not had any black tarry colored stools or bloody stools.  No dysuria or hematuria or increase in urinary frequency.  She is not able to ambulate on her own without help.  Her daughter helps her walk to get here.  She was assisted from the lobby to the room by nursing staff but was ambulating.  No slurred speech, extremity weakness, facial droop, confusion, sensory deficit.    Allergies:  Allergies   Allergen Reactions     Xarelto [Rivaroxaban] Diarrhea     Ace Inhibitors Cough       Problem List:    Patient Active Problem List    Diagnosis Date Noted     Hypoxemia 05/23/2019     Priority: Medium     Other secondary pulmonary hypertension (H) 05/14/2019     Priority: Medium     Long term current use of anticoagulant therapy 03/11/2016     Priority: Medium     Atrial fibrillation with RVR (H) 01/11/2016     Priority: Medium     Pneumonia 01/09/2016     Priority: Medium     Osteoporosis 10/26/2015     Priority: Medium     Advanced directives, counseling/discussion 10/03/2014     Priority: Medium     Declined       S/P AVR (aortic valve replacement) 04/10/2014     Priority: Medium      Atrial flutter 02/04/2014     Priority: Medium        Past Medical History:    Past Medical History:   Diagnosis Date     Aortic valve stenosis      Atrial flutter (H)      Cardiomyopathy (H)      Severe aortic stenosis 4/7/2014     Shoulder fracture, left 3/22/15       Past Surgical History:    Past Surgical History:   Procedure Laterality Date     CATARACT IOL, RT/LT Right      ENT SURGERY      glaucoma surgery     REPLACE VALVE AORTIC  4/10/2014    Procedure: REPLACE VALVE AORTIC;  Median sternotomy, Replace Aortic Valve on pump oxygenation. ;  Surgeon: Wesley Fong MD;  Location:  OR       Family History:    Family History   Problem Relation Age of Onset     Dementia Brother      Alzheimer Disease Sister        Social History:  Marital Status:   [5]  Social History     Tobacco Use     Smoking status: Never Smoker     Smokeless tobacco: Never Used   Substance Use Topics     Alcohol use: No     Alcohol/week: 0.0 standard drinks     Drug use: No        Medications:         acetaminophen (TYLENOL) 500 MG tablet       atenolol (TENORMIN) 50 MG tablet       calcium carbonate-vitamin D 500-400 MG-UNIT TABS tablt       CARTIA  MG 24 hr capsule       docusate sodium (COLACE) 100 MG capsule       methylcellulose (CITRUCEL) powder       phenazopyridine (PYRIDIUM) 100 MG tablet       polyethylene glycol (MIRALAX) 17 GM/Dose powder       torsemide (DEMADEX) 20 MG tablet       trimethoprim-polymyxin b (POLYTRIM) 42209-8.1 UNIT/ML-% ophthalmic solution       warfarin ANTICOAGULANT (COUMADIN) 2.5 MG tablet       warfarin ANTICOAGULANT (COUMADIN) 2.5 MG tablet          Review of Systems   All other systems reviewed and are negative.      Physical Exam   BP: (!) 143/87  Pulse: 91  Temp: 97.8  F (36.6  C)  Resp: 17  SpO2: 93 %  Lying Orthostatic BP: 127/71  Lying Orthostatic Pulse: 84 bpm  Sitting Orthostatic BP: 130/74  Sitting Orthostatic Pulse: 72 bpm  Standing Orthostatic BP: 130/73  Standing  Orthostatic Pulse: 78 bpm      Physical Exam  Vitals signs and nursing note reviewed.   Constitutional:       General: She is not in acute distress.     Appearance: Normal appearance. She is well-developed. She is not diaphoretic.   HENT:      Head: Normocephalic and atraumatic.      Right Ear: External ear normal.      Left Ear: External ear normal.      Nose: Nose normal. No congestion or rhinorrhea.      Mouth/Throat:      Mouth: Mucous membranes are moist.   Eyes:      General: No scleral icterus.     Extraocular Movements: Extraocular movements intact.      Comments: Extraocular movements intact with the right eye pupils equal round and reactive to light without nystagmus.  Left eye moves only minimally and the pupil is not responsive to light and there is a haziness to the left eye.  There is some conjunctival injection bilaterally without exudate.   Neck:      Musculoskeletal: Normal range of motion and neck supple.   Cardiovascular:      Rate and Rhythm: Normal rate and regular rhythm.      Heart sounds: Normal heart sounds.   Pulmonary:      Effort: Pulmonary effort is normal. No respiratory distress.      Breath sounds: Normal breath sounds. No stridor. No wheezing or rhonchi.   Abdominal:      General: Abdomen is flat. There is no distension.      Tenderness: There is no abdominal tenderness. There is no guarding or rebound.   Musculoskeletal: Normal range of motion.         General: No swelling, tenderness, deformity or signs of injury.   Skin:     General: Skin is warm and dry.      Coloration: Skin is not jaundiced or pale.      Findings: No bruising, erythema, lesion or rash.   Neurological:      General: No focal deficit present.      Mental Status: She is alert and oriented to person, place, and time.      Cranial Nerves: No cranial nerve deficit.      Sensory: No sensory deficit.      Motor: No weakness.      Gait: Gait normal.   Psychiatric:         Mood and Affect: Mood normal.         Thought  Content: Thought content normal.         ED Course        Procedures           Results for orders placed or performed during the hospital encounter of 01/10/21 (from the past 24 hour(s))   CBC with platelets differential   Result Value Ref Range    WBC 4.7 4.0 - 11.0 10e9/L    RBC Count 4.82 3.8 - 5.2 10e12/L    Hemoglobin 14.2 11.7 - 15.7 g/dL    Hematocrit 43.0 35.0 - 47.0 %    MCV 89 78 - 100 fl    MCH 29.5 26.5 - 33.0 pg    MCHC 33.0 31.5 - 36.5 g/dL    RDW 14.0 10.0 - 15.0 %    Platelet Count 150 150 - 450 10e9/L    Diff Method Automated Method     % Neutrophils 63.7 %    % Lymphocytes 17.3 %    % Monocytes 14.2 %    % Eosinophils 3.6 %    % Basophils 0.8 %    % Immature Granulocytes 0.4 %    Nucleated RBCs 0 0 /100    Absolute Neutrophil 3.0 1.6 - 8.3 10e9/L    Absolute Lymphocytes 0.8 0.8 - 5.3 10e9/L    Absolute Monocytes 0.7 0.0 - 1.3 10e9/L    Absolute Basophils 0.0 0.0 - 0.2 10e9/L    Abs Immature Granulocytes 0.0 0 - 0.4 10e9/L    Absolute Nucleated RBC 0.0    INR   Result Value Ref Range    INR 3.81 (H) 0.86 - 1.14   Comprehensive metabolic panel   Result Value Ref Range    Sodium 139 133 - 144 mmol/L    Potassium 4.2 3.4 - 5.3 mmol/L    Chloride 106 94 - 109 mmol/L    Carbon Dioxide 30 20 - 32 mmol/L    Anion Gap 3 3 - 14 mmol/L    Glucose 88 70 - 99 mg/dL    Urea Nitrogen 14 7 - 30 mg/dL    Creatinine 0.58 0.52 - 1.04 mg/dL    GFR Estimate 86 >60 mL/min/[1.73_m2]    GFR Estimate If Black >90 >60 mL/min/[1.73_m2]    Calcium 9.7 8.5 - 10.1 mg/dL    Bilirubin Total 0.4 0.2 - 1.3 mg/dL    Albumin 3.8 3.4 - 5.0 g/dL    Protein Total 7.5 6.8 - 8.8 g/dL    Alkaline Phosphatase 73 40 - 150 U/L    ALT 26 0 - 50 U/L    AST 27 0 - 45 U/L   CT Head w/o Contrast    Narrative    EXAM: CT HEAD W/O CONTRAST  LOCATION: [facility]  DATE/TIME: 1/10/2021 2:09 AM    INDICATION: Dizziness  COMPARISON: None.  TECHNIQUE: Routine CT Head without IV contrast. Multiplanar reformats. Dose reduction techniques were  used.    FINDINGS:  INTRACRANIAL CONTENTS: No intracranial hemorrhage, extraaxial collection, or mass effect.  No CT evidence of acute infarct. Severe presumed chronic small vessel ischemic changes. Moderate generalized volume loss. No hydrocephalus.     VISUALIZED ORBITS/SINUSES/MASTOIDS: Left orbital and ocular prosthetics. No paranasal sinus mucosal disease. No middle ear or mastoid effusion.    BONES/SOFT TISSUES: No acute abnormality. Vascular calcifications. Bilateral TMJ degenerative changes.      Impression    IMPRESSION:  1.  No acute intracranial process.  2.  Age-related changes described above.       Medications - No data to display    Assessments & Plan (with Medical Decision Making)  80-year-old female with a history of atrial flutter here with dizziness.  Differential diagnosis included anemia, intracerebral hemorrhage, benign paroxysmal vertigo, vestibulitis, electrolyte abnormality etc.  CT scan of the head performed along with laboratory evaluation.  CT scan was reassuring.  The patient can walk in the hallway with some assistance.  Tympanic membranes look normal.  No bleeding on the brain.  If symptoms persist she may need to have an MRI.  Return to ER precautions and follow-up precautions discussed.  All questions were answered.  The patient was informed that her INR was a little bit high and that she can follow-up in the clinic regarding that.     I have reviewed the nursing notes.    I have reviewed the findings, diagnosis, plan and need for follow up with the patient.      New Prescriptions    No medications on file       Final diagnoses:   Dizziness       1/10/2021   [facility] EMERGENCY DEPT     Blayne Hernandez MD  01/10/21 [id number]

## 2021-01-10 NOTE — ED AVS SNAPSHOT
[facility] Emergency Dept  [address] [place] [address]  Phone: [phone]  Fax: [phone]                                    Khalida Rouse   MRN: [id number]    Department: [facility] Emergency Dept   Date of Visit: 1/10/2021           After Visit Summary Signature Page    I have received my discharge instructions, and my questions have been answered. I have discussed any challenges I see with this plan with the nurse or doctor.    ..........................................................................................................................................  Patient/Patient Representative Signature      ..........................................................................................................................................  Patient Representative Print Name and Relationship to Patient    ..................................................               ................................................  Date                                   Time    ..........................................................................................................................................  Reviewed by Signature/Title    ...................................................              ..............................................  Date                                               Time          22EPIC Rev 08/18

## 2021-01-10 NOTE — DISCHARGE INSTRUCTIONS
I am not sure the cause of your dizziness but your CAT scan and lab work looked good.  Your INR was a little bit high but you can follow-up in the clinic for further monitoring without.  There was no evidence of bleeding or masses on the brain that would cause this.  If your symptoms persist you may need to have an MRI of your brain.  I hope you get better quickly.

## 2021-01-11 NOTE — TELEPHONE ENCOUNTER
Anticoagulation Management    Unable to reach Khalida today.    Sundays 1/10 ER INR result of 3.81 is supratherapeutic (goal INR of 2.0-3.0).  Result received from: ER lab    Follow up required to confirm warfarin dose taken and assess for changes    VM left to call [facility] back to discuss.       Anticoagulation clinic to follow up    Venessa Hood RN

## 2021-01-11 NOTE — TELEPHONE ENCOUNTER
Anticoagulation Management     Unable to reach Khalida today.     Sundays 1/10 ER INR result of 3.81 is supratherapeutic (goal INR of 2.0-3.0).  Result received from: ER lab     Follow up required to confirm warfarin dose taken and assess for changes     VM left to call [facility] back to discuss. Advised 2.5 mg tonight.       Anticoagulation clinic to follow up     Venessa Hood RN

## 2021-01-12 NOTE — TELEPHONE ENCOUNTER
Anticoagulation Management     Unable to reach Khalida today.     Sundays 1/10 ER INR result of 3.81 is supratherapeutic (goal INR of 2.0-3.0).  Result received from: ER lab     Follow up required to confirm warfarin dose taken and assess for changes     VM left to call [facility] back to discuss. Advised 2.5 mg on Mon 1/11     Anticoagulation clinic to follow up     Venessa Hood RN

## 2021-01-13 ENCOUNTER — ANTICOAGULATION THERAPY VISIT (OUTPATIENT)
Dept: ANTICOAGULATION | Facility: CLINIC | Age: 81
End: 2021-01-13

## 2021-01-13 DIAGNOSIS — Z79.01 LONG TERM CURRENT USE OF ANTICOAGULANT THERAPY: ICD-10-CM

## 2021-01-13 DIAGNOSIS — I48.91 ATRIAL FIBRILLATION WITH RVR (H): ICD-10-CM

## 2021-01-13 DIAGNOSIS — Z95.2 S/P AVR (AORTIC VALVE REPLACEMENT): ICD-10-CM

## 2021-01-13 NOTE — PROGRESS NOTES
ANTICOAGULATION MANAGEMENT     Patient Name:  Khalida Rouse  Date:  1/13/2021    ASSESSMENT /SUBJECTIVE:    Today's INR result of 3.81 is supratherapeutic. Goal INR of 2.0-3.0      Warfarin dose taken: Warfarin taken as instructed - pt did not get the VM to take 2.5 mg on Mon 1/11, so normal dose of 5 mg was taken that day    Diet: No new diet changes affecting INR    Medication changes/ interactions: No new medications/supplements affecting INR    Previous INR: Therapeutic     S/S of bleeding or thromboembolism: No    New injury or illness: No    Upcoming surgery, procedure or cardioversion: No    Additional findings: None      PLAN:    Telephone call with Khalida regarding INR result and instructed:     Warfarin Dosing Instructions: 2.5 mg Wed and Fri this week  then continue your current warfarin dose of 5 mg Mon, Wed, Fri and 2.5 mg ROW    Instructed patient to follow up no later than: 10-14 days  Patient offered & declined to schedule next visit - she states she needs to talk to her daughter first.     Education provided: None required      Pt verbalizes understanding and agrees to warfarin dosing plan.    Instructed to call the Anticoagulation Clinic for any changes, questions or concerns. (#[phone])        Venessa Hood RN      OBJECTIVE:  Recent labs: (last 7 days)     01/10/21  0211   INR 3.81*

## 2021-01-18 DIAGNOSIS — I48.20 CHRONIC ATRIAL FIBRILLATION (H): ICD-10-CM

## 2021-01-19 ENCOUNTER — ANTICOAGULATION THERAPY VISIT (OUTPATIENT)
Dept: ANTICOAGULATION | Facility: CLINIC | Age: 81
End: 2021-01-19

## 2021-01-19 DIAGNOSIS — Z79.01 LONG TERM CURRENT USE OF ANTICOAGULANT THERAPY: ICD-10-CM

## 2021-01-19 DIAGNOSIS — I48.91 ATRIAL FIBRILLATION WITH RVR (H): ICD-10-CM

## 2021-01-19 DIAGNOSIS — Z95.2 S/P AVR (AORTIC VALVE REPLACEMENT): ICD-10-CM

## 2021-01-19 LAB
CAPILLARY BLOOD COLLECTION: NORMAL
INR BLD: 3.7 (ref 0.86–1.14)

## 2021-01-19 PROCEDURE — 85610 PROTHROMBIN TIME: CPT | Performed by: INTERNAL MEDICINE

## 2021-01-19 PROCEDURE — 36416 COLLJ CAPILLARY BLOOD SPEC: CPT | Performed by: INTERNAL MEDICINE

## 2021-01-19 RX ORDER — DILTIAZEM HYDROCHLORIDE 180 MG/1
180 CAPSULE, COATED, EXTENDED RELEASE ORAL 2 TIMES DAILY
Qty: 180 CAPSULE | Refills: 1 | Status: ON HOLD | OUTPATIENT
Start: 2021-01-19 | End: 2021-05-14

## 2021-01-19 NOTE — PROGRESS NOTES
ANTICOAGULATION MANAGEMENT     Patient Name:  Khalida Rouse  Date:  1/19/2021    ASSESSMENT /SUBJECTIVE:    Today's INR result of 3.7 is supratherapeutic. Goal INR of 2.0-3.0      Warfarin dose taken: Warfarin taken as instructed (5 mg Mon and 2.5 mg ROW)    Diet: No new diet changes affecting INR    Medication changes/ interactions: No new medications/supplements affecting INR    Previous INR: Supratherapeutic     S/S of bleeding or thromboembolism: No    New injury or illness: No    Upcoming surgery, procedure or cardioversion: No    Additional findings: None      PLAN:    Telephone call with Khalida regarding INR result and instructed:     Warfarin Dosing Instructions: 1.25 mg tonight, then 2.5 mg daily     Instructed patient to follow up no later than: 10-14 days  Patient offered & declined to schedule next visit - pt states she will need to talk to her daughter, as she is the one that brings her in for her appts    Education provided: None required      Pt verbalizes understanding and agrees to warfarin dosing plan.    Instructed to call the Anticoagulation Clinic for any changes, questions or concerns. (#[phone])        Venessa Hood RN      OBJECTIVE:  Recent labs: (last 7 days)     01/19/21  1327   INR 3.7*         INR assessment SUPRA    Recheck INR In: 10 DAYS    INR Location Outside lab      Anticoagulation Summary  As of 1/19/2021    INR goal:  2.0-3.0   TTR:  68.9 % (1 y)   INR used for dosing:  3.7 (1/19/2021)   Warfarin maintenance plan:  2.5 mg (2.5 mg x 1) every day   Full warfarin instructions:  1/19: 1.25 mg; Otherwise 2.5 mg every day   Weekly warfarin total:  17.5 mg   Plan last modified:  Venessa Hood RN (1/19/2021)   Next INR check:  1/29/2021   Priority:  Maintenance   Target end date:  Indefinite    Indications    Long term current use of anticoagulant therapy [Z79.01]  Atrial fibrillation with RVR (H) [I48.91]  S/P AVR (aortic valve replacement) [Z95.2]              Anticoagulation Episode Summary     INR check location:      Preferred lab:      Send INR reminders to:  [facility]    Comments:  2.5 mg tablets, book, BP, PM dose      Anticoagulation Care Providers     Provider Role Specialty Phone number    Mazin Larsen DO Referring Internal Medicine [phone]    Dorcas Fisher MD Responsible Family Medicine [phone]

## 2021-01-19 NOTE — TELEPHONE ENCOUNTER
Routing refill request to provider for review/approval because:  Blood pressures elevated above goal.     Joanne Maza RN

## 2021-01-22 ENCOUNTER — TELEPHONE (OUTPATIENT)
Dept: INTERNAL MEDICINE | Facility: CLINIC | Age: 81
End: 2021-01-22

## 2021-01-22 NOTE — TELEPHONE ENCOUNTER
Patient left VM on [facility] phone asking for a return call to schedule an INR appointment.     Please call patient back when able.     Tatum Dunn RN, BSN, PHGWEN Patrick

## 2021-01-27 ENCOUNTER — ANTICOAGULATION THERAPY VISIT (OUTPATIENT)
Dept: ANTICOAGULATION | Facility: CLINIC | Age: 81
End: 2021-01-27

## 2021-01-27 DIAGNOSIS — Z95.2 S/P AVR (AORTIC VALVE REPLACEMENT): ICD-10-CM

## 2021-01-27 DIAGNOSIS — Z79.01 LONG TERM CURRENT USE OF ANTICOAGULANT THERAPY: ICD-10-CM

## 2021-01-27 DIAGNOSIS — I48.91 ATRIAL FIBRILLATION WITH RVR (H): ICD-10-CM

## 2021-01-27 LAB
CAPILLARY BLOOD COLLECTION: NORMAL
INR BLD: 3.7 (ref 0.86–1.14)

## 2021-01-27 PROCEDURE — 85610 PROTHROMBIN TIME: CPT | Performed by: INTERNAL MEDICINE

## 2021-01-27 PROCEDURE — 36416 COLLJ CAPILLARY BLOOD SPEC: CPT | Performed by: INTERNAL MEDICINE

## 2021-01-27 NOTE — PROGRESS NOTES
Anticoagulation Management    Unable to reach pt today.    Today's INR result of 3.7 is supratherapeutic (goal INR of 2.0-3.0).  Result received from: Clinic Lab    Follow up required to confirm warfarin dose taken    LM I have attempted to contact this patient by phone, left message to return call at [phone] or [phone].  Will try again later.        Anticoagulation clinic to follow up    Christiane Mcguire RN

## 2021-02-03 ENCOUNTER — ANTICOAGULATION THERAPY VISIT (OUTPATIENT)
Dept: ANTICOAGULATION | Facility: CLINIC | Age: 81
End: 2021-02-03

## 2021-02-03 DIAGNOSIS — Z79.01 LONG TERM CURRENT USE OF ANTICOAGULANT THERAPY: ICD-10-CM

## 2021-02-03 DIAGNOSIS — Z95.2 S/P AVR (AORTIC VALVE REPLACEMENT): ICD-10-CM

## 2021-02-03 DIAGNOSIS — I48.91 ATRIAL FIBRILLATION WITH RVR (H): ICD-10-CM

## 2021-02-03 LAB
CAPILLARY BLOOD COLLECTION: NORMAL
INR BLD: 1.8 (ref 0.86–1.14)

## 2021-02-03 PROCEDURE — 85610 PROTHROMBIN TIME: CPT | Performed by: INTERNAL MEDICINE

## 2021-02-03 PROCEDURE — 36416 COLLJ CAPILLARY BLOOD SPEC: CPT | Performed by: INTERNAL MEDICINE

## 2021-02-03 NOTE — PROGRESS NOTES
ANTICOAGULATION MANAGEMENT     Patient Name:  Khalida Rouse  Date:  2/3/2021    ASSESSMENT /SUBJECTIVE:    Today's INR result of 1.8 is subtherapeutic. Goal INR of 2.0-3.0      Warfarin dose taken: Warfarin taken as instructed    Diet: No new diet changes affecting INR    Medication changes/ interactions: No new medications/supplements affecting INR    Previous INR: Supratherapeutic     S/S of bleeding or thromboembolism: No    New injury or illness: No    Upcoming surgery, procedure or cardioversion: No    Additional findings: None      PLAN:    Telephone call with Khalida regarding INR result and instructed:     Warfarin Dosing Instructions: Continue your current warfarin dose 2.5 mg daily    Instructed patient to follow up no later than: 10 days  Lab visit scheduled    Education provided: Please call back if any changes to your diet, medications or how you've been taking warfarin, Target INR goal and significance of current INR result, Importance of therapeutic range, Importance of following up for INR monitoring at instructed interval and Importance of taking warfarin as instructed      Khalida verbalizes understanding and agrees to warfarin dosing plan.    Instructed to call the Anticoagulation Clinic for any changes, questions or concerns. (#[phone])        Vera Martino RN      OBJECTIVE:  Recent labs: (last 7 days)     21  1328   INR 1.8*         No question data found.  Anticoagulation Summary  As of 2/3/2021    INR goal:  2.0-3.0   TTR:  65.8 % (1 y)   INR used for dosin.8 (2/3/2021)   Warfarin maintenance plan:  2.5 mg (2.5 mg x 1) every day   Full warfarin instructions:  2.5 mg every day   Weekly warfarin total:  17.5 mg   No change documented:  Vera Martino RN   Plan last modified:  Venessa Hood RN (2021)   Next INR check:  2021   Priority:  Maintenance   Target end date:  Indefinite    Indications    Long term current use of anticoagulant therapy [Z79.01]  Atrial  fibrillation with RVR (H) [I48.91]  S/P AVR (aortic valve replacement) [Z95.2]             Anticoagulation Episode Summary     INR check location:      Preferred lab:      Send INR reminders to:  [facility]    Comments:  2.5 mg tablets, book, BP, PM dose      Anticoagulation Care Providers     Provider Role Specialty Phone number    Mazin Larsen DO Referring Internal Medicine [phone]    Dorcas Fisher MD Responsible Family Medicine [phone]

## 2021-02-17 ENCOUNTER — ANTICOAGULATION THERAPY VISIT (OUTPATIENT)
Dept: ANTICOAGULATION | Facility: CLINIC | Age: 81
End: 2021-02-17

## 2021-02-17 DIAGNOSIS — Z79.01 LONG TERM CURRENT USE OF ANTICOAGULANT THERAPY: ICD-10-CM

## 2021-02-17 DIAGNOSIS — I48.91 ATRIAL FIBRILLATION WITH RVR (H): ICD-10-CM

## 2021-02-17 DIAGNOSIS — Z95.2 S/P AVR (AORTIC VALVE REPLACEMENT): ICD-10-CM

## 2021-02-17 LAB
CAPILLARY BLOOD COLLECTION: NORMAL
INR BLD: 1.5 (ref 0.86–1.14)

## 2021-02-17 PROCEDURE — 85610 PROTHROMBIN TIME: CPT | Performed by: INTERNAL MEDICINE

## 2021-02-17 PROCEDURE — 36416 COLLJ CAPILLARY BLOOD SPEC: CPT | Performed by: INTERNAL MEDICINE

## 2021-02-17 NOTE — PROGRESS NOTES
"ANTICOAGULATION MANAGEMENT     Patient Name:  Khalida Rouse  Date:  2021    ASSESSMENT /SUBJECTIVE:    Today's INR result of 1.5 is subtherapeutic. Goal INR of 2.0-3.0      Warfarin dose taken: Less warfarin taken than planned which may be affecting INR - pt states that she took 1.25 mg Sun, Mon, Tues because she wasn't able to make it in for her scheduled appt and was nervous because her blood was \"too thick\" before. I advised her to keep take the same dose she was advised to take if she is unable to make it to a scheduled appt.     Diet: No new diet changes affecting INR    Medication changes/ interactions: No new medications/supplements affecting INR    Previous INR: Subtherapeutic     S/S of bleeding or thromboembolism: No    New injury or illness: No    Upcoming surgery, procedure or cardioversion: No    Additional findings: None      PLAN:    Telephone call with Khalida regarding INR result and instructed:     Warfarin Dosing Instructions: Continue your current warfarin dose 2.5 mg daily    Instructed patient to follow up no later than: 2 weeks  Lab visit scheduled    Education provided: None required      Khalida verbalizes understanding and agrees to warfarin dosing plan.    Instructed to call the Anticoagulation Clinic for any changes, questions or concerns. (#[phone])        Venessa Hood RN      OBJECTIVE:  Recent labs: (last 7 days)     21  1333   INR 1.5*         INR assessment SUB    Recheck INR In: 2 WEEKS    INR Location Outside lab      Anticoagulation Summary  As of 2021    INR goal:  2.0-3.0   TTR:  65.8 % (1 y)   INR used for dosin.5 (2021)   Warfarin maintenance plan:  2.5 mg (2.5 mg x 1) every day   Full warfarin instructions:  2.5 mg every day   Weekly warfarin total:  17.5 mg   Plan last modified:  Venessa Hood RN (2021)   Next INR check:  3/3/2021   Priority:  Maintenance   Target end date:  Indefinite    Indications    Long term current use of " anticoagulant therapy [Z79.01]  Atrial fibrillation with RVR (H) [I48.91]  S/P AVR (aortic valve replacement) [Z95.2]             Anticoagulation Episode Summary     INR check location:      Preferred lab:      Send INR reminders to:  [facility]    Comments:  2.5 mg tablets, book, BP, PM dose      Anticoagulation Care Providers     Provider Role Specialty Phone number    Mazin Larsen DO Referring Internal Medicine [phone]    Dorcas Fisher MD Responsible Family Medicine [phone]

## 2021-03-03 ENCOUNTER — ANTICOAGULATION THERAPY VISIT (OUTPATIENT)
Dept: ANTICOAGULATION | Facility: CLINIC | Age: 81
End: 2021-03-03

## 2021-03-03 DIAGNOSIS — Z79.01 LONG TERM CURRENT USE OF ANTICOAGULANT THERAPY: ICD-10-CM

## 2021-03-03 DIAGNOSIS — I48.91 ATRIAL FIBRILLATION WITH RVR (H): ICD-10-CM

## 2021-03-03 DIAGNOSIS — Z95.2 S/P AVR (AORTIC VALVE REPLACEMENT): ICD-10-CM

## 2021-03-03 LAB
CAPILLARY BLOOD COLLECTION: NORMAL
INR BLD: 2.6 (ref 0.86–1.14)

## 2021-03-03 PROCEDURE — 85610 PROTHROMBIN TIME: CPT | Performed by: INTERNAL MEDICINE

## 2021-03-03 NOTE — PROGRESS NOTES
ANTICOAGULATION MANAGEMENT     Patient Name:  Khalida Rouse  Date:  3/3/2021    ASSESSMENT /SUBJECTIVE:    Today's INR result of 2.6 is therapeutic. Goal INR of 2.0-3.0      Warfarin dose taken: Warfarin taken as instructed    Diet: No new diet changes affecting INR    Medication changes/ interactions: No new medications/supplements affecting INR    Previous INR: Subtherapeutic     S/S of bleeding or thromboembolism: No    New injury or illness: No    Upcoming surgery, procedure or cardioversion: No    Additional findings: None      PLAN:    Telephone call with Khalida regarding INR result and instructed:     Warfarin Dosing Instructions: Continue your current warfarin dose 2.5 mg daily    Instructed patient to follow up no later than: 4 weeks  Lab visit scheduled    Education provided: None required      Pt verbalizes understanding and agrees to warfarin dosing plan.    Instructed to call the Anticoagulation Clinic for any changes, questions or concerns. (#[phone])        Venessa Hood RN      OBJECTIVE:  Recent labs: (last 7 days)     21  1327   INR 2.6*         INR assessment THER    Recheck INR In: 4 WEEKS    INR Location Outside lab      Anticoagulation Summary  As of 3/3/2021    INR goal:  2.0-3.0   TTR:  67.9 % (1 y)   INR used for dosin.6 (3/3/2021)   Warfarin maintenance plan:  2.5 mg (2.5 mg x 1) every day   Full warfarin instructions:  2.5 mg every day   Weekly warfarin total:  17.5 mg   No change documented:  Venessa Hood RN   Plan last modified:  Venessa Hood RN (2021)   Next INR check:  3/31/2021   Priority:  Maintenance   Target end date:  Indefinite    Indications    Long term current use of anticoagulant therapy [Z79.01]  Atrial fibrillation with RVR (H) [I48.91]  S/P AVR (aortic valve replacement) [Z95.2]             Anticoagulation Episode Summary     INR check location:      Preferred lab:      Send INR reminders to:  [facility]    Comments:  2.5 mg  tablets, book, BP, PM dose      Anticoagulation Care Providers     Provider Role Specialty Phone number    Mazin Larsen DO Referring Internal Medicine [phone]    Dorcas Fisher MD Responsible Family Medicine [phone]

## 2021-03-19 NOTE — ED AVS SNAPSHOT
[facility] Emergency Dept  [address] [place] [address]  Phone: [phone]  Fax: [phone]                                    Khalida Rouse   MRN: [id number]    Department: [facility] Emergency Dept   Date of Visit: 10/4/2020           After Visit Summary Signature Page    I have received my discharge instructions, and my questions have been answered. I have discussed any challenges I see with this plan with the nurse or doctor.    ..........................................................................................................................................  Patient/Patient Representative Signature      ..........................................................................................................................................  Patient Representative Print Name and Relationship to Patient    ..................................................               ................................................  Date                                   Time    ..........................................................................................................................................  Reviewed by Signature/Title    ...................................................              ..............................................  Date                                               Time          22EPIC Rev 08/18        This document is complete and the patient is ready for discharge.

## 2021-03-27 ENCOUNTER — APPOINTMENT (OUTPATIENT)
Dept: CT IMAGING | Facility: CLINIC | Age: 81
End: 2021-03-27
Attending: NURSE PRACTITIONER
Payer: MEDICARE

## 2021-03-27 ENCOUNTER — HOSPITAL ENCOUNTER (EMERGENCY)
Facility: CLINIC | Age: 81
Discharge: HOME | End: 2021-03-27
Attending: NURSE PRACTITIONER | Admitting: NURSE PRACTITIONER
Payer: MEDICARE

## 2021-03-27 VITALS
TEMPERATURE: 98.7 F | BODY MASS INDEX: 23.96 KG/M2 | HEART RATE: 72 BPM | OXYGEN SATURATION: 99 % | SYSTOLIC BLOOD PRESSURE: 134 MMHG | WEIGHT: 144 LBS | DIASTOLIC BLOOD PRESSURE: 84 MMHG | RESPIRATION RATE: 16 BRPM

## 2021-03-27 DIAGNOSIS — K59.00 CONSTIPATION, UNSPECIFIED CONSTIPATION TYPE: ICD-10-CM

## 2021-03-27 DIAGNOSIS — M54.50 ACUTE RIGHT-SIDED LOW BACK PAIN WITHOUT SCIATICA: ICD-10-CM

## 2021-03-27 LAB
ALBUMIN SERPL-MCNC: 4.1 G/DL (ref 3.4–5)
ALBUMIN UR-MCNC: NEGATIVE MG/DL
ALP SERPL-CCNC: 112 U/L (ref 40–150)
ALT SERPL W P-5'-P-CCNC: 22 U/L (ref 0–50)
ANION GAP SERPL CALCULATED.3IONS-SCNC: 3 MMOL/L (ref 3–14)
APPEARANCE UR: CLEAR
AST SERPL W P-5'-P-CCNC: 22 U/L (ref 0–45)
BASOPHILS # BLD AUTO: 0.1 10E9/L (ref 0–0.2)
BASOPHILS NFR BLD AUTO: 0.6 %
BILIRUB SERPL-MCNC: 0.5 MG/DL (ref 0.2–1.3)
BILIRUB UR QL STRIP: NEGATIVE
BUN SERPL-MCNC: 14 MG/DL (ref 7–30)
CALCIUM SERPL-MCNC: 9.9 MG/DL (ref 8.5–10.1)
CHLORIDE SERPL-SCNC: 105 MMOL/L (ref 94–109)
CO2 SERPL-SCNC: 30 MMOL/L (ref 20–32)
COLOR UR AUTO: YELLOW
CREAT SERPL-MCNC: 0.59 MG/DL (ref 0.52–1.04)
DIFFERENTIAL METHOD BLD: NORMAL
EOSINOPHIL # BLD AUTO: 0.3 10E9/L (ref 0–0.7)
EOSINOPHIL NFR BLD AUTO: 3 %
ERYTHROCYTE [DISTWIDTH] IN BLOOD BY AUTOMATED COUNT: 14.3 % (ref 10–15)
GFR SERPL CREATININE-BSD FRML MDRD: 85 ML/MIN/{1.73_M2}
GLUCOSE SERPL-MCNC: 92 MG/DL (ref 70–99)
GLUCOSE UR STRIP-MCNC: NEGATIVE MG/DL
HCT VFR BLD AUTO: 45.2 % (ref 35–47)
HGB BLD-MCNC: 14.6 G/DL (ref 11.7–15.7)
HGB UR QL STRIP: NEGATIVE
IMM GRANULOCYTES # BLD: 0.1 10E9/L (ref 0–0.4)
IMM GRANULOCYTES NFR BLD: 0.7 %
KETONES UR STRIP-MCNC: NEGATIVE MG/DL
LEUKOCYTE ESTERASE UR QL STRIP: ABNORMAL
LYMPHOCYTES # BLD AUTO: 0.8 10E9/L (ref 0.8–5.3)
LYMPHOCYTES NFR BLD AUTO: 8.4 %
MCH RBC QN AUTO: 29 PG (ref 26.5–33)
MCHC RBC AUTO-ENTMCNC: 32.3 G/DL (ref 31.5–36.5)
MCV RBC AUTO: 90 FL (ref 78–100)
MONOCYTES # BLD AUTO: 1 10E9/L (ref 0–1.3)
MONOCYTES NFR BLD AUTO: 10.9 %
MUCOUS THREADS #/AREA URNS LPF: PRESENT /LPF
NEUTROPHILS # BLD AUTO: 7.2 10E9/L (ref 1.6–8.3)
NEUTROPHILS NFR BLD AUTO: 76.4 %
NITRATE UR QL: NEGATIVE
NRBC # BLD AUTO: 0 10*3/UL
NRBC BLD AUTO-RTO: 0 /100
PH UR STRIP: 6 PH (ref 5–7)
PLATELET # BLD AUTO: 187 10E9/L (ref 150–450)
POTASSIUM SERPL-SCNC: 4.7 MMOL/L (ref 3.4–5.3)
PROT SERPL-MCNC: 7.9 G/DL (ref 6.8–8.8)
RBC # BLD AUTO: 5.03 10E12/L (ref 3.8–5.2)
RBC #/AREA URNS AUTO: 2 /HPF (ref 0–2)
SODIUM SERPL-SCNC: 138 MMOL/L (ref 133–144)
SOURCE: ABNORMAL
SP GR UR STRIP: 1.03 (ref 1–1.03)
SQUAMOUS #/AREA URNS AUTO: <1 /HPF (ref 0–1)
UROBILINOGEN UR STRIP-MCNC: 0 MG/DL (ref 0–2)
WBC # BLD AUTO: 9.5 10E9/L (ref 4–11)
WBC #/AREA URNS AUTO: 4 /HPF (ref 0–5)

## 2021-03-27 PROCEDURE — 99284 EMERGENCY DEPT VISIT MOD MDM: CPT | Performed by: NURSE PRACTITIONER

## 2021-03-27 PROCEDURE — 87086 URINE CULTURE/COLONY COUNT: CPT | Performed by: NURSE PRACTITIONER

## 2021-03-27 PROCEDURE — 99285 EMERGENCY DEPT VISIT HI MDM: CPT | Mod: 25 | Performed by: NURSE PRACTITIONER

## 2021-03-27 PROCEDURE — 85025 COMPLETE CBC W/AUTO DIFF WBC: CPT | Performed by: NURSE PRACTITIONER

## 2021-03-27 PROCEDURE — G1004 CDSM NDSC: HCPCS

## 2021-03-27 PROCEDURE — 81001 URINALYSIS AUTO W/SCOPE: CPT | Performed by: EMERGENCY MEDICINE

## 2021-03-27 PROCEDURE — 250N000009 HC RX 250: Performed by: NURSE PRACTITIONER

## 2021-03-27 PROCEDURE — 250N000011 HC RX IP 250 OP 636: Performed by: NURSE PRACTITIONER

## 2021-03-27 PROCEDURE — 80053 COMPREHEN METABOLIC PANEL: CPT | Performed by: NURSE PRACTITIONER

## 2021-03-27 RX ORDER — IOPAMIDOL 755 MG/ML
100 INJECTION, SOLUTION INTRAVASCULAR ONCE
Status: COMPLETED | OUTPATIENT
Start: 2021-03-27 | End: 2021-03-27

## 2021-03-27 RX ADMIN — IOPAMIDOL 70 ML: 755 INJECTION, SOLUTION INTRAVENOUS at 13:56

## 2021-03-27 RX ADMIN — SODIUM CHLORIDE 60 ML: 9 INJECTION, SOLUTION INTRAVENOUS at 13:56

## 2021-03-27 ASSESSMENT — ENCOUNTER SYMPTOMS
CHILLS: 0
ABDOMINAL PAIN: 0
BACK PAIN: 1
FEVER: 0
DYSURIA: 0
VOMITING: 0
BLOOD IN STOOL: 0
APPETITE CHANGE: 0
DIFFICULTY URINATING: 0
FLANK PAIN: 1
ABDOMINAL DISTENTION: 1
DIARRHEA: 0
FREQUENCY: 0
CONSTIPATION: 1
NAUSEA: 0
NEUROLOGICAL NEGATIVE: 1
FATIGUE: 0
COUGH: 0

## 2021-03-27 NOTE — DISCHARGE INSTRUCTIONS
Labs are normal..  Abdominal CT scan shows no acute worrisome findings.  ---You do have some compression to your L1-L3 vertebra (upper lumbar spine). This may have been there for a while, but could cause some back pain.  Treat your back pain with extra strength Tylenol 2 tablets every 8 hours as needed.  For constipation: Use MiraLAX 1 capful in 8 ounces of water daily for the next 2-3 days.  Make appointment for recheck in clinic next week.  Return to the emergency department for fevers, increased pain, vomiting, or any new symptoms of concern.

## 2021-03-27 NOTE — ED TRIAGE NOTES
Pt c/o atraumatic Right LBP x 2-3 days.    Does state it sometimes move across her back.  Denies urinary  Symptoms.

## 2021-03-27 NOTE — ED PROVIDER NOTES
"  History     Chief Complaint   Patient presents with     Back Pain     HPI  Khalida Rouse is a 81 year old female with history of Htn, aortic valve stenosis (s/p aortic valve replacement in 2014), atrial flutter/fibrillation (on Coumadin), cardiomyopathy, who presents to the emergency department for evaluation of right flank pain.  Symptoms started yesterday.  Worsening today.  Pain is focal to the right lower back/flank region and does not radiate.  Increases with movement.  Denies fever or chills.  Denies dysuria, urinary urgency, or urinary frequency.  Reports low abdominal distention and bloating today.  States that she thinks she is \"backed up\" (constipated).  Bowel movement was yesterday, small.  Denies diarrhea.  Denies black or bloody stools.  Denies nausea or vomiting.  Denies history of kidney stones or frequent UTIs.    Allergies:  Allergies   Allergen Reactions     Xarelto [Rivaroxaban] Diarrhea     Ace Inhibitors Cough       Problem List:    Patient Active Problem List    Diagnosis Date Noted     Hypoxemia 05/23/2019     Priority: Medium     Other secondary pulmonary hypertension (H) 05/14/2019     Priority: Medium     Long term current use of anticoagulant therapy 03/11/2016     Priority: Medium     Atrial fibrillation with RVR (H) 01/11/2016     Priority: Medium     Pneumonia 01/09/2016     Priority: Medium     Osteoporosis 10/26/2015     Priority: Medium     Advanced directives, counseling/discussion 10/03/2014     Priority: Medium     Declined       S/P AVR (aortic valve replacement) 04/10/2014     Priority: Medium     Atrial flutter 02/04/2014     Priority: Medium        Past Medical History:    Past Medical History:   Diagnosis Date     Aortic valve stenosis      Atrial flutter (H)      Cardiomyopathy (H)      Severe aortic stenosis 4/7/2014     Shoulder fracture, left 3/22/15       Past Surgical History:    Past Surgical History:   Procedure Laterality Date     CATARACT IOL, RT/LT Right      ENT " SURGERY      glaucoma surgery     REPLACE VALVE AORTIC  4/10/2014    Procedure: REPLACE VALVE AORTIC;  Median sternotomy, Replace Aortic Valve on pump oxygenation. ;  Surgeon: Wesley Fong MD;  Location:  OR       Family History:    Family History   Problem Relation Age of Onset     Dementia Brother      Alzheimer Disease Sister        Social History:  Marital Status:   [5]  Social History     Tobacco Use     Smoking status: Never Smoker     Smokeless tobacco: Never Used   Substance Use Topics     Alcohol use: No     Alcohol/week: 0.0 standard drinks     Drug use: No        Medications:    acetaminophen (TYLENOL) 500 MG tablet  atenolol (TENORMIN) 50 MG tablet  calcium carbonate-vitamin D 500-400 MG-UNIT TABS tablt  diltiazem ER COATED BEADS (CARDIZEM CD/CARTIA XT) 180 MG 24 hr capsule  docusate sodium (COLACE) 100 MG capsule  methylcellulose (CITRUCEL) powder  phenazopyridine (PYRIDIUM) 100 MG tablet  polyethylene glycol (MIRALAX) 17 GM/Dose powder  torsemide (DEMADEX) 20 MG tablet  trimethoprim-polymyxin b (POLYTRIM) 83036-1.1 UNIT/ML-% ophthalmic solution  warfarin ANTICOAGULANT (COUMADIN) 2.5 MG tablet  warfarin ANTICOAGULANT (COUMADIN) 2.5 MG tablet          Review of Systems   Constitutional: Negative for appetite change, chills, fatigue and fever.   HENT: Negative for congestion.    Respiratory: Negative for cough.    Cardiovascular: Negative for chest pain.   Gastrointestinal: Positive for abdominal distention and constipation. Negative for abdominal pain, blood in stool, diarrhea, nausea and vomiting.   Genitourinary: Positive for flank pain. Negative for difficulty urinating, dysuria, frequency and urgency.   Musculoskeletal: Positive for back pain.   Skin: Negative.    Neurological: Negative.    All other systems reviewed and are negative.      Physical Exam   BP: 137/84  Pulse: 74  Temp: 98.7  F (37.1  C)  Resp: 18  Weight: 65.3 kg (144 lb)  SpO2: 96 %      Physical  Exam  Constitutional:       General: She is not in acute distress.     Appearance: Normal appearance. She is not ill-appearing or diaphoretic.   HENT:      Head: Atraumatic.      Mouth/Throat:      Pharynx: No oropharyngeal exudate.   Eyes:      General: No scleral icterus.     Conjunctiva/sclera: Conjunctivae normal.   Cardiovascular:      Rate and Rhythm: Normal rate and regular rhythm.      Heart sounds: No murmur.   Pulmonary:      Effort: Pulmonary effort is normal. No respiratory distress.      Breath sounds: Normal breath sounds.   Abdominal:      General: There is distension (low abdominal).      Palpations: Abdomen is soft. There is no hepatomegaly or splenomegaly.      Tenderness: There is no abdominal tenderness. There is right CVA tenderness. There is no left CVA tenderness.   Musculoskeletal: Normal range of motion.   Skin:     General: Skin is warm and dry.      Findings: No rash.   Neurological:      General: No focal deficit present.      Mental Status: She is alert and oriented to person, place, and time.         ED Course        Procedures             Results for orders placed or performed during the hospital encounter of 03/27/21 (from the past 24 hour(s))   CBC with platelets differential   Result Value Ref Range    WBC 9.5 4.0 - 11.0 10e9/L    RBC Count 5.03 3.8 - 5.2 10e12/L    Hemoglobin 14.6 11.7 - 15.7 g/dL    Hematocrit 45.2 35.0 - 47.0 %    MCV 90 78 - 100 fl    MCH 29.0 26.5 - 33.0 pg    MCHC 32.3 31.5 - 36.5 g/dL    RDW 14.3 10.0 - 15.0 %    Platelet Count 187 150 - 450 10e9/L    Diff Method Automated Method     % Neutrophils 76.4 %    % Lymphocytes 8.4 %    % Monocytes 10.9 %    % Eosinophils 3.0 %    % Basophils 0.6 %    % Immature Granulocytes 0.7 %    Nucleated RBCs 0 0 /100    Absolute Neutrophil 7.2 1.6 - 8.3 10e9/L    Absolute Lymphocytes 0.8 0.8 - 5.3 10e9/L    Absolute Monocytes 1.0 0.0 - 1.3 10e9/L    Absolute Eosinophils 0.3 0.0 - 0.7 10e9/L    Absolute Basophils 0.1 0.0 - 0.2  10e9/L    Abs Immature Granulocytes 0.1 0 - 0.4 10e9/L    Absolute Nucleated RBC 0.0    Comprehensive metabolic panel   Result Value Ref Range    Sodium 138 133 - 144 mmol/L    Potassium 4.7 3.4 - 5.3 mmol/L    Chloride 105 94 - 109 mmol/L    Carbon Dioxide 30 20 - 32 mmol/L    Anion Gap 3 3 - 14 mmol/L    Glucose 92 70 - 99 mg/dL    Urea Nitrogen 14 7 - 30 mg/dL    Creatinine 0.59 0.52 - 1.04 mg/dL    GFR Estimate 85 >60 mL/min/[1.73_m2]    GFR Estimate If Black >90 >60 mL/min/[1.73_m2]    Calcium 9.9 8.5 - 10.1 mg/dL    Bilirubin Total 0.5 0.2 - 1.3 mg/dL    Albumin 4.1 3.4 - 5.0 g/dL    Protein Total 7.9 6.8 - 8.8 g/dL    Alkaline Phosphatase 112 40 - 150 U/L    ALT 22 0 - 50 U/L    AST 22 0 - 45 U/L   CT ABDOMEN PELVIS W CONTRAST    Narrative    CT ABDOMEN/PELVIS WITH CONTRAST March 27, 2021 2:09 PM    CLINICAL HISTORY: Right flank pain. Lower abdominal bloating.    TECHNIQUE: CT scan of the abdomen and pelvis was performed following  injection of IV contrast. Multiplanar reformats were obtained. Dose  reduction techniques were used.  CONTRAST: 70mL Isovue-370.    COMPARISON: None.    FINDINGS:   LOWER CHEST: Small amount of pleural fluid on the right. Cardiac  enlargement. Sternotomy and aortic valve prosthesis. Coronary artery  calcification.    HEPATOBILIARY: Two large calcified gallstones are noted within the  gallbladder. Mild nodularity of the liver contour raises the  possibility of cirrhosis. No focal hepatic lesions are identified.    PANCREAS: Normal.    SPLEEN: Normal.    ADRENAL GLANDS: Normal.    KIDNEYS/BLADDER: There are at least two nonobstructing stones in the  left kidney, with the largest stone or cluster of stones in the lower  pole measuring 0.6 cm 2.9 cm cyst in the upper pole of the right  kidney laterally with typically requiring no routine follow-up. The  kidneys are otherwise unremarkable. No hydronephrosis.    BOWEL: No bowel obstruction. Scattered colonic diverticulosis.  No  convincing evidence for colitis or diverticulitis. Unremarkable  appendix.    PELVIC ORGANS: Unremarkable.    LYMPH NODES: No enlarged lymph nodes are identified in the abdomen or  pelvis.    VASCULATURE: Mild atherosclerotic aortoiliac calcification.    ADDITIONAL FINDINGS: None.    MUSCULOSKELETAL: Degenerative changes are noted throughout the  visualized thoracolumbar spine mild age-indeterminate anterior  compression of the L1-L3 vertebral bodies.      Impression    IMPRESSION:   1.  Cholelithiasis.  2.  Mild nodularity of the liver contour raises the possibility of  cirrhosis.  3.  There are at least two nonobstructing stones in the left kidney,  with the largest measuring 0.6 cm.  4.  Small amount of pleural fluid on the right.  5.  Cardiac enlargement.  6.  Mild age-indeterminate anterior compression of the L1-L3 vertebral  bodies.  7.  Colonic diverticulosis, without convincing evidence for  diverticulitis.   Routine UA with microscopic   Result Value Ref Range    Color Urine Yellow     Appearance Urine Clear     Glucose Urine Negative NEG^Negative mg/dL    Bilirubin Urine Negative NEG^Negative    Ketones Urine Negative NEG^Negative mg/dL    Specific Gravity Urine 1.030 1.003 - 1.035    Blood Urine Negative NEG^Negative    pH Urine 6.0 5.0 - 7.0 pH    Protein Albumin Urine Negative NEG^Negative mg/dL    Urobilinogen mg/dL 0.0 0.0 - 2.0 mg/dL    Nitrite Urine Negative NEG^Negative    Leukocyte Esterase Urine Small (A) NEG^Negative    Source Unspecified Urine     WBC Urine 4 0 - 5 /HPF    RBC Urine 2 0 - 2 /HPF    Squamous Epithelial /HPF Urine <1 0 - 1 /HPF    Mucous Urine Present (A) NEG^Negative /LPF       Medications   Saline 100mL Bag (60 mLs Intravenous Given 3/27/21 1356)   sodium chloride (PF) 0.9% PF flush 3 mL (3 mLs Intravenous Given 3/27/21 1358)   iopamidol (ISOVUE-370) solution 100 mL (70 mLs Intravenous Given 3/27/21 1356)       Assessments & Plan (with Medical Decision Making)   81 year old  female with history of Htn, aortic valve stenosis (s/p aortic valve replacement in 2014), atrial flutter/fibrillation (on Coumadin), cardiomyopathy, who presents to the emergency department for evaluation of right flank pain, low abdominal bloating/distention, and constipation.  Symptoms started yesterday.     On exam patient is alert and oriented. Afebrile. Normal vital signs. She does not appear ill or distressed. Right CVA tenderness. Low abdominal distention is appreciated. No significant abdominal tenderness or evidence of peritonitis. Labs are unremarkable. UA reveals small leukocyte Estrace, but otherwise no strong evidence for infection.  Cultures pending.  Abdomina/pelvis CT obtained and reveals cholelithiasis.  Mild nodularity of the liver concerning for possible cirrhosis.  There is 2 nonobstructing stones in the left kidney.  Small amount of right side pleural fluid is noted.  Cardiac enlargement.  There is mild age-indeterminate anterior compression of L5 1 to L3 vertebral bodies, which could cause her back pain.  There is colonic diverticulosis without convincing evidence for diverticulitis.  No evidence of appendicitis.  No hydronephrosis or stranding concerning for obstructing ureteral stone or pyelonephritis. She has no shortness of breath or respiratory symptoms so unlikely that the small right pleural effusion is causing her right low back pain. I suspect this is musculoskeletal given it worsens with movement especially when she bends over.  It may be related to her L1-L3 compression, and unclear if this is acute.  I discussed the lab and imaging findings with patient.  Patient is very concerned about her bloating and not much of a bowel movement in the last 2 days. We discussed the use of Miralax.     Plan as follows:    Labs are normal..  Abdominal CT scan shows no acute worrisome findings.  ---You do have some compression to your L1-L3 vertebra (upper lumbar spine). This may have been there for  a while, but could cause some back pain.  Treat your back pain with extra strength Tylenol 2 tablets every 8 hours as needed.  For constipation: Use MiraLAX 1 capful in 8 ounces of water daily for the next 2-3 days.  Make appointment for recheck in clinic next week.  Return to the emergency department for fevers, increased pain, vomiting, or any new symptoms of concern.    I have reviewed the nursing notes.    I have reviewed the findings, diagnosis, plan and need for follow up with the patient.      New Prescriptions    No medications on file       Final diagnoses:   Constipation, unspecified constipation type   Acute right-sided low back pain without sciatica       3/27/2021   [facility] EMERGENCY DEPT     Gerri, DARLENE Anderson CNP  03/27/21 1277

## 2021-03-28 LAB
BACTERIA SPEC CULT: NORMAL
Lab: NORMAL
SPECIMEN SOURCE: NORMAL

## 2021-03-28 NOTE — RESULT ENCOUNTER NOTE
[facility] Emergency Dept scharge antibiotic (if prescribed): None  No changes in treatment per [facility] ED Lab Result Urine Urine culture protocol.

## 2021-03-29 ENCOUNTER — APPOINTMENT (OUTPATIENT)
Dept: GENERAL RADIOLOGY | Facility: CLINIC | Age: 81
End: 2021-03-29
Attending: EMERGENCY MEDICINE
Payer: MEDICARE

## 2021-03-29 ENCOUNTER — PATIENT OUTREACH (OUTPATIENT)
Dept: CARE COORDINATION | Facility: CLINIC | Age: 81
End: 2021-03-29

## 2021-03-29 ENCOUNTER — HOSPITAL ENCOUNTER (EMERGENCY)
Facility: CLINIC | Age: 81
Discharge: HOME | End: 2021-03-29
Attending: EMERGENCY MEDICINE | Admitting: EMERGENCY MEDICINE
Payer: MEDICARE

## 2021-03-29 VITALS
TEMPERATURE: 97.5 F | HEART RATE: 68 BPM | DIASTOLIC BLOOD PRESSURE: 83 MMHG | RESPIRATION RATE: 20 BRPM | OXYGEN SATURATION: 98 % | SYSTOLIC BLOOD PRESSURE: 126 MMHG

## 2021-03-29 DIAGNOSIS — Z79.01 LONG TERM CURRENT USE OF ANTICOAGULANT THERAPY: ICD-10-CM

## 2021-03-29 DIAGNOSIS — K59.00 CONSTIPATION, UNSPECIFIED CONSTIPATION TYPE: ICD-10-CM

## 2021-03-29 DIAGNOSIS — Z71.89 OTHER SPECIFIED COUNSELING: ICD-10-CM

## 2021-03-29 DIAGNOSIS — I48.91 ATRIAL FIBRILLATION WITH RVR (H): ICD-10-CM

## 2021-03-29 PROCEDURE — 74019 RADEX ABDOMEN 2 VIEWS: CPT

## 2021-03-29 PROCEDURE — 99284 EMERGENCY DEPT VISIT MOD MDM: CPT | Performed by: EMERGENCY MEDICINE

## 2021-03-29 PROCEDURE — 250N000013 HC RX MED GY IP 250 OP 250 PS 637: Performed by: EMERGENCY MEDICINE

## 2021-03-29 PROCEDURE — 99283 EMERGENCY DEPT VISIT LOW MDM: CPT | Performed by: EMERGENCY MEDICINE

## 2021-03-29 RX ORDER — IBUPROFEN 200 MG
200 TABLET ORAL EVERY 4 HOURS PRN
COMMUNITY
End: 2021-04-27

## 2021-03-29 RX ORDER — POLYETHYLENE GLYCOL 3350 17 G/17G
1 POWDER, FOR SOLUTION ORAL 3 TIMES DAILY PRN
Qty: 116 G | Refills: 0 | Status: ON HOLD | OUTPATIENT
Start: 2021-03-29 | End: 2021-04-13

## 2021-03-29 RX ADMIN — DOCUSATE SODIUM 286 ML: 50 LIQUID ORAL at 16:20

## 2021-03-29 NOTE — RESULT ENCOUNTER NOTE
Final urine culture report is NEGATIVE  No change in treatment per [facility] ED Lab Result Urine Culture protocol.

## 2021-03-29 NOTE — ED PROVIDER NOTES
History     Chief Complaint   Patient presents with     Abdominal Pain     HPI  Khalida Rouse is a 81 year old female  with history of Htn, aortic valve stenosis (s/p aortic valve replacement in 2014), atrial flutter/fibrillation (on Coumadin), cardiomyopathy, who presents to the emergency department for evaluation of Presents to the emergency department secondary to abdominal pain.  It started 4 hours ago, it is located in the diffuse,  is crampy, is 5/10 waxing and waningUnchanged since it started.  Associated symptoms include small hard stool this morning - but no nausea, vomiting, diarrhea, melena, BRBPR, belching, flatus, fever, chills, sweats, myalgias, headache, dysuria, urinary frequency, urinary urgency and back pain. She tried miralax at 11 am without results yet.     She has had  less bowel movements lately and they have been hard.        Allergies:  Allergies   Allergen Reactions     Xarelto [Rivaroxaban] Diarrhea     Ace Inhibitors Cough       Problem List:    Patient Active Problem List    Diagnosis Date Noted     Hypoxemia 05/23/2019     Priority: Medium     Other secondary pulmonary hypertension (H) 05/14/2019     Priority: Medium     Long term current use of anticoagulant therapy 03/11/2016     Priority: Medium     Atrial fibrillation with RVR (H) 01/11/2016     Priority: Medium     Pneumonia 01/09/2016     Priority: Medium     Osteoporosis 10/26/2015     Priority: Medium     Advanced directives, counseling/discussion 10/03/2014     Priority: Medium     Declined       S/P AVR (aortic valve replacement) 04/10/2014     Priority: Medium     Atrial flutter 02/04/2014     Priority: Medium        Past Medical History:    Past Medical History:   Diagnosis Date     Aortic valve stenosis      Atrial flutter (H)      Cardiomyopathy (H)      Severe aortic stenosis 4/7/2014     Shoulder fracture, left 3/22/15       Past Surgical History:    Past Surgical History:   Procedure Laterality Date     CATARACT IOL,  RT/LT Right      ENT SURGERY      glaucoma surgery     REPLACE VALVE AORTIC  4/10/2014    Procedure: REPLACE VALVE AORTIC;  Median sternotomy, Replace Aortic Valve on pump oxygenation. ;  Surgeon: Ajit, Wesley Robison MD;  Location:  OR       Family History:    Family History   Problem Relation Age of Onset     Dementia Brother      Alzheimer Disease Sister        Social History:  Marital Status:   [5]  Social History     Tobacco Use     Smoking status: Never Smoker     Smokeless tobacco: Never Used   Substance Use Topics     Alcohol use: No     Alcohol/week: 0.0 standard drinks     Drug use: No        Medications:    acetaminophen (TYLENOL) 500 MG tablet  ibuprofen (ADVIL/MOTRIN) 200 MG tablet  polyethylene glycol (MIRALAX) 17 GM/Dose powder  trimethoprim-polymyxin b (POLYTRIM) 18556-7.1 UNIT/ML-% ophthalmic solution  warfarin ANTICOAGULANT (COUMADIN) 2.5 MG tablet  atenolol (TENORMIN) 50 MG tablet  calcium carbonate-vitamin D 500-400 MG-UNIT TABS tablt  diltiazem ER COATED BEADS (CARDIZEM CD/CARTIA XT) 180 MG 24 hr capsule  docusate sodium (COLACE) 100 MG capsule  methylcellulose (CITRUCEL) powder  phenazopyridine (PYRIDIUM) 100 MG tablet  torsemide (DEMADEX) 20 MG tablet          Review of Systems   All other systems reviewed and are negative.      Physical Exam   BP: 126/83  Pulse: 67  Temp: 97.5  F (36.4  C)  Resp: 18  SpO2: 96 %      Physical Exam  Vitals signs and nursing note reviewed.   Constitutional:       General: She is not in acute distress.     Appearance: She is well-developed. She is not diaphoretic.   HENT:      Head: Normocephalic and atraumatic.   Eyes:      General: No scleral icterus.  Neck:      Musculoskeletal: Normal range of motion and neck supple.   Abdominal:      General: There is distension. There are no signs of injury.      Palpations: There is no shifting dullness, hepatomegaly or mass.      Tenderness: There is generalized abdominal tenderness.   Skin:     General: Skin  is warm and dry.      Coloration: Skin is not pale.      Findings: No erythema or rash.   Neurological:      General: No focal deficit present.      Mental Status: She is alert and oriented to person, place, and time.         ED Course        Procedures                   Results for orders placed or performed during the hospital encounter of 03/29/21 (from the past 24 hour(s))   XR Abdomen 2 Views    Narrative    XR ABDOMEN TWO VIEWS   3/29/2021 3:14 PM     HISTORY: Abdominal pain constipation    COMPARISON: CT abdomen pelvis on 3/27/2021      Impression    IMPRESSION: Supine and upright views of abdomen pelvis were obtained.  Few gas-filled colonic loops in the left midabdomen with moderate  amount of stool throughout the colon. Otherwise, nonobstructive bowel  gas pattern. No free peritoneal or portal venous gas. Cardiomegaly  with postsurgical changes of prosthetic aortic valve replacement.    CELESTINE MOREJON MD       Medications   pink lady enema (COMPOUNDED: docusate, magnesium citrate, mineral oil, sodium phosphate) (286 mLs Rectal Given 3/29/21 1620)       Assessments & Plan (with Medical Decision Making)  81 yr old female with constipation.  Xray performed showing a large amount of stool.  I discussed options with the patient and she did want to proceed with an enema.  Pink lady ordered.   The patient had some output with the pink lady but had more gas pains.  She did not develop focal abdominal pain in the emergency department.  She has no associated symptoms in her pain is diffuse and seems to be related to constipation.  I advised the patient to return to the emergency department should she develop new or worsening symptoms such as focal abdominal pain, fever, vomiting etc.  She is in agreement with that plan.  She was told she can take MiraLAX up to 3 times a day until she has a bowel movement.  Also advised to drink plenty of water and eat a nonconstipating diet.     I have reviewed the nursing  notes.    I have reviewed the findings, diagnosis, plan and need for follow up with the patient.      New Prescriptions    POLYETHYLENE GLYCOL (MIRALAX) 17 GM/DOSE POWDER    Take 17 g (1 capful) by mouth 3 times daily as needed for constipation       Final diagnoses:   Constipation, unspecified constipation type       3/29/2021   [facility] EMERGENCY DEPT     Blayne Hernandez MD  03/29/21 [id number]

## 2021-03-29 NOTE — DISCHARGE INSTRUCTIONS
Take the MiraLAX up to 3 times a day until you have a bowel movement.  Drink plenty of water and avoid constipating foods.  Return to the emergency department if pain becomes out of control, other symptoms develop such as fever or vomiting.  I hope you get better quickly.

## 2021-03-29 NOTE — PROGRESS NOTES
Clinic Care Coordination Contact  Community Health Worker Initial Outreach          The patient is in the ED CHW will monitor for discharge. CHW will defer initial outreach at this time.

## 2021-03-29 NOTE — ED NOTES
Patient had small light brown formed stool, passing a lot of flatus. Appears a lot more comfortable. Back in bed.

## 2021-03-30 DIAGNOSIS — Z79.01 LONG TERM CURRENT USE OF ANTICOAGULANT THERAPY: Primary | ICD-10-CM

## 2021-03-30 DIAGNOSIS — I48.91 ATRIAL FIBRILLATION WITH RVR (H): ICD-10-CM

## 2021-03-30 DIAGNOSIS — Z95.2 S/P AVR (AORTIC VALVE REPLACEMENT): ICD-10-CM

## 2021-03-30 NOTE — PROGRESS NOTES
Clinic Care Coordination Contact  [facility]/Voicemail       Clinical Data: Care Coordinator Outreach  Outreach attempted x 1.  Left message on patient's voicemail with call back information and requested return call.  Plan:  Care Coordinator will try to reach patient again in 1-2 business days.

## 2021-03-31 ENCOUNTER — NURSE TRIAGE (OUTPATIENT)
Dept: INTERNAL MEDICINE | Facility: CLINIC | Age: 81
End: 2021-03-31

## 2021-03-31 ENCOUNTER — HOSPITAL ENCOUNTER (EMERGENCY)
Facility: CLINIC | Age: 81
Discharge: HOME | End: 2021-03-31
Attending: PHYSICIAN ASSISTANT | Admitting: PHYSICIAN ASSISTANT
Payer: MEDICARE

## 2021-03-31 VITALS
OXYGEN SATURATION: 97 % | TEMPERATURE: 97.4 F | DIASTOLIC BLOOD PRESSURE: 74 MMHG | HEART RATE: 82 BPM | RESPIRATION RATE: 16 BRPM | SYSTOLIC BLOOD PRESSURE: 156 MMHG

## 2021-03-31 DIAGNOSIS — K59.00 CONSTIPATION: ICD-10-CM

## 2021-03-31 PROCEDURE — 99283 EMERGENCY DEPT VISIT LOW MDM: CPT | Performed by: PHYSICIAN ASSISTANT

## 2021-03-31 PROCEDURE — 99284 EMERGENCY DEPT VISIT MOD MDM: CPT | Performed by: PHYSICIAN ASSISTANT

## 2021-03-31 RX ORDER — ATENOLOL 50 MG/1
TABLET ORAL
Qty: 180 TABLET | Refills: 1 | Status: ON HOLD | OUTPATIENT
Start: 2021-03-31 | End: 2021-05-14

## 2021-03-31 RX ORDER — WARFARIN SODIUM 2.5 MG/1
TABLET ORAL
Qty: 160 TABLET | Refills: 0 | Status: SHIPPED | OUTPATIENT
Start: 2021-03-31 | End: 2021-04-12

## 2021-03-31 NOTE — TELEPHONE ENCOUNTER
Routing refill request to provider for review/approval because:  Medication is reported/historical (Coumadin)    SANA PearsonN, RN  [facility]

## 2021-03-31 NOTE — ED PROVIDER NOTES
"  History     Chief Complaint   Patient presents with     Constipation     The history is provided by the patient.     Khalida Rouse is a 81 year old female who presents to the emergency department for the third time in the past 4 days secondary to constipation. The patient was seen in the ED on 03/27/2021 and again on 03/29/2021, with use of pink lady enema once and instructions to use miralax daily. She had little results in the ED and has continued to have little results at home. Abdominal CT and x-ray have shown moderate amounts of stool in the colon. She states that her abdomen feels \"hard\" and bloated. She had a small bowel movement yesterday and another small bowel movement today, but states that she is not \"cleaning out\". She has mild abdominal pain and cramping when lying down, but notes this being worsened when up and walking. Rates it 6 on a scale of 10. No bloody or black stools. Denies nausea, vomiting, fevers, chills, SOB, cough. The patient does not have a history of constipation or bowel obstructions. No recent pain medications use, exercise changes, diet changes, medication changes.     Abdominal x-ray 03/29/2021:     IMPRESSION: Supine and upright views of abdomen pelvis were obtained.  Few gas-filled colonic loops in the left midabdomen with moderate  amount of stool throughout the colon. Otherwise, nonobstructive bowel  gas pattern. No free peritoneal or portal venous gas. Cardiomegaly  with postsurgical changes of prosthetic aortic valve replacement.    Abdominal CT 03/27/2021:    IMPRESSION:   1.  Cholelithiasis.  2.  Mild nodularity of the liver contour raises the possibility of  cirrhosis.  3.  There are at least two nonobstructing stones in the left kidney,  with the largest measuring 0.6 cm.  4.  Small amount of pleural fluid on the right.  5.  Cardiac enlargement.  6.  Mild age-indeterminate anterior compression of the L1-L3 vertebral  bodies.  7.  Colonic diverticulosis, without " convincing evidence for  diverticulitis.    Allergies:  Allergies   Allergen Reactions     Xarelto [Rivaroxaban] Diarrhea     Ace Inhibitors Cough       Problem List:    Patient Active Problem List    Diagnosis Date Noted     Hypoxemia 05/23/2019     Priority: Medium     Other secondary pulmonary hypertension (H) 05/14/2019     Priority: Medium     Long term current use of anticoagulant therapy 03/11/2016     Priority: Medium     Atrial fibrillation with RVR (H) 01/11/2016     Priority: Medium     Pneumonia 01/09/2016     Priority: Medium     Osteoporosis 10/26/2015     Priority: Medium     Advanced directives, counseling/discussion 10/03/2014     Priority: Medium     Declined       S/P AVR (aortic valve replacement) 04/10/2014     Priority: Medium     Atrial flutter 02/04/2014     Priority: Medium        Past Medical History:    Past Medical History:   Diagnosis Date     Aortic valve stenosis      Atrial flutter (H)      Cardiomyopathy (H)      Severe aortic stenosis 4/7/2014     Shoulder fracture, left 3/22/15       Past Surgical History:    Past Surgical History:   Procedure Laterality Date     CATARACT IOL, RT/LT Right      ENT SURGERY      glaucoma surgery     REPLACE VALVE AORTIC  4/10/2014    Procedure: REPLACE VALVE AORTIC;  Median sternotomy, Replace Aortic Valve on pump oxygenation. ;  Surgeon: Wesley Fong MD;  Location:  OR       Family History:    Family History   Problem Relation Age of Onset     Dementia Brother      Alzheimer Disease Sister        Social History:  Marital Status:   [5]  Social History     Tobacco Use     Smoking status: Never Smoker     Smokeless tobacco: Never Used   Substance Use Topics     Alcohol use: No     Alcohol/week: 0.0 standard drinks     Drug use: No        Medications:    acetaminophen (TYLENOL) 500 MG tablet  atenolol (TENORMIN) 50 MG tablet  calcium carbonate-vitamin D 500-400 MG-UNIT TABS tablt  diltiazem ER COATED BEADS (CARDIZEM CD/CARTIA XT) 180  MG 24 hr capsule  docusate sodium (COLACE) 100 MG capsule  ibuprofen (ADVIL/MOTRIN) 200 MG tablet  methylcellulose (CITRUCEL) powder  phenazopyridine (PYRIDIUM) 100 MG tablet  polyethylene glycol (MIRALAX) 17 GM/Dose powder  torsemide (DEMADEX) 20 MG tablet  trimethoprim-polymyxin b (POLYTRIM) 30896-4.1 UNIT/ML-% ophthalmic solution  warfarin ANTICOAGULANT (COUMADIN) 2.5 MG tablet  warfarin ANTICOAGULANT (COUMADIN) 2.5 MG tablet          Review of Systems   All other systems reviewed and are negative.      Physical Exam   BP: 133/89  Pulse: 82  Temp: 97.4  F (36.3  C)  Resp: 16  SpO2: 97 %      Physical Exam  Vitals signs and nursing note reviewed.   Constitutional:       General: She is not in acute distress.     Appearance: She is not diaphoretic.      Comments: Very slow with any movement secondary to her back pain.   HENT:      Head: Normocephalic and atraumatic.      Right Ear: External ear normal.      Left Ear: External ear normal.      Nose: Nose normal.      Mouth/Throat:      Pharynx: No oropharyngeal exudate.   Eyes:      General: No scleral icterus.        Right eye: No discharge.         Left eye: No discharge.      Conjunctiva/sclera: Conjunctivae normal.      Pupils: Pupils are equal, round, and reactive to light.   Neck:      Musculoskeletal: Normal range of motion and neck supple.      Thyroid: No thyromegaly.   Cardiovascular:      Rate and Rhythm: Normal rate and regular rhythm.      Heart sounds: Normal heart sounds. No murmur.   Pulmonary:      Effort: Pulmonary effort is normal. No respiratory distress.      Breath sounds: Normal breath sounds. No wheezing or rales.   Chest:      Chest wall: No tenderness.   Abdominal:      General: Bowel sounds are normal. There is distension.      Palpations: Abdomen is soft. There is no mass.      Tenderness: There is abdominal tenderness (bilateral upper quadrants). There is no right CVA tenderness, left CVA tenderness, guarding or rebound.      Hernia: No  hernia is present.   Genitourinary:     Rectum: Normal. No mass, tenderness, anal fissure or internal hemorrhoid. Normal anal tone.      Comments: No stool in the rectal vault.  Musculoskeletal: Normal range of motion.         General: No tenderness or deformity.   Lymphadenopathy:      Cervical: No cervical adenopathy.   Skin:     General: Skin is warm and dry.      Capillary Refill: Capillary refill takes less than 2 seconds.      Findings: No erythema or rash.   Neurological:      Mental Status: She is alert and oriented to person, place, and time.      Cranial Nerves: No cranial nerve deficit.   Psychiatric:         Behavior: Behavior normal.         Thought Content: Thought content normal.         ED Course        Procedures    No results found for this or any previous visit (from the past 24 hour(s)).    Medications - No data to display    Assessments & Plan (with Medical Decision Making)  Alma Gaxiola is an 81 year old female who presents to the emergency department for concerns of constipation without relief after 2 previous ED visits. This is her third visit for constipation. She has no history of constipation of bowel obstructions and notes this being the first time she has had issues with her bowels. She has had 2 small bowel movements recently, but still notes a large amount of stool. Abdomen is distended and painful. Pink lady enema was tried at previous visit with minimal results. She has been using miralax daily without relief. Patient is vitally stable upon arrival. Physical exam as noted above.   No repeat imaging was obtained since she had an abdominal CT 4 days ago and an abdominal x-ray 2 days ago. Results are in the HPI above.  Home therapies have not been successful to this point.  I reviewed the most recent x-ray from 2 days ago, and she had a rather large ball of stool in the right upper quadrant where the ascending and transverse colon join.  I think this is likely the cause for  issue.  Due to this, we did a soapsuds enema to try and break this up.  She had fair results after the first enema, but then had rather significant large stool production after the second enema.  She did feel much better.  She states that she did not have any further pain in the abdomen.  Less bloated.  I am concerned that she likely still has some stool left.  She is at high risk for recurrence given how long this has gone on.  Due to that, I recommended a bowel cleanout by taking a bottle of magnesium citrate.  If this does not work, I gave her instructions on how to take high doses of MiraLAX to provide a cleanout.  Preventative measures reviewed with her.  High-fiber and high fluid diet.  Consider stool softener.  Be proactive with MiraLAX in the future.  Patient was in agreement with this plan and was suitable for discharge.     I have reviewed the nursing notes.    I have reviewed the findings, diagnosis, plan and need for follow up with the patient.       Discharge Medication List as of 3/31/2021  8:06 PM          Final diagnoses:   Constipation       This document serves as a record of services personally performed by Gabino Che PA*. It was created on their behalf by Sonya Davis, a trained medical scribe. The creation of this record is based on the provider's personal observations and the statements of the patient. This document has been checked and approved by the attending provider.    Note: Chart documentation done in part with Dragon Voice Recognition software. Although reviewed after completion, some word and grammatical errors may remain.    3/31/2021   [facility] EMERGENCY DEPT     Gabino Che PA-C  03/31/21 [id number]

## 2021-03-31 NOTE — PROGRESS NOTES
Clinic Care Coordination Contact  Community Health Worker Initial Outreach    CHW Initial Information Gathering:  Referral Source: ED Follow-Up       Patient accepts CC: No, due to the patient stating that she was not needing any assistance at this time. The patient stated that there were no concerns at this time. The CHW informed her about CCC and how to reach out if assistance is needed.

## 2021-03-31 NOTE — TELEPHONE ENCOUNTER
"S-(situation): constipated.     B-(background): for past 2 weeks or so. Stools seem harder than usual.     A-(assessment): constipation of unknown etiology     R-(recommendations):  Follow home care measures as discussed in this protocol. Call to schedule appt if no improvement within 7 days. ...........CAITLIN Courtney        Additional Information    Negative: Abdomen pain is the main symptom and adult male    Negative: Abdomen pain is the main symptom and adult female    Negative: Rectal bleeding or blood in stool is the main symptom    Negative: Patient sounds very sick or weak to the triager    Negative: Constant abdominal pain lasting > 2 hours    Negative: Vomiting bile (green color)    Negative: Vomiting and abdomen looks much more swollen than usual    Negative: Rectal pain or fullness from fecal impaction (rectum full of stool) and NOT better after SITZ bath, suppository or enema    Negative: Abdomen is more swollen than usual    Negative: Last bowel movement (BM) > 4 days ago    Negative: Leaking stool    Negative: Intermittent mild abdominal pain and fever    Unable to have a bowel movement (BM) without manually removing stool (using finger to pull out stool or perform disimpaction)    Answer Assessment - Initial Assessment Questions  1. STOOL PATTERN OR FREQUENCY: \"How often do you pass bowel movements (BMs)?\"  (Normal range: tid to q 3 days)  \"When was the last BM passed?\"        My normal is daily. That was about 2 weeks.   2. STRAINING: \"Do you have to strain to have a BM?\"       She is straining,but not much coming out.   3. RECTAL PAIN: \"Does your rectum hurt when the stool comes out?\" If so, ask: \"Do you have hemorrhoids? How bad is the pain?\"  (Scale 1-10; or mild, moderate, severe)      NO rectal pain. NO hemorrhoids.   4. STOOL COMPOSITION: \"Are the stools hard?\"       Stools are about medium in constancy.   5. BLOOD ON STOOLS: \"Has there been any blood on the toilet tissue or on the surface of the " "BM?\" If so, ask: \"When was the last time?\"       NO blood in stools.   6. CHRONIC CONSTIPATION: \"Is this a new problem for you?\"  If no, ask: \"How long have you had this problem?\" (days, weeks, months)       This is a new problem, of maybe past 2 weeks.   7. CHANGES IN DIET: \"Have there been any recent changes in your diet?\"       NO changes in her diet. She pretty much drinks water- 4 to 6 glasses per day.   8. MEDICATIONS: \"Have you been taking any new medications?\"      NO new meds.   9. LAXATIVES: \"Have you been using any laxatives or enemas?\"  If yes, ask \"What, how often, and when was the last time?\"      She had tried Metamucil..  10. CAUSE: \"What do you think is causing the constipation?\"         Etiology unknown.   11. OTHER SYMPTOMS: \"Do you have any other symptoms?\" (e.g., abdominal pain, fever, vomiting)        Her stomach is hard.   12. PREGNANCY: \"Is there any chance you are pregnant?\" \"When was your last menstrual period?\"        NA    Protocols used: CONSTIPATION-A-[place]      "

## 2021-04-01 NOTE — ED NOTES
Patient had large results after getting the last 750 mL of the soap suds enema. She is feeling much better. She is able to move much better on her own without back or abdominal pain.

## 2021-04-01 NOTE — ED NOTES
Patient 250 ml of soap suds enema. She was able to hold it for about 10 minutes. Patient now up to the commode. Has the call light to let me know when she is finished.

## 2021-04-01 NOTE — DISCHARGE INSTRUCTIONS
It was a pleasure working with you today!  I am thankful that you are feeling much better.    To ensure that we complete this process I would recommend that you drink a bottle of magnesium citrate tonight or tomorrow morning.  You can mix this with Gatorade or lemon lime soda such as Sprite, 7-Up, or squirt.  This should finish your bowel cleanse.  If it does not work, then I would take 6 capfuls of MiraLAX mixed in 20 ounces of Gatorade or water and drink that.  I want to make sure that you do not have a repeat event of what you just went through.:)

## 2021-04-09 ENCOUNTER — OFFICE VISIT (OUTPATIENT)
Dept: INTERNAL MEDICINE | Facility: CLINIC | Age: 81
End: 2021-04-09
Payer: MEDICARE

## 2021-04-09 VITALS
HEART RATE: 111 BPM | BODY MASS INDEX: 24.3 KG/M2 | SYSTOLIC BLOOD PRESSURE: 140 MMHG | RESPIRATION RATE: 20 BRPM | OXYGEN SATURATION: 95 % | WEIGHT: 146 LBS | TEMPERATURE: 98.1 F | DIASTOLIC BLOOD PRESSURE: 86 MMHG

## 2021-04-09 DIAGNOSIS — K59.01 SLOW TRANSIT CONSTIPATION: Primary | ICD-10-CM

## 2021-04-09 PROCEDURE — 99214 OFFICE O/P EST MOD 30 MIN: CPT | Performed by: INTERNAL MEDICINE

## 2021-04-09 NOTE — PROGRESS NOTES
Sandra Gaxiola is a 81 year old who presents for the following health issues     HPI     Constipation  Onset/Duration: x2 months   Description:  Frequency of bowel movements: one every 3 days   Consistency of stool:extremley hard or diarrhea   Progression of Symptoms: improving  Accompanying signs and symptoms:    Abdominal pain: YES   Rectal pain: no   Blood in stool: no   Nausea/Vomiting: no   Weight loss or gain: no  History:   Similar problems in past: no  History of abdominal surgery: no  Chronic laxative use: no  New medications: no  Precipitating or alleviating factors: miralax helps some        EMR reviewed including:             Complaint, History of Chief Complaint, Corresponding Review of Systems, and Complaint Specific Physical Examination.    #1   Intermittent constipation with diarrhea.  Bowel movements every several days.  Generally developed diarrhea following taking MiraLAX.  No melena or hematochezia.  No recent colonoscopy.  Recent CT performed in the emergency department demonstrates cholelithiasis, diverticulosis, and no evidence of appendicitis.  Notes minimal abdominal pain at this time.  Does have occasional lower left abdominal pain prior to bowel movements.       Exam:   GI: Abdomen is soft, without rebound, guarding or tenderness. Bowel sounds are appropriate. No renal bruits are heard.  No point tenderness is noted.   HEART:  regular without rubs, clicks, gallops, or murmurs. PMI is nondisplaced. Upstrokes are brisk. S1,S2 are heard.  Bioprosthetic valve is working well.   LUNGS: clear bilaterally, airflow is brisk, no intercostal retraction or stridor is noted. No coughing is noted during visit.        Vital Signs:   BP (!) 140/86   Pulse 111   Temp 98.1  F (36.7  C) (Temporal)   Resp 20   Wt 66.2 kg (146 lb)   SpO2 95%   BMI 24.30 kg/m               Decision Making    Problem and Complexity     1. Slow transit constipation  Recommend bulking agents such as Citrucel or  Metamucil.  Recommend using the MiraLAX as needed.  Patient will notify me if she has any melena or hematochezia.  Discussed benefits of colonoscopy.           ------------------------------------------------------------------------------------------------------------------------------  Data    4=1/3 5=2/3    1  >3  Specialty (external) notes reviewed:   Previous emergency medicine notes and lab work reviewed    Tests ordered (by provider) reviewed:   CT reviewed with patient     Tests ordered (by provider):   None    Independent Historians Interview:   None    2  Review of outside (other providers) Tests:   Previous lab work reviewed    3   Verbal Discussion with Specialists:    None      ----------------------------------------------------------------------------------------------------------------------------------  Risk   Prescription drug management:   Ongoing                                High risk for toxicity:     Decision regarding surgery:    None     Social determinants of health:   None     Decision to withhold therapy:   None                                 FOLLOW UP   I have asked the patient to make an appointment for followup with me in 1 week            I have carefully explained the diagnosis and treatment options to the patient.  The patient has displayed an understanding of the above, and all subsequent questions were answered.      DO MARIA D Sabillon    Portions of this note were produced using [employer]  Although every attempt at real-time proof reading has been made, occasional grammar/syntax errors may have been missed.

## 2021-04-11 ENCOUNTER — HOSPITAL ENCOUNTER (EMERGENCY)
Facility: CLINIC | Age: 81
Discharge: LEFT BEFORE DISCHARGE | End: 2021-04-12
Payer: MEDICARE

## 2021-04-11 VITALS
SYSTOLIC BLOOD PRESSURE: 144 MMHG | TEMPERATURE: 97.4 F | HEART RATE: 94 BPM | WEIGHT: 150 LBS | OXYGEN SATURATION: 99 % | RESPIRATION RATE: 18 BRPM | BODY MASS INDEX: 24.96 KG/M2 | DIASTOLIC BLOOD PRESSURE: 83 MMHG

## 2021-04-12 ENCOUNTER — APPOINTMENT (OUTPATIENT)
Dept: CT IMAGING | Facility: CLINIC | Age: 81
DRG: 372 | End: 2021-04-12
Attending: EMERGENCY MEDICINE
Payer: MEDICARE

## 2021-04-12 ENCOUNTER — HOSPITAL ENCOUNTER (INPATIENT)
Facility: CLINIC | Age: 81
LOS: 2 days | Discharge: HOME HEALTH | DRG: 372 | End: 2021-04-14
Attending: EMERGENCY MEDICINE | Admitting: FAMILY MEDICINE
Payer: MEDICARE

## 2021-04-12 DIAGNOSIS — Z95.2 S/P AVR (AORTIC VALVE REPLACEMENT): ICD-10-CM

## 2021-04-12 DIAGNOSIS — I27.29 OTHER SECONDARY PULMONARY HYPERTENSION (H): ICD-10-CM

## 2021-04-12 DIAGNOSIS — Z11.52 ENCOUNTER FOR SCREENING LABORATORY TESTING FOR SEVERE ACUTE RESPIRATORY SYNDROME CORONAVIRUS 2 (SARS-COV-2): ICD-10-CM

## 2021-04-12 DIAGNOSIS — K59.00 CONSTIPATION, UNSPECIFIED CONSTIPATION TYPE: Primary | ICD-10-CM

## 2021-04-12 DIAGNOSIS — K35.30 ACUTE APPENDICITIS WITH LOCALIZED PERITONITIS, WITHOUT PERFORATION, ABSCESS, OR GANGRENE: ICD-10-CM

## 2021-04-12 DIAGNOSIS — I48.92 ATRIAL FLUTTER, UNSPECIFIED TYPE (H): ICD-10-CM

## 2021-04-12 LAB
ALBUMIN SERPL-MCNC: 3.4 G/DL (ref 3.4–5)
ALBUMIN UR-MCNC: NEGATIVE MG/DL
ALP SERPL-CCNC: 114 U/L (ref 40–150)
ALT SERPL W P-5'-P-CCNC: 21 U/L (ref 0–50)
AMORPH CRY #/AREA URNS HPF: ABNORMAL /HPF
ANION GAP SERPL CALCULATED.3IONS-SCNC: 4 MMOL/L (ref 3–14)
APPEARANCE UR: ABNORMAL
AST SERPL W P-5'-P-CCNC: 18 U/L (ref 0–45)
BASOPHILS # BLD AUTO: 0 10E9/L (ref 0–0.2)
BASOPHILS NFR BLD AUTO: 0.4 %
BILIRUB SERPL-MCNC: 0.7 MG/DL (ref 0.2–1.3)
BILIRUB UR QL STRIP: NEGATIVE
BUN SERPL-MCNC: 11 MG/DL (ref 7–30)
CALCIUM SERPL-MCNC: 9 MG/DL (ref 8.5–10.1)
CHLORIDE SERPL-SCNC: 105 MMOL/L (ref 94–109)
CO2 SERPL-SCNC: 28 MMOL/L (ref 20–32)
COLOR UR AUTO: YELLOW
CREAT SERPL-MCNC: 0.66 MG/DL (ref 0.52–1.04)
DIFFERENTIAL METHOD BLD: ABNORMAL
EOSINOPHIL # BLD AUTO: 0.1 10E9/L (ref 0–0.7)
EOSINOPHIL NFR BLD AUTO: 1.1 %
ERYTHROCYTE [DISTWIDTH] IN BLOOD BY AUTOMATED COUNT: 14 % (ref 10–15)
GFR SERPL CREATININE-BSD FRML MDRD: 82 ML/MIN/{1.73_M2}
GLUCOSE SERPL-MCNC: 78 MG/DL (ref 70–99)
GLUCOSE UR STRIP-MCNC: NEGATIVE MG/DL
HCT VFR BLD AUTO: 41.1 % (ref 35–47)
HGB BLD-MCNC: 13.6 G/DL (ref 11.7–15.7)
HGB UR QL STRIP: NEGATIVE
IMM GRANULOCYTES # BLD: 0 10E9/L (ref 0–0.4)
IMM GRANULOCYTES NFR BLD: 0.4 %
INR PPP: 3.1 (ref 0.86–1.14)
KETONES UR STRIP-MCNC: NEGATIVE MG/DL
LABORATORY COMMENT REPORT: NORMAL
LEUKOCYTE ESTERASE UR QL STRIP: ABNORMAL
LIPASE SERPL-CCNC: 98 U/L (ref 73–393)
LYMPHOCYTES # BLD AUTO: 0.6 10E9/L (ref 0.8–5.3)
LYMPHOCYTES NFR BLD AUTO: 5.6 %
MCH RBC QN AUTO: 29.2 PG (ref 26.5–33)
MCHC RBC AUTO-ENTMCNC: 33.1 G/DL (ref 31.5–36.5)
MCV RBC AUTO: 88 FL (ref 78–100)
MONOCYTES # BLD AUTO: 1 10E9/L (ref 0–1.3)
MONOCYTES NFR BLD AUTO: 9.5 %
MUCOUS THREADS #/AREA URNS LPF: PRESENT /LPF
NEUTROPHILS # BLD AUTO: 8.7 10E9/L (ref 1.6–8.3)
NEUTROPHILS NFR BLD AUTO: 83 %
NITRATE UR QL: NEGATIVE
NRBC # BLD AUTO: 0 10*3/UL
NRBC BLD AUTO-RTO: 0 /100
PH UR STRIP: 7 PH (ref 5–7)
PLATELET # BLD AUTO: 184 10E9/L (ref 150–450)
POTASSIUM SERPL-SCNC: 4.1 MMOL/L (ref 3.4–5.3)
PROT SERPL-MCNC: 7.5 G/DL (ref 6.8–8.8)
RADIOLOGIST FLAGS: ABNORMAL
RBC # BLD AUTO: 4.65 10E12/L (ref 3.8–5.2)
RBC #/AREA URNS AUTO: <1 /HPF (ref 0–2)
SARS-COV-2 RNA RESP QL NAA+PROBE: NEGATIVE
SODIUM SERPL-SCNC: 137 MMOL/L (ref 133–144)
SOURCE: ABNORMAL
SP GR UR STRIP: 1.02 (ref 1–1.03)
SPECIMEN SOURCE: NORMAL
SQUAMOUS #/AREA URNS AUTO: <1 /HPF (ref 0–1)
UROBILINOGEN UR STRIP-MCNC: 0 MG/DL (ref 0–2)
WBC # BLD AUTO: 10.4 10E9/L (ref 4–11)
WBC #/AREA URNS AUTO: 5 /HPF (ref 0–5)

## 2021-04-12 PROCEDURE — C9803 HOPD COVID-19 SPEC COLLECT: HCPCS | Performed by: EMERGENCY MEDICINE

## 2021-04-12 PROCEDURE — 99207 PR CDG-MDM COMPONENT: MEETS HIGH - UP CODED: CPT | Performed by: NURSE PRACTITIONER

## 2021-04-12 PROCEDURE — 85610 PROTHROMBIN TIME: CPT | Performed by: EMERGENCY MEDICINE

## 2021-04-12 PROCEDURE — 80053 COMPREHEN METABOLIC PANEL: CPT | Performed by: EMERGENCY MEDICINE

## 2021-04-12 PROCEDURE — 120N000001 HC R&B MED SURG/OB

## 2021-04-12 PROCEDURE — 81001 URINALYSIS AUTO W/SCOPE: CPT | Performed by: EMERGENCY MEDICINE

## 2021-04-12 PROCEDURE — 250N000011 HC RX IP 250 OP 636: Performed by: NURSE PRACTITIONER

## 2021-04-12 PROCEDURE — 87635 SARS-COV-2 COVID-19 AMP PRB: CPT | Performed by: EMERGENCY MEDICINE

## 2021-04-12 PROCEDURE — 96360 HYDRATION IV INFUSION INIT: CPT | Mod: 59 | Performed by: EMERGENCY MEDICINE

## 2021-04-12 PROCEDURE — 258N000003 HC RX IP 258 OP 636: Performed by: EMERGENCY MEDICINE

## 2021-04-12 PROCEDURE — 99285 EMERGENCY DEPT VISIT HI MDM: CPT | Performed by: EMERGENCY MEDICINE

## 2021-04-12 PROCEDURE — 250N000011 HC RX IP 250 OP 636: Performed by: EMERGENCY MEDICINE

## 2021-04-12 PROCEDURE — 96361 HYDRATE IV INFUSION ADD-ON: CPT | Performed by: EMERGENCY MEDICINE

## 2021-04-12 PROCEDURE — 99223 1ST HOSP IP/OBS HIGH 75: CPT | Mod: AI | Performed by: NURSE PRACTITIONER

## 2021-04-12 PROCEDURE — 250N000009 HC RX 250: Performed by: EMERGENCY MEDICINE

## 2021-04-12 PROCEDURE — 74177 CT ABD & PELVIS W/CONTRAST: CPT | Mod: ME

## 2021-04-12 PROCEDURE — 83690 ASSAY OF LIPASE: CPT | Performed by: EMERGENCY MEDICINE

## 2021-04-12 PROCEDURE — 85025 COMPLETE CBC W/AUTO DIFF WBC: CPT | Performed by: EMERGENCY MEDICINE

## 2021-04-12 PROCEDURE — 99285 EMERGENCY DEPT VISIT HI MDM: CPT | Mod: 25 | Performed by: EMERGENCY MEDICINE

## 2021-04-12 RX ORDER — ONDANSETRON 4 MG/1
4 TABLET, ORALLY DISINTEGRATING ORAL EVERY 6 HOURS PRN
Status: DISCONTINUED | OUTPATIENT
Start: 2021-04-12 | End: 2021-04-14 | Stop reason: HOSPADM

## 2021-04-12 RX ORDER — ONDANSETRON 2 MG/ML
4 INJECTION INTRAMUSCULAR; INTRAVENOUS EVERY 6 HOURS PRN
Status: DISCONTINUED | OUTPATIENT
Start: 2021-04-12 | End: 2021-04-14 | Stop reason: HOSPADM

## 2021-04-12 RX ORDER — PROCHLORPERAZINE 25 MG
12.5 SUPPOSITORY, RECTAL RECTAL EVERY 12 HOURS PRN
Status: DISCONTINUED | OUTPATIENT
Start: 2021-04-12 | End: 2021-04-14 | Stop reason: HOSPADM

## 2021-04-12 RX ORDER — PROCHLORPERAZINE MALEATE 5 MG
5 TABLET ORAL EVERY 6 HOURS PRN
Status: DISCONTINUED | OUTPATIENT
Start: 2021-04-12 | End: 2021-04-14 | Stop reason: HOSPADM

## 2021-04-12 RX ORDER — LIDOCAINE 40 MG/G
CREAM TOPICAL
Status: DISCONTINUED | OUTPATIENT
Start: 2021-04-12 | End: 2021-04-14 | Stop reason: HOSPADM

## 2021-04-12 RX ORDER — ATENOLOL 50 MG/1
50 TABLET ORAL 2 TIMES DAILY
Status: DISCONTINUED | OUTPATIENT
Start: 2021-04-12 | End: 2021-04-13

## 2021-04-12 RX ORDER — AMPICILLIN AND SULBACTAM 2; 1 G/1; G/1
3 INJECTION, POWDER, FOR SOLUTION INTRAMUSCULAR; INTRAVENOUS EVERY 6 HOURS
Status: COMPLETED | OUTPATIENT
Start: 2021-04-12 | End: 2021-04-14

## 2021-04-12 RX ORDER — IOPAMIDOL 755 MG/ML
500 INJECTION, SOLUTION INTRAVASCULAR ONCE
Status: COMPLETED | OUTPATIENT
Start: 2021-04-12 | End: 2021-04-12

## 2021-04-12 RX ORDER — AMPICILLIN AND SULBACTAM 2; 1 G/1; G/1
3 INJECTION, POWDER, FOR SOLUTION INTRAMUSCULAR; INTRAVENOUS ONCE
Status: COMPLETED | OUTPATIENT
Start: 2021-04-12 | End: 2021-04-12

## 2021-04-12 RX ORDER — SODIUM CHLORIDE 9 MG/ML
INJECTION, SOLUTION INTRAVENOUS CONTINUOUS
Status: DISCONTINUED | OUTPATIENT
Start: 2021-04-12 | End: 2021-04-12

## 2021-04-12 RX ORDER — POLYETHYLENE GLYCOL 3350 17 G/17G
17 POWDER, FOR SOLUTION ORAL 3 TIMES DAILY PRN
Status: DISCONTINUED | OUTPATIENT
Start: 2021-04-12 | End: 2021-04-14 | Stop reason: HOSPADM

## 2021-04-12 RX ORDER — DILTIAZEM HYDROCHLORIDE 180 MG/1
180 CAPSULE, COATED, EXTENDED RELEASE ORAL 2 TIMES DAILY
Status: DISCONTINUED | OUTPATIENT
Start: 2021-04-12 | End: 2021-04-14 | Stop reason: HOSPADM

## 2021-04-12 RX ADMIN — SODIUM CHLORIDE 60 ML: 9 INJECTION, SOLUTION INTRAVENOUS at 12:45

## 2021-04-12 RX ADMIN — AMPICILLIN SODIUM AND SULBACTAM SODIUM 3 G: 2; 1 INJECTION, POWDER, FOR SOLUTION INTRAMUSCULAR; INTRAVENOUS at 14:04

## 2021-04-12 RX ADMIN — SODIUM CHLORIDE 125 ML/HR: 9 INJECTION, SOLUTION INTRAVENOUS at 13:51

## 2021-04-12 RX ADMIN — SODIUM CHLORIDE 500 ML: 9 INJECTION, SOLUTION INTRAVENOUS at 12:15

## 2021-04-12 RX ADMIN — AMPICILLIN SODIUM AND SULBACTAM SODIUM 3 G: 2; 1 INJECTION, POWDER, FOR SOLUTION INTRAMUSCULAR; INTRAVENOUS at 20:26

## 2021-04-12 RX ADMIN — IOPAMIDOL 70 ML: 755 INJECTION, SOLUTION INTRAVENOUS at 12:45

## 2021-04-12 ASSESSMENT — ACTIVITIES OF DAILY LIVING (ADL)
ADLS_ACUITY_SCORE: 11
ADLS_ACUITY_SCORE: 11

## 2021-04-12 ASSESSMENT — MIFFLIN-ST. JEOR: SCORE: 1105.89

## 2021-04-12 NOTE — PHARMACY-ANTICOAGULATION SERVICE
Clinical Pharmacy - Warfarin Dosing Consult     Pharmacy has been consulted to manage this patient s warfarin therapy.  Indication: Atrial Fibrillation  Therapy Goal: INR 2-3  OP Anticoag Clinic: [facility]  Warfarin Prior to Admission: Yes  Warfarin PTA Regimen: 2.5 mg daily  Recent documented change in oral intake/nutrition: Unknown  Dose Comments: Last dose PTA unknown by patient    INR   Date Value Ref Range Status   04/12/2021 3.10 (H) 0.86 - 1.14 Final     INR Point of Care   Date Value Ref Range Status   03/03/2021 2.6 (H) 0.86 - 1.14 Final     Comment:     This test is intended for monitoring Coumadin therapy.  Results are not   accurate in patients with prolonged INR due to factor deficiency.       Patient unable to recall when her last dose was taken.    Recommend to HOLD further warfarin doses today as INR is slightly elevated anyways.  Pharmacy will monitor Khalida Rouse daily and order warfarin doses to achieve specified goal.      Please contact pharmacy as soon as possible if the warfarin needs to be held for a procedure or if the warfarin goals change.

## 2021-04-12 NOTE — ED PROVIDER NOTES
History     Chief Complaint   Patient presents with     Abdominal Pain     HPI  Khalida Rouse is a 81 year old female who presents with left-sided abdominal pain.  She states this is been present over the last week to 10 days.  No fever.  No vomiting.  No diarrhea.  Pain is dull, moderate.  She has no history of abdominal trouble or surgery.  No known history of diverticulitis.    Allergies:  Allergies   Allergen Reactions     Xarelto [Rivaroxaban] Diarrhea     Ace Inhibitors Cough       Problem List:    Patient Active Problem List    Diagnosis Date Noted     Acute appendicitis with localized peritonitis, without perforation, abscess, or gangrene 04/12/2021     Priority: Medium     Hypoxemia 05/23/2019     Priority: Medium     Other secondary pulmonary hypertension (H) 05/14/2019     Priority: Medium     Long term current use of anticoagulant therapy 03/11/2016     Priority: Medium     Atrial fibrillation with RVR (H) 01/11/2016     Priority: Medium     Pneumonia 01/09/2016     Priority: Medium     Osteoporosis 10/26/2015     Priority: Medium     Advanced directives, counseling/discussion 10/03/2014     Priority: Medium     Declined       S/P AVR (aortic valve replacement) 04/10/2014     Priority: Medium     Atrial flutter 02/04/2014     Priority: Medium        Past Medical History:    Past Medical History:   Diagnosis Date     Aortic valve stenosis      Atrial flutter (H)      Cardiomyopathy (H)      Severe aortic stenosis 4/7/2014     Shoulder fracture, left 3/22/15       Past Surgical History:    Past Surgical History:   Procedure Laterality Date     CATARACT IOL, RT/LT Right      ENT SURGERY      glaucoma surgery     REPLACE VALVE AORTIC  4/10/2014    Procedure: REPLACE VALVE AORTIC;  Median sternotomy, Replace Aortic Valve on pump oxygenation. ;  Surgeon: Wesley Fong MD;  Location:  OR       Family History:    Family History   Problem Relation Age of Onset     Dementia Brother      Alzheimer  Disease Sister        Social History:  Marital Status:   [5]  Social History     Tobacco Use     Smoking status: Never Smoker     Smokeless tobacco: Never Used   Substance Use Topics     Alcohol use: No     Alcohol/week: 0.0 standard drinks     Drug use: No        Medications:    acetaminophen (TYLENOL) 500 MG tablet  warfarin ANTICOAGULANT (COUMADIN) 2.5 MG tablet  atenolol (TENORMIN) 50 MG tablet  calcium carbonate-vitamin D 500-400 MG-UNIT TABS tablt  diltiazem ER COATED BEADS (CARDIZEM CD/CARTIA XT) 180 MG 24 hr capsule  docusate sodium (COLACE) 100 MG capsule  ibuprofen (ADVIL/MOTRIN) 200 MG tablet  methylcellulose (CITRUCEL) powder  phenazopyridine (PYRIDIUM) 100 MG tablet  polyethylene glycol (MIRALAX) 17 GM/Dose powder  torsemide (DEMADEX) 20 MG tablet  trimethoprim-polymyxin b (POLYTRIM) 76511-6.1 UNIT/ML-% ophthalmic solution          Review of Systems  All other systems are reviewed and are negative    Physical Exam   BP: 136/81  Pulse: 96  Temp: 98.3  F (36.8  C)  Resp: 16  Weight: 64.4 kg (142 lb)  SpO2: 95 %      Physical Exam  Vitals signs and nursing note reviewed.   Constitutional:       General: She is not in acute distress.     Appearance: She is well-developed. She is not diaphoretic.   HENT:      Head: Normocephalic and atraumatic.   Eyes:      General: No scleral icterus.     Pupils: Pupils are equal, round, and reactive to light.   Neck:      Musculoskeletal: Normal range of motion and neck supple.   Cardiovascular:      Rate and Rhythm: Normal rate and regular rhythm.      Heart sounds: Normal heart sounds. No murmur.   Pulmonary:      Effort: No respiratory distress.      Breath sounds: No stridor. No wheezing or rales.   Abdominal:      Palpations: Abdomen is soft.      Tenderness: There is abdominal tenderness (moderate) in the left upper quadrant and left lower quadrant. There is no guarding or rebound.   Musculoskeletal:         General: No tenderness.   Skin:     General: Skin is  warm and dry.      Coloration: Skin is not pale.      Findings: No erythema or rash.   Neurological:      Mental Status: She is alert.         ED Course        Procedures               Critical Care time:  none               Results for orders placed or performed during the hospital encounter of 04/12/21 (from the past 24 hour(s))   CBC with platelets differential   Result Value Ref Range    WBC 10.4 4.0 - 11.0 10e9/L    RBC Count 4.65 3.8 - 5.2 10e12/L    Hemoglobin 13.6 11.7 - 15.7 g/dL    Hematocrit 41.1 35.0 - 47.0 %    MCV 88 78 - 100 fl    MCH 29.2 26.5 - 33.0 pg    MCHC 33.1 31.5 - 36.5 g/dL    RDW 14.0 10.0 - 15.0 %    Platelet Count 184 150 - 450 10e9/L    Diff Method Automated Method     % Neutrophils 83.0 %    % Lymphocytes 5.6 %    % Monocytes 9.5 %    % Eosinophils 1.1 %    % Basophils 0.4 %    % Immature Granulocytes 0.4 %    Nucleated RBCs 0 0 /100    Absolute Neutrophil 8.7 (H) 1.6 - 8.3 10e9/L    Absolute Lymphocytes 0.6 (L) 0.8 - 5.3 10e9/L    Absolute Monocytes 1.0 0.0 - 1.3 10e9/L    Absolute Eosinophils 0.1 0.0 - 0.7 10e9/L    Absolute Basophils 0.0 0.0 - 0.2 10e9/L    Abs Immature Granulocytes 0.0 0 - 0.4 10e9/L    Absolute Nucleated RBC 0.0    Comprehensive metabolic panel   Result Value Ref Range    Sodium 137 133 - 144 mmol/L    Potassium 4.1 3.4 - 5.3 mmol/L    Chloride 105 94 - 109 mmol/L    Carbon Dioxide 28 20 - 32 mmol/L    Anion Gap 4 3 - 14 mmol/L    Glucose 78 70 - 99 mg/dL    Urea Nitrogen 11 7 - 30 mg/dL    Creatinine 0.66 0.52 - 1.04 mg/dL    GFR Estimate 82 >60 mL/min/[1.73_m2]    GFR Estimate If Black >90 >60 mL/min/[1.73_m2]    Calcium 9.0 8.5 - 10.1 mg/dL    Bilirubin Total 0.7 0.2 - 1.3 mg/dL    Albumin 3.4 3.4 - 5.0 g/dL    Protein Total 7.5 6.8 - 8.8 g/dL    Alkaline Phosphatase 114 40 - 150 U/L    ALT 21 0 - 50 U/L    AST 18 0 - 45 U/L   Lipase   Result Value Ref Range    Lipase 98 73 - 393 U/L   INR   Result Value Ref Range    INR 3.10 (H) 0.86 - 1.14   CT Abdomen Pelvis  w Contrast   Result Value Ref Range    Radiologist flags Appendicitis (AA)     Narrative    CT ABDOMEN/PELVIS WITH CONTRAST April 12, 2021 12:59 PM    CLINICAL HISTORY: Left-sided abdominal pain.    TECHNIQUE: CT scan of the abdomen and pelvis was performed following  injection of IV contrast. Multiplanar reformats were obtained. Dose  reduction techniques were used.  CONTRAST: 70mL Isovue-370.    COMPARISON: 3/27/2021.    FINDINGS:   LOWER CHEST: The heart is an enlarged. Prior median sternotomy.  Visualized lung bases unremarkable.    HEPATOBILIARY: Cirrhotic appearing liver without evidence of worrisome  focal lesion. Large calcified gallstones are present.    PANCREAS: Normal.    SPLEEN: Normal.    ADRENAL GLANDS: Normal.    KIDNEYS/BLADDER: Nonobstructing 5 mm calculus in the left kidney. No  hydronephrosis or hydroureter. No worrisome renal lesions.    BOWEL: The appendix is abnormally dilated and surrounded by fatty  infiltration. The appendix measures 1.3 cm (series 3, image 138) and  appendicolith is present. No free intraperitoneal air or  periappendiceal abscess.    PELVIC ORGANS: Parametrial varicosities are present.    ADDITIONAL FINDINGS: No ascites or lymphadenopathy.    MUSCULOSKELETAL: Vertebral body height loss at L1 is new compared to  the prior study.      Impression    IMPRESSION:   1.  Acute appendicitis. No free air or periappendiceal abscess.  2.  New compression deformity of L1.  3.  Marked cardiomegaly.  4.  Cirrhotic appearing liver.  5.  Nonobstructing left renal calculus.    [Critical Result: Appendicitis]    Finding was identified on 4/12/2021 1:01 PM.     Dr. Hall was contacted by me on 4/12/2021 1:07 PM and verbalized  understanding of the critical result.     MABEL JUNIOR MD       Medications   0.9% sodium chloride BOLUS (500 mLs Intravenous New Bag 4/12/21 1215)     Followed by   sodium chloride 0.9% infusion (has no administration in time range)   ampicillin-sulbactam (UNASYN)  3 g vial to attach to  mL bag (has no administration in time range)   sodium chloride (PF) 0.9% PF flush 3 mL (3 mLs Intracatheter Given 4/12/21 1245)   iopamidol (ISOVUE-370) solution 500 mL (70 mLs Intravenous Given 4/12/21 1245)   new 100 ml saline bag (60 mLs Intravenous Given 4/12/21 1245)       Assessments & Plan (with Medical Decision Making)  81-year-old female with acute appendicitis on CT.  Telephone consultation obtained with surgeon, Dr. Chu.  She is a significant high risk patient, and we were in agreement to start with conservative management with antibiotics.  She is accepted for admission by Dr. Rodríguez.  Dr. Chu plans to see her tomorrow in consultation.     I have reviewed the nursing notes.    I have reviewed the findings, diagnosis, plan and need for follow up with the patient.       New Prescriptions    No medications on file       Final diagnoses:   Acute appendicitis with localized peritonitis, without perforation, abscess, or gangrene       4/12/2021   [facility] EMERGENCY DEPT     Singh Hall MD  04/12/21 7906

## 2021-04-12 NOTE — PROGRESS NOTES
S-(situation): Patient arrives to room 269 via cart from ED    B-(background): Appendicitis    A-(assessment): Pt is A & O x 4, VSS, low grade temp of 99.9. Pt denies any pain at this time but she was having pain the last 10 or so days. Lung sounds CTA, denies any chest pain or shortness of breathe. Bowel sounds audible and normal active in all quadrants. Abdomen appears slightly bloated and pt states she is more bloated than usual. States last BM was this morning and was loose. States she has been passing gas. No skin issues.     R-(recommendations): Orders reviewed with patiet. Will monitor patient per MD orders.     Inpatient nursing criteria listed below were met:    Health care directives status obtained and documented: Yes  SCD's Documented: No  Skin issues/needs documented:NA  Isolation addressed and Signage used: NA  Fall Prevention: Care plan updated Yes Education given and documented Yes  Care Plan initiated and Co-Morbidities added: Yes  Education Assessment documented:Yes  Admission Education Documented: Yes  If present CAUTI/CLABI Education done: NA  New medication patient education completed and documented (Possible Side Effects of Common Medications handout): Yes  Allergies Reviewed: Yes  Admission Medication Reconciliation completed: Yes, but pt was unable to fully confirm some medications  Home medications if not able to send immediately home with family stored here: NA  Reminder note placed in discharge instructions regarding home meds: NA  Individualized care needs/preferences addressed and charted: Yes

## 2021-04-12 NOTE — ED TRIAGE NOTES
Presents to ED with generalized abdominal pain. States this has been ongoing for 3 weeks but worse tonight. BM today but states she has been having to take stool softeners. No nausea or vomiting. Was seen in clinic and told she was constipated. Has not improved.

## 2021-04-12 NOTE — ED NOTES
Patient remains in lobby waiting for room. Stable at this time. Updated her that there are no rooms available at this time as it is very busy. Patient calm and cooperative with this.

## 2021-04-12 NOTE — H&P
[facility]    History and Physical - Hospitalist Service       Date of Admission:  4/12/2021    Assessment & Plan   Khalida Rouse is a 81 year old female admitted on 4/12/2021. She presented with generalized abdominal pain.  She has a history of atrial flutter, on anticoagulation.  She states this is been present 2-3 weeks.  No fever.  No vomiting.  No diarrhea.  Pain is dull, moderate.  She has no history of abdominal trouble or surgery.  No known history of diverticulitis.  Patient was discussed with Dr. Chu., recommendation to start with conservative management with antibiotics.         Acute appendicitis with localized peritonitis, without perforation, abscess, or gangrene  Assessment: Patient presented with abdominal pain, generalized for roughly 2 to 3 weeks.  Labs normal but CAT scan shows acute appendicitis, no free air or periappendiceal abscess.   Plan:   - Discussion with surgery in the emergency room with recommendations to admit to inpatient status and treat with IV antibiotics.  Surgery consult tomorrow.      Atrial flutter    S/P AVR (aortic valve replacement)  Assessment: Chronic and stable, patient is on warfarin, metoprolol, Cardizem.   Plan: Continue home medications.  Appreciate pharmacy assistance for anticoagulation dosing.       Diet:  Cardiac diet  DVT Prophylaxis: Warfarin  Dupree Catheter: not present  Code Status:  Full code         Disposition Plan   Expected discharge: 2 - 3 days, recommended to prior living arrangement once antibiotic plan established.  Entered: Lillie Hurt CNP 04/12/2021, 2:04 PM     The patient's care was discussed with the Attending Physician, Dr. Rodríguez, Bedside Nurse, Care Coordinator/ and Patient.    Lillie Hurt CNP  [facility]  Contact information available via [facility] Paging/Directory      ______________________________________________________________________    Chief  Complaint   Abdominal Pain    History is obtained from the patient, electronic health record and emergency department physician    History of Present Illness   Khalida Rouse is a 81 year old female who presented with generalized abdominal pain.  She has a history of atrial flutter, on anticoagulation.  She states this is been present 2-3 weeks.  No fever.  No vomiting.  No diarrhea.  Pain is dull, moderate.  She has no history of abdominal trouble or surgery.  No known history of diverticulitis.  Patient was discussed with Dr. Chu., recommendation to start with conservative management with antibiotics.       Review of Systems    The 10 point Review of Systems is negative other than noted in the HPI or here.     Past Medical History    I have reviewed this patient's medical history and updated it with pertinent information if needed.   Past Medical History:   Diagnosis Date     Aortic valve stenosis      Atrial flutter (H)      Cardiomyopathy (H)      Severe aortic stenosis 4/7/2014     Shoulder fracture, left 3/22/15       Past Surgical History   I have reviewed this patient's surgical history and updated it with pertinent information if needed.  Past Surgical History:   Procedure Laterality Date     CATARACT IOL, RT/LT Right      ENT SURGERY      glaucoma surgery     REPLACE VALVE AORTIC  4/10/2014    Procedure: REPLACE VALVE AORTIC;  Median sternotomy, Replace Aortic Valve on pump oxygenation. ;  Surgeon: Wesley Fong MD;  Location: [facility] OR       Social History   I have reviewed this patient's social history and updated it with pertinent information if needed.  Social History     Tobacco Use     Smoking status: Never Smoker     Smokeless tobacco: Never Used   Substance Use Topics     Alcohol use: No     Alcohol/week: 0.0 standard drinks     Drug use: No       Family History   I have reviewed this patient's family history and updated it with pertinent information if needed.  Family History   Problem  Relation Age of Onset     Dementia Brother      Alzheimer Disease Sister        Prior to Admission Medications   Prior to Admission Medications   Prescriptions Last Dose Informant Patient Reported? Taking?   acetaminophen (TYLENOL) 500 MG tablet 2021 at 0100 Self No Yes   Sig: Take 2 tablets (1,000 mg) by mouth every 6 hours as needed for mild pain   atenolol (TENORMIN) 50 MG tablet Unknown at Unknown time Pharmacy No No   Sig: TAKE ONE TABLET BY MOUTH TWICE A DAY   Patient not taking: TAKE ONE TABLET BY MOUTH TWICE A DAY   calcium carbonate-vitamin D 500-400 MG-UNIT TABS tablt   No No   Sig: Take 1 tablet by mouth 3 times daily   Patient taking differently: Take 1 tablet by mouth 2 times daily    diltiazem ER COATED BEADS (CARDIZEM CD/CARTIA XT) 180 MG 24 hr capsule Unknown at Unknown time Pharmacy No No   Sig: Take 1 capsule (180 mg) by mouth 2 times daily   docusate sodium (COLACE) 100 MG capsule   No No   Sig: Take 1 capsule (100 mg) by mouth daily   Patient not taking: Reported on 2021   ibuprofen (ADVIL/MOTRIN) 200 MG tablet   Yes No   Sig: Take 200 mg by mouth every 4 hours as needed for mild pain   methylcellulose (CITRUCEL) powder   No No   Si scoop twice a day with big glass of water   Patient not taking: Reported on 2021   phenazopyridine (PYRIDIUM) 100 MG tablet   No No   Sig: Take 1 tablet (100 mg) by mouth 3 times daily as needed for urinary tract discomfort   Patient not taking: Reported on 2021   polyethylene glycol (MIRALAX) 17 GM/Dose powder   No No   Sig: Take 17 g (1 capful) by mouth 3 times daily as needed for constipation   torsemide (DEMADEX) 20 MG tablet   No No   Sig: TAKE ONE TABLET BY MOUTH ONCE DAILY AS NEEDED   Patient not taking: Reported on 2021   trimethoprim-polymyxin b (POLYTRIM) 38473-2.1 UNIT/ML-% ophthalmic solution   No No   Sig: Place 1-2 drops into the right eye every 4 hours   Patient taking differently: Place 1-2 drops into the right eye daily as  needed    warfarin ANTICOAGULANT (COUMADIN) 2.5 MG tablet Past Week at Unknown time Self Yes Yes   Sig: Take 2.5 mg by mouth daily       Facility-Administered Medications: None     Allergies   Allergies   Allergen Reactions     Xarelto [Rivaroxaban] Diarrhea     Ace Inhibitors Cough       Physical Exam   Vital Signs: Temp: 98.3  F (36.8  C) Temp src: Oral BP: 122/75 Pulse: 107   Resp: 16 SpO2: 94 % O2 Device: None (Room air)    Weight: 142 lbs 0 oz      Constitutional: awake, alert, cooperative, no apparent distress   ENT: Normocephalic, without obvious abnormality, atraumatic, oral pharynx with moist mucous membranes, tonsils without erythema or exudates.   Respiratory: No respiratory distress, lungs clear  Cardiovascular:  Normal apical impulse, irregular rate and rhythm   GI: Normal bowel sounds, soft, non-distended, mildly tender   Skin: normal skin color, texture, turgor  Musculoskeletal: There is no redness, warmth, or swelling of the joints.  Full range of motion noted.   Neurologic: Awake, alert, oriented to name, place and time.  Cranial nerves II-XII are grossly intact.    Psychiatric: Judgment intact.  No evidence of agitation.      Data   Data reviewed today: I reviewed all medications, new labs and imaging results over the last 24 hours.      Recent Labs   Lab 04/12/21  1153   WBC 10.4   HGB 13.6   MCV 88      INR 3.10*      POTASSIUM 4.1   CHLORIDE 105   CO2 28   BUN 11   CR 0.66   ANIONGAP 4   JOSEFINA 9.0   GLC 78   ALBUMIN 3.4   PROTTOTAL 7.5   BILITOTAL 0.7   ALKPHOS 114   ALT 21   AST 18   LIPASE 98     Recent Results (from the past 24 hour(s))   CT Abdomen Pelvis w Contrast   Result Value    Radiologist flags Appendicitis (AA)    Narrative    CT ABDOMEN/PELVIS WITH CONTRAST April 12, 2021 12:59 PM    CLINICAL HISTORY: Left-sided abdominal pain.    TECHNIQUE: CT scan of the abdomen and pelvis was performed following  injection of IV contrast. Multiplanar reformats were obtained.  Dose  reduction techniques were used.  CONTRAST: 70mL Isovue-370.    COMPARISON: 3/27/2021.    FINDINGS:   LOWER CHEST: The heart is an enlarged. Prior median sternotomy.  Visualized lung bases unremarkable.    HEPATOBILIARY: Cirrhotic appearing liver without evidence of worrisome  focal lesion. Large calcified gallstones are present.    PANCREAS: Normal.    SPLEEN: Normal.    ADRENAL GLANDS: Normal.    KIDNEYS/BLADDER: Nonobstructing 5 mm calculus in the left kidney. No  hydronephrosis or hydroureter. No worrisome renal lesions.    BOWEL: The appendix is abnormally dilated and surrounded by fatty  infiltration. The appendix measures 1.3 cm (series 3, image 138) and  appendicolith is present. No free intraperitoneal air or  periappendiceal abscess.    PELVIC ORGANS: Parametrial varicosities are present.    ADDITIONAL FINDINGS: No ascites or lymphadenopathy.    MUSCULOSKELETAL: Vertebral body height loss at L1 is new compared to  the prior study.      Impression    IMPRESSION:   1.  Acute appendicitis. No free air or periappendiceal abscess.  2.  New compression deformity of L1.  3.  Marked cardiomegaly.  4.  Cirrhotic appearing liver.  5.  Nonobstructing left renal calculus.    [Critical Result: Appendicitis]    Finding was identified on 4/12/2021 1:01 PM.     Dr. Hall was contacted by me on 4/12/2021 1:07 PM and verbalized  understanding of the critical result.     MABEL JUNIOR MD

## 2021-04-13 ENCOUNTER — APPOINTMENT (OUTPATIENT)
Dept: CARDIOLOGY | Facility: CLINIC | Age: 81
DRG: 372 | End: 2021-04-13
Attending: NURSE PRACTITIONER
Payer: MEDICARE

## 2021-04-13 ENCOUNTER — APPOINTMENT (OUTPATIENT)
Dept: OCCUPATIONAL THERAPY | Facility: CLINIC | Age: 81
DRG: 372 | End: 2021-04-13
Attending: NURSE PRACTITIONER
Payer: MEDICARE

## 2021-04-13 PROBLEM — R41.89 COGNITIVE CHANGES: Status: ACTIVE | Noted: 2021-04-13

## 2021-04-13 LAB
ALBUMIN SERPL-MCNC: 3.2 G/DL (ref 3.4–5)
ALP SERPL-CCNC: 117 U/L (ref 40–150)
ALT SERPL W P-5'-P-CCNC: 22 U/L (ref 0–50)
ANION GAP SERPL CALCULATED.3IONS-SCNC: 4 MMOL/L (ref 3–14)
AST SERPL W P-5'-P-CCNC: 17 U/L (ref 0–45)
BILIRUB SERPL-MCNC: 1 MG/DL (ref 0.2–1.3)
BUN SERPL-MCNC: 7 MG/DL (ref 7–30)
CALCIUM SERPL-MCNC: 8.5 MG/DL (ref 8.5–10.1)
CHLORIDE SERPL-SCNC: 106 MMOL/L (ref 94–109)
CO2 SERPL-SCNC: 26 MMOL/L (ref 20–32)
CREAT SERPL-MCNC: 0.62 MG/DL (ref 0.52–1.04)
ERYTHROCYTE [DISTWIDTH] IN BLOOD BY AUTOMATED COUNT: 14.1 % (ref 10–15)
GFR SERPL CREATININE-BSD FRML MDRD: 84 ML/MIN/{1.73_M2}
GLUCOSE SERPL-MCNC: 85 MG/DL (ref 70–99)
HCT VFR BLD AUTO: 41.3 % (ref 35–47)
HGB BLD-MCNC: 13.6 G/DL (ref 11.7–15.7)
INR PPP: 2.44 (ref 0.86–1.14)
MCH RBC QN AUTO: 29.2 PG (ref 26.5–33)
MCHC RBC AUTO-ENTMCNC: 32.9 G/DL (ref 31.5–36.5)
MCV RBC AUTO: 89 FL (ref 78–100)
PLATELET # BLD AUTO: 169 10E9/L (ref 150–450)
POTASSIUM SERPL-SCNC: 3.8 MMOL/L (ref 3.4–5.3)
PROT SERPL-MCNC: 6.8 G/DL (ref 6.8–8.8)
RBC # BLD AUTO: 4.65 10E12/L (ref 3.8–5.2)
SODIUM SERPL-SCNC: 136 MMOL/L (ref 133–144)
WBC # BLD AUTO: 11.4 10E9/L (ref 4–11)

## 2021-04-13 PROCEDURE — 97165 OT EVAL LOW COMPLEX 30 MIN: CPT | Mod: GO

## 2021-04-13 PROCEDURE — 80053 COMPREHEN METABOLIC PANEL: CPT | Performed by: NURSE PRACTITIONER

## 2021-04-13 PROCEDURE — 250N000011 HC RX IP 250 OP 636: Performed by: NURSE PRACTITIONER

## 2021-04-13 PROCEDURE — 93306 TTE W/DOPPLER COMPLETE: CPT | Mod: 26 | Performed by: INTERNAL MEDICINE

## 2021-04-13 PROCEDURE — 93306 TTE W/DOPPLER COMPLETE: CPT

## 2021-04-13 PROCEDURE — 250N000009 HC RX 250: Performed by: NURSE PRACTITIONER

## 2021-04-13 PROCEDURE — 85027 COMPLETE CBC AUTOMATED: CPT | Performed by: NURSE PRACTITIONER

## 2021-04-13 PROCEDURE — 36415 COLL VENOUS BLD VENIPUNCTURE: CPT | Performed by: NURSE PRACTITIONER

## 2021-04-13 PROCEDURE — 99232 SBSQ HOSP IP/OBS MODERATE 35: CPT | Performed by: NURSE PRACTITIONER

## 2021-04-13 PROCEDURE — 250N000013 HC RX MED GY IP 250 OP 250 PS 637: Performed by: NURSE PRACTITIONER

## 2021-04-13 PROCEDURE — 250N000013 HC RX MED GY IP 250 OP 250 PS 637: Performed by: FAMILY MEDICINE

## 2021-04-13 PROCEDURE — 85610 PROTHROMBIN TIME: CPT | Performed by: NURSE PRACTITIONER

## 2021-04-13 PROCEDURE — 99221 1ST HOSP IP/OBS SF/LOW 40: CPT | Performed by: SURGERY

## 2021-04-13 PROCEDURE — 120N000001 HC R&B MED SURG/OB

## 2021-04-13 RX ORDER — WARFARIN SODIUM 2.5 MG/1
2.5 TABLET ORAL
Status: COMPLETED | OUTPATIENT
Start: 2021-04-13 | End: 2021-04-13

## 2021-04-13 RX ORDER — SENNOSIDES 8.6 MG
650 CAPSULE ORAL 2 TIMES DAILY
COMMUNITY

## 2021-04-13 RX ORDER — METOPROLOL TARTRATE 1 MG/ML
5 INJECTION, SOLUTION INTRAVENOUS ONCE
Status: COMPLETED | OUTPATIENT
Start: 2021-04-13 | End: 2021-04-13

## 2021-04-13 RX ORDER — POLYETHYLENE GLYCOL 3350 17 G/17G
1 POWDER, FOR SOLUTION ORAL 3 TIMES DAILY PRN
Qty: 116 G | Refills: 0 | Status: ON HOLD | OUTPATIENT
Start: 2021-04-13 | End: 2021-05-14

## 2021-04-13 RX ORDER — ATENOLOL 25 MG/1
25 TABLET ORAL 2 TIMES DAILY
Status: DISCONTINUED | OUTPATIENT
Start: 2021-04-13 | End: 2021-04-14 | Stop reason: HOSPADM

## 2021-04-13 RX ADMIN — AMPICILLIN SODIUM AND SULBACTAM SODIUM 3 G: 2; 1 INJECTION, POWDER, FOR SOLUTION INTRAMUSCULAR; INTRAVENOUS at 02:19

## 2021-04-13 RX ADMIN — AMPICILLIN SODIUM AND SULBACTAM SODIUM 3 G: 2; 1 INJECTION, POWDER, FOR SOLUTION INTRAMUSCULAR; INTRAVENOUS at 08:41

## 2021-04-13 RX ADMIN — AMPICILLIN SODIUM AND SULBACTAM SODIUM 3 G: 2; 1 INJECTION, POWDER, FOR SOLUTION INTRAMUSCULAR; INTRAVENOUS at 20:10

## 2021-04-13 RX ADMIN — ATENOLOL 25 MG: 25 TABLET ORAL at 09:15

## 2021-04-13 RX ADMIN — METOPROLOL TARTRATE 5 MG: 1 INJECTION, SOLUTION INTRAVENOUS at 17:15

## 2021-04-13 RX ADMIN — WARFARIN SODIUM 2.5 MG: 2.5 TABLET ORAL at 17:15

## 2021-04-13 RX ADMIN — ATENOLOL 25 MG: 25 TABLET ORAL at 20:09

## 2021-04-13 RX ADMIN — DILTIAZEM HYDROCHLORIDE 180 MG: 180 CAPSULE, EXTENDED RELEASE ORAL at 20:09

## 2021-04-13 RX ADMIN — AMPICILLIN SODIUM AND SULBACTAM SODIUM 3 G: 2; 1 INJECTION, POWDER, FOR SOLUTION INTRAMUSCULAR; INTRAVENOUS at 13:44

## 2021-04-13 ASSESSMENT — ACTIVITIES OF DAILY LIVING (ADL)
ADLS_ACUITY_SCORE: 11
ADLS_ACUITY_SCORE: 12
ADLS_ACUITY_SCORE: 11
PREVIOUS_RESPONSIBILITIES: MEAL PREP;HOUSEKEEPING;LAUNDRY;MEDICATION MANAGEMENT
DEPENDENT_IADLS:: LAUNDRY;SHOPPING

## 2021-04-13 NOTE — PLAN OF CARE
A/Ox4. Continues to deny pain. Bowel sounds normal active. Reports passing gas. Feels little bloated. Denies tenderness of abdomen when touched. Lung sounds clear. Up with stand by assist. IV infusing per orders. Unable to confirm medications. Will continue to monitor.

## 2021-04-13 NOTE — CONSULTS
Care Management Initial Consult    General Information  Assessment completed with: Patient, Family, Patient and daughter, Leah   Type of CM/SW Visit: Initial Assessment    Primary Care Provider verified and updated as needed: Yes   Readmission within the last 30 days:        Reason for Consult: discharge planning  Advance Care Planning:    Has no scanned ACP document        Communication Assessment  Patient's communication style: spoken language (English or Bilingual)    Hearing Difficulty or Deaf: no   Wear Glasses or Blind: yes    Cognitive  Cognitive/Neuro/Behavioral: WDL                      Living Environment:   People in home:       Current living Arrangements: apartment      Able to return to prior arrangements: yes       Family/Social Support:  Care provided by: self, child(negrito)  Provides care for: no one  Marital Status:   Children          Description of Support System: Supportive, Involved    Support Assessment: Adequate family and caregiver support, Adequate social supports    Current Resources:   Patient receiving home care services: No     Community Resources: None  Equipment currently used at home: cane, straight, walker, rolling  Supplies currently used at home: None    Employment/Financial:  Employment Status: retired        Financial Concerns: No concerns identified           Lifestyle & Psychosocial Needs:        Socioeconomic History     Marital status:      Spouse name: Not on file     Number of children: Not on file     Years of education: Not on file     Highest education level: Not on file     Tobacco Use     Smoking status: Never Smoker     Smokeless tobacco: Never Used   Substance and Sexual Activity     Alcohol use: No     Alcohol/week: 0.0 standard drinks     Drug use: No     Sexual activity: Not Currently     Partners: Male       Functional Status:  Prior to admission patient needed assistance:   Dependent ADLs:: Ambulation-cane  Dependent IADLs:: Laundry, Shopping    "    Mental Health Status:  Mental Health Status: No Current Concerns       Chemical Dependency Status:  Chemical Dependency Status: No Current Concerns             Values/Beliefs:  Spiritual, Cultural Beliefs, [employer] Practices, Values that affect care:            Values/Beliefs Comment: unknown     Additional Information:  Care Management was consulted for discharge planning.  Writer visited with patient and discussed current hospital OT services recommendation for home OT services.  Pt was provided with Medicare certified home care list. Patient stated that she plans to talk with her daughter, Leah, about home care and the agencies.  Patient stated she is interested in having home care, but wants to talk with her daughter first. OT reported that patient's SLUM score was 13/30.       Patient lives home alone in her apartment in a split level house.  She lives on the top floor and there are 17 on the inside of the home to get to the top floor.  \"I may have to look at moving somewhere else in the near future.\"  Patient reported that she is independent with all ADL and IADLs except that her daughter gets and delivers her groceries and she washes and dries her clothes (because patient does not have a washer and dryer) and then patient folds the clothes and puts them away.  Patient stated she has not trouble making meals and taking her medications.      Writer has also spoken with patient's daughter, Leah.  She is aware of the recommendation for home care and that recommendations are for patient to get help with medication management and meal preparation.  Daughter plans to talk with patient about home care also.      Care Management will continue to follow for discharge planning.      SALVADOR Edgar  [facility]   [phone]     "

## 2021-04-13 NOTE — PROGRESS NOTES
[facility]    Medicine Progress Note - Hospitalist Service       Date of Admission:  4/12/2021  Assessment & Plan         Khalida Rouse is a 81 year old female admitted on 4/12/2021. She presented with generalized abdominal pain.  She has a history of atrial flutter, on anticoagulation.  She states this is been present 2-3 weeks.  No fever.  No vomiting.  No diarrhea.  Pain is dull, moderate.  She has no history of abdominal trouble or surgery.  No known history of diverticulitis.  Patient was discussed with Dr. Chu., recommendation for conservative management with antibiotics.          Acute appendicitis with localized peritonitis, without perforation, abscess, or gangrene  Assessment: Patient presented with abdominal pain, generalized for roughly 2 to 3 weeks.  Labs normal but CAT scan shows acute appendicitis, no free air or periappendiceal abscess. Patient is overall stable, improving. Tolerating oral intake. Patient complains of bloating and constipation.   Plan:   - Surgery recommends continuation of antibiotics for 2 weeks  - Follow up with Cardiology for surgical clearance  - Follow up with General Surgery ( would need more complex postsurgical care and would not recommend at [facility].)       Atrial flutter    S/P AVR (aortic valve replacement)  Assessment: Chronic and stable, patient is on warfarin, metoprolol, Cardizem.   Plan: Continue home medications.  Appreciate pharmacy assistance for anticoagulation dosing.        Cognitive Changes  Assessment: Patient with confusion, forgetfulness. She did not remember why she was in the hospital or that her appendix had a problem. Patient daughter states she has been more confused in the last few months. Discussed patient should not be managing her own medications or cooking. She is at high risk for injury. SLUMS 13/30.   Plan:  consult.        Diet: Combination Diet Regular Diet Adult    DVT Prophylaxis: Warfarin  Dupree Catheter:  not present  Code Status: Full Code           Disposition Plan   Expected discharge: Tomorrow, recommended to prior living arrangement once antibiotic plan established.  Entered: Lillie Hurt CNP 04/13/2021, 11:26 AM       The patient's care was discussed with the Attending Physician, Dr. Rodríguez, Bedside Nurse, Care Coordinator/, Patient and Patient's Family.    Lillie Hurt CNP  Hospitalist Service  [facility]  Contact information available via [facility] Paging/Directory    ______________________________________________________________________    Interval History   Patient is feeling better, alert to self, time. Not situations. Did not remember why she was in the hospital, she is not able to tell what medications she is on. Patient is up walking, tolerating oral intake. Afebrile and hemodynamically stable.     Data reviewed today: I reviewed all medications, new labs and imaging results over the last 24 hours.      Physical Exam   Vital Signs: Temp: 99.7  F (37.6  C) Temp src: Oral BP: 117/72 Pulse: 115   Resp: 16 SpO2: 91 % O2 Device: None (Room air)    Weight: 142 lbs 3.2 oz  Constitutional: awake, alert, cooperative, no apparent distress   ENT: Normocephalic, without obvious abnormality, atraumatic, oral pharynx with moist mucous membranes, tonsils without erythema or exudates.   Respiratory: No respiratory distress, lungs clear  Cardiovascular:  Normal apical impulse, irregular rate and rhythm   GI: Normal bowel sounds, soft, non-distended, mildly tender   Skin: normal skin color, texture, turgor  Musculoskeletal: There is no redness, warmth, or swelling of the joints.  Full range of motion noted.   Neurologic: Awake, alert, oriented to name, place and time.  Cranial nerves II-XII are grossly intact.    Psychiatric: Judgment intact.  No evidence of agitation.    Data   Recent Labs   Lab 04/13/21  0547 04/12/21  1153   WBC 11.4* 10.4   HGB 13.6 13.6    MCV 89 88    184   INR 2.44* 3.10*    137   POTASSIUM 3.8 4.1   CHLORIDE 106 105   CO2 26 28   BUN 7 11   CR 0.62 0.66   ANIONGAP 4 4   JOSEFINA 8.5 9.0   GLC 85 78   ALBUMIN 3.2* 3.4   PROTTOTAL 6.8 7.5   BILITOTAL 1.0 0.7   ALKPHOS 117 114   ALT 22 21   AST 17 18   LIPASE  --  98     Recent Results (from the past 24 hour(s))   Echocardiogram Complete    Narrative    [id number]  [id number]  [id number]  [id number]^YASMEEN^THANH^MURPHY     [facility]  Echocardiography Laboratory  [address] [place] [address]     Name: JJ RAMACHANDRAN  MRN: [id number]  : 1940  Study Date: 2021 08:01 AM  Age: 81 yrs  Gender: Female  Patient Location: [facility]  Reason For Study: Pulmonary Hypertension  History: 21mm Magna Perimount Bioprosthesis AV  Ordering Physician: THANH ARANA  Referring Physician: ARIELLA PATHAK  Performed By: Kaur Pearson RDCS     BSA: 1.7 m2  Height: 65 in  Weight: 142 lb  HR: 120  BP: 130/65 mmHg  ______________________________________________________________________________  Procedure  Complete Portable Echo Adult.  ______________________________________________________________________________  Interpretation Summary     The left ventricular cavity is small.  Left ventricular systolic function is normal.  The visual ejection fraction is estimated at 65-70%.  Flattened septum is consistent with RV pressure overload.  Right ventricular systolic pressure is elevated, consistent with severe  pulmonary hypertension.  There is severe biatrial enlargement.  The gradient is normal for this prosthetic aortic valve.  In comparison to previous study, 2019, est pulmonary pressures are  increased.  ______________________________________________________________________________  Left Ventricle  The left ventricular cavity is small. There is mild concentric left  ventricular hypertrophy. Left ventricular systolic function is normal. The  visual ejection fraction is  estimated at 65-70%. Diastolic function not  assessed due to atrial fibrillation. Flattened septum is consistent with RV  pressure overload. The left ventricular apex is not well visualized.     Right Ventricle  The right ventricle is grossly normal size. The right ventricle is not well  visualized. The right ventricular systolic function is mildly reduced.     Atria  The left atrium is severely dilated. There is severe biatrial enlargement. The  right atrium is severely dilated.     Mitral Valve  The mitral valve leaflets are mildly thickened. There is trace mitral  regurgitation. There is no mitral valve stenosis.     Tricuspid Valve  The tricuspid valve is not well visualized, but is grossly normal. There is  moderate to mod-severe (2-3+) tricuspid regurgitation. The right ventricular  systolic pressure is elevated at 49.4 mmHg. Right ventricular systolic  pressure is elevated, consistent with severe pulmonary hypertension.     Aortic Valve  The aortic valve is not well visualized. The peak AoV pressure gradient is  23.0 mmHg. The mean AoV pressure gradient is 12.7 mmHg. The calculated aortic  valve are is 1.3 cm^2. The prosthetic aortic valve is not well visualized.  The  gradient is normal for this prosthetic aortic valve.     Pulmonic Valve  The pulmonic valve is not well visualized. Normal pulmonic valve velocity.     Vessels  Normal size aorta. IVC diameter >2.1 cm collapsing <50% with sniff suggests a  high RA pressure estimated at 15 mmHg or greater.     Pericardium  The pericardium appears mildly thickened. Small pericardial effusion.     Rhythm  The rhythm was rapid atrial fibrillation.  ______________________________________________________________________________  MMode/2D Measurements & Calculations  IVSd: 1.2 cm     LVIDd: 3.1 cm  LVIDs: 2.3 cm  LVPWd: 1.3 cm  FS: 24.8 %  LV mass(C)d: 130.0 grams  LV mass(C)dI: 76.0 grams/m2  Ao root diam: 3.3 cm  LA dimension: 3.6 cm  asc Aorta Diam: 3.3 cm  LA/Ao:  1.1  LVOT diam: 1.9 cm  LVOT area: 2.7 cm2  LA Volume (BP): 75.9 ml  LA Volume Index (BP): 44.4 ml/m2  RWT: 0.87     Doppler Measurements & Calculations  MV E max desmond: 118.5 cm/sec  MV dec slope: 661.9 cm/sec2  MV dec time: 0.18 sec  Ao V2 max: 238.7 cm/sec  Ao max P.0 mmHg  Ao V2 mean: 165.1 cm/sec  Ao mean P.7 mmHg  Ao V2 VTI: 35.0 cm  JEN(I,D): 1.3 cm2  JEN(V,D): 1.3 cm2  LV V1 max P.3 mmHg  LV V1 max: 115.1 cm/sec  LV V1 VTI: 16.9 cm  SV(LVOT): 45.8 ml  SI(LVOT): 26.8 ml/m2  PA acc time: 0.08 sec  TR max desmond: 351.4 cm/sec  TR max P.4 mmHg  AV Desmond Ratio (DI): 0.48  JEN Index (cm2/m2): 0.76  E/E' av.6  Lateral E/e': 9.1  Medial E/e': 14.2     ______________________________________________________________________________  Report approved by: Laura Oropeza 2021 09:38 AM

## 2021-04-13 NOTE — PLAN OF CARE
Problem: Attention and Thought Clarity Impairment (Delirium)  Goal: Improved Attention and Thought Clarity  4/13/2021 1723 by Jonathan Waller RN  Outcome: No Change  4/13/2021 1723 by Jonathan Waller RN  Outcome: No Change     Problem: Infection  Goal: Absence of Infection Signs and Symptoms  4/13/2021 1723 by Jonathan Waller RN  Outcome: No Change  4/13/2021 1723 by Jonathan Waller RN  Outcome: No Change   Patient alert, orientated to month and year. Confused to situation and place and time.  Reminded in hospital, forgets almost immediately.  Pleasantly confused. Up with SBA. Alarms on and active, wandering risk.  VSS. Denies any pain or nausea. HR elevated 120-130's, provider notified. Cardizem restarted for this evening. Metoprolol IV x1 given.  Anticipate possible discharge in am.  Continue to monitor HR and signs of infection.  Jonathan Waller RN

## 2021-04-13 NOTE — PHARMACY-ANTICOAGULATION SERVICE
Clinical Pharmacy - Warfarin Dosing Consult     Pharmacy has been consulted to manage this patient s warfarin therapy.  Indication: Atrial Fibrillation  Therapy Goal: INR 2-3  OP Anticoag Clinic: [facility]  Warfarin Prior to Admission: Yes  Warfarin PTA Regimen: 2.5 mg daily  Recent documented change in oral intake/nutrition: Unknown  Dose Comments: Last dose PTA unknown by patient    INR   Date Value Ref Range Status   04/13/2021 2.44 (H) 0.86 - 1.14 Final   04/12/2021 3.10 (H) 0.86 - 1.14 Final       Recommend warfarin 2.5 mg today.  Pharmacy will monitor Khalida Rouse daily and order warfarin doses to achieve specified goal.      Please contact pharmacy as soon as possible if the warfarin needs to be held for a procedure or if the warfarin goals change.

## 2021-04-13 NOTE — PROGRESS NOTES
"   04/13/21 1324   Quick Adds   Type of Visit Initial Occupational Therapy Evaluation   Living Environment   People in home alone   Current Living Arrangements apartment   Home Accessibility stairs to enter home   Number of Stairs, Main Entrance other (see comments)  (17)   Transportation Anticipated family or friend will provide   Living Environment Comments 17 steps up to apartment; pt has walk in shower; pt's daughter able to provide some assistance   Self-Care   Usual Activity Tolerance good   Current Activity Tolerance good   Regular Exercise No   Equipment Currently Used at Home none   Disability/Function   Hearing Difficulty or Deaf no   Wear Glasses or Blind yes   Vision Management Glasses   Concentrating, Remembering or Making Decisions Difficulty yes   Difficulty Communicating no   Difficulty Eating/Swallowing no   Walking or Climbing Stairs Difficulty no   Dressing/Bathing Difficulty no   Toileting issues no   Doing Errands Independently Difficulty (such as shopping) no   Fall history within last six months no   General Information   Onset of Illness/Injury or Date of Surgery 04/12/21   Referring Physician Lillie Hurt, CNP   Patient/Family Therapy Goal Statement (OT) Return home   Additional Occupational Profile Info/Pertinent History of Current Problem Pt scheduled for OT evaluation for cognitive assessment after being admitted due to abdominal pain   Cognitive Status Examination   Orientation Status person;place   Affect/Mental Status (Cognitive) confused   Follows Commands follows one-step commands   Safety Deficit minimal deficit   Memory Deficit moderate deficit   Attention Deficit moderate deficit;distractible in quiet environment;focused/sustained attention   Executive Function Deficit moderate deficit   Cognitive Status Comments Pt scored 13/30 on the [facility] cognitive screen, indicting significant cognitive impairments. Pt presents with some awareness into \"memory problems\" per her report, " however limited insight into current situation   Range of Motion Comprehensive   Comment, General Range of Motion BUE AROM WNL   Strength Comprehensive (MMT)   Comment, General Manual Muscle Testing (MMT) Assessment BUE strength WFL   Activities of Daily Living   BADL Assessment/Intervention bathing;upper body dressing;lower body dressing;grooming;feeding;toileting   Bathing Assessment/Intervention   [place] Level (Bathing) independent   Upper Body Dressing Assessment/Training   [place] Level (Upper Body Dressing) independent   Lower Body Dressing Assessment/Training   [place] Level (Lower Body Dressing) independent   Grooming Assessment/Training   [place] Level (Grooming) independent   Eating/Self Feeding   [place] Level (Feeding) independent   Toileting   [place] Level (Toileting) independent   Instrumental Activities of Daily Living (IADL)   Previous Responsibilities meal prep;housekeeping;laundry;medication management   IADL Comments Pt's daughter does assist with some IADL tasks   Clinical Impression   Criteria for Skilled Therapeutic Interventions Met (OT) no problems identified which require skilled intervention   Clinical Decision Making Complexity (OT) low complexity   Risk & Benefits of therapy have been explained evaluation/treatment results reviewed   OT Discharge Planning    OT Discharge Recommendation (DC Rec) home with home care occupational therapy   OT Rationale for DC Rec Pt presents at baseline for ADL performance. Cognitively pt demonstrates impairments that may impact her safety to return home alone. Pt scored 13/30 on the [facility] cognitive screen. Recommended pt receive assistance with all higher level tasks (i.e. medication mgmt, driving, meal prep) upon d/c home.  OT recommended for home safety evaluation and cognitive assessment within pt's home environment. No skilled inpatient OT services recommended at this time.    Total Evaluation Time (Minutes)   Total  Evaluation Time (Minutes) 24       Kenisha Ribera OTR/L  [facility]  [phone]

## 2021-04-13 NOTE — CONSULTS
[facility]    General Surgery Consultation    Date of Admission:  4/12/2021    Assessment & Plan   Khalida Rouse is a 81 year old female who was admitted on 4/12/2021. I was asked to see the patient for acute appendicitis.    Active Problems:    Atrial flutter    S/P AVR (aortic valve replacement)    Acute appendicitis with localized peritonitis, without perforation, abscess, or gangrene    Patient has acute appendicitis with a appendicolith within the appendix.  No signs of perforations or intra-abdominal abscess.  Patient has multiple comorbidities including a prosthetic aortic valve secondary to severe aortic stenosis, patient is on long-term Coumadin for A. fib and the prostatic valve, most recent echo showed that she has severe pulmonary hypertension with worsening pulmonary pressure from previous study in 2019.  She also has cirrhosis of the liver.  She is also pleasantly demented.  I recommend that we try conservative management with IV antibiotics for the next few days, until patient has return of bowel function, I recommend that she be sent on antibiotic for a full 2-week course.  I discussed with the patient and also with the hospitalist team, that this has a high incidence of recurrence however due to her severe comorbidity, I do not recommend surgery unless she fails conservative management.  She does need surgery, I recommend that she be transferred to a tertiary center.  The hospitalist team will discuss CODE STATUS and also my treatment plan with her daughter (Leah).      [facility]    Code Status    Full Code    Reason for Consult   Reason for consult: I was asked by Lillie Hurt CNP to evaluate this patient for acute appendicitis.    Primary Care Physician   Mazin Larsen    Chief Complaint   Acute appendicitis    History is obtained from the patient    History of Present Illness   Khalida Rouse is a 81 year old female who presents with history of  "constipation and RLQ pain.  Most history is from EMR and pt's daughter as pt is pleasantly demented.  Pt stated she came to ED as she has increased RLQ pain.  She's been \"working\" on her distended abdomen for \"weeks.\"  Per daughter, pt been having issues constipation the past month.  Last CT abd/pel which showed large stool burden and also distention of the colon.  No mentioned of \"normal\" appendix, but I was not able to see the appendix on that scan.  No evidence of any stranding or fluid at that time.  No fevers; no chills.      Pt stated today her abdomen is feeling \"better.\"  Still having tenderness on palpation.  no flatus; some ?BMs.  No melena; no BRBPR.  Pain is strictly at the RLQ.  Pt able to walk up and down the stairs; get in and out of bed herself.  Tolerating normal      Pt significant PMH - worsening pul htn on most recent echo 4/13/2021, prosthetic aortic valve (coumadin), AF, cardiomegaly, liver cirrhosis.      Past Medical History   I have reviewed this patient's medical history and updated it with pertinent information if needed.   Past Medical History:   Diagnosis Date     Aortic valve stenosis      Atrial flutter (H)      Cardiomyopathy (H)      Severe aortic stenosis 4/7/2014     Shoulder fracture, left 3/22/15       Past Surgical History   I have reviewed this patient's surgical history and updated it with pertinent information if needed.  Past Surgical History:   Procedure Laterality Date     CATARACT IOL, RT/LT Right      ENT SURGERY      glaucoma surgery     REPLACE VALVE AORTIC  4/10/2014    Procedure: REPLACE VALVE AORTIC;  Median sternotomy, Replace Aortic Valve on pump oxygenation. ;  Surgeon: Wesley Fong MD;  Location: [facility] OR       Prior to Admission Medications   Prior to Admission Medications   Prescriptions Last Dose Informant Patient Reported? Taking?   acetaminophen (ACETAMINOPHEN 8 HOUR) 650 MG CR tablet 4/12/2021 at am Daughter Yes Yes   Sig: Take 650 mg by mouth daily "   acetaminophen (TYLENOL) 500 MG tablet 2021 at 0100 Self No Yes   Sig: Take 2 tablets (1,000 mg) by mouth every 6 hours as needed for mild pain   atenolol (TENORMIN) 50 MG tablet 2021 at am Pharmacy No Yes   Sig: TAKE ONE TABLET BY MOUTH TWICE A DAY   calcium carbonate-vitamin D 500-400 MG-UNIT TABS tablt Unknown at Unknown time Daughter No No   Sig: Take 1 tablet by mouth 3 times daily   Patient taking differently: Take 1 tablet by mouth 2 times daily    diltiazem ER COATED BEADS (CARDIZEM CD/CARTIA XT) 180 MG 24 hr capsule 2021 at am Pharmacy No Yes   Sig: Take 1 capsule (180 mg) by mouth 2 times daily   docusate sodium (COLACE) 100 MG capsule Unknown at Unknown time Daughter No No   Sig: Take 1 capsule (100 mg) by mouth daily   Patient not taking: Reported on 2021   ibuprofen (ADVIL/MOTRIN) 200 MG tablet Unknown at Unknown time Daughter Yes No   Sig: Take 200 mg by mouth every 4 hours as needed for mild pain   magnesium citrate solution 2021 at am Daughter Yes Yes   Sig: Take 30 mLs by mouth 3 times daily as needed for constipation or other   methylcellulose (CITRUCEL) powder   No No   Si scoop twice a day with big glass of water   Patient not taking: Reported on 2021   phenazopyridine (PYRIDIUM) 100 MG tablet   No No   Sig: Take 1 tablet (100 mg) by mouth 3 times daily as needed for urinary tract discomfort   Patient not taking: Reported on 2021   polyethylene glycol (MIRALAX) 17 GM/Dose powder   No No   Sig: Take 17 g (1 capful) by mouth 3 times daily as needed for constipation   torsemide (DEMADEX) 20 MG tablet   No No   Sig: TAKE ONE TABLET BY MOUTH ONCE DAILY AS NEEDED   Patient not taking: Reported on 2021   trimethoprim-polymyxin b (POLYTRIM) 68691-9.1 UNIT/ML-% ophthalmic solution   No No   Sig: Place 1-2 drops into the right eye every 4 hours   Patient taking differently: Place 1-2 drops into the right eye daily as needed    warfarin ANTICOAGULANT (COUMADIN) 2.5  MG tablet Past Week at Unknown time Self Yes Yes   Sig: Take 2.5 mg by mouth daily       Facility-Administered Medications: None     Allergies   Allergies   Allergen Reactions     Xarelto [Rivaroxaban] Diarrhea     Ace Inhibitors Cough       Social History   I have reviewed this patient's social history and updated it with pertinent information if needed. Khalida Rouse  reports that she has never smoked. She has never used smokeless tobacco. She reports that she does not drink alcohol or use drugs.    Family History   I have reviewed this patient's family history and updated it with pertinent information if needed.   Family History   Problem Relation Age of Onset     Dementia Brother      Alzheimer Disease Sister        Review of Systems   The 10 point Review of Systems is negative other than noted in the HPI or here.     Physical Exam   Temp: 99.7  F (37.6  C) Temp src: Oral BP: 117/72 Pulse: 115   Resp: 16 SpO2: 91 % O2 Device: None (Room air)    Vital Signs with Ranges  Temp:  [99.7  F (37.6  C)-99.9  F (37.7  C)] 99.7  F (37.6  C)  Pulse:  [] 115  Resp:  [16-18] 16  BP: (117-136)/(65-85) 117/72  SpO2:  [91 %-100 %] 91 %  142 lbs 3.2 oz    Constitutional: Awake, alert, cooperative, no apparent distress, and appears stated age.  Pleasantly demented  Eyes: Lids and lashes normal, round and reactive to light, extra ocular muscles intact, sclera clear, conjunctiva normal.  ENT: Normocephalic, without obvious abnormality, atraumatic.  Respiratory: No increased work of breathing.  Cardiovascular:   GI:  soft, distended,  RLQ/periumbilical tender, no peritoneal signs.    Lymph/Hematologic:   Skin: No bruising or bleeding  Musculoskeletal: There is no redness, warmth, or swelling of the joints.  Full range of motion noted.  Motor strength is 5 out of 5 all extremities bilaterally.  Tone is normal.  Neurologic: Awake, alert, oriented to name,but not time or place.    Neuropsychiatric: Calm, normal eye contact,  alert, normal affect, oriented to self    Data   Results for orders placed or performed during the hospital encounter of 21 (from the past 24 hour(s))   INR   Result Value Ref Range    INR 2.44 (H) 0.86 - 1.14   Comprehensive metabolic panel   Result Value Ref Range    Sodium 136 133 - 144 mmol/L    Potassium 3.8 3.4 - 5.3 mmol/L    Chloride 106 94 - 109 mmol/L    Carbon Dioxide 26 20 - 32 mmol/L    Anion Gap 4 3 - 14 mmol/L    Glucose 85 70 - 99 mg/dL    Urea Nitrogen 7 7 - 30 mg/dL    Creatinine 0.62 0.52 - 1.04 mg/dL    GFR Estimate 84 >60 mL/min/[1.73_m2]    GFR Estimate If Black >90 >60 mL/min/[1.73_m2]    Calcium 8.5 8.5 - 10.1 mg/dL    Bilirubin Total 1.0 0.2 - 1.3 mg/dL    Albumin 3.2 (L) 3.4 - 5.0 g/dL    Protein Total 6.8 6.8 - 8.8 g/dL    Alkaline Phosphatase 117 40 - 150 U/L    ALT 22 0 - 50 U/L    AST 17 0 - 45 U/L   CBC with platelets   Result Value Ref Range    WBC 11.4 (H) 4.0 - 11.0 10e9/L    RBC Count 4.65 3.8 - 5.2 10e12/L    Hemoglobin 13.6 11.7 - 15.7 g/dL    Hematocrit 41.3 35.0 - 47.0 %    MCV 89 78 - 100 fl    MCH 29.2 26.5 - 33.0 pg    MCHC 32.9 31.5 - 36.5 g/dL    RDW 14.1 10.0 - 15.0 %    Platelet Count 169 150 - 450 10e9/L   Echocardiogram Complete    Narrative    [id number]  [id number]  [id number]  [id number]^YASMEEN^THANH^MURPHY     [facility]  Echocardiography Laboratory  [address] [place] [address]     Name: JJ RAMACHANDRAN  MRN: [id number]  : 1940  Study Date: 2021 08:01 AM  Age: 81 yrs  Gender: Female  Patient Location: [facility]  Reason For Study: Pulmonary Hypertension  History: 21mm Magna Perimount Bioprosthesis AV  Ordering Physician: THANH ARANA  Referring Physician: ARIELLA PATHAK  Performed By: Kaur Pearson RDCS     BSA: 1.7 m2  Height: 65 in  Weight: 142 lb  HR: 120  BP: 130/65 mmHg  ______________________________________________________________________________  Procedure  Complete Portable Echo  Adult.  ______________________________________________________________________________  Interpretation Summary     The left ventricular cavity is small.  Left ventricular systolic function is normal.  The visual ejection fraction is estimated at 65-70%.  Flattened septum is consistent with RV pressure overload.  Right ventricular systolic pressure is elevated, consistent with severe  pulmonary hypertension.  There is severe biatrial enlargement.  The gradient is normal for this prosthetic aortic valve.  In comparison to previous study, 9/2019, est pulmonary pressures are  increased.  ______________________________________________________________________________  Left Ventricle  The left ventricular cavity is small. There is mild concentric left  ventricular hypertrophy. Left ventricular systolic function is normal. The  visual ejection fraction is estimated at 65-70%. Diastolic function not  assessed due to atrial fibrillation. Flattened septum is consistent with RV  pressure overload. The left ventricular apex is not well visualized.     Right Ventricle  The right ventricle is grossly normal size. The right ventricle is not well  visualized. The right ventricular systolic function is mildly reduced.     Atria  The left atrium is severely dilated. There is severe biatrial enlargement. The  right atrium is severely dilated.     Mitral Valve  The mitral valve leaflets are mildly thickened. There is trace mitral  regurgitation. There is no mitral valve stenosis.     Tricuspid Valve  The tricuspid valve is not well visualized, but is grossly normal. There is  moderate to mod-severe (2-3+) tricuspid regurgitation. The right ventricular  systolic pressure is elevated at 49.4 mmHg. Right ventricular systolic  pressure is elevated, consistent with severe pulmonary hypertension.     Aortic Valve  The aortic valve is not well visualized. The peak AoV pressure gradient is  23.0 mmHg. The mean AoV pressure gradient is 12.7  mmHg. The calculated aortic  valve are is 1.3 cm^2. The prosthetic aortic valve is not well visualized.  The  gradient is normal for this prosthetic aortic valve.     Pulmonic Valve  The pulmonic valve is not well visualized. Normal pulmonic valve velocity.     Vessels  Normal size aorta. IVC diameter >2.1 cm collapsing <50% with sniff suggests a  high RA pressure estimated at 15 mmHg or greater.     Pericardium  The pericardium appears mildly thickened. Small pericardial effusion.     Rhythm  The rhythm was rapid atrial fibrillation.  ______________________________________________________________________________  MMode/2D Measurements & Calculations  IVSd: 1.2 cm     LVIDd: 3.1 cm  LVIDs: 2.3 cm  LVPWd: 1.3 cm  FS: 24.8 %  LV mass(C)d: 130.0 grams  LV mass(C)dI: 76.0 grams/m2  Ao root diam: 3.3 cm  LA dimension: 3.6 cm  asc Aorta Diam: 3.3 cm  LA/Ao: 1.1  LVOT diam: 1.9 cm  LVOT area: 2.7 cm2  LA Volume (BP): 75.9 ml  LA Volume Index (BP): 44.4 ml/m2  RWT: 0.87     Doppler Measurements & Calculations  MV E max desmond: 118.5 cm/sec  MV dec slope: 661.9 cm/sec2  MV dec time: 0.18 sec  Ao V2 max: 238.7 cm/sec  Ao max P.0 mmHg  Ao V2 mean: 165.1 cm/sec  Ao mean P.7 mmHg  Ao V2 VTI: 35.0 cm  JEN(I,D): 1.3 cm2  JEN(V,D): 1.3 cm2  LV V1 max P.3 mmHg  LV V1 max: 115.1 cm/sec  LV V1 VTI: 16.9 cm  SV(LVOT): 45.8 ml  SI(LVOT): 26.8 ml/m2  PA acc time: 0.08 sec  TR max desmond: 351.4 cm/sec  TR max P.4 mmHg  AV Desmond Ratio (DI): 0.48  JEN Index (cm2/m2): 0.76  E/E' av.6  Lateral E/e': 9.1  Medial E/e': 14.2     ______________________________________________________________________________  Report approved by: Laura Oropeza 2021 09:38 AM

## 2021-04-13 NOTE — PLAN OF CARE
Pt is A & O x 4, VSS, low grade temp of 99.9 this shift. Continues to deny pain. Bowels normal active, pt states last BM was this morning and was loose. Passing gas. Lung sounds CTA. Pt up with SBA. IV antibiotic given per MAR and saline locked. Pt is unable to confirm medications she takes at home. States she doesn't take atenolol at home but then states she is unsure. Atenolol and diltiazem ER not given as pt cannot remember if she takes these. Daughter Leah needs to be called tomorrow, pt states she will be able to go to her apartment to go over hers. Was unable to go there tonight. This was relayed to oncoming nurse. Will continue to monitor pain, vitals, bowels, and POC.

## 2021-04-14 VITALS
BODY MASS INDEX: 23.69 KG/M2 | DIASTOLIC BLOOD PRESSURE: 63 MMHG | WEIGHT: 142.2 LBS | HEIGHT: 65 IN | TEMPERATURE: 97.9 F | HEART RATE: 96 BPM | SYSTOLIC BLOOD PRESSURE: 114 MMHG | RESPIRATION RATE: 18 BRPM | OXYGEN SATURATION: 93 %

## 2021-04-14 LAB — INR PPP: 1.9 (ref 0.86–1.14)

## 2021-04-14 PROCEDURE — 85610 PROTHROMBIN TIME: CPT | Performed by: NURSE PRACTITIONER

## 2021-04-14 PROCEDURE — 250N000011 HC RX IP 250 OP 636: Performed by: NURSE PRACTITIONER

## 2021-04-14 PROCEDURE — 99239 HOSP IP/OBS DSCHRG MGMT >30: CPT | Performed by: NURSE PRACTITIONER

## 2021-04-14 PROCEDURE — 250N000013 HC RX MED GY IP 250 OP 250 PS 637: Performed by: FAMILY MEDICINE

## 2021-04-14 PROCEDURE — 250N000013 HC RX MED GY IP 250 OP 250 PS 637: Performed by: NURSE PRACTITIONER

## 2021-04-14 PROCEDURE — 36415 COLL VENOUS BLD VENIPUNCTURE: CPT | Performed by: NURSE PRACTITIONER

## 2021-04-14 RX ORDER — WARFARIN SODIUM 2.5 MG/1
2.5 TABLET ORAL
Status: DISCONTINUED | OUTPATIENT
Start: 2021-04-14 | End: 2021-04-14 | Stop reason: HOSPADM

## 2021-04-14 RX ADMIN — AMOXICILLIN AND CLAVULANATE POTASSIUM 1 TABLET: 875; 125 TABLET, FILM COATED ORAL at 09:16

## 2021-04-14 RX ADMIN — ATENOLOL 25 MG: 25 TABLET ORAL at 09:16

## 2021-04-14 RX ADMIN — DILTIAZEM HYDROCHLORIDE 180 MG: 180 CAPSULE, EXTENDED RELEASE ORAL at 09:16

## 2021-04-14 RX ADMIN — AMPICILLIN SODIUM AND SULBACTAM SODIUM 3 G: 2; 1 INJECTION, POWDER, FOR SOLUTION INTRAMUSCULAR; INTRAVENOUS at 01:30

## 2021-04-14 ASSESSMENT — ACTIVITIES OF DAILY LIVING (ADL)
ADLS_ACUITY_SCORE: 12

## 2021-04-14 NOTE — PLAN OF CARE
S-(situation): Patient discharged to home via wheelchair with family @ 1150.    B-(background): Appendicitis    A-(assessment): Vital signs:  Temp: 97.9  F (36.6  C) Temp src: Oral BP: 114/63 Pulse: 96   Resp: 18 SpO2: 93 % O2 Device: None (Room air)  Denies pain. Tolerating regular diet without nausea. Showered this shift. IV removed asymptomatically. Bowel sounds are active. Bowel movement yesterday. Abdomen rounded. Oral Augmentin started. Lungs are clear. Skin intact.     R-(recommendations): Discharge instructions reviewed with patient. Listed belongings gathered and returned to patient.        Discharge Nursing Criteria:     Care Plan and Patient education resolved: Yes    New Medications- pt has been educated about purpose and side effects: Yes    Vaccines  Influenza status verified at discharge:  Yes    MISC  Prescriptions if needed, hard copies sent with patient  NA  Home medications returned to patient: NA  Medication Bin checked and emptied on discharge Yes  Patient reports post-discharge pain management plan is effective: Yes

## 2021-04-14 NOTE — PLAN OF CARE
"Vss, HR 80s-90s, irregular. Abdomen is rounded, pt denies pain. Answers most orientation questions correctly, but is forgetful to situation and immediately forgets details, pleasantly confused. Pt will remember being in the hospital but then will ask, \"so is your  with your son?\" and other random questions and statements that do not pertain to the actual conversation taking place. Will continue with plan of care, IV Unasyn, reorient as needed.   "

## 2021-04-14 NOTE — PROGRESS NOTES
Care Management Discharge Note    Discharge Date: 04/14/21       Discharge Disposition: Home, Home Care    Discharge Services: None    Discharge DME: None    Discharge Transportation: family or friend will provide    Private pay costs discussed: Not applicable    Patient/family educated on Medicare website which has current facility and service quality ratings: yes    Education Provided on the Discharge Plan:  Yes     Persons Notified of Discharge Plans: Patient and daughter    Patient/Family in Agreement with the Plan: yes    Handoff Referral Completed: Yes    Additional Information:  Writer visited with patient and her daughter, Leah, this morning in patient's hospital room.  Patient is ready for discharge to home today.  Discussed option of home care and reviewed the Medicare Certified list of home care agencies.  Patient and daughter have chosen: [facility] (Phone: [phone]). Writer has sent the referral and the home care liaison is meeting with them prior to discharge this morning. RN for medication management and OT services are being ordered for home care.      Writer also provided patient with a listing of area assisted living facilities and with a [employer] brochure.  Also discussed option of a [place] choice assessment that can be scheduled through the [facility]. Writer provided their phone number and information also.      Daughter, Leah, is very involved and supportive of patient.  She plans to continue to assist patient with resources and help with any needs patient has.  Leah to drive patient home today.        Rosy Mendez, [facility]   [phone]

## 2021-04-14 NOTE — DISCHARGE SUMMARY
[facility]  Hospitalist Discharge Summary      Date of Admission:  4/12/2021  Date of Discharge:  4/14/2021  Discharging Provider: Talat Bauman NP      Discharge Diagnoses   Active Problems:    Atrial flutter    S/P AVR (aortic valve replacement)    Acute appendicitis with localized peritonitis, without perforation, abscess, or gangrene    Cognitive changes - [facility] 13/30      Follow-ups Needed After Discharge   Follow-up Appointments     Follow-up and recommended labs and tests       Follow up with primary care provider, Mazin Larsen, within 7   days for hospital follow- up.  The following labs/tests are recommended:    CBC, CRP.  Patient was noted to have significant memory concerns, unable   to remember her medications and did not remember why she was in the   hospital. OT evaluation was done.    A referral has been placed for you to see cardiology after discharge. If   you have not heard from them in 2-3 business days, please call the   Cardiology Clinic you were referred to below. [facility] [place]   [phone]    A referral has been placed for you to see general surgery after discharge.   Please call [facility] General Surgery Clinic [facility] [phone] if   you have not heard from them in 2-3 business days.           Unresulted Labs Ordered in the Past 30 Days of this Admission     No orders found from 3/13/2021 to 4/13/2021.      These results will be followed up by N/A    Discharge Disposition   Discharged to home  Condition at discharge: Stable    Hospital Course     Khalida Rouse is a 81 year old female admitted on 4/12/2021. She presented with generalized abdominal pain.  She has a history of atrial flutter, on anticoagulation.  She stated abdominal pain was present for 2-3 weeks prior to admission. She denied any fever. No vomiting.  No diarrhea.  Pain is dull, moderate.  She has no history of abdominal trouble or surgery.  No known history of  diverticulitis.  CT of the abdomen done in the ED showed acute appendicitis with no evidence of free air or abscess. Patient was discussed with Dr. Chu, on call for general surgery, who recommended conservative management with antibiotics. Patient was started on IV Unasyn with improvement in symptoms. On the day of discharge, patient denied any abdominal pain. She denies nausea and is tolerating oral intake. She was passing gas and was able to have a bowel movement the morning of discharge. She is afebrile and hemodynamically stable. Discussed with general surgery who were OK with patient discharging on two week course of oral antibiotics and plans to follow up with general surgery as outpatient. Patient also will need to see cardiology for surgical clearance given worsening pulmonary hypertension noted on echocardiogram 4/13/21, prosthetic aortic valve, atrial fibrillation, and cardiomegaly. Patient given prescription for 2 week course of Augmentin and referrals to cardiology and general surgery. Patient and daughter deny any further questions or concerns. Patient's other chronic medical conditions remained stable throughout hospitalization and prior to admission medications were continued after discharge.   Consultations This Hospital Stay   PHARMACY TO DOSE WARFARIN  SURGERY GENERAL IP CONSULT  OCCUPATIONAL THERAPY ADULT IP CONSULT  CARE MANAGEMENT / SOCIAL WORK IP CONSULT    Code Status   Full Code    Time Spent on this Encounter   ITalat NP, personally saw the patient today and spent greater than 30 minutes discharging this patient.       Talat Bauman NP  [address] MEDICAL SURGICAL  [address] [place] [address]  Phone: [phone]  ______________________________________________________________________    Physical Exam   Vital Signs: Temp: 97.9  F (36.6  C) Temp src: Oral BP: 114/63 Pulse: 96   Resp: 18 SpO2: 93 % O2 Device: None (Room air)    Weight: 142 lbs 3.2  oz  Constitutional: awake, alert, cooperative, no apparent distress, and appears stated age  Respiratory: No increased work of breathing, good air exchange, clear to auscultation bilaterally, no crackles or wheezing  Cardiovascular: irregularly irregular rhythm  GI: normal bowel sounds, non-distended and non-tender  Skin: normal skin color, texture, turgor  Musculoskeletal: no lower extremity pitting edema present  Neurologic: Awake, alert, oriented to name, place and time.      Primary Care Physician   Mazin Larsen    Discharge Orders      CARDIOLOGY EVAL ADULT REFERRAL      GENERAL SURG ADULT REFERRAL      HOME CARE NURSING REFERRAL      Reason for your hospital stay    You were in the hospital for an appendix infection.     Activity    Your activity upon discharge: activity as tolerated     Follow-up and recommended labs and tests     Follow up with primary care provider, Mazin Larsen, within 7 days for hospital follow- up.  The following labs/tests are recommended:  CBC, CRP.  Patient was noted to have significant memory concerns, unable to remember her medications and did not remember why she was in the hospital. OT evaluation was done.    A referral has been placed for you to see cardiology after discharge. If you have not heard from them in 2-3 business days, please call the Cardiology Clinic you were referred to below. [facility] [place] [phone]    A referral has been placed for you to see general surgery after discharge. Please call [facility] General Surgery Clinic [facility] [phone] if you have not heard from them in 2-3 business days.     Diet    Follow this diet upon discharge: Orders Placed This Encounter      Combination Diet Regular Diet Adult       Significant Results and Procedures   Most Recent 3 CBC's:  Recent Labs   Lab Test 04/13/21  0547 04/12/21  1153 03/27/21  1324   WBC 11.4* 10.4 9.5   HGB 13.6 13.6 14.6   MCV 89 88 90    184 187     Most  Recent 3 BMP's:  Recent Labs   Lab Test 04/13/21  0547 04/12/21  1153 03/27/21  1324    137 138   POTASSIUM 3.8 4.1 4.7   CHLORIDE 106 105 105   CO2 26 28 30   BUN 7 11 14   CR 0.62 0.66 0.59   ANIONGAP 4 4 3   JOSEFINA 8.5 9.0 9.9   GLC 85 78 92   ,   Results for orders placed or performed during the hospital encounter of 04/12/21   CT Abdomen Pelvis w Contrast     Value    Radiologist flags Appendicitis (YU)    Narrative    CT ABDOMEN/PELVIS WITH CONTRAST April 12, 2021 12:59 PM    CLINICAL HISTORY: Left-sided abdominal pain.    TECHNIQUE: CT scan of the abdomen and pelvis was performed following  injection of IV contrast. Multiplanar reformats were obtained. Dose  reduction techniques were used.  CONTRAST: 70mL Isovue-370.    COMPARISON: 3/27/2021.    FINDINGS:   LOWER CHEST: The heart is an enlarged. Prior median sternotomy.  Visualized lung bases unremarkable.    HEPATOBILIARY: Cirrhotic appearing liver without evidence of worrisome  focal lesion. Large calcified gallstones are present.    PANCREAS: Normal.    SPLEEN: Normal.    ADRENAL GLANDS: Normal.    KIDNEYS/BLADDER: Nonobstructing 5 mm calculus in the left kidney. No  hydronephrosis or hydroureter. No worrisome renal lesions.    BOWEL: The appendix is abnormally dilated and surrounded by fatty  infiltration. The appendix measures 1.3 cm (series 3, image 138) and  appendicolith is present. No free intraperitoneal air or  periappendiceal abscess.    PELVIC ORGANS: Parametrial varicosities are present.    ADDITIONAL FINDINGS: No ascites or lymphadenopathy.    MUSCULOSKELETAL: Vertebral body height loss at L1 is new compared to  the prior study.      Impression    IMPRESSION:   1.  Acute appendicitis. No free air or periappendiceal abscess.  2.  New compression deformity of L1.  3.  Marked cardiomegaly.  4.  Cirrhotic appearing liver.  5.  Nonobstructing left renal calculus.    [Critical Result: Appendicitis]    Finding was identified on 4/12/2021 1:01 PM.      Dr. Hall was contacted by me on 2021 1:07 PM and verbalized  understanding of the critical result.     MABEL JUNIOR MD   Echocardiogram Complete    Narrative    [id number]  [id number]  [id number]  [id number]^YASMEEN^THANH^MURPHY     [facility]  Echocardiography Laboratory  [address] [place] [address]     Name: JJ RAMACHANDRAN  MRN: [id number]  : 1940  Study Date: 2021 08:01 AM  Age: 81 yrs  Gender: Female  Patient Location: [facility]  Reason For Study: Pulmonary Hypertension  History: 21mm Magna Perimount Bioprosthesis AV  Ordering Physician: THANH ARANA  Referring Physician: ARIELLA PATHAK  Performed By: Kaur Pearson RDCS     BSA: 1.7 m2  Height: 65 in  Weight: 142 lb  HR: 120  BP: 130/65 mmHg  ______________________________________________________________________________  Procedure  Complete Portable Echo Adult.  ______________________________________________________________________________  Interpretation Summary     The left ventricular cavity is small.  Left ventricular systolic function is normal.  The visual ejection fraction is estimated at 65-70%.  Flattened septum is consistent with RV pressure overload.  Right ventricular systolic pressure is elevated, consistent with severe  pulmonary hypertension.  There is severe biatrial enlargement.  The gradient is normal for this prosthetic aortic valve.  In comparison to previous study, 2019, est pulmonary pressures are  increased.  ______________________________________________________________________________  Left Ventricle  The left ventricular cavity is small. There is mild concentric left  ventricular hypertrophy. Left ventricular systolic function is normal. The  visual ejection fraction is estimated at 65-70%. Diastolic function not  assessed due to atrial fibrillation. Flattened septum is consistent with RV  pressure overload. The left ventricular apex is not well visualized.     Right  Ventricle  The right ventricle is grossly normal size. The right ventricle is not well  visualized. The right ventricular systolic function is mildly reduced.     Atria  The left atrium is severely dilated. There is severe biatrial enlargement. The  right atrium is severely dilated.     Mitral Valve  The mitral valve leaflets are mildly thickened. There is trace mitral  regurgitation. There is no mitral valve stenosis.     Tricuspid Valve  The tricuspid valve is not well visualized, but is grossly normal. There is  moderate to mod-severe (2-3+) tricuspid regurgitation. The right ventricular  systolic pressure is elevated at 49.4 mmHg. Right ventricular systolic  pressure is elevated, consistent with severe pulmonary hypertension.     Aortic Valve  The aortic valve is not well visualized. The peak AoV pressure gradient is  23.0 mmHg. The mean AoV pressure gradient is 12.7 mmHg. The calculated aortic  valve are is 1.3 cm^2. The prosthetic aortic valve is not well visualized.  The  gradient is normal for this prosthetic aortic valve.     Pulmonic Valve  The pulmonic valve is not well visualized. Normal pulmonic valve velocity.     Vessels  Normal size aorta. IVC diameter >2.1 cm collapsing <50% with sniff suggests a  high RA pressure estimated at 15 mmHg or greater.     Pericardium  The pericardium appears mildly thickened. Small pericardial effusion.     Rhythm  The rhythm was rapid atrial fibrillation.  ______________________________________________________________________________  MMode/2D Measurements & Calculations  IVSd: 1.2 cm     LVIDd: 3.1 cm  LVIDs: 2.3 cm  LVPWd: 1.3 cm  FS: 24.8 %  LV mass(C)d: 130.0 grams  LV mass(C)dI: 76.0 grams/m2  Ao root diam: 3.3 cm  LA dimension: 3.6 cm  asc Aorta Diam: 3.3 cm  LA/Ao: 1.1  LVOT diam: 1.9 cm  LVOT area: 2.7 cm2  LA Volume (BP): 75.9 ml  LA Volume Index (BP): 44.4 ml/m2  RWT: 0.87     Doppler Measurements & Calculations  MV E max maris: 118.5 cm/sec  MV dec slope: 661.9  cm/sec2  MV dec time: 0.18 sec  Ao V2 max: 238.7 cm/sec  Ao max P.0 mmHg  Ao V2 mean: 165.1 cm/sec  Ao mean P.7 mmHg  Ao V2 VTI: 35.0 cm  JEN(I,D): 1.3 cm2  JEN(V,D): 1.3 cm2  LV V1 max P.3 mmHg  LV V1 max: 115.1 cm/sec  LV V1 VTI: 16.9 cm  SV(LVOT): 45.8 ml  SI(LVOT): 26.8 ml/m2  PA acc time: 0.08 sec  TR max desmond: 351.4 cm/sec  TR max P.4 mmHg  AV Desmond Ratio (DI): 0.48  JEN Index (cm2/m2): 0.76  E/E' av.6  Lateral E/e': 9.1  Medial E/e': 14.2     ______________________________________________________________________________  Report approved by: Laura Oropeza 2021 09:38 AM               Discharge Medications   Current Discharge Medication List      START taking these medications    Details   amoxicillin-clavulanate (AUGMENTIN) 875-125 MG tablet Take 1 tablet by mouth every 12 hours for 14 days  Qty: 28 tablet, Refills: 0    Associated Diagnoses: Acute appendicitis with localized peritonitis, without perforation, abscess, or gangrene         CONTINUE these medications which have CHANGED    Details   polyethylene glycol (MIRALAX) 17 GM/Dose powder Take 17 g (1 capful) by mouth 3 times daily as needed for constipation  Qty: 116 g, Refills: 0    Associated Diagnoses: Constipation, unspecified constipation type         CONTINUE these medications which have NOT CHANGED    Details   acetaminophen (ACETAMINOPHEN 8 HOUR) 650 MG CR tablet Take 650 mg by mouth daily      acetaminophen (TYLENOL) 500 MG tablet Take 2 tablets (1,000 mg) by mouth every 6 hours as needed for mild pain  Qty: 180 tablet, Refills: 3    Associated Diagnoses: Primary osteoarthritis of both knees      atenolol (TENORMIN) 50 MG tablet TAKE ONE TABLET BY MOUTH TWICE A DAY  Qty: 180 tablet, Refills: 1    Associated Diagnoses: Atrial fibrillation with RVR (H)      diltiazem ER COATED BEADS (CARDIZEM CD/CARTIA XT) 180 MG 24 hr capsule Take 1 capsule (180 mg) by mouth 2 times daily  Qty: 180 capsule, Refills: 1     Associated Diagnoses: Chronic atrial fibrillation (H)      magnesium citrate solution Take 30 mLs by mouth 3 times daily as needed for constipation or other      warfarin ANTICOAGULANT (COUMADIN) 2.5 MG tablet Take 2.5 mg by mouth daily       calcium carbonate-vitamin D 500-400 MG-UNIT TABS tablt Take 1 tablet by mouth 3 times daily    Associated Diagnoses: Medicare annual wellness visit, subsequent; Osteoporosis      docusate sodium (COLACE) 100 MG capsule Take 1 capsule (100 mg) by mouth daily  Qty: 90 capsule, Refills: 3    Associated Diagnoses: Slow transit constipation      ibuprofen (ADVIL/MOTRIN) 200 MG tablet Take 200 mg by mouth every 4 hours as needed for mild pain         STOP taking these medications       methylcellulose (CITRUCEL) powder Comments:   Reason for Stopping:         phenazopyridine (PYRIDIUM) 100 MG tablet Comments:   Reason for Stopping:         torsemide (DEMADEX) 20 MG tablet Comments:   Reason for Stopping:         trimethoprim-polymyxin b (POLYTRIM) 84901-6.1 UNIT/ML-% ophthalmic solution Comments:   Reason for Stopping:             Allergies   Allergies   Allergen Reactions     Xarelto [Rivaroxaban] Diarrhea     Ace Inhibitors Cough

## 2021-04-14 NOTE — PHARMACY-ANTICOAGULATION SERVICE
Clinical Pharmacy - Warfarin Dosing Consult     Pharmacy has been consulted to manage this patient s warfarin therapy.  Indication: Atrial Fibrillation  Therapy Goal: INR 2-3  OP Anticoag Clinic: [facility]  Warfarin Prior to Admission: Yes  Warfarin PTA Regimen: 2.5 mg daily  Recent documented change in oral intake/nutrition: Unknown  Dose Comments: Last dose PTA unknown by patient    INR   Date Value Ref Range Status   04/14/2021 1.90 (H) 0.86 - 1.14 Final   04/13/2021 2.44 (H) 0.86 - 1.14 Final       Recommend warfarin 2.5 mg today.  Pharmacy will monitor Khalida Rouse daily and order warfarin doses to achieve specified goal.      Please contact pharmacy as soon as possible if the warfarin needs to be held for a procedure or if the warfarin goals change.

## 2021-04-15 ENCOUNTER — OFFICE VISIT (OUTPATIENT)
Dept: CARDIOLOGY | Facility: CLINIC | Age: 81
End: 2021-04-15
Payer: MEDICARE

## 2021-04-15 ENCOUNTER — ANTICOAGULATION THERAPY VISIT (OUTPATIENT)
Dept: ANTICOAGULATION | Facility: CLINIC | Age: 81
End: 2021-04-15

## 2021-04-15 ENCOUNTER — HOSPITAL ENCOUNTER (OUTPATIENT)
Dept: CARDIOLOGY | Facility: CLINIC | Age: 81
Discharge: HOME | End: 2021-04-15
Attending: INTERNAL MEDICINE | Admitting: INTERNAL MEDICINE
Payer: MEDICARE

## 2021-04-15 ENCOUNTER — HOSPITAL ENCOUNTER (OUTPATIENT)
Facility: CLINIC | Age: 81
End: 2021-04-15
Payer: MEDICARE

## 2021-04-15 ENCOUNTER — PATIENT OUTREACH (OUTPATIENT)
Dept: CARE COORDINATION | Facility: CLINIC | Age: 81
End: 2021-04-15

## 2021-04-15 VITALS
WEIGHT: 136.4 LBS | HEART RATE: 106 BPM | OXYGEN SATURATION: 95 % | DIASTOLIC BLOOD PRESSURE: 70 MMHG | HEIGHT: 65 IN | SYSTOLIC BLOOD PRESSURE: 122 MMHG | BODY MASS INDEX: 22.73 KG/M2

## 2021-04-15 DIAGNOSIS — I48.91 ATRIAL FIBRILLATION WITH RVR (H): ICD-10-CM

## 2021-04-15 DIAGNOSIS — Z11.59 ENCOUNTER FOR SCREENING FOR OTHER VIRAL DISEASES: ICD-10-CM

## 2021-04-15 DIAGNOSIS — Z95.2 S/P AVR (AORTIC VALVE REPLACEMENT): ICD-10-CM

## 2021-04-15 DIAGNOSIS — I48.92 ATRIAL FLUTTER, UNSPECIFIED TYPE (H): ICD-10-CM

## 2021-04-15 DIAGNOSIS — I27.29 OTHER SECONDARY PULMONARY HYPERTENSION (H): ICD-10-CM

## 2021-04-15 DIAGNOSIS — Z79.01 LONG TERM CURRENT USE OF ANTICOAGULANT THERAPY: ICD-10-CM

## 2021-04-15 DIAGNOSIS — K35.30 ACUTE APPENDICITIS WITH LOCALIZED PERITONITIS, WITHOUT PERFORATION, ABSCESS, OR GANGRENE: ICD-10-CM

## 2021-04-15 LAB
CAPILLARY BLOOD COLLECTION: NORMAL
INR BLD: 2 (ref 0.86–1.14)

## 2021-04-15 PROCEDURE — 99214 OFFICE O/P EST MOD 30 MIN: CPT | Performed by: INTERNAL MEDICINE

## 2021-04-15 PROCEDURE — 93005 ELECTROCARDIOGRAM TRACING: CPT | Performed by: REHABILITATION PRACTITIONER

## 2021-04-15 PROCEDURE — 36416 COLLJ CAPILLARY BLOOD SPEC: CPT | Performed by: INTERNAL MEDICINE

## 2021-04-15 PROCEDURE — 93010 ELECTROCARDIOGRAM REPORT: CPT | Performed by: INTERNAL MEDICINE

## 2021-04-15 PROCEDURE — 85610 PROTHROMBIN TIME: CPT | Performed by: INTERNAL MEDICINE

## 2021-04-15 ASSESSMENT — PAIN SCALES - GENERAL: PAINLEVEL: NO PAIN (0)

## 2021-04-15 ASSESSMENT — MIFFLIN-ST. JEOR: SCORE: 1079.59

## 2021-04-15 NOTE — PROGRESS NOTES
Anticoagulation Management    Unable to reach Khalida today.    Today's INR result of 2.0 is therapeutic (goal INR of 2.0-3.0).  Result received from: Clinic Lab    Follow up required to discuss dosing instructions and confirm understanding of instructions    LM to call [facility] back to discuss. Pt was just recently discharged from the hospital.   [place] [facility] RN's can be reached for return calls at [phone] (internal staff only). Please do not give this number out to patients or cold transfer. Thank you!         Anticoagulation clinic to follow up    Vera Martino RN

## 2021-04-15 NOTE — PROGRESS NOTES
Clinic Care Coordination Contact  [facility]/Voicemail       Clinical Data: Care Coordinator Outreach  Outreach attempted x 1.  Left message on patient's voicemail with call back information and requested return call.  Plan: Care Coordinator will try to reach patient again in 1-2 business days.      Pratibha HOODN, RN, PHN, [facility]  Primary Clinic Care Coordination    [facility]  Primary Care Clinics  [email or web address]   Office: [phone]  Employed by [facility]

## 2021-04-15 NOTE — PROGRESS NOTES
ANTICOAGULATION MANAGEMENT     Patient Name:  Khalida Rouse  Date:  4/15/2021    ASSESSMENT /SUBJECTIVE:    Today's INR result of 2.0 is therapeutic. Goal INR of 2.0-3.0      Warfarin dose taken: Warfarin taken as instructed    Diet: No new diet changes affecting INR    Medication changes/ interactions: Currently on Augmentin post hospital admission    Previous INR: Therapeutic     S/S of bleeding or thromboembolism: No    New injury or illness: No    Upcoming surgery, procedure or cardioversion: No  Additional findings: Recent hospital admission for constipation and appemdicitis    PLAN:    Telephone call with Khalida regarding INR result and instructed:     Warfarin Dosing Instructions: Continue your current warfarin dose 2.5 mg daily    Instructed patient to follow up no later than: 1 week  Lab visit scheduled    Education provided: Please call back if any changes to your diet, medications or how you've been taking warfarin, Importance of therapeutic range and Potential interaction between warfarin and Augmentin      Khalida verbalizes understanding and agrees to warfarin dosing plan.    Instructed to call the Anticoagulation Clinic for any changes, questions or concerns. (#[phone])        Vera Martino RN      OBJECTIVE:  Recent labs: (last 7 days)     21  1153 21  0547 21  0601 04/15/21  0922   INR 3.10* 2.44* 1.90* 2.0*         No question data found.  Anticoagulation Summary  As of 4/15/2021    INR goal:  2.0-3.0   TTR:  77.2 % (1 y)   INR used for dosin.0 (4/15/2021)   Warfarin maintenance plan:  2.5 mg (2.5 mg x 1) every day   Full warfarin instructions:  2.5 mg every day   Weekly warfarin total:  17.5 mg   No change documented:  Vera Martino RN   Plan last modified:  Venessa Hood RN (2021)   Next INR check:  2021   Priority:  Maintenance   Target end date:  Indefinite    Indications    Long term current use of anticoagulant therapy [Z79.01]  Atrial fibrillation  with RVR (H) [I48.91]  S/P AVR (aortic valve replacement) [Z95.2]             Anticoagulation Episode Summary     INR check location:      Preferred lab:      Send INR reminders to:  [facility] [place]    Comments:  2.5 mg tablets, book, BP, PM dose      Anticoagulation Care Providers     Provider Role Specialty Phone number    Mazin Larsen DO Referring Internal Medicine [phone]    Dorcas Fisher MD Responsible Family Medicine [phone]

## 2021-04-15 NOTE — TELEPHONE ENCOUNTER
REFERRAL INFORMATION:    Referring Provider:  Lillie Hurt CNP    Referring Clinic:  [facility]     Reason for Visit/Diagnosis: Acute appendicitis       FUTURE VISIT INFORMATION:    Appointment Date: 4/23/2021    Appointment Time: 9 AM      NOTES RECORD STATUS  DETAILS   OFFICE NOTE from Referring Provider Internal 4/12/2021 ED- Admission    OFFICE NOTE from Other Specialists N/A    HOSPITAL DISCHARGE SUMMARY/ ED VISITS  Internal 4/12/2021 ([facility])   OPERATIVE REPORT N/A    ENDOSCOPY (EGD)  N/A    PERTINENT LABS Internal/ Care Everywhere    PATHOLOGY REPORTS (RELATED) N/A    IMAGING (CT, MRI, US, XR)  Internal CT Abdomen Pelvis: 4/12/2021, 3/27/2021

## 2021-04-15 NOTE — LETTER
[facility] CARE COORDINATION  [address]   [address]  Tel. [phone] / Fax [phone]    April 16, 2021     Khalida Rouse  [address]  [facility] [address]      Dear Khalida,    I am a clinic care coordinator who works with Mazin Larsen DO at [facility]. I have been trying to reach you recently to introduce Clinic Care Coordination and to see if there was anything I could assist you with.  Below is a description of clinic care coordination and how I can further assist you.      The clinic care coordination team is made up of a registered nurse,  and community health worker who understand the health care system. The goal of clinic care coordination is to help you manage your health and improve access to the health care system in the most efficient manner. The team can assist you in meeting your health care goals by providing education, coordinating services, strengthening the communication among your providers and supporting you with any resource needs.    Please feel free to contact me at [phone] with any questions or concerns. We are focused on providing you with the highest-quality healthcare experience possible and that all starts with you.     Sincerely,     Pratibha OGDEN, RN, PHN, CCM  Primary Clinic Care Coordination    [facility]  Primary Care Clinics  [email or web address]   Office: [phone]  Employed by [facility]

## 2021-04-15 NOTE — LETTER
4/15/2021    Mazin Larsen, DO  [address] [place] [address]    RE: Khalida Rouse       Dear Colleague,    I had the pleasure of seeing Khalida Rouse in the [facility].    CARDIOLOGY CLINIC VISIT  DATE OF SERVICE:  April 15, 2021      PRIMARY CARE PHYSICIAN:  Mazin Larsen    HISTORY OF PRESENT ILLNESS:  Ms. Rouse is a very pleasant 81-year-old female status post bioprosthetic aortic valve replacement in 04/2014 for severe aortic stenosis with 21 mm bioprosthetic aortic valve, chronic atrial fibrillation on rate control strategy on Coumadin for stroke prophylaxis, history of moderate to moderately severe tricuspid regurgitation and moderate pulmonary hypertension.   She was recently hospitalized for acute appendicitis with localized peritonitis.  She is currently being treated conservatively with abx, however, reportedly surgery would be considered if she does improve.  She is here for cardiac evaluation prior to consideration of surgery.  She is a long time patient of Dr. Flanagan and this is my fist visit with Khalida.    She presents today with her daughter.  She was discharged from the hospital yesterday.  She is feeling well and denies any exertional chest pain, chest discomfort or shortness of breath.  She denies any orthopnea, PND or lower extremity edema.  She was noted to have worsening cognitive function in the hospital, but currently lives independently.  She walks 17 steps in her house.  She is active playing with great grandchildren.  She reports no symptoms of concern with these activities.  During the hospitalization she had a repeat echocardiogram which demonstrated normal LV function, normal prosthetic valve function and worsening of pulmonary hypertension now in the severe range.  She was noted to have a flattened septum consistent with pressure/volume overload.  A CT scan of the abdomen demonstrated acute  appendicitis.  She was noted to have cirrhotic appearing liver.  Of note, Dr. Flanagan documents that he had discussed RHC with Khalida in the past and she had declined.  At this point, she is now open to further cardiac procedures.       Today, the patient is coming for routine followup.  She has no specific cardiac complaints.  The patient tells me health-wise, she feels quite well.  She is fairly active, helping her daughter and taking care of small grandkids.  She regularly climbs up and down several steps of stairs, does laundry other activities of daily living, walking without any issues.  No chest discomfort or shortness of breath, dizziness, presyncope or syncope.  She had an echocardiogram done recently that showed normal LV function, normal functioning bioprosthetic valve with normal gradient across the aortic valve, no aortic regurgitation, moderate tricuspid regurgitation, moderate pulmonary hypertension, severe biatrial enlargement.  Overall, compared to previous study, no significant changes.          PAST MEDICAL HISTORY:  1.  S/P bioprosthetic AVR in 2014  2.  Chronic atrial fibrillation:  On coumadin  3.  Pulmonary hypertension:  Severely elevated pressures on most recent echocardiogram  4.  Cognitive changes      MEDICATIONS:  Current Outpatient Medications   Medication     acetaminophen (ACETAMINOPHEN 8 HOUR) 650 MG CR tablet     acetaminophen (TYLENOL) 500 MG tablet     amoxicillin-clavulanate (AUGMENTIN) 875-125 MG tablet     atenolol (TENORMIN) 50 MG tablet     calcium carbonate-vitamin D 500-400 MG-UNIT TABS tablt     diltiazem ER COATED BEADS (CARDIZEM CD/CARTIA XT) 180 MG 24 hr capsule     ibuprofen (ADVIL/MOTRIN) 200 MG tablet     magnesium citrate solution     polyethylene glycol (MIRALAX) 17 GM/Dose powder     warfarin ANTICOAGULANT (COUMADIN) 2.5 MG tablet     docusate sodium (COLACE) 100 MG capsule     No current facility-administered medications for this visit.        ALLERGIES:  Allergies    Allergen Reactions     Xarelto [Rivaroxaban] Diarrhea     Ace Inhibitors Cough       REVIEW OF SYSTEMS:  A complete ROS was obtained and the pertinent positives are outlined in the history of present illness above.  The remainder of systems is negative.      PHYSICAL EXAM:      BP: 122/70 Pulse: 106     SpO2: 95 %      Vital Signs with Ranges  Pulse:  [106] 106  BP: (122)/(70) 122/70  SpO2:  [95 %] 95 %  136 lbs 6.4 oz    Constitutional: awake, alert, no distress  Eyes: PERRL, sclera nonicteric  ENT: trachea midline  Respiratory: CTAB  Cardiovascular: Irregularly irregular, no m/r/g,  + V wave  GI: soft, distended, +BS  Lymph/Hematologic: no lymphadenopathy  Skin: dry, no rash  Musculoskeletal: good muscle tone, no edema bilaterally  Neurologic: no focal deficits  Neuropsychiatric: appropriate affact    DATA:  Echocardiogram 4/13/21:  Images reviewed personally     The left ventricular cavity is small.  Left ventricular systolic function is normal.  The visual ejection fraction is estimated at 65-70%.  Flattened septum is consistent with RV pressure overload.  Right ventricular systolic pressure is elevated, consistent with severe  pulmonary hypertension.  There is severe biatrial enlargement.  The gradient is normal for this prosthetic aortic valve.  In comparison to previous study, 9/2019, est pulmonary pressures are  increased.          ASSESSMENT:  1.  Acute appendicitis:  Now on abx.  Trial of conservative therapy given increased surgical risk.  2.  S/P bioprosthetic AVR:  Normal function by recent echocardiogram  3.  Chronic atrial fibrillation:  On coumadin, atenolol, diltiazem.  Rates somewhat increased today in the setting of acute appendicitis  4.  Pulmonary hypertension: Unclear etiology.  Patient has declined RHC in the past, now agreeable.   Moderate in the past, now severe on most recent echocardiogram with septal flattening consistent with RV pressure/volume overload  5.  Liver cirrhosis:  Noted  incidentally on recent CT.  ? Etiology.  No ETOH use, possibly related to cor pulmonale      RECOMMENDATIONS:  1. Recommend RHC for further evaluation of pulmonary hypertension and filling pressures.  She does not appear to be overtly volume overloaded.  She has previously declined RHC but is now agreeable. Risks/benefits/alternatives were discussed with Khalida and she is agreeable to proceeding.  She will hold coumadin prior to procedure per cath lab protocol.  2.  She is at increased risk for surgery/anesthesia secondary to significant pulmonary hypertension of unclear etiology and agree with trial of conservative treatment.   However, if urgent/emergent surgery is required in the near future, would recommend proceeding with surgery with delay.    3.  Follow up with her primary cardiologist, Dr. Flanagan after RHC procedure is completed.       Hazel Arevalo MD  Cardiology - [facility]  April 15, 2021    cc:   Lillie Hurt, CNP  [address] [place] [address]

## 2021-04-15 NOTE — PROGRESS NOTES
CARDIOLOGY CLINIC VISIT  DATE OF SERVICE:  April 15, 2021      PRIMARY CARE PHYSICIAN:  Mazin Larsen    HISTORY OF PRESENT ILLNESS:  Ms. Rouse is a very pleasant 81-year-old female status post bioprosthetic aortic valve replacement in 04/2014 for severe aortic stenosis with 21 mm bioprosthetic aortic valve, chronic atrial fibrillation on rate control strategy on Coumadin for stroke prophylaxis, history of moderate to moderately severe tricuspid regurgitation and moderate pulmonary hypertension.   She was recently hospitalized for acute appendicitis with localized peritonitis.  She is currently being treated conservatively with abx, however, reportedly surgery would be considered if she does improve.  She is here for cardiac evaluation prior to consideration of surgery.  She is a long time patient of Dr. Flanagan and this is my fist visit with Khalida.    She presents today with her daughter.  She was discharged from the hospital yesterday.  She is feeling well and denies any exertional chest pain, chest discomfort or shortness of breath.  She denies any orthopnea, PND or lower extremity edema.  She was noted to have worsening cognitive function in the hospital, but currently lives independently.  She walks 17 steps in her house.  She is active playing with great grandchildren.  She reports no symptoms of concern with these activities.  During the hospitalization she had a repeat echocardiogram which demonstrated normal LV function, normal prosthetic valve function and worsening of pulmonary hypertension now in the severe range.  She was noted to have a flattened septum consistent with pressure/volume overload.  A CT scan of the abdomen demonstrated acute appendicitis.  She was noted to have cirrhotic appearing liver.  Of note, Dr. Flanagan documents that he had discussed RHC with Khalida in the past and she had declined.  At this point, she is now open to further cardiac procedures.       Today, the patient is coming  for routine followup.  She has no specific cardiac complaints.  The patient tells me health-wise, she feels quite well.  She is fairly active, helping her daughter and taking care of small grandkids.  She regularly climbs up and down several steps of stairs, does laundry other activities of daily living, walking without any issues.  No chest discomfort or shortness of breath, dizziness, presyncope or syncope.  She had an echocardiogram done recently that showed normal LV function, normal functioning bioprosthetic valve with normal gradient across the aortic valve, no aortic regurgitation, moderate tricuspid regurgitation, moderate pulmonary hypertension, severe biatrial enlargement.  Overall, compared to previous study, no significant changes.          PAST MEDICAL HISTORY:  1.  S/P bioprosthetic AVR in 2014  2.  Chronic atrial fibrillation:  On coumadin  3.  Pulmonary hypertension:  Severely elevated pressures on most recent echocardiogram  4.  Cognitive changes      MEDICATIONS:  Current Outpatient Medications   Medication     acetaminophen (ACETAMINOPHEN 8 HOUR) 650 MG CR tablet     acetaminophen (TYLENOL) 500 MG tablet     amoxicillin-clavulanate (AUGMENTIN) 875-125 MG tablet     atenolol (TENORMIN) 50 MG tablet     calcium carbonate-vitamin D 500-400 MG-UNIT TABS tablt     diltiazem ER COATED BEADS (CARDIZEM CD/CARTIA XT) 180 MG 24 hr capsule     ibuprofen (ADVIL/MOTRIN) 200 MG tablet     magnesium citrate solution     polyethylene glycol (MIRALAX) 17 GM/Dose powder     warfarin ANTICOAGULANT (COUMADIN) 2.5 MG tablet     docusate sodium (COLACE) 100 MG capsule     No current facility-administered medications for this visit.        ALLERGIES:  Allergies   Allergen Reactions     Xarelto [Rivaroxaban] Diarrhea     Ace Inhibitors Cough       REVIEW OF SYSTEMS:  A complete ROS was obtained and the pertinent positives are outlined in the history of present illness above.  The remainder of systems is  negative.      PHYSICAL EXAM:      BP: 122/70 Pulse: 106     SpO2: 95 %      Vital Signs with Ranges  Pulse:  [106] 106  BP: (122)/(70) 122/70  SpO2:  [95 %] 95 %  136 lbs 6.4 oz    Constitutional: awake, alert, no distress  Eyes: PERRL, sclera nonicteric  ENT: trachea midline  Respiratory: CTAB  Cardiovascular: Irregularly irregular, no m/r/g,  + V wave  GI: soft, distended, +BS  Lymph/Hematologic: no lymphadenopathy  Skin: dry, no rash  Musculoskeletal: good muscle tone, no edema bilaterally  Neurologic: no focal deficits  Neuropsychiatric: appropriate affact    DATA:  Echocardiogram 4/13/21:  Images reviewed personally     The left ventricular cavity is small.  Left ventricular systolic function is normal.  The visual ejection fraction is estimated at 65-70%.  Flattened septum is consistent with RV pressure overload.  Right ventricular systolic pressure is elevated, consistent with severe  pulmonary hypertension.  There is severe biatrial enlargement.  The gradient is normal for this prosthetic aortic valve.  In comparison to previous study, 9/2019, est pulmonary pressures are  increased.          ASSESSMENT:  1.  Acute appendicitis:  Now on abx.  Trial of conservative therapy given increased surgical risk.  2.  S/P bioprosthetic AVR:  Normal function by recent echocardiogram  3.  Chronic atrial fibrillation:  On coumadin, atenolol, diltiazem.  Rates somewhat increased today in the setting of acute appendicitis  4.  Pulmonary hypertension: Unclear etiology.  Patient has declined RHC in the past, now agreeable.   Moderate in the past, now severe on most recent echocardiogram with septal flattening consistent with RV pressure/volume overload  5.  Liver cirrhosis:  Noted incidentally on recent CT.  ? Etiology.  No ETOH use, possibly related to cor pulmonale      RECOMMENDATIONS:  1. Recommend RHC for further evaluation of pulmonary hypertension and filling pressures.  She does not appear to be overtly volume  overloaded.  She has previously declined RHC but is now agreeable. Risks/benefits/alternatives were discussed with Khalida and she is agreeable to proceeding.  She will hold coumadin prior to procedure per cath lab protocol.  2.  She is at increased risk for surgery/anesthesia secondary to significant pulmonary hypertension of unclear etiology and agree with trial of conservative treatment.   However, if urgent/emergent surgery is required in the near future, would recommend proceeding with surgery with delay.    3.  Follow up with her primary cardiologist, Dr. Flanagan after RHC procedure is completed.       Hazel Arevalo MD  Cardiology - [facility]  April 15, 2021

## 2021-04-16 NOTE — PROGRESS NOTES
Clinic Care Coordination Contact  [facility]/Voicemail       Clinical Data: Care Coordinator Outreach  Outreach attempted x 2.  Left message on patient's voicemail with call back information and requested return call.  Plan: Care Coordinator will send care coordination introduction letter with care coordinator contact information and explanation of care coordination services via Mail. Care Coordinator will do no further outreaches at this time.      Pratibha OGDEN, RN, PHN, CCM  Primary Clinic Care Coordination    [facility]  Primary Care Clinics  [email or web address]   Office: [phone]  Employed by [facility] [employer]

## 2021-04-21 ENCOUNTER — APPOINTMENT (OUTPATIENT)
Dept: LAB | Facility: CLINIC | Age: 81
End: 2021-04-21
Payer: MEDICARE

## 2021-04-21 ENCOUNTER — OFFICE VISIT (OUTPATIENT)
Dept: INTERNAL MEDICINE | Facility: CLINIC | Age: 81
End: 2021-04-21
Payer: MEDICARE

## 2021-04-21 VITALS
BODY MASS INDEX: 23.43 KG/M2 | HEART RATE: 78 BPM | DIASTOLIC BLOOD PRESSURE: 68 MMHG | OXYGEN SATURATION: 95 % | WEIGHT: 140.8 LBS | TEMPERATURE: 97.7 F | SYSTOLIC BLOOD PRESSURE: 124 MMHG | RESPIRATION RATE: 16 BRPM

## 2021-04-21 DIAGNOSIS — R41.3 MEMORY LOSS: Primary | ICD-10-CM

## 2021-04-21 DIAGNOSIS — I48.91 ATRIAL FIBRILLATION WITH RVR (H): ICD-10-CM

## 2021-04-21 DIAGNOSIS — K59.01 SLOW TRANSIT CONSTIPATION: ICD-10-CM

## 2021-04-21 DIAGNOSIS — K36 SUBACUTE APPENDICITIS: ICD-10-CM

## 2021-04-21 DIAGNOSIS — Z95.2 S/P AVR (AORTIC VALVE REPLACEMENT): ICD-10-CM

## 2021-04-21 DIAGNOSIS — Z79.01 LONG TERM CURRENT USE OF ANTICOAGULANT THERAPY: ICD-10-CM

## 2021-04-21 DIAGNOSIS — R41.89 COGNITIVE CHANGES: ICD-10-CM

## 2021-04-21 LAB
BASOPHILS # BLD AUTO: 0.1 10E9/L (ref 0–0.2)
BASOPHILS NFR BLD AUTO: 0.9 %
DIFFERENTIAL METHOD BLD: NORMAL
EOSINOPHIL # BLD AUTO: 0.2 10E9/L (ref 0–0.7)
EOSINOPHIL NFR BLD AUTO: 2.7 %
ERYTHROCYTE [DISTWIDTH] IN BLOOD BY AUTOMATED COUNT: 13.8 % (ref 10–15)
FOLATE SERPL-MCNC: 16.1 NG/ML
HCT VFR BLD AUTO: 44 % (ref 35–47)
HGB BLD-MCNC: 14.3 G/DL (ref 11.7–15.7)
IMM GRANULOCYTES # BLD: 0.1 10E9/L (ref 0–0.4)
IMM GRANULOCYTES NFR BLD: 2 %
LYMPHOCYTES # BLD AUTO: 0.8 10E9/L (ref 0.8–5.3)
LYMPHOCYTES NFR BLD AUTO: 11.4 %
MCH RBC QN AUTO: 28.8 PG (ref 26.5–33)
MCHC RBC AUTO-ENTMCNC: 32.5 G/DL (ref 31.5–36.5)
MCV RBC AUTO: 89 FL (ref 78–100)
MONOCYTES # BLD AUTO: 0.7 10E9/L (ref 0–1.3)
MONOCYTES NFR BLD AUTO: 9.9 %
NEUTROPHILS # BLD AUTO: 5.1 10E9/L (ref 1.6–8.3)
NEUTROPHILS NFR BLD AUTO: 73.1 %
NRBC # BLD AUTO: 0 10*3/UL
NRBC BLD AUTO-RTO: 0 /100
PLATELET # BLD AUTO: 222 10E9/L (ref 150–450)
RBC # BLD AUTO: 4.96 10E12/L (ref 3.8–5.2)
VIT B12 SERPL-MCNC: 724 PG/ML (ref 193–986)
WBC # BLD AUTO: 7 10E9/L (ref 4–11)

## 2021-04-21 PROCEDURE — 82746 ASSAY OF FOLIC ACID SERUM: CPT | Performed by: INTERNAL MEDICINE

## 2021-04-21 PROCEDURE — 36415 COLL VENOUS BLD VENIPUNCTURE: CPT | Performed by: INTERNAL MEDICINE

## 2021-04-21 PROCEDURE — 85025 COMPLETE CBC W/AUTO DIFF WBC: CPT | Performed by: INTERNAL MEDICINE

## 2021-04-21 PROCEDURE — 99214 OFFICE O/P EST MOD 30 MIN: CPT | Performed by: INTERNAL MEDICINE

## 2021-04-21 PROCEDURE — 82607 VITAMIN B-12: CPT | Performed by: INTERNAL MEDICINE

## 2021-04-21 ASSESSMENT — PAIN SCALES - GENERAL: PAINLEVEL: NO PAIN (0)

## 2021-04-21 NOTE — PROGRESS NOTES
Assessment & Plan     Subacute appendicitis  Anticipating surgery electively    Slow transit constipation  Continue stool softener  - CBC with platelets and differential    Memory loss  Previous head CAT scan unremarkable.  Will review reversible causes of mental decline  - Folate  - Treponema Abs w Reflex to RPR and Titer  - Vitamin B12    Cognitive changes - SLUMS 13/30  As above    Atrial fibrillation with RVR (H)  Currently on anticoagulation.  Will need to be discontinued prior to surgery    S/P AVR (aortic valve replacement)  Bioprosthetic valve does not require bridging    Long term current use of anticoagulant therapy  As above                                      FOLLOW UP   I have asked the patient to make an appointment for followup with me postsurgically.  Given her recent hospitalization, preoperative consultation was not requested.                Mazin Larsen DO  [facility] ADONAY Gaxiola is a 81 year old who presents for the following health issues     HPI       Hospital Follow-up Visit:    Hospital/Nursing Home/IP Rehab Facility: [facility]  Date of Admission: 04/12/2021  Date of Discharge: 04/14/2021   Reason(s) for Admission: Constipatioin      Was your hospitalization related to COVID-19? No   Problems taking medications regularly:  None  Medication changes since discharge: None  Problems adhering to non-medication therapy:  None    Summary of hospitalization:  [facility] discharge summary reviewed  Diagnostic Tests/Treatments reviewed.  Follow up needed: none  Other Healthcare Providers Involved in Patient s Care:         None  Update since discharge: improved.       Post Discharge Medication Reconciliation: discharge medications reconciled, continue medications without change.  Plan of care communicated with patient and family                  Review of Systems   CONSTITUTIONAL: NEGATIVE for fever, chills, change in  weight  INTEGUMENTARY/SKIN: NEGATIVE for worrisome rashes, moles or lesions  EYES: NEGATIVE for vision changes or irritation  ENT/MOUTH: NEGATIVE for ear, mouth and throat problems  RESP: NEGATIVE for significant cough or SOB  BREAST: NEGATIVE for masses, tenderness or discharge  CV: NEGATIVE for chest pain, palpitations or peripheral edema  GI: NEGATIVE for nausea, abdominal pain, heartburn.  Patient has some persistent constipation for which she is using Colace and MiraLAX with fairly good results.  : NEGATIVE for frequency, dysuria, or hematuria  MUSCULOSKELETAL: NEGATIVE for significant arthralgias or myalgia  NEURO: NEGATIVE for weakness, dizziness or paresthesias  ENDOCRINE: NEGATIVE for temperature intolerance, skin/hair changes  HEME: NEGATIVE for bleeding problems  PSYCHIATRIC: NEGATIVE for changes in mood or affect.  It has been reported the patient has had some slight cognitive decline does have occasional forgetfulness and lapses in judgment.      Objective    There were no vitals taken for this visit.  There is no height or weight on file to calculate BMI.  Physical Exam   GENERAL: healthy, alert and no distress  EYES: Eyes grossly normal to inspection, PERRL and conjunctivae and sclerae normal  HENT: ear canals and TM's normal, nose and mouth without ulcers or lesions  NECK: no adenopathy, no asymmetry, masses, or scars and thyroid normal to palpation  RESP: lungs clear to auscultation - no rales, rhonchi or wheezes  CV: regular rate and rhythm, normal S1 S2, no S3 or S4, no murmur, click or rub, no peripheral edema and peripheral pulses strong  ABDOMEN: soft, nontender, no hepatosplenomegaly, no masses and bowel sounds normal  MS: no gross musculoskeletal defects noted, no edema  SKIN: no suspicious lesions or rashes  NEURO: Normal strength and tone, mentation intact and speech normal  PSYCH: mentation appears normal, affect normal/bright  LYMPH: no cervical, supraclavicular, axillary, or inguinal  adenopathy    Laboratory work and radiographs performed in the hospital are noted.  Most notably is a CAT scan demonstrating acute appendicitis.  Patient is asymptomatic.  Anticipating elective appendectomy in the near future.

## 2021-04-22 LAB — T PALLIDUM AB SER QL: NONREACTIVE

## 2021-04-23 ENCOUNTER — PRE VISIT (OUTPATIENT)
Dept: SURGERY | Facility: CLINIC | Age: 81
End: 2021-04-23

## 2021-04-23 ENCOUNTER — OFFICE VISIT (OUTPATIENT)
Dept: SURGERY | Facility: CLINIC | Age: 81
End: 2021-04-23
Attending: NURSE PRACTITIONER
Payer: MEDICARE

## 2021-04-23 VITALS
HEART RATE: 92 BPM | DIASTOLIC BLOOD PRESSURE: 78 MMHG | BODY MASS INDEX: 23.43 KG/M2 | HEIGHT: 65 IN | WEIGHT: 140.6 LBS | SYSTOLIC BLOOD PRESSURE: 126 MMHG | OXYGEN SATURATION: 96 % | TEMPERATURE: 98.1 F

## 2021-04-23 DIAGNOSIS — I48.92 ATRIAL FLUTTER, UNSPECIFIED TYPE (H): ICD-10-CM

## 2021-04-23 DIAGNOSIS — Z95.2 S/P AVR (AORTIC VALVE REPLACEMENT): ICD-10-CM

## 2021-04-23 DIAGNOSIS — I27.29 OTHER SECONDARY PULMONARY HYPERTENSION (H): ICD-10-CM

## 2021-04-23 DIAGNOSIS — K35.30 ACUTE APPENDICITIS WITH LOCALIZED PERITONITIS, WITHOUT PERFORATION, ABSCESS, OR GANGRENE: ICD-10-CM

## 2021-04-23 PROCEDURE — 99203 OFFICE O/P NEW LOW 30 MIN: CPT | Performed by: SURGERY

## 2021-04-23 ASSESSMENT — MIFFLIN-ST. JEOR: SCORE: 1098.64

## 2021-04-23 ASSESSMENT — PAIN SCALES - GENERAL: PAINLEVEL: MODERATE PAIN (5)

## 2021-04-23 NOTE — NURSING NOTE
"Chief Complaint   Patient presents with     Consult     Appt for appendicitis.       Vitals:    04/23/21 0810   BP: 126/78   BP Location: Left arm   Patient Position: Sitting   Cuff Size: Adult Regular   Pulse: 92   Temp: 98.1  F (36.7  C)   SpO2: 96%   Weight: 63.8 kg (140 lb 9.6 oz)   Height: 1.651 m (5' 5\")       Body mass index is 23.4 kg/m .                            RADHA JARVIS, EMT    "

## 2021-04-23 NOTE — PROGRESS NOTES
New General Surgery Consultation Note        Khalida Rouse  [id number]  1940 April 23, 2021     Requesting Provider: Lillie Hurt     Dear Mazin Waldron,            CHIEF COMPLAINT:  Back pain    Assessment & Plan   Problem List Items Addressed This Visit        Digestive    Acute appendicitis with localized peritonitis, without perforation, abscess, or gangrene       Circulatory    Atrial flutter    Other secondary pulmonary hypertension (H)       Other    S/P AVR (aortic valve replacement)         Pts CT was reviewed extensively. She is doing well on ABX. She does have an appendicolith, and we discussed that acute appendicitis may occur/recur, and should be managed at [facility], however, she is a high risk candidate for elective surgery, particularly with pulmonary HTN an on Xarelto, with severe aortic stenosis s/p valve replacement. She is hoping to avodi surgery. After risk/benefit discussion, we will attempt to manage non-op, and let her complete course of ABX. IF recurrence requiring OR management, she knows that this is best done at [facility].     John Tristan MD          Lab Results   Component Value Date    WBC 7.0 04/21/2021     Lab Results   Component Value Date    RBC 4.96 04/21/2021     Lab Results   Component Value Date    HGB 14.3 04/21/2021     Lab Results   Component Value Date    HCT 44.0 04/21/2021     No components found for: MCT  Lab Results   Component Value Date    MCV 89 04/21/2021     Lab Results   Component Value Date    MCH 28.8 04/21/2021     Lab Results   Component Value Date    MCHC 32.5 04/21/2021     Lab Results   Component Value Date    RDW 13.8 04/21/2021     Lab Results   Component Value Date     04/21/2021     Last Comprehensive Metabolic Panel:  Sodium   Date Value Ref Range Status   04/13/2021 136 133 - 144 mmol/L Final     Potassium   Date Value Ref Range Status   04/13/2021 3.8 3.4 - 5.3 mmol/L Final     Chloride   Date Value Ref Range Status    04/13/2021 106 94 - 109 mmol/L Final     Carbon Dioxide   Date Value Ref Range Status   04/13/2021 26 20 - 32 mmol/L Final     Anion Gap   Date Value Ref Range Status   04/13/2021 4 3 - 14 mmol/L Final     Glucose   Date Value Ref Range Status   04/13/2021 85 70 - 99 mg/dL Final     Urea Nitrogen   Date Value Ref Range Status   04/13/2021 7 7 - 30 mg/dL Final     Creatinine   Date Value Ref Range Status   04/13/2021 0.62 0.52 - 1.04 mg/dL Final     GFR Estimate   Date Value Ref Range Status   04/13/2021 84 >60 mL/min/[1.73_m2] Final     Comment:     Non  GFR Calc  Starting 12/18/2018, serum creatinine based estimated GFR (eGFR) will be   calculated using the Chronic Kidney Disease Epidemiology Collaboration   (CKD-EPI) equation.       Calcium   Date Value Ref Range Status   04/13/2021 8.5 8.5 - 10.1 mg/dL Final     INR/Prothrombin Time  Liver Function Studies -   Recent Labs   Lab Test 04/13/21  0547   PROTTOTAL 6.8   ALBUMIN 3.2*   BILITOTAL 1.0   ALKPHOS 117   AST 17   ALT 22     30 minutes spent on the date of the encounter doing chart review, history and exam, documentation and further activities per the note      HISTORY OF PRESENT ILLNESS:  Pt is a 81F with comorbidities including AVR, AF w RVR, on blodo thinner, and pulmonary HTN. She has chronic constipation. She went to [facility] ED 2 weeks ago with LLQ/back pain. No fevers, mild elevation in WBC fron baseline, still within normal range. On CT, she has a large appendix with appendicolith. Mild periappendiceal inflammation. Sh was given antibiotics and is still on a course of them, feels v well.    LOCATION:  back    SEVERITY:   mild    ROS    PAST MEDICAL HISTORY:  Past Medical History:   Diagnosis Date     Aortic valve stenosis      Atrial flutter (H)      Cardiomyopathy (H)      Cognitive changes - SLUMS 16/30 4/13/2021     Severe aortic stenosis 4/7/2014     Shoulder fracture, left 3/22/15        PAST SURGICAL HISTORY:  Past  Surgical History:   Procedure Laterality Date     CATARACT IOL, RT/LT Right      ENT SURGERY      glaucoma surgery     REPLACE VALVE AORTIC  4/10/2014    Procedure: REPLACE VALVE AORTIC;  Median sternotomy, Replace Aortic Valve on pump oxygenation. ;  Surgeon: Wesley Fong MD;  Location: [facility] OR        MEDICATIONS:  Current Outpatient Medications   Medication     acetaminophen (ACETAMINOPHEN 8 HOUR) 650 MG CR tablet     acetaminophen (TYLENOL) 500 MG tablet     amoxicillin-clavulanate (AUGMENTIN) 875-125 MG tablet     atenolol (TENORMIN) 50 MG tablet     diltiazem ER COATED BEADS (CARDIZEM CD/CARTIA XT) 180 MG 24 hr capsule     ibuprofen (ADVIL/MOTRIN) 200 MG tablet     magnesium citrate solution     polyethylene glycol (MIRALAX) 17 GM/Dose powder     warfarin ANTICOAGULANT (COUMADIN) 2.5 MG tablet     docusate sodium (COLACE) 100 MG capsule     No current facility-administered medications for this visit.         ALLERGIES:  Allergies   Allergen Reactions     Xarelto [Rivaroxaban] Diarrhea     Ace Inhibitors Cough        SOCIAL HISTORY:  Social History     Socioeconomic History     Marital status:      Spouse name: None     Number of children: None     Years of education: None     Highest education level: None   Occupational History     None   Social Needs     Financial resource strain: None     Food insecurity     Worry: None     Inability: None     Transportation needs     Medical: None     Non-medical: None   Tobacco Use     Smoking status: Never Smoker     Smokeless tobacco: Never Used   Substance and Sexual Activity     Alcohol use: No     Alcohol/week: 0.0 standard drinks     Drug use: No     Sexual activity: Not Currently     Partners: Male   Lifestyle     Physical activity     Days per week: None     Minutes per session: None     Stress: None   Relationships     Social connections     Talks on phone: None     Gets together: None     Attends Mormonism service: None     Active member of club or  "organization: None     Attends meetings of clubs or organizations: None     Relationship status: None     Intimate partner violence     Fear of current or ex partner: None     Emotionally abused: None     Physically abused: None     Forced sexual activity: None   Other Topics Concern     Parent/sibling w/ CABG, MI or angioplasty before 65F 55M? Not Asked      Service Not Asked     Blood Transfusions Not Asked     Caffeine Concern No     Occupational Exposure Not Asked     Hobby Hazards Not Asked     Sleep Concern Not Asked     Stress Concern Not Asked     Weight Concern Not Asked     Special Diet No     Back Care Not Asked     Exercise No     Comment: walking     Bike Helmet Not Asked     Seat Belt Not Asked     Self-Exams Not Asked   Social History Narrative     None       FAMILY HISTORY:  Family History   Problem Relation Age of Onset     Dementia Brother      Alzheimer Disease Sister        PHYSICAL EXAM:  Objective    /78 (BP Location: Left arm, Patient Position: Sitting, Cuff Size: Adult Regular)   Pulse 92   Temp 98.1  F (36.7  C)   Ht 1.651 m (5' 5\")   Wt 63.8 kg (140 lb 9.6 oz)   SpO2 96%   BMI 23.40 kg/m    /78 (BP Location: Left arm, Patient Position: Sitting, Cuff Size: Adult Regular)   Pulse 92   Temp 98.1  F (36.7  C)   Ht 1.651 m (5' 5\")   Wt 63.8 kg (140 lb 9.6 oz)   SpO2 96%   BMI 23.40 kg/m    Body mass index is 23.4 kg/m .  Physical Exam   GENERAL: healthy, alert and no distress  EYES: Eyes grossly normal to inspection, PERRL and conjunctivae and sclerae normal  HENT: ear canals and TM's normal, nose and mouth without ulcers or lesions  NECK: no adenopathy, no asymmetry, masses, or scars and thyroid normal to palpation  RESP: lungs clear to auscultation - no rales, rhonchi or wheezes  BREAST: normal without masses, tenderness or nipple discharge and no palpable axillary masses or adenopathy  CV: irrregular rhythm, normal S1 S2, no S3 or S4, no murmur, click or rub, " no peripheral edema and peripheral pulses strong  ABDOMEN: soft, nontender, no hepatosplenomegaly, no masses and bowel sounds normal  MS: no gross musculoskeletal defects noted, no edema  SKIN: no suspicious lesions or rashes  NEURO: Normal strength and tone, mentation intact and speech normal  PSYCH: mentation appears normal, affect normal/bright         DISCUSSION OF RISKS:  deferred    Sincerely,     John Tristan MD

## 2021-04-23 NOTE — LETTER
4/23/2021       RE: Khalida Rouse  [address]  [facility] [address]     Dear Colleague,    Thank you for referring your patient, Khalida Rouse, to the [facility] [place] at [facility]. Please see a copy of my visit note below.    New General Surgery Consultation Note        Khalida Rouse  [id number]  1940 April 23, 2021     Requesting Provider: Lillie Hurt     Dear Mazin Waldron,     CHIEF COMPLAINT:  Back pain    Assessment & Plan   Problem List Items Addressed This Visit        Digestive    Acute appendicitis with localized peritonitis, without perforation, abscess, or gangrene       Circulatory    Atrial flutter    Other secondary pulmonary hypertension (H)       Other    S/P AVR (aortic valve replacement)         Pts CT was reviewed extensively. She is doing well on ABX. She does have an appendicolith, and we discussed that acute appendicitis may occur/recur, and should be managed at [facility], however, she is a high risk candidate for elective surgery, particularly with pulmonary HTN an on Xarelto, with severe aortic stenosis s/p valve replacement. She is hoping to avodi surgery. After risk/benefit discussion, we will attempt to manage non-op, and let her complete course of ABX. IF recurrence requiring OR management, she knows that this is best done at [facility].     John Tristan MD          Lab Results   Component Value Date    WBC 7.0 04/21/2021     Lab Results   Component Value Date    RBC 4.96 04/21/2021     Lab Results   Component Value Date    HGB 14.3 04/21/2021     Lab Results   Component Value Date    HCT 44.0 04/21/2021     No components found for: MCT  Lab Results   Component Value Date    MCV 89 04/21/2021     Lab Results   Component Value Date    MCH 28.8 04/21/2021     Lab Results   Component Value Date    MCHC 32.5 04/21/2021     Lab Results   Component Value Date    RDW 13.8 04/21/2021     Lab  Results   Component Value Date     04/21/2021     Last Comprehensive Metabolic Panel:  Sodium   Date Value Ref Range Status   04/13/2021 136 133 - 144 mmol/L Final     Potassium   Date Value Ref Range Status   04/13/2021 3.8 3.4 - 5.3 mmol/L Final     Chloride   Date Value Ref Range Status   04/13/2021 106 94 - 109 mmol/L Final     Carbon Dioxide   Date Value Ref Range Status   04/13/2021 26 20 - 32 mmol/L Final     Anion Gap   Date Value Ref Range Status   04/13/2021 4 3 - 14 mmol/L Final     Glucose   Date Value Ref Range Status   04/13/2021 85 70 - 99 mg/dL Final     Urea Nitrogen   Date Value Ref Range Status   04/13/2021 7 7 - 30 mg/dL Final     Creatinine   Date Value Ref Range Status   04/13/2021 0.62 0.52 - 1.04 mg/dL Final     GFR Estimate   Date Value Ref Range Status   04/13/2021 84 >60 mL/min/[1.73_m2] Final     Comment:     Non  GFR Calc  Starting 12/18/2018, serum creatinine based estimated GFR (eGFR) will be   calculated using the Chronic Kidney Disease Epidemiology Collaboration   (CKD-EPI) equation.       Calcium   Date Value Ref Range Status   04/13/2021 8.5 8.5 - 10.1 mg/dL Final     INR/Prothrombin Time  Liver Function Studies -   Recent Labs   Lab Test 04/13/21  0547   PROTTOTAL 6.8   ALBUMIN 3.2*   BILITOTAL 1.0   ALKPHOS 117   AST 17   ALT 22     30 minutes spent on the date of the encounter doing chart review, history and exam, documentation and further activities per the note      HISTORY OF PRESENT ILLNESS:  Pt is a 81F with comorbidities including AVR, AF w RVR, on blodo thinner, and pulmonary HTN. She has chronic constipation. She went to [facility] ED 2 weeks ago with LLQ/back pain. No fevers, mild elevation in WBC fron baseline, still within normal range. On CT, she has a large appendix with appendicolith. Mild periappendiceal inflammation. Sh was given antibiotics and is still on a course of them, feels v well.    LOCATION:  back    SEVERITY:    mild    ROS    PAST MEDICAL HISTORY:  Past Medical History:   Diagnosis Date     Aortic valve stenosis      Atrial flutter (H)      Cardiomyopathy (H)      Cognitive changes - SLUMS 16/30 4/13/2021     Severe aortic stenosis 4/7/2014     Shoulder fracture, left 3/22/15        PAST SURGICAL HISTORY:  Past Surgical History:   Procedure Laterality Date     CATARACT IOL, RT/LT Right      ENT SURGERY      glaucoma surgery     REPLACE VALVE AORTIC  4/10/2014    Procedure: REPLACE VALVE AORTIC;  Median sternotomy, Replace Aortic Valve on pump oxygenation. ;  Surgeon: Wesley Fong MD;  Location: [facility] OR        MEDICATIONS:  Current Outpatient Medications   Medication     acetaminophen (ACETAMINOPHEN 8 HOUR) 650 MG CR tablet     acetaminophen (TYLENOL) 500 MG tablet     amoxicillin-clavulanate (AUGMENTIN) 875-125 MG tablet     atenolol (TENORMIN) 50 MG tablet     diltiazem ER COATED BEADS (CARDIZEM CD/CARTIA XT) 180 MG 24 hr capsule     ibuprofen (ADVIL/MOTRIN) 200 MG tablet     magnesium citrate solution     polyethylene glycol (MIRALAX) 17 GM/Dose powder     warfarin ANTICOAGULANT (COUMADIN) 2.5 MG tablet     docusate sodium (COLACE) 100 MG capsule     No current facility-administered medications for this visit.         ALLERGIES:  Allergies   Allergen Reactions     Xarelto [Rivaroxaban] Diarrhea     Ace Inhibitors Cough        SOCIAL HISTORY:  Social History     Socioeconomic History     Marital status:      Spouse name: None     Number of children: None     Years of education: None     Highest education level: None   Occupational History     None   Social Needs     Financial resource strain: None     Food insecurity     Worry: None     Inability: None     Transportation needs     Medical: None     Non-medical: None   Tobacco Use     Smoking status: Never Smoker     Smokeless tobacco: Never Used   Substance and Sexual Activity     Alcohol use: No     Alcohol/week: 0.0 standard drinks     Drug use: No      "Sexual activity: Not Currently     Partners: Male   Lifestyle     Physical activity     Days per week: None     Minutes per session: None     Stress: None   Relationships     Social connections     Talks on phone: None     Gets together: None     Attends Mu-ism service: None     Active member of club or organization: None     Attends meetings of clubs or organizations: None     Relationship status: None     Intimate partner violence     Fear of current or ex partner: None     Emotionally abused: None     Physically abused: None     Forced sexual activity: None   Other Topics Concern     Parent/sibling w/ CABG, MI or angioplasty before 65F 55M? Not Asked      Service Not Asked     Blood Transfusions Not Asked     Caffeine Concern No     Occupational Exposure Not Asked     Hobby Hazards Not Asked     Sleep Concern Not Asked     Stress Concern Not Asked     Weight Concern Not Asked     Special Diet No     Back Care Not Asked     Exercise No     Comment: walking     Bike Helmet Not Asked     Seat Belt Not Asked     Self-Exams Not Asked   Social History Narrative     None       FAMILY HISTORY:  Family History   Problem Relation Age of Onset     Dementia Brother      Alzheimer Disease Sister        PHYSICAL EXAM:  Objective    /78 (BP Location: Left arm, Patient Position: Sitting, Cuff Size: Adult Regular)   Pulse 92   Temp 98.1  F (36.7  C)   Ht 1.651 m (5' 5\")   Wt 63.8 kg (140 lb 9.6 oz)   SpO2 96%   BMI 23.40 kg/m    /78 (BP Location: Left arm, Patient Position: Sitting, Cuff Size: Adult Regular)   Pulse 92   Temp 98.1  F (36.7  C)   Ht 1.651 m (5' 5\")   Wt 63.8 kg (140 lb 9.6 oz)   SpO2 96%   BMI 23.40 kg/m    Body mass index is 23.4 kg/m .  Physical Exam   GENERAL: healthy, alert and no distress  EYES: Eyes grossly normal to inspection, PERRL and conjunctivae and sclerae normal  HENT: ear canals and TM's normal, nose and mouth without ulcers or lesions  NECK: no adenopathy, no " asymmetry, masses, or scars and thyroid normal to palpation  RESP: lungs clear to auscultation - no rales, rhonchi or wheezes  BREAST: normal without masses, tenderness or nipple discharge and no palpable axillary masses or adenopathy  CV: irrregular rhythm, normal S1 S2, no S3 or S4, no murmur, click or rub, no peripheral edema and peripheral pulses strong  ABDOMEN: soft, nontender, no hepatosplenomegaly, no masses and bowel sounds normal  MS: no gross musculoskeletal defects noted, no edema  SKIN: no suspicious lesions or rashes  NEURO: Normal strength and tone, mentation intact and speech normal  PSYCH: mentation appears normal, affect normal/bright     DISCUSSION OF RISKS:  deferred    Sincerely,     John Tristan MD

## 2021-04-23 NOTE — PROGRESS NOTES
Khalida ARMAAN Desiraealfonso  Gender: female  : 1940  [address]  [facility] [address]  [phone] (home)     Medical Record: [id number]  Pharmacy:    [facility] - [place], [place] - [address]  [facility] - [place], [place] - [address]  Primary Care Provider: Mazin Larsen    Parent's names are: Data Unavailable (mother) and Data Unavailable (father).      [facility]  2021     1st callback attempt. No answer. No message left.

## 2021-04-24 NOTE — PROGRESS NOTES
"Khalida Rouse  Gender: female  : 1940  [address]  [facility] [address]  [phone] (home)     Medical Record: [id number]  Pharmacy:    [facility] - [place], [place] - [address]  [facility] - [place], [place] - [address]  Primary Care Provider: Mazin Larsen    Parent's names are: Data Unavailable (mother) and Data Unavailable (father).      [facility]  2021     Discharge Phone Call:  Key Words/Key Times      Introduction - AIDET (Acknowledge, Introduce, Duration, Explanation)      Empathy-   We are calling to see how you are since your recent stay in the hospital?     Call back COMMENTS: new back pain      Clinical Questions -  (f/u appts, medication side effects/purpose, ability to care for self at home) \"For your safety, it is important to us that you understand the purpose and side effects of your medications, can you tell me what your new medications are?\"     Call back COMMENTS: Encouraged patient to contact her PCP regarding new complaint of back pain. Also, provided information on scheduled appointment at [facility] .      Staff Recognition -  We like to recognize staff and physicians who have done an excellent job.  Do you remember any people from your care team that you would like recognize?     Call back COMMENTS: \"I don't remember\"      Very Good Care -  We want to provide very good care to all patients.  How was your care?     Call back COMMENTS: \"I had good care\"      Opportunities for Improvement -  Our goal is to be the best.  Do you have any suggestions for things that we could improve upon?     Call back COMMENTS: \"Not at this time.\"      Thank You   "

## 2021-04-26 ENCOUNTER — TELEPHONE (OUTPATIENT)
Dept: INTERNAL MEDICINE | Facility: CLINIC | Age: 81
End: 2021-04-26

## 2021-04-26 ENCOUNTER — TELEPHONE (OUTPATIENT)
Dept: CARDIOLOGY | Facility: CLINIC | Age: 81
End: 2021-04-26

## 2021-04-26 NOTE — LETTER
April 26, 2021      Khalida Rouse  [address] [facility] [address]        Dear ,    We are writing to inform you of your test results.    The screening test for treponema's is negative.   B12 level and folic acid levels are within normal limits.   Blood cell count is normal without evidence of anemia or leukemia.   No reversible cause for mental status changes are noted.     Resulted Orders   Folate   Result Value Ref Range    Folate 16.1 >5.4 ng/mL   Treponema Abs w Reflex to RPR and Titer   Result Value Ref Range    Treponema Antibodies Nonreactive NR^Nonreactive      Comment:      Methodology Change: Test performed on the [employer] [facility] XL by Treponema   pallidum Total Antibodies Assay as of 3.17.2020.     Vitamin B12   Result Value Ref Range    Vitamin B12 724 193 - 986 pg/mL   CBC with platelets and differential   Result Value Ref Range    WBC 7.0 4.0 - 11.0 10e9/L    RBC Count 4.96 3.8 - 5.2 10e12/L    Hemoglobin 14.3 11.7 - 15.7 g/dL    Hematocrit 44.0 35.0 - 47.0 %    MCV 89 78 - 100 fl    MCH 28.8 26.5 - 33.0 pg    MCHC 32.5 31.5 - 36.5 g/dL    RDW 13.8 10.0 - 15.0 %    Platelet Count 222 150 - 450 10e9/L    Diff Method Automated Method     % Neutrophils 73.1 %    % Lymphocytes 11.4 %    % Monocytes 9.9 %    % Eosinophils 2.7 %    % Basophils 0.9 %    % Immature Granulocytes 2.0 %    Nucleated RBCs 0 0 /100    Absolute Neutrophil 5.1 1.6 - 8.3 10e9/L    Absolute Lymphocytes 0.8 0.8 - 5.3 10e9/L    Absolute Monocytes 0.7 0.0 - 1.3 10e9/L    Absolute Eosinophils 0.2 0.0 - 0.7 10e9/L    Absolute Basophils 0.1 0.0 - 0.2 10e9/L    Abs Immature Granulocytes 0.1 0 - 0.4 10e9/L    Absolute Nucleated RBC 0.0      If you have any questions or concerns, please call the clinic at the number listed above.     Sincerely,    Mazin Larsen,

## 2021-04-26 NOTE — TELEPHONE ENCOUNTER
----- Message from Mazin Larsen DO sent at 4/26/2021 10:02 AM CDT -----  Dear Khalida, your recent test results are attached.    The screening test for treponema's is negative.  B12 level and folic acid levels are within normal limits.  Blood cell count is normal without evidence of anemia or leukemia.  No reversible cause for mental status changes are noted.    You will be contacted with any outstanding results as they are available.  Feel free to contact me via the office or My Chart if you have any questions regarding the above.

## 2021-04-26 NOTE — TELEPHONE ENCOUNTER
Patient no showed for COVID test on 4/24/21. RHC scheduled on 4/28/21. RN called patient and left detailed VM advising patient she need to callback today to arrange COVID test prior to RHC or would need to reschedule RHC. RN will await patient callback.

## 2021-04-27 ENCOUNTER — TELEPHONE (OUTPATIENT)
Dept: ANTICOAGULATION | Facility: CLINIC | Age: 81
End: 2021-04-27

## 2021-04-27 ENCOUNTER — TELEPHONE (OUTPATIENT)
Dept: CARDIOLOGY | Facility: CLINIC | Age: 81
End: 2021-04-27

## 2021-04-27 ENCOUNTER — APPOINTMENT (OUTPATIENT)
Dept: CT IMAGING | Facility: CLINIC | Age: 81
End: 2021-04-27
Attending: EMERGENCY MEDICINE
Payer: MEDICARE

## 2021-04-27 ENCOUNTER — HOSPITAL ENCOUNTER (EMERGENCY)
Facility: CLINIC | Age: 81
Discharge: HOME | End: 2021-04-27
Attending: EMERGENCY MEDICINE | Admitting: EMERGENCY MEDICINE
Payer: MEDICARE

## 2021-04-27 ENCOUNTER — APPOINTMENT (OUTPATIENT)
Dept: GENERAL RADIOLOGY | Facility: CLINIC | Age: 81
End: 2021-04-27
Attending: EMERGENCY MEDICINE
Payer: MEDICARE

## 2021-04-27 VITALS
WEIGHT: 138.4 LBS | DIASTOLIC BLOOD PRESSURE: 70 MMHG | RESPIRATION RATE: 16 BRPM | TEMPERATURE: 97.7 F | OXYGEN SATURATION: 95 % | SYSTOLIC BLOOD PRESSURE: 130 MMHG | HEART RATE: 80 BPM | BODY MASS INDEX: 23.03 KG/M2

## 2021-04-27 DIAGNOSIS — K56.7 ILEUS OF UNSPECIFIED TYPE (H): ICD-10-CM

## 2021-04-27 DIAGNOSIS — R10.84 ABDOMINAL PAIN, GENERALIZED: ICD-10-CM

## 2021-04-27 DIAGNOSIS — S32.010A COMPRESSION FRACTURE OF L1 VERTEBRA, INITIAL ENCOUNTER (H): ICD-10-CM

## 2021-04-27 DIAGNOSIS — K36 OTHER APPENDICITIS: ICD-10-CM

## 2021-04-27 LAB
ANION GAP SERPL CALCULATED.3IONS-SCNC: 5 MMOL/L (ref 3–14)
BASOPHILS # BLD AUTO: 0 10E9/L (ref 0–0.2)
BASOPHILS NFR BLD AUTO: 0.4 %
BUN SERPL-MCNC: 15 MG/DL (ref 7–30)
CALCIUM SERPL-MCNC: 9.4 MG/DL (ref 8.5–10.1)
CHLORIDE SERPL-SCNC: 103 MMOL/L (ref 94–109)
CO2 SERPL-SCNC: 29 MMOL/L (ref 20–32)
CREAT SERPL-MCNC: 0.62 MG/DL (ref 0.52–1.04)
DIFFERENTIAL METHOD BLD: NORMAL
EOSINOPHIL # BLD AUTO: 0.1 10E9/L (ref 0–0.7)
EOSINOPHIL NFR BLD AUTO: 1.8 %
ERYTHROCYTE [DISTWIDTH] IN BLOOD BY AUTOMATED COUNT: 14.1 % (ref 10–15)
GFR SERPL CREATININE-BSD FRML MDRD: 84 ML/MIN/{1.73_M2}
GLUCOSE SERPL-MCNC: 87 MG/DL (ref 70–99)
HCT VFR BLD AUTO: 42.5 % (ref 35–47)
HGB BLD-MCNC: 13.9 G/DL (ref 11.7–15.7)
IMM GRANULOCYTES # BLD: 0 10E9/L (ref 0–0.4)
IMM GRANULOCYTES NFR BLD: 0.6 %
LYMPHOCYTES # BLD AUTO: 0.8 10E9/L (ref 0.8–5.3)
LYMPHOCYTES NFR BLD AUTO: 11.3 %
MCH RBC QN AUTO: 28.7 PG (ref 26.5–33)
MCHC RBC AUTO-ENTMCNC: 32.7 G/DL (ref 31.5–36.5)
MCV RBC AUTO: 88 FL (ref 78–100)
MONOCYTES # BLD AUTO: 0.7 10E9/L (ref 0–1.3)
MONOCYTES NFR BLD AUTO: 11 %
NEUTROPHILS # BLD AUTO: 5.1 10E9/L (ref 1.6–8.3)
NEUTROPHILS NFR BLD AUTO: 74.9 %
NRBC # BLD AUTO: 0 10*3/UL
NRBC BLD AUTO-RTO: 0 /100
PLATELET # BLD AUTO: 187 10E9/L (ref 150–450)
POTASSIUM SERPL-SCNC: 4.5 MMOL/L (ref 3.4–5.3)
RBC # BLD AUTO: 4.84 10E12/L (ref 3.8–5.2)
SODIUM SERPL-SCNC: 137 MMOL/L (ref 133–144)
WBC # BLD AUTO: 6.7 10E9/L (ref 4–11)

## 2021-04-27 PROCEDURE — 96374 THER/PROPH/DIAG INJ IV PUSH: CPT | Mod: 59 | Performed by: EMERGENCY MEDICINE

## 2021-04-27 PROCEDURE — 85025 COMPLETE CBC W/AUTO DIFF WBC: CPT | Performed by: EMERGENCY MEDICINE

## 2021-04-27 PROCEDURE — 250N000009 HC RX 250: Performed by: EMERGENCY MEDICINE

## 2021-04-27 PROCEDURE — 74019 RADEX ABDOMEN 2 VIEWS: CPT

## 2021-04-27 PROCEDURE — 250N000011 HC RX IP 250 OP 636: Performed by: EMERGENCY MEDICINE

## 2021-04-27 PROCEDURE — 96375 TX/PRO/DX INJ NEW DRUG ADDON: CPT | Performed by: EMERGENCY MEDICINE

## 2021-04-27 PROCEDURE — 74177 CT ABD & PELVIS W/CONTRAST: CPT | Mod: MG

## 2021-04-27 PROCEDURE — 99285 EMERGENCY DEPT VISIT HI MDM: CPT | Performed by: EMERGENCY MEDICINE

## 2021-04-27 PROCEDURE — 96376 TX/PRO/DX INJ SAME DRUG ADON: CPT | Performed by: EMERGENCY MEDICINE

## 2021-04-27 PROCEDURE — 99285 EMERGENCY DEPT VISIT HI MDM: CPT | Mod: 25 | Performed by: EMERGENCY MEDICINE

## 2021-04-27 PROCEDURE — 80048 BASIC METABOLIC PNL TOTAL CA: CPT | Performed by: EMERGENCY MEDICINE

## 2021-04-27 RX ORDER — ONDANSETRON 4 MG/1
4 TABLET, ORALLY DISINTEGRATING ORAL EVERY 6 HOURS PRN
Qty: 12 TABLET | Refills: 0 | Status: SHIPPED | OUTPATIENT
Start: 2021-04-27 | End: 2021-04-30

## 2021-04-27 RX ORDER — IOPAMIDOL 755 MG/ML
500 INJECTION, SOLUTION INTRAVASCULAR ONCE
Status: COMPLETED | OUTPATIENT
Start: 2021-04-27 | End: 2021-04-27

## 2021-04-27 RX ORDER — HYDROCODONE BITARTRATE AND ACETAMINOPHEN 5; 325 MG/1; MG/1
1 TABLET ORAL EVERY 4 HOURS PRN
Qty: 15 TABLET | Refills: 0 | Status: ON HOLD | OUTPATIENT
Start: 2021-04-27 | End: 2021-05-17

## 2021-04-27 RX ORDER — HYDROMORPHONE HYDROCHLORIDE 1 MG/ML
0.5 INJECTION, SOLUTION INTRAMUSCULAR; INTRAVENOUS; SUBCUTANEOUS
Status: DISCONTINUED | OUTPATIENT
Start: 2021-04-27 | End: 2021-04-27 | Stop reason: HOSPADM

## 2021-04-27 RX ORDER — ONDANSETRON 2 MG/ML
4 INJECTION INTRAMUSCULAR; INTRAVENOUS EVERY 30 MIN PRN
Status: DISCONTINUED | OUTPATIENT
Start: 2021-04-27 | End: 2021-04-27 | Stop reason: HOSPADM

## 2021-04-27 RX ADMIN — ONDANSETRON 4 MG: 2 INJECTION INTRAMUSCULAR; INTRAVENOUS at 15:21

## 2021-04-27 RX ADMIN — SODIUM CHLORIDE 60 ML: 9 INJECTION, SOLUTION INTRAVENOUS at 14:38

## 2021-04-27 RX ADMIN — ONDANSETRON 4 MG: 2 INJECTION INTRAMUSCULAR; INTRAVENOUS at 16:23

## 2021-04-27 RX ADMIN — IOPAMIDOL 70 ML: 755 INJECTION, SOLUTION INTRAVENOUS at 14:38

## 2021-04-27 RX ADMIN — HYDROMORPHONE HYDROCHLORIDE 0.5 MG: 1 INJECTION, SOLUTION INTRAMUSCULAR; INTRAVENOUS; SUBCUTANEOUS at 15:02

## 2021-04-27 NOTE — ED TRIAGE NOTES
She has ongoing low back pain and abdominal bloating.  She feels she is very constipated and has been taking laxatives that are causing her to have watery stools..

## 2021-04-27 NOTE — TELEPHONE ENCOUNTER
Multiple voicemails have been left for patient and family member regarding RHC scheduled for 4/28/21. Patient missed her COVID test, therefore needs to reschedule the procedure. Patient has not returned our calls, RHC cancelled for 4/28/21. Left one more final VM informing patient that the procedure has been cancelled.

## 2021-04-27 NOTE — DISCHARGE INSTRUCTIONS
As discussed you have appendicitis has been treated with Augmentin.  There are improvement signs on the CT scan today.  Please complete the course and then discuss with your primary care whether or not more antibiotics are indicated.  At this time I would not represcribe Augmentin.  You can stop the laxatives.  Drink plenty of water and take a fiber supplement to avoid constipation.  Norco can cause constipation.  I have prescribed Norco for your back pain.  Also Zofran for nausea vomiting.  If things are not going well please return to the emergency department for reevaluation and consideration for admission to the hospital.

## 2021-04-27 NOTE — ED PROVIDER NOTES
History     Chief Complaint   Patient presents with     Constipation     The history is provided by the patient.     Khalida Rouse is a 81 year old female who presents to the emergency department secondary to abdominal pain related to her recent constipation and back pain. The patient has been unable to get her constipation under control for the past few weeks. She has been using miralax, mag citrate, prune juice, water at home. She was seen in the ED 4 previous times for constipation. She has had minimal output with one pink lady enema, and moderate results with 2 consecutive soapsuds' enemas. She also felt less bloated and had less pain following the soapsuds enemas. Imaging at her most recent ED visit showed associated acute appendicitis. She was admitted for 2 days and did not have surgery due to underlying conditions. She was put on a 2 week course of antibiotics. The patient states that there has mostly been water coming out when she goes to have a bowel movement. She does not feel anything hard at her rectum. Her last normal bowel movement is unclear. Her abdominal pain is across her entire abdomen and radiates around to her left lower back. She mentions that her back pain has been bothering her for the past few weeks. Denies vomiting, fevers, urinary symptoms.      Pt seen for constipation on 3/27  3/9  3/31    Returned 4/11 and had a ct abd pelvis that showed appendicitis.  This was managed conservatively with antibiotics        Allergies:  Allergies   Allergen Reactions     Xarelto [Rivaroxaban] Diarrhea     Ace Inhibitors Cough       Problem List:    Patient Active Problem List    Diagnosis Date Noted     Cognitive changes - SLUMS 13/30 04/13/2021     Priority: Medium     Acute appendicitis with localized peritonitis, without perforation, abscess, or gangrene 04/12/2021     Priority: Medium     Hypoxemia 05/23/2019     Priority: Medium     Other secondary pulmonary hypertension (H) 05/14/2019      Priority: Medium     Long term current use of anticoagulant therapy 03/11/2016     Priority: Medium     Atrial fibrillation with RVR (H) 01/11/2016     Priority: Medium     Pneumonia 01/09/2016     Priority: Medium     Osteoporosis 10/26/2015     Priority: Medium     Advanced directives, counseling/discussion 10/03/2014     Priority: Medium     Declined       S/P AVR (aortic valve replacement) 04/10/2014     Priority: Medium     Atrial flutter 02/04/2014     Priority: Medium        Past Medical History:    Past Medical History:   Diagnosis Date     Aortic valve stenosis      Atrial flutter (H)      Cardiomyopathy (H)      Cognitive changes - SLUMS 16/30 4/13/2021     Severe aortic stenosis 4/7/2014     Shoulder fracture, left 3/22/15       Past Surgical History:    Past Surgical History:   Procedure Laterality Date     CATARACT IOL, RT/LT Right      ENT SURGERY      glaucoma surgery     REPLACE VALVE AORTIC  4/10/2014    Procedure: REPLACE VALVE AORTIC;  Median sternotomy, Replace Aortic Valve on pump oxygenation. ;  Surgeon: Wesley Fong MD;  Location:  OR       Family History:    Family History   Problem Relation Age of Onset     Dementia Brother      Alzheimer Disease Sister        Social History:  Marital Status:   [5]  Social History     Tobacco Use     Smoking status: Never Smoker     Smokeless tobacco: Never Used   Substance Use Topics     Alcohol use: No     Alcohol/week: 0.0 standard drinks     Drug use: No        Medications:    acetaminophen (ACETAMINOPHEN 8 HOUR) 650 MG CR tablet  amoxicillin-clavulanate (AUGMENTIN) 875-125 MG tablet  atenolol (TENORMIN) 50 MG tablet  HYDROcodone-acetaminophen (NORCO) 5-325 MG tablet  magnesium citrate solution  ondansetron (ZOFRAN ODT) 4 MG ODT tab  polyethylene glycol (MIRALAX) 17 GM/Dose powder  warfarin ANTICOAGULANT (COUMADIN) 2.5 MG tablet  diltiazem ER COATED BEADS (CARDIZEM CD/CARTIA XT) 180 MG 24 hr capsule  docusate sodium (COLACE) 100 MG  capsule          Review of Systems   All other systems reviewed and are negative.      Physical Exam   BP: 130/69  Pulse: 83  Temp: 97.7  F (36.5  C)  Resp: 16  Weight: 62.8 kg (138 lb 6.4 oz)  SpO2: 95 %      Physical Exam  Vitals signs and nursing note reviewed.   Constitutional:       General: She is not in acute distress.     Appearance: Normal appearance. She is well-developed. She is not diaphoretic.   HENT:      Head: Normocephalic and atraumatic.      Right Ear: External ear normal.      Left Ear: External ear normal.      Nose: Nose normal. No congestion or rhinorrhea.      Mouth/Throat:      Mouth: Mucous membranes are moist.      Pharynx: No oropharyngeal exudate or posterior oropharyngeal erythema.   Eyes:      General: No scleral icterus.        Right eye: No discharge.         Left eye: No discharge.      Extraocular Movements: Extraocular movements intact.      Conjunctiva/sclera: Conjunctivae normal.   Neck:      Musculoskeletal: Normal range of motion and neck supple.   Cardiovascular:      Rate and Rhythm: Normal rate and regular rhythm.      Heart sounds: Normal heart sounds. No murmur. No friction rub. No gallop.    Pulmonary:      Effort: Pulmonary effort is normal. No respiratory distress.      Breath sounds: Normal breath sounds. No stridor.   Musculoskeletal:      Right lower leg: No edema.      Left lower leg: No edema.   Skin:     General: Skin is warm and dry.      Coloration: Skin is not pale.      Findings: No erythema or rash.   Neurological:      General: No focal deficit present.      Mental Status: She is alert. Mental status is at baseline.      Cranial Nerves: No cranial nerve deficit.      Coordination: Coordination normal.   Psychiatric:         Mood and Affect: Mood normal.         Behavior: Behavior normal.         Thought Content: Thought content normal.         ED Course        Procedures    Results for orders placed or performed during the hospital encounter of 04/27/21  (from the past 24 hour(s))   CBC with platelets differential   Result Value Ref Range    WBC 6.7 4.0 - 11.0 10e9/L    RBC Count 4.84 3.8 - 5.2 10e12/L    Hemoglobin 13.9 11.7 - 15.7 g/dL    Hematocrit 42.5 35.0 - 47.0 %    MCV 88 78 - 100 fl    MCH 28.7 26.5 - 33.0 pg    MCHC 32.7 31.5 - 36.5 g/dL    RDW 14.1 10.0 - 15.0 %    Platelet Count 187 150 - 450 10e9/L    Diff Method Automated Method     % Neutrophils 74.9 %    % Lymphocytes 11.3 %    % Monocytes 11.0 %    % Eosinophils 1.8 %    % Basophils 0.4 %    % Immature Granulocytes 0.6 %    Nucleated RBCs 0 0 /100    Absolute Neutrophil 5.1 1.6 - 8.3 10e9/L    Absolute Lymphocytes 0.8 0.8 - 5.3 10e9/L    Absolute Monocytes 0.7 0.0 - 1.3 10e9/L    Absolute Eosinophils 0.1 0.0 - 0.7 10e9/L    Absolute Basophils 0.0 0.0 - 0.2 10e9/L    Abs Immature Granulocytes 0.0 0 - 0.4 10e9/L    Absolute Nucleated RBC 0.0    Basic metabolic panel   Result Value Ref Range    Sodium 137 133 - 144 mmol/L    Potassium 4.5 3.4 - 5.3 mmol/L    Chloride 103 94 - 109 mmol/L    Carbon Dioxide 29 20 - 32 mmol/L    Anion Gap 5 3 - 14 mmol/L    Glucose 87 70 - 99 mg/dL    Urea Nitrogen 15 7 - 30 mg/dL    Creatinine 0.62 0.52 - 1.04 mg/dL    GFR Estimate 84 >60 mL/min/[1.73_m2]    GFR Estimate If Black >90 >60 mL/min/[1.73_m2]    Calcium 9.4 8.5 - 10.1 mg/dL   KUB XR    Narrative    XR KUB   4/27/2021 1:18 PM     HISTORY: abd discomfort, antibiotics    COMPARISON: CT abdomen and pelvis 4/12/2021.      Impression    IMPRESSION:  Increased mild gaseous distention of the colon. The small  bowel gas pattern is nonobstructive. Limited assessment for free air  due to supine technique. Cholelithiasis. Sternotomy.     SILVIA DREW MD   CT Abdomen Pelvis w Contrast    Narrative    CT ABDOMEN PELVIS W CONTRAST 4/27/2021 2:47 PM    CLINICAL HISTORY: RLQ abdominal pain    TECHNIQUE: CT scan of the abdomen and pelvis was performed following  injection of IV contrast. Multiplanar reformats were obtained.  Dose  reduction techniques were used.  CONTRAST: Isovue 370, 70mL    COMPARISON: CT abdomen and pelvis 4/12/2021 and KUB 4/27/2021.    FINDINGS:   LOWER CHEST: Stable cardiomegaly. Severe coronary artery  calcifications. Sternotomy.    HEPATOBILIARY: Cirrhotic liver. No suspicious mass. Large calcified  gallstones. No biliary dilatation.    PANCREAS: Normal.    SPLEEN: Normal.    ADRENAL GLANDS: Normal.    KIDNEYS/BLADDER: Unchanged nonobstructing 4 mm stone in the left  kidney. No significant mass, or hydronephrosis. There are simple or  benign cysts. No follow up is needed.    BOWEL: The appendix measures 8 mm in diameter on this exam versus 13  mm previously. There is diffuse appendiceal thickening and mild  inflammatory changes which has improved from prior exam. Appendicolith  is seen at the base of the appendix. No evidence of perforation or  abscess. Colonic diverticulosis without diverticulitis. Mild  distention of the colon with gas. No significant formed stool. The  stomach and small bowel are normal in caliber without bowel  obstruction.    PELVIC ORGANS: Normal.    ADDITIONAL FINDINGS: No lymphadenopathy. Moderate aortoiliac  atherosclerosis.    MUSCULOSKELETAL: Slight interval worsening of mild L1 compression  fracture. Stable L2 and L3 compression fractures.      Impression    IMPRESSION:   1.  Acute appendicitis with improving inflammatory changes since  4/12/2021. No perforation or abscess.  2.  Large calcified gallstones.  3.  Cirrhotic liver.  4.  Colonic diverticulosis without diverticulitis.  5.  Unchanged nonobstructing 4 mm left intrarenal calculus.  6.  Slight worsening of mild L1 compression fracture.  7.  Stable cardiomegaly.    SILVIA DREW MD       Medications   HYDROmorphone (PF) (DILAUDID) injection 0.5 mg (0.5 mg Intravenous Given 4/27/21 1502)   ondansetron (ZOFRAN) injection 4 mg (4 mg Intravenous Given 4/27/21 1623)   Saline 100mL Bag (60 mLs Intravenous Given 4/27/21 1438)    iopamidol (ISOVUE-370) solution 500 mL (70 mLs Intravenous Given 4/27/21 1438)   sodium chloride (PF) 0.9% PF flush 3 mL (10 mLs Intravenous Given 4/27/21 1438)       Assessments & Plan (with Medical Decision Making)  Khalida is an 81 year old female who presents to the emergency department secondary to ongoing constipation, abdominal pain, back pain. The patient has a history of constipation and has visited the ED 4 previous times. She has sandra using mag citrate, miralax, prune juice. Most recent ED visit she was diagnosed with acute appendicitis and put on a 2 week course of antibiotics. No further issues mentioned regarding this. Vitals are stable and she is afebrile. Physical exam as noted above.   An x-ray was obtained to determine the patient's stool pattern and whether the constipation should continue to be treated with oral medications vs. enemas. At home remedies have not had results.  Xray did not show constipation but rather gas in the colon.    I am concerned about possible ileus vs obstruction given previous finding of appendicitis that was treated with antibiotics but not surgery.  Therefore CT abd/pelvis ordered.   CT scan shows some appendicitis but improved inflammatory changes.  They still have a couple of days left of Augmentin.  Further review of the chart reveals that she has severe pulmonary hypertension and previous history of aortic valve replacement.  I discussed this case with Dr. Caldwell who reviewed the chart and the CT scan and agreed that taking her to the OR today was not recommended.  She should have follow-up with the general surgeon at the , Dr. Tristan who she is seen before if her symptoms persist.  At this point they can stop the laxatives.  She should continue taking fiber supplements and drinking plenty of water.  Her back pain has been worse and they requested pain medicine.  I decided to give her Norco and Zofran.  They understand that Norco can increase constipation issues and  they should be careful with that.  The differential diagnosis, treatment options, risks and follow-up discussed with a competent patient and her daughter who agree with the plan.       I have reviewed the nursing notes.    I have reviewed the findings, diagnosis, plan and need for follow up with the patient.      New Prescriptions    HYDROCODONE-ACETAMINOPHEN (NORCO) 5-325 MG TABLET    Take 1 tablet by mouth every 4 hours as needed for pain    ONDANSETRON (ZOFRAN ODT) 4 MG ODT TAB    Take 1 tablet (4 mg) by mouth every 6 hours as needed for nausea or vomiting       Final diagnoses:   Abdominal pain, generalized   Ileus of unspecified type (H)   Other appendicitis   Compression fracture of L1 vertebra, initial encounter (H)       This document serves as a record of services personally performed by Blayne Hernandez MD. It was created on their behalf by Sonya Davis, a trained medical scribe. The creation of this record is based on the provider's personal observations and the statements of the patient. This document has been checked and approved by the attending provider.    Note: Chart documentation done in part with Dragon Voice Recognition software. Although reviewed after completion, some word and grammatical errors may remain.    4/27/2021   [facility] EMERGENCY DEPT     Blayne Hernandez MD  04/27/21 0680

## 2021-04-27 NOTE — TELEPHONE ENCOUNTER
Pt was seen in the ER today (4/27) for abdominal pain. Norco and Zofran were prescribed. While Norco has a moderate affect on INR, there was also a note that pt has been on Amoxicillin as well, and this can have a significant affect on INR. No INR was done today and she was due for one on 4/21.  I have left a VM to call [facility] back. She will need to have her INR checked ASAP.   Venessa Hood RN

## 2021-04-28 DIAGNOSIS — Z71.89 OTHER SPECIFIED COUNSELING: ICD-10-CM

## 2021-04-28 NOTE — TELEPHONE ENCOUNTER
Pt was seen in the ER Tues 4/27 for abdominal pain. Norco and Zofran were prescribed. While Norco has a moderate affect on INR, there was also a note that pt has been on Amoxicillin as well, and this can have a significant affect on INR. No INR was done today and she was due for one on 4/21.  I have left a VM to call [facility] back. She will need to have her INR checked ASAP.   Venessa Hood RN

## 2021-04-28 NOTE — TELEPHONE ENCOUNTER
Received VM from patients daughter stated that patient has been dealing with nausea/vomiting and mild fevers so therefore she was unable to get COVID test and procedure done. Tried to call patients daughter back. No answer. Left VM with scheduling number for daughter to call to reschedule COVID and RHC. Left team 4 direct number to call with any further questions or concerns.

## 2021-04-28 NOTE — PROGRESS NOTES
Clinic Care Coordination Contact    Situation: Patient chart reviewed by care coordinator.    Background: Clinic Care Coordination referral prompted from discharge reports due to ED visit on 4/27/21 for generalized abdominal pain.      Assessment: Patient was discharged home with prescriptions for Zofran and Norco per ED notes. She has a previously scheduled right heart cath procedure today, 4/28/21 however Cardiology team has been trying to contact patient as it appears patient missed her pre-procedure COVID test and rescheduling may occur.    Plan/Recommendations: Clinic Care coordination has made several outreach attempts to patient within past 4 months, patient has either declined or not been reachable.  No further outreaches indicated following 4/27 ED visit due to above.    Juliane Aldridge, SANAN, RN   [facility]  - Clinic Care Coordinator

## 2021-04-30 ENCOUNTER — OFFICE VISIT (OUTPATIENT)
Dept: INTERNAL MEDICINE | Facility: CLINIC | Age: 81
End: 2021-04-30
Payer: MEDICARE

## 2021-04-30 VITALS
BODY MASS INDEX: 23.31 KG/M2 | HEART RATE: 66 BPM | WEIGHT: 140.1 LBS | OXYGEN SATURATION: 95 % | SYSTOLIC BLOOD PRESSURE: 130 MMHG | TEMPERATURE: 98 F | RESPIRATION RATE: 16 BRPM | DIASTOLIC BLOOD PRESSURE: 62 MMHG

## 2021-04-30 DIAGNOSIS — I48.91 ATRIAL FIBRILLATION WITH RVR (H): ICD-10-CM

## 2021-04-30 DIAGNOSIS — K35.30 ACUTE APPENDICITIS WITH LOCALIZED PERITONITIS, WITHOUT PERFORATION, ABSCESS, OR GANGRENE: Primary | ICD-10-CM

## 2021-04-30 PROCEDURE — 99213 OFFICE O/P EST LOW 20 MIN: CPT | Performed by: INTERNAL MEDICINE

## 2021-04-30 ASSESSMENT — PAIN SCALES - GENERAL: PAINLEVEL: WORST PAIN (10)

## 2021-04-30 NOTE — TELEPHONE ENCOUNTER
INR was not done today.   I spoke with pt and strongly encouraged her to get her INR done ASAP. She states that her daughter is her transportation and she will be gone all next week. I again informed her that it is very important that she has her INR checked ASAP as she has recently been on antibiotics and there are some concerns with appendicitis which could also affect her INR.   I advised her to speak with her daughter and in the meantime watch for symptoms such as increased bleeding, bruising, chest pain/discomfort, s/s clotting  Venessa Hood RN

## 2021-04-30 NOTE — PROGRESS NOTES
Sandra Gaxiola is a 81 year old who presents for the following health issues     HPI     ED/UC Followup:    Facility:  [facility]  Date of visit: 4/27/21  Reason for visit: abd pain  Current Status: no change        EMR reviewed including:             Complaint, History of Chief Complaint, Corresponding Review of Systems, and Complaint Specific Physical Examination.    #1   Patient has had a recent history of acute appendicitis.    Has been seen by multiple surgeons and decision for conservative care was made based on her multitude of comorbidities.  She is currently pain-free, afebrile, taking food without difficulty, and had no complaint.  Bowel movements are scant due to her recent n.p.o. status.  Denies constipation       Exam:   GI: Abdomen is soft, without rebound, guarding or tenderness. Bowel sounds are appropriate. No renal bruits are heard.      #2   Atrial fibrillation and prior aortic valve replacement requiring anticoagulation.  Continues warfarin without any bruising or bleeding.  Denies chest pain, near syncope or other cardiac concerns at this time.       Exam:   HEART: Regular without rubs, clicks, gallops, or murmurs. PMI is nondisplaced. Upstrokes are brisk. S1,S2 are heard.   LUNGS: clear bilaterally, airflow is brisk, no intercostal retraction or stridor is noted. No coughing is noted during visit.        Vital Signs:   /62 (BP Location: Right arm, Patient Position: Sitting, Cuff Size: Adult Regular)   Pulse 66   Temp 98  F (36.7  C) (Temporal)   Resp 16   Wt 63.5 kg (140 lb 1.6 oz)   SpO2 95%   BMI 23.31 kg/m               Decision Making    Problem and Complexity     1. Acute appendicitis with localized peritonitis, without perforation, abscess, or gangrene  continue conservative observation.  No further need for antibiotics based on examination and history.    2. Atrial fibrillation with RVR (H)  continue current anticoagulation           ------------------------------------------------------------------------------------------------------------------------------  Data    4=1/3 5=2/3    1  >3  Specialty (external) notes reviewed:  general surgery notes and emergency medicine notes appreciated.  Individual tests ordered (by provider) reviewed:   None  Individual tests ordered (by provider):   None  Independent Historians Interview:   Daughter present and gives large portion of history  2  Review of outside (other providers) Tests:   Multiple reviewed  3   Verbal Discussion with Specialists:    None    ----------------------------------------------------------------------------------------------------------------------------------  Risk   Prescription drug management:   Yes, ongoing                                High risk for toxicity:   Decision regarding surgery:    None   Social determinants of health:   None   Decision regarding hospitalization:     None   Decision to withhold therapy:   None                                 FOLLOW UP   I have asked the patient to make an appointment for followup with me in 1 month or sooner as needed            I have carefully explained the diagnosis and treatment options to the patient.  The patient has displayed an understanding of the above, and all subsequent questions were answered.      DO MARIA D Sabillon    Portions of this note were produced using [employer]  Although every attempt at real-time proof reading has been made, occasional grammar/syntax errors may have been missed.

## 2021-05-03 ENCOUNTER — TELEPHONE (OUTPATIENT)
Dept: ANTICOAGULATION | Facility: CLINIC | Age: 81
End: 2021-05-03

## 2021-05-03 NOTE — TELEPHONE ENCOUNTER
ANTICOAGULATION     Khalida CROOK Padma is overdue for INR check.      Left message for patient to call and schedule lab appointment as soon as possible. If returning call, please schedule.     Vera Martino RN

## 2021-05-13 ENCOUNTER — HOSPITAL ENCOUNTER (EMERGENCY)
Facility: CLINIC | Age: 81
Discharge: ANOTHER HOSPITAL | End: 2021-05-13
Attending: EMERGENCY MEDICINE | Admitting: EMERGENCY MEDICINE
Payer: MEDICARE

## 2021-05-13 ENCOUNTER — APPOINTMENT (OUTPATIENT)
Dept: CT IMAGING | Facility: CLINIC | Age: 81
End: 2021-05-13
Attending: EMERGENCY MEDICINE
Payer: MEDICARE

## 2021-05-13 ENCOUNTER — HOSPITAL ENCOUNTER (INPATIENT)
Facility: CLINIC | Age: 81
LOS: 4 days | Discharge: NURSING, REHAB OR LONG-TERM CARE | DRG: 813 | End: 2021-05-17
Attending: HOSPITALIST | Admitting: STUDENT IN AN ORGANIZED HEALTH CARE EDUCATION/TRAINING PROGRAM
Payer: MEDICARE

## 2021-05-13 VITALS
BODY MASS INDEX: 22.47 KG/M2 | HEART RATE: 154 BPM | DIASTOLIC BLOOD PRESSURE: 69 MMHG | WEIGHT: 135 LBS | TEMPERATURE: 98.4 F | SYSTOLIC BLOOD PRESSURE: 109 MMHG | RESPIRATION RATE: 25 BRPM | OXYGEN SATURATION: 93 %

## 2021-05-13 DIAGNOSIS — R09.02 HYPOXEMIA: Primary | ICD-10-CM

## 2021-05-13 DIAGNOSIS — I48.91 ATRIAL FIBRILLATION AND FLUTTER (H): ICD-10-CM

## 2021-05-13 DIAGNOSIS — Z79.01 CURRENT USE OF LONG TERM ANTICOAGULATION: ICD-10-CM

## 2021-05-13 DIAGNOSIS — N28.89 RENAL HEMORRHAGE, RIGHT: ICD-10-CM

## 2021-05-13 DIAGNOSIS — K92.2 GASTROINTESTINAL HEMORRHAGE, UNSPECIFIED GASTROINTESTINAL HEMORRHAGE TYPE: ICD-10-CM

## 2021-05-13 DIAGNOSIS — K92.1 GASTROINTESTINAL HEMORRHAGE WITH MELENA: ICD-10-CM

## 2021-05-13 DIAGNOSIS — G31.84 MILD COGNITIVE IMPAIRMENT: ICD-10-CM

## 2021-05-13 DIAGNOSIS — S32.000S LUMBAR COMPRESSION FRACTURE, SEQUELA: ICD-10-CM

## 2021-05-13 DIAGNOSIS — I48.92 ATRIAL FIBRILLATION AND FLUTTER (H): ICD-10-CM

## 2021-05-13 DIAGNOSIS — R79.1 SUPRATHERAPEUTIC INR: ICD-10-CM

## 2021-05-13 DIAGNOSIS — M54.50 LOW BACK PAIN, UNSPECIFIED BACK PAIN LATERALITY, UNSPECIFIED CHRONICITY, UNSPECIFIED WHETHER SCIATICA PRESENT: ICD-10-CM

## 2021-05-13 LAB
ABO + RH BLD: NORMAL
ABO + RH BLD: NORMAL
ALBUMIN SERPL-MCNC: 2.6 G/DL (ref 3.4–5)
ALBUMIN SERPL-MCNC: 2.6 G/DL (ref 3.4–5)
ALP SERPL-CCNC: 83 U/L (ref 40–150)
ALP SERPL-CCNC: 89 U/L (ref 40–150)
ALT SERPL W P-5'-P-CCNC: 21 U/L (ref 0–50)
ALT SERPL W P-5'-P-CCNC: 22 U/L (ref 0–50)
ANION GAP SERPL CALCULATED.3IONS-SCNC: 5 MMOL/L (ref 3–14)
ANION GAP SERPL CALCULATED.3IONS-SCNC: 6 MMOL/L (ref 3–14)
AST SERPL W P-5'-P-CCNC: 27 U/L (ref 0–45)
AST SERPL W P-5'-P-CCNC: 28 U/L (ref 0–45)
BASOPHILS # BLD AUTO: 0 10E9/L (ref 0–0.2)
BASOPHILS NFR BLD AUTO: 0.1 %
BILIRUB SERPL-MCNC: 0.8 MG/DL (ref 0.2–1.3)
BILIRUB SERPL-MCNC: 1.9 MG/DL (ref 0.2–1.3)
BLD GP AB SCN SERPL QL: NORMAL
BLD PROD TYP BPU: NORMAL
BLD UNIT ID BPU: 0
BLD UNIT ID BPU: 0
BLOOD BANK CMNT PATIENT-IMP: NORMAL
BLOOD PRODUCT CODE: NORMAL
BLOOD PRODUCT CODE: NORMAL
BPU ID: NORMAL
BPU ID: NORMAL
BUN SERPL-MCNC: 35 MG/DL (ref 7–30)
BUN SERPL-MCNC: 40 MG/DL (ref 7–30)
CALCIUM SERPL-MCNC: 8.8 MG/DL (ref 8.5–10.1)
CALCIUM SERPL-MCNC: 9 MG/DL (ref 8.5–10.1)
CHLORIDE SERPL-SCNC: 102 MMOL/L (ref 94–109)
CHLORIDE SERPL-SCNC: 103 MMOL/L (ref 94–109)
CO2 SERPL-SCNC: 27 MMOL/L (ref 20–32)
CO2 SERPL-SCNC: 27 MMOL/L (ref 20–32)
CREAT SERPL-MCNC: 1.05 MG/DL (ref 0.52–1.04)
CREAT SERPL-MCNC: 1.22 MG/DL (ref 0.52–1.04)
CRP SERPL-MCNC: 174 MG/L (ref 0–8)
DIFFERENTIAL METHOD BLD: ABNORMAL
EOSINOPHIL # BLD AUTO: 0 10E9/L (ref 0–0.7)
EOSINOPHIL NFR BLD AUTO: 0 %
ERYTHROCYTE [DISTWIDTH] IN BLOOD BY AUTOMATED COUNT: 14.3 % (ref 10–15)
ERYTHROCYTE [DISTWIDTH] IN BLOOD BY AUTOMATED COUNT: 14.6 % (ref 10–15)
ERYTHROCYTE [SEDIMENTATION RATE] IN BLOOD BY WESTERGREN METHOD: 80 MM/H (ref 0–30)
GFR SERPL CREATININE-BSD FRML MDRD: 41 ML/MIN/{1.73_M2}
GFR SERPL CREATININE-BSD FRML MDRD: 49 ML/MIN/{1.73_M2}
GLUCOSE BLDC GLUCOMTR-MCNC: 125 MG/DL (ref 70–99)
GLUCOSE SERPL-MCNC: 125 MG/DL (ref 70–99)
GLUCOSE SERPL-MCNC: 129 MG/DL (ref 70–99)
HCT VFR BLD AUTO: 24.5 % (ref 35–47)
HCT VFR BLD AUTO: 32.3 % (ref 35–47)
HEMOCCULT STL QL: POSITIVE
HGB BLD-MCNC: 10.5 G/DL (ref 11.7–15.7)
HGB BLD-MCNC: 8.1 G/DL (ref 11.7–15.7)
HGB BLD-MCNC: 8.3 G/DL (ref 11.7–15.7)
HGB BLD-MCNC: 9.5 G/DL (ref 11.7–15.7)
IMM GRANULOCYTES # BLD: 0.2 10E9/L (ref 0–0.4)
IMM GRANULOCYTES NFR BLD: 1 %
INR PPP: 1.1 (ref 0.86–1.14)
INR PPP: 3.3 (ref 0.86–1.14)
INR PPP: >10 (ref 0.86–1.14)
INR PPP: >10 (ref 0.86–1.14)
LABORATORY COMMENT REPORT: NORMAL
LACTATE BLD-SCNC: 1.5 MMOL/L (ref 0.7–2)
LYMPHOCYTES # BLD AUTO: 0.3 10E9/L (ref 0.8–5.3)
LYMPHOCYTES NFR BLD AUTO: 2.2 %
MCH RBC QN AUTO: 28 PG (ref 26.5–33)
MCH RBC QN AUTO: 28.7 PG (ref 26.5–33)
MCHC RBC AUTO-ENTMCNC: 32.5 G/DL (ref 31.5–36.5)
MCHC RBC AUTO-ENTMCNC: 33.1 G/DL (ref 31.5–36.5)
MCV RBC AUTO: 86 FL (ref 78–100)
MCV RBC AUTO: 87 FL (ref 78–100)
MONOCYTES # BLD AUTO: 1.7 10E9/L (ref 0–1.3)
MONOCYTES NFR BLD AUTO: 10.6 %
NEUTROPHILS # BLD AUTO: 13.5 10E9/L (ref 1.6–8.3)
NEUTROPHILS NFR BLD AUTO: 86.1 %
NRBC # BLD AUTO: 0 10*3/UL
NRBC BLD AUTO-RTO: 0 /100
NUM BPU REQUESTED: 2
PLATELET # BLD AUTO: 246 10E9/L (ref 150–450)
PLATELET # BLD AUTO: 260 10E9/L (ref 150–450)
POTASSIUM SERPL-SCNC: 3.9 MMOL/L (ref 3.4–5.3)
POTASSIUM SERPL-SCNC: 4.3 MMOL/L (ref 3.4–5.3)
PROT SERPL-MCNC: 6.5 G/DL (ref 6.8–8.8)
PROT SERPL-MCNC: 6.7 G/DL (ref 6.8–8.8)
RBC # BLD AUTO: 2.82 10E12/L (ref 3.8–5.2)
RBC # BLD AUTO: 3.75 10E12/L (ref 3.8–5.2)
SARS-COV-2 RNA RESP QL NAA+PROBE: NEGATIVE
SODIUM SERPL-SCNC: 134 MMOL/L (ref 133–144)
SODIUM SERPL-SCNC: 136 MMOL/L (ref 133–144)
SPECIMEN EXP DATE BLD: NORMAL
SPECIMEN SOURCE: NORMAL
TRANSFUSION STATUS PATIENT QL: NORMAL
WBC # BLD AUTO: 15.7 10E9/L (ref 4–11)
WBC # BLD AUTO: 17 10E9/L (ref 4–11)

## 2021-05-13 PROCEDURE — 85018 HEMOGLOBIN: CPT | Performed by: STUDENT IN AN ORGANIZED HEALTH CARE EDUCATION/TRAINING PROGRAM

## 2021-05-13 PROCEDURE — 99291 CRITICAL CARE FIRST HOUR: CPT | Performed by: STUDENT IN AN ORGANIZED HEALTH CARE EDUCATION/TRAINING PROGRAM

## 2021-05-13 PROCEDURE — 85027 COMPLETE CBC AUTOMATED: CPT | Performed by: STUDENT IN AN ORGANIZED HEALTH CARE EDUCATION/TRAINING PROGRAM

## 2021-05-13 PROCEDURE — 83605 ASSAY OF LACTIC ACID: CPT | Performed by: STUDENT IN AN ORGANIZED HEALTH CARE EDUCATION/TRAINING PROGRAM

## 2021-05-13 PROCEDURE — 93005 ELECTROCARDIOGRAM TRACING: CPT

## 2021-05-13 PROCEDURE — 85018 HEMOGLOBIN: CPT | Performed by: EMERGENCY MEDICINE

## 2021-05-13 PROCEDURE — 86850 RBC ANTIBODY SCREEN: CPT | Performed by: EMERGENCY MEDICINE

## 2021-05-13 PROCEDURE — 87635 SARS-COV-2 COVID-19 AMP PRB: CPT | Performed by: EMERGENCY MEDICINE

## 2021-05-13 PROCEDURE — P9016 RBC LEUKOCYTES REDUCED: HCPCS | Performed by: EMERGENCY MEDICINE

## 2021-05-13 PROCEDURE — 96375 TX/PRO/DX INJ NEW DRUG ADDON: CPT | Performed by: EMERGENCY MEDICINE

## 2021-05-13 PROCEDURE — 999N001017 HC STATISTIC GLUCOSE BY METER IP

## 2021-05-13 PROCEDURE — 86140 C-REACTIVE PROTEIN: CPT | Performed by: EMERGENCY MEDICINE

## 2021-05-13 PROCEDURE — 93005 ELECTROCARDIOGRAM TRACING: CPT | Performed by: EMERGENCY MEDICINE

## 2021-05-13 PROCEDURE — 82272 OCCULT BLD FECES 1-3 TESTS: CPT | Performed by: EMERGENCY MEDICINE

## 2021-05-13 PROCEDURE — 96365 THER/PROPH/DIAG IV INF INIT: CPT | Performed by: EMERGENCY MEDICINE

## 2021-05-13 PROCEDURE — 250N000011 HC RX IP 250 OP 636: Performed by: EMERGENCY MEDICINE

## 2021-05-13 PROCEDURE — 86900 BLOOD TYPING SEROLOGIC ABO: CPT | Performed by: EMERGENCY MEDICINE

## 2021-05-13 PROCEDURE — 85610 PROTHROMBIN TIME: CPT | Performed by: STUDENT IN AN ORGANIZED HEALTH CARE EDUCATION/TRAINING PROGRAM

## 2021-05-13 PROCEDURE — 258N000003 HC RX IP 258 OP 636: Performed by: STUDENT IN AN ORGANIZED HEALTH CARE EDUCATION/TRAINING PROGRAM

## 2021-05-13 PROCEDURE — C9803 HOPD COVID-19 SPEC COLLECT: HCPCS | Performed by: EMERGENCY MEDICINE

## 2021-05-13 PROCEDURE — 93010 ELECTROCARDIOGRAM REPORT: CPT | Performed by: EMERGENCY MEDICINE

## 2021-05-13 PROCEDURE — 200N000001 HC R&B ICU

## 2021-05-13 PROCEDURE — 80053 COMPREHEN METABOLIC PANEL: CPT | Performed by: EMERGENCY MEDICINE

## 2021-05-13 PROCEDURE — 85610 PROTHROMBIN TIME: CPT | Performed by: EMERGENCY MEDICINE

## 2021-05-13 PROCEDURE — 250N000009 HC RX 250: Performed by: STUDENT IN AN ORGANIZED HEALTH CARE EDUCATION/TRAINING PROGRAM

## 2021-05-13 PROCEDURE — C9113 INJ PANTOPRAZOLE SODIUM, VIA: HCPCS | Performed by: STUDENT IN AN ORGANIZED HEALTH CARE EDUCATION/TRAINING PROGRAM

## 2021-05-13 PROCEDURE — 36415 COLL VENOUS BLD VENIPUNCTURE: CPT | Performed by: STUDENT IN AN ORGANIZED HEALTH CARE EDUCATION/TRAINING PROGRAM

## 2021-05-13 PROCEDURE — C9132 KCENTRA, PER I.U.: HCPCS | Performed by: EMERGENCY MEDICINE

## 2021-05-13 PROCEDURE — 258N000003 HC RX IP 258 OP 636: Performed by: EMERGENCY MEDICINE

## 2021-05-13 PROCEDURE — 85025 COMPLETE CBC W/AUTO DIFF WBC: CPT | Performed by: EMERGENCY MEDICINE

## 2021-05-13 PROCEDURE — 250N000011 HC RX IP 250 OP 636: Performed by: STUDENT IN AN ORGANIZED HEALTH CARE EDUCATION/TRAINING PROGRAM

## 2021-05-13 PROCEDURE — 86923 COMPATIBILITY TEST ELECTRIC: CPT | Performed by: EMERGENCY MEDICINE

## 2021-05-13 PROCEDURE — 36430 TRANSFUSION BLD/BLD COMPNT: CPT | Performed by: EMERGENCY MEDICINE

## 2021-05-13 PROCEDURE — 99291 CRITICAL CARE FIRST HOUR: CPT | Mod: 25 | Performed by: EMERGENCY MEDICINE

## 2021-05-13 PROCEDURE — 250N000015 HC RX FACTOR IP MED 636 OP 636: Performed by: EMERGENCY MEDICINE

## 2021-05-13 PROCEDURE — 36415 COLL VENOUS BLD VENIPUNCTURE: CPT | Performed by: EMERGENCY MEDICINE

## 2021-05-13 PROCEDURE — 74176 CT ABD & PELVIS W/O CONTRAST: CPT | Mod: MG

## 2021-05-13 PROCEDURE — 99292 CRITICAL CARE ADDL 30 MIN: CPT | Performed by: STUDENT IN AN ORGANIZED HEALTH CARE EDUCATION/TRAINING PROGRAM

## 2021-05-13 PROCEDURE — 80053 COMPREHEN METABOLIC PANEL: CPT | Performed by: STUDENT IN AN ORGANIZED HEALTH CARE EDUCATION/TRAINING PROGRAM

## 2021-05-13 PROCEDURE — 86901 BLOOD TYPING SEROLOGIC RH(D): CPT | Performed by: EMERGENCY MEDICINE

## 2021-05-13 PROCEDURE — 85652 RBC SED RATE AUTOMATED: CPT | Performed by: EMERGENCY MEDICINE

## 2021-05-13 PROCEDURE — 99285 EMERGENCY DEPT VISIT HI MDM: CPT | Mod: 25 | Performed by: EMERGENCY MEDICINE

## 2021-05-13 RX ORDER — NALOXONE HYDROCHLORIDE 0.4 MG/ML
0.4 INJECTION, SOLUTION INTRAMUSCULAR; INTRAVENOUS; SUBCUTANEOUS
Status: DISCONTINUED | OUTPATIENT
Start: 2021-05-13 | End: 2021-05-17 | Stop reason: HOSPADM

## 2021-05-13 RX ORDER — ONDANSETRON 4 MG/1
4 TABLET, ORALLY DISINTEGRATING ORAL EVERY 6 HOURS PRN
Status: DISCONTINUED | OUTPATIENT
Start: 2021-05-13 | End: 2021-05-17 | Stop reason: HOSPADM

## 2021-05-13 RX ORDER — NALOXONE HYDROCHLORIDE 0.4 MG/ML
0.2 INJECTION, SOLUTION INTRAMUSCULAR; INTRAVENOUS; SUBCUTANEOUS
Status: DISCONTINUED | OUTPATIENT
Start: 2021-05-13 | End: 2021-05-17 | Stop reason: HOSPADM

## 2021-05-13 RX ORDER — NICOTINE POLACRILEX 4 MG
15-30 LOZENGE BUCCAL
Status: DISCONTINUED | OUTPATIENT
Start: 2021-05-13 | End: 2021-05-17 | Stop reason: HOSPADM

## 2021-05-13 RX ORDER — PROCHLORPERAZINE 25 MG
12.5 SUPPOSITORY, RECTAL RECTAL EVERY 12 HOURS PRN
Status: DISCONTINUED | OUTPATIENT
Start: 2021-05-13 | End: 2021-05-17 | Stop reason: HOSPADM

## 2021-05-13 RX ORDER — PROCHLORPERAZINE MALEATE 5 MG
5 TABLET ORAL EVERY 6 HOURS PRN
Status: DISCONTINUED | OUTPATIENT
Start: 2021-05-13 | End: 2021-05-17 | Stop reason: HOSPADM

## 2021-05-13 RX ORDER — ONDANSETRON 2 MG/ML
4 INJECTION INTRAMUSCULAR; INTRAVENOUS EVERY 6 HOURS PRN
Status: DISCONTINUED | OUTPATIENT
Start: 2021-05-13 | End: 2021-05-17 | Stop reason: HOSPADM

## 2021-05-13 RX ORDER — FENTANYL CITRATE 50 UG/ML
25 INJECTION, SOLUTION INTRAMUSCULAR; INTRAVENOUS EVERY 5 MIN PRN
Status: DISPENSED | OUTPATIENT
Start: 2021-05-13 | End: 2021-05-14

## 2021-05-13 RX ORDER — FENTANYL CITRATE 50 UG/ML
50 INJECTION, SOLUTION INTRAMUSCULAR; INTRAVENOUS
Status: DISPENSED | OUTPATIENT
Start: 2021-05-13 | End: 2021-05-14

## 2021-05-13 RX ORDER — HYDROMORPHONE HYDROCHLORIDE 1 MG/ML
0.3 INJECTION, SOLUTION INTRAMUSCULAR; INTRAVENOUS; SUBCUTANEOUS
Status: COMPLETED | OUTPATIENT
Start: 2021-05-13 | End: 2021-05-13

## 2021-05-13 RX ORDER — FLUMAZENIL 0.1 MG/ML
0.2 INJECTION, SOLUTION INTRAVENOUS
Status: ACTIVE | OUTPATIENT
Start: 2021-05-13 | End: 2021-05-15

## 2021-05-13 RX ORDER — DEXTROSE MONOHYDRATE 25 G/50ML
25-50 INJECTION, SOLUTION INTRAVENOUS
Status: DISCONTINUED | OUTPATIENT
Start: 2021-05-13 | End: 2021-05-17 | Stop reason: HOSPADM

## 2021-05-13 RX ORDER — ONDANSETRON 2 MG/ML
4 INJECTION INTRAMUSCULAR; INTRAVENOUS
Status: COMPLETED | OUTPATIENT
Start: 2021-05-13 | End: 2021-05-13

## 2021-05-13 RX ADMIN — PROTHROMBIN, COAGULATION FACTOR VII HUMAN, COAGULATION FACTOR IX HUMAN, COAGULATION FACTOR X HUMAN, PROTEIN C, PROTEIN S HUMAN, AND WATER 2255 UNITS: KIT at 16:55

## 2021-05-13 RX ADMIN — HYDROMORPHONE HYDROCHLORIDE 0.3 MG: 1 INJECTION, SOLUTION INTRAMUSCULAR; INTRAVENOUS; SUBCUTANEOUS at 11:29

## 2021-05-13 RX ADMIN — ONDANSETRON 4 MG: 2 INJECTION INTRAMUSCULAR; INTRAVENOUS at 11:26

## 2021-05-13 RX ADMIN — DILTIAZEM HYDROCHLORIDE 5 MG/HR: 5 INJECTION INTRAVENOUS at 20:50

## 2021-05-13 RX ADMIN — PHYTONADIONE 10 MG: 10 INJECTION, EMULSION INTRAMUSCULAR; INTRAVENOUS; SUBCUTANEOUS at 12:24

## 2021-05-13 RX ADMIN — PANTOPRAZOLE SODIUM 40 MG: 40 INJECTION, POWDER, FOR SOLUTION INTRAVENOUS at 21:20

## 2021-05-13 RX ADMIN — PROTHROMBIN, COAGULATION FACTOR VII HUMAN, COAGULATION FACTOR IX HUMAN, COAGULATION FACTOR X HUMAN, PROTEIN C, PROTEIN S HUMAN, AND WATER 2245 UNITS: KIT at 17:25

## 2021-05-13 ASSESSMENT — ACTIVITIES OF DAILY LIVING (ADL)
VISION_MANAGEMENT: GLASSES
WALKING_OR_CLIMBING_STAIRS: AMBULATION DIFFICULTY, REQUIRES EQUIPMENT
HEARING_DIFFICULTY_OR_DEAF: NO
FALL_HISTORY_WITHIN_LAST_SIX_MONTHS: NO
WALKING_OR_CLIMBING_STAIRS_DIFFICULTY: YES
CONCENTRATING,_REMEMBERING_OR_MAKING_DECISIONS_DIFFICULTY: YES
WALKING_OR_CLIMBING_STAIRS: AMBULATION DIFFICULTY, REQUIRES EQUIPMENT
DIFFICULTY_EATING/SWALLOWING: NO
VISION_MANAGEMENT: GLASSES.
DIFFICULTY_EATING/SWALLOWING: NO
DOING_ERRANDS_INDEPENDENTLY_DIFFICULTY: YES
TOILETING_ISSUES: NO
DIFFICULTY_COMMUNICATING: NO
WALKING_OR_CLIMBING_STAIRS_DIFFICULTY: YES
DRESSING/BATHING_DIFFICULTY: NO
DIFFICULTY_COMMUNICATING: NO
NUMBER_OF_TIMES_PATIENT_HAS_FALLEN_WITHIN_LAST_SIX_MONTHS: 0
TOILETING_ISSUES: NO
DRESSING/BATHING_DIFFICULTY: NO
WEAR_GLASSES_OR_BLIND: YES
WEAR_GLASSES_OR_BLIND: YES
EQUIPMENT_CURRENTLY_USED_AT_HOME: WALKER, ROLLING;CANE, STRAIGHT
CONCENTRATING,_REMEMBERING_OR_MAKING_DECISIONS_DIFFICULTY: YES
FALL_HISTORY_WITHIN_LAST_SIX_MONTHS: NO

## 2021-05-13 ASSESSMENT — ENCOUNTER SYMPTOMS
PALPITATIONS: 1
DIZZINESS: 1
EYES NEGATIVE: 1
HEMATURIA: 1
FATIGUE: 1
SHORTNESS OF BREATH: 0
ACTIVITY CHANGE: 1
BACK PAIN: 1
BRUISES/BLEEDS EASILY: 1
WEAKNESS: 1
FEVER: 0
APPETITE CHANGE: 1
ABDOMINAL PAIN: 0
CONFUSION: 1

## 2021-05-13 ASSESSMENT — MIFFLIN-ST. JEOR: SCORE: 1100

## 2021-05-13 NOTE — ED NOTES
Triple Lumen catheter placed-red colored urine returned. Irrigation started prior to transfer. Pt tolerated well

## 2021-05-13 NOTE — ED NOTES
Pure Wick placed for pt comfort. Increase in back pain with movement. Red bloody urine noted to suction canister.

## 2021-05-13 NOTE — ED NOTES
Alert and comfortable while laying flat. Dr. Castanon at bedside talking to daughter about potential transfer.

## 2021-05-13 NOTE — ED PROVIDER NOTES
History     Chief Complaint   Patient presents with     Back Pain     HPI  Khalida Rouse is a 81 year old female who presents with combination of back pain, lightheadedness, looking pale, weakness.  She is brought in by her daughter Leah.  Leah was away for 1 week to attend her son's wedding.  Came back to find her mother looking ill.  Patient lives by herself.  Has cognitive impairment.  Has been managing her own medications.  Daughter noted that patient was having black tarry stools and some vaginal bleeding.  Her back pain secondary to known compression/insufficiency fractures have been problematic for pain and mobility but that also seems of gotten worse.    Significant past medical history includes long-term anticoagulation on warfarin for atrial flutter/fibrillation,  Status post aortic valve replacement 2014 with tissue valve, recent diagnosis of acute appendicitis-medically managed with antibiotic therapy because of high surgical risk, cognitive impairment with slums score 13/30.      Allergies:  Allergies   Allergen Reactions     Xarelto [Rivaroxaban] Diarrhea     Ace Inhibitors Cough       Problem List:    Patient Active Problem List    Diagnosis Date Noted     Cognitive changes - SLUMS 13/30 04/13/2021     Priority: Medium     Acute appendicitis with localized peritonitis, without perforation, abscess, or gangrene 04/12/2021     Priority: Medium     Hypoxemia 05/23/2019     Priority: Medium     Other secondary pulmonary hypertension (H) 05/14/2019     Priority: Medium     Long term current use of anticoagulant therapy 03/11/2016     Priority: Medium     Atrial fibrillation with RVR (H) 01/11/2016     Priority: Medium     Pneumonia 01/09/2016     Priority: Medium     Osteoporosis 10/26/2015     Priority: Medium     Advanced directives, counseling/discussion 10/03/2014     Priority: Medium     Declined       S/P AVR (aortic valve replacement) 04/10/2014     Priority: Medium     Atrial flutter  02/04/2014     Priority: Medium        Past Medical History:    Past Medical History:   Diagnosis Date     Aortic valve stenosis      Atrial flutter (H)      Cardiomyopathy (H)      Cognitive changes - SLUMS 16/30 4/13/2021     Severe aortic stenosis 4/7/2014     Shoulder fracture, left 3/22/15       Past Surgical History:    Past Surgical History:   Procedure Laterality Date     CATARACT IOL, RT/LT Right      ENT SURGERY      glaucoma surgery     REPLACE VALVE AORTIC  4/10/2014    Procedure: REPLACE VALVE AORTIC;  Median sternotomy, Replace Aortic Valve on pump oxygenation. ;  Surgeon: Wesley Fong MD;  Location:  OR       Family History:    Family History   Problem Relation Age of Onset     Dementia Brother      Alzheimer Disease Sister        Social History:  Marital Status:   [5]  Social History     Tobacco Use     Smoking status: Never Smoker     Smokeless tobacco: Never Used   Substance Use Topics     Alcohol use: No     Alcohol/week: 0.0 standard drinks     Drug use: No        Medications:    acetaminophen (ACETAMINOPHEN 8 HOUR) 650 MG CR tablet  atenolol (TENORMIN) 50 MG tablet  diltiazem ER COATED BEADS (CARDIZEM CD/CARTIA XT) 180 MG 24 hr capsule  docusate sodium (COLACE) 100 MG capsule  HYDROcodone-acetaminophen (NORCO) 5-325 MG tablet  magnesium citrate solution  polyethylene glycol (MIRALAX) 17 GM/Dose powder  warfarin ANTICOAGULANT (COUMADIN) 2.5 MG tablet          Review of Systems   Constitutional: Positive for activity change, appetite change and fatigue. Negative for fever.   HENT: Negative.    Eyes: Negative.    Respiratory: Negative for shortness of breath.    Cardiovascular: Positive for palpitations. Negative for chest pain.   Gastrointestinal: Negative for abdominal pain.   Genitourinary: Positive for hematuria and vaginal bleeding.   Musculoskeletal: Positive for back pain.   Neurological: Positive for dizziness and weakness.   Hematological: Bruises/bleeds easily.    Psychiatric/Behavioral: Positive for confusion.   All other systems reviewed and are negative.      Physical Exam   BP: 110/68  Pulse: 132  Temp: 99.1  F (37.3  C)  Resp: 16  Weight: 61.2 kg (135 lb)  SpO2: (!) 65 %      Physical Exam  Vitals signs and nursing note reviewed.   Constitutional:       Appearance: She is ill-appearing.      Comments: Confused, hard of hearing.   HENT:      Head: Normocephalic and atraumatic.      Nose: No congestion or rhinorrhea.      Mouth/Throat:      Mouth: Mucous membranes are dry.   Eyes:      Conjunctiva/sclera: Conjunctivae normal.      Pupils: Pupils are equal, round, and reactive to light.      Comments: Artificial left eye.   Occasional tears show slight pink/blood-tinged from the left eye.   Neck:      Musculoskeletal: Normal range of motion and neck supple.   Cardiovascular:      Rate and Rhythm: Tachycardia present. Rhythm irregular.      Comments: Valvular mechanical heart sounds noted  Pulmonary:      Effort: Pulmonary effort is normal.      Breath sounds: Normal breath sounds. No wheezing or rhonchi.   Abdominal:      General: Abdomen is flat.      Tenderness: There is no abdominal tenderness. There is right CVA tenderness and left CVA tenderness. There is no guarding.   Genitourinary:     Comments: Thin atrophic vaginal mucosa without blood tinged drainage  Normal rectal tone with melanotic stool.    Musculoskeletal:      Right lower leg: No edema.      Left lower leg: No edema.   Skin:     Capillary Refill: Capillary refill takes 2 to 3 seconds.      Coloration: Skin is pale.   Neurological:      General: No focal deficit present.      Mental Status: She is alert.      Motor: Weakness present.   Psychiatric:         Mood and Affect: Mood normal.         Behavior: Behavior normal.         ED Course        Procedures          EKG:  Interpretation by Blayne Castanon DO.   Indication: Tachycardia  Atrial fibrillation.  Rate 135 bpm.  Nonspecific ST depression in inferior  lateral.      Critical Care time:  60 minutes           Results for orders placed or performed during the hospital encounter of 05/13/21 (from the past 24 hour(s))   CBC with platelets differential   Result Value Ref Range    WBC 15.7 (H) 4.0 - 11.0 10e9/L    RBC Count 2.82 (L) 3.8 - 5.2 10e12/L    Hemoglobin 8.1 (L) 11.7 - 15.7 g/dL    Hematocrit 24.5 (L) 35.0 - 47.0 %    MCV 87 78 - 100 fl    MCH 28.7 26.5 - 33.0 pg    MCHC 33.1 31.5 - 36.5 g/dL    RDW 14.6 10.0 - 15.0 %    Platelet Count 246 150 - 450 10e9/L    Diff Method Automated Method     % Neutrophils 86.1 %    % Lymphocytes 2.2 %    % Monocytes 10.6 %    % Eosinophils 0.0 %    % Basophils 0.1 %    % Immature Granulocytes 1.0 %    Nucleated RBCs 0 0 /100    Absolute Neutrophil 13.5 (H) 1.6 - 8.3 10e9/L    Absolute Lymphocytes 0.3 (L) 0.8 - 5.3 10e9/L    Absolute Monocytes 1.7 (H) 0.0 - 1.3 10e9/L    Absolute Eosinophils 0.0 0.0 - 0.7 10e9/L    Absolute Basophils 0.0 0.0 - 0.2 10e9/L    Abs Immature Granulocytes 0.2 0 - 0.4 10e9/L    Absolute Nucleated RBC 0.0    INR   Result Value Ref Range    INR >10.00 (HH) 0.86 - 1.14   Comprehensive metabolic panel   Result Value Ref Range    Sodium 134 133 - 144 mmol/L    Potassium 4.3 3.4 - 5.3 mmol/L    Chloride 102 94 - 109 mmol/L    Carbon Dioxide 27 20 - 32 mmol/L    Anion Gap 5 3 - 14 mmol/L    Glucose 125 (H) 70 - 99 mg/dL    Urea Nitrogen 40 (H) 7 - 30 mg/dL    Creatinine 1.22 (H) 0.52 - 1.04 mg/dL    GFR Estimate 41 (L) >60 mL/min/[1.73_m2]    GFR Estimate If Black 48 (L) >60 mL/min/[1.73_m2]    Calcium 9.0 8.5 - 10.1 mg/dL    Bilirubin Total 0.8 0.2 - 1.3 mg/dL    Albumin 2.6 (L) 3.4 - 5.0 g/dL    Protein Total 6.5 (L) 6.8 - 8.8 g/dL    Alkaline Phosphatase 83 40 - 150 U/L    ALT 21 0 - 50 U/L    AST 27 0 - 45 U/L   CRP inflammation   Result Value Ref Range    CRP Inflammation 174.0 (H) 0.0 - 8.0 mg/L   Erythrocyte sedimentation rate auto   Result Value Ref Range    Sed Rate 80 (H) 0 - 30 mm/h   ABO/Rh type  and screen   Result Value Ref Range    Units Ordered 2     ABO O     RH(D) Pos     Antibody Screen Neg     Test Valid Only At [facility]        Specimen Expires 05/16/2021     Crossmatch Red Blood Cells    Blood component   Result Value Ref Range    Unit Number [id number]     Blood Component Type Red Blood Cells LeukoReduced (Part 2)     Division Number 00     Status of Unit Released to care unit 05/13/2021 1310     Blood Product Code [id number]     Unit Status ISS    Blood component   Result Value Ref Range    Unit Number [id number]     Blood Component Type Red Blood Cells Leukocyte Reduced     Division Number 00     Status of Unit Ready for patient 05/13/2021 1643     Blood Product Code [id number]     Unit Status LORENZO    Occult blood stool   Result Value Ref Range    Occult Blood Positive (A) NEG^Negative   INR   Result Value Ref Range    INR >10.00 (HH) 0.86 - 1.14   INR   Result Value Ref Range    INR 3.30 (H) 0.86 - 1.14   CT Abdomen Pelvis w/o Contrast    Narrative    CT ABDOMEN PELVIS W/O CONTRAST 5/13/2021 3:12 PM    CLINICAL HISTORY: Abdominal abscess/infection suspected; Monitoring  for acute appendicitis.  Has been receiving outpatient oral  antibiotics for suppressive therapy.  Presenting with abdominal pain,  supratherapeutic INR greater than 10, active rectal and vaginal  bleeding, elevated white count and inflammatory m  TECHNIQUE: CT scan of the abdomen and pelvis was performed without IV  contrast. Multiplanar reformats were obtained. Dose reduction  techniques were used.  CONTRAST: None.    COMPARISON: CT exams 4/27/2021 and 4/12/2021    FINDINGS:   LOWER CHEST: Stable cardiomegaly. Trace right-sided pleural fluid with  adjacent opacities likely reflecting atelectasis. Right middle lobe  and lingular consolidation also likely reflect atelectasis. Severe  coronary artery calcifications. Poststernotomy with aortic valve  replacement.    HEPATOBILIARY: Cholelithiasis. Stable subtle  nodular liver surface  contour. No biliary ductal dilatation.     PANCREAS: Normal.    SPLEEN: Normal.    ADRENAL GLANDS: Normal.    KIDNEYS/BLADDER: Interval development of asymmetric enlargement of the  right kidney with associated asymmetric perinephric edema. Associated  moderately distended right renal collecting system with heterogeneous  likely hemorrhagic fluid. Mild distention of the right ureter with  similar presumed hemorrhagic fluid. No obstructing mass or radiodense  stone. High density likely hemorrhagic fluid layering within the  dependent portion of the right side of the urinary bladder. Mild  distention of the left renal collecting system with high density  presumed hemorrhagic fluid. Stable right renal cyst. No follow-up is  needed.    BOWEL: Normal caliber small bowel. The appendix is not as well-seen on  this unenhanced exam. Resumed exposures 10 mm in diameter, previously  12 mm. Adjacent edema likely related to the after mentioned right  renal findings. No associated abscess is identified. Distal colonic  diverticulosis without evidence of acute diverticulitis. No free air  or free fluid.    LYMPH NODES: Normal.    VASCULATURE: Mild/moderate atherosclerosis.    PELVIC ORGANS: Normal. Moderate presacral edema is noted.    OTHER: None.    MUSCULOSKELETAL: Stable L1-L3 vertebral body compression fractures and  newly visualized acute moderate T12 vertebral body compression  fracture.      Impression    IMPRESSION:   1.  Moderate right-sided and mild left-sided hydronephrosis with  associated hemorrhagic fluid in the bilateral renal collecting  systems, ureters and dependent portion of the urinary bladder. Severe  asymmetric right perinephric edema. No discrete perinephric hematoma.  2.  Essentially stable mildly dilated appendix. No associated abscess  or free air.  3.  Newly visualized moderate T12 vertebral body compression fracture.  Multiple lumbar compression fractures are stable.    A phone  call report regarding the critical renal and appendiceal  findings on this exam was made to Dr. Cash Castanon at 3:38 PM on  5/13/2021.    CRISTINA MOHAMUD MD   Hemoglobin   Result Value Ref Range    Hemoglobin 8.3 (L) 11.7 - 15.7 g/dL   Asymptomatic SARS-CoV-2 COVID-19 Virus (Coronavirus) by PCR    Specimen: Nasopharyngeal   Result Value Ref Range    SARS-CoV-2 Virus Specimen Source Nasopharyngeal     SARS-CoV-2 PCR Result NEGATIVE     SARS-CoV-2 PCR Comment (Note)        Medications   HOLD: warfarin (COUMADIN) therapy (has no administration in time range)   HYDROmorphone (PF) (DILAUDID) injection 0.3 mg (0.3 mg Intravenous Given 5/13/21 1129)   ondansetron (ZOFRAN) injection 4 mg (4 mg Intravenous Given 5/13/21 1126)   phytonadione (AQUA-MEPHYTON) 10 mg in sodium chloride 0.9 % 50 mL intermittent infusion (0 mg Intravenous Stopped 5/13/21 1312)       Assessments & Plan (with Medical Decision Making)  Problem list:  #1.  Suspected upper GI bleed with associated melena and hemoglobin drop of 5.8 g.  Triggered by a supratherapeutic INR greater than 10.  #2.  CT demonstrating moderate hydronephrosis along with gross hematuria concerning for hemorrhagic fluid in the renal collecting system.  #3.  Supratherapeutic INR.  Suspect secondary to medication confusion due to cognitive impairment.  On long-term anticoagulation for atrial flutter/fibrillation.  #4.  Low back pain secondary to compression fracture/insufficiency fractures L1-L3.  Subacute.  Not related to any recent fall or injury.  #5.  Vaginal bleeding-postmenopausal  Scant bleeding. ? Gyn consult for post menopausal bleeding. ?? Is superthrapeutic with INR  #6.  DIANA GFR 41 was  84 (4/27)  #7.  Tissue valve- aortic valve replacement  #8.  Acute appendicitis diagnosed (4/12).  Elected to treat medically non-surgically.  Treated with oral antibiotics x2 weeks.  During hospital stay was on IV Unasyn and per daughter switch to oral Augmentin.   CT shows no acute  inflammation around the appendix.  #9 chronic atrial fibrillation on long-term anticoagulant with warfarin    Patient presented orthostatic positive with transfer from the wheelchair to the bed.  She is remained tachycardic in A. fib with RVR.  Concerning for ongoing active bleeding both GI and renal.  Reversed warfarin with vitamin K 10 mg IV and added Kcentra recheck reduced to INR (3) after vitamin K.  Patient completed her first unit of packed red blood cells and is currently receiving her second packed red blood cells.  She is having no cardiopulmonary symptoms.  Her back pain per daughter is typical for her compression fractures there is no history for fall.  Cannot fully rule out any acute trauma given that the patient was on her own for the past week and has a poor memory due to cognitive impairment.  No history for upper GI bleed in the past.  IV Pepcid ordered.  With no history for portal hypertension did not order octreotide.  At this time transfer to [facility].  Spoke with Dr. Rojo.  He will contact GI for consultation in case there is need for therapeutic intervention.  Urology was notified for completion of consultation.  During call back discussion recommended  starting bladder irrigation.  This was not possible during transfer to the place a 22 [place] Dupree catheter.       I have reviewed the nursing notes.    I have reviewed the findings, diagnosis, plan and need for follow up with the patient.      New Prescriptions    No medications on file       Final diagnoses:   Supratherapeutic INR   Current use of long term anticoagulation   Atrial fibrillation and flutter (H)   Gastrointestinal hemorrhage, unspecified gastrointestinal hemorrhage type   Renal hemorrhage, right   Lumbar compression fracture, sequela   Mild cognitive impairment       5/13/2021   [facility] EMERGENCY DEPT     Blayne Castanon,   05/13/21 [id number]

## 2021-05-14 ENCOUNTER — TELEPHONE (OUTPATIENT)
Dept: UROLOGY | Facility: CLINIC | Age: 81
End: 2021-05-14

## 2021-05-14 ENCOUNTER — APPOINTMENT (OUTPATIENT)
Dept: PHYSICAL THERAPY | Facility: CLINIC | Age: 81
DRG: 813 | End: 2021-05-14
Attending: STUDENT IN AN ORGANIZED HEALTH CARE EDUCATION/TRAINING PROGRAM
Payer: MEDICARE

## 2021-05-14 ENCOUNTER — APPOINTMENT (OUTPATIENT)
Dept: OCCUPATIONAL THERAPY | Facility: CLINIC | Age: 81
DRG: 813 | End: 2021-05-14
Attending: STUDENT IN AN ORGANIZED HEALTH CARE EDUCATION/TRAINING PROGRAM
Payer: MEDICARE

## 2021-05-14 DIAGNOSIS — C64.9 RENAL CELL CARCINOMA (H): Primary | ICD-10-CM

## 2021-05-14 LAB
ANION GAP SERPL CALCULATED.3IONS-SCNC: 4 MMOL/L (ref 3–14)
BUN SERPL-MCNC: 30 MG/DL (ref 7–30)
CALCIUM SERPL-MCNC: 8.4 MG/DL (ref 8.5–10.1)
CHLORIDE SERPL-SCNC: 106 MMOL/L (ref 94–109)
CO2 SERPL-SCNC: 28 MMOL/L (ref 20–32)
CREAT SERPL-MCNC: 0.92 MG/DL (ref 0.52–1.04)
ERYTHROCYTE [DISTWIDTH] IN BLOOD BY AUTOMATED COUNT: 14.3 % (ref 10–15)
GFR SERPL CREATININE-BSD FRML MDRD: 58 ML/MIN/{1.73_M2}
GLUCOSE BLDC GLUCOMTR-MCNC: 123 MG/DL (ref 70–99)
GLUCOSE BLDC GLUCOMTR-MCNC: 88 MG/DL (ref 70–99)
GLUCOSE BLDC GLUCOMTR-MCNC: 92 MG/DL (ref 70–99)
GLUCOSE SERPL-MCNC: 106 MG/DL (ref 70–99)
HCT VFR BLD AUTO: 27.8 % (ref 35–47)
HGB BLD-MCNC: 9 G/DL (ref 11.7–15.7)
HGB BLD-MCNC: 9.1 G/DL (ref 11.7–15.7)
HGB BLD-MCNC: 9.2 G/DL (ref 11.7–15.7)
INR PPP: 1.16 (ref 0.86–1.14)
MCH RBC QN AUTO: 28.3 PG (ref 26.5–33)
MCHC RBC AUTO-ENTMCNC: 32.7 G/DL (ref 31.5–36.5)
MCV RBC AUTO: 86 FL (ref 78–100)
PLATELET # BLD AUTO: 219 10E9/L (ref 150–450)
POTASSIUM SERPL-SCNC: 4.2 MMOL/L (ref 3.4–5.3)
RBC # BLD AUTO: 3.22 10E12/L (ref 3.8–5.2)
SODIUM SERPL-SCNC: 138 MMOL/L (ref 133–144)
UPPER GI ENDOSCOPY: NORMAL
WBC # BLD AUTO: 14.6 10E9/L (ref 4–11)

## 2021-05-14 PROCEDURE — G0500 MOD SEDAT ENDO SERVICE >5YRS: HCPCS | Performed by: INTERNAL MEDICINE

## 2021-05-14 PROCEDURE — 250N000011 HC RX IP 250 OP 636: Performed by: INTERNAL MEDICINE

## 2021-05-14 PROCEDURE — 85018 HEMOGLOBIN: CPT | Performed by: STUDENT IN AN ORGANIZED HEALTH CARE EDUCATION/TRAINING PROGRAM

## 2021-05-14 PROCEDURE — 97161 PT EVAL LOW COMPLEX 20 MIN: CPT | Mod: GP

## 2021-05-14 PROCEDURE — 0DJ08ZZ INSPECTION OF UPPER INTESTINAL TRACT, VIA NATURAL OR ARTIFICIAL OPENING ENDOSCOPIC: ICD-10-PCS | Performed by: INTERNAL MEDICINE

## 2021-05-14 PROCEDURE — 85610 PROTHROMBIN TIME: CPT | Performed by: STUDENT IN AN ORGANIZED HEALTH CARE EDUCATION/TRAINING PROGRAM

## 2021-05-14 PROCEDURE — 36415 COLL VENOUS BLD VENIPUNCTURE: CPT | Performed by: STUDENT IN AN ORGANIZED HEALTH CARE EDUCATION/TRAINING PROGRAM

## 2021-05-14 PROCEDURE — 99291 CRITICAL CARE FIRST HOUR: CPT | Performed by: INTERNAL MEDICINE

## 2021-05-14 PROCEDURE — 97110 THERAPEUTIC EXERCISES: CPT | Mod: GP

## 2021-05-14 PROCEDURE — 200N000001 HC R&B ICU

## 2021-05-14 PROCEDURE — 250N000011 HC RX IP 250 OP 636: Performed by: STUDENT IN AN ORGANIZED HEALTH CARE EDUCATION/TRAINING PROGRAM

## 2021-05-14 PROCEDURE — 85018 HEMOGLOBIN: CPT | Performed by: INTERNAL MEDICINE

## 2021-05-14 PROCEDURE — 85027 COMPLETE CBC AUTOMATED: CPT | Performed by: STUDENT IN AN ORGANIZED HEALTH CARE EDUCATION/TRAINING PROGRAM

## 2021-05-14 PROCEDURE — 43235 EGD DIAGNOSTIC BRUSH WASH: CPT | Performed by: INTERNAL MEDICINE

## 2021-05-14 PROCEDURE — 250N000013 HC RX MED GY IP 250 OP 250 PS 637: Performed by: INTERNAL MEDICINE

## 2021-05-14 PROCEDURE — C9113 INJ PANTOPRAZOLE SODIUM, VIA: HCPCS | Performed by: STUDENT IN AN ORGANIZED HEALTH CARE EDUCATION/TRAINING PROGRAM

## 2021-05-14 PROCEDURE — 258N000003 HC RX IP 258 OP 636: Performed by: STUDENT IN AN ORGANIZED HEALTH CARE EDUCATION/TRAINING PROGRAM

## 2021-05-14 PROCEDURE — 80048 BASIC METABOLIC PNL TOTAL CA: CPT | Performed by: STUDENT IN AN ORGANIZED HEALTH CARE EDUCATION/TRAINING PROGRAM

## 2021-05-14 PROCEDURE — 36415 COLL VENOUS BLD VENIPUNCTURE: CPT | Performed by: INTERNAL MEDICINE

## 2021-05-14 PROCEDURE — 999N001017 HC STATISTIC GLUCOSE BY METER IP

## 2021-05-14 PROCEDURE — 250N000009 HC RX 250: Performed by: STUDENT IN AN ORGANIZED HEALTH CARE EDUCATION/TRAINING PROGRAM

## 2021-05-14 PROCEDURE — 250N000009 HC RX 250: Performed by: INTERNAL MEDICINE

## 2021-05-14 PROCEDURE — 97165 OT EVAL LOW COMPLEX 30 MIN: CPT | Mod: GO

## 2021-05-14 RX ORDER — ATENOLOL 50 MG/1
50 TABLET ORAL 2 TIMES DAILY
COMMUNITY

## 2021-05-14 RX ORDER — DILTIAZEM HYDROCHLORIDE 180 MG/1
180 CAPSULE, EXTENDED RELEASE ORAL DAILY
COMMUNITY
End: 2023-01-01 | Stop reason: DRUGHIGH

## 2021-05-14 RX ORDER — METOPROLOL TARTRATE 1 MG/ML
2.5-5 INJECTION, SOLUTION INTRAVENOUS EVERY 4 HOURS PRN
Status: DISCONTINUED | OUTPATIENT
Start: 2021-05-14 | End: 2021-05-17 | Stop reason: HOSPADM

## 2021-05-14 RX ORDER — FENTANYL CITRATE 50 UG/ML
100 INJECTION, SOLUTION INTRAMUSCULAR; INTRAVENOUS ONCE
Status: COMPLETED | OUTPATIENT
Start: 2021-05-14 | End: 2021-05-14

## 2021-05-14 RX ORDER — ASPIRIN 81 MG
100 TABLET, DELAYED RELEASE (ENTERIC COATED) ORAL DAILY PRN
COMMUNITY
End: 2021-09-10

## 2021-05-14 RX ORDER — LIDOCAINE 40 MG/G
CREAM TOPICAL
Status: DISCONTINUED | OUTPATIENT
Start: 2021-05-14 | End: 2021-05-17 | Stop reason: HOSPADM

## 2021-05-14 RX ORDER — DILTIAZEM HYDROCHLORIDE 180 MG/1
180 CAPSULE, COATED, EXTENDED RELEASE ORAL 2 TIMES DAILY
Status: DISCONTINUED | OUTPATIENT
Start: 2021-05-14 | End: 2021-05-17 | Stop reason: HOSPADM

## 2021-05-14 RX ORDER — POLYETHYLENE GLYCOL 3350 17 G/17G
1 POWDER, FOR SOLUTION ORAL DAILY PRN
COMMUNITY
End: 2021-09-10

## 2021-05-14 RX ORDER — ATENOLOL 50 MG/1
50 TABLET ORAL 2 TIMES DAILY
Status: DISCONTINUED | OUTPATIENT
Start: 2021-05-14 | End: 2021-05-17 | Stop reason: HOSPADM

## 2021-05-14 RX ORDER — OXYCODONE HYDROCHLORIDE 5 MG/1
5 TABLET ORAL EVERY 4 HOURS PRN
Status: DISCONTINUED | OUTPATIENT
Start: 2021-05-14 | End: 2021-05-17 | Stop reason: HOSPADM

## 2021-05-14 RX ADMIN — ATENOLOL 50 MG: 50 TABLET ORAL at 10:45

## 2021-05-14 RX ADMIN — MIDAZOLAM HYDROCHLORIDE 2 MG: 1 INJECTION, SOLUTION INTRAMUSCULAR; INTRAVENOUS at 12:25

## 2021-05-14 RX ADMIN — PANTOPRAZOLE SODIUM 40 MG: 40 INJECTION, POWDER, FOR SOLUTION INTRAVENOUS at 09:44

## 2021-05-14 RX ADMIN — DILTIAZEM HYDROCHLORIDE 15 MG/HR: 5 INJECTION INTRAVENOUS at 04:05

## 2021-05-14 RX ADMIN — DILTIAZEM HYDROCHLORIDE 180 MG: 180 CAPSULE, COATED, EXTENDED RELEASE ORAL at 20:01

## 2021-05-14 RX ADMIN — FENTANYL CITRATE 25 MCG: 50 INJECTION, SOLUTION INTRAMUSCULAR; INTRAVENOUS at 12:25

## 2021-05-14 RX ADMIN — FENTANYL CITRATE 25 MCG: 50 INJECTION, SOLUTION INTRAMUSCULAR; INTRAVENOUS at 09:53

## 2021-05-14 RX ADMIN — METOPROLOL TARTRATE 2.5 MG: 1 INJECTION, SOLUTION INTRAVENOUS at 05:00

## 2021-05-14 RX ADMIN — PANTOPRAZOLE SODIUM 40 MG: 40 INJECTION, POWDER, FOR SOLUTION INTRAVENOUS at 20:01

## 2021-05-14 ASSESSMENT — ACTIVITIES OF DAILY LIVING (ADL)
ADLS_ACUITY_SCORE: 16
ADLS_ACUITY_SCORE: 16
PREVIOUS_RESPONSIBILITIES: MEAL PREP;HOUSEKEEPING;MEDICATION MANAGEMENT
ADLS_ACUITY_SCORE: 18
ADLS_ACUITY_SCORE: 18
ADLS_ACUITY_SCORE: 16
ADLS_ACUITY_SCORE: 16

## 2021-05-14 ASSESSMENT — MIFFLIN-ST. JEOR: SCORE: 1100

## 2021-05-14 NOTE — PHARMACY-ADMISSION MEDICATION HISTORY
Pharmacy Medication History  Admission medication history interview status for the 5/13/2021  admission is complete. See EPIC admission navigator for prior to admission medications     Location of Interview: Phone  Medication history sources: Patient's family/friend (daughter Leah)    Significant changes made to the medication list:  Changed to PRN: Docusate, miralax, magnesium citrate  Added: Tears    In the past week, patient estimated taking medication this percent of the time: Unknown (daughter has been gone for a week, not sure what her mom has been doing or when last doses were)    Additional medication history information:   Has been on warfarin since 2014 with no problems, gets INR's regularly and is compliant.     Medication reconciliation completed by provider prior to medication history? No    Time spent in this activity: 15 min    Prior to Admission medications    Medication Sig Last Dose Taking? Auth Provider   acetaminophen (ACETAMINOPHEN 8 HOUR) 650 MG CR tablet Take 650 mg by mouth At Bedtime   Yes Reported, Patient   atenolol (TENORMIN) 50 MG tablet Take 50 mg by mouth 2 times daily  Yes Unknown, Entered By History   diltiazem ER (DILT-XR) 180 MG 24 hr capsule Take 180 mg by mouth 2 times daily   Yes Unknown, Entered By History   docusate sodium (COLACE) 100 MG tablet Take 100 mg by mouth daily as needed for constipation prn Yes Unknown, Entered By History   hypromellose (ARTIFICIAL TEARS) 0.5 % SOLN ophthalmic solution Place 1 drop into both eyes every hour as needed for dry eyes prn Yes Unknown, Entered By History   polyethylene glycol (MIRALAX) 17 g packet Take 1 packet by mouth daily as needed for constipation prn Yes Unknown, Entered By History   HYDROcodone-acetaminophen (NORCO) 5-325 MG tablet Take 1 tablet by mouth every 4 hours as needed for pain prn  Blayne Hernandez MD   magnesium citrate solution Take 100 mLs by mouth 3 times daily as needed for constipation or other  prn   Reported, Patient   warfarin ANTICOAGULANT (COUMADIN) 2.5 MG tablet Take 2.5 mg by mouth every evening    Reported, Patient     Jessica Atwood, PharmD     The information provided in this note is only as accurate as the sources available at the time of update(s)

## 2021-05-14 NOTE — PROGRESS NOTES
05/14/21 0929   Quick Adds   Type of Visit Initial Occupational Therapy Evaluation   Living Environment   People in home alone   Current Living Arrangements apartment   Home Accessibility stairs to enter home   Number of Stairs, Main Entrance other (see comments)  (18)   Transportation Anticipated family or friend will provide   Living Environment Comments reports family checks on her daily. Has 3 kids   Self-Care   Usual Activity Tolerance moderate   Current Activity Tolerance poor   Equipment Currently Used at Home walker, rolling;cane, straight  (Tub/shower. Handicap height toilet)   Activity/Exercise/Self-Care Comment At baseline is independent in self-cares using single point cane PRN.  Also owns walker   Disability/Function   Fall history within last six months no  (pt is questionable historian)   Change in Functional Status Since Onset of Current Illness/Injury yes   General Information   Onset of Illness/Injury or Date of Surgery 05/13/21   Referring Physician Blayne Castanon, DO   Patient/Family Therapy Goal Statement (OT) Rest   Additional Occupational Profile Info/Pertinent History of Current Problem PMHx of dementia, chronic A. fib on Coumadin, severe aortic stenosis with admission with lightheadedness, weakness and severe anemia with melena, bright red blood per rectum, hematuria, vaginal bleeding with supratherapeutic INR. Patient reportedly lives by herself and has family checks on her daily.    Existing Precautions/Restrictions fall;oxygen therapy device and L/min  (2L; ICU RN Nida approved EOB mobility)   Cognitive Status Examination   Orientation Status person  (month, year)   Affect/Mental Status (Cognitive) confused   Follows Commands follows one-step commands;50-74% accuracy;increased processing time needed;physical/tactile prompts required;repetition of directions required   Memory Deficit severe deficit;short-term memory;long-term memory   Attention Deficit moderate  deficit;distractible in quiet environment;focused/sustained attention   Executive Function Deficit problem-solving/reasoning;planning/decision-making;moderate deficit   Cognitive Status Comments Of note, pt scored 13/30 on SLUMS on 4/13/2021   Visual Perception   Visual Impairment/Limitations corrective lenses full-time;other (see comments)  (Glaucoma. Glass left eye)   Sensory   Sensory Comments Pt denies BUE/BLE numbness or tingling   Pain Assessment   Patient Currently in Pain Yes, see Vital Sign flowsheet   Strength Comprehensive (MMT)   Comment, General Manual Muscle Testing (MMT) Assessment Functional. 4/5 shoulder and grasp strength otherwise 5/5   Transfers   Transfer Comments Pt did not tolerate log rolling   Activities of Daily Living   BADL Assessment/Intervention grooming   Bathing Assessment/Intervention   [place] Level (Bathing) dependent (less than 25% patient effort)   Lower Body Dressing Assessment/Training   [place] Level (Lower Body Dressing) dependent (less than 25% patient effort)   Grooming Assessment/Training   [place] Level (Grooming) supervision;set up   Comment (Grooming) per clinical judgement   Toileting   [place] Level (Toileting) dependent (less than 25% patient effort)   Instrumental Activities of Daily Living (IADL)   Previous Responsibilities meal prep;housekeeping;medication management   IADL Comments pt reports she does not do laundry and does not drive   Clinical Impression   Criteria for Skilled Therapeutic Interventions Met (OT) yes;meets criteria;skilled treatment is necessary   OT Diagnosis decline function   OT Problem List-Impairments impacting ADL activity tolerance impaired;cognition;mobility;pain;strength;sensory feedback   Assessment of Occupational Performance 5 or more Performance Deficits   Identified Performance Deficits dressing, toileting, bathing, functional mobility, med management, meal prep   Planned Therapy Interventions (OT) ADL  retraining;IADL retraining;cognition;transfer training;progressive activity/exercise;strengthening   Clinical Decision Making Complexity (OT) low complexity   Therapy Frequency (OT) 5x/week  (decrease if cannot tolerate)   Predicted Duration of Therapy 5 days   Risk & Benefits of therapy have been explained evaluation/treatment results reviewed;care plan/treatment goals reviewed;risks/benefits reviewed;current/potential barriers reviewed;participants voiced agreement with care plan;participants included;patient   OT Discharge Planning    OT Discharge Recommendation (DC Rec) Transitional Care Facility   OT Rationale for DC Rec Evaluation participation/tolerance very limited by pt's back pain and high HR.  Son reports pt was diagnosed with back fractures a couple weeks ago. Today pt did not tolerate log rolling despite encouragement. Pt disoriented to place, reason for hospitalization, time. Recommend continued skilled OT while pt is hospitalized and in TCU to progress her safety and independence.    Total Evaluation Time (Minutes)   Total Evaluation Time (Minutes) 17

## 2021-05-14 NOTE — PLAN OF CARE
[facility] Intensive Care Unit   Nursing Note                                                       Neuro: Dementia at baseline per daughter. Disoriented to place. Moves all extremities.  Follows commands.  Cardiovascular:  Patient admitted with Afib RVR. EKG obtained and dilt gtt started. IV Metoprolol PRN to maintain HR.  Pulmonary:  2L NC  GI/:  Patient admitted with CBI. 22fr jiménez clotted off upon admision due to fluids running dry in route. 26fr jiménez placed for CBI. Patient states she is much more comfortable. No clotting issues over night. Drainage a light pink-red.   Endocrine: Glucose well controlled.  Skin:  Intact. Bruises on back and thighs.   AM labs noted. Hemoglobin stable  Family updated  Continue to monitor closely.    No lab results found in last 7 days.    Lab Results   Component Value Date    TROPI <0.015 10/04/2020    TROPI <0.015 04/24/2020    TROPI <0.015 05/19/2019    TROPI  04/28/2016     <0.015  The 99th percentile for upper reference range is 0.045 ug/L.  Troponin values in   the range of 0.045 - 0.120 ug/L may be associated with risks of adverse   clinical events.      TROPI  03/05/2016     <0.015  The 99th percentile for upper reference range is 0.045 ug/L.  Troponin values in   the range of 0.045 - 0.120 ug/L may be associated with risks of adverse   clinical events.         ROUTINE IP LABS (Last four results)  BMP  Recent Labs   Lab 05/13/21  2100 05/13/21  1108    134   POTASSIUM 3.9 4.3   CHLORIDE 103 102   JOSEFINA 8.8 9.0   CO2 27 27   BUN 35* 40*   CR 1.05* 1.22*   * 125*     CBC  Recent Labs   Lab 05/13/21  2353 05/13/21  2100 05/13/21  1549 05/13/21  1108   WBC  --  17.0*  --  15.7*   RBC  --  3.75*  --  2.82*   HGB 9.5* 10.5* 8.3* 8.1*   HCT  --  32.3*  --  24.5*   MCV  --  86  --  87   MCH  --  28.0  --  28.7   MCHC  --  32.5  --  33.1   RDW  --  14.3  --  14.6   PLT  --  260  --  246     INR  Recent Labs   Lab 05/13/21  2100 05/13/21  1404 05/13/21  1216  05/13/21  1108   INR 1.10 3.30* >10.00* >10.00*     LACTIC ACID  Lactic Acid (mmol/L)   Date Value   05/13/2021 1.5   05/19/2019 1.0   01/09/2016 1.3   04/10/2014 1.9     Blood Glucose  Glucose (mg/dL)   Date Value   05/14/2021 123 (H)   05/13/2021 125 (H)   04/15/2014 88   04/15/2014 99   04/15/2014 87   04/14/2014 122 (H)       Intake/Output Summary (Last 24 hours) at 5/14/2021 0528  Last data filed at 5/14/2021 0500  Gross per 24 hour   Intake 102.42 ml   Output -300 ml   Net 402.42 ml       Alex Quick, RN-BSN  [facility]  Intensive Care Unit

## 2021-05-14 NOTE — CONSULTS
81 year old female with supratherapuetic INR on anticoagulation.  Likely upper GI bleed.  NPO  I/V Protonix  Check INR  EGD in AM or tonight if change in status    Seven CONNOR

## 2021-05-14 NOTE — PROVIDER NOTIFICATION
Pt. More agitated after EGD, JO, no pupil changes.  Abd slightly more distended, passing gas.  Catheter irrigated, no clots, CBI increased to mod. To light pink urine flow. MD paged.

## 2021-05-14 NOTE — CONSULTS
[facility]  Gastroenterology Consultation         Khalida Rouse  [address]  [facility] [address]  81 year old female    Admission Date/Time: 5/13/2021  Primary Care Provider: Mazin Larsen  Referring / Attending Physician:  Dr. Abelardo Johnson    We were asked to see the patient in consultation by Dr. Abelardo Johnson for evaluation of GI bleed.      CC: lightheaded and anemia    HPI:  Khalida Rouse is a 81 year old female who has a PMHx of dementia, chronic A. fib on Coumadin, severe aortic stenosis with admission with lightheadedness, weakness and severe anemia with melena, bright red blood per rectum, hematuria, vaginal bleeding with supratherapeutic INR. Patient reportedly lives by herself and has family checks on her daily. However, unable to answer if took extra warfarin, when bleeding started, and any other history questions. Discussed with daughter, Leah, but she has no specifics as she had been out of town at a wedding. There is no known history of PUD or other source of GI bleed.     VSS. -120s. On 2 LPM nasal cannula. Hemoglobin 8.1 in ED with baseline 13.  INR >10.    ROS: A comprehensive ten point review of systems was negative aside from those in mentioned in the HPI.      PAST MED HX:  I have reviewed this patient's medical history and updated it with pertinent information if needed.   Past Medical History:   Diagnosis Date     Aortic valve stenosis      Atrial flutter (H)      Cardiomyopathy (H)      Cognitive changes - SLUMS 16/30 4/13/2021     Severe aortic stenosis 4/7/2014     Shoulder fracture, left 3/22/15       MEDICATIONS:   Prior to Admission Medications   Prescriptions Last Dose Informant Patient Reported? Taking?   HYDROcodone-acetaminophen (NORCO) 5-325 MG tablet  Daughter No No   Sig: Take 1 tablet by mouth every 4 hours as needed for pain   acetaminophen (ACETAMINOPHEN 8 HOUR) 650 MG CR tablet  Daughter Yes No   Sig: Take 650 mg by mouth At  Bedtime    atenolol (TENORMIN) 50 MG tablet  Daughter No No   Sig: TAKE ONE TABLET BY MOUTH TWICE A DAY   Patient taking differently: Take 50 mg by mouth 2 times daily    diltiazem ER COATED BEADS (CARDIZEM CD/CARTIA XT) 180 MG 24 hr capsule  Daughter No No   Sig: Take 1 capsule (180 mg) by mouth 2 times daily   docusate sodium (COLACE) 100 MG capsule  Daughter No No   Sig: Take 1 capsule (100 mg) by mouth daily   magnesium citrate solution  Daughter Yes No   Sig: Take 100 mLs by mouth 3 times daily    polyethylene glycol (MIRALAX) 17 GM/Dose powder  Daughter No No   Sig: Take 17 g (1 capful) by mouth 3 times daily as needed for constipation   Patient taking differently: Take 1 capful by mouth 3 times daily Or more   warfarin ANTICOAGULANT (COUMADIN) 2.5 MG tablet  Daughter Yes No   Sig: Take 2.5 mg by mouth daily       Facility-Administered Medications: None       ALLERGIES:   Allergies   Allergen Reactions     Xarelto [Rivaroxaban] Diarrhea     Ace Inhibitors Cough       SOCIAL HISTORY:  Social History     Tobacco Use     Smoking status: Never Smoker     Smokeless tobacco: Never Used   Substance Use Topics     Alcohol use: No     Alcohol/week: 0.0 standard drinks     Drug use: No       FAMILY HISTORY:  Family History   Problem Relation Age of Onset     Dementia Brother      Alzheimer Disease Sister        PHYSICAL EXAM:   General  Alert and comfortable. Oriented x1.  Vital Signs with Ranges  Temp: 98.2  F (36.8  C) Temp src: Oral BP: 108/74 Pulse: 120   Resp: 22 SpO2: 90 % O2 Device: Nasal cannula Oxygen Delivery: 2 LPM  I/O last 3 completed shifts:  In: 102.42 [I.V.:102.42]  Out: -450     Constitutional: healthy, alert, no distress, cooperative and pale   Cardiovascular: negative, PMI normal. No lifts, heaves, or thrills. RRR. No murmurs, clicks gallops or rub  Respiratory: negative, Percussion normal. Good diaphragmatic excursion. Lungs clear  Head: Normocephalic. No masses, lesions, tenderness or  abnormalities  Neck: Neck supple. No adenopathy. Thyroid symmetric, normal size,, Carotids without bruits.  Abdomen: Abdomen soft, non-tender. BS normal. No masses, organomegaly          ADDITIONAL COMMENTS:   I reviewed the patient's new clinical lab test results.   Recent Labs   Lab Test 05/14/21  0531 05/13/21  2353 05/13/21  2100 05/13/21  1404 05/13/21  1404 05/13/21  1108 05/13/21  1108   WBC 14.6*  --  17.0*  --   --   --  15.7*   HGB 9.1* 9.5* 10.5*   < >  --   --  8.1*   MCV 86  --  86  --   --   --  87     --  260  --   --   --  246   INR 1.16*  --  1.10  --  3.30*   < > >10.00*    < > = values in this interval not displayed.     Recent Labs   Lab Test 05/14/21  0531 05/13/21 2100 05/13/21  1108   POTASSIUM 4.2 3.9 4.3   CHLORIDE 106 103 102   CO2 28 27 27   BUN 30 35* 40*   ANIONGAP 4 6 5     Recent Labs   Lab Test 05/13/21 2100 05/13/21  1108 04/13/21  0547 04/12/21  1353 04/12/21  1153 03/27/21  1430 11/11/20  2256 11/11/20  2256 10/04/20  1818 10/04/20  1818 09/27/14  1313 09/27/14  1313   ALBUMIN 2.6* 2.6* 3.2*  --  3.4  --    < >  --   --  3.9   < > 4.0   BILITOTAL 1.9* 0.8 1.0  --  0.7  --    < >  --   --  0.6   < > 0.6   ALT 22 21 22 -- 21  --    < >  --   --  27   < > 52*   AST 28 27 17  --  18  --    < >  --   --  30   < > 44   PROTEIN  --   --   --  Negative  --  Negative  --  Negative   < >  --    < >  --    LIPASE  --   --   --   --  98  --   --   --   --  99  --  119    < > = values in this interval not displayed.       I reviewed the patient's new imaging results.        CONSULTATION ASSESSMENT AND PLAN:    Khalida Rouse is a pleasant 81 year old female with melena, bright red rectal bleeding and other sources of blood loss with supratherapeutic INR. GI consulted for GI bleed on 5/13/21.    Active Problems:   GIB (gastrointestinal bleeding)  Acute blood loss anemia  Supratherapeutic INR    Patient has h/o Afib on warfarin with INR over 10 and reversed with Kcentra. Now at 1.16.  Hemoglobin baseline 13 presented with hemoglobin of 8 received a unit of PRBC and improved 10.5 and dropped to 9.5.  Patient has multiple sources of blood loss, likely has upper GI bleed, possible lower GI bleed, hematuria and vaginal bleeding.    -- Recommend EGD today- discussed with daughter Leah, and she has agreed.  -- Serial hemoglobin q 6 hours.  -- Continue with IV pantoprazole 40 mg BID  -- Hold warfarin  -- NPO      [facility].  Office: [phone]  Cell : [phone] (Dr. Gould)  Cell: [phone] (Karol Bess PA-C)

## 2021-05-14 NOTE — H&P
[facility]    History and Physical - Hospitalist Service       Date of Admission:  5/13/2021    Assessment & Plan   Khalida Rouse is a 81 year old female admitted on 5/13/2021. She presents with acute hemorrhage from multiple sources.    Acute blood loss anemia, severe  Blood loss from multiple sources (GI, renal, possibly vaginal). Likely she is oozing from multiple sources due to supratherapeutic INR.  - monitor  - transfuse as needed  - conditional RBC order for Hgb less than 8    Supratherapeutic INR, resolved  - received IV vit K and Kcentra at [facility]  - monitor    Acute GI bleed  Reports several days of melanic, tarry stools. Hematochezia for last 2-3 days by patient report.  - discussed with Bryanna CONNOR, consult is appreciated  - plan for possible EGD in AM, plan to stabilize prior  - Dr. Gould available overnight on admission if she decompensates    Gross Hematuria  Severe urinary retention, resolved  -Continue bladder irrigation  -change jiménez on admission due to clotted jiménez on arrival, drained 1L immediately  - consult to urology    Chronic Afib  Afib with RVR  - start diltiazem gtt  - consider stopping warfarin at discharge, will need risk benefit discussions with family  - hold warfarin this stay    Severe Pulmonary Hypertension  Right sided pressures severely elevated on most recent TTE. No history of RHC. Did not get COVID test prior to RHC that was scheduled on 4/28 so it was not completed.  - monitor    S/p tissue AVR  - noted    Recent appendicitis managed with antibiotics  No surgery offered due to underlying health conditions. Completed course of Augmentin    Possible vaginal bleeding  - after resolution of bleeding if vaginal bleeding continues, can have OBGYN consult  - monitor for now    Moderate Cognitive impairment  Delirium   - delirium precautions       Diet: NPO for Medical/Clinical Reasons Except for: Meds, Ice Chips  DVT Prophylaxis: Pneumatic Compression  Devices  Jiménez Catheter: in place, indication:    Code Status: No CPR- Do NOT Intubate         Disposition Plan   Expected discharge: 2 - 3 days, recommended to transitional care unit once hemoglobin stable.  Entered: Abelardo Johnson MD 05/13/2021, 9:43 PM     The patient's care was discussed with the Bedside Nurse, Patient, Patient's Family and GI Consultant.    Abelardo Johnson MD  [facility]  Contact information available via [facility] Paging/Directory    Time Spent on this Encounter   Khalida was seen and evaluated by me on 05/13/21.  She was in critical condition as the result of acute hemorrhage and afib RVR.    Her condition is now Fair.      The acute issues managed by me today include  Afib, acute hemorrhagic anemia, GIB, gross hematuria   Supportive interventions provided and/or ordered by me include diltiazem gtt, jiménez change, RBCs     Total Critical Care time spent by me, excluding procedures, was 85 minutes.    Abelardo Johnson MD      ______________________________________________________________________    Chief Complaint   bleeding    History is obtained from the patient, electronic health record and patient's daughter    History of Present Illness   Khalida Rouse is a 81 year old female who has history of dementia, chronic A. fib on Coumadin, severe aortic stenosis status post tissue AVR who reportedly lives by herself.  Her daughter checked on her on day of admission and noted that she was lightheaded, appeared pale and was very weak.  On presentation the emergency department noted that her hemoglobin was 8 down from 13 approximately 3 weeks prior.  She reported black tarry stools for approximately 1 week, and bright red blood per rectum for a few days.  She also notes that her urine has been red to dark brown for several days as well.  She does not think she has any other symptoms, but of note she appears very confused.    Called and discussed with her daughter Leah.   Leah is unable to provide additional history over the last week because she was out of town for a family wedding.    Review of Systems    The 10 point Review of Systems is negative other than noted in the HPI or here.     Past Medical History    I have reviewed this patient's medical history and updated it with pertinent information if needed.   Past Medical History:   Diagnosis Date     Aortic valve stenosis      Atrial flutter (H)      Cardiomyopathy (H)      Cognitive changes - SLUMS 16/30 4/13/2021     Severe aortic stenosis 4/7/2014     Shoulder fracture, left 3/22/15       Past Surgical History   I have reviewed this patient's surgical history and updated it with pertinent information if needed.  Past Surgical History:   Procedure Laterality Date     CATARACT IOL, RT/LT Right      ENT SURGERY      glaucoma surgery     REPLACE VALVE AORTIC  4/10/2014    Procedure: REPLACE VALVE AORTIC;  Median sternotomy, Replace Aortic Valve on pump oxygenation. ;  Surgeon: Wesley Fong MD;  Location: [facility] OR       Social History   I have reviewed this patient's social history and updated it with pertinent information if needed.  Social History     Tobacco Use     Smoking status: Never Smoker     Smokeless tobacco: Never Used   Substance Use Topics     Alcohol use: No     Alcohol/week: 0.0 standard drinks     Drug use: No       Family History   I have reviewed this patient's family history and updated it with pertinent information if needed.  Family History   Problem Relation Age of Onset     Dementia Brother      Alzheimer Disease Sister        Prior to Admission Medications   Prior to Admission Medications   Prescriptions Last Dose Informant Patient Reported? Taking?   HYDROcodone-acetaminophen (NORCO) 5-325 MG tablet  Daughter No No   Sig: Take 1 tablet by mouth every 4 hours as needed for pain   acetaminophen (ACETAMINOPHEN 8 HOUR) 650 MG CR tablet  Daughter Yes No   Sig: Take 650 mg by mouth At Bedtime     atenolol (TENORMIN) 50 MG tablet  Daughter No No   Sig: TAKE ONE TABLET BY MOUTH TWICE A DAY   Patient taking differently: Take 50 mg by mouth 2 times daily    diltiazem ER COATED BEADS (CARDIZEM CD/CARTIA XT) 180 MG 24 hr capsule  Daughter No No   Sig: Take 1 capsule (180 mg) by mouth 2 times daily   docusate sodium (COLACE) 100 MG capsule  Daughter No No   Sig: Take 1 capsule (100 mg) by mouth daily   magnesium citrate solution  Daughter Yes No   Sig: Take 100 mLs by mouth 3 times daily    polyethylene glycol (MIRALAX) 17 GM/Dose powder  Daughter No No   Sig: Take 17 g (1 capful) by mouth 3 times daily as needed for constipation   Patient taking differently: Take 1 capful by mouth 3 times daily Or more   warfarin ANTICOAGULANT (COUMADIN) 2.5 MG tablet  Daughter Yes No   Sig: Take 2.5 mg by mouth daily       Facility-Administered Medications: None     Allergies   Allergies   Allergen Reactions     Xarelto [Rivaroxaban] Diarrhea     Ace Inhibitors Cough       Physical Exam   Vital Signs:                   vitals reviewed in room. BP 120s/80s. -190 afib rvr on tele. RR 16-20. T99. O2 95-98%  Weight: 0 lbs 0 oz    Constitutional: Awake, alert, cooperative, no apparent cardiopulmonary distress. Appears confused.  Eyes: Conjunctiva and pupils examined and normal.  HEENT: Moist mucous membranes, normal dentition.  Respiratory: Clear to auscultation bilaterally, no crackles or wheezing.  Cardiovascular: Regular rate and rhythm, normal S1 and S2, and no murmur noted.  GI: Soft, non-distended, non-tender, normal bowel sounds.  : jiménez in place with passing clots, dark red urine.  Skin: No rashes, no cyanosis, no edema noted on exposed skin.  Musculoskeletal: No joint swelling, erythema or tenderness. No gross bony abnormalities  Neurologic: Cranial nerves 2-12 grossly intact, normal strength and sensation.  Psychiatric: Alert, oriented to person, states she is on an airplane in the year 2022, appears somewhat  anxious      Data   Data reviewed today: I reviewed all medications, new labs and imaging results over the last 24 hours. I personally reviewed the EKG tracing showing afib rvr w/o clear current of ischemia and the abdominal CT image(s) showing hemorrhage of bladder, kidneys.    Recent Labs   Lab 05/13/21  2100 05/13/21  1549 05/13/21  1404 05/13/21  1216 05/13/21  1108   WBC 17.0*  --   --   --  15.7*   HGB 10.5* 8.3*  --   --  8.1*   MCV 86  --   --   --  87     --   --   --  246   INR 1.10  --  3.30* >10.00* >10.00*     --   --   --  134   POTASSIUM 3.9  --   --   --  4.3   CHLORIDE 103  --   --   --  102   CO2 27  --   --   --  27   BUN 35*  --   --   --  40*   CR 1.05*  --   --   --  1.22*   ANIONGAP 6  --   --   --  5   JOSEFINA 8.8  --   --   --  9.0   *  --   --   --  125*   ALBUMIN 2.6*  --   --   --  2.6*   PROTTOTAL 6.7*  --   --   --  6.5*   BILITOTAL 1.9*  --   --   --  0.8   ALKPHOS 89  --   --   --  83   ALT 22  --   --   --  21   AST 28  --   --   --  27     Recent Results (from the past 24 hour(s))   CT Abdomen Pelvis w/o Contrast    Narrative    CT ABDOMEN PELVIS W/O CONTRAST 5/13/2021 3:12 PM    CLINICAL HISTORY: Abdominal abscess/infection suspected; Monitoring  for acute appendicitis.  Has been receiving outpatient oral  antibiotics for suppressive therapy.  Presenting with abdominal pain,  supratherapeutic INR greater than 10, active rectal and vaginal  bleeding, elevated white count and inflammatory m  TECHNIQUE: CT scan of the abdomen and pelvis was performed without IV  contrast. Multiplanar reformats were obtained. Dose reduction  techniques were used.  CONTRAST: None.    COMPARISON: CT exams 4/27/2021 and 4/12/2021    FINDINGS:   LOWER CHEST: Stable cardiomegaly. Trace right-sided pleural fluid with  adjacent opacities likely reflecting atelectasis. Right middle lobe  and lingular consolidation also likely reflect atelectasis. Severe  coronary artery calcifications.  Poststernotomy with aortic valve  replacement.    HEPATOBILIARY: Cholelithiasis. Stable subtle nodular liver surface  contour. No biliary ductal dilatation.     PANCREAS: Normal.    SPLEEN: Normal.    ADRENAL GLANDS: Normal.    KIDNEYS/BLADDER: Interval development of asymmetric enlargement of the  right kidney with associated asymmetric perinephric edema. Associated  moderately distended right renal collecting system with heterogeneous  likely hemorrhagic fluid. Mild distention of the right ureter with  similar presumed hemorrhagic fluid. No obstructing mass or radiodense  stone. High density likely hemorrhagic fluid layering within the  dependent portion of the right side of the urinary bladder. Mild  distention of the left renal collecting system with high density  presumed hemorrhagic fluid. Stable right renal cyst. No follow-up is  needed.    BOWEL: Normal caliber small bowel. The appendix is not as well-seen on  this unenhanced exam. Resumed exposures 10 mm in diameter, previously  12 mm. Adjacent edema likely related to the after mentioned right  renal findings. No associated abscess is identified. Distal colonic  diverticulosis without evidence of acute diverticulitis. No free air  or free fluid.    LYMPH NODES: Normal.    VASCULATURE: Mild/moderate atherosclerosis.    PELVIC ORGANS: Normal. Moderate presacral edema is noted.    OTHER: None.    MUSCULOSKELETAL: Stable L1-L3 vertebral body compression fractures and  newly visualized acute moderate T12 vertebral body compression  fracture.      Impression    IMPRESSION:   1.  Moderate right-sided and mild left-sided hydronephrosis with  associated hemorrhagic fluid in the bilateral renal collecting  systems, ureters and dependent portion of the urinary bladder. Severe  asymmetric right perinephric edema. No discrete perinephric hematoma.  2.  Essentially stable mildly dilated appendix. No associated abscess  or free air.  3.  Newly visualized moderate T12  vertebral body compression fracture.  Multiple lumbar compression fractures are stable.    A phone call report regarding the critical renal and appendiceal  findings on this exam was made to Dr. Cash Castanon at 3:38 PM on  5/13/2021.    CRISTINA MOHAMUD MD

## 2021-05-14 NOTE — CONSULTS
CLINICAL NUTRITION SERVICES  -  ASSESSMENT NOTE      RECOMMENDATIONS FOR MD/PROVIDER TO ORDER:   Recommend ADAT to Gluten Free (only as appropriate)   Future/Additional Recommendations:   Recommend add Ensure Enlive TID with meals for nutrition push when able   Malnutrition:   % Weight Loss:  Up to 5% in 1 month (non-severe malnutrition)  % Intake:  <75% for >/= 1 month (non-severe malnutrition) - Suspect  Subcutaneous Fat Loss:  Unable to determine  Muscle Loss:  Unable to determine  Fluid Retention:  None noted    Malnutrition Diagnosis: Non-Severe malnutrition  In Context of:  Acute illness or injury        REASON FOR ASSESSMENT  Khalida Rouse is a 81 year old female seen by Registered Dietitian for Admission Nutrition Risk Screen for positive for weight loss 2-13 lbs and decreased appetite    DX:  Acute GI bleed  --> lightheadedness, weakness and severe anemia with melena, bright red blood per rectum, hematuria, vaginal bleeding with supratherapeutic INR    PMH:  Dementia  Chronic A. fib on Coumadin  Severe aortic stenosis s/p tissue AVR    NUTRITION HISTORY  - Unable to obtain nutrition history as patient confused and no family available.  Pt lives alone in [facility] and family checks on her daily.  No food allergies/intolerances listed.  However, in 2014, RD noted patient was on a gluten free diet at home due to h/o diarrhea and this diet seemed to improve her symptoms.  Pt is weak and deconditioned.      CURRENT NUTRITION ORDERS  Diet Order:     NPO     Current Intake/Tolerance:  NPO x 2 days    5/13:  Abd/Pelvic CT   1.  Moderate right-sided and mild left-sided hydronephrosis with associated hemorrhagic fluid in the bilateral renal collecting systems, ureters and dependent portion of the urinary bladder. Severe asymmetric right perinephric edema. No   discrete perinephric hematoma.   2.  Essentially stable mildly dilated appendix. No associated abscess or free air.   3.  Newly visualized moderate T12 vertebral  "body compression fracture.  Multiple lumbar compression fractures are stable.    5/14:  EGD    - Normal esophagus.   - Normal gastric body.   - Erythematous mucosa in the antrum.   - Normal first portion of the duodenum and second portion of the duodenum.   - No specimens collected.   - Patchy gastritis but no specific lesion, bleeding is likely diffuse mucosal bleeding from supratherapeutic INR.     NUTRITION FOCUSED PHYSICAL ASSESSMENT FOR DIAG  No:             Observed:    N/A    Obtained from Chart/Interdisciplinary Team:  Pale    ANTHROPOMETRICS  Height: 5' 4\"  Admit Weight:  61.2 kg (135 oz)  Body mass index is 23.2 kg/m .  Weight Status:  Normal BMI  IBW:  54.5 kg  % IBW:  112%  Weight History:    - UBW range is 135-145 lbs  - Weight loss 3.3 kg (5%) over the past month    Wt Readings from Last 20 Encounters:   05/14/21 65.5 kg (144 lb 6.4 oz)   05/13/21 61.2 kg (135 lb)   04/30/21 63.5 kg (140 lb 1.6 oz)   04/27/21 62.8 kg (138 lb 6.4 oz)   04/23/21 63.8 kg (140 lb 9.6 oz)   04/21/21 63.9 kg (140 lb 12.8 oz)   04/15/21 61.9 kg (136 lb 6.4 oz)   04/12/21 64.5 kg (142 lb 3.2 oz)   04/09/21 66.2 kg (146 lb)   03/27/21 65.3 kg (144 lb)   11/02/20 65.8 kg (145 lb)   10/13/20 65.8 kg (145 lb)   06/29/20 64.7 kg (142 lb 9.6 oz)   04/24/20 64.5 kg (142 lb 4.8 oz)   09/24/19 63.3 kg (139 lb 9.6 oz)   09/21/19 63.5 kg (140 lb)   09/17/19 63.5 kg (139 lb 14.4 oz)   07/18/19 63 kg (139 lb)   07/13/19 61.2 kg (135 lb)   06/17/19 60.8 kg (134 lb)     LABS  Labs reviewed    MEDICATIONS  Medications reviewed    ASSESSED NUTRITION NEEDS PER APPROVED PRACTICE GUIDELINES:    Dosing Weight:  61.2 kg (admit wt)  Estimated Energy Needs:  9544-5530 kcals (25-30 Kcal/Kg)  Justification: maintenance  Estimated Protein Needs:  73-92 grams protein (1.2-1.5 g pro/Kg)  Justification: hypercatabolism with acute illness  Estimated Fluid Needs:  6301-5180 mL (1 mL/Kcal)  Justification: maintenance    MALNUTRITION:  % Weight Loss:  Up to 5% " in 1 month (non-severe malnutrition)  % Intake:  <75% for >/= 1 month (non-severe malnutrition) - Suspect  Subcutaneous Fat Loss:  Unable to determine  Muscle Loss:  Unable to determine  Fluid Retention:  None noted    Malnutrition Diagnosis: Non-Severe malnutrition  In Context of:  Acute illness or injury      NUTRITION DIAGNOSIS:  Predicted suboptimal nutrient intake related to family report of decreased appetite PTA and recent 5% weight loss over past month      NUTRITION INTERVENTIONS  Recommendations / Nutrition Prescription  Recommend ADAT to Gluten Free (only as appropriate)  Recommend add Ensure Enlive TID with meals for nutrition push when able    Implementation  Nutrition education: Not appropriate at this time due to patient condition  Collaboration and Referral of Nutrition care - Patient was discussed during ICU interdisciplinary rounds this morning    Nutrition Goals  Diet will be advanced to solids and pt will consume >50% meals and supplements       MONITORING AND EVALUATION:  Progress towards goals will be monitored and evaluated per protocol and Practice Guidelines

## 2021-05-14 NOTE — TELEPHONE ENCOUNTER
----- Message from Sharon Rojas PA-C sent at 5/14/2021 11:52 AM CDT -----  Regarding: HFU, CT and cysto  Pt needs CT Urogram in 3-4 weeks to eval for underlying renal cell carcinoma as well as cysto, HFU. 1st avail MD

## 2021-05-14 NOTE — PROGRESS NOTES
[facility]    Hospitalist Progress Note    Brief Summary:  This is a 81-year-old female with history of atrial fibrillation/flutter, status post aortic valve replacement with tissue valve, dementia, on chronic anticoagulation with Coumadin, history of recent compression fracture, initially presented to the ER at [facility] with complaint of weakness fatigue lightheadedness.,  Melena and bright red blood per rectum and hematuria.  She was found to have INR more than 10 and hemoglobin of 8.3 down from her baseline of 14.  She was given vitamin K, Kcentra and transfused 2 units of packed RBCs and transferred to [facility] for further management.    Assessment & Plan        Acute blood loss anemia, severe  Her baseline hemoglobin is 14, she dropped down to 8.3.  With active bleeding per rectally, melena as well as hematochezia, hematuria.  I think is most likely secondary to supratherapeutic INR.  Hemoglobin has been stable 9.1 this morning.  No further GI bleeding, her hematuria has been improving as well.  I will keep checking her hemoglobin every 6 hours, try to keep her hemoglobin around 8 or above.    Supratherapeutic INR, resolved  - received IV vit K and Kcentra at [facility]  -INR this morning 1.16.     Acute GI bleed  Reports several days of melanic, tarry stools. Hematochezia for last 2-3 days by patient report.  No sign of active bleeding at this time.  Gastroenterologist is consulted.  -Scheduled for EGD today.       Gross Hematuria  Severe urinary retention, resolved  Status post three-way Dupree catheter placement.  Monitor urine output right away.  She was started on CBI on admission, urine is now mostly clear slightly indistinct though.  CBI is discontinued at this time.  Continue to monitor  -Continue bladder irrigation       Atrial fibrillation with RVR  Chronic anticoagulation with Coumadin  Patient has chronic A. fib, she is on Cardizem as well as atenolol at  home.  She is currently on IV Cardizem drip at 50 mg/h, heart rate is still uncontrolled.  I will restart her oral atenolol 50 mg twice daily.  We will try to wean her off the IV Cardizem drip.  If her blood pressure drops or not able to tolerate the atenolol, will start on a loaded with digoxin.  Patient is on Coumadin, her INR was more than 10 on admission.  Patient has dementia, she lives by herself, history of compression fracture, and now with GI bleeding and hematuria.  She is high risk for anticoagulation.  Discussed with the patient's son at the bedside who agree and would like to hold the Coumadin at this time.       Severe Pulmonary Hypertension  Right sided pressures severely elevated on most recent TTE.  - monitor     S/p tissue AVR  -Stable at this time.     Recent appendicitis managed with antibiotics  No surgery offered due to underlying health conditions. Completed course of Augmentin     Possible vaginal bleeding  - after resolution of bleeding if vaginal bleeding continues, can have OBGYN consult  - monitor for now     Moderate Cognitive impairment  Delirium   - delirium precautions    Compression fracture  As per history, patient's son that patient has compression fracture lower back.  Complains of some pain lower back, started on oxycodone 5 mg every 4 hours as needed  And apply lidocaine patches to her back, continue IV fentanyl for severe pain.        Dupree Catheter: in place, indication:    DVT Prophylaxis: Pneumatic Compression Devices  Code Status: No CPR- Do NOT Intubate    Disposition: Expected discharge in 2 to 3 days once stable.      Discussed with patient, patient's son at bedside and the nursing staff to include the patient.    Total time spent today is 30 minutes of critical care time which include chart review history taking physical examination formulation of the plan counseling and coordination of care    Gavin Marrero MD  Text Page  (7am - 6pm)    Interval History   Patient  complain of back pain at this time, hard to move within the bed.  Denies any chest pain fever chills nausea vomiting headache dizziness or lightheadedness at this time.    Heart rate is in 120s    No other significant event overnight.    -Data reviewed today: I reviewed all new labs and imaging results over the last 24 hours. I personally reviewed no images or EKG's today.    Physical Exam   Temp: 98  F (36.7  C) Temp src: Oral BP: 118/67 Pulse: 124   Resp: 19 SpO2: 92 % O2 Device: Nasal cannula Oxygen Delivery: 2 LPM  Vitals:    05/13/21 2100 05/14/21 0600   Weight: 65.5 kg (144 lb 6.4 oz) 65.5 kg (144 lb 6.4 oz)     Vital Signs with Ranges  Temp:  [97.5  F (36.4  C)-98.6  F (37  C)] 98  F (36.7  C)  Pulse:  [] 124  Resp:  [15-40] 19  BP: ()/(57-93) 118/67  SpO2:  [69 %-100 %] 92 %  I/O last 3 completed shifts:  In: 102.42 [I.V.:102.42]  Out: -450     Constitutional: fatigued  Eyes: Lids and lashes normal, pupils equal, round and reactive to light, extra ocular muscles intact, sclera clear, conjunctiva normal  Respiratory: No increased work of breathing, good air exchange, clear to auscultation bilaterally, no crackles or wheezing  Cardiovascular: irregularly irregular rhythm and variable S1 and S2, no murmur  GI: No scars, normal bowel sounds, soft, non-distended, non-tender, no masses palpated, no hepatosplenomegally  Skin: no bruising or bleeding  Musculoskeletal: no lower extremity pitting edema present  Neurologic: no focal deficit.     Medications     dilTIAZem HCl-Sodium Chloride 15 mg/hr (05/14/21 0730)     - MEDICATION INSTRUCTIONS -       - MEDICATION INSTRUCTIONS -         atenolol  50 mg Oral BID     fentaNYL  50 mcg Intravenous Once within 24 hrs     midazolam  1 mg Intravenous Once within 24 hrs     pantoprazole (PROTONIX) IV  40 mg Intravenous Q12H     sodium chloride (PF)  3 mL Intracatheter Q8H       Data   Recent Labs   Lab 05/14/21  0531 05/13/21  2353 05/13/21  2100 05/13/21  1407  05/13/21  1404 05/13/21  1108 05/13/21  1108   WBC 14.6*  --  17.0*  --   --   --  15.7*   HGB 9.1* 9.5* 10.5*   < >  --   --  8.1*   MCV 86  --  86  --   --   --  87     --  260  --   --   --  246   INR 1.16*  --  1.10  --  3.30*   < > >10.00*     --  136  --   --   --  134   POTASSIUM 4.2  --  3.9  --   --   --  4.3   CHLORIDE 106  --  103  --   --   --  102   CO2 28  --  27  --   --   --  27   BUN 30  --  35*  --   --   --  40*   CR 0.92  --  1.05*  --   --   --  1.22*   ANIONGAP 4  --  6  --   --   --  5   JOSEFINA 8.4*  --  8.8  --   --   --  9.0   *  --  129*  --   --   --  125*   ALBUMIN  --   --  2.6*  --   --   --  2.6*   PROTTOTAL  --   --  6.7*  --   --   --  6.5*   BILITOTAL  --   --  1.9*  --   --   --  0.8   ALKPHOS  --   --  89  --   --   --  83   ALT  --   --  22  --   --   --  21   AST  --   --  28  --   --   --  27    < > = values in this interval not displayed.       Recent Results (from the past 24 hour(s))   CT Abdomen Pelvis w/o Contrast    Narrative    CT ABDOMEN PELVIS W/O CONTRAST 5/13/2021 3:12 PM    CLINICAL HISTORY: Abdominal abscess/infection suspected; Monitoring  for acute appendicitis.  Has been receiving outpatient oral  antibiotics for suppressive therapy.  Presenting with abdominal pain,  supratherapeutic INR greater than 10, active rectal and vaginal  bleeding, elevated white count and inflammatory m  TECHNIQUE: CT scan of the abdomen and pelvis was performed without IV  contrast. Multiplanar reformats were obtained. Dose reduction  techniques were used.  CONTRAST: None.    COMPARISON: CT exams 4/27/2021 and 4/12/2021    FINDINGS:   LOWER CHEST: Stable cardiomegaly. Trace right-sided pleural fluid with  adjacent opacities likely reflecting atelectasis. Right middle lobe  and lingular consolidation also likely reflect atelectasis. Severe  coronary artery calcifications. Poststernotomy with aortic valve  replacement.    HEPATOBILIARY: Cholelithiasis. Stable subtle  nodular liver surface  contour. No biliary ductal dilatation.     PANCREAS: Normal.    SPLEEN: Normal.    ADRENAL GLANDS: Normal.    KIDNEYS/BLADDER: Interval development of asymmetric enlargement of the  right kidney with associated asymmetric perinephric edema. Associated  moderately distended right renal collecting system with heterogeneous  likely hemorrhagic fluid. Mild distention of the right ureter with  similar presumed hemorrhagic fluid. No obstructing mass or radiodense  stone. High density likely hemorrhagic fluid layering within the  dependent portion of the right side of the urinary bladder. Mild  distention of the left renal collecting system with high density  presumed hemorrhagic fluid. Stable right renal cyst. No follow-up is  needed.    BOWEL: Normal caliber small bowel. The appendix is not as well-seen on  this unenhanced exam. Resumed exposures 10 mm in diameter, previously  12 mm. Adjacent edema likely related to the after mentioned right  renal findings. No associated abscess is identified. Distal colonic  diverticulosis without evidence of acute diverticulitis. No free air  or free fluid.    LYMPH NODES: Normal.    VASCULATURE: Mild/moderate atherosclerosis.    PELVIC ORGANS: Normal. Moderate presacral edema is noted.    OTHER: None.    MUSCULOSKELETAL: Stable L1-L3 vertebral body compression fractures and  newly visualized acute moderate T12 vertebral body compression  fracture.      Impression    IMPRESSION:   1.  Moderate right-sided and mild left-sided hydronephrosis with  associated hemorrhagic fluid in the bilateral renal collecting  systems, ureters and dependent portion of the urinary bladder. Severe  asymmetric right perinephric edema. No discrete perinephric hematoma.  2.  Essentially stable mildly dilated appendix. No associated abscess  or free air.  3.  Newly visualized moderate T12 vertebral body compression fracture.  Multiple lumbar compression fractures are stable.    A phone  call report regarding the critical renal and appendiceal  findings on this exam was made to Dr. Cash Castanon at 3:38 PM on  5/13/2021.    CRISTINA MOHAMUD MD

## 2021-05-14 NOTE — CONSULTS
[facility] Consultation by [facility]    Khalida Rouse MRN# [id number]   Age: 81 year old YOB: 1940     Date of Admission:  5/13/2021    Reason for consult: Gross hematuria       Requesting PA/MD: Dr. Johnson       Level of consult: Consult, follow and place orders           Assessment and Plan:   Assessment:   Supratherapeutic INR  Gross hematuria (Blood loss from multiple sources GI, renal, possibly vaginal)      Plan:   CBI clamped. Restart if gross hematuria recurs. Could remove Dupree after 24 hours of clear urine.  Will need follow-up CT Urogram in 3-4 weeks to eval for underlying renal cell carcinoma as well as cysto. Will arrange this.   Will follow.   Discussed with RNSs and Dr. Sheikh, last night's on call MD. Dr. Kennedy updated.     Appreciate wonderful care from ICU team.     Will sign off. Please call if urologic issues arise.     Sharon Rojas PA-C  [facility]  [phone]               Chief Complaint:   Gross hematuria     History is obtained from the patient and EMR.         History of Present Illness:   Khalida Rouse is a pleasant 81 year old female who has history of dementia, chronic A. fib on Coumadin, severe aortic stenosis status post tissue AVR whom was found by her daughter at home to be lightheaded, appeared pale and was very weak.  On presentation at outside emergency department, her hemoglobin was 8 down from 13 approximately 3 weeks prior.  She reported black tarry stools for approximately 1 week, and bright red blood per rectum for a few days.  She also notes that her urine has been red to dark brown for several days as well. Dupree was placed. This clotted off upon arrival to [facility] ICU and Dupree was upsized to 26Fr three-way. Large clots were irrigated. Her INR has been corrected (1.1) and urine is clear on very slow gtt. Cr improved, Hgb 9.1.     Pt is able to converse. Dupree not bothersome.               Past Medical History:     Past Medical  History:   Diagnosis Date     Aortic valve stenosis      Atrial flutter (H)      Cardiomyopathy (H)      Cognitive changes - SLUMS 16/30 4/13/2021     Severe aortic stenosis 4/7/2014     Shoulder fracture, left 3/22/15             Past Surgical History:     Past Surgical History:   Procedure Laterality Date     CATARACT IOL, RT/LT Right      ENT SURGERY      glaucoma surgery     REPLACE VALVE AORTIC  4/10/2014    Procedure: REPLACE VALVE AORTIC;  Median sternotomy, Replace Aortic Valve on pump oxygenation. ;  Surgeon: Wesley Fong MD;  Location:  OR             Social History:     Social History     Tobacco Use     Smoking status: Never Smoker     Smokeless tobacco: Never Used   Substance Use Topics     Alcohol use: No     Alcohol/week: 0.0 standard drinks             Family History:     Family History   Problem Relation Age of Onset     Dementia Brother      Alzheimer Disease Sister      Family history reviewed.             Allergies:     Allergies   Allergen Reactions     Xarelto [Rivaroxaban] Diarrhea     Ace Inhibitors Cough             Medications:     Current Facility-Administered Medications   Medication     glucose gel 15-30 g    Or     dextrose 50 % injection 25-50 mL    Or     glucagon injection 1 mg     diltiazem (CARDIZEM) 125 mg in sodium chloride 0.9 % 125 mL infusion     fentaNYL (PF) (SUBLIMAZE) injection 50 mcg    Followed by     fentaNYL (PF) (SUBLIMAZE) injection 25 mcg     flumazenil (ROMAZICON) injection 0.2 mg     metoprolol (LOPRESSOR) injection 2.5-5 mg     midazolam (VERSED) injection 1 mg    Followed by     midazolam (VERSED) injection 0.5 mg     naloxone (NARCAN) injection 0.2 mg     naloxone (NARCAN) injection 0.2 mg     naloxone (NARCAN) injection 0.4 mg     naloxone (NARCAN) injection 0.4 mg     ondansetron (ZOFRAN-ODT) ODT tab 4 mg    Or     ondansetron (ZOFRAN) injection 4 mg     pantoprazole (PROTONIX) IV push injection 40 mg     prochlorperazine (COMPAZINE) injection 5  "mg    Or     prochlorperazine (COMPAZINE) tablet 5 mg    Or     prochlorperazine (COMPAZINE) suppository 12.5 mg             Review of Systems:   A comprehensive 10-point review of systems was performed and found to be negative except as described in the HPI.     /74   Pulse 120   Temp 98.2  F (36.8  C) (Oral)   Resp 22   Ht 1.626 m (5' 4\")   Wt 65.5 kg (144 lb 6.4 oz)   SpO2 90%   BMI 24.79 kg/m    PSYCH: NAD  EYES: EOMI  MOUTH: MMM  NEURO: AAO x3  : Clear on slow gtt          Data:     Lab Results   Component Value Date    WBC 14.6 (H) 05/14/2021    HGB 9.1 (L) 05/14/2021    HCT 27.8 (L) 05/14/2021    MCV 86 05/14/2021     05/14/2021     Lab Results   Component Value Date    CR 0.92 05/14/2021                "

## 2021-05-14 NOTE — PROGRESS NOTES
05/14/21 0931   Quick Adds   Type of Visit Initial PT Evaluation   Living Environment   People in home alone   Current Living Arrangements apartment   Home Accessibility stairs to enter home   Number of Stairs, Main Entrance other (see comments)  (18 steep stairs)   Stair Railings, Main Entrance railings safe and in good condition   Transportation Anticipated family or friend will provide   Living Environment Comments Pt lives alone but family checks in on almost daily   Self-Care   Usual Activity Tolerance moderate   Current Activity Tolerance poor   Equipment Currently Used at Home cane, straight;walker, rolling   Activity/Exercise/Self-Care Comment At baseline, pt is independent, uses a cane as needed. Pt reports she ha been having increasing back pain and was recently diagnosed with vertebral fractures, had been using a walker for the last few weeks due to increasing pain.   Disability/Function   Fall history within last six months no   General Information   Onset of Illness/Injury or Date of Surgery 05/13/21   Referring Physician Abelardo Johnson MD   Patient/Family Therapy Goals Statement (PT) To go home   Pertinent History of Current Problem (include personal factors and/or comorbidities that impact the POC) Pt is 81 year old female adm on 5/13/21 after daughter found her to be appearing unwell, lightheaded, weak, increasing back pain. Pt also found to have black, tarry stools and vaginal bleeding. Pt admitted for management of acute blood loss anemia with hemorrhage from multiple sources. Pt with supratherapeutic INR of >10 on admission which was reveresed and hgb of 8.1, received blood transfusion. Plan is for pt to have EGD today. Pt went into a-fib with RVR, currently with HR varying from 110's-140's at rest.   Existing Precautions/Restrictions fall   Cognition   Orientation Status (Cognition) oriented to;person   Affect/Mental Status (Cognition) WFL   Follows Commands (Cognition) WFL   Cognitive  Status Comments Pt appearing confused at times, easily overwhelmed   Pain Assessment   Patient Currently in Pain   (c/o extreme pain with any attempts at mobility)   Posture    Posture Forward head position;Protracted shoulders   Range of Motion (ROM)   ROM Comment B LE ROM WFL although pt with extreme pain with any attempts to move   Strength   Strength Comments Can do ankle pumps and quad sets but very little active movement due to pain   Bed Mobility   Comment (Bed Mobility) Pt currently dependent for bed mobility, very limited by pain   Transfers   Transfer Safety Comments Dependent   Clinical Impression   Criteria for Skilled Therapeutic Intervention yes, treatment indicated   PT Diagnosis (PT) Impaired mobility   Influenced by the following impairments Increased pain, decreased activity tolerance, decreased strength, decreased balance   Functional limitations due to impairments Decreased ability to participate in daily tasks   Clinical Presentation Stable/Uncomplicated   Clinical Presentation Rationale Current presentation, [facility]   Clinical Decision Making (Complexity) low complexity   Therapy Frequency (PT) 5x/week   Predicted Duration of Therapy Intervention (days/wks) 5 days   Planned Therapy Interventions (PT) balance training;bed mobility training;gait training;home exercise program;patient/family education;strengthening;transfer training   Risk & Benefits of therapy have been explained evaluation/treatment results reviewed;care plan/treatment goals reviewed;risks/benefits reviewed;current/potential barriers reviewed;participants voiced agreement with care plan;participants included;patient;son   PT Discharge Planning    PT Discharge Recommendation (DC Rec) Transitional Care Facility   PT Rationale for DC Rec Pt lives alone, is well below baseline level of function, anticipate need for continued rehab when medically stable for discharge from hospital.   Total Evaluation Time   Total Evaluation Time  (Minutes) 10

## 2021-05-14 NOTE — PLAN OF CARE
Tolerated EGD well, sleepy til 1500, more confused, trying to get out of bed, son here , MD notified.  VSS, irrigation now to med. Flow , aspirated x2 for clots.  Hgb stable, HR under 110. Dr. Cano here, spoke with robert, cont.to monitor.

## 2021-05-14 NOTE — PROVIDER NOTIFICATION
MD Notification    Notified Person: MD    Notified Person Name: Dr. Jensen    Notification Date/Time: 0345 5/14    Purpose of Notification: Patient started on dilt gtt for Afib RVR. HR down from 170' to 120's to 130's.     Orders Received: PRN IV metoprolol. Continue dilt gtt.     Comments:

## 2021-05-15 ENCOUNTER — APPOINTMENT (OUTPATIENT)
Dept: GENERAL RADIOLOGY | Facility: CLINIC | Age: 81
DRG: 813 | End: 2021-05-15
Attending: INTERNAL MEDICINE
Payer: MEDICARE

## 2021-05-15 LAB
ALBUMIN SERPL-MCNC: 2.2 G/DL (ref 3.4–5)
ANION GAP SERPL CALCULATED.3IONS-SCNC: 2 MMOL/L (ref 3–14)
BASOPHILS # BLD AUTO: 0 10E9/L (ref 0–0.2)
BASOPHILS NFR BLD AUTO: 0.1 %
BUN SERPL-MCNC: 30 MG/DL (ref 7–30)
CALCIUM SERPL-MCNC: 9.2 MG/DL (ref 8.5–10.1)
CHLORIDE SERPL-SCNC: 109 MMOL/L (ref 94–109)
CO2 SERPL-SCNC: 30 MMOL/L (ref 20–32)
CREAT SERPL-MCNC: 0.84 MG/DL (ref 0.52–1.04)
DIFFERENTIAL METHOD BLD: ABNORMAL
EOSINOPHIL # BLD AUTO: 0 10E9/L (ref 0–0.7)
EOSINOPHIL NFR BLD AUTO: 0.4 %
ERYTHROCYTE [DISTWIDTH] IN BLOOD BY AUTOMATED COUNT: 14.4 % (ref 10–15)
GFR SERPL CREATININE-BSD FRML MDRD: 65 ML/MIN/{1.73_M2}
GLUCOSE BLDC GLUCOMTR-MCNC: 75 MG/DL (ref 70–99)
GLUCOSE SERPL-MCNC: 78 MG/DL (ref 70–99)
HCT VFR BLD AUTO: 29.4 % (ref 35–47)
HGB BLD-MCNC: 8.8 G/DL (ref 11.7–15.7)
HGB BLD-MCNC: 9.2 G/DL (ref 11.7–15.7)
IMM GRANULOCYTES # BLD: 0.1 10E9/L (ref 0–0.4)
IMM GRANULOCYTES NFR BLD: 0.9 %
INR PPP: 1.28 (ref 0.86–1.14)
INTERPRETATION ECG - MUSE: NORMAL
LYMPHOCYTES # BLD AUTO: 0.5 10E9/L (ref 0.8–5.3)
LYMPHOCYTES NFR BLD AUTO: 4.7 %
MCH RBC QN AUTO: 27.6 PG (ref 26.5–33)
MCHC RBC AUTO-ENTMCNC: 31.3 G/DL (ref 31.5–36.5)
MCV RBC AUTO: 88 FL (ref 78–100)
MONOCYTES # BLD AUTO: 0.8 10E9/L (ref 0–1.3)
MONOCYTES NFR BLD AUTO: 8.1 %
NEUTROPHILS # BLD AUTO: 8.8 10E9/L (ref 1.6–8.3)
NEUTROPHILS NFR BLD AUTO: 85.8 %
NRBC # BLD AUTO: 0 10*3/UL
NRBC BLD AUTO-RTO: 0 /100
PHOSPHATE SERPL-MCNC: 2.8 MG/DL (ref 2.5–4.5)
PLATELET # BLD AUTO: 246 10E9/L (ref 150–450)
POTASSIUM SERPL-SCNC: 4.5 MMOL/L (ref 3.4–5.3)
RBC # BLD AUTO: 3.33 10E12/L (ref 3.8–5.2)
SODIUM SERPL-SCNC: 141 MMOL/L (ref 133–144)
WBC # BLD AUTO: 10.2 10E9/L (ref 4–11)

## 2021-05-15 PROCEDURE — 85025 COMPLETE CBC W/AUTO DIFF WBC: CPT | Performed by: INTERNAL MEDICINE

## 2021-05-15 PROCEDURE — 85610 PROTHROMBIN TIME: CPT | Performed by: INTERNAL MEDICINE

## 2021-05-15 PROCEDURE — 210N000002 HC R&B HEART CARE

## 2021-05-15 PROCEDURE — C9113 INJ PANTOPRAZOLE SODIUM, VIA: HCPCS | Performed by: STUDENT IN AN ORGANIZED HEALTH CARE EDUCATION/TRAINING PROGRAM

## 2021-05-15 PROCEDURE — 99291 CRITICAL CARE FIRST HOUR: CPT | Performed by: INTERNAL MEDICINE

## 2021-05-15 PROCEDURE — 71045 X-RAY EXAM CHEST 1 VIEW: CPT

## 2021-05-15 PROCEDURE — 85018 HEMOGLOBIN: CPT | Performed by: INTERNAL MEDICINE

## 2021-05-15 PROCEDURE — 80069 RENAL FUNCTION PANEL: CPT | Performed by: INTERNAL MEDICINE

## 2021-05-15 PROCEDURE — 36415 COLL VENOUS BLD VENIPUNCTURE: CPT | Performed by: INTERNAL MEDICINE

## 2021-05-15 PROCEDURE — 250N000011 HC RX IP 250 OP 636: Performed by: STUDENT IN AN ORGANIZED HEALTH CARE EDUCATION/TRAINING PROGRAM

## 2021-05-15 PROCEDURE — 250N000013 HC RX MED GY IP 250 OP 250 PS 637: Performed by: INTERNAL MEDICINE

## 2021-05-15 PROCEDURE — 999N001017 HC STATISTIC GLUCOSE BY METER IP

## 2021-05-15 RX ORDER — ACETAMINOPHEN 500 MG
1000 TABLET ORAL 4 TIMES DAILY
Status: DISCONTINUED | OUTPATIENT
Start: 2021-05-15 | End: 2021-05-17 | Stop reason: HOSPADM

## 2021-05-15 RX ORDER — LIDOCAINE 4 G/G
1 PATCH TOPICAL
Status: DISCONTINUED | OUTPATIENT
Start: 2021-05-15 | End: 2021-05-17 | Stop reason: HOSPADM

## 2021-05-15 RX ORDER — PANTOPRAZOLE SODIUM 40 MG/1
40 TABLET, DELAYED RELEASE ORAL
Status: DISCONTINUED | OUTPATIENT
Start: 2021-05-15 | End: 2021-05-17 | Stop reason: HOSPADM

## 2021-05-15 RX ADMIN — DILTIAZEM HYDROCHLORIDE 180 MG: 180 CAPSULE, COATED, EXTENDED RELEASE ORAL at 08:14

## 2021-05-15 RX ADMIN — ATENOLOL 50 MG: 50 TABLET ORAL at 20:27

## 2021-05-15 RX ADMIN — DILTIAZEM HYDROCHLORIDE 180 MG: 180 CAPSULE, COATED, EXTENDED RELEASE ORAL at 20:28

## 2021-05-15 RX ADMIN — PANTOPRAZOLE SODIUM 40 MG: 40 INJECTION, POWDER, FOR SOLUTION INTRAVENOUS at 08:14

## 2021-05-15 RX ADMIN — OXYCODONE HYDROCHLORIDE 5 MG: 5 TABLET ORAL at 20:27

## 2021-05-15 RX ADMIN — ATENOLOL 50 MG: 50 TABLET ORAL at 08:14

## 2021-05-15 RX ADMIN — ACETAMINOPHEN 1000 MG: 500 TABLET, FILM COATED ORAL at 15:22

## 2021-05-15 RX ADMIN — LIDOCAINE 1 PATCH: 560 PATCH PERCUTANEOUS; TOPICAL; TRANSDERMAL at 11:49

## 2021-05-15 RX ADMIN — OXYCODONE HYDROCHLORIDE 5 MG: 5 TABLET ORAL at 08:13

## 2021-05-15 RX ADMIN — PANTOPRAZOLE SODIUM 40 MG: 40 TABLET, DELAYED RELEASE ORAL at 17:39

## 2021-05-15 ASSESSMENT — ACTIVITIES OF DAILY LIVING (ADL)
ADLS_ACUITY_SCORE: 18
ADLS_ACUITY_SCORE: 17
ADLS_ACUITY_SCORE: 16
ADLS_ACUITY_SCORE: 18
ADLS_ACUITY_SCORE: 18
ADLS_ACUITY_SCORE: 17

## 2021-05-15 ASSESSMENT — MIFFLIN-ST. JEOR: SCORE: 1099

## 2021-05-15 NOTE — PROGRESS NOTES
3-7p    Neuro: Only alert to self and son. Pleasantly confused. Restless at times.   Pulm: LS diminished. On 2L via NC  CV: A.fib w/ RVR. dilt drip restarted   : Dupree patent with CBI. No clots observed  GI: On clears. No BM  Extremities: Generalized weakness. Back pain with movements     Lines: PIVx2  Family: Son updated at bedside  Plan: Continue to monitor and support.

## 2021-05-15 NOTE — PROGRESS NOTES
[facility]  Gastroenterology Progress Note     Khalida Rouse MRN# [id number]   YOB: 1940 Age: 81 year old          Assessment and Plan:   Khalida Rouse is a pleasant 81 year old female with melena, bright red rectal bleeding and other sources of blood loss with supratherapeutic INR. GI consulted for GI bleed on 5/13/21.     Active Problems:   GIB (gastrointestinal bleeding)  Acute blood loss anemia  Supratherapeutic INR    Patient has h/o Afib on warfarin with INR over 10 and reversed with Kcentra. Now at 1.16. Hemoglobin baseline 13 presented with hemoglobin of 8 received a unit of PRBC and improved 10.5 and dropped to 9.5. Hemoglobin now stable.  Patient has multiple sources of blood loss, likely has upper GI bleed, possible lower GI bleed, hematuria and vaginal bleeding.  5/14 EGD noted patchy gastritis with no specific lesion. Bleeding likely diffuse mucosal bleeding from supratherapeutic INR. NO plans for further workup from GI if hemoglobin stable    -- Daily hemoglobin  -- switch to oral pantoprazole 40 mg BID  -- Ok with GI to restart warfarin   -- Advance diet as tolerated- advance to full liquid  -- Ok with GI to transfer patient to medical floor             Interval History:   no new complaints, doing well, denies chest pain, denies shortness of breath, denies abdominal pain and doing well; no cp, sob, n/v/d, or abd pain.              Review of Systems:   C: NEGATIVE for fever, chills, change in weight  E/M: NEGATIVE for ear, mouth and throat problems  R: NEGATIVE for significant cough or SOB  CV: NEGATIVE for chest pain, palpitations or peripheral edema             Medications:   I have reviewed this patient's current medications    atenolol  50 mg Oral BID     diltiazem ER COATED BEADS  180 mg Oral BID     pantoprazole (PROTONIX) IV  40 mg Intravenous Q12H     sodium chloride (PF)  3 mL Intracatheter Q8H                  Physical Exam:   Vitals were reviewed  Vital  Signs with Ranges  Temp:  [98  F (36.7  C)-98.3  F (36.8  C)] 98.2  F (36.8  C)  Pulse:  [] 108  Resp:  [9-38] 23  BP: ()/(48-89) 115/61  SpO2:  [90 %-100 %] 95 %  I/O last 3 completed shifts:  In: 244.91 [P.O.:100; I.V.:144.91]  Out: 7420 [Urine:7420]  Constitutional: healthy, alert and no distress   Cardiovascular: negative, PMI normal. No lifts, heaves, or thrills. RRR. No murmurs, clicks gallops or rub  Respiratory: negative, Percussion normal. Good diaphragmatic excursion. Lungs clear  Psychiatric: mentation appears normal and affect normal/bright  Head: Normocephalic. No masses, lesions, tenderness or abnormalities  Abdomen: Abdomen soft, non-tender. BS normal. No masses, organomegaly           Data:   I reviewed the patient's new clinical lab test results.   Recent Labs   Lab Test 05/15/21  0616 05/15/21  0008 05/14/21  1831 05/14/21  0531 05/14/21  0531 05/13/21  2100 05/13/21  2100   WBC 10.2  --   --   --  14.6*  --  17.0*   HGB 9.2* 8.8* 9.2*   < > 9.1*   < > 10.5*   MCV 88  --   --   --  86  --  86     --   --   --  219  --  260   INR 1.28*  --   --   --  1.16*  --  1.10    < > = values in this interval not displayed.     Recent Labs   Lab Test 05/15/21  0616 05/14/21  0531 05/13/21  2100   POTASSIUM 4.5 4.2 3.9   CHLORIDE 109 106 103   CO2 30 28 27   BUN 30 30 35*   ANIONGAP 2* 4 6     Recent Labs   Lab Test 05/15/21  0616 05/13/21  2100 05/13/21  1108 04/13/21  0547 04/12/21  1353 04/12/21  1153 03/27/21  1430 11/11/20  2256 11/11/20  2256 10/04/20  1818 10/04/20  1818 09/27/14  1313 09/27/14  1313   ALBUMIN 2.2* 2.6* 2.6* 3.2*  --  3.4  --    < >  --   --  3.9   < > 4.0   BILITOTAL  --  1.9* 0.8 1.0  --  0.7  --    < >  --   --  0.6   < > 0.6   ALT  --  22 21 22  --  21  --    < >  --   --  27   < > 52*   AST  --  28 27 17  --  18  --    < >  --   --  30   < > 44   PROTEIN  --   --   --   --  Negative  --  Negative  --  Negative   < >  --    < >  --    LIPASE  --   --   --   --   --   98  --   --   --   --  99  --  119    < > = values in this interval not displayed.       I reviewed the patient's new imaging results.    All laboratory data reviewed  All imaging studies reviewed by me.    Karol Bess PA-C,  5/15/2021  [facility]  Office : [phone]  Cell: [phone] (Dr. Gould)  Cell: [phone] (Karol Bess PA-C)

## 2021-05-15 NOTE — PLAN OF CARE
END OF SHIFT NOTE    Neuro: Oriented to self, pleasantly confused. Follows commands. JO. Left eye artificial/blind. Right eye JESSIKA.    Cardiac: Afib with CVR. BP occasionally soft overnight when deep sleep. MAP goal >65 maintained. Dilt gtt weaned off.    Resp: Lung sounds clear. 2L NC while sleeping post procedure. Can probably wean off during day.    GI: No BM overnight. Abdomen rounded, soft, non-tender, +BT.    : CBI maintained at moderate flow. Output via jiménez catheter much more clear with little brick dust sediment this morning.     Skin: Pale, intact    Pain: c/o back pain when repositioned. Otherwise, patient denies pain.    Problem: Adult Inpatient Plan of Care  Goal: Plan of Care Review  Outcome: Improving  Flowsheets (Taken 5/15/2021 0548)  Plan of Care Reviewed With:   patient   daughter   son  Goal: Patient-Specific Goal (Individualized)  Outcome: Improving  Goal: Absence of Hospital-Acquired Illness or Injury  Outcome: Improving  Intervention: Identify and Manage Fall Risk  Recent Flowsheet Documentation  Taken 5/15/2021 0400 by Mavis Ruffin RN  Safety Promotion/Fall Prevention:   bed alarm on   activity supervised   clutter free environment maintained   fall prevention program maintained   sitter at bedside   safety round/check completed  Taken 5/15/2021 0000 by Mavis Ruffin RN  Safety Promotion/Fall Prevention:   bed alarm on   activity supervised   clutter free environment maintained   fall prevention program maintained   sitter at bedside   safety round/check completed  Taken 5/14/2021 2000 by Mavis Ruffin RN  Safety Promotion/Fall Prevention:   bed alarm on   activity supervised   clutter free environment maintained   fall prevention program maintained   sitter at bedside   safety round/check completed  Intervention: Prevent Skin Injury  Recent Flowsheet Documentation  Taken 5/15/2021 0400 by Mavis Ruffin RN  Body Position: weight shifting  Taken 5/15/2021 0000  by Mavis Ruffin RN  Body Position: weight shifting  Taken 5/14/2021 2200 by Mavis Ruffin RN  Body Position: weight shifting  Taken 5/14/2021 2000 by Mavis Ruffin RN  Body Position: weight shifting  Intervention: Prevent and Manage VTE (Venous Thromboembolism) Risk  Recent Flowsheet Documentation  Taken 5/15/2021 0400 by Mavis Ruffin RN  VTE Prevention/Management: pneumatic compression device  Taken 5/15/2021 0000 by Mavis Ruffin RN  VTE Prevention/Management: pneumatic compression device  Taken 5/14/2021 2000 by Mavis Ruffin RN  VTE Prevention/Management: pneumatic compression device  Goal: Optimal Comfort and Wellbeing  Outcome: Improving  Goal: Readiness for Transition of Care  Outcome: Improving     Problem: Adjustment to Illness (Delirium)  Goal: Optimal Coping  Outcome: Improving     Problem: Altered Behavior (Delirium)  Goal: Improved Behavioral Control  Outcome: Improving     Problem: Attention and Thought Clarity Impairment (Delirium)  Goal: Improved Attention and Thought Clarity  Outcome: Improving     Problem: Sleep Disturbance (Delirium)  Goal: Improved Sleep  Outcome: Improving     Problem: OT General Care Plan  Goal: Toilet Transfer/Toileting (OT)  Description: Toilet Transfer/Toileting (OT)  Outcome: Improving     Problem: Adjustment to Illness (Gastrointestinal Bleeding)  Goal: Optimal Coping with Acute Illness  Outcome: Improving     Problem: Bleeding (Gastrointestinal Bleeding)  Goal: Hemostasis  Outcome: Improving

## 2021-05-15 NOTE — PLAN OF CARE
Alert to self and place.disorientated to situation and time.  Forgetful.Left eye artificial/blind. Right eye JESSIKA. On 2 L NC RA 88-89%. Denies SOB or Chest pain. Afib CVR. Up in chair. Some low back pain, Tylenol scheduled and lido patch. CBI on slow drip, urine normal to pink will continue to wean.  Up with 2. Tolerated Full liquids for lunch, regular diet for dinner.   Update: 1830 CBI clamped.

## 2021-05-16 LAB
BASOPHILS # BLD AUTO: 0 10E9/L (ref 0–0.2)
BASOPHILS NFR BLD AUTO: 0.2 %
DIFFERENTIAL METHOD BLD: ABNORMAL
EOSINOPHIL # BLD AUTO: 0.1 10E9/L (ref 0–0.7)
EOSINOPHIL NFR BLD AUTO: 0.4 %
ERYTHROCYTE [DISTWIDTH] IN BLOOD BY AUTOMATED COUNT: 14.1 % (ref 10–15)
HCT VFR BLD AUTO: 27.8 % (ref 35–47)
HGB BLD-MCNC: 8.9 G/DL (ref 11.7–15.7)
IMM GRANULOCYTES # BLD: 0.1 10E9/L (ref 0–0.4)
IMM GRANULOCYTES NFR BLD: 0.7 %
LYMPHOCYTES # BLD AUTO: 0.4 10E9/L (ref 0.8–5.3)
LYMPHOCYTES NFR BLD AUTO: 3 %
MCH RBC QN AUTO: 28 PG (ref 26.5–33)
MCHC RBC AUTO-ENTMCNC: 32 G/DL (ref 31.5–36.5)
MCV RBC AUTO: 87 FL (ref 78–100)
MONOCYTES # BLD AUTO: 1.3 10E9/L (ref 0–1.3)
MONOCYTES NFR BLD AUTO: 9.1 %
NEUTROPHILS # BLD AUTO: 12 10E9/L (ref 1.6–8.3)
NEUTROPHILS NFR BLD AUTO: 86.6 %
NRBC # BLD AUTO: 0 10*3/UL
NRBC BLD AUTO-RTO: 0 /100
PLATELET # BLD AUTO: 239 10E9/L (ref 150–450)
RBC # BLD AUTO: 3.18 10E12/L (ref 3.8–5.2)
WBC # BLD AUTO: 13.8 10E9/L (ref 4–11)

## 2021-05-16 PROCEDURE — 250N000011 HC RX IP 250 OP 636: Performed by: INTERNAL MEDICINE

## 2021-05-16 PROCEDURE — 36415 COLL VENOUS BLD VENIPUNCTURE: CPT | Performed by: INTERNAL MEDICINE

## 2021-05-16 PROCEDURE — 99233 SBSQ HOSP IP/OBS HIGH 50: CPT | Performed by: INTERNAL MEDICINE

## 2021-05-16 PROCEDURE — 250N000013 HC RX MED GY IP 250 OP 250 PS 637: Performed by: INTERNAL MEDICINE

## 2021-05-16 PROCEDURE — 85025 COMPLETE CBC W/AUTO DIFF WBC: CPT | Performed by: INTERNAL MEDICINE

## 2021-05-16 PROCEDURE — 210N000002 HC R&B HEART CARE

## 2021-05-16 RX ORDER — FUROSEMIDE 10 MG/ML
20 INJECTION INTRAMUSCULAR; INTRAVENOUS ONCE
Status: COMPLETED | OUTPATIENT
Start: 2021-05-16 | End: 2021-05-16

## 2021-05-16 RX ADMIN — DILTIAZEM HYDROCHLORIDE 180 MG: 180 CAPSULE, COATED, EXTENDED RELEASE ORAL at 21:16

## 2021-05-16 RX ADMIN — ACETAMINOPHEN 1000 MG: 500 TABLET, FILM COATED ORAL at 21:16

## 2021-05-16 RX ADMIN — ATENOLOL 50 MG: 50 TABLET ORAL at 08:59

## 2021-05-16 RX ADMIN — ACETAMINOPHEN 1000 MG: 500 TABLET, FILM COATED ORAL at 14:25

## 2021-05-16 RX ADMIN — DILTIAZEM HYDROCHLORIDE 180 MG: 180 CAPSULE, COATED, EXTENDED RELEASE ORAL at 08:59

## 2021-05-16 RX ADMIN — PANTOPRAZOLE SODIUM 40 MG: 40 TABLET, DELAYED RELEASE ORAL at 08:59

## 2021-05-16 RX ADMIN — ACETAMINOPHEN 1000 MG: 500 TABLET, FILM COATED ORAL at 08:58

## 2021-05-16 RX ADMIN — ACETAMINOPHEN 1000 MG: 500 TABLET, FILM COATED ORAL at 18:03

## 2021-05-16 RX ADMIN — FUROSEMIDE 20 MG: 10 INJECTION, SOLUTION INTRAVENOUS at 14:26

## 2021-05-16 RX ADMIN — PANTOPRAZOLE SODIUM 40 MG: 40 TABLET, DELAYED RELEASE ORAL at 18:03

## 2021-05-16 RX ADMIN — LIDOCAINE 1 PATCH: 560 PATCH PERCUTANEOUS; TOPICAL; TRANSDERMAL at 09:02

## 2021-05-16 RX ADMIN — OXYCODONE HYDROCHLORIDE 5 MG: 5 TABLET ORAL at 01:28

## 2021-05-16 ASSESSMENT — ACTIVITIES OF DAILY LIVING (ADL)
ADLS_ACUITY_SCORE: 16
ADLS_ACUITY_SCORE: 17
ADLS_ACUITY_SCORE: 16
ADLS_ACUITY_SCORE: 16

## 2021-05-16 ASSESSMENT — MIFFLIN-ST. JEOR: SCORE: 1077.77

## 2021-05-16 NOTE — PLAN OF CARE
"Pt requesting to use the restroom to urinate, reports bladder feels \"full\". Dupree irrigated with 60ml x2, 3 pea sized clots returned, now clear urine return. Pt tolerated well, denies feeling the urge to void.  "

## 2021-05-16 NOTE — CONSULTS
Care Management Initial Consult    General Information  Assessment completed with: Patient, Children, (son Wm)  Type of CM/SW Visit: Initial Assessment    Primary Care Provider verified and updated as needed:     Readmission within the last 30 days:        Reason for Consult: discharge planning  Advance Care Planning: Advance Care Planning Reviewed: (no ACP documents)          Communication Assessment  Patient's communication style: spoken language (English or Bilingual)    Hearing Difficulty or Deaf: no   Wear Glasses or Blind: yes    Cognitive  Cognitive/Neuro/Behavioral: .WDL except  Level of Consciousness: alert  Arousal Level: opens eyes spontaneously  Orientation: disoriented to, time, situation  Mood/Behavior: calm  Best Language: 0 - No aphasia  Speech: clear    Living Environment:   People in home: alone     Current living Arrangements: apartment      Able to return to prior arrangements: no       Family/Social Support:  Care provided by: self  Provides care for: no one  Marital Status:   Children          Description of Support System: Supportive, Involved    Support Assessment: Adequate family and caregiver support, Adequate social supports    Current Resources:   Patient receiving home care services: No     Community Resources: None  Equipment currently used at home: none  Supplies currently used at home: None    Employment/Financial:  Employment Status: retired        Financial Concerns: No concerns identified           Lifestyle & Psychosocial Needs:        Socioeconomic History     Marital status:      Spouse name: Not on file     Number of children: Not on file     Years of education: Not on file     Highest education level: Not on file     Tobacco Use     Smoking status: Never Smoker     Smokeless tobacco: Never Used   Substance and Sexual Activity     Alcohol use: No     Alcohol/week: 0.0 standard drinks     Drug use: No     Sexual activity: Not Currently     Partners: Male        Functional Status:  Prior to admission patient needed assistance:              Mental Health Status:          Chemical Dependency Status:                Values/Beliefs:  Spiritual, Cultural Beliefs, Amish Practices, Values that affect care: no               Additional Information:  Per care management/social work consult for discharge planning and TCU placement on discharge.  Patient was admitted on 5-13-21 with a GI bleed.  The tentative date of discharge is 5-17-21 or 5-18-21.  Reviewed chart and spoke with patient and son Wm regarding discharge plans.  Per patient and son report, patient lives alone in an apartment with 18 steps to enter.  Patient uses a straight cane and a rolling walker at baseline, depending on the distance, although she has been using the rolling walker lately.  Patient's son states that family checks on her almost daily.  Reviewed the therapy discharge recommendations of TCU placement on discharge and patient's son is in agreement.  He is asking for referrals to be sent to [facility] in [place], [facility] in [place], and [facility].  Referrals sent, via discharge on the double, to check bed availability.  Discussed transportation options and explained that it is private pay.  Patient's son is considering his options.    Will continue to follow.      BILLIE Mcclendon, [facility]    [phone]  [facility]

## 2021-05-16 NOTE — PROGRESS NOTES
Nursing 8031-0799: No change in status. No acute changes. Up in chair until bedtime. PRN PO oxy x 1 for back pain. Lidocaine patch in place as well. Disoriented x 3. Reoriented as needed. Titrated oxygen down to 1L. SPO2 around 94% on 1L. CBI running. No significant clots needing hand irrigations. Tele: A-fib CVR. AVSS.

## 2021-05-16 NOTE — PROGRESS NOTES
[facility]  Gastroenterology Progress Note     Khalida Rouse MRN# [id number]   YOB: 1940 Age: 81 year old          Assessment and Plan:     Khalida Rouse is a pleasant 81 year old female with melena, bright red rectal bleeding and other sources of blood loss with supratherapeutic INR. GI consulted for GI bleed on 5/13/21.     Active Problems:   GIB (gastrointestinal bleeding)  Acute blood loss anemia  Supratherapeutic INR    Patient has h/o Afib on warfarin with INR over 10 and reversed with Kcentra. Now at 1.16. Hemoglobin stable.  Patient has multiple sources of blood loss, likely has upper GI bleed, possible lower GI bleed, hematuria and vaginal bleeding.  5/14 EGD noted patchy gastritis with no specific lesion. Bleeding likely diffuse mucosal bleeding from supratherapeutic INR. NO plans for further workup from GI if hemoglobin stable     -- Daily hemoglobin  -- oral pantoprazole 40 mg BID  -- Ok with GI to restart warfarin   -- tolerating diet  -- GI will sign off and recommend follow-up in 1-2 weeks       Data Unavailable      Interval History:   no new complaints, doing well, denies chest pain, denies shortness of breath, denies abdominal pain and doing well; no cp, sob, n/v/d, or abd pain.              Review of Systems:   C: NEGATIVE for fever, chills, change in weight  E/M: NEGATIVE for ear, mouth and throat problems  R: NEGATIVE for significant cough or SOB  CV: NEGATIVE for chest pain, palpitations or peripheral edema             Medications:   I have reviewed this patient's current medications    acetaminophen  1,000 mg Oral 4x Daily     atenolol  50 mg Oral BID     diltiazem ER COATED BEADS  180 mg Oral BID     lidocaine  1 patch Transdermal Q24H     lidocaine   Transdermal Q8H     pantoprazole  40 mg Oral BID AC     sodium chloride (PF)  3 mL Intracatheter Q8H                  Physical Exam:   Vitals were reviewed  Vital Signs with Ranges  Temp:  [97.8  F (36.6   C)-98.3  F (36.8  C)] 98  F (36.7  C)  Pulse:  [] 75  Resp:  [11-35] 16  BP: ()/(60-80) 114/70  SpO2:  [85 %-99 %] 94 %  I/O last 3 completed shifts:  In: 1080 [P.O.:1080]  Out: -2550   Constitutional: healthy, alert and no distress   Cardiovascular: negative, PMI normal. No lifts, heaves, or thrills. RRR. No murmurs, clicks gallops or rub  Respiratory: negative, Percussion normal. Good diaphragmatic excursion. Lungs clear  Abdomen: Abdomen soft, non-tender. BS normal. No masses, organomegaly             Data:   I reviewed the patient's new clinical lab test results.   Recent Labs   Lab Test 05/16/21  0623 05/15/21  0616 05/15/21  0008 05/14/21  0531 05/14/21 0531 05/13/21 2100 05/13/21 2100   WBC 13.8* 10.2  --   --  14.6*  --  17.0*   HGB 8.9* 9.2* 8.8*   < > 9.1*   < > 10.5*   MCV 87 88  --   --  86  --  86    246  --   --  219  --  260   INR  --  1.28*  --   --  1.16*  --  1.10    < > = values in this interval not displayed.     Recent Labs   Lab Test 05/15/21  0616 05/14/21  0531 05/13/21 2100   POTASSIUM 4.5 4.2 3.9   CHLORIDE 109 106 103   CO2 30 28 27   BUN 30 30 35*   ANIONGAP 2* 4 6     Recent Labs   Lab Test 05/15/21  0616 05/13/21  2100 05/13/21  1108 04/13/21  0547 04/12/21  1353 04/12/21  1153 03/27/21  1430 11/11/20  2256 11/11/20  2256 10/04/20  1818 10/04/20  1818 09/27/14  1313 09/27/14  1313   ALBUMIN 2.2* 2.6* 2.6* 3.2*  --  3.4  --    < >  --   --  3.9   < > 4.0   BILITOTAL  --  1.9* 0.8 1.0  --  0.7  --    < >  --   --  0.6   < > 0.6   ALT  --  22 21 22  --  21  --    < >  --   --  27   < > 52*   AST  --  28 27 17  --  18  --    < >  --   --  30   < > 44   PROTEIN  --   --   --   --  Negative  --  Negative  --  Negative   < >  --    < >  --    LIPASE  --   --   --   --   --  98  --   --   --   --  99  --  119    < > = values in this interval not displayed.       I reviewed the patient's new imaging results.    All laboratory data reviewed  All imaging studies reviewed by  me.    Karol Bess PA-C,  5/16/2021  [facility]  Office : [phone]  Cell: [phone] (Dr. Gould)  Cell: [phone] (Karol Bess PA-C)

## 2021-05-16 NOTE — PROGRESS NOTES
[facility]    Hospitalist Progress Note    Brief Summary:  This is a 81-year-old female with history of atrial fibrillation/flutter, status post aortic valve replacement with tissue valve, dementia, on chronic anticoagulation with Coumadin, history of recent compression fracture, initially presented to the ER at [facility] with complaint of weakness fatigue lightheadedness.,  Melena and bright red blood per rectum and hematuria.  She was found to have INR more than 10 and hemoglobin of 8.3 down from her baseline of 14.  She was given vitamin K, Kcentra and transfused 2 units of packed RBCs and transferred to [facility] for further management.    Assessment & Plan        Acute blood loss anemia, severe  Her baseline hemoglobin is 14, she dropped down to 8.3.  With active bleeding per rectally, melena as well as hematochezia, hematuria  On admission.   This is  secondary to supratherapeutic INR.  Hemoglobin has been stable around 9.2 in last 24 hrs. .  No further GI bleeding, her hematuria has been improving as well. Anemia stable. Hemodynamically stable. No active bleeding check Hb as needed or daily basis now.     Supratherapeutic INR, resolved  - received IV vit K and Kcentra at [facility]  -INR 1.28 on 5/15. Continue to hold coumadin and discontinue on discharge   Discussed with the patient pros and cons of coumadin after all consideration he decided to discontinue the coumadin at this time      Acute GI bleed  Reports several days of melanic, tarry stools. Hematochezia for last 2-3 days by patient report.  No sign of active bleeding at this time.  Gastroenterologist is consulted and following  EGD done shows no active bleeding or source, had patchy moderate erythematous mucosa at gastric antrum.   Esophagus and Duodenum was normal. Most likely diffuse mucosal bleeding because of supra theapeutic INR now resolved. Continue Protonix 40 mg bid orally        Gross  Hematuria  Severe urinary retention, resolved  Status post three-way Jiménez catheter placement.    She was started on CBI on admission, urine is now mostly clear.   CBI was discontinue yesterday AM but gross hematuria reoccur, she is on CBI and return is mostly clear.  Appreciate input from the Urology, plan for CT urogram and cysto in 3-4 weeks as out patient.   CBI is mostly clear, off and on small clot, may be secondary to catheter now.  I will remove the jiménez catheter now and see how she does.        Atrial fibrillation with RVR: resolved.   Chronic anticoagulation with Coumadin  Patient has chronic A. fib, she is on Cardizem as well as atenolol at home.  She was on IV Cardizem drip at now stopped.   Continue oral Atenolol 50 mg bid  and Cardizem 180 daily.   Heart rate is now well control  On oral medications.   Patient has dementia, she lives by herself, history of compression fracture, and now with GI bleeding and hematuria.  She is high risk for anticoagulation.  Discussed with the patient's son at the bedside who agree and would like to stop the Coumadin at this time.       Severe Pulmonary Hypertension  Right sided pressures severely elevated on most recent TTE.  - monitor     S/p tissue AVR  -Stable at this time.     Recent appendicitis managed with antibiotics  No surgery offered due to underlying health conditions. Completed course of Augmentin     Possible vaginal bleeding  - after resolution of bleeding if vaginal bleeding continues, can have OBGYN consult  - monitor for now     Moderate Cognitive impairment  Delirium   - delirium precautions    Compression fracture/Back pain   As per history, patient's son that patient has compression fracture lower back.  Complains of some pain lower back, started on oxycodone 5 mg every 4 hours as needed  And apply lidocaine patches to her back, continue IV fentanyl for severe pain.  schedule tylenol 1000 mg every 6 hrs for better pain control now.   Now pain is  much better control.     Acute Hypoxic Respiratory failure  This is multifactorial but most likely related to atelectasis and some element of  Fluid overload as well.  Encourage her to use IS and give one dose of IV lasix 20 mg today       Overall remain stable now.   PT and OT recommend TCU placement, SW consulted awaiting placement.   Remove jiménez catheter now.   Encourage her to use IS to wean her oxygen off  I will give one dose of IV lasix 20 mg X 1 now today       Discussed with patient, patient son and the nursing staff taking care of the patient today         Jiménez Catheter: in place, indication:    DVT Prophylaxis: Pneumatic Compression Devices  Code Status: No CPR- Do NOT Intubate    Disposition: Expected discharge in 1 to 2 days once stable.      Discussed with patient, patient's son at bedside and the nursing staff to include the patient.    Total time spent today is 30 minutes of critical care time which include chart review history taking physical examination formulation of the plan counseling and coordination of care    Gavin Marrero MD  Text Page  (7am - 6pm)    Interval History   Patient is doing much better today, awake and alert and sitting in chair, her pain is much better control and denies any pain this morning, no fever, chills, chest pain, headache or dizziness. Her urine return mostly clear with intermittent tinge of pink. No need for manual irrigation overnight.       No other significant event overnight.    -Data reviewed today: I reviewed all new labs and imaging results over the last 24 hours. I personally reviewed no images or EKG's today.    Physical Exam   Temp: 98  F (36.7  C) Temp src: Oral BP: 114/70 Pulse: 75   Resp: 16 SpO2: 94 % O2 Device: Nasal cannula Oxygen Delivery: 2 LPM  Vitals:    05/14/21 0600 05/15/21 0630 05/16/21 0500   Weight: 65.5 kg (144 lb 6.4 oz) 65.4 kg (144 lb 2.9 oz) 63.3 kg (139 lb 8 oz)     Vital Signs with Ranges  Temp:  [97.8  F (36.6  C)-98.3  F (36.8   C)] 98  F (36.7  C)  Pulse:  [] 75  Resp:  [15-35] 16  BP: ()/(68-71) 114/70  SpO2:  [85 %-98 %] 94 %  I/O last 3 completed shifts:  In: 1080 [P.O.:1080]  Out: -2550     Constitutional: fatigued  Eyes: Lids and lashes normal, pupils equal, round and reactive to light, extra ocular muscles intact, sclera clear, conjunctiva normal  Respiratory: No increased work of breathing, good air exchange, has bilateral coarse crackles today.   Cardiovascular: irregularly irregular rhythm and variable S1 and S2, no murmur  GI: No scars, normal bowel sounds, soft, non-distended, non-tender, no masses palpated, no hepatosplenomegally  Skin: no bruising or bleeding  Musculoskeletal: no lower extremity pitting edema present  Neurologic: no focal deficit.     Medications     - MEDICATION INSTRUCTIONS -       - MEDICATION INSTRUCTIONS -         acetaminophen  1,000 mg Oral 4x Daily     atenolol  50 mg Oral BID     diltiazem ER COATED BEADS  180 mg Oral BID     furosemide  20 mg Intravenous Once     lidocaine  1 patch Transdermal Q24H     lidocaine   Transdermal Q8H     pantoprazole  40 mg Oral BID AC     sodium chloride (PF)  3 mL Intracatheter Q8H       Data   Recent Labs   Lab 05/16/21  0623 05/15/21  0616 05/15/21  0008 05/14/21  0531 05/14/21  0531 05/13/21  2100 05/13/21  2100 05/13/21  1108 05/13/21  1108   WBC 13.8* 10.2  --   --  14.6*  --  17.0*  --  15.7*   HGB 8.9* 9.2* 8.8*   < > 9.1*   < > 10.5*   < > 8.1*   MCV 87 88  --   --  86  --  86  --  87    246  --   --  219  --  260  --  246   INR  --  1.28*  --   --  1.16*  --  1.10   < > >10.00*   NA  --  141  --   --  138  --  136  --  134   POTASSIUM  --  4.5  --   --  4.2  --  3.9  --  4.3   CHLORIDE  --  109  --   --  106  --  103  --  102   CO2  --  30  --   --  28  --  27  --  27   BUN  --  30  --   --  30  --  35*  --  40*   CR  --  0.84  --   --  0.92  --  1.05*  --  1.22*   ANIONGAP  --  2*  --   --  4  --  6  --  5   JOSEFINA  --  9.2  --   --  8.4*  --   8.8  --  9.0   GLC  --  78  --   --  106*  --  129*  --  125*   ALBUMIN  --  2.2*  --   --   --   --  2.6*  --  2.6*   PROTTOTAL  --   --   --   --   --   --  6.7*  --  6.5*   BILITOTAL  --   --   --   --   --   --  1.9*  --  0.8   ALKPHOS  --   --   --   --   --   --  89  --  83   ALT  --   --   --   --   --   --  22  --  21   AST  --   --   --   --   --   --  28  --  27    < > = values in this interval not displayed.       No results found for this or any previous visit (from the past 24 hour(s)).

## 2021-05-16 NOTE — PLAN OF CARE
Neuro: Only alert to self and son. Pleasantly confused. Restless at times.   Pulm: LS diminished. On 2L via NC  CV: A.fib w/ RVR. dilt drip restarted   GI/: Regular diet.Dupree patent with CBI output appeared pink without bloody clots, CBI stopper at 0300. No clots observed. No BM this shift.  Extremities: Generalized weakness. Back pain with movements     Lines: PIVx2  Pain: Lidocaine patch to the back and PRN Oxycodone.  Plan: Continue to monitor and support. PT and OT to start. Wean off CBI, continue manual irrigation if needed. CT urogram and cysto in 3-4 weeks as out patient.

## 2021-05-16 NOTE — PLAN OF CARE
Alert to self and place.disorientated to situation and time.  Forgetful.Left eye artificial/blind. Right eye JESSIKA. On 2 L NC RA 88-89%.Doing IS every few hours, pt can tolerated 4-5 reps at 1000. Denies SOB or Chest pain. Afib CVR/Slow HR 52-80 this evening. Up with one, in chair. Some low back pain, Tylenol scheduled and lido patch. CBI / Dupree removed 1330 per MD. Due to void,scan is         .Tolerated regular diet. No N/V, stomach rounded, last BM? None charted. Will ask for bowel regimen ordered. Monitoring hgb, recheck in am.

## 2021-05-17 ENCOUNTER — TELEPHONE (OUTPATIENT)
Dept: ANTICOAGULATION | Facility: CLINIC | Age: 81
End: 2021-05-17

## 2021-05-17 ENCOUNTER — APPOINTMENT (OUTPATIENT)
Dept: OCCUPATIONAL THERAPY | Facility: CLINIC | Age: 81
DRG: 813 | End: 2021-05-17
Attending: HOSPITALIST
Payer: MEDICARE

## 2021-05-17 ENCOUNTER — APPOINTMENT (OUTPATIENT)
Dept: PHYSICAL THERAPY | Facility: CLINIC | Age: 81
DRG: 813 | End: 2021-05-17
Attending: HOSPITALIST
Payer: MEDICARE

## 2021-05-17 VITALS
HEIGHT: 64 IN | SYSTOLIC BLOOD PRESSURE: 109 MMHG | OXYGEN SATURATION: 91 % | BODY MASS INDEX: 23.75 KG/M2 | HEART RATE: 75 BPM | WEIGHT: 139.1 LBS | RESPIRATION RATE: 18 BRPM | TEMPERATURE: 98.5 F | DIASTOLIC BLOOD PRESSURE: 68 MMHG

## 2021-05-17 LAB
HGB BLD-MCNC: 8.4 G/DL (ref 11.7–15.7)
INR PPP: 1.54 (ref 0.86–1.14)

## 2021-05-17 PROCEDURE — 99239 HOSP IP/OBS DSCHRG MGMT >30: CPT | Performed by: INTERNAL MEDICINE

## 2021-05-17 PROCEDURE — 250N000013 HC RX MED GY IP 250 OP 250 PS 637: Performed by: INTERNAL MEDICINE

## 2021-05-17 PROCEDURE — 85610 PROTHROMBIN TIME: CPT | Performed by: INTERNAL MEDICINE

## 2021-05-17 PROCEDURE — 97110 THERAPEUTIC EXERCISES: CPT | Mod: GP

## 2021-05-17 PROCEDURE — 97116 GAIT TRAINING THERAPY: CPT | Mod: GP

## 2021-05-17 PROCEDURE — 36415 COLL VENOUS BLD VENIPUNCTURE: CPT | Performed by: INTERNAL MEDICINE

## 2021-05-17 PROCEDURE — 97535 SELF CARE MNGMENT TRAINING: CPT | Mod: GO | Performed by: OCCUPATIONAL THERAPIST

## 2021-05-17 PROCEDURE — 85018 HEMOGLOBIN: CPT | Performed by: INTERNAL MEDICINE

## 2021-05-17 RX ORDER — PANTOPRAZOLE SODIUM 40 MG/1
40 TABLET, DELAYED RELEASE ORAL
DISCHARGE
Start: 2021-05-17 | End: 2021-09-10

## 2021-05-17 RX ORDER — OXYCODONE HYDROCHLORIDE 5 MG/1
5 TABLET ORAL EVERY 6 HOURS PRN
Qty: 12 TABLET | Refills: 0 | Status: SHIPPED | OUTPATIENT
Start: 2021-05-17 | End: 2021-05-26

## 2021-05-17 RX ADMIN — LIDOCAINE 1 PATCH: 560 PATCH PERCUTANEOUS; TOPICAL; TRANSDERMAL at 08:38

## 2021-05-17 RX ADMIN — ATENOLOL 50 MG: 50 TABLET ORAL at 08:38

## 2021-05-17 RX ADMIN — PANTOPRAZOLE SODIUM 40 MG: 40 TABLET, DELAYED RELEASE ORAL at 08:38

## 2021-05-17 RX ADMIN — OXYCODONE HYDROCHLORIDE 5 MG: 5 TABLET ORAL at 03:10

## 2021-05-17 RX ADMIN — ACETAMINOPHEN 1000 MG: 500 TABLET, FILM COATED ORAL at 08:38

## 2021-05-17 RX ADMIN — DILTIAZEM HYDROCHLORIDE 180 MG: 180 CAPSULE, COATED, EXTENDED RELEASE ORAL at 08:38

## 2021-05-17 ASSESSMENT — ACTIVITIES OF DAILY LIVING (ADL)
ADLS_ACUITY_SCORE: 15
ADLS_ACUITY_SCORE: 15
ADLS_ACUITY_SCORE: 14
ADLS_ACUITY_SCORE: 15

## 2021-05-17 ASSESSMENT — MIFFLIN-ST. JEOR: SCORE: 1075.95

## 2021-05-17 NOTE — PROGRESS NOTES
"SPIRITUAL HEALTH SERVICES  SPIRITUAL ASSESSMENT Progress Note  [facility]     REFERRAL SOURCE: Length of Stay    Khalida shared that she was \"born and raised\" in the [place], [place] area. She has the support of her family (3 children and grandchildren). Khalida shared that she's \"doing as well as can be expected\" and appreciated the visit.    I offered spiritual and emotional support through reflective listening that affirmed emotions, experience, and meaning.     PLAN: [facility] remains available for support.    Argelia Martinez  Associate   Pager [phone]  Phone [phone]    "

## 2021-05-17 NOTE — PROGRESS NOTES
Care Management Discharge Note    Discharge Date: 05/17/21(TCU)       Discharge Disposition: Transitional Care    Discharge Services:  therapy    Discharge DME:  N/A    Discharge Transportation: family or friend will provide    Private pay costs discussed: Not applicable    PAS Confirmation Code:  N/A  Patient/family educated on Medicare website which has current facility and service quality ratings: no    Education Provided on the Discharge Plan:  yes  Persons Notified of Discharge Plans: Son Wm ad Daughter Leah  Patient/Family in Agreement with the Plan: yes    Handoff Referral Completed: Yes    Additional Information:  Received a call from [facility].  They have a bed available for today.  Patient will need portable oxygen for transport.  Call placed to patient's son Wm to update him as to the bed availability and to the need for oxygen.  Patient's son is in agreement with the discharge.  Explained that she will need portable oxygen for transport and there is a $75 fee for this.  Patient's son is in agreement.  Patient's son states that his sister will transport around 13:30 today.  Call placed to  [facility] to order a portable oxygen tank for transport to be delivered by 13:00 today.  Faxed the orders to  Res[facility].  Call placed to [facility] to update them as to the transport time and faxed the orders and the PAS.      PAS-RR    D: Per DHS regulation, VIJAY completed and submitted PAS-RR to [place] [employer] Direct Connect via the [employer] LinkAge Line.  PAS-RR confirmation # is : [id number].    I: VIJAY spoke with patient, son, and daughter and they are aware a PAS-RR has been submitted.  VIJAY reviewed with patient, son, and daughter that they may be contacted for a follow up appointment within 10 days of hospital discharge if their SNF stay is < 30 days.  Contact information for [facility] LinkAge Line was also provided.    A: Patient, son, and daughter  verbalized understanding.    P:  Further questions may be directed to Senior LinkAge Line at #[phone], option #4 for [facility]- staff.        BILLIE Mcclendon, [facility]    [phone]  [facility]

## 2021-05-17 NOTE — DISCHARGE SUMMARY
[facility]    Discharge Summary  Hospitalist    Date of Admission:  5/13/2021  Date of Discharge:  5/17/2021  1:44 PM  Discharging Provider: Gavin Marrero MD  Date of Service (when I saw the patient): 05/17/21    Discharge Diagnoses   Please review below     History of Present Illness   Khalida Rouse is an 81 year old female who presented with weakness, fatigued, lightheaded and melena.       Hospital Course      This is a 81-year-old female with history of atrial fibrillation/flutter, status post aortic valve replacement with tissue valve, dementia, on chronic anticoagulation with Coumadin, history of recent compression fracture, initially presented to the ER at [facility] with complaint of weakness fatigue lightheadedness.,  Melena and bright red blood per rectum and hematuria.  She was found to have INR more than 10 and hemoglobin of 8.3 down from her baseline of 14.  She was given vitamin K, Kcentra and transfused 2 units of packed RBCs and transferred to [facility] for further management.        Final Discharge Diagnosis and Hospital Course.         Acute blood loss anemia, severe  Her baseline hemoglobin is 14, she dropped down to 8.3.  With active bleeding per rectally, melena as well as hematochezia, hematuria  On admission.   This is  secondary to supratherapeutic INR.  Hemoglobin has been stable around 8.4 n last 24 hrs. .  No further GI bleeding, her hematuria has been improving as well. Anemia stable. Hemodynamically stable.        Supratherapeutic INR, resolved  - received IV vit K and Kcentra at [facility]  -INR 1.28 on 5/15. Continue to hold coumadin and discontinue on discharge   Discussed with the patient pros and cons of coumadin after all consideration he decided to discontinue the coumadin at this time      Acute GI bleed  Reports several days of melanic, tarry stools. Hematochezia for last 2-3 days by patient report.  No sign of active bleeding at this  time.  Gastroenterologist is consulted and following  EGD done shows no active bleeding or source, had patchy moderate erythematous mucosa at gastric antrum.   Esophagus and Duodenum was normal. Most likely diffuse mucosal bleeding because of supra theapeutic INR now resolved. Continue Protonix 40 mg bid orally         Gross Hematuria  Severe urinary retention, resolved  Status post three-way Dupree catheter placement.    She was started on CBI on admission, urine is now mostly clear.   CBI was discontinue yesterday AM but gross hematuria reoccur, she is on CBI and return is mostly clear.  Appreciate input from the Urology, plan for CT urogram and cysto in 3-4 weeks as out patient.   CBI is mostly clear, off and on small clot, may be secondary to catheter now.  Dupree catheter is now remove, passing urine, no significant hematuria noticed at this time.  She need to follow up with the Urology as above.         Atrial fibrillation with RVR: resolved.   Chronic anticoagulation with Coumadin  Patient has chronic A. fib, she is on Cardizem as well as atenolol at home.  She was on IV Cardizem drip at now stopped.   Continue oral Atenolol 50 mg bid  and Cardizem 180 daily.   Heart rate is now well control  On oral medications.   Patient has dementia, she lives by herself, history of compression fracture, and now with GI bleeding and hematuria.  She is high risk for anticoagulation.  Discussed with the patient's son at the bedside who agree and would like to stop the Coumadin at this time.        Severe Pulmonary Hypertension  Right sided pressures severely elevated on most recent TTE.  - monitor     S/p tissue AVR  -Stable at this time.     Recent appendicitis managed with antibiotics  No surgery offered due to underlying health conditions. Completed course of Augmentin     Possible vaginal bleeding  - after resolution of bleeding if vaginal bleeding continues, can have OBGYN consult  - monitor for now     Moderate Cognitive  impairment  Delirium   - delirium precautions     Compression fracture/Back pain   As per history, patient's son that patient has compression fracture lower back.  Complains of some pain lower back, started on oxycodone 5 mg every 4 hours as needed  And apply lidocaine patches to her back, continue IV fentanyl for severe pain.  schedule tylenol 1000 mg every 6 hrs for better pain control now.   Now pain is much better control.      Acute Hypoxic Respiratory failure: improve   This is multifactorial but most likely related to atelectasis and some element of  Fluid overload as well.  Encourage her to use IS and continue to wean her off the oxygen.         Patient discharge in stable condition at this time      Gavin Marrero MD, MD    Significant Results and Procedures   EGD  Findings:        The examined esophagus was normal.        The gastric body was normal.        Patchy moderately erythematous mucosa without bleeding was found in the        gastric antrum.        The first portion of the duodenum and second portion of the duodenum        were normal.                                                                                     Impression:               - Normal esophagus.                             - Normal gastric body.                             - Erythematous mucosa in the antrum.                             - Normal first portion of the duodenum and second                             portion of the duodenum.                             - No specimens collected.                             - Patchy gastritis but no specific lesion, bleeding                             is likely diffuse mucosal bleeding from                             supratherapeutic INR. Will not proceed with                             colonoscopy at this time given H/H stable    Pending Results   These results will be followed up by PCP  Unresulted Labs Ordered in the Past 30 Days of this Admission     No orders found from  4/13/2021 to 5/14/2021.          Code Status   DNR / DNI       Primary Care Physician   Mazin Larsen    Physical Exam   Temp: 98.5  F (36.9  C) Temp src: Oral BP: 109/68 Pulse: 75   Resp: 18 SpO2: 91 % O2 Device: Nasal cannula Oxygen Delivery: 1.5 LPM  Vitals:    05/15/21 0630 05/16/21 0500 05/17/21 0603   Weight: 65.4 kg (144 lb 2.9 oz) 63.3 kg (139 lb 8 oz) 63.1 kg (139 lb 1.6 oz)     Vital Signs with Ranges  Temp:  [97.9  F (36.6  C)-98.5  F (36.9  C)] 98.5  F (36.9  C)  Pulse:  [42-84] 75  Resp:  [12-32] 18  BP: ()/(48-68) 109/68  SpO2:  [84 %-97 %] 91 %  I/O last 3 completed shifts:  In: 720 [P.O.:720]  Out: 1120 [Urine:1120]    Constitutional: awake, alert, cooperative, no apparent distress, and appears stated age  Eyes: Lids and lashes normal, pupils equal, round and reactive to light, extra ocular muscles intact, sclera clear, conjunctiva normal  Respiratory: No increased work of breathing, good air exchange, clear to auscultation bilaterally, no crackles or wheezing  Cardiovascular: Normal apical impulse, regular rate and rhythm, normal S1 and S2, no S3 or S4, and no murmur noted  GI: No scars, normal bowel sounds, soft, non-distended, non-tender, no masses palpated, no hepatosplenomegally  Musculoskeletal: There is no redness, warmth, or swelling of the joints.  Full range of motion noted.  Motor strength is 5 out of 5 all extremities bilaterally.  Tone is normal.  Neurologic: Awake, alert, oriented to name, place and time.  Cranial nerves II-XII are grossly intact.  Motor is 5 out of 5 bilaterally.  Cerebellar finger to nose, heel to shin intact.  Sensory is intact.  Babinski down going, Romberg negative, and gait is normal.    Discharge Disposition   Discharged to home  Condition at discharge: Stable    Consultations This Hospital Stay   PHYSICAL THERAPY ADULT IP CONSULT  OCCUPATIONAL THERAPY ADULT IP CONSULT  GASTROENTEROLOGY IP CONSULT  UROLOGY IP CONSULT  PHYSICAL THERAPY ADULT IP  CONSULT  OCCUPATIONAL THERAPY ADULT IP CONSULT  CARE MANAGEMENT / SOCIAL WORK IP CONSULT  PHYSICAL THERAPY ADULT IP CONSULT  OCCUPATIONAL THERAPY ADULT IP CONSULT    Time Spent on this Encounter   IGavin MD, personally saw the patient today and spent greater than 30 minutes discharging this patient.    Discharge Orders      General info for SNF    Length of Stay Estimate: Short Term Care: Estimated # of Days <30  Condition at Discharge: Stable  Level of care:skilled   Rehabilitation Potential: Fair  Admission H&P remains valid and up-to-date: Yes  Recent Chemotherapy: N/A  Use Nursing Home Standing Orders: Yes     Mantoux instructions    Give two-step Mantoux (PPD) Per Facility Policy Yes     Reason for your hospital stay    Acute GI bleeding, Gross hematuria     Intake and output    Every shift     Daily weights    Call Provider for weight gain of more than 2 pounds per day or 5 pounds per week.     Follow Up and recommended labs and tests    Follow up with retirement physician.  The following labs/tests are recommended: cbc.  Follow up with [facility] in 1 week  Follow up with [facility] in 1-2 weeks  -[facility]    [address]  [place], [place] [address] [phone]     Activity - Up with assistive device     Activity - Up with nursing assistance     No CPR- Do NOT Intubate     Physical Therapy Adult Consult    Evaluate and treat as clinically indicated.    Reason:  Back pain, weakness     Occupational Therapy Adult Consult    Evaluate and treat as clinically indicated.    Reason:  weakness     Fall precautions     Oxygen Adult/Peds     Advance Diet as Tolerated    Follow this diet upon discharge: Orders Placed This Encounter      Snacks/Supplements Adult: Other; vanilla Ensure wiht meals (RD); With Meals      Regular Diet Adult     Discharge Medications   Discharge Medication List as of 5/17/2021  1:15 PM      START taking these medications    Details   oxyCODONE  (ROXICODONE) 5 MG tablet Take 1 tablet (5 mg) by mouth every 6 hours as needed for moderate to severe pain, Disp-12 tablet, R-0, Local Print         CONTINUE these medications which have NOT CHANGED    Details   acetaminophen (ACETAMINOPHEN 8 HOUR) 650 MG CR tablet Take 650 mg by mouth At Bedtime , Historical      atenolol (TENORMIN) 50 MG tablet Take 50 mg by mouth 2 times daily, Historical      diltiazem ER (DILT-XR) 180 MG 24 hr capsule Take 180 mg by mouth 2 times daily , Historical      docusate sodium (COLACE) 100 MG tablet Take 100 mg by mouth daily as needed for constipation, Historical      hypromellose (ARTIFICIAL TEARS) 0.5 % SOLN ophthalmic solution Place 1 drop into both eyes every hour as needed for dry eyes, Historical      polyethylene glycol (MIRALAX) 17 g packet Take 1 packet by mouth daily as needed for constipation, Historical      magnesium citrate solution Take 100 mLs by mouth 3 times daily as needed for constipation or other , Historical         STOP taking these medications       HYDROcodone-acetaminophen (NORCO) 5-325 MG tablet Comments:   Reason for Stopping:         warfarin ANTICOAGULANT (COUMADIN) 2.5 MG tablet Comments:   Reason for Stopping:             Allergies   Allergies   Allergen Reactions     Xarelto [Rivaroxaban] Diarrhea     Ace Inhibitors Cough     Data   Most Recent 3 CBC's:  Recent Labs   Lab Test 05/17/21  0557 05/16/21  0623 05/15/21  0616 05/14/21  0531 05/14/21  0531   WBC  --  13.8* 10.2  --  14.6*   HGB 8.4* 8.9* 9.2*   < > 9.1*   MCV  --  87 88  --  86   PLT  --  239 246  --  219    < > = values in this interval not displayed.      Most Recent 3 BMP's:  Recent Labs   Lab Test 05/15/21  0616 05/14/21  0531 05/13/21  2100    138 136   POTASSIUM 4.5 4.2 3.9   CHLORIDE 109 106 103   CO2 30 28 27   BUN 30 30 35*   CR 0.84 0.92 1.05*   ANIONGAP 2* 4 6   JOSEFINA 9.2 8.4* 8.8   GLC 78 106* 129*     Most Recent 2 LFT's:  Recent Labs   Lab Test 05/13/21  2100 05/13/21  1108    AST 28 27   ALT 22 21   ALKPHOS 89 83   BILITOTAL 1.9* 0.8     Most Recent INR's and Anticoagulation Dosing History:  Anticoagulation Dose History     Recent Dosing and Labs Latest Ref Rng & Units 5/13/2021 5/13/2021 5/13/2021 5/13/2021 5/14/2021 5/15/2021 5/17/2021    INR 0.86 - 1.14 >10.00(HH) >10.00(HH) 3.30(H) 1.10 1.16(H) 1.28(H) 1.54(H)    INR 0.9 - 1.1 - - - - - - -    INR Point of Care 0.86 - 1.14 - - - - - - -        Most Recent 3 Troponin's:  Recent Labs   Lab Test 10/04/20  1818 04/24/20  1920 05/19/19  1036   TROPI <0.015 <0.015 <0.015     Most Recent Cholesterol Panel:  Recent Labs   Lab Test 04/10/18  1319   CHOL 175   LDL 88   HDL 69   TRIG 89     Most Recent 6 Bacteria Isolates From Any Culture (See EPIC Reports for Culture Details):  Recent Labs   Lab Test 03/27/21  1430 11/11/20  2256 10/06/20  1958 09/21/19  1912 07/13/19  2144 05/19/19  1415   CULT 50,000 to 100,000 colonies/mL  mixed urogenital yannick  Susceptibility testing not routinely done   10,000 to 50,000 colonies/mL  mixed urogenital yannick  Susceptibility testing not routinely done   <10,000 colonies/mL  mixed urogenital yannick  Susceptibility testing not routinely done   10,000 to 50,000 colonies/mL  mixed urogenital yannick  Susceptibility testing not routinely done   10,000 to 50,000 colonies/mL  mixed urogenital yannick  Susceptibility testing not routinely done   No MRSA isolated     Most Recent TSH, T4 and A1c Labs:  Recent Labs   Lab Test 04/24/20  1920 04/07/14  0806 04/07/14  0806   TSH 1.46   < >  --    A1C  --   --  5.4    < > = values in this interval not displayed.     Results for orders placed or performed during the hospital encounter of 05/13/21   XR Chest Port 1 View    Narrative    CHEST ONE VIEW PORTABLE  May 15, 2021 11:00 AM     HISTORY: Hypoxia.    COMPARISON: 10/4/2020.      Impression    IMPRESSION: Sternotomy. Cardiac enlargement. Pulmonary vascularity is  within normal limits. Aortic calcification. Mild scattered  scarring  and/or atelectasis at both lung bases. The lungs are otherwise clear.  No pneumothorax.    VENU GUAN MD     Most Recent CPK:No lab results found.

## 2021-05-17 NOTE — PLAN OF CARE
RN: pt alert, oriented to self, daughter present for discharge teaching; discharged to TCU at 1330 with daughter to provide private transportation. Provided daughter with contact information for Urology, GI, and Cardiology; cards appointment already scheduled. Belongings verified and sent; oxygen delivered prior to discharge for transportation; daughter instructed on O2 management, pt cooperative.

## 2021-05-17 NOTE — TELEPHONE ENCOUNTER
ANTICOAGULATION  MANAGEMENT    See note below from discharge notes 5/17      Supratherapeutic INR, resolved  - received IV vit K and Kcentra at [facility]  -INR 1.28 on 5/15. Continue to hold coumadin and discontinue on discharge   Discussed with the patient pros and cons of coumadin after all consideration he decided to discontinue the coumadin at this time     Khalida Rouse is being discharged from the [facility] ([facility]).    Reason for discharge: warfarin therapy completed - see note above    Anticoagulation episode resolved, [facility] referral closed and INR Standing order discontinued    If patient needs warfarin management in the future, please send a new referral

## 2021-05-17 NOTE — PLAN OF CARE
Physical Therapy Discharge Summary    Reason for therapy discharge:    Discharged to transitional care facility.    Progress towards therapy goal(s). See goals on Care Plan in [facility] electronic health record for goal details.  Goals not met.  Barriers to achieving goals:   discharge from facility.    Therapy recommendation(s):    Continued therapy is recommended.  Rationale/Recommendations:  cont PT at TCU to optimize functional recovery, independence, and safety prior to return home.

## 2021-05-17 NOTE — PLAN OF CARE
Neuro: Only alert to self and son. Pleasantly confused. Restless at times.   Pulm: LS diminished. On 2L via NC  CV: A.fib w/ RVR. dilt drip restarted   GI/: Regular diet.Dupree was discharge at 1300 on 5/16, pt does not voided, was bladder scanned during the evening shift for 275 at 2100. Pt was bladder scanned for 680 ml at 0245, was assisted to bed side commode, was not able to voided, at 0400 patient was straight cath for 720 ml pinkish/brown no blood clots noted.  . No BM this shift.  Extremities: Generalized weakness. Back pain with movements.  Activities: Up to bedside commode and chair with assistance of one     Lines: PIVx2  Pain: Lidocaine patch to the back and PRN Oxycodone.  Plan: Continue to monitor and support. PT and OT to start. Wean off CBI, continue manual irrigation if needed. CT urogram and cysto in 3-4 weeks as out patient.

## 2021-05-18 ENCOUNTER — PATIENT OUTREACH (OUTPATIENT)
Dept: FAMILY MEDICINE | Facility: CLINIC | Age: 81
End: 2021-05-18
Payer: MEDICARE

## 2021-05-18 NOTE — PROGRESS NOTES
Clinic Care Coordination Contact  Care Coordination Transition Communication    Clinical Data: Patient was hospitalized at [facility] from 5/13/2021 to 5/17/2021 with diagnosis of hypoxemia and low back pain..     Transition to Facility:              [facility] Name: [facility] (Phone: [phone] Fax: [phone])                Contact name and phone number/fax: Gilma    Plan: RN/SW Care Coordinator will await notification from facility staff informing RN/SW Care Coordinator of patient's discharge plans/needs. RN/SW Care Coordinator will review chart and outreach to facility staff every 4 weeks and as needed.     Pratibha HOODN, RN, PHN, CCM  Primary Clinic Care Coordination    [facility]  Primary Care Clinics  [email or web address]   Office: [phone]  Employed by [facility] [employer]

## 2021-05-18 NOTE — PLAN OF CARE
Occupational Therapy Discharge Summary    Reason for therapy discharge:    Discharged to transitional care facility.    Progress towards therapy goal(s). See goals on Care Plan in [facility] electronic health record for goal details.  Goals not met.  Barriers to achieving goals:   discharge from facility.    Therapy recommendation(s):    Continued therapy is recommended.  Rationale/Recommendations:  Increase ADL independence and safety.

## 2021-05-19 ENCOUNTER — HOSPITAL LABORATORY (OUTPATIENT)
Dept: NURSING HOME | Facility: OTHER | Age: 81
End: 2021-05-19

## 2021-05-20 ENCOUNTER — NURSING HOME VISIT (OUTPATIENT)
Dept: GERIATRICS | Facility: CLINIC | Age: 81
End: 2021-05-20
Payer: MEDICARE

## 2021-05-20 ENCOUNTER — ANTICOAGULATION THERAPY VISIT (OUTPATIENT)
Dept: INTERNAL MEDICINE | Facility: CLINIC | Age: 81
End: 2021-05-20

## 2021-05-20 DIAGNOSIS — Z95.2 S/P AVR (AORTIC VALVE REPLACEMENT): ICD-10-CM

## 2021-05-20 DIAGNOSIS — M54.50 ACUTE BILATERAL LOW BACK PAIN WITHOUT SCIATICA: ICD-10-CM

## 2021-05-20 DIAGNOSIS — F03.90 DEMENTIA WITHOUT BEHAVIORAL DISTURBANCE, UNSPECIFIED DEMENTIA TYPE: ICD-10-CM

## 2021-05-20 DIAGNOSIS — I27.29 OTHER SECONDARY PULMONARY HYPERTENSION (H): ICD-10-CM

## 2021-05-20 DIAGNOSIS — K59.01 SLOW TRANSIT CONSTIPATION: ICD-10-CM

## 2021-05-20 DIAGNOSIS — I48.91 ATRIAL FIBRILLATION WITH RVR (H): ICD-10-CM

## 2021-05-20 DIAGNOSIS — Z79.01 LONG TERM CURRENT USE OF ANTICOAGULANT THERAPY: ICD-10-CM

## 2021-05-20 DIAGNOSIS — K92.1 GASTROINTESTINAL HEMORRHAGE WITH MELENA: Primary | ICD-10-CM

## 2021-05-20 PROCEDURE — 99309 SBSQ NF CARE MODERATE MDM 30: CPT | Performed by: NURSE PRACTITIONER

## 2021-05-20 NOTE — LETTER
5/20/2021        RE: Khalida Rouse  [address]  [facility] [address]        [place] [facility]  PRIMARY CARE PROVIDER AND CLINIC:  Mazin Larsen DO, [address]  / [place] [address]  Chief Complaint   Patient presents with     Hospital F/U     [place] Medical Record Number:  [id number]  Place of Service where encounter took place:  [facility] ([facility]) [[id number]]    Khalida Rouse  is a 81 year old  (1940), admitted to the above facility from  [facility]. Hospital stay 5/13/21 through 5/17/21..  Admitted to this facility for  rehab, medical management and nursing care.    HPI:    HPI information obtained from: facility chart records, facility staff, patient report and [facility] chart review.     Brief Summary of Hospital Course:   Khalida is a 81 year old female whom presented with weakness, fatigue, lightheadedness, and melena to the [facility] ED.   Chronic health issues include atrial fib/flutter, s/p aortic valve replacement with tissue valve and on Warfarin, and dementia.    In the [facility] she was found to have a INR >10 and HgB = 8.3 down from baseline of 14.    She was transfused 2 units of PRBCs, given Vitamin D and transferred to [facility] for management.    HgB stable around 8.4 for 24 hours prior to discharge to [facility].  INR was 1.28 on 5/15.  Discussed the pros/cons of Warfarin and decision made to discontinue medication while at [facility].   did have a EGD done and showed no active bleeding or source, had patchy moderate erythematous mucosa at gastric antrum.  Felt that she had a diffuse mucosal bleeding due to supra therapeutic INR that is now resolved.  Continued with Protonix 40mg twice a day.    As for the Atrial Fibrillation.  She will continue with Atenolol 50mg twice a day and Cardizem 180mg daily.  Khalida lives alone and has Dementia and high risk of bleeding.  Warfarin discontinued.      As for the  compression fracture/back pain, Tylenol 1000mg every 6 hours is used and Lidocaine patches were applied to her back.  In the hospital, IV fentanyl was used for the severe pain.    It is noted that if she continues to have vaginal bleeding, then OBGYN to be consulted but just to monitor.      Updates on Status Since Skilled nursing Admission:   Meeting Khalida in her room late afternoon.  She is sitting up in her wheelchair and alert/pleasant.    Does complain of back pain.  Limited assist with ADLs and one to transfer.  BIMS and PHQ-9 was done and scored 8/15 on BIMS and 9/27 on PHQ-9  While she is here, therapies will be working with her on mobility, ADLs, exercise and strengthening.  Was told that family is looking at assisted living most likely as she is a risk to be by herself.      CODE STATUS/ADVANCE DIRECTIVES DISCUSSION:   DNR / DNI  Patient's living condition: lives alone  ALLERGIES: Xarelto [rivaroxaban] and Ace inhibitors  PAST MEDICAL HISTORY:  has a past medical history of Aortic valve stenosis, Atrial flutter (H), Cardiomyopathy (H), Cognitive changes - SLUMS 16/30 (4/13/2021), Severe aortic stenosis (4/7/2014), and Shoulder fracture, left (3/22/15).  PAST SURGICAL HISTORY:   has a past surgical history that includes ENT surgery; Replace valve aortic (4/10/2014); cataract iol, rt/lt (Right); and Esophagoscopy, gastroscopy, duodenoscopy (EGD), combined (N/A, 5/14/2021).  FAMILY HISTORY: family history includes Alzheimer Disease in her sister; Dementia in her brother.  SOCIAL HISTORY:   reports that she has never smoked. She has never used smokeless tobacco. She reports that she does not drink alcohol or use drugs.    Post Discharge Medication Reconciliation Status: discharge medications reconciled, continue medications without change    Current Outpatient Medications   Medication Sig Dispense Refill     acetaminophen (ACETAMINOPHEN 8 HOUR) 650 MG CR tablet Take 650 mg by mouth At Bedtime        atenolol  "(TENORMIN) 50 MG tablet Take 50 mg by mouth 2 times daily       diltiazem ER (DILT-XR) 180 MG 24 hr capsule Take 180 mg by mouth 2 times daily        docusate sodium (COLACE) 100 MG tablet Take 100 mg by mouth daily as needed for constipation       hypromellose (ARTIFICIAL TEARS) 0.5 % SOLN ophthalmic solution Place 1 drop into both eyes every hour as needed for dry eyes       magnesium citrate solution Take 100 mLs by mouth 3 times daily as needed for constipation or other        oxyCODONE (ROXICODONE) 5 MG tablet Take 1 tablet (5 mg) by mouth every 6 hours as needed for moderate to severe pain 12 tablet 0     pantoprazole (PROTONIX) 40 MG EC tablet Take 1 tablet (40 mg) by mouth 2 times daily (before meals)       polyethylene glycol (MIRALAX) 17 g packet Take 1 packet by mouth daily as needed for constipation         ROS:  Limited secondary to cognitive impairment but today pt reports back pain, no chest pain or shortness of breath, sees out of right eye and wears glasses    Vitals:  /65   Pulse 93   Temp 97.9  F (36.6  C)   Resp 18   Ht 1.626 m (5' 4\")   Wt 63.6 kg (140 lb 4.8 oz)   SpO2 97%   BMI 24.08 kg/m    Exam:  GENERAL APPEARANCE:  Alert, in no distress, thin, cooperative, pure white hair in abundant amount  EYES:  Conjunctiva and lids normal, wears glasses  RESP:  respiratory effort and palpation of chest normal, lungs clear to auscultation , no respiratory distress  CV:  Palpation and auscultation of heart done , no edema, heart rate regular and strong  ABDOMEN:  normal bowel sounds, soft, nontender, no hepatosplenomegaly or other masses, no guarding or rebound  M/S:   Gait and station abnormal limited assist with transfers, will self transfer.  Up in a wheelchair currently  SKIN:  Pale, warm and dry.    PSYCH:  oriented to person, place and time is vague, memory impaired, affect and mood normal    Lab/Diagnostic data:  Component      Latest Ref Rng & Units 5/15/2021 5/17/2021   Sodium      " 133 - 144 mmol/L 141    Potassium      3.4 - 5.3 mmol/L 4.5    Chloride      94 - 109 mmol/L 109    Carbon Dioxide      20 - 32 mmol/L 30    Anion Gap      3 - 14 mmol/L 2 (L)    Glucose      70 - 99 mg/dL 78    Urea Nitrogen      7 - 30 mg/dL 30    Creatinine      0.52 - 1.04 mg/dL 0.84    GFR Estimate      >60 mL/min/1.73:m2 65    GFR Estimate If Black      >60 mL/min/1.73:m2 75    Calcium      8.5 - 10.1 mg/dL 9.2    Phosphorus      2.5 - 4.5 mg/dL 2.8    Albumin      3.4 - 5.0 g/dL 2.2 (L)    Hemoglobin      11.7 - 15.7 g/dL  8.4 (L)   INR      0.86 - 1.14  1.54 (H)       ASSESSMENT/PLAN:  Gastrointestinal hemorrhage with melena  - will continue to monitor for residual bleeding.  Expect stools may be darker color.    Is on Protonix 40mg twice a day for now.  Can lower the dose at a later time once done healing.    Atrial fibrillation with RVR (H)  S/P AVR (aortic valve replacement)  Other secondary pulmonary hypertension (H)  No more coumadin.  Is on Atenolol 50mg twice a day and Diltiazem 180mg twice a day extended release to assist with the Atrial Fib but has hypertension as well.  Blood pressures so far have been 100-120's/60-70's.  Continue to monitor at visits.    Slow transit constipation  Came with PRN medications that include Miralax, Colace and Mag Citrate.    Staff will record her bowels and if needing anything can use the above medications.  If no use then recommend discontinuing them.    Does have a narcotic on board as PRN but at risk for constipation if used regularly.    Acute bilateral low back pain without sciatica  Is on Tylenol 8 hour 650mg at HS and then has PRN oxycodone 5mg every 6 hours prn.    Continue to monitor use of the oxycodone and may have to do adjustments in order for comfort.    Dementia without behavioral disturbance, unspecified dementia type (H)  In the discharge paperwork from hospital it notes her memory issues as Dementia.    Monitor for any behaviors as she is new to  facility.   No medications for memory loss.  As noted too the NP, possible she may go to a AL facility as high risk to be alone.        Orders written today by NP:  1.  CBC and BMP on Monday 5/24/21    Total time spent with patient visit at the skilled nursing facility was 30 min including patient visit and review of past records. Greater than 50% of total time spent with counseling and coordinating care due to overseeing her diagnoses and medications as well as anticipation of her returning to the community setting at some point.     Electronically signed by:  DARLENE Gates CNP                           Sincerely,        DARLENE Gates CNP

## 2021-05-21 VITALS
TEMPERATURE: 97.9 F | BODY MASS INDEX: 23.95 KG/M2 | HEIGHT: 64 IN | DIASTOLIC BLOOD PRESSURE: 65 MMHG | SYSTOLIC BLOOD PRESSURE: 121 MMHG | HEART RATE: 93 BPM | WEIGHT: 140.3 LBS | OXYGEN SATURATION: 97 % | RESPIRATION RATE: 18 BRPM

## 2021-05-21 LAB
GAMMA INTERFERON BACKGROUND BLD IA-ACNC: 0 IU/ML
M TB IFN-G CD4+ BCKGRND COR BLD-ACNC: 9.85 IU/ML
M TB TUBERC IFN-G BLD QL: NEGATIVE
MITOGEN IGNF BCKGRD COR BLD-ACNC: 0 IU/ML
MITOGEN IGNF BCKGRD COR BLD-ACNC: 0.01 IU/ML

## 2021-05-21 ASSESSMENT — MIFFLIN-ST. JEOR: SCORE: 1081.4

## 2021-05-21 NOTE — PROGRESS NOTES
[place] [facility]  PRIMARY CARE PROVIDER AND CLINIC:  Mazin Larsen, DO, [address] [facility]  / [place] [address]  Chief Complaint   Patient presents with     Hospital F/U     [place] Medical Record Number:  [id number]  Place of Service where encounter took place:  [facility] ([facility]) [[id number]]    Khalida Rouse  is a 81 year old  (1940), admitted to the above facility from  [facility]. Hospital stay 5/13/21 through 5/17/21..  Admitted to this facility for  rehab, medical management and nursing care.    HPI:    HPI information obtained from: facility chart records, facility staff, patient report and [facility] chart review.     Brief Summary of Hospital Course:   Khalida is a 81 year old female whom presented with weakness, fatigue, lightheadedness, and melena to the [facility] ED.   Chronic health issues include atrial fib/flutter, s/p aortic valve replacement with tissue valve and on Warfarin, and dementia.    In the [facility] she was found to have a INR >10 and HgB = 8.3 down from baseline of 14.    She was transfused 2 units of PRBCs, given Vitamin D and transferred to [facility] for management.    HgB stable around 8.4 for 24 hours prior to discharge to [facility].  INR was 1.28 on 5/15.  Discussed the pros/cons of Warfarin and decision made to discontinue medication while at [facility].   did have a EGD done and showed no active bleeding or source, had patchy moderate erythematous mucosa at gastric antrum.  Felt that she had a diffuse mucosal bleeding due to supra therapeutic INR that is now resolved.  Continued with Protonix 40mg twice a day.    As for the Atrial Fibrillation.  She will continue with Atenolol 50mg twice a day and Cardizem 180mg daily.  Khalida lives alone and has Dementia and high risk of bleeding.  Warfarin discontinued.      As for the compression fracture/back pain, Tylenol 1000mg every 6 hours is used and Lidocaine  patches were applied to her back.  In the hospital, IV fentanyl was used for the severe pain.    It is noted that if she continues to have vaginal bleeding, then OBGYN to be consulted but just to monitor.      Updates on Status Since Skilled nursing Admission:   Meeting Khalida in her room late afternoon.  She is sitting up in her wheelchair and alert/pleasant.    Does complain of back pain.  Limited assist with ADLs and one to transfer.  BIMS and PHQ-9 was done and scored 8/15 on BIMS and 9/27 on PHQ-9  While she is here, therapies will be working with her on mobility, ADLs, exercise and strengthening.  Was told that family is looking at assisted living most likely as she is a risk to be by herself.      CODE STATUS/ADVANCE DIRECTIVES DISCUSSION:   DNR / DNI  Patient's living condition: lives alone  ALLERGIES: Xarelto [rivaroxaban] and Ace inhibitors  PAST MEDICAL HISTORY:  has a past medical history of Aortic valve stenosis, Atrial flutter (H), Cardiomyopathy (H), Cognitive changes - SLUMS 16/30 (4/13/2021), Severe aortic stenosis (4/7/2014), and Shoulder fracture, left (3/22/15).  PAST SURGICAL HISTORY:   has a past surgical history that includes ENT surgery; Replace valve aortic (4/10/2014); cataract iol, rt/lt (Right); and Esophagoscopy, gastroscopy, duodenoscopy (EGD), combined (N/A, 5/14/2021).  FAMILY HISTORY: family history includes Alzheimer Disease in her sister; Dementia in her brother.  SOCIAL HISTORY:   reports that she has never smoked. She has never used smokeless tobacco. She reports that she does not drink alcohol or use drugs.    Post Discharge Medication Reconciliation Status: discharge medications reconciled, continue medications without change    Current Outpatient Medications   Medication Sig Dispense Refill     acetaminophen (ACETAMINOPHEN 8 HOUR) 650 MG CR tablet Take 650 mg by mouth At Bedtime        atenolol (TENORMIN) 50 MG tablet Take 50 mg by mouth 2 times daily       diltiazem ER (DILT-XR)  "180 MG 24 hr capsule Take 180 mg by mouth 2 times daily        docusate sodium (COLACE) 100 MG tablet Take 100 mg by mouth daily as needed for constipation       hypromellose (ARTIFICIAL TEARS) 0.5 % SOLN ophthalmic solution Place 1 drop into both eyes every hour as needed for dry eyes       magnesium citrate solution Take 100 mLs by mouth 3 times daily as needed for constipation or other        oxyCODONE (ROXICODONE) 5 MG tablet Take 1 tablet (5 mg) by mouth every 6 hours as needed for moderate to severe pain 12 tablet 0     pantoprazole (PROTONIX) 40 MG EC tablet Take 1 tablet (40 mg) by mouth 2 times daily (before meals)       polyethylene glycol (MIRALAX) 17 g packet Take 1 packet by mouth daily as needed for constipation         ROS:  Limited secondary to cognitive impairment but today pt reports back pain, no chest pain or shortness of breath, sees out of right eye and wears glasses    Vitals:  /65   Pulse 93   Temp 97.9  F (36.6  C)   Resp 18   Ht 1.626 m (5' 4\")   Wt 63.6 kg (140 lb 4.8 oz)   SpO2 97%   BMI 24.08 kg/m    Exam:  GENERAL APPEARANCE:  Alert, in no distress, thin, cooperative, pure white hair in abundant amount  EYES:  Conjunctiva and lids normal, wears glasses  RESP:  respiratory effort and palpation of chest normal, lungs clear to auscultation , no respiratory distress  CV:  Palpation and auscultation of heart done , no edema, heart rate regular and strong  ABDOMEN:  normal bowel sounds, soft, nontender, no hepatosplenomegaly or other masses, no guarding or rebound  M/S:   Gait and station abnormal limited assist with transfers, will self transfer.  Up in a wheelchair currently  SKIN:  Pale, warm and dry.    PSYCH:  oriented to person, place and time is vague, memory impaired, affect and mood normal    Lab/Diagnostic data:  Component      Latest Ref Rng & Units 5/15/2021 5/17/2021   Sodium      133 - 144 mmol/L 141    Potassium      3.4 - 5.3 mmol/L 4.5    Chloride      94 - 109 " mmol/L 109    Carbon Dioxide      20 - 32 mmol/L 30    Anion Gap      3 - 14 mmol/L 2 (L)    Glucose      70 - 99 mg/dL 78    Urea Nitrogen      7 - 30 mg/dL 30    Creatinine      0.52 - 1.04 mg/dL 0.84    GFR Estimate      >60 mL/min/1.73:m2 65    GFR Estimate If Black      >60 mL/min/1.73:m2 75    Calcium      8.5 - 10.1 mg/dL 9.2    Phosphorus      2.5 - 4.5 mg/dL 2.8    Albumin      3.4 - 5.0 g/dL 2.2 (L)    Hemoglobin      11.7 - 15.7 g/dL  8.4 (L)   INR      0.86 - 1.14  1.54 (H)       ASSESSMENT/PLAN:  Gastrointestinal hemorrhage with melena  - will continue to monitor for residual bleeding.  Expect stools may be darker color.    Is on Protonix 40mg twice a day for now.  Can lower the dose at a later time once done healing.    Atrial fibrillation with RVR (H)  S/P AVR (aortic valve replacement)  Other secondary pulmonary hypertension (H)  No more coumadin.  Is on Atenolol 50mg twice a day and Diltiazem 180mg twice a day extended release to assist with the Atrial Fib but has hypertension as well.  Blood pressures so far have been 100-120's/60-70's.  Continue to monitor at visits.    Slow transit constipation  Came with PRN medications that include Miralax, Colace and Mag Citrate.    Staff will record her bowels and if needing anything can use the above medications.  If no use then recommend discontinuing them.    Does have a narcotic on board as PRN but at risk for constipation if used regularly.    Acute bilateral low back pain without sciatica  Is on Tylenol 8 hour 650mg at HS and then has PRN oxycodone 5mg every 6 hours prn.    Continue to monitor use of the oxycodone and may have to do adjustments in order for comfort.    Dementia without behavioral disturbance, unspecified dementia type (H)  In the discharge paperwork from hospital it notes her memory issues as Dementia.    Monitor for any behaviors as she is new to facility.   No medications for memory loss.  As noted too the NP, possible she may go to a  AL facility as high risk to be alone.        Orders written today by NP:  1.  CBC and BMP on Monday 5/24/21    Total time spent with patient visit at the skilled nursing facility was 30 min including patient visit and review of past records. Greater than 50% of total time spent with counseling and coordinating care due to overseeing her diagnoses and medications as well as anticipation of her returning to the community setting at some point.     Electronically signed by:  DARLENE Gates CNP

## 2021-05-26 ENCOUNTER — HOSPITAL LABORATORY (OUTPATIENT)
Dept: NURSING HOME | Facility: OTHER | Age: 81
End: 2021-05-26

## 2021-05-26 DIAGNOSIS — M54.50 LOW BACK PAIN, UNSPECIFIED BACK PAIN LATERALITY, UNSPECIFIED CHRONICITY, UNSPECIFIED WHETHER SCIATICA PRESENT: ICD-10-CM

## 2021-05-26 LAB
ANION GAP SERPL CALCULATED.3IONS-SCNC: 4 MMOL/L (ref 3–14)
BUN SERPL-MCNC: 14 MG/DL (ref 7–30)
CALCIUM SERPL-MCNC: 8.5 MG/DL (ref 8.5–10.1)
CHLORIDE SERPL-SCNC: 108 MMOL/L (ref 94–109)
CO2 SERPL-SCNC: 30 MMOL/L (ref 20–32)
CREAT SERPL-MCNC: 0.85 MG/DL (ref 0.52–1.04)
ERYTHROCYTE [DISTWIDTH] IN BLOOD BY AUTOMATED COUNT: 15.8 % (ref 10–15)
GFR SERPL CREATININE-BSD FRML MDRD: 64 ML/MIN/{1.73_M2}
GLUCOSE SERPL-MCNC: 85 MG/DL (ref 70–99)
HCT VFR BLD AUTO: 30.6 % (ref 35–47)
HGB BLD-MCNC: 9.6 G/DL (ref 11.7–15.7)
MCH RBC QN AUTO: 28.2 PG (ref 26.5–33)
MCHC RBC AUTO-ENTMCNC: 31.4 G/DL (ref 31.5–36.5)
MCV RBC AUTO: 90 FL (ref 78–100)
PLATELET # BLD AUTO: 298 10E9/L (ref 150–450)
POTASSIUM SERPL-SCNC: 3.7 MMOL/L (ref 3.4–5.3)
RBC # BLD AUTO: 3.4 10E12/L (ref 3.8–5.2)
SODIUM SERPL-SCNC: 142 MMOL/L (ref 133–144)
WBC # BLD AUTO: 6.3 10E9/L (ref 4–11)

## 2021-05-26 RX ORDER — OXYCODONE HYDROCHLORIDE 5 MG/1
5 TABLET ORAL EVERY 6 HOURS PRN
Qty: 12 TABLET | Refills: 0 | Status: SHIPPED | OUTPATIENT
Start: 2021-05-26 | End: 2021-06-16

## 2021-05-26 NOTE — PROGRESS NOTES
[place] [facility]  PRIMARY CARE PROVIDER AND CLINIC:  Mazin Larsen DO, [address] [facility]  / [place] [address]  Chief Complaint   Patient presents with     Hospital F/U     [place] Medical Record Number:  [id number]  Place of Service where encounter took place:  [facility] ([facility]) [[id number]]    Khalida Rouse  is a 81 year old  (1940), admitted to the above facility from  [facility]. Hospital stay 5/13/21 through 5/17/21..  Admitted to this facility for  rehab, medical management and nursing care.    HPI:    HPI information obtained from: facility staff, patient report and [facility] chart review.   Brief Summary of Hospital Course:   -Patient with PMH for A. fib/flutter, TAVR, dementia, recent compression fracture present to the ER with melena and gross hematuria, was found to have supratherapeutic INR at 10 hemoglobin 8 point down from her baseline 14, reversed with vitamin K, Kcentra, transfused 2 packed RBCs and transferred to [facility].  Decision was made to discontinue Coumadin, EGD did not show the source of bleeding, but patchy moderate erythematous mucosa.  -As far as for Gross hematuria started on CBI, Dupree's catheter removed, urology did cystoscopy on outpatient basis in 3-4 weeks      Today:  - GIB /Hematuria:: denies any more black stool or blood with urine. Denies belly pain but is feeling bloating.   -  A-fib: denies chest pain or palpitation  - Resp: reports it is good, not on O2.   - rehab: reports working on different thing in his body, uses a walker on her own.     CODE STATUS/ADVANCE DIRECTIVES DISCUSSION:   DNR / DNI  Patient's living condition: lives alone  ALLERGIES: Xarelto [rivaroxaban] and Ace inhibitors  PAST MEDICAL HISTORY:  has a past medical history of Aortic valve stenosis, Atrial flutter (H), Cardiomyopathy (H), Cognitive changes - SLUMS 16/30 (4/13/2021), Severe aortic stenosis (4/7/2014), and Shoulder  "fracture, left (3/22/15).  PAST SURGICAL HISTORY:   has a past surgical history that includes ENT surgery; Replace valve aortic (4/10/2014); cataract iol, rt/lt (Right); and Esophagoscopy, gastroscopy, duodenoscopy (EGD), combined (N/A, 5/14/2021).  FAMILY HISTORY: family history includes Alzheimer Disease in her sister; Dementia in her brother.  SOCIAL HISTORY:   reports that she has never smoked. She has never used smokeless tobacco. She reports that she does not drink alcohol or use drugs.    Post Discharge Medication Reconciliation Status: discharge medications reconciled and changed, per note/orders  Current Outpatient Medications   Medication Sig Dispense Refill     acetaminophen (ACETAMINOPHEN 8 HOUR) 650 MG CR tablet Take 650 mg by mouth At Bedtime        atenolol (TENORMIN) 50 MG tablet Take 50 mg by mouth 2 times daily       diltiazem ER (DILT-XR) 180 MG 24 hr capsule Take 180 mg by mouth 2 times daily        docusate sodium (COLACE) 100 MG tablet Take 100 mg by mouth daily as needed for constipation       hypromellose (ARTIFICIAL TEARS) 0.5 % SOLN ophthalmic solution Place 1 drop into both eyes every hour as needed for dry eyes       magnesium citrate solution Take 100 mLs by mouth 3 times daily as needed for constipation or other        oxyCODONE (ROXICODONE) 5 MG tablet Take 1 tablet (5 mg) by mouth every 6 hours as needed for moderate to severe pain 12 tablet 0     pantoprazole (PROTONIX) 40 MG EC tablet Take 1 tablet (40 mg) by mouth 2 times daily (before meals)       polyethylene glycol (MIRALAX) 17 g packet Take 1 packet by mouth daily as needed for constipation       ROS:  Limited secondary to cognitive impairment but today pt reports- see HPI    Vitals:  /73   Pulse 72   Temp 97.9  F (36.6  C)   Resp 20   Ht 1.626 m (5' 4\")   Wt 63.7 kg (140 lb 8 oz)   SpO2 94%   BMI 24.12 kg/m    Exam:  GENERAL APPEARANCE:  in no distress, cooperative  ENT:  Mouth and posterior oropharynx normal, " moist mucous membranes, oral mucosa moist, no lesion noted.   EYES:  EOMI, Pupil rounded and equal.  RESP:  lungs clear to auscultation   CV:  S1S2 audible, irregular HR, no murmur appreciated.   ABDOMEN:  soft, NT/ND, BS audible. no mass appreciated on palpation.   M/S:   no joint deformity noted on observation.   SKIN:  No rash.   NEURO:   No NFD appreciated on observation. Hand  5/5 b/l  PSYCH: AAOx 3, fair insight, judgement and memory, affect and mood normal      Lab/Diagnostic data: Reviewed in the chart and EHR.        ASSESSMENT/PLAN:  -------------------------  Recent UGIB and Gross hematuria 2/2 supratherapeutic INR  - required blood transfusion, reversal with vit K and Kcentra. HH trending up  - will see need cystoscopy in 2-3 weeks.   - stable.     Chronic a-fibrillation (H)  - - off coumadin now. Agree.   - CVR on metoprolol and Cardizem.       Recent compression fx:  - started on oxycodone and Lidoderm patch while IP. Analgesia optimal.     -Severe Pulmonary Hypertension (h)  - recent acute respiratory Andres 2/2 volume overload and atelectasis while IP  - stable. Continue to monitor      Generalized muscle weakness: started rehab program, making a progress, continue until desired goal is achieved.     CHRONIC STABLE CONDITIONS:  - Moderate Cognitive impairment: adjusting well. Monitor for behavioral changes.         Order: See above, otherwise, continue the rest of the current POC.       Electronically signed by:  Claudia Molina MD

## 2021-05-27 ENCOUNTER — NURSING HOME VISIT (OUTPATIENT)
Dept: GERIATRICS | Facility: CLINIC | Age: 81
End: 2021-05-27
Payer: MEDICARE

## 2021-05-27 VITALS
HEIGHT: 64 IN | RESPIRATION RATE: 20 BRPM | SYSTOLIC BLOOD PRESSURE: 136 MMHG | BODY MASS INDEX: 23.99 KG/M2 | WEIGHT: 140.5 LBS | HEART RATE: 72 BPM | OXYGEN SATURATION: 94 % | DIASTOLIC BLOOD PRESSURE: 73 MMHG | TEMPERATURE: 97.9 F

## 2021-05-27 DIAGNOSIS — I48.11 LONGSTANDING PERSISTENT ATRIAL FIBRILLATION (H): ICD-10-CM

## 2021-05-27 DIAGNOSIS — K92.1 GASTROINTESTINAL HEMORRHAGE WITH MELENA: Primary | ICD-10-CM

## 2021-05-27 DIAGNOSIS — S32.010S COMPRESSION FRACTURE OF L1 VERTEBRA, SEQUELA: ICD-10-CM

## 2021-05-27 DIAGNOSIS — I27.29 OTHER SECONDARY PULMONARY HYPERTENSION (H): ICD-10-CM

## 2021-05-27 DIAGNOSIS — M62.81 GENERALIZED MUSCLE WEAKNESS: ICD-10-CM

## 2021-05-27 DIAGNOSIS — R41.89 COGNITIVE IMPAIRMENT: ICD-10-CM

## 2021-05-27 PROCEDURE — 99305 1ST NF CARE MODERATE MDM 35: CPT | Mod: AI | Performed by: FAMILY MEDICINE

## 2021-05-27 ASSESSMENT — MIFFLIN-ST. JEOR: SCORE: 1082.3

## 2021-05-27 NOTE — LETTER
5/27/2021        RE: Khalida Rouse  [address]  [facility] [address]        [facility]  PRIMARY CARE PROVIDER AND CLINIC:  Mazin Larsen DO, [address] [facility] / [place] [address]  Chief Complaint   Patient presents with     Hospital F/U     Center Medical Record Number:  [id number]  Place of Service where encounter took place:  [facility] [place] ([facility]) [[id number]]    Khalida Rouse  is a 81 year old  (1940), admitted to the above facility from  [facility]. Hospital stay 5/13/21 through 5/17/21..  Admitted to this facility for  rehab, medical management and nursing care.    HPI:    HPI information obtained from: facility staff, patient report and [facility] chart review.   Brief Summary of Hospital Course:   -Patient with PMH for A. fib/flutter, TAVR, dementia, recent compression fracture present to the ER with melena and gross hematuria, was found to have supratherapeutic INR at 10 hemoglobin 8 point down from her baseline 14, reversed with vitamin K, Kcentra, transfused 2 packed RBCs and transferred to [facility].  Decision was made to discontinue Coumadin, EGD did not show the source of bleeding, but patchy moderate erythematous mucosa.  -As far as for Gross hematuria started on CBI, Dupree's catheter removed, urology did cystoscopy on outpatient basis in 3-4 weeks      Today:  - GIB /Hematuria:: denies any more black stool or blood with urine. Denies belly pain but is feeling bloating.   -  A-fib: denies chest pain or palpitation  - Resp: reports it is good, not on O2.   - rehab: reports working on different thing in his body, uses a walker on her own.     CODE STATUS/ADVANCE DIRECTIVES DISCUSSION:   DNR / DNI  Patient's living condition: lives alone  ALLERGIES: Xarelto [rivaroxaban] and Ace inhibitors  PAST MEDICAL HISTORY:  has a past medical history of Aortic valve stenosis, Atrial flutter (H), Cardiomyopathy (H), Cognitive  "changes - SLUMS 16/30 (4/13/2021), Severe aortic stenosis (4/7/2014), and Shoulder fracture, left (3/22/15).  PAST SURGICAL HISTORY:   has a past surgical history that includes ENT surgery; Replace valve aortic (4/10/2014); cataract iol, rt/lt (Right); and Esophagoscopy, gastroscopy, duodenoscopy (EGD), combined (N/A, 5/14/2021).  FAMILY HISTORY: family history includes Alzheimer Disease in her sister; Dementia in her brother.  SOCIAL HISTORY:   reports that she has never smoked. She has never used smokeless tobacco. She reports that she does not drink alcohol or use drugs.    Post Discharge Medication Reconciliation Status: discharge medications reconciled and changed, per note/orders  Current Outpatient Medications   Medication Sig Dispense Refill     acetaminophen (ACETAMINOPHEN 8 HOUR) 650 MG CR tablet Take 650 mg by mouth At Bedtime        atenolol (TENORMIN) 50 MG tablet Take 50 mg by mouth 2 times daily       diltiazem ER (DILT-XR) 180 MG 24 hr capsule Take 180 mg by mouth 2 times daily        docusate sodium (COLACE) 100 MG tablet Take 100 mg by mouth daily as needed for constipation       hypromellose (ARTIFICIAL TEARS) 0.5 % SOLN ophthalmic solution Place 1 drop into both eyes every hour as needed for dry eyes       magnesium citrate solution Take 100 mLs by mouth 3 times daily as needed for constipation or other        oxyCODONE (ROXICODONE) 5 MG tablet Take 1 tablet (5 mg) by mouth every 6 hours as needed for moderate to severe pain 12 tablet 0     pantoprazole (PROTONIX) 40 MG EC tablet Take 1 tablet (40 mg) by mouth 2 times daily (before meals)       polyethylene glycol (MIRALAX) 17 g packet Take 1 packet by mouth daily as needed for constipation       ROS:  Limited secondary to cognitive impairment but today pt reports- see HPI    Vitals:  /73   Pulse 72   Temp 97.9  F (36.6  C)   Resp 20   Ht 1.626 m (5' 4\")   Wt 63.7 kg (140 lb 8 oz)   SpO2 94%   BMI 24.12 kg/m    Exam:  GENERAL " APPEARANCE:  in no distress, cooperative  ENT:  Mouth and posterior oropharynx normal, moist mucous membranes, oral mucosa moist, no lesion noted.   EYES:  EOMI, Pupil rounded and equal.  RESP:  lungs clear to auscultation   CV:  S1S2 audible, irregular HR, no murmur appreciated.   ABDOMEN:  soft, NT/ND, BS audible. no mass appreciated on palpation.   M/S:   no joint deformity noted on observation.   SKIN:  No rash.   NEURO:   No NFD appreciated on observation. Hand  5/5 b/l  PSYCH: AAOx 3, fair insight, judgement and memory, affect and mood normal      Lab/Diagnostic data: Reviewed in the chart and EHR.        ASSESSMENT/PLAN:  -------------------------  Recent UGIB and Gross hematuria 2/2 supratherapeutic INR  - required blood transfusion, reversal with vit K and Kcentra. HH trending up  - will see need cystoscopy in 2-3 weeks.   - stable.     Chronic a-fibrillation (H)  - - off coumadin now. Agree.   - CVR on metoprolol and Cardizem.       Recent compression fx:  - started on oxycodone and Lidoderm patch while IP. Analgesia optimal.     -Severe Pulmonary Hypertension (h)  - recent acute respiratory Andres 2/2 volume overload and atelectasis while IP  - stable. Continue to monitor      Generalized muscle weakness: started rehab program, making a progress, continue until desired goal is achieved.     CHRONIC STABLE CONDITIONS:  - Moderate Cognitive impairment: adjusting well. Monitor for behavioral changes.         Order: See above, otherwise, continue the rest of the current POC.       Electronically signed by:  Claudia Molina MD         Sincerely,        Claudia Molina MD

## 2021-05-28 ENCOUNTER — DOCUMENTATION ONLY (OUTPATIENT)
Dept: OTHER | Facility: CLINIC | Age: 81
End: 2021-05-28

## 2021-06-02 ENCOUNTER — TELEPHONE (OUTPATIENT)
Dept: GERIATRICS | Facility: CLINIC | Age: 81
End: 2021-06-02

## 2021-06-02 NOTE — TELEPHONE ENCOUNTER
[facility] Nurse Triage Telephone Note    Provider: Taylor Eckert NP  Facility: [facility]  Facility Type:  TCU    Caller: Hellen  Call Back Number: [phone]    Allergies:    Allergies   Allergen Reactions     Xarelto [Rivaroxaban] Diarrhea     Ace Inhibitors Cough        Reason for call: Nurse called to report that patient is complaining of frequency, dysuria, and urgency when voiding.  Nurse also noted an increase in confusion in patient as well.  Low grade temperature of 99.0.       Verbal Order/Direction given by Provider: Okay for a UA/UC.      Provider giving Order:  Taylor Eckert NP    Verbal Order given to: Hellen Blue RN

## 2021-06-03 ENCOUNTER — NURSING HOME VISIT (OUTPATIENT)
Dept: GERIATRICS | Facility: CLINIC | Age: 81
End: 2021-06-03
Payer: MEDICARE

## 2021-06-03 ENCOUNTER — HOSPITAL LABORATORY (OUTPATIENT)
Dept: NURSING HOME | Facility: OTHER | Age: 81
End: 2021-06-03

## 2021-06-03 VITALS
SYSTOLIC BLOOD PRESSURE: 135 MMHG | DIASTOLIC BLOOD PRESSURE: 78 MMHG | OXYGEN SATURATION: 94 % | WEIGHT: 139.5 LBS | TEMPERATURE: 97.5 F | RESPIRATION RATE: 18 BRPM | HEART RATE: 75 BPM | BODY MASS INDEX: 23.95 KG/M2

## 2021-06-03 DIAGNOSIS — D62 ANEMIA DUE TO BLOOD LOSS, ACUTE: ICD-10-CM

## 2021-06-03 DIAGNOSIS — M54.50 ACUTE BILATERAL LOW BACK PAIN WITHOUT SCIATICA: ICD-10-CM

## 2021-06-03 DIAGNOSIS — Z95.2 S/P AVR (AORTIC VALVE REPLACEMENT): ICD-10-CM

## 2021-06-03 DIAGNOSIS — I48.11 LONGSTANDING PERSISTENT ATRIAL FIBRILLATION (H): Primary | ICD-10-CM

## 2021-06-03 DIAGNOSIS — I27.29 OTHER SECONDARY PULMONARY HYPERTENSION (H): ICD-10-CM

## 2021-06-03 DIAGNOSIS — F03.90 DEMENTIA WITHOUT BEHAVIORAL DISTURBANCE, UNSPECIFIED DEMENTIA TYPE: ICD-10-CM

## 2021-06-03 LAB
ALBUMIN UR-MCNC: 100 MG/DL
APPEARANCE UR: ABNORMAL
BACTERIA #/AREA URNS HPF: ABNORMAL /HPF
BILIRUB UR QL STRIP: NEGATIVE
CAOX CRY #/AREA URNS HPF: ABNORMAL /HPF
COLOR UR AUTO: YELLOW
GLUCOSE UR STRIP-MCNC: NEGATIVE MG/DL
HGB UR QL STRIP: ABNORMAL
KETONES UR STRIP-MCNC: NEGATIVE MG/DL
LEUKOCYTE ESTERASE UR QL STRIP: ABNORMAL
MUCOUS THREADS #/AREA URNS LPF: PRESENT /LPF
NITRATE UR QL: POSITIVE
PH UR STRIP: 6 PH (ref 5–7)
RBC #/AREA URNS AUTO: 63 /HPF (ref 0–2)
SOURCE: ABNORMAL
SP GR UR STRIP: 1.02 (ref 1–1.03)
SQUAMOUS #/AREA URNS AUTO: 4 /HPF (ref 0–1)
TRANS CELLS #/AREA URNS HPF: 3 /HPF
UROBILINOGEN UR STRIP-MCNC: 2 MG/DL (ref 0–2)
WBC #/AREA URNS AUTO: >182 /HPF (ref 0–5)
WBC CLUMPS #/AREA URNS HPF: PRESENT /HPF

## 2021-06-03 PROCEDURE — 99309 SBSQ NF CARE MODERATE MDM 30: CPT | Performed by: NURSE PRACTITIONER

## 2021-06-03 NOTE — LETTER
6/3/2021        RE: Khalida Rouse  [address]  [facility] [address]        [facility]  PRIMARY CARE PROVIDER AND CLINIC:  Mazin Larsen DO, [address] [facility]  / [place] [address]  Chief Complaint   Patient presents with     RECHECK     [place] Medical Record Number:  [id number]  Place of Service where encounter took place:  [facility] [place] (U) [[id number]]    Khalida Rouse  is a 81 year old  (1940), admitted to the above facility from  [facility]. Hospital stay 5/13/21 through 5/17/21..  Admitted to this facility for  rehab, medical management and nursing care.    HPI:    HPI information obtained from: facility chart records, facility staff, patient report and [facility] chart review.     Brief Summary of Hospital Course:   Khalida is a 81 year old female whom presented with weakness, fatigue, lightheadedness, and melena to the [facility] ED.   Chronic health issues include atrial fib/flutter, s/p aortic valve replacement with tissue valve and on Warfarin, and dementia.    In the [facility] she was found to have a INR >10 and HgB = 8.3 down from baseline of 14.    She was transfused 2 units of PRBCs, given Vitamin k and transferred to [facility] for management.    HgB stable around 8.4 for 24 hours prior to discharge to [facility].  INR was 1.28 on 5/15.  Discussed the pros/cons of Warfarin and decision made to discontinue medication while at [facility].   did have a EGD done and showed no active bleeding or source, had patchy moderate erythematous mucosa at gastric antrum.  Felt that she had a diffuse mucosal bleeding due to supra therapeutic INR that is now resolved.  Continued with Protonix 40mg twice a day.    As for the Atrial Fibrillation.  She will continue with Atenolol 50mg twice a day and Cardizem 180mg daily.  Khalida lives alone and has Dementia and high risk of bleeding.  Warfarin discontinued.      As for the compression  fracture/back pain, Tylenol 1000mg every 6 hours is used and Lidocaine patches were applied to her back.  In the hospital, IV fentanyl was used for the severe pain.    It is noted that if she continues to have vaginal bleeding, then OBGYN to be consulted but just to monitor.      Updates on Status Since Skilled nursing Admission:   5/20 BIMS and PHQ-9 was done and scored 8/15 on BIMS and 9/27 on PHQ-9  While she is here, therapies will be working with her on mobility, ADLs, exercise and strengthening.  Was told that family is looking at assisted living most likely as she is a risk to be by herself.  1.  CBC and BMP on Monday 5/24/21 5/27 MD visit   6/2 UA/UC ordered for confusion and low grade temp of 99.0  6/3 denies urinary complaints.       CODE STATUS/ADVANCE DIRECTIVES DISCUSSION:   DNR / DNI  Patient's living condition: lives alone    ALLERGIES: Xarelto [rivaroxaban] and Ace inhibitors  Past Medical, Surgical, Family and Social History reviewed and updated in EPIC.  Medication list and progress notes reviewed in nursing home electronic health record    ROS:  Limited secondary to cognitive impairment but today pt reports back pain, no chest pain or shortness of breath, sees out of right eye and wears glasses    Vitals:  /78   Pulse 75   Temp 97.5  F (36.4  C)   Resp 18   Wt 63.3 kg (139 lb 8 oz)   SpO2 94%   BMI 23.95 kg/m    Exam:  GENERAL APPEARANCE:  Alert, in no distress, thin, cooperative,   RESP:  no respiratory distress  CV:  No edema  M/S:   Gait and station observed in wheelchair  SKIN:  Pale, warm and dry.    PSYCH:  oriented to person, place and time is vague, memory impaired, affect and mood normal    Lab/Diagnostic data done at TCU: 5/26  2K3.7  CO2 30 glucose 85 BUN 19 creatinine 0.85 white blood count 6.3 hemoglobin 9.6    ASSESSMENT/PLAN:  Atrial fibrillation Persistent  S/P AVR (aortic valve replacement)  Other secondary pulmonary hypertension (H)  Warfarin stopped due  to GI bleed   rate controlled. Atenolol 50mg twice a day and Diltiazem 180mg twice a day extended Blood pressures so far have been 100-120's/60-70's.    Slow transit constipation  No concerns.     Acute bilateral low back pain without sciatica  Is on Tylenol 8 hour 650mg at HS and then has PRN oxycodone 5mg every 6 hours prn.    Has not used the oxycodone for several days. Will discontinue it.     Dementia without behavioral disturbance, unspecified dementia type (H)  No current concerns. Progressive decline expected and will need increase in level of care.  -  following for discharge planning.   - continue supportive care    Anemia due to acute blood loss  Hx of gi bleed due to warfarin. Transfused 2 units at hospital. hgb have been improving.     Orders written today by NP:  None    Addendum: 6/4 discontinue oxygen, oxycodone.       Electronically signed by:  Fadumo Chun NP                         Sincerely,        Fadumo Chun NP

## 2021-06-04 NOTE — PROGRESS NOTES
[facility]  PRIMARY CARE PROVIDER AND CLINIC:  Mazin Larsen, DO, [address] [facility]  / [place] [address]  Chief Complaint   Patient presents with     RECHECK     [place] Medical Record Number:  [id number]  Place of Service where encounter took place:  [facility] [place] (TC) [[id number]]    Khalida Rouse  is a 81 year old  (1940), admitted to the above facility from  [facility]. Hospital stay 5/13/21 through 5/17/21..  Admitted to this facility for  rehab, medical management and nursing care.    HPI:    HPI information obtained from: facility chart records, facility staff, patient report and [facility] chart review.     Brief Summary of Hospital Course:   Khalida is a 81 year old female whom presented with weakness, fatigue, lightheadedness, and melena to the [facility] ED.   Chronic health issues include atrial fib/flutter, s/p aortic valve replacement with tissue valve and on Warfarin, and dementia.    In the [facility] she was found to have a INR >10 and HgB = 8.3 down from baseline of 14.    She was transfused 2 units of PRBCs, given Vitamin k and transferred to [facility] for management.    HgB stable around 8.4 for 24 hours prior to discharge to [facility].  INR was 1.28 on 5/15.  Discussed the pros/cons of Warfarin and decision made to discontinue medication while at [facility].   did have a EGD done and showed no active bleeding or source, had patchy moderate erythematous mucosa at gastric antrum.  Felt that she had a diffuse mucosal bleeding due to supra therapeutic INR that is now resolved.  Continued with Protonix 40mg twice a day.    As for the Atrial Fibrillation.  She will continue with Atenolol 50mg twice a day and Cardizem 180mg daily.  Khalida lives alone and has Dementia and high risk of bleeding.  Warfarin discontinued.      As for the compression fracture/back pain, Tylenol 1000mg every 6 hours is used and Lidocaine patches  were applied to her back.  In the hospital, IV fentanyl was used for the severe pain.    It is noted that if she continues to have vaginal bleeding, then OBGYN to be consulted but just to monitor.      Updates on Status Since Skilled nursing Admission:   5/20 BIMS and PHQ-9 was done and scored 8/15 on BIMS and 9/27 on PHQ-9  While she is here, therapies will be working with her on mobility, ADLs, exercise and strengthening.  Was told that family is looking at assisted living most likely as she is a risk to be by herself.  1.  CBC and BMP on Monday 5/24/21 5/27 MD visit   6/2 UA/UC ordered for confusion and low grade temp of 99.0  6/3 denies urinary complaints.       CODE STATUS/ADVANCE DIRECTIVES DISCUSSION:   DNR / DNI  Patient's living condition: lives alone    ALLERGIES: Xarelto [rivaroxaban] and Ace inhibitors  Past Medical, Surgical, Family and Social History reviewed and updated in EPIC.  Medication list and progress notes reviewed in nursing home electronic health record    ROS:  Limited secondary to cognitive impairment but today pt reports back pain, no chest pain or shortness of breath, sees out of right eye and wears glasses    Vitals:  /78   Pulse 75   Temp 97.5  F (36.4  C)   Resp 18   Wt 63.3 kg (139 lb 8 oz)   SpO2 94%   BMI 23.95 kg/m    Exam:  GENERAL APPEARANCE:  Alert, in no distress, thin, cooperative,   RESP:  no respiratory distress  CV:  No edema  M/S:   Gait and station observed in wheelchair  SKIN:  Pale, warm and dry.    PSYCH:  oriented to person, place and time is vague, memory impaired, affect and mood normal    Lab/Diagnostic data done at TCU: 5/26  2K3.7  CO2 30 glucose 85 BUN 19 creatinine 0.85 white blood count 6.3 hemoglobin 9.6    ASSESSMENT/PLAN:  Atrial fibrillation Persistent  S/P AVR (aortic valve replacement)  Other secondary pulmonary hypertension (H)  Warfarin stopped due to GI bleed   rate controlled. Atenolol 50mg twice a day and Diltiazem 180mg  twice a day extended Blood pressures so far have been 100-120's/60-70's.    Slow transit constipation  No concerns.     Acute bilateral low back pain without sciatica  Is on Tylenol 8 hour 650mg at HS and then has PRN oxycodone 5mg every 6 hours prn.    Has not used the oxycodone for several days. Will discontinue it.     Dementia without behavioral disturbance, unspecified dementia type (H)  No current concerns. Progressive decline expected and will need increase in level of care.  -  following for discharge planning.   - continue supportive care    Anemia due to acute blood loss  Hx of gi bleed due to warfarin. Transfused 2 units at hospital. hgb have been improving.     Orders written today by NP:  None    Addendum: 6/4 discontinue oxygen, oxycodone.       Electronically signed by:  Fadumo Chun NP

## 2021-06-05 ENCOUNTER — TELEPHONE (OUTPATIENT)
Dept: GERIATRICS | Facility: CLINIC | Age: 81
End: 2021-06-05

## 2021-06-05 LAB
BACTERIA SPEC CULT: NORMAL
Lab: NORMAL
SPECIMEN SOURCE: NORMAL

## 2021-06-05 NOTE — TELEPHONE ENCOUNTER
Weight gain 3.5lbs in 2 days, 13lbs since 5/19, /77,  IRR, 97.1, pulmonary clear, edema 2+ legs, weight 6/3 141, today 144.5, moderate confusion, UA results+?, denies burning/frequency, no ABX started, UC today >100k mixed urogenital, no predominant, still denies symptoms, cognitive baseline with limitations, asymptomatic, ambulatory with walker.  -ACE wrap bilateral legs  -pending sensitivities  -nitrofurantoin 100mg BID 5 days  -continue daily weights, per nurse have been variable results up and down

## 2021-06-09 ENCOUNTER — NURSING HOME VISIT (OUTPATIENT)
Dept: GERIATRICS | Facility: CLINIC | Age: 81
End: 2021-06-09
Payer: MEDICARE

## 2021-06-09 VITALS
OXYGEN SATURATION: 91 % | WEIGHT: 143.5 LBS | BODY MASS INDEX: 24.5 KG/M2 | RESPIRATION RATE: 18 BRPM | TEMPERATURE: 97.3 F | HEIGHT: 64 IN | HEART RATE: 85 BPM | DIASTOLIC BLOOD PRESSURE: 90 MMHG | SYSTOLIC BLOOD PRESSURE: 125 MMHG

## 2021-06-09 DIAGNOSIS — I27.29 OTHER SECONDARY PULMONARY HYPERTENSION (H): ICD-10-CM

## 2021-06-09 DIAGNOSIS — M54.50 ACUTE BILATERAL LOW BACK PAIN WITHOUT SCIATICA: ICD-10-CM

## 2021-06-09 DIAGNOSIS — F03.90 DEMENTIA WITHOUT BEHAVIORAL DISTURBANCE, UNSPECIFIED DEMENTIA TYPE: ICD-10-CM

## 2021-06-09 DIAGNOSIS — D62 ANEMIA DUE TO BLOOD LOSS, ACUTE: ICD-10-CM

## 2021-06-09 DIAGNOSIS — Z95.2 S/P AVR (AORTIC VALVE REPLACEMENT): ICD-10-CM

## 2021-06-09 DIAGNOSIS — I48.11 LONGSTANDING PERSISTENT ATRIAL FIBRILLATION (H): Primary | ICD-10-CM

## 2021-06-09 PROCEDURE — 99309 SBSQ NF CARE MODERATE MDM 30: CPT | Performed by: NURSE PRACTITIONER

## 2021-06-09 ASSESSMENT — MIFFLIN-ST. JEOR: SCORE: 1095.91

## 2021-06-09 NOTE — LETTER
6/9/2021        RE: Khalida Rouse  [address]  [facility] [address]        [facility]  PRIMARY CARE PROVIDER AND CLINIC:  Mazin Larsen DO, [address] [facility]  / [place] [address]  Chief Complaint   Patient presents with     RECHECK     [place] Medical Record Number:  [id number]  Place of Service where encounter took place:  [facility] [place] (U) [[id number]]    Khalida Rouse  is a 81 year old  (1940), admitted to the above facility from  [facility]. Hospital stay 5/13/21 through 5/17/21..  Admitted to this facility for  rehab, medical management and nursing care.    HPI:    HPI information obtained from: facility chart records, facility staff, patient report and [facility] chart review.     Brief Summary of Hospital Course:   Khalida is a 81 year old female whom presented with weakness, fatigue, lightheadedness, and melena to the [facility] ED.   Chronic health issues include atrial fib/flutter, s/p aortic valve replacement with tissue valve and on Warfarin, and dementia.    In the [facility] she was found to have a INR >10 and HgB = 8.3 down from baseline of 14.    She was transfused 2 units of PRBCs, given Vitamin k and transferred to [facility] for management.    HgB stable around 8.4 for 24 hours prior to discharge to [facility].  INR was 1.28 on 5/15.  Discussed the pros/cons of Warfarin and decision made to discontinue medication while at [facility].   did have a EGD done and showed no active bleeding or source, had patchy moderate erythematous mucosa at gastric antrum.  Felt that she had a diffuse mucosal bleeding due to supra therapeutic INR that is now resolved.  Continued with Protonix 40mg twice a day.    As for the Atrial Fibrillation.  She will continue with Atenolol 50mg twice a day and Cardizem 180mg daily.  Khalida lives alone and has Dementia and high risk of bleeding.  Warfarin discontinued.      As for the compression  "fracture/back pain, Tylenol 1000mg every 6 hours is used and Lidocaine patches were applied to her back.  In the hospital, IV fentanyl was used for the severe pain.    It is noted that if she continues to have vaginal bleeding, then OBGYN to be consulted but just to monitor.      Updates on Status Since Skilled nursing Admission:   5/20 BIMS and PHQ-9 was done and scored 8/15 on BIMS and 9/27 on PHQ-9  While she is here, therapies will be working with her on mobility, ADLs, exercise and strengthening.  Was told that family is looking at assisted living most likely as she is a risk to be by herself.  1.  CBC and BMP on Monday 5/24/21 5/27 MD visit   6/2 UA/UC ordered for confusion and low grade temp of 99.0  6/3 denies urinary complaints.   6/9 staff reports weight gain variable and edema. Ace wraps were started.       CODE STATUS/ADVANCE DIRECTIVES DISCUSSION:   DNR / DNI  Patient's living condition: lives alone    ALLERGIES: Xarelto [rivaroxaban] and Ace inhibitors  Past Medical, Surgical, Family and Social History reviewed and updated in EPIC.  Medication list and progress notes reviewed in nursing home electronic health record    ROS:  Limited secondary to cognitive impairment but today pt reports back pain, no chest pain or shortness of breath, sees out of right eye and wears glasses    Vitals:  BP (!) 125/90   Pulse 85   Temp 97.3  F (36.3  C)   Resp 18   Ht 1.626 m (5' 4\")   Wt 65.1 kg (143 lb 8 oz)   SpO2 91%   BMI 24.63 kg/m    Exam:  GENERAL APPEARANCE:  Alert, in no distress, thin, cooperative,   RESP:  no respiratory distress, lung sounds clear throughout.   CV:  No edema  M/S:   Gait and station observed in wheelchair  SKIN:  Pale, warm and dry.    PSYCH:  oriented to person, place and time is vague, memory impaired, affect and mood normal    Lab/Diagnostic data done at U: 5/26  2K3.7  CO2 30 glucose 85 BUN 19 creatinine 0.85 white blood count 6.3 hemoglobin " 9.6    ASSESSMENT/PLAN:  Atrial fibrillation Persistent  S/P AVR (aortic valve replacement)  Other secondary pulmonary hypertension (H)  Warfarin stopped due to GI bleed   rate controlled. Atenolol 50mg twice a day and Diltiazem 180mg twice a day extended Blood pressures so far have been 100-120's/60-70's.  Wt Readings from Last 4 Encounters:   06/09/21 65.1 kg (143 lb 8 oz)   06/03/21 63.3 kg (139 lb 8 oz)   05/27/21 63.7 kg (140 lb 8 oz)   05/21/21 63.6 kg (140 lb 4.8 oz)        Slow transit constipation  No concerns.     Acute bilateral low back pain without sciatica  Is on Tylenol 8 hour 650mg at HS and then has PRN oxycodone 5mg every 6 hours prn.    Oxycodone was discontinued.     Dementia without behavioral disturbance, unspecified dementia type (H)  No current concerns. Progressive decline expected and will need increase in level of care.  -  following for discharge planning.   - continue supportive care    Anemia due to acute blood loss  Hx of gi bleed due to warfarin. Transfused 2 units at hospital. hgb have been improving.   Hemoglobin   Date Value Ref Range Status   05/26/2021 9.6 (L) 11.7 - 15.7 g/dL Final   ] monitor PRN if new bleeding noted.     Orders written today by NP:  No changes.       Electronically signed by:  Fadumo Chun NP                       Sincerely,        Fadumo Chun NP

## 2021-06-10 NOTE — PROGRESS NOTES
[facility]  PRIMARY CARE PROVIDER AND CLINIC:  Mazin Larsen, DO, [address] [facility]  / [place] [address]  Chief Complaint   Patient presents with     RECHECK     [place] Medical Record Number:  [id number]  Place of Service where encounter took place:  [facility] ([facility]) [[id number]]    Khalida Rouse  is a 81 year old  (1940), admitted to the above facility from  [facility]. Hospital stay 5/13/21 through 5/17/21..  Admitted to this facility for  rehab, medical management and nursing care.    HPI:    HPI information obtained from: facility chart records, facility staff, patient report and [facility] chart review.     Brief Summary of Hospital Course:   Khalida is a 81 year old female whom presented with weakness, fatigue, lightheadedness, and melena to the [facility] ED.   Chronic health issues include atrial fib/flutter, s/p aortic valve replacement with tissue valve and on Warfarin, and dementia.    In the [facility] she was found to have a INR >10 and HgB = 8.3 down from baseline of 14.    She was transfused 2 units of PRBCs, given Vitamin k and transferred to [facility] for management.    HgB stable around 8.4 for 24 hours prior to discharge to [facility].  INR was 1.28 on 5/15.  Discussed the pros/cons of Warfarin and decision made to discontinue medication while at [facility].   did have a EGD done and showed no active bleeding or source, had patchy moderate erythematous mucosa at gastric antrum.  Felt that she had a diffuse mucosal bleeding due to supra therapeutic INR that is now resolved.  Continued with Protonix 40mg twice a day.    As for the Atrial Fibrillation.  She will continue with Atenolol 50mg twice a day and Cardizem 180mg daily.  Khalida lives alone and has Dementia and high risk of bleeding.  Warfarin discontinued.      As for the compression fracture/back pain, Tylenol 1000mg every 6 hours is used and Lidocaine patches  "were applied to her back.  In the hospital, IV fentanyl was used for the severe pain.    It is noted that if she continues to have vaginal bleeding, then OBGYN to be consulted but just to monitor.      Updates on Status Since Skilled nursing Admission:   5/20 BIMS and PHQ-9 was done and scored 8/15 on BIMS and 9/27 on PHQ-9  While she is here, therapies will be working with her on mobility, ADLs, exercise and strengthening.  Was told that family is looking at assisted living most likely as she is a risk to be by herself.  1.  CBC and BMP on Monday 5/24/21 5/27 MD visit   6/2 UA/UC ordered for confusion and low grade temp of 99.0  6/3 denies urinary complaints.   6/9 staff reports weight gain variable and edema. Ace wraps were started.       CODE STATUS/ADVANCE DIRECTIVES DISCUSSION:   DNR / DNI  Patient's living condition: lives alone    ALLERGIES: Xarelto [rivaroxaban] and Ace inhibitors  Past Medical, Surgical, Family and Social History reviewed and updated in EPIC.  Medication list and progress notes reviewed in nursing home electronic health record    ROS:  Limited secondary to cognitive impairment but today pt reports back pain, no chest pain or shortness of breath, sees out of right eye and wears glasses    Vitals:  BP (!) 125/90   Pulse 85   Temp 97.3  F (36.3  C)   Resp 18   Ht 1.626 m (5' 4\")   Wt 65.1 kg (143 lb 8 oz)   SpO2 91%   BMI 24.63 kg/m    Exam:  GENERAL APPEARANCE:  Alert, in no distress, thin, cooperative,   RESP:  no respiratory distress, lung sounds clear throughout.   CV:  No edema  M/S:   Gait and station observed in wheelchair  SKIN:  Pale, warm and dry.    PSYCH:  oriented to person, place and time is vague, memory impaired, affect and mood normal    Lab/Diagnostic data done at TCU: 5/26  2K3.7  CO2 30 glucose 85 BUN 19 creatinine 0.85 white blood count 6.3 hemoglobin 9.6    ASSESSMENT/PLAN:  Atrial fibrillation Persistent  S/P AVR (aortic valve replacement)  Other " secondary pulmonary hypertension (H)  Warfarin stopped due to GI bleed   rate controlled. Atenolol 50mg twice a day and Diltiazem 180mg twice a day extended Blood pressures so far have been 100-120's/60-70's.  Wt Readings from Last 4 Encounters:   06/09/21 65.1 kg (143 lb 8 oz)   06/03/21 63.3 kg (139 lb 8 oz)   05/27/21 63.7 kg (140 lb 8 oz)   05/21/21 63.6 kg (140 lb 4.8 oz)        Slow transit constipation  No concerns.     Acute bilateral low back pain without sciatica  Is on Tylenol 8 hour 650mg at HS and then has PRN oxycodone 5mg every 6 hours prn.    Oxycodone was discontinued.     Dementia without behavioral disturbance, unspecified dementia type (H)  No current concerns. Progressive decline expected and will need increase in level of care.  -  following for discharge planning.   - continue supportive care    Anemia due to acute blood loss  Hx of gi bleed due to warfarin. Transfused 2 units at hospital. hgb have been improving.   Hemoglobin   Date Value Ref Range Status   05/26/2021 9.6 (L) 11.7 - 15.7 g/dL Final   ] monitor PRN if new bleeding noted.     Orders written today by NP:  No changes.       Electronically signed by:  Fadumo Chun NP

## 2021-06-15 ENCOUNTER — OFFICE VISIT (OUTPATIENT)
Dept: CARDIOLOGY | Facility: CLINIC | Age: 81
End: 2021-06-15
Attending: INTERNAL MEDICINE
Payer: MEDICARE

## 2021-06-15 VITALS
HEART RATE: 76 BPM | HEIGHT: 65 IN | DIASTOLIC BLOOD PRESSURE: 74 MMHG | WEIGHT: 146.6 LBS | SYSTOLIC BLOOD PRESSURE: 132 MMHG | OXYGEN SATURATION: 90 % | BODY MASS INDEX: 24.43 KG/M2

## 2021-06-15 DIAGNOSIS — I27.29 OTHER SECONDARY PULMONARY HYPERTENSION (H): ICD-10-CM

## 2021-06-15 PROCEDURE — 99214 OFFICE O/P EST MOD 30 MIN: CPT | Performed by: INTERNAL MEDICINE

## 2021-06-15 ASSESSMENT — MIFFLIN-ST. JEOR: SCORE: 1125.85

## 2021-06-15 NOTE — LETTER
6/15/2021    Mazin Larsen, DO  [address] [place] [address]    RE: Khalida Rouse       Dear Colleague,    I had the pleasure of seeing Khalida Rouse in the [facility].    HPI and Plan:   Ms Rouse is a very pleasant 81-year-old female with history of severe aortic stenosis s/p bioprosthetic aortic valve replacement 2014, chronic atrial fibrillation on rate control and was on Coumadin for stroke prophylaxis, history of moderate to moderate severe tricuspid regurgitation pulmonary hypertension with patient declined right heart catheterization in the past.  I saw the patient last time in September 2019.  Patient was then seen by my colleague Dr. Hazel Arevalo in April 2021 following an admission for appendicitis.  This was medically managed with possibility of surgery subsequently.  Patient underwent an echocardiogram around that time that showed normal LV systolic function, mild to moderate resolves early function and severe pulmonary hypertension with RVSP of 49 mm RA, PA pressures were higher in the study compared to previous study mean gradients of 12.7 mmHg across the prosthetic aortic valve.  2-3+ tricuspid regurgitation was noted.  At that time right heart rotation was again discussed with patient and she agreed.  Today patient is coming to cardiology clinic accompanied by her daughter.  In the interim patient has GI bleed in the setting of supratherapeutic INR and was admitted in the hospital underwent EGD was not found to have any particular bleeding source but Coumadin was stopped due to risk of further bleeding and after discussing risk and benefits with the patient.  Also worsening of underlying dementia was noted.  Patient was then discharged to nursing home where she has been staying.  Patient tells me overall healthwise she feels well.  She did struggle with some intermittent leg swelling which has not significantly  resolved.  Her weights are stable in the nursing home.  She is on combination of atenolol and diltiazem with ventricular rates well controlled.  She denies any chest discomfort or shortness of breath.  Albumin was also noted to be quite low during this hospitalization.  Patient and patient's daughter both acknowledge that the dementia is getting worse especially the short-term memory worse at the end of the day.  At this time they do not want to proceed with any invasive procedure which is I think quite understandable given the overall clinical context and comorbidities.    Assessment plan  A very pleasant 81 female with a history of aortic valve replacement 2014, chronic atrial fibrillation was on Coumadin for stroke paralysis which has been discontinued because of a GI bleed.  Patient does have underlying pulmonary hypertension in 2-3+ tricuspid regurgitation mild to moderate resolved systolic function.  Earlier plan was for right heart catheterization which patient does not want to pursue at this time which I think is reasonable given the overall clinical context and comorbidities especially worsening dementia.  We will continue conservative medical management.  She did have some evidence of leg swelling recently which has resolved.  I am not sure whether she received diuretics.  Presently she is not on any diuretic.  Overall she appears euvolemic at this time.  Going forward if she is any evidence of volume retention is noted I would recommend the patient be started on diuretics like Lasix.  Ventricular rates appear well controlled on combination of atenolol and diltiazem.  I recommend we can see her back in about 4 months later this fall, sooner if she notes any change in clinical status.    1.  S/p bioprosthetic aortic valve placement 2014.  Normal mean gradients on recent echocardiogram.  Latest echocardiogram showed mean range of 12.7 mmHg.  2.  Chronic atrial fibrillation on rate control strategy, severe  biatrial enlargement, asymptomatic from atrial fibrillation.  Was on Coumadin but has been discontinued due to recent GI bleed.  Also concern about potential fall risk in the setting of underlying dementia and some balance issues.  3.  2-3+ records regurgitation, pulmonary hypertension.  Right heart catheterization was set up but given the overall deterioration in her health especially worsening of dementia patient prefers only conservative management which I think is reasonable and she wants to avoid invasive procedures.  4.  Mild to moderately reduced RV systolic function.  5.  Recent episode of GI bleed  6.  Dementia, progressively worsening.  Patient is now residing in nursing home.    Recommendations  Continue atenolol and diltiazem  If there is evidence of volume overload in future would recommend starting diuretics.  Follow-up in about 4 months in cardiology clinic.    Orders Placed This Encounter   Procedures     Follow-Up with Cardiologist       No orders of the defined types were placed in this encounter.      There are no discontinued medications.      Encounter Diagnosis   Name Primary?     Other secondary pulmonary hypertension (H)        CURRENT MEDICATIONS:  Current Outpatient Medications   Medication Sig Dispense Refill     acetaminophen (ACETAMINOPHEN 8 HOUR) 650 MG CR tablet Take 650 mg by mouth At Bedtime        atenolol (TENORMIN) 50 MG tablet Take 50 mg by mouth 2 times daily       diltiazem ER (DILT-XR) 180 MG 24 hr capsule Take 180 mg by mouth 2 times daily        docusate sodium (COLACE) 100 MG tablet Take 100 mg by mouth daily as needed for constipation       hypromellose (ARTIFICIAL TEARS) 0.5 % SOLN ophthalmic solution Place 1 drop into both eyes every hour as needed for dry eyes       magnesium citrate solution Take 100 mLs by mouth 3 times daily as needed for constipation or other        oxyCODONE (ROXICODONE) 5 MG tablet Take 1 tablet (5 mg) by mouth every 6 hours as needed for moderate  to severe pain 12 tablet 0     pantoprazole (PROTONIX) 40 MG EC tablet Take 1 tablet (40 mg) by mouth 2 times daily (before meals)       polyethylene glycol (MIRALAX) 17 g packet Take 1 packet by mouth daily as needed for constipation         ALLERGIES     Allergies   Allergen Reactions     Xarelto [Rivaroxaban] Diarrhea     Ace Inhibitors Cough       PAST MEDICAL HISTORY:  Past Medical History:   Diagnosis Date     Aortic valve stenosis      Atrial flutter (H)      Cardiomyopathy (H)      Cognitive changes - SLUMS 16/30 4/13/2021     Severe aortic stenosis 4/7/2014     Shoulder fracture, left 3/22/15       PAST SURGICAL HISTORY:  Past Surgical History:   Procedure Laterality Date     CATARACT IOL, RT/LT Right      ENT SURGERY      glaucoma surgery     ESOPHAGOSCOPY, GASTROSCOPY, DUODENOSCOPY (EGD), COMBINED N/A 5/14/2021    Procedure: ESOPHAGOGASTRODUODENOSCOPY (EGD);  Surgeon: Alysha Cano MD;  Location:  GI     REPLACE VALVE AORTIC  4/10/2014    Procedure: REPLACE VALVE AORTIC;  Median sternotomy, Replace Aortic Valve on pump oxygenation. ;  Surgeon: Wesley Fong MD;  Location:  OR       FAMILY HISTORY:  Family History   Problem Relation Age of Onset     Dementia Brother      Alzheimer Disease Sister        SOCIAL HISTORY:  Social History     Socioeconomic History     Marital status:      Spouse name: Not on file     Number of children: Not on file     Years of education: Not on file     Highest education level: Not on file   Occupational History     Not on file   Social Needs     Financial resource strain: Not on file     Food insecurity     Worry: Not on file     Inability: Not on file     Transportation needs     Medical: Not on file     Non-medical: Not on file   Tobacco Use     Smoking status: Never Smoker     Smokeless tobacco: Never Used   Substance and Sexual Activity     Alcohol use: No     Alcohol/week: 0.0 standard drinks     Drug use: No     Sexual activity: Not Currently      "Partners: Male   Lifestyle     Physical activity     Days per week: Not on file     Minutes per session: Not on file     Stress: Not on file   Relationships     Social connections     Talks on phone: Not on file     Gets together: Not on file     Attends Restorationist service: Not on file     Active member of club or organization: Not on file     Attends meetings of clubs or organizations: Not on file     Relationship status: Not on file     Intimate partner violence     Fear of current or ex partner: Not on file     Emotionally abused: Not on file     Physically abused: Not on file     Forced sexual activity: Not on file   Other Topics Concern     Parent/sibling w/ CABG, MI or angioplasty before 65F 55M? Not Asked      Service Not Asked     Blood Transfusions Not Asked     Caffeine Concern No     Occupational Exposure Not Asked     Hobby Hazards Not Asked     Sleep Concern Not Asked     Stress Concern Not Asked     Weight Concern Not Asked     Special Diet No     Back Care Not Asked     Exercise No     Comment: walking     Bike Helmet Not Asked     Seat Belt Not Asked     Self-Exams Not Asked   Social History Narrative     Not on file       Review of Systems:  Skin:  Negative       Eyes:  Positive for glasses    ENT:  Negative      Respiratory:  Positive for dyspnea on exertion     Cardiovascular:  Negative for;palpitations;chest pain edema;Positive for;lightheadedness;dizziness;fatigue    Gastroenterology: Negative      Genitourinary:  Negative      Musculoskeletal:  Positive for   right side pain  Neurologic:  Negative      Psychiatric:  Negative      Heme/Lymph/Imm:  Positive for allergies    Endocrine:  Negative        Physical Exam:  Vitals: /74 (BP Location: Right arm, Patient Position: Sitting, Cuff Size: Adult Regular)   Pulse 76   Ht 1.651 m (5' 5\")   Wt 66.5 kg (146 lb 9.6 oz)   SpO2 90%   BMI 24.40 kg/m      General patient appears comfortable  Neck JVP elevated to around 10 " cm  Cardiovascular system irregular, normal rate, grade 2 x 6 systolic murmur heard best over the left lower sternal border, ejection systolic click heard over the right upper sternal border without any significant murmur  Respiratory system clear to auscultation  Extremities minimal pitting pedal edema    Thank you for allowing me to participate in the care of your patient.      Sincerely,     Jason Flanagan MD     [facility]  cc:   Hazel Arevalo MD  [facility] [place]  [address]  [place]  [place] [address]

## 2021-06-15 NOTE — PROGRESS NOTES
HPI and Plan:   Ms Rouse is a very pleasant 81-year-old female with history of severe aortic stenosis s/p bioprosthetic aortic valve replacement 2014, chronic atrial fibrillation on rate control and was on Coumadin for stroke prophylaxis, history of moderate to moderate severe tricuspid regurgitation pulmonary hypertension with patient declined right heart catheterization in the past.  I saw the patient last time in September 2019.  Patient was then seen by my colleague Dr. Hazel Arevalo in April 2021 following an admission for appendicitis.  This was medically managed with possibility of surgery subsequently.  Patient underwent an echocardiogram around that time that showed normal LV systolic function, mild to moderate resolves early function and severe pulmonary hypertension with RVSP of 49 mm RA, PA pressures were higher in the study compared to previous study mean gradients of 12.7 mmHg across the prosthetic aortic valve.  2-3+ tricuspid regurgitation was noted.  At that time right heart rotation was again discussed with patient and she agreed.  Today patient is coming to cardiology clinic accompanied by her daughter.  In the interim patient has GI bleed in the setting of supratherapeutic INR and was admitted in the hospital underwent EGD was not found to have any particular bleeding source but Coumadin was stopped due to risk of further bleeding and after discussing risk and benefits with the patient.  Also worsening of underlying dementia was noted.  Patient was then discharged to nursing home where she has been staying.  Patient tells me overall healthwise she feels well.  She did struggle with some intermittent leg swelling which has not significantly resolved.  Her weights are stable in the nursing home.  She is on combination of atenolol and diltiazem with ventricular rates well controlled.  She denies any chest discomfort or shortness of breath.  Albumin was also noted to be quite low during this  hospitalization.  Patient and patient's daughter both acknowledge that the dementia is getting worse especially the short-term memory worse at the end of the day.  At this time they do not want to proceed with any invasive procedure which is I think quite understandable given the overall clinical context and comorbidities.    Assessment plan  A very pleasant 81 female with a history of aortic valve replacement 2014, chronic atrial fibrillation was on Coumadin for stroke paralysis which has been discontinued because of a GI bleed.  Patient does have underlying pulmonary hypertension in 2-3+ tricuspid regurgitation mild to moderate resolved systolic function.  Earlier plan was for right heart catheterization which patient does not want to pursue at this time which I think is reasonable given the overall clinical context and comorbidities especially worsening dementia.  We will continue conservative medical management.  She did have some evidence of leg swelling recently which has resolved.  I am not sure whether she received diuretics.  Presently she is not on any diuretic.  Overall she appears euvolemic at this time.  Going forward if she is any evidence of volume retention is noted I would recommend the patient be started on diuretics like Lasix.  Ventricular rates appear well controlled on combination of atenolol and diltiazem.  I recommend we can see her back in about 4 months later this fall, sooner if she notes any change in clinical status.    1.  S/p bioprosthetic aortic valve placement 2014.  Normal mean gradients on recent echocardiogram.  Latest echocardiogram showed mean range of 12.7 mmHg.  2.  Chronic atrial fibrillation on rate control strategy, severe biatrial enlargement, asymptomatic from atrial fibrillation.  Was on Coumadin but has been discontinued due to recent GI bleed.  Also concern about potential fall risk in the setting of underlying dementia and some balance issues.  3.  2-3+ records  regurgitation, pulmonary hypertension.  Right heart catheterization was set up but given the overall deterioration in her health especially worsening of dementia patient prefers only conservative management which I think is reasonable and she wants to avoid invasive procedures.  4.  Mild to moderately reduced RV systolic function.  5.  Recent episode of GI bleed  6.  Dementia, progressively worsening.  Patient is now residing in nursing home.    Recommendations  Continue atenolol and diltiazem  If there is evidence of volume overload in future would recommend starting diuretics.  Follow-up in about 4 months in cardiology clinic.    Orders Placed This Encounter   Procedures     Follow-Up with Cardiologist       No orders of the defined types were placed in this encounter.      There are no discontinued medications.      Encounter Diagnosis   Name Primary?     Other secondary pulmonary hypertension (H)        CURRENT MEDICATIONS:  Current Outpatient Medications   Medication Sig Dispense Refill     acetaminophen (ACETAMINOPHEN 8 HOUR) 650 MG CR tablet Take 650 mg by mouth At Bedtime        atenolol (TENORMIN) 50 MG tablet Take 50 mg by mouth 2 times daily       diltiazem ER (DILT-XR) 180 MG 24 hr capsule Take 180 mg by mouth 2 times daily        docusate sodium (COLACE) 100 MG tablet Take 100 mg by mouth daily as needed for constipation       hypromellose (ARTIFICIAL TEARS) 0.5 % SOLN ophthalmic solution Place 1 drop into both eyes every hour as needed for dry eyes       magnesium citrate solution Take 100 mLs by mouth 3 times daily as needed for constipation or other        oxyCODONE (ROXICODONE) 5 MG tablet Take 1 tablet (5 mg) by mouth every 6 hours as needed for moderate to severe pain 12 tablet 0     pantoprazole (PROTONIX) 40 MG EC tablet Take 1 tablet (40 mg) by mouth 2 times daily (before meals)       polyethylene glycol (MIRALAX) 17 g packet Take 1 packet by mouth daily as needed for constipation          ALLERGIES     Allergies   Allergen Reactions     Xarelto [Rivaroxaban] Diarrhea     Ace Inhibitors Cough       PAST MEDICAL HISTORY:  Past Medical History:   Diagnosis Date     Aortic valve stenosis      Atrial flutter (H)      Cardiomyopathy (H)      Cognitive changes - SLUMS 16/30 4/13/2021     Severe aortic stenosis 4/7/2014     Shoulder fracture, left 3/22/15       PAST SURGICAL HISTORY:  Past Surgical History:   Procedure Laterality Date     CATARACT IOL, RT/LT Right      ENT SURGERY      glaucoma surgery     ESOPHAGOSCOPY, GASTROSCOPY, DUODENOSCOPY (EGD), COMBINED N/A 5/14/2021    Procedure: ESOPHAGOGASTRODUODENOSCOPY (EGD);  Surgeon: Alysha Cano MD;  Location:  GI     REPLACE VALVE AORTIC  4/10/2014    Procedure: REPLACE VALVE AORTIC;  Median sternotomy, Replace Aortic Valve on pump oxygenation. ;  Surgeon: Wesley Fong MD;  Location:  OR       FAMILY HISTORY:  Family History   Problem Relation Age of Onset     Dementia Brother      Alzheimer Disease Sister        SOCIAL HISTORY:  Social History     Socioeconomic History     Marital status:      Spouse name: Not on file     Number of children: Not on file     Years of education: Not on file     Highest education level: Not on file   Occupational History     Not on file   Social Needs     Financial resource strain: Not on file     Food insecurity     Worry: Not on file     Inability: Not on file     Transportation needs     Medical: Not on file     Non-medical: Not on file   Tobacco Use     Smoking status: Never Smoker     Smokeless tobacco: Never Used   Substance and Sexual Activity     Alcohol use: No     Alcohol/week: 0.0 standard drinks     Drug use: No     Sexual activity: Not Currently     Partners: Male   Lifestyle     Physical activity     Days per week: Not on file     Minutes per session: Not on file     Stress: Not on file   Relationships     Social connections     Talks on phone: Not on file     Gets together: Not on  "file     Attends Jew service: Not on file     Active member of club or organization: Not on file     Attends meetings of clubs or organizations: Not on file     Relationship status: Not on file     Intimate partner violence     Fear of current or ex partner: Not on file     Emotionally abused: Not on file     Physically abused: Not on file     Forced sexual activity: Not on file   Other Topics Concern     Parent/sibling w/ CABG, MI or angioplasty before 65F 55M? Not Asked      Service Not Asked     Blood Transfusions Not Asked     Caffeine Concern No     Occupational Exposure Not Asked     Hobby Hazards Not Asked     Sleep Concern Not Asked     Stress Concern Not Asked     Weight Concern Not Asked     Special Diet No     Back Care Not Asked     Exercise No     Comment: walking     Bike Helmet Not Asked     Seat Belt Not Asked     Self-Exams Not Asked   Social History Narrative     Not on file       Review of Systems:  Skin:  Negative       Eyes:  Positive for glasses    ENT:  Negative      Respiratory:  Positive for dyspnea on exertion     Cardiovascular:  Negative for;palpitations;chest pain edema;Positive for;lightheadedness;dizziness;fatigue    Gastroenterology: Negative      Genitourinary:  Negative      Musculoskeletal:  Positive for   right side pain  Neurologic:  Negative      Psychiatric:  Negative      Heme/Lymph/Imm:  Positive for allergies    Endocrine:  Negative        Physical Exam:  Vitals: /74 (BP Location: Right arm, Patient Position: Sitting, Cuff Size: Adult Regular)   Pulse 76   Ht 1.651 m (5' 5\")   Wt 66.5 kg (146 lb 9.6 oz)   SpO2 90%   BMI 24.40 kg/m      General patient appears comfortable  Neck JVP elevated to around 10 cm  Cardiovascular system irregular, normal rate, grade 2 x 6 systolic murmur heard best over the left lower sternal border, ejection systolic click heard over the right upper sternal border without any significant murmur  Respiratory system clear to " auscultation  Extremities minimal pitting pedal edema      CC  Hazel Arevalo MD  [facility]  [id number]  [place] [id number]

## 2021-06-16 ENCOUNTER — NURSING HOME VISIT (OUTPATIENT)
Dept: GERIATRICS | Facility: CLINIC | Age: 81
End: 2021-06-16
Payer: MEDICARE

## 2021-06-16 VITALS
HEIGHT: 64 IN | BODY MASS INDEX: 24.96 KG/M2 | TEMPERATURE: 98.5 F | HEART RATE: 65 BPM | WEIGHT: 146.2 LBS | OXYGEN SATURATION: 99 % | RESPIRATION RATE: 18 BRPM | SYSTOLIC BLOOD PRESSURE: 151 MMHG | DIASTOLIC BLOOD PRESSURE: 83 MMHG

## 2021-06-16 DIAGNOSIS — Z87.19 HISTORY OF GI BLEED: ICD-10-CM

## 2021-06-16 DIAGNOSIS — I27.29 OTHER SECONDARY PULMONARY HYPERTENSION (H): ICD-10-CM

## 2021-06-16 DIAGNOSIS — D62 ANEMIA DUE TO BLOOD LOSS, ACUTE: ICD-10-CM

## 2021-06-16 DIAGNOSIS — I48.11 LONGSTANDING PERSISTENT ATRIAL FIBRILLATION (H): Primary | ICD-10-CM

## 2021-06-16 DIAGNOSIS — M54.50 ACUTE BILATERAL LOW BACK PAIN WITHOUT SCIATICA: ICD-10-CM

## 2021-06-16 DIAGNOSIS — Z95.2 S/P AVR (AORTIC VALVE REPLACEMENT): ICD-10-CM

## 2021-06-16 DIAGNOSIS — F03.90 DEMENTIA WITHOUT BEHAVIORAL DISTURBANCE, UNSPECIFIED DEMENTIA TYPE: ICD-10-CM

## 2021-06-16 PROCEDURE — 99309 SBSQ NF CARE MODERATE MDM 30: CPT | Performed by: NURSE PRACTITIONER

## 2021-06-16 ASSESSMENT — MIFFLIN-ST. JEOR: SCORE: 1108.16

## 2021-06-16 NOTE — PROGRESS NOTES
[facility] [facility]  [place] Medical Record Number:  [id number]  Place of Service where encounter took place:  [facility] [[id number]]  Chief Complaint   Patient presents with     Nursing Home Acute       HPI:    Khalida Rouse  is a 81 year old (1940), who is being seen today for an episodic care visit.  HPI information obtained from: facility chart records, facility staff and patient report. Today's concern is:       Brief Summary of Hospital Course:   Khaliad is a 81 year old female whom presented with weakness, fatigue, lightheadedness, and melena to the [facility] ED.   Chronic health issues include atrial fib/flutter, s/p aortic valve replacement with tissue valve and on Warfarin, and dementia.     In the [facility] she was found to have a INR >10 and HgB = 8.3 down from baseline of 14.    She was transfused 2 units of PRBCs, given Vitamin k and transferred to [facility] for management.     HgB stable around 8.4 for 24 hours prior to discharge to [facility].  INR was 1.28 on 5/15.  Discussed the pros/cons of Warfarin and decision made to discontinue medication while at [facility].   did have a EGD done and showed no active bleeding or source, had patchy moderate erythematous mucosa at gastric antrum.  Felt that she had a diffuse mucosal bleeding due to supra therapeutic INR that is now resolved.  Continued with Protonix 40mg twice a day.     As for the Atrial Fibrillation.  She will continue with Atenolol 50mg twice a day and Cardizem 180mg daily.  Khalida lives alone and has Dementia and high risk of bleeding.  Warfarin discontinued.       As for the compression fracture/back pain, Tylenol 1000mg every 6 hours is used and Lidocaine patches were applied to her back.  In the hospital, IV fentanyl was used for the severe pain.     It is noted that if she continues to have vaginal bleeding, then OBGYN to be consulted but just to monitor.       Updates on Status Since  Skilled nursing Admission:   5/20 BIMS and PHQ-9 was done and scored 8/15 on BIMS and 9/27 on PHQ-9  While she is here, therapies will be working with her on mobility, ADLs, exercise and strengthening.  Was told that family is looking at assisted living most likely as she is a risk to be by herself.    5/27 MD visit   6/2 UA/UC ordered for confusion and low grade temp of 99.0  6/3 denies urinary complaints.   6/9 staff reports weight gain variable and edema. Ace wraps were started.   6/15-saw cardiology  Was to discharge from TCU, however, unable to find [facility] willing to admit her due to worsening sun downers. Staff had hoped after UTI cleared, cognitive symptoms would clear.  They have not  Today:  Patient pleasant, confused. In wc, awaiting lunch.  Denies CP, SOB, uncontrolled pain.  Offers no complaints    Past Medical and Surgical History and medications reviewed in Epic today.    MEDICATIONS:    Current Outpatient Medications   Medication Sig Dispense Refill     acetaminophen (ACETAMINOPHEN 8 HOUR) 650 MG CR tablet Take 650 mg by mouth 3 times daily       atenolol (TENORMIN) 50 MG tablet Take 50 mg by mouth 2 times daily       diltiazem ER (DILT-XR) 180 MG 24 hr capsule Take 180 mg by mouth 2 times daily        docusate sodium (COLACE) 100 MG tablet Take 100 mg by mouth daily as needed for constipation       hypromellose (ARTIFICIAL TEARS) 0.5 % SOLN ophthalmic solution Place 1 drop into both eyes every hour as needed for dry eyes       magnesium citrate solution Take 100 mLs by mouth 3 times daily as needed for constipation or other        pantoprazole (PROTONIX) 40 MG EC tablet Take 1 tablet (40 mg) by mouth 2 times daily (before meals)       polyethylene glycol (MIRALAX) 17 g packet Take 1 packet by mouth daily as needed for constipation         REVIEW OF SYSTEMS:  Limited secondary to cognitive impairment but today pt reports no concerns    Objective:  BP (!) 151/83   Pulse 65   Temp 98.5  F (36.9  C)    "Resp 18   Ht 1.626 m (5' 4\")   Wt 66.3 kg (146 lb 3.2 oz)   SpO2 99%   BMI 25.10 kg/m    Exam:  GENERAL APPEARANCE:  Alert, in no distress, appears comfortable in WC  RESP:  lungs clear to auscultation , no respiratory distress  CV:  Irregularly irregular, 2/6 systolic murmur heard best over LL Sternal border, edema 1+ bilateral feet  M/S:   Gait and station abnormal WC  PSYCH:  memory impaired , affect and mood pleasant    Labs:   No new labs    ASSESSMENT/PLAN:  Longstanding persistent atrial fibrillation (H)  S/P AVR (aortic valve replacement)  Other secondary pulmonary hypertension (H)  Rate controled  Continues on Atenolol and diltiazem  Weight stable  Follows with cardiology.  No new intervention plan after cards visit yesterday given patient co-morbidities.  Did recommend starting diuretic if evidence of volume overload  Continue with LE wraps      Acute bilateral low back pain without sciatica  APAP increased to TID for optimal analgesia  Has not used narcotics.  Oxycodone discontinued    Dementia without behavioral disturbance, unspecified dementia type (H)  Progressive.  Sundowning-becomes anxious, irritable.  SW working on safe discharge plan to appropriate residential  Continue to anticipate needs, provide reassurance and redirection as needed    Anemia due to blood loss, acute  History of GI bleed  No further evidence of bleeding  hgb stable in mid 9's  On PPI--continue for 1 more month then consider decreasing to 40 mg daily (down from BID) for a couple of weeks and discontinue if not needed for other indications        Electronically signed by:  DARLENE Juan CNP           "

## 2021-06-16 NOTE — LETTER
6/16/2021        RE: Khalida Rouse  [address]  [facility] [address]        [facility]  [facility] Medical Record Number:  [id number]  Place of Service where encounter took place:  [facility] [[id number]]  Chief Complaint   Patient presents with     Nursing Home Acute       HPI:    Khalida Rouse  is a 81 year old (1940), who is being seen today for an episodic care visit.  HPI information obtained from: facility chart records, facility staff and patient report. Today's concern is:       Brief Summary of Hospital Course:   Khalida is a 81 year old female whom presented with weakness, fatigue, lightheadedness, and melena to the [facility] ED.   Chronic health issues include atrial fib/flutter, s/p aortic valve replacement with tissue valve and on Warfarin, and dementia.     In the [facility] she was found to have a INR >10 and HgB = 8.3 down from baseline of 14.    She was transfused 2 units of PRBCs, given Vitamin k and transferred to [facility] for management.     HgB stable around 8.4 for 24 hours prior to discharge to [facility].  INR was 1.28 on 5/15.  Discussed the pros/cons of Warfarin and decision made to discontinue medication while at [facility].   did have a EGD done and showed no active bleeding or source, had patchy moderate erythematous mucosa at gastric antrum.  Felt that she had a diffuse mucosal bleeding due to supra therapeutic INR that is now resolved.  Continued with Protonix 40mg twice a day.     As for the Atrial Fibrillation.  She will continue with Atenolol 50mg twice a day and Cardizem 180mg daily.  Khalida lives alone and has Dementia and high risk of bleeding.  Warfarin discontinued.       As for the compression fracture/back pain, Tylenol 1000mg every 6 hours is used and Lidocaine patches were applied to her back.  In the hospital, IV fentanyl was used for the severe pain.     It is noted that if she continues to have vaginal bleeding,  then OBGYN to be consulted but just to monitor.       Updates on Status Since Skilled nursing Admission:   5/20 BIMS and PHQ-9 was done and scored 8/15 on BIMS and 9/27 on PHQ-9  While she is here, therapies will be working with her on mobility, ADLs, exercise and strengthening.  Was told that family is looking at assisted living most likely as she is a risk to be by herself.    5/27 MD visit   6/2 UA/UC ordered for confusion and low grade temp of 99.0  6/3 denies urinary complaints.   6/9 staff reports weight gain variable and edema. Ace wraps were started.   6/15-saw cardiology  Was to discharge from TCU, however, unable to find [facility] willing to admit her due to worsening sun downers. Staff had hoped after UTI cleared, cognitive symptoms would clear.  They have not  Today:  Patient pleasant, confused. In wc, awaiting lunch.  Denies CP, SOB, uncontrolled pain.  Offers no complaints    Past Medical and Surgical History and medications reviewed in Epic today.    MEDICATIONS:    Current Outpatient Medications   Medication Sig Dispense Refill     acetaminophen (ACETAMINOPHEN 8 HOUR) 650 MG CR tablet Take 650 mg by mouth 3 times daily       atenolol (TENORMIN) 50 MG tablet Take 50 mg by mouth 2 times daily       diltiazem ER (DILT-XR) 180 MG 24 hr capsule Take 180 mg by mouth 2 times daily        docusate sodium (COLACE) 100 MG tablet Take 100 mg by mouth daily as needed for constipation       hypromellose (ARTIFICIAL TEARS) 0.5 % SOLN ophthalmic solution Place 1 drop into both eyes every hour as needed for dry eyes       magnesium citrate solution Take 100 mLs by mouth 3 times daily as needed for constipation or other        pantoprazole (PROTONIX) 40 MG EC tablet Take 1 tablet (40 mg) by mouth 2 times daily (before meals)       polyethylene glycol (MIRALAX) 17 g packet Take 1 packet by mouth daily as needed for constipation         REVIEW OF SYSTEMS:  Limited secondary to cognitive impairment but today pt reports no  "concerns    Objective:  BP (!) 151/83   Pulse 65   Temp 98.5  F (36.9  C)   Resp 18   Ht 1.626 m (5' 4\")   Wt 66.3 kg (146 lb 3.2 oz)   SpO2 99%   BMI 25.10 kg/m    Exam:  GENERAL APPEARANCE:  Alert, in no distress, appears comfortable in WC  RESP:  lungs clear to auscultation , no respiratory distress  CV:  Irregularly irregular, 2/6 systolic murmur heard best over LL Sternal border, edema 1+ bilateral feet  M/S:   Gait and station abnormal WC  PSYCH:  memory impaired , affect and mood pleasant    Labs:   No new labs    ASSESSMENT/PLAN:  Longstanding persistent atrial fibrillation (H)  S/P AVR (aortic valve replacement)  Other secondary pulmonary hypertension (H)  Rate controled  Continues on Atenolol and diltiazem  Weight stable  Follows with cardiology.  No new intervention plan after cards visit yesterday given patient co-morbidities.  Did recommend starting diuretic if evidence of volume overload  Continue with LE wraps      Acute bilateral low back pain without sciatica  APAP increased to TID for optimal analgesia  Has not used narcotics.  Oxycodone discontinued    Dementia without behavioral disturbance, unspecified dementia type (H)  Progressive.  Sundowning-becomes anxious, irritable.  SW working on safe discharge plan to appropriate [facility]  Continue to anticipate needs, provide reassurance and redirection as needed    Anemia due to blood loss, acute  History of GI bleed  No further evidence of bleeding  hgb stable in mid 9's  On PPI--continue for 1 more month then consider decreasing to 40 mg daily (down from BID) for a couple of weeks and discontinue if not needed for other indications        Electronically signed by:  DARLENE Juan CNP                 Sincerely,        DARLENE Juan CNP    "

## 2021-06-17 ENCOUNTER — PATIENT OUTREACH (OUTPATIENT)
Dept: CARE COORDINATION | Facility: CLINIC | Age: 81
End: 2021-06-17

## 2021-06-17 NOTE — PROGRESS NOTES
Clinic Care Coordination Contact    Situation: Patient chart reviewed by care coordinator.    Background: Patient was hospitalized at [facility] from 5/13/2021 to 5/17/2021 with diagnosis of hypoxemia and low back pain and discharged to TCU    Assessment: Pt will need to transition to AL however, looking for one that will take her with her dementia.     Plan/Recommendations: RNCC will continue to monitor for discharge disposition weekly.     Pratibha OGDEN, RN, PHN, CCM  Primary Clinic Care Coordination    [facility]  Primary Care Clinics  [email or web address]   Office: [phone]  Employed by [facility]

## 2021-06-25 ENCOUNTER — DISCHARGE SUMMARY NURSING HOME (OUTPATIENT)
Dept: GERIATRICS | Facility: CLINIC | Age: 81
End: 2021-06-25
Payer: MEDICARE

## 2021-06-25 VITALS
TEMPERATURE: 97.5 F | OXYGEN SATURATION: 83 % | BODY MASS INDEX: 26.78 KG/M2 | RESPIRATION RATE: 20 BRPM | WEIGHT: 156 LBS | DIASTOLIC BLOOD PRESSURE: 62 MMHG | SYSTOLIC BLOOD PRESSURE: 113 MMHG | HEART RATE: 65 BPM

## 2021-06-25 DIAGNOSIS — R09.02 HYPOXEMIA: ICD-10-CM

## 2021-06-25 DIAGNOSIS — M62.81 GENERALIZED MUSCLE WEAKNESS: ICD-10-CM

## 2021-06-25 DIAGNOSIS — I48.91 ATRIAL FIBRILLATION WITH RVR (H): Primary | ICD-10-CM

## 2021-06-25 DIAGNOSIS — D62 ANEMIA DUE TO BLOOD LOSS, ACUTE: ICD-10-CM

## 2021-06-25 DIAGNOSIS — Z87.01 HISTORY OF PNEUMONIA: ICD-10-CM

## 2021-06-25 DIAGNOSIS — M54.50 LOW BACK PAIN, UNSPECIFIED BACK PAIN LATERALITY, UNSPECIFIED CHRONICITY, UNSPECIFIED WHETHER SCIATICA PRESENT: ICD-10-CM

## 2021-06-25 DIAGNOSIS — F03.90 DEMENTIA WITHOUT BEHAVIORAL DISTURBANCE, UNSPECIFIED DEMENTIA TYPE: ICD-10-CM

## 2021-06-25 DIAGNOSIS — I27.20 PULMONARY HYPERTENSION (H): ICD-10-CM

## 2021-06-25 DIAGNOSIS — K59.01 SLOW TRANSIT CONSTIPATION: ICD-10-CM

## 2021-06-25 DIAGNOSIS — Z87.19 HISTORY OF GI BLEED: ICD-10-CM

## 2021-06-25 PROCEDURE — 99316 NF DSCHRG MGMT 30 MIN+: CPT | Performed by: NURSE PRACTITIONER

## 2021-06-25 NOTE — LETTER
6/25/2021        RE: Khalida Rouse  [address]  [facility] [address]        [place] [facility] DISCHARGE SUMMARY  PATIENT'S NAME: Khalida Rouse  YOB: 1940  MEDICAL RECORD NUMBER:  [id number]  Place of Service where encounter took place:  [facility] [place] ([facility]) [[id number]]    PRIMARY CARE PROVIDER AND CLINIC RESPONSIBLE AFTER TRANSFER:   Mazin Larsen DO, [address] [facility] [address]    [facility] Provider     Transferring providers: DARLENE Gates CNP, Claudia Molina MD  Recent Hospitalization/ED:  [facility] Hospital stay 5/13/2021 to 5/17/2021.  Date of SNF Admission: May 17, 2021  Date of SNF (anticipated) Discharge: July 01, 2021  Discharged to: new assisted living for patient Jacobo Varela  Cognitive Scores: BIMS: 8/15   5/19/21  SLUMS 7/30  Physical Function: limited assist from staff for transfers  DME: Wheelchair F2F done this visit   Hospital Bed F2F done this visit   Home Oxygen - will wait to see next week after CXR is done    CODE STATUS/ADVANCE DIRECTIVES DISCUSSION:  DNR / DNI  ALLERGIES: Xarelto [rivaroxaban] and Ace inhibitors    DISCHARGE DIAGNOSIS/NURSING FACILITY COURSE:   Atrial fibrillation with RVR (H)  Khalida came to [place] for rehab after having a hospital stay at [facility] for a GI bleed most likely from a supratherapeutic INR.  At the time of the hospitalization, the pros and cons discussed with family and Coumadin was discontinued.    Remains on diltiazem 180mg twice a day as well as Atenolol 50mg twice a day.    Today her heart rate was regular.  She had no complaints of chest discomfort, nor did she appear to be in any acute distress.    No changes.    Hypoxemia  History of pneumonia  Report today was that Khalida has had low O2 sats for the last couple of days and on oxygen.  Found her in her room with not wearing the oxygen and tank hanging on her wheelchair.  Denies shortness of breath but  cognitively do not feel she may be a good historian with knowing her breathing.  Could hear some congestion in the lung bases.  Spoke with the floor nurse about a CXR either this weekend or Monday and thought that Monday may be best.  If need to push it up during the weekend, staff can call on-call provider.    Will add oxygen in today's orders but will see if she will need it before discharge next Thursday.  Will update primary NP.      History of GI bleed  Remains on Pantoprazole 40mg twice a day.  This NP will be caring for her at the [facility] and so eventually will try a dose reduction.      Pulmonary hypertension (H)  Blood pressures range from 110-130's/60-80's with a few outliers of SBP in the 150-170's.  Most are within nice limits.    Again she is on Atenolol 50mg twice a day and Diltiazem 180mg twice a day.      Anemia due to blood loss, acute  No iron replacement.  Last HgB level was 9.6 on 5/26/21.    Dementia without behavioral disturbance, unspecified dementia type (H)  New diagnosis for Khalida.  Scored a 8/15 for the BIMS.  Able to see the alteration in cognition with conversation.  Very short term memory.  No behaviors at this time.  No medications.  Will be moving to a memory care unit in [place] where she will have supervision but also programming with other residents.  Feel she will benefit from the unit as well as receiving assist with cares.  On 5/19/21  SLUMS done and scored 7/30    Low back pain, unspecified back pain laterality, unspecified chronicity, unspecified whether sciatica present  Generalized muscle weakness  - activity level is up with assist from nursing.  Currently able to move self in wheelchair.  Limited assist with transfers.  Does receive Tylenol 8 hour 650mg po 3x day       Slow transit constipation  Does not have anything scheduled for bowel medications but has PRNs available.  Colace, senna and Mg citrate.    Will discontinue all three of these orders today.  Keeping the PRN Miralax  order.  Has a BM every 2-3 days and on each day she has about 2-3.        Past Medical History:  has a past medical history of Aortic valve stenosis, Atrial flutter (H), Cardiomyopathy (H), Cognitive changes - SLUMS 16/30 (4/13/2021), Severe aortic stenosis (4/7/2014), and Shoulder fracture, left (3/22/15).    Discharge Medications:    Current Outpatient Medications   Medication Sig Dispense Refill     acetaminophen (ACETAMINOPHEN 8 HOUR) 650 MG CR tablet Take 650 mg by mouth 3 times daily       atenolol (TENORMIN) 50 MG tablet Take 50 mg by mouth 2 times daily       diltiazem ER (DILT-XR) 180 MG 24 hr capsule Take 180 mg by mouth 2 times daily        hypromellose (ARTIFICIAL TEARS) 0.5 % SOLN ophthalmic solution Place 1 drop into both eyes every hour as needed for dry eyes       pantoprazole (PROTONIX) 40 MG EC tablet Take 1 tablet (40 mg) by mouth 2 times daily (before meals)       polyethylene glycol (MIRALAX) 17 g packet Take 1 packet by mouth daily as needed for constipation         Medication Changes/Rationale:     5/18/21  OT eval and treat for ADLs, therapeutic ex, cog skills and testing prn, and functional mobility.    5/18/21  Discontinue Ensure with meals TID, 4oz house supplement TID    5/18/21  PT to eval and treat for therapeutic exercise, functional activity, gait training and neur re-ed    5/21/21  CBC and BMP for Wednesday 5/26, change Tylenol order to TID for pain, utilize PRN oxycodone with a pain assessment every shift if extreme pain.  ACP referral to assess resident.  PHQ-9 score is 9    5/21/21  Change oxygen order to PRN for hypoxia.    6/2/21  Collect UA/UC for increased frequency, dysuria, incontinence.    6/5/21  Apply ACE wraps in AM and remove at HS.  Start Macrobid 100mg twice a day x5 days UTI, edema    6/7/21  Discontinue oxygen order prn, discontinue oxycodone    6/15/21  Dr. Flanagan visit.  Cardiac stable.  HR well controlled.  Follow up in 4 months.    6/16/21  Discontinue  PT/OT    6/25/21  Dx of Dementia without behaviors, ok to discharge to memory care unit at [facility] with list of medications.  F2F done for hospital bed and w/c, CXR AP and lateral view on Monday due to low oxygen levels,  Discontinue Mg citrate, senna and colace orders.    Controlled medications sent with patient:   not applicable/none     ROS:   Limited secondary to cognitive impairment but today pt reports no chest pain, no acute SOB then did not participate in ROS    Physical Exam:   Vitals: /62   Pulse 65   Temp 97.5  F (36.4  C)   Resp 20   Wt 70.8 kg (156 lb)   SpO2 (!) 83%   BMI 26.78 kg/m    BMI= Body mass index is 26.78 kg/m .  GENERAL APPEARANCE:  Alert, in no distress, cooperative, confused  EYES:  EOM, conjunctivae, lids, pupils and irises normal, wearing glasses, following NP as moving around room  NECK:  No adenopathy,masses or thyromegaly  RESP:  has been having oxygen at 2L/min and currently not on.  no acute distress.  congestion bilateral bases.    CV:  Palpation and auscultation of heart done , heart rate regular and controlled.  ACE wraps on legs +3 edema.    ABDOMEN:  normal bowel sounds, soft, nontender, no hepatosplenomegaly or other masses, no guarding or rebound  :    continent of urine  M/S:   Gait and station abnormal propels self in wheelchair, staff help with transfers but limited.  fall risk  SKIN:  pink, warm and dry.  no open wounds  PSYCH:  oriented to person, family with place and time vague, memory impaired , affect and mood normal     SNF labs:   Component      Latest Ref Rng & Units 5/26/2021   Sodium      133 - 144 mmol/L 142   Potassium      3.4 - 5.3 mmol/L 3.7   Chloride      94 - 109 mmol/L 108   Carbon Dioxide      20 - 32 mmol/L 30   Anion Gap      3 - 14 mmol/L 4   Glucose      70 - 99 mg/dL 85   Urea Nitrogen      7 - 30 mg/dL 14   Creatinine      0.52 - 1.04 mg/dL 0.85   GFR Estimate      >60 mL/min/1.73:m2 64   GFR Estimate If Black      >60  mL/min/1.73:m2 74   Calcium      8.5 - 10.1 mg/dL 8.5   WBC      4.0 - 11.0 10e9/L 6.3   RBC Count      3.8 - 5.2 10e12/L 3.40 (L)   Hemoglobin      11.7 - 15.7 g/dL 9.6 (L)   Hematocrit      35.0 - 47.0 % 30.6 (L)   MCV      78 - 100 fl 90   MCH      26.5 - 33.0 pg 28.2   MCHC      31.5 - 36.5 g/dL 31.4 (L)   RDW      10.0 - 15.0 % 15.8 (H)   Platelet Count      150 - 450 10e9/L 298     21 in hospital  HgB = 8.4    DISCHARGE PLAN:    Follow up labs: No labs orders/due    Medical Follow Up:      New primary provider will see in the memory care unit after she is admitted.    MTM referral needed and placed by this provider: No    Current [place] scheduled appointments:       Discharge Services: No therapy or home care recommended.     Discharge Instructions Verbalized to Patient at Discharge:     Weight bearing restrictions:  Weight bearing as tolerated.       TOTAL DISCHARGE TIME:   Greater than 30 minutes  Electronically signed by:  DARLENE Gates CNP     Face to Face will be attached in separate notes for this date                  Face to Face and Medical Necessity Statement for DME Provider visit    Demographic Information on Khalida Rouse:  Gender: female  : 1940  [address]  [facility] [address]  [phone] (home)     Medical Record: [id number]  Social Security Number: [id number]  Primary Care Provider: Mazin Larsen  Insurance: Payor: MEDICARE / Plan: MEDICARE / Product Type: Medicare /     HPI:   Khalida Rouse is a 81 year old  (1940), who is being seen today for a face to face provider visit at [facility]; medical necessity statement for DME included. This patient requires the following:  DME Ordered and Medical Necessity Statement     Wheelchair Documentation  Size: 18 x 16  Corresponding cushion: Yes: seat and back cushion  Standard foot rests: Yes  Elevating leg rests: No  Arm rests: Yes: cushion  Lap tray: No  Dose the patient use oxygen? No at  times may need it but does not need a [facility]  Is the patient able to propel wheelchair? Yes  And is there someone who can? - family  1. The patient has mobility limitations that impairs their ability to participate in one or more mobility related activities: Toileting, Grooming and Bathing.  The wheelchair is suitable and necessary for use in the patient's home.  2. The patient's mobility limitations cannot be safely resolved by using a cane/walker:Yes    Reason why a cane or walker will not meet the patient's needs. (ie: balance, tolerance, level of assistance) balance, level of assist  3. The patients home has adequate access to use a manual wheelchair:Yes  4. The use of a manual wheelchair on a regular basis will improve the patients ability to participate in mobility related ADL's at home:Yes  5. The patient is willing to use a manual wheelchair at home:Yes  6. The patient has adequate upper body strength and the mental capability to safely use a manual wheelchair and/or has a caregiver that is able to assist: Yes  7. Does the patient have a lower extremity injury or edema?Yes     Reason for Type of Wheelchair  Patient weight: 156 lbs  Light Weight Wheelchair: Patient is unable to self-propel a standard wheelchair in the home but can self propel a light weight wheelchair.    Pt needing above DME with expected length of need of 99 indefinitely - lifetime due to medical necessity associated with following diagnosis:     Atrial fibrillation with RVR (H)  Hypoxemia  History of pneumonia  History of GI bleed  Pulmonary hypertension (H)  Anemia due to blood loss, acute  Dementia without behavioral disturbance, unspecified dementia type (H)  Low back pain, unspecified back pain laterality, unspecified chronicity, unspecified whether sciatica present  Generalized muscle weakness  Slow transit constipation      PMH   has a past medical history of Aortic valve stenosis, Atrial flutter (H), Cardiomyopathy (H), Cognitive  changes - SLUMS 16/30 (4/13/2021), Severe aortic stenosis (4/7/2014), and Shoulder fracture, left (3/22/15).   SLUMS on 5/19/21 was 7/30  BIMS 8/15    ROS:Limited secondary to cognitive impairment but today pt reports no chest pain and no acute SOB    EXAM  Vitals: /62   Pulse 65   Temp 97.5  F (36.4  C)   Resp 20   Wt 70.8 kg (156 lb)   SpO2 (!) 83%   BMI 26.78 kg/m  ;BMI= Body mass index is 26.78 kg/m .  GENERAL APPEARANCE:  Alert, in no distress, cooperative, confused  EYES:  EOM, conjunctivae, lids, pupils and irises normal, wearing glasses, following NP as moving around room  NECK:  No adenopathy,masses or thyromegaly  RESP:  has been having oxygen at 2L/min and currently not on.  no acute distress.  congestion bilateral bases.    CV:  Palpation and auscultation of heart done , heart rate regular and controlled.  ACE wraps on legs +3 edema.    ABDOMEN:  normal bowel sounds, soft, nontender, no hepatosplenomegaly or other masses, no guarding or rebound  :    continent of urine  M/S:   Gait and station abnormal propels self in wheelchair, staff help with transfers but limited.  fall risk  SKIN:  pink, warm and dry.  no open wounds  PSYCH:  oriented to person, family with place and time vague, memory impaired , affect and mood normal    ASSESSMENT/PLAN:  1. Atrial fibrillation with RVR (H)    2. Hypoxemia    3. History of pneumonia    4. History of GI bleed    5. Pulmonary hypertension (H)    6. Anemia due to blood loss, acute    7. Dementia without behavioral disturbance, unspecified dementia type (H)    8. Low back pain, unspecified back pain laterality, unspecified chronicity, unspecified whether sciatica present    9. Generalized muscle weakness    10. Slow transit constipation      Khalida would benefit from a 18 x16 wheelchair for lifetime use as she has general muscle weakness and low back pain.  Her other diagnoses above limit her for endurance and balance that she is able to safely propel the  wheelchair herself.  Her new home will have adequate space for her maneuver the wheelchair through doors.  If she is not able to propel her wheelchair there are family members that can.  The wheelchair will allow her to be independent with moving around her apartment and facility safely where using another devise would limit her for endurance and balance if used alone.  Orders:  1. Facility staff/TC to contact DME company to get their order form for provider to fill out    ELECTRONICALLY SIGNED BY GINNY CERTIFIED PROVIDER:  DARLENE Gates CNP   NPI: [id number]  [place] [facility]  [address] [facility] [address]            Face to Face and Medical Necessity Statement for DME Provider visit    Demographic Information on Khalida Rouse:  Gender: female  : 1940  [address] [facility] [address]  [phone] (home)     Medical Record: [id number]  Social Security Number: [id number]  Primary Care Provider: Mazin Larsen  Insurance: Payor: MEDICARE / Plan: MEDICARE / Product Type: Medicare /     HPI:   Khalida Rouse is a 81 year old  (1940), who is being seen today for a face to face provider visit at [facility]; medical necessity statement for DME included. This patient requires the following:  DME Ordered and Medical Necessity Statement     Khalida would benefit a semi-electric hospital bed for lifetime (99) as she has chronic health issues that include hypoxia, bilateral lower extremity edema, general weakness and low back pain.  Having an electric hospital bed would allow her to adjust the height for ease of surface to surface transfers, adjust to help with pain in her lower back, adjust the head for immediate ease of breathing as she has hypoxia.  Khalida would not be able to achieve this with a regular bed.    The patient does  require positioning of the body in ways not feasible with an ordinary bed due to a medical condition that is expected to last at  least 1 month due to chronic hypoxia, low back pain, general weakness and assist with mobility.   The patient does  require, for the alleviation of pain, postioning of the body in ways not feasible with an ordinary bed.   The patient does  require the head of bed elevated more than 30* most of the time due to CHF, chronic pulmonary disease or aspiration.  The patient does not require traction that can only be attached to a hospital bed.  The patient does  require a bed height different than a fixed height hospital bed to permit tranfers to wheelchair or standing position.   The patient does  require frequent or immediate changes in body position due to low back pain and hypoxia.       Pt needing above DME with expected length of need of 99 life time due to medical necessity associated with following diagnosis:     Atrial fibrillation with RVR (H)  Hypoxemia  History of pneumonia  History of GI bleed  Pulmonary hypertension (H)  Anemia due to blood loss, acute  Dementia without behavioral disturbance, unspecified dementia type (H)  Low back pain, unspecified back pain laterality, unspecified chronicity, unspecified whether sciatica present  Generalized muscle weakness  Slow transit constipation      PMH   has a past medical history of Aortic valve stenosis, Atrial flutter (H), Cardiomyopathy (H), Cognitive changes - SLUMS 16/30 (4/13/2021), Severe aortic stenosis (4/7/2014), and Shoulder fracture, left (3/22/15).    ROS:Limited secondary to cognitive impairment but today pt reports no chest pain and no acute shortness of breath.    EXAM  Vitals: /62   Pulse 65   Temp 97.5  F (36.4  C)   Resp 20   Wt 70.8 kg (156 lb)   SpO2 (!) 83%   BMI 26.78 kg/m  ;BMI= Body mass index is 26.78 kg/m .  GENERAL APPEARANCE:  Alert, in no distress, cooperative, confused  EYES:  EOM, conjunctivae, lids, pupils and irises normal, wearing glasses, following NP as moving around room  NECK:  No adenopathy,masses or  thyromegaly  RESP:  has been having oxygen at 2L/min and currently not on.  no acute distress.  congestion bilateral bases.    CV:  Palpation and auscultation of heart done , heart rate regular and controlled.  ACE wraps on legs +3 edema.    ABDOMEN:  normal bowel sounds, soft, nontender, no hepatosplenomegaly or other masses, no guarding or rebound  :    continent of urine  M/S:   Gait and station abnormal propels self in wheelchair, staff help with transfers but limited.  fall risk  SKIN:  pink, warm and dry.  no open wounds  PSYCH:  oriented to person, family with place and time vague, memory impaired , affect and mood normal    ASSESSMENT/PLAN:  1. Atrial fibrillation with RVR (H)    2. Hypoxemia    3. History of pneumonia    4. History of GI bleed    5. Pulmonary hypertension (H)    6. Anemia due to blood loss, acute    7. Dementia without behavioral disturbance, unspecified dementia type (H)    8. Low back pain, unspecified back pain laterality, unspecified chronicity, unspecified whether sciatica present    9. Generalized muscle weakness    10. Slow transit constipation      Khalida is able to move to a less restricted environment and to achieve independence a semi-electric hospital bed would be required for height adjustment for transfers, turning and repositioning as well as head of the bed elevation for hypoxia.      Orders:  1. Facility staff/TC to contact DME company to get their order form for provider to fill out    ELECTRONICALLY SIGNED BY GINNY CERTIFIED PROVIDER:  DARLENE Gates CNP   NPI: [id number]  [place] [facility]  [address] [place] [address]            Sincerely,        DARLENE Gates CNP

## 2021-06-25 NOTE — PROGRESS NOTES
[facility] DISCHARGE SUMMARY  PATIENT'S NAME: Khalida Rouse  YOB: 1940  MEDICAL RECORD NUMBER:  [id number]  Place of Service where encounter took place:  [facility] [place] ([facility]) [[id number]]    PRIMARY CARE PROVIDER AND CLINIC RESPONSIBLE AFTER TRANSFER:   Mazin Larsen DO, [address] [facility]  / [place] [id number]    G Provider     Transferring providers: DARLENE Gates CNP, Claudia Molina MD  Recent Hospitalization/ED:  [facility] stay 5/13/2021 to 5/17/2021.  Date of SNF Admission: May 17, 2021  Date of SNF (anticipated) Discharge: July 01, 2021  Discharged to: new assisted living for patient [facility]  Cognitive Scores: BIMS: 8/15   5/19/21  SLUMS 7/30  Physical Function: limited assist from staff for transfers  DME: Wheelchair F2F done this visit   Hospital Bed F2F done this visit   Home Oxygen - will wait to see next week after CXR is done    CODE STATUS/ADVANCE DIRECTIVES DISCUSSION:  DNR / DNI  ALLERGIES: Xarelto [rivaroxaban] and Ace inhibitors    DISCHARGE DIAGNOSIS/NURSING FACILITY COURSE:   Atrial fibrillation with RVR (H)  Khalida came to [place] for rehab after having a hospital stay at [facility] for a GI bleed most likely from a supratherapeutic INR.  At the time of the hospitalization, the pros and cons discussed with family and Coumadin was discontinued.    Remains on diltiazem 180mg twice a day as well as Atenolol 50mg twice a day.    Today her heart rate was regular.  She had no complaints of chest discomfort, nor did she appear to be in any acute distress.    No changes.    Hypoxemia  History of pneumonia  Report today was that Khalida has had low O2 sats for the last couple of days and on oxygen.  Found her in her room with not wearing the oxygen and tank hanging on her wheelchair.  Denies shortness of breath but cognitively do not feel she may be a good historian with knowing her breathing.  Could  hear some congestion in the lung bases.  Spoke with the floor nurse about a CXR either this weekend or Monday and thought that Monday may be best.  If need to push it up during the weekend, staff can call on-call provider.    Will add oxygen in today's orders but will see if she will need it before discharge next Thursday.  Will update primary NP.      History of GI bleed  Remains on Pantoprazole 40mg twice a day.  This NP will be caring for her at the [facility] and so eventually will try a dose reduction.      Pulmonary hypertension (H)  Blood pressures range from 110-130's/60-80's with a few outliers of SBP in the 150-170's.  Most are within nice limits.    Again she is on Atenolol 50mg twice a day and Diltiazem 180mg twice a day.      Anemia due to blood loss, acute  No iron replacement.  Last HgB level was 9.6 on 5/26/21.    Dementia without behavioral disturbance, unspecified dementia type (H)  New diagnosis for Khalida.  Scored a 8/15 for the BIMS.  Able to see the alteration in cognition with conversation.  Very short term memory.  No behaviors at this time.  No medications.  Will be moving to a memory care unit in [place] where she will have supervision but also programming with other residents.  Feel she will benefit from the unit as well as receiving assist with cares.  On 5/19/21  SLUMS done and scored 7/30    Low back pain, unspecified back pain laterality, unspecified chronicity, unspecified whether sciatica present  Generalized muscle weakness  - activity level is up with assist from nursing.  Currently able to move self in wheelchair.  Limited assist with transfers.  Does receive Tylenol 8 hour 650mg po 3x day       Slow transit constipation  Does not have anything scheduled for bowel medications but has PRNs available.  Colace, senna and Mg citrate.    Will discontinue all three of these orders today.  Keeping the PRN Miralax order.  Has a BM every 2-3 days and on each day she has about 2-3.        Past Medical  History:  has a past medical history of Aortic valve stenosis, Atrial flutter (H), Cardiomyopathy (H), Cognitive changes - SLUMS 16/30 (4/13/2021), Severe aortic stenosis (4/7/2014), and Shoulder fracture, left (3/22/15).    Discharge Medications:    Current Outpatient Medications   Medication Sig Dispense Refill     acetaminophen (ACETAMINOPHEN 8 HOUR) 650 MG CR tablet Take 650 mg by mouth 3 times daily       atenolol (TENORMIN) 50 MG tablet Take 50 mg by mouth 2 times daily       diltiazem ER (DILT-XR) 180 MG 24 hr capsule Take 180 mg by mouth 2 times daily        hypromellose (ARTIFICIAL TEARS) 0.5 % SOLN ophthalmic solution Place 1 drop into both eyes every hour as needed for dry eyes       pantoprazole (PROTONIX) 40 MG EC tablet Take 1 tablet (40 mg) by mouth 2 times daily (before meals)       polyethylene glycol (MIRALAX) 17 g packet Take 1 packet by mouth daily as needed for constipation         Medication Changes/Rationale:     5/18/21  OT eval and treat for ADLs, therapeutic ex, cog skills and testing prn, and functional mobility.    5/18/21  Discontinue Ensure with meals TID, 4oz house supplement TID    5/18/21  PT to eval and treat for therapeutic exercise, functional activity, gait training and neur re-ed    5/21/21  CBC and BMP for Wednesday 5/26, change Tylenol order to TID for pain, utilize PRN oxycodone with a pain assessment every shift if extreme pain.  ACP referral to assess resident.  PHQ-9 score is 9    5/21/21  Change oxygen order to PRN for hypoxia.    6/2/21  Collect UA/UC for increased frequency, dysuria, incontinence.    6/5/21  Apply ACE wraps in AM and remove at HS.  Start Macrobid 100mg twice a day x5 days UTI, edema    6/7/21  Discontinue oxygen order prn, discontinue oxycodone    6/15/21  Dr. Flanagan visit.  Cardiac stable.  HR well controlled.  Follow up in 4 months.    6/16/21  Discontinue PT/OT    6/25/21  Dx of Dementia without behaviors, ok to discharge to memory care unit at  [facility] with list of medications.  F2F done for hospital bed and w/c, CXR AP and lateral view on Monday due to low oxygen levels,  Discontinue Mg citrate, senna and colace orders.    Controlled medications sent with patient:   not applicable/none     ROS:   Limited secondary to cognitive impairment but today pt reports no chest pain, no acute SOB then did not participate in ROS    Physical Exam:   Vitals: /62   Pulse 65   Temp 97.5  F (36.4  C)   Resp 20   Wt 70.8 kg (156 lb)   SpO2 (!) 83%   BMI 26.78 kg/m    BMI= Body mass index is 26.78 kg/m .  GENERAL APPEARANCE:  Alert, in no distress, cooperative, confused  EYES:  EOM, conjunctivae, lids, pupils and irises normal, wearing glasses, following NP as moving around room  NECK:  No adenopathy,masses or thyromegaly  RESP:  has been having oxygen at 2L/min and currently not on.  no acute distress.  congestion bilateral bases.    CV:  Palpation and auscultation of heart done , heart rate regular and controlled.  ACE wraps on legs +3 edema.    ABDOMEN:  normal bowel sounds, soft, nontender, no hepatosplenomegaly or other masses, no guarding or rebound  :    continent of urine  M/S:   Gait and station abnormal propels self in wheelchair, staff help with transfers but limited.  fall risk  SKIN:  pink, warm and dry.  no open wounds  PSYCH:  oriented to person, family with place and time vague, memory impaired , affect and mood normal     SNF labs:   Component      Latest Ref Rng & Units 5/26/2021   Sodium      133 - 144 mmol/L 142   Potassium      3.4 - 5.3 mmol/L 3.7   Chloride      94 - 109 mmol/L 108   Carbon Dioxide      20 - 32 mmol/L 30   Anion Gap      3 - 14 mmol/L 4   Glucose      70 - 99 mg/dL 85   Urea Nitrogen      7 - 30 mg/dL 14   Creatinine      0.52 - 1.04 mg/dL 0.85   GFR Estimate      >60 mL/min/1.73:m2 64   GFR Estimate If Black      >60 mL/min/1.73:m2 74   Calcium      8.5 - 10.1 mg/dL 8.5   WBC      4.0 - 11.0 10e9/L 6.3   RBC  Count      3.8 - 5.2 10e12/L 3.40 (L)   Hemoglobin      11.7 - 15.7 g/dL 9.6 (L)   Hematocrit      35.0 - 47.0 % 30.6 (L)   MCV      78 - 100 fl 90   MCH      26.5 - 33.0 pg 28.2   MCHC      31.5 - 36.5 g/dL 31.4 (L)   RDW      10.0 - 15.0 % 15.8 (H)   Platelet Count      150 - 450 10e9/L 298     5/17/21 in hospital  HgB = 8.4    DISCHARGE PLAN:    Follow up labs: No labs orders/due    Medical Follow Up:      New primary provider will see in the memory care unit after she is admitted.    MTM referral needed and placed by this provider: No    Current [place] scheduled appointments:       Discharge Services: No therapy or home care recommended.     Discharge Instructions Verbalized to Patient at Discharge:     Weight bearing restrictions:  Weight bearing as tolerated.       TOTAL DISCHARGE TIME:   Greater than 30 minutes  Electronically signed by:  DARLENE Gates CNP     Face to Face will be attached in separate notes for this date

## 2021-06-26 ENCOUNTER — HOSPITAL ENCOUNTER (INPATIENT)
Facility: CLINIC | Age: 81
LOS: 2 days | Discharge: NURSING, REHAB OR LONG-TERM CARE | DRG: 291 | End: 2021-06-28
Attending: EMERGENCY MEDICINE | Admitting: INTERNAL MEDICINE
Payer: MEDICARE

## 2021-06-26 ENCOUNTER — APPOINTMENT (OUTPATIENT)
Dept: GENERAL RADIOLOGY | Facility: CLINIC | Age: 81
DRG: 291 | End: 2021-06-26
Attending: EMERGENCY MEDICINE
Payer: MEDICARE

## 2021-06-26 DIAGNOSIS — I27.20 PULMONARY HYPERTENSION (H): ICD-10-CM

## 2021-06-26 DIAGNOSIS — Z11.52 ENCOUNTER FOR SCREENING LABORATORY TESTING FOR SEVERE ACUTE RESPIRATORY SYNDROME CORONAVIRUS 2 (SARS-COV-2): ICD-10-CM

## 2021-06-26 DIAGNOSIS — I48.0 PAROXYSMAL ATRIAL FIBRILLATION (H): ICD-10-CM

## 2021-06-26 DIAGNOSIS — R09.02 HYPOXIA: ICD-10-CM

## 2021-06-26 LAB
ALBUMIN SERPL-MCNC: 3.2 G/DL (ref 3.4–5)
ALP SERPL-CCNC: 80 U/L (ref 40–150)
ALT SERPL W P-5'-P-CCNC: 11 U/L (ref 0–50)
ANION GAP SERPL CALCULATED.3IONS-SCNC: 3 MMOL/L (ref 3–14)
AST SERPL W P-5'-P-CCNC: 12 U/L (ref 0–45)
BASOPHILS # BLD AUTO: 0 10E9/L (ref 0–0.2)
BASOPHILS NFR BLD AUTO: 0.8 %
BILIRUB SERPL-MCNC: 0.6 MG/DL (ref 0.2–1.3)
BUN SERPL-MCNC: 19 MG/DL (ref 7–30)
CALCIUM SERPL-MCNC: 8.6 MG/DL (ref 8.5–10.1)
CHLORIDE SERPL-SCNC: 110 MMOL/L (ref 94–109)
CO2 SERPL-SCNC: 29 MMOL/L (ref 20–32)
CREAT SERPL-MCNC: 0.66 MG/DL (ref 0.52–1.04)
DIFFERENTIAL METHOD BLD: ABNORMAL
EOSINOPHIL # BLD AUTO: 0.2 10E9/L (ref 0–0.7)
EOSINOPHIL NFR BLD AUTO: 4.4 %
ERYTHROCYTE [DISTWIDTH] IN BLOOD BY AUTOMATED COUNT: 15.9 % (ref 10–15)
GFR SERPL CREATININE-BSD FRML MDRD: 82 ML/MIN/{1.73_M2}
GLUCOSE SERPL-MCNC: 96 MG/DL (ref 70–99)
HCT VFR BLD AUTO: 31.9 % (ref 35–47)
HGB BLD-MCNC: 9.9 G/DL (ref 11.7–15.7)
IMM GRANULOCYTES # BLD: 0 10E9/L (ref 0–0.4)
IMM GRANULOCYTES NFR BLD: 0.8 %
LABORATORY COMMENT REPORT: NORMAL
LYMPHOCYTES # BLD AUTO: 0.4 10E9/L (ref 0.8–5.3)
LYMPHOCYTES NFR BLD AUTO: 8 %
MCH RBC QN AUTO: 26.8 PG (ref 26.5–33)
MCHC RBC AUTO-ENTMCNC: 31 G/DL (ref 31.5–36.5)
MCV RBC AUTO: 86 FL (ref 78–100)
MONOCYTES # BLD AUTO: 0.7 10E9/L (ref 0–1.3)
MONOCYTES NFR BLD AUTO: 13.6 %
NEUTROPHILS # BLD AUTO: 3.8 10E9/L (ref 1.6–8.3)
NEUTROPHILS NFR BLD AUTO: 72.4 %
NRBC # BLD AUTO: 0 10*3/UL
NRBC BLD AUTO-RTO: 0 /100
NT-PROBNP SERPL-MCNC: 1348 PG/ML (ref 0–1800)
PLATELET # BLD AUTO: 189 10E9/L (ref 150–450)
POTASSIUM SERPL-SCNC: 4.2 MMOL/L (ref 3.4–5.3)
PROT SERPL-MCNC: 6.8 G/DL (ref 6.8–8.8)
RBC # BLD AUTO: 3.69 10E12/L (ref 3.8–5.2)
SARS-COV-2 RNA RESP QL NAA+PROBE: NEGATIVE
SODIUM SERPL-SCNC: 142 MMOL/L (ref 133–144)
SPECIMEN SOURCE: NORMAL
TROPONIN I SERPL-MCNC: <0.015 UG/L (ref 0–0.04)
TROPONIN I SERPL-MCNC: <0.015 UG/L (ref 0–0.04)
WBC # BLD AUTO: 5.2 10E9/L (ref 4–11)

## 2021-06-26 PROCEDURE — 87635 SARS-COV-2 COVID-19 AMP PRB: CPT | Performed by: EMERGENCY MEDICINE

## 2021-06-26 PROCEDURE — 84484 ASSAY OF TROPONIN QUANT: CPT | Performed by: EMERGENCY MEDICINE

## 2021-06-26 PROCEDURE — 80053 COMPREHEN METABOLIC PANEL: CPT | Performed by: EMERGENCY MEDICINE

## 2021-06-26 PROCEDURE — 250N000011 HC RX IP 250 OP 636: Performed by: EMERGENCY MEDICINE

## 2021-06-26 PROCEDURE — 99285 EMERGENCY DEPT VISIT HI MDM: CPT | Mod: 25 | Performed by: EMERGENCY MEDICINE

## 2021-06-26 PROCEDURE — 83880 ASSAY OF NATRIURETIC PEPTIDE: CPT | Performed by: EMERGENCY MEDICINE

## 2021-06-26 PROCEDURE — 93010 ELECTROCARDIOGRAM REPORT: CPT | Performed by: EMERGENCY MEDICINE

## 2021-06-26 PROCEDURE — 36415 COLL VENOUS BLD VENIPUNCTURE: CPT | Performed by: INTERNAL MEDICINE

## 2021-06-26 PROCEDURE — 93005 ELECTROCARDIOGRAM TRACING: CPT | Performed by: EMERGENCY MEDICINE

## 2021-06-26 PROCEDURE — C9803 HOPD COVID-19 SPEC COLLECT: HCPCS | Performed by: EMERGENCY MEDICINE

## 2021-06-26 PROCEDURE — 250N000013 HC RX MED GY IP 250 OP 250 PS 637: Performed by: INTERNAL MEDICINE

## 2021-06-26 PROCEDURE — 250N000011 HC RX IP 250 OP 636: Performed by: INTERNAL MEDICINE

## 2021-06-26 PROCEDURE — 96374 THER/PROPH/DIAG INJ IV PUSH: CPT | Performed by: EMERGENCY MEDICINE

## 2021-06-26 PROCEDURE — 99207 PR CDG-HISTORY COMPONENT: MEETS DETAILED - DOWN CODED LACK OF ROS: CPT | Performed by: INTERNAL MEDICINE

## 2021-06-26 PROCEDURE — 84484 ASSAY OF TROPONIN QUANT: CPT | Performed by: INTERNAL MEDICINE

## 2021-06-26 PROCEDURE — 120N000001 HC R&B MED SURG/OB

## 2021-06-26 PROCEDURE — 71045 X-RAY EXAM CHEST 1 VIEW: CPT

## 2021-06-26 PROCEDURE — 85025 COMPLETE CBC W/AUTO DIFF WBC: CPT | Performed by: EMERGENCY MEDICINE

## 2021-06-26 PROCEDURE — 99221 1ST HOSP IP/OBS SF/LOW 40: CPT | Mod: AI | Performed by: INTERNAL MEDICINE

## 2021-06-26 RX ORDER — ONDANSETRON 2 MG/ML
4 INJECTION INTRAMUSCULAR; INTRAVENOUS EVERY 6 HOURS PRN
Status: DISCONTINUED | OUTPATIENT
Start: 2021-06-26 | End: 2021-06-28 | Stop reason: HOSPADM

## 2021-06-26 RX ORDER — POLYETHYLENE GLYCOL 3350 17 G/17G
17 POWDER, FOR SOLUTION ORAL DAILY PRN
Status: DISCONTINUED | OUTPATIENT
Start: 2021-06-26 | End: 2021-06-26

## 2021-06-26 RX ORDER — PANTOPRAZOLE SODIUM 40 MG/1
40 TABLET, DELAYED RELEASE ORAL
Status: DISCONTINUED | OUTPATIENT
Start: 2021-06-27 | End: 2021-06-28 | Stop reason: HOSPADM

## 2021-06-26 RX ORDER — POLYETHYLENE GLYCOL 3350 17 G/17G
17 POWDER, FOR SOLUTION ORAL DAILY PRN
Status: DISCONTINUED | OUTPATIENT
Start: 2021-06-26 | End: 2021-06-28 | Stop reason: HOSPADM

## 2021-06-26 RX ORDER — FUROSEMIDE 10 MG/ML
60 INJECTION INTRAMUSCULAR; INTRAVENOUS ONCE
Status: COMPLETED | OUTPATIENT
Start: 2021-06-26 | End: 2021-06-26

## 2021-06-26 RX ORDER — ACETAMINOPHEN 325 MG/1
650 TABLET ORAL EVERY 4 HOURS PRN
Status: DISCONTINUED | OUTPATIENT
Start: 2021-06-26 | End: 2021-06-28 | Stop reason: HOSPADM

## 2021-06-26 RX ORDER — DOCUSATE SODIUM 100 MG/1
100 CAPSULE, LIQUID FILLED ORAL DAILY PRN
Status: DISCONTINUED | OUTPATIENT
Start: 2021-06-26 | End: 2021-06-28 | Stop reason: HOSPADM

## 2021-06-26 RX ORDER — LIDOCAINE 40 MG/G
CREAM TOPICAL
Status: DISCONTINUED | OUTPATIENT
Start: 2021-06-26 | End: 2021-06-28 | Stop reason: HOSPADM

## 2021-06-26 RX ORDER — ONDANSETRON 2 MG/ML
4 INJECTION INTRAMUSCULAR; INTRAVENOUS EVERY 6 HOURS PRN
Status: DISCONTINUED | OUTPATIENT
Start: 2021-06-26 | End: 2021-06-26

## 2021-06-26 RX ORDER — ONDANSETRON 4 MG/1
4 TABLET, ORALLY DISINTEGRATING ORAL EVERY 6 HOURS PRN
Status: DISCONTINUED | OUTPATIENT
Start: 2021-06-26 | End: 2021-06-26

## 2021-06-26 RX ORDER — AMOXICILLIN 250 MG
1 CAPSULE ORAL 2 TIMES DAILY
Status: DISCONTINUED | OUTPATIENT
Start: 2021-06-26 | End: 2021-06-28 | Stop reason: HOSPADM

## 2021-06-26 RX ORDER — ATENOLOL 50 MG/1
50 TABLET ORAL 2 TIMES DAILY
Status: DISCONTINUED | OUTPATIENT
Start: 2021-06-26 | End: 2021-06-28 | Stop reason: HOSPADM

## 2021-06-26 RX ORDER — AMOXICILLIN 250 MG
2 CAPSULE ORAL 2 TIMES DAILY
Status: DISCONTINUED | OUTPATIENT
Start: 2021-06-26 | End: 2021-06-28 | Stop reason: HOSPADM

## 2021-06-26 RX ORDER — SENNOSIDES 8.6 MG
650 CAPSULE ORAL 3 TIMES DAILY
Status: DISCONTINUED | OUTPATIENT
Start: 2021-06-26 | End: 2021-06-26 | Stop reason: RX

## 2021-06-26 RX ORDER — GLIPIZIDE 10 MG/1
1 TABLET ORAL
Status: DISCONTINUED | OUTPATIENT
Start: 2021-06-26 | End: 2021-06-28 | Stop reason: HOSPADM

## 2021-06-26 RX ORDER — DILTIAZEM HYDROCHLORIDE 180 MG/1
180 CAPSULE, COATED, EXTENDED RELEASE ORAL 2 TIMES DAILY
Status: DISCONTINUED | OUTPATIENT
Start: 2021-06-26 | End: 2021-06-28 | Stop reason: HOSPADM

## 2021-06-26 RX ORDER — FUROSEMIDE 10 MG/ML
20 INJECTION INTRAMUSCULAR; INTRAVENOUS EVERY 8 HOURS
Status: DISCONTINUED | OUTPATIENT
Start: 2021-06-26 | End: 2021-06-28 | Stop reason: HOSPADM

## 2021-06-26 RX ORDER — ONDANSETRON 4 MG/1
4 TABLET, ORALLY DISINTEGRATING ORAL EVERY 6 HOURS PRN
Status: DISCONTINUED | OUTPATIENT
Start: 2021-06-26 | End: 2021-06-28 | Stop reason: HOSPADM

## 2021-06-26 RX ADMIN — FUROSEMIDE 60 MG: 10 INJECTION, SOLUTION INTRAVENOUS at 15:10

## 2021-06-26 RX ADMIN — ACETAMINOPHEN 650 MG: 325 TABLET, FILM COATED ORAL at 20:30

## 2021-06-26 RX ADMIN — FUROSEMIDE 20 MG: 10 INJECTION, SOLUTION INTRAVENOUS at 20:29

## 2021-06-26 RX ADMIN — DILTIAZEM HYDROCHLORIDE 180 MG: 180 CAPSULE, COATED, EXTENDED RELEASE ORAL at 20:30

## 2021-06-26 RX ADMIN — SENNOSIDES-DOCUSATE SODIUM TAB 8.6-50 MG 1 TABLET: 8.6-5 TAB at 20:30

## 2021-06-26 RX ADMIN — ATENOLOL 50 MG: 50 TABLET ORAL at 20:30

## 2021-06-26 ASSESSMENT — MIFFLIN-ST. JEOR: SCORE: 1124.03

## 2021-06-26 ASSESSMENT — ACTIVITIES OF DAILY LIVING (ADL): ADLS_ACUITY_SCORE: 14

## 2021-06-26 NOTE — ED PROVIDER NOTES
History     Chief Complaint   Patient presents with     hypoxia     HPI  Khalida Rouse is a 81 year old female who is s/p AV replacement in 2014, CHF, ? anticoagulation and presents from [place] home secondary to hypoxia.  Hx of dementia and compression fractures in the back.  She states she has had mild cough.  No fever, no new body aches or change in taste or smell.  She has been having increased fluid retention in her legs.  No chest pain, nausea, vomiting, diarrhea, dysuria, hematuria.    Last cardiology visit on 15 Palak 2021 weight 146lbs  1.  S/p bioprosthetic aortic valve placement 2014.  Normal mean gradients on recent echocardiogram.  Latest echocardiogram showed mean range of 12.7 mmHg.  2.  Chronic atrial fibrillation on rate control strategy, severe biatrial enlargement, asymptomatic from atrial fibrillation.  Was on Coumadin but has been discontinued due to recent GI bleed.  Also concern about potential fall risk in the setting of underlying dementia and some balance issues.  3.  2-3+ records regurgitation, pulmonary hypertension.  Right heart catheterization was set up but given the overall deterioration in her health especially worsening of dementia patient prefers only conservative management which I think is reasonable and she wants to avoid invasive procedures.  4.  Mild to moderately reduced RV systolic function.  5.  Recent episode of GI bleed  6.  Dementia, progressively worsening.  Patient is now residing in nursing home.    Allergies:  Allergies   Allergen Reactions     Xarelto [Rivaroxaban] Diarrhea     Ace Inhibitors Cough       Problem List:    Patient Active Problem List    Diagnosis Date Noted     Paroxysmal atrial fibrillation (H) 06/26/2021     Priority: Medium     Hypoxia 06/26/2021     Priority: Medium     Pulmonary hypertension (H) 06/26/2021     Priority: Medium     GIB (gastrointestinal bleeding) 05/13/2021     Priority: Medium     Cognitive changes - SLUMS 13/30 04/13/2021      Priority: Medium     Acute appendicitis with localized peritonitis, without perforation, abscess, or gangrene 04/12/2021     Priority: Medium     Hypoxemia 05/23/2019     Priority: Medium     Other secondary pulmonary hypertension (H) 05/14/2019     Priority: Medium     Long term current use of anticoagulant therapy 03/11/2016     Priority: Medium     Atrial fibrillation with RVR (H) 01/11/2016     Priority: Medium     Pneumonia 01/09/2016     Priority: Medium     Osteoporosis 10/26/2015     Priority: Medium     Advanced directives, counseling/discussion 10/03/2014     Priority: Medium     Declined       S/P AVR (aortic valve replacement) 04/10/2014     Priority: Medium     Atrial flutter 02/04/2014     Priority: Medium        Past Medical History:    Past Medical History:   Diagnosis Date     Aortic valve stenosis      Atrial flutter (H)      Cardiomyopathy (H)      Cognitive changes - SLUMS 16/30 4/13/2021     Severe aortic stenosis 4/7/2014     Shoulder fracture, left 3/22/15       Past Surgical History:    Past Surgical History:   Procedure Laterality Date     CATARACT IOL, RT/LT Right      ENT SURGERY      glaucoma surgery     ESOPHAGOSCOPY, GASTROSCOPY, DUODENOSCOPY (EGD), COMBINED N/A 5/14/2021    Procedure: ESOPHAGOGASTRODUODENOSCOPY (EGD);  Surgeon: Alysha Cano MD;  Location:  GI     REPLACE VALVE AORTIC  4/10/2014    Procedure: REPLACE VALVE AORTIC;  Median sternotomy, Replace Aortic Valve on pump oxygenation. ;  Surgeon: Wesley Fong MD;  Location:  OR       Family History:    Family History   Problem Relation Age of Onset     Dementia Brother      Alzheimer Disease Sister        Social History:  Marital Status:   [5]  Social History     Tobacco Use     Smoking status: Never Smoker     Smokeless tobacco: Never Used   Substance Use Topics     Alcohol use: No     Alcohol/week: 0.0 standard drinks     Drug use: No        Medications:    acetaminophen (ACETAMINOPHEN 8 HOUR) 650 MG CR  tablet  atenolol (TENORMIN) 50 MG tablet  diltiazem ER (DILT-XR) 180 MG 24 hr capsule  docusate sodium (COLACE) 100 MG tablet  hypromellose (ARTIFICIAL TEARS) 0.5 % SOLN ophthalmic solution  magnesium citrate solution  pantoprazole (PROTONIX) 40 MG EC tablet  polyethylene glycol (MIRALAX) 17 g packet          Review of Systems   All other systems reviewed and are negative.      Physical Exam   BP: (!) 133/93  Pulse: 102  Temp: 98.5  F (36.9  C)  Resp: 20  Weight: 72.1 kg (159 lb)  SpO2: (!) 83 %      Physical Exam  Vitals signs and nursing note reviewed.   Constitutional:       General: She is not in acute distress.     Appearance: She is well-developed. She is not diaphoretic.   HENT:      Head: Normocephalic and atraumatic.      Mouth/Throat:      Mouth: Mucous membranes are moist.   Eyes:      General: No scleral icterus.     Extraocular Movements: Extraocular movements intact.      Conjunctiva/sclera: Conjunctivae normal.   Neck:      Musculoskeletal: Normal range of motion and neck supple.   Cardiovascular:      Rate and Rhythm: Normal rate. Rhythm irregular.   Pulmonary:      Effort: Pulmonary effort is normal. No respiratory distress.      Comments: Crackles and decreased breath sounds at the right base.  Clear left lung exam.  Musculoskeletal: Normal range of motion.      Right lower leg: Edema present.      Left lower leg: Edema present.      Comments: 2+ pitting edema bilateral lower extremities to the knee.   Skin:     General: Skin is warm and dry.      Coloration: Skin is not pale.      Findings: No erythema or rash.   Neurological:      Mental Status: She is alert and oriented to person, place, and time.      Cranial Nerves: No cranial nerve deficit.      Sensory: No sensory deficit.   Psychiatric:         Mood and Affect: Mood normal.         Behavior: Behavior normal.         ED Course        Procedures               EKG Interpretation:      Interpreted by Blayne Hernandez MD  Time reviewed:  1326  Symptoms at time of EKG: Short of breath  Rhythm: Atrial fibrillation  Rate: normal  Axis: normal  Ectopy: none  Conduction: normal  ST Segments/ T Waves: No ST-T wave changes  Q Waves: none  Comparison to prior: Unchanged    Clinical Impression: Atrial fibrillation                   Results for orders placed or performed during the hospital encounter of 06/26/21 (from the past 24 hour(s))   CBC with platelets differential   Result Value Ref Range    WBC 5.2 4.0 - 11.0 10e9/L    RBC Count 3.69 (L) 3.8 - 5.2 10e12/L    Hemoglobin 9.9 (L) 11.7 - 15.7 g/dL    Hematocrit 31.9 (L) 35.0 - 47.0 %    MCV 86 78 - 100 fl    MCH 26.8 26.5 - 33.0 pg    MCHC 31.0 (L) 31.5 - 36.5 g/dL    RDW 15.9 (H) 10.0 - 15.0 %    Platelet Count 189 150 - 450 10e9/L    Diff Method Automated Method     % Neutrophils 72.4 %    % Lymphocytes 8.0 %    % Monocytes 13.6 %    % Eosinophils 4.4 %    % Basophils 0.8 %    % Immature Granulocytes 0.8 %    Nucleated RBCs 0 0 /100    Absolute Neutrophil 3.8 1.6 - 8.3 10e9/L    Absolute Lymphocytes 0.4 (L) 0.8 - 5.3 10e9/L    Absolute Monocytes 0.7 0.0 - 1.3 10e9/L    Absolute Eosinophils 0.2 0.0 - 0.7 10e9/L    Absolute Basophils 0.0 0.0 - 0.2 10e9/L    Abs Immature Granulocytes 0.0 0 - 0.4 10e9/L    Absolute Nucleated RBC 0.0    Comprehensive metabolic panel   Result Value Ref Range    Sodium 142 133 - 144 mmol/L    Potassium 4.2 3.4 - 5.3 mmol/L    Chloride 110 (H) 94 - 109 mmol/L    Carbon Dioxide 29 20 - 32 mmol/L    Anion Gap 3 3 - 14 mmol/L    Glucose 96 70 - 99 mg/dL    Urea Nitrogen 19 7 - 30 mg/dL    Creatinine 0.66 0.52 - 1.04 mg/dL    GFR Estimate 82 >60 mL/min/[1.73_m2]    GFR Estimate If Black >90 >60 mL/min/[1.73_m2]    Calcium 8.6 8.5 - 10.1 mg/dL    Bilirubin Total 0.6 0.2 - 1.3 mg/dL    Albumin 3.2 (L) 3.4 - 5.0 g/dL    Protein Total 6.8 6.8 - 8.8 g/dL    Alkaline Phosphatase 80 40 - 150 U/L    ALT 11 0 - 50 U/L    AST 12 0 - 45 U/L   Nt probnp inpatient (BNP)   Result Value Ref  Range    N-Terminal Pro BNP Inpatient 1,348 0 - 1,800 pg/mL   Troponin I (now)   Result Value Ref Range    Troponin I ES <0.015 0.000 - 0.045 ug/L   Symptomatic SARS-CoV-2 COVID-19 Virus (Coronavirus) by PCR    Specimen: Nasopharyngeal   Result Value Ref Range    SARS-CoV-2 Virus Specimen Source Nasopharyngeal     SARS-CoV-2 PCR Result NEGATIVE     SARS-CoV-2 PCR Comment (Note)    XR Chest Port 1 View    Narrative    XR PORTABLE CHEST ONE VIEW   6/26/2021 2:00 PM     HISTORY: Shortness of breath.    COMPARISON: Chest x-ray 5/15/2021      Impression    IMPRESSION: Portable chest. There is a new moderate right pleural  effusion and probable small left pleural effusion. Heart size is  difficult to assess but likely remains enlarged. Prior CABG and aortic  valve replacement. No pneumothorax.       Medications   furosemide (LASIX) injection 60 mg (has no administration in time range)       Assessments & Plan (with Medical Decision Making)  81 yr old with history of heart failure, pulmonary hypertension and right sided overload on last echo presents with bilateral leg swelling sob and right sided pleural effusion.  Weight is up 13 pounds since 15 Palak.  Cardiology visit reviewed recently and she was euvolemic at 146 pounds.  Now she is under 59 pounds and bilateral lower extremity edema.  60 mg of Lasix given IV.  Admission recommended given the pleural effusion and weight gain and pulmonary hypertension with tricuspid regurgitation history.  Discussed with the patient and daughter who agree with the plan.  Discussed with the hospitalist,  who accepts the patient and agrees with the plan.     I have reviewed the nursing notes.    I have reviewed the findings, diagnosis, plan and need for follow up with the patient.      New Prescriptions    No medications on file       Final diagnoses:   Hypoxia   Pulmonary hypertension (H)   Paroxysmal atrial fibrillation (H)       6/26/2021   [facility]  EMERGENCY DEPT     Blayne Hernandez MD  06/26/21 2170

## 2021-06-26 NOTE — ED TRIAGE NOTES
Comes from [facility] home with need for oxygen since Thursday with some shortness of breath.  History of dementia and compression fractures to back.

## 2021-06-26 NOTE — H&P
[facility]    History and Physical - Hospitalist Service       Date of Admission:  6/26/2021    Assessment & Plan           Khalida Rouse is a 81 year old female admitted on 6/26/2021. She has a h/o dementia,  a fib and on cardizem and bb. She had significantly elevated INR last month leading to bleeding issues and subsequently anticoagulation was stopped. She has been admitted with worsening sob and swelling in legs b/l from Nursing home over last 2 days. She is sob with activity and at rest. She was found to have acute hypoxic respiratory failure secondary to acute CHF. Will  Start iv diuresis and trend troponin.                Acute onset sob : 2/2 acute CHF      Acute hypoxic respiratory failure : on 2 liters of oxygen      Acute diastolic CHF : iv diuresis      Pleural effusion, right moderate : iv diuresis      HTN, Essential : on atenolol 50 mg bid, cardizem 180 mg bid      A fib, h/o : on atenolol, cardizem, discontinued anticoagulation due to Gi bleed last month      S/p AVR : discontinued anticoagulation due to Gi bleed last month      GERD : on PPI      Dementia, h/o : monitor neuro status      Anemia, chronic : stable.      H/o backache and spinal #             Diet:   cardiac  DVT Prophylaxis: Pneumatic Compression Devices  Dupree Catheter: Not present  Central Lines: None  Code Status:   DNR/DNI    Risk Factors Present on Admission                   Disposition Plan   Expected discharge: 2 - 3 days, recommended to transitional care unit once respiratory status is stable.     The patient's care was discussed with the Bedside Nurse, Care Coordinator/, Patient and Patient's Family.    Marcus Pichardo MD  [facility]  Securely message with the Vocera Web Console (learn more here)  Text page via [employer] Paging/Directory      ______________________________________________________________________    Chief Complaint   sob    History is  obtained from the patient    History of Present Illness         Khalida Rouse is a 81 year old female admitted on 6/26/2021. She has a h/o dementia,  a fib and on cardizem and bb. She had significantly elevated INR last month leading to bleeding issues and subsequently anticoagulation was stopped. She has been admitted with worsening sob and swelling in legs b/l from Nursing home over last 2 days. She is sob with activity and at rest. She was found to have acute hypoxic respiratory failure secondary to acute CHF. Will  Start iv diuresis and trend troponin.      Review of Systems        No fever or chills  No cough or phlegm, cp  No abdominal symptoms  No urinary symptoms  No neuro symptoms      Past Medical History    I have reviewed this patient's medical history and updated it with pertinent information if needed.   Past Medical History:   Diagnosis Date     Aortic valve stenosis      Atrial flutter (H)      Cardiomyopathy (H)      Cognitive changes - SLUMS 16/30 4/13/2021     Severe aortic stenosis 4/7/2014     Shoulder fracture, left 3/22/15       Past Surgical History   I have reviewed this patient's surgical history and updated it with pertinent information if needed.  Past Surgical History:   Procedure Laterality Date     CATARACT IOL, RT/LT Right      ENT SURGERY      glaucoma surgery     ESOPHAGOSCOPY, GASTROSCOPY, DUODENOSCOPY (EGD), COMBINED N/A 5/14/2021    Procedure: ESOPHAGOGASTRODUODENOSCOPY (EGD);  Surgeon: Alysha Cano MD;  Location:  GI     REPLACE VALVE AORTIC  4/10/2014    Procedure: REPLACE VALVE AORTIC;  Median sternotomy, Replace Aortic Valve on pump oxygenation. ;  Surgeon: Wesley Fong MD;  Location: [facility]       Social History   I have reviewed this patient's social history and updated it with pertinent information if needed.  Social History     Tobacco Use     Smoking status: Never Smoker     Smokeless tobacco: Never Used   Substance Use Topics     Alcohol use: No      Alcohol/week: 0.0 standard drinks     Drug use: No     Lives in [facility]  Single  3 children  Non smoker, denies alcohol  Uses a walker    Family History   I have reviewed this patient's family history and updated it with pertinent information if needed.  Family History   Problem Relation Age of Onset     Dementia Brother      Alzheimer Disease Sister        Prior to Admission Medications   Prior to Admission Medications   Prescriptions Last Dose Informant Patient Reported? Taking?   acetaminophen (ACETAMINOPHEN 8 HOUR) 650 MG CR tablet  Daughter Yes No   Sig: Take 650 mg by mouth 3 times daily   atenolol (TENORMIN) 50 MG tablet  Daughter Yes No   Sig: Take 50 mg by mouth 2 times daily   diltiazem ER (DILT-XR) 180 MG 24 hr capsule  Daughter Yes No   Sig: Take 180 mg by mouth 2 times daily    docusate sodium (COLACE) 100 MG tablet  Daughter Yes No   Sig: Take 100 mg by mouth daily as needed for constipation   hypromellose (ARTIFICIAL TEARS) 0.5 % SOLN ophthalmic solution  Daughter Yes No   Sig: Place 1 drop into both eyes every hour as needed for dry eyes   magnesium citrate solution  Daughter Yes No   Sig: Take 100 mLs by mouth 3 times daily as needed for constipation or other    pantoprazole (PROTONIX) 40 MG EC tablet   No No   Sig: Take 1 tablet (40 mg) by mouth 2 times daily (before meals)   polyethylene glycol (MIRALAX) 17 g packet  Daughter Yes No   Sig: Take 1 packet by mouth daily as needed for constipation      Facility-Administered Medications: None     Allergies   Allergies   Allergen Reactions     Xarelto [Rivaroxaban] Diarrhea     Ace Inhibitors Cough       Physical Exam   Vital Signs: Temp: 98.5  F (36.9  C) Temp src: Oral BP: (!) 144/81 Pulse: 92   Resp: 20 SpO2: 92 % O2 Device: None (Room air) Oxygen Delivery: 2 LPM  Weight: 159 lbs 0 oz       GENERAL: The patient is not in any acute distressed. Awake and alert.  HEENT: Nonicteric sclerae, PERRLA, EOMI. Oropharynx clear. Moist mucous membranes.  Conjunctivae appear well perfused.  HEART: Regular rate and rhythm without murmurs.  LUNGS: Clear to auscultation bilaterally. No wheezing or crackles.  ABDOMEN: Soft, positive bowel sounds, nontender.  SKIN: No rash, no excessive bruising, petechiae, or purpura.  EXTREMITIES : no rashes, b/l  swelling in legs present.  NEUROLOGIC: AxO x 3.  Cranial nerves II-XII intact without motor/sensory deficit.        Data   Data reviewed today: I reviewed all medications, new labs and imaging results over the last 24 hours. I personally reviewed the EKG tracing showing NSR.    Recent Labs   Lab 06/26/21  1335   WBC 5.2   HGB 9.9*   MCV 86         POTASSIUM 4.2   CHLORIDE 110*   CO2 29   BUN 19   CR 0.66   ANIONGAP 3   JOSEFINA 8.6   GLC 96   ALBUMIN 3.2*   PROTTOTAL 6.8   BILITOTAL 0.6   ALKPHOS 80   ALT 11   AST 12   TROPI <0.015

## 2021-06-27 LAB
ANION GAP SERPL CALCULATED.3IONS-SCNC: 2 MMOL/L (ref 3–14)
BUN SERPL-MCNC: 14 MG/DL (ref 7–30)
CALCIUM SERPL-MCNC: 9 MG/DL (ref 8.5–10.1)
CHLORIDE SERPL-SCNC: 105 MMOL/L (ref 94–109)
CO2 SERPL-SCNC: 35 MMOL/L (ref 20–32)
CREAT SERPL-MCNC: 0.69 MG/DL (ref 0.52–1.04)
GFR SERPL CREATININE-BSD FRML MDRD: 81 ML/MIN/{1.73_M2}
GLUCOSE SERPL-MCNC: 81 MG/DL (ref 70–99)
MAGNESIUM SERPL-MCNC: 2.1 MG/DL (ref 1.6–2.3)
POTASSIUM SERPL-SCNC: 3.3 MMOL/L (ref 3.4–5.3)
POTASSIUM SERPL-SCNC: 3.8 MMOL/L (ref 3.4–5.3)
SODIUM SERPL-SCNC: 142 MMOL/L (ref 133–144)
TROPONIN I SERPL-MCNC: <0.015 UG/L (ref 0–0.04)

## 2021-06-27 PROCEDURE — 80048 BASIC METABOLIC PNL TOTAL CA: CPT | Performed by: INTERNAL MEDICINE

## 2021-06-27 PROCEDURE — 250N000011 HC RX IP 250 OP 636: Performed by: INTERNAL MEDICINE

## 2021-06-27 PROCEDURE — 36415 COLL VENOUS BLD VENIPUNCTURE: CPT | Performed by: INTERNAL MEDICINE

## 2021-06-27 PROCEDURE — 999N000156 HC STATISTIC RCP CONSULT EA 30 MIN

## 2021-06-27 PROCEDURE — 99233 SBSQ HOSP IP/OBS HIGH 50: CPT | Performed by: INTERNAL MEDICINE

## 2021-06-27 PROCEDURE — 84484 ASSAY OF TROPONIN QUANT: CPT | Performed by: INTERNAL MEDICINE

## 2021-06-27 PROCEDURE — 999N000123 HC STATISTIC OXYGEN O2DAILY TECH TIME

## 2021-06-27 PROCEDURE — 84132 ASSAY OF SERUM POTASSIUM: CPT | Performed by: INTERNAL MEDICINE

## 2021-06-27 PROCEDURE — 999N000157 HC STATISTIC RCP TIME EA 10 MIN

## 2021-06-27 PROCEDURE — 120N000001 HC R&B MED SURG/OB

## 2021-06-27 PROCEDURE — 250N000013 HC RX MED GY IP 250 OP 250 PS 637: Performed by: INTERNAL MEDICINE

## 2021-06-27 PROCEDURE — 83735 ASSAY OF MAGNESIUM: CPT | Performed by: INTERNAL MEDICINE

## 2021-06-27 RX ORDER — POTASSIUM CHLORIDE 1500 MG/1
40 TABLET, EXTENDED RELEASE ORAL ONCE
Status: COMPLETED | OUTPATIENT
Start: 2021-06-27 | End: 2021-06-27

## 2021-06-27 RX ADMIN — ACETAMINOPHEN 650 MG: 325 TABLET, FILM COATED ORAL at 20:17

## 2021-06-27 RX ADMIN — SENNOSIDES-DOCUSATE SODIUM TAB 8.6-50 MG 2 TABLET: 8.6-5 TAB at 08:12

## 2021-06-27 RX ADMIN — ATENOLOL 50 MG: 50 TABLET ORAL at 20:18

## 2021-06-27 RX ADMIN — FUROSEMIDE 20 MG: 10 INJECTION, SOLUTION INTRAVENOUS at 05:02

## 2021-06-27 RX ADMIN — PANTOPRAZOLE SODIUM 40 MG: 40 TABLET, DELAYED RELEASE ORAL at 06:51

## 2021-06-27 RX ADMIN — FUROSEMIDE 20 MG: 10 INJECTION, SOLUTION INTRAVENOUS at 20:17

## 2021-06-27 RX ADMIN — DILTIAZEM HYDROCHLORIDE 180 MG: 180 CAPSULE, COATED, EXTENDED RELEASE ORAL at 20:18

## 2021-06-27 RX ADMIN — FUROSEMIDE 20 MG: 10 INJECTION, SOLUTION INTRAVENOUS at 11:26

## 2021-06-27 RX ADMIN — ATENOLOL 50 MG: 50 TABLET ORAL at 08:12

## 2021-06-27 RX ADMIN — SENNOSIDES-DOCUSATE SODIUM TAB 8.6-50 MG 1 TABLET: 8.6-5 TAB at 20:17

## 2021-06-27 RX ADMIN — POTASSIUM CHLORIDE 40 MEQ: 1500 TABLET, EXTENDED RELEASE ORAL at 09:17

## 2021-06-27 RX ADMIN — DILTIAZEM HYDROCHLORIDE 180 MG: 180 CAPSULE, COATED, EXTENDED RELEASE ORAL at 08:12

## 2021-06-27 RX ADMIN — PANTOPRAZOLE SODIUM 40 MG: 40 TABLET, DELAYED RELEASE ORAL at 15:48

## 2021-06-27 ASSESSMENT — ACTIVITIES OF DAILY LIVING (ADL)
ADLS_ACUITY_SCORE: 14
ADLS_ACUITY_SCORE: 15
ADLS_ACUITY_SCORE: 14
ADLS_ACUITY_SCORE: 15

## 2021-06-27 ASSESSMENT — MIFFLIN-ST. JEOR: SCORE: 1105.44

## 2021-06-27 NOTE — PLAN OF CARE
Problem: Cardiac Output Decreased (Heart Failure)  Goal: Optimal Cardiac Output  Outcome: No Change   Good urine output after lasix. Lungs clear.   Unable to wean oxygen to keep sats > 92%.  O2 87% RA, 92% 1L NC.

## 2021-06-27 NOTE — PROGRESS NOTES
"CLINICAL NUTRITION SERVICES - ASSESSMENT NOTE     Nutrition Prescription    RECOMMENDATIONS FOR MDs/PROVIDERS TO ORDER:  None today    Malnutrition Status:    Unable to determine    Recommendations already ordered by Registered Dietitian (RD):  Ensure q day    Future/Additional Recommendations:  Monitor intakes and wt trends  Adjust supplements pending intakes/pt preference  Consider gluten free diet if pt having diarrhea     REASON FOR ASSESSMENT  Khalida Rouse is an 81 year old female assessed by the dietitian for Provider Order - Nutrition Education - Heart Failure - Dietitian to instruct patient on 2 gram sodium diet  PMH significant for dementia, GERD and HTN admitted for CHF exacerbation.  NUTRITION HISTORY  Per unit staff pt is not appropriate for a phone visit d/t confusion, information obtained from chart review. Per RD note in 2014 pt was following a gluten free diet d/t diarrhea. This diet seems to have improved her symptoms. Spoke to staff at [facility], she is on a regular diet there. No noted documentation on diarrhea. NH staff report intakes of 50% of meals.     CURRENT NUTRITION ORDERS  Diet: 2 g Sodium  Intake/Tolerance: No documented intakes since admit. Staff report intermittent confusion which could affect intakes. Will start Ensure q day, RD to monitor intakes and adjust as needed.    LABS  Labs reviewed:  K: 3.3 (L)    MEDICATIONS  Medications reviewed  Lasix  protonix  40 mEq KCl (6/27)  Senokot  PRN: colace, zofran, miralax    ANTHROPOMETRICS  Height: 162.6 cm (5' 4\")  Most Recent Weight: 66 kg (145 lb 9.6 oz)    IBW: 54.5 kg  BMI: 24.99 kg/m^2,  Normal BMI  Weight History: 4# wt loss since admit, likely d/t fluid status/diuresis. 5# wt wain in 1 month, 1# gain in 3 months, stable x7 and 12 months  Wt Readings from Last 10 Encounters:   06/27/21 66 kg (145 lb 9.6 oz)   05/26/21 67.9 kg (149 lb 11.2 oz)   05/26/21 67.9 kg (149 lb 11.2 oz)   06/25/21 70.8 kg (156 lb)   06/16/21 66.3 kg (146 lb 3.2 " oz)   06/15/21 66.5 kg (146 lb 9.6 oz)   06/09/21 65.1 kg (143 lb 8 oz)   06/03/21 63.3 kg (139 lb 8 oz)   05/27/21 63.7 kg (140 lb 8 oz)   05/21/21 63.6 kg (140 lb 4.8 oz)   05/17/21 63.1 kg (139 lb 1.6 oz)   05/13/21 61.2 kg (135 lb)   03/27/21 65.3 kg (144 lb)   11/02/20 65.8 kg (145 lb)   06/29/20 64.7 kg (142 lb 9.6 oz)     Dosing Weight: 57.4 kg (adjsuted using current wt of 66 kg and ideal wt of 54.5 kg)    ASSESSED NUTRITION NEEDS  Estimated Energy Needs: 0524-6653 kcals/day (25 - 30 kcals/kg)  Justification: Maintenance  Estimated Protein Needs: 57-69 grams protein/day (1 - 1.2 grams of pro/kg)  Justification: Maintenance  Estimated Fluid Needs: 4150-9094 mL/day (1 mL/kcal)   Justification: Maintenance or Per provider pending fluid status    PHYSICAL FINDINGS  See malnutrition section below.     MALNUTRITION  % Intake: Unable to assess  % Weight Loss: Weight loss does not meet criteria  Subcutaneous Fat Loss: Unable to assess  Muscle Loss: Unable to assess  Fluid Accumulation/Edema: 1+ BLE  Malnutrition Diagnosis: Patient does not meet two of the established criteria necessary for diagnosing malnutrition    NUTRITION DIAGNOSIS  Predicted inadequate protein-energy intake related to variable appetite and dementia as evidenced by pt reliant on PO intakes to meet 100% of nutritional needs with potential for variation        INTERVENTIONS  Implementation  Nutrition Education: Not appropriate at this time due to patient condition   Ensure q day     Goals  Patient to consume % of nutritionally adequate meal trays TID, or the equivalent with supplements/snacks.     Monitoring/Evaluation  Progress toward goals will be monitored and evaluated per protocol.    Ashley Harman MS, RD, LDN  Unit Pager [phone]

## 2021-06-27 NOTE — PLAN OF CARE
S-(situation): Patient arrives to room 267 via cart from ED    B-(background): Admitted with hypoxia    A-(assessment): Pt confused, hx of dementia. Daughter at bedside. Purewick in place with great urine output. Forgets that purewick is in place. Admission questions went through with patient and daughter. Will continue to monitor.     R-(recommendations): Orders reviewed with daughter and patient. Will monitor patient per MD orders.     Inpatient nursing criteria listed below were met:    Health care directives status obtained and documented: Yes-- No actual document but working on it  SCD's Documented: Yes  Skin issues/needs documented:NA  Isolation addressed and Signage used: NA  Fall Prevention: Care plan updated Yes Education given and documented Yes  Care Plan initiated and Co-Morbidities added: Yes  Education Assessment documented:Yes  Admission Education Documented: Yes  If present CAUTI/CLABI Education done: NA  New medication patient education completed and documented (Possible Side Effects of Common Medications handout): Yes  Allergies Reviewed: Yes  Admission Medication Reconciliation completed: Yes  Home medications if not able to send immediately home with family stored here: NA  Reminder note placed in discharge instructions regarding home meds: NA  Individualized care needs/preferences addressed and charted: Yes

## 2021-06-27 NOTE — CONSULTS
Care Management Initial Consult    General Information  Assessment completed with: Patient, Children, daughter- Leah  Type of CM/SW Visit: Initial Assessment    Primary Care Provider verified and updated as needed: Yes   Readmission within the last 30 days:        Reason for Consult: discharge planning  Advance Care Planning: Advance Care Planning Reviewed: present on chart  has a POLST        Communication Assessment  Patient's communication style: spoken language (English or Bilingual)    Hearing Difficulty or Deaf: no   Wear Glasses or Blind: yes    Cognitive  Cognitive/Neuro/Behavioral: .WDL except  Level of Consciousness: confused, alert  Arousal Level: opens eyes spontaneously  Orientation: disoriented to, place, time, situation  Mood/Behavior: calm, cooperative  Best Language: 0 - No aphasia  Speech: clear, spontaneous    Living Environment:   People in home: facility resident     Current living Arrangements: extended care facility  Name of Facility: [facility]    Able to return to prior arrangements: yes       Family/Social Support:  Care provided by: other (see comments)(staff at the TCU )  Provides care for: no one, unable/limited ability to care for self  Marital Status:   Children          Description of Support System: Supportive, Involved    Support Assessment: Adequate family and caregiver support, Adequate social supports    Current Resources:   Patient receiving home care services: No     Community Resources: Transitional Care  Equipment currently used at home: walker, standard, walker, rolling, wheelchair, manual, hospital bed, grab bar, tub/shower, grab bar, toilet  Supplies currently used at home: None    Employment/Financial:  Employment Status: retired        Financial Concerns: No concerns identified           Lifestyle & Psychosocial Needs:        Socioeconomic History     Marital status:      Spouse name: Not on file     Number of children: Not on file      Years of education: Not on file     Highest education level: Not on file     Tobacco Use     Smoking status: Never Smoker     Smokeless tobacco: Never Used   Substance and Sexual Activity     Alcohol use: No     Alcohol/week: 0.0 standard drinks     Drug use: No     Sexual activity: Not Currently     Partners: Male       Functional Status:  Prior to admission patient needed assistance:              Mental Health Status:  Mental Health Status: No Current Concerns       Chemical Dependency Status:  Chemical Dependency Status: No Current Concerns             Values/Beliefs:  Spiritual, Cultural Beliefs, Jehovah's witness Practices, Values that affect care:            Values/Beliefs Comment: Unknown     Additional Information:  Care Management consulted for CHF order set and discharge planning.  Writer visited with patient briefly.  She was not able to remember what facility she came from.  Discussed that Care Management would assist in helping her with discharge planning.      Writer has spoken with patient's daughter, Leah.  She stated that patient came from the TCU at [facility].  They have not given up the bed and plan for patient to return there.  Patient is scheduled to move into the memory care unit at [facility] this Wednesday.  Daughter, Leah, to transport patient on day of discharge.  Discussed that it is anticipated patient may be ready for discharge Monday if she is off of oxygen.  Leah was happy to hear this.      Care Management to continue to follow for discharge planning.      SALVADOR Edgar  [facility]   [phone]

## 2021-06-27 NOTE — PROGRESS NOTES
[facility]    Medicine Progress Note - Hospitalist Service       Date of Admission:  6/26/2021    Assessment & Plan              Khalida Rouse is a 81 year old female admitted on 6/26/2021. She has a h/o dementia,  a fib and on cardizem and bb. She had significantly elevated INR last month leading to bleeding issues and subsequently anticoagulation was stopped. She has been admitted with worsening sob and swelling in legs b/l from Nursing home over last 2 days. She is sob with activity and at rest. She was found to have acute hypoxic respiratory failure secondary to acute CHF. Will  Start iv diuresis and trend troponin.           6/27 :     Sob improving  On 1 liter of oxygen  Continue iv diuresis  OT to evaluate per CHF protoco      Not medically ready for discharge at this time.  Needs iv diuresis, another 1-2 days stay        A/p :                Acute onset sob : 2/2 acute CHF       Acute hypoxic respiratory failure : on 2 liters of oxygen       Acute diastolic CHF : iv diuresis       Pleural effusion, right moderate : iv diuresis       HTN, Essential : on atenolol 50 mg bid, cardizem 180 mg bid       A fib, h/o : on atenolol, cardizem, discontinued anticoagulation due to Gi bleed last month       S/p AVR : discontinued anticoagulation due to Gi bleed last month       GERD : on PPI       Dementia, h/o : monitor neuro status       Anemia, chronic : stable.       H/o backache and spinal #        Diet: 2 Gram Sodium Diet Other - please comment    DVT Prophylaxis: Pneumatic Compression Devices  Dupree Catheter: Not present  Central Lines: None  Code Status: No CPR- Do NOT Intubate      Disposition Plan   Expected discharge: 1-2 days, recommended to disposition to be decided once CHF improves.     The patient's care was discussed with the Bedside Nurse, Care Coordinator/, Patient and Patient's Family.    Marcus Pichardo MD  Hospitalist Service  [facility]  [facility]  Securely message with the [employer] Web Console (learn more here)  Text page via [facility] Paging/Directory      Risk Factors Present on Admission                   ______________________________________________________________________        Data reviewed today: I reviewed all medications, new labs and imaging results over the last 24 hours. I personally reviewed no images or EKG's today.    Physical Exam   Vital Signs: Temp: 97.5  F (36.4  C) Temp src: Oral BP: 114/58 Pulse: 97   Resp: 16 SpO2: 92 % O2 Device: Nasal cannula Oxygen Delivery: 1 LPM  Weight: 145 lbs 9.6 oz      GENERAL: The patient is not in any acute distressed. Awake and alert.  HEENT: Nonicteric sclerae, PERRLA, EOMI. Oropharynx clear. Moist mucous membranes. Conjunctivae appear well perfused.  HEART: Regular rate and rhythm without murmurs.  LUNGS: Clear to auscultation bilaterally. No wheezing or crackles.  ABDOMEN: Soft, positive bowel sounds, nontender.  SKIN: No rash, no excessive bruising, petechiae, or purpura.  EXTREMITIES : no rashes, b/l  swelling in legs present.  NEUROLOGIC: AxO x 3.  Cranial nerves II-XII intact without motor/sensory deficit.          Data   Recent Labs   Lab 06/27/21  0609 06/27/21  0200 06/26/21  1953 06/26/21  1335   WBC  --   --   --  5.2   HGB  --   --   --  9.9*   MCV  --   --   --  86   PLT  --   --   --  189     --   --  142   POTASSIUM 3.3*  --   --  4.2   CHLORIDE 105  --   --  110*   CO2 35*  --   --  29   BUN 14  --   --  19   CR 0.69  --   --  0.66   ANIONGAP 2*  --   --  3   JOSEFINA 9.0  --   --  8.6   GLC 81  --   --  96   ALBUMIN  --   --   --  3.2*   PROTTOTAL  --   --   --  6.8   BILITOTAL  --   --   --  0.6   ALKPHOS  --   --   --  80   ALT  --   --   --  11   AST  --   --   --  12   TROPI  --  <0.015 <0.015 <0.015

## 2021-06-28 ENCOUNTER — APPOINTMENT (OUTPATIENT)
Dept: OCCUPATIONAL THERAPY | Facility: CLINIC | Age: 81
DRG: 291 | End: 2021-06-28
Attending: INTERNAL MEDICINE
Payer: MEDICARE

## 2021-06-28 ENCOUNTER — PATIENT OUTREACH (OUTPATIENT)
Dept: FAMILY MEDICINE | Facility: CLINIC | Age: 81
End: 2021-06-28
Attending: INTERNAL MEDICINE
Payer: MEDICARE

## 2021-06-28 VITALS
WEIGHT: 145.6 LBS | HEART RATE: 95 BPM | OXYGEN SATURATION: 95 % | DIASTOLIC BLOOD PRESSURE: 63 MMHG | RESPIRATION RATE: 20 BRPM | BODY MASS INDEX: 24.86 KG/M2 | SYSTOLIC BLOOD PRESSURE: 103 MMHG | TEMPERATURE: 97.5 F | HEIGHT: 64 IN

## 2021-06-28 LAB
ANION GAP SERPL CALCULATED.3IONS-SCNC: 3 MMOL/L (ref 3–14)
BUN SERPL-MCNC: 18 MG/DL (ref 7–30)
CALCIUM SERPL-MCNC: 9.1 MG/DL (ref 8.5–10.1)
CHLORIDE SERPL-SCNC: 104 MMOL/L (ref 94–109)
CO2 SERPL-SCNC: 35 MMOL/L (ref 20–32)
CREAT SERPL-MCNC: 0.7 MG/DL (ref 0.52–1.04)
GFR SERPL CREATININE-BSD FRML MDRD: 81 ML/MIN/{1.73_M2}
GLUCOSE SERPL-MCNC: 132 MG/DL (ref 70–99)
MAGNESIUM SERPL-MCNC: 2.2 MG/DL (ref 1.6–2.3)
POTASSIUM SERPL-SCNC: 3.5 MMOL/L (ref 3.4–5.3)
SODIUM SERPL-SCNC: 142 MMOL/L (ref 133–144)

## 2021-06-28 PROCEDURE — 83735 ASSAY OF MAGNESIUM: CPT | Performed by: INTERNAL MEDICINE

## 2021-06-28 PROCEDURE — 999N000123 HC STATISTIC OXYGEN O2DAILY TECH TIME

## 2021-06-28 PROCEDURE — 250N000013 HC RX MED GY IP 250 OP 250 PS 637: Performed by: INTERNAL MEDICINE

## 2021-06-28 PROCEDURE — 250N000011 HC RX IP 250 OP 636: Performed by: INTERNAL MEDICINE

## 2021-06-28 PROCEDURE — 999N000156 HC STATISTIC RCP CONSULT EA 30 MIN

## 2021-06-28 PROCEDURE — 36415 COLL VENOUS BLD VENIPUNCTURE: CPT | Performed by: INTERNAL MEDICINE

## 2021-06-28 PROCEDURE — 97166 OT EVAL MOD COMPLEX 45 MIN: CPT | Mod: GO | Performed by: OCCUPATIONAL THERAPIST

## 2021-06-28 PROCEDURE — 80048 BASIC METABOLIC PNL TOTAL CA: CPT | Performed by: INTERNAL MEDICINE

## 2021-06-28 PROCEDURE — 99239 HOSP IP/OBS DSCHRG MGMT >30: CPT | Performed by: INTERNAL MEDICINE

## 2021-06-28 PROCEDURE — 999N000157 HC STATISTIC RCP TIME EA 10 MIN

## 2021-06-28 RX ORDER — FUROSEMIDE 40 MG
40 TABLET ORAL DAILY
Qty: 30 TABLET | Refills: 1 | DISCHARGE
Start: 2021-06-28 | End: 2021-09-10

## 2021-06-28 RX ORDER — FUROSEMIDE 40 MG
40 TABLET ORAL DAILY
Qty: 30 TABLET | Refills: 1 | DISCHARGE
Start: 2021-06-28 | End: 2021-06-28

## 2021-06-28 RX ADMIN — FUROSEMIDE 20 MG: 10 INJECTION, SOLUTION INTRAVENOUS at 05:50

## 2021-06-28 RX ADMIN — PANTOPRAZOLE SODIUM 40 MG: 40 TABLET, DELAYED RELEASE ORAL at 05:50

## 2021-06-28 RX ADMIN — ACETAMINOPHEN 650 MG: 325 TABLET, FILM COATED ORAL at 04:40

## 2021-06-28 RX ADMIN — ATENOLOL 50 MG: 50 TABLET ORAL at 08:43

## 2021-06-28 RX ADMIN — DILTIAZEM HYDROCHLORIDE 180 MG: 180 CAPSULE, COATED, EXTENDED RELEASE ORAL at 08:42

## 2021-06-28 RX ADMIN — SENNOSIDES-DOCUSATE SODIUM TAB 8.6-50 MG 1 TABLET: 8.6-5 TAB at 08:43

## 2021-06-28 ASSESSMENT — ACTIVITIES OF DAILY LIVING (ADL)
ADLS_ACUITY_SCORE: 15
ADLS_ACUITY_SCORE: 16
ADLS_ACUITY_SCORE: 15
PREVIOUS_RESPONSIBILITIES: MEAL PREP;HOUSEKEEPING
ADLS_ACUITY_SCORE: 15

## 2021-06-28 NOTE — PLAN OF CARE
Pt only oriented to self, pleasantly confused. VSS, afebrile, maintaining 02 >92% on 1.5 L via nasal cannula. Lung sounds coarse throughout. Prn tylenol given x 1 for back pain. Edematous to BLE. IV lasix given per MAR voiding well with x1 assist to the bathroom with walker and gait belt. Potassium to be rechecked in the AM per protocol. Will continue to monitor pain, vitals, intake/output, and POC.

## 2021-06-28 NOTE — PLAN OF CARE
S-(situation): Patient discharged to [facility] via wheelchair with daughter    B-(background): CHF, dementia    A-(assessment):   Temp: 97.5  F (36.4  C) Temp src: Oral BP: 103/63 Pulse: 95   Resp: 20 SpO2: 95 % O2 Device: None (Room air)     Neuro: A/O to self and place  Pulm: lung sounds fine crackles to bases, GREGG, weaned to RA  CV: apical pulse irregular  GI: bowel sounds normoactive x4  : adequate urine output  Skin: trace edema to BLE    Last bowel movement: 06/27/2021     R-(recommendations):Message left for report with Hellen at 1150am. Listed belongings gathered and sent with patient.     Discharge Nursing Criteria:     Care Plan and Patient education resolved: Yes    Vaccines  Influenza status verified at discharge:  Yes      MISC  Home medications returned to patient: NA  Medication Bin checked and emptied on discharge Yes  All paperwork sent with patient/Copy of AVS given to patient or family Yes.

## 2021-06-28 NOTE — PLAN OF CARE
VSS. Afebrile. Pt desating to upper 80% on 2L O2 when asleep. Pt O2 increased to 3L for sleep and O2 remains >92% on 3L O2/nc. C/o pain to back PRN pain medication given per MAR which has been somewhat effective. LS clear with fine crackles noted in bases. BLE edema remains 1+. Denies any chest pain or SOB. No complaints.

## 2021-06-28 NOTE — PROGRESS NOTES
Clinic Care Coordination Contact  Care Coordination Transition Communication      Clinical Data: Patient was hospitalized at 6/26/2021 from 6/27/2021 with diagnosis of Hypoxia.     Transition to Facility:              [facility] Name: [facility] (Phone: [phone] Fax: [phone])              Contact name and phone number/fax: [place]    Plan: RN/SW Care Coordinator will await notification from facility staff informing RN/SW Care Coordinator of patient's discharge plans/needs. RN/SW Care Coordinator will review chart and outreach to facility staff every 4 weeks and as needed.       Pratibha HOODN, RN, PHN, CCM  Primary Clinic Care Coordination    [facility]  Primary Care Clinics  [email or web address]   Office: [phone]  Employed by [facility]

## 2021-06-28 NOTE — PROGRESS NOTES
"Occupational Therapy Evaluation         06/28/21 0814   Quick Adds   Type of Visit Initial Occupational Therapy Evaluation   Living Environment   People in home alone   Current Living Arrangements extended care facility   Home Accessibility no concerns   Transportation Anticipated family or friend will provide   Self-Care   Usual Activity Tolerance good   Current Activity Tolerance moderate   Equipment Currently Used at Home walker, rolling   Activity/Exercise/Self-Care Comment Daughter, assists with some daily tasks/activities.  Activities enjoyed; \"helping others\", social engagement, games   Disability/Function   Hearing Difficulty or Deaf no   Wear Glasses or Blind yes   Concentrating, Remembering or Making Decisions Difficulty yes   Concentration Management memory   Difficulty Communicating no   Difficulty Eating/Swallowing no   Walking or Climbing Stairs Difficulty yes   Walking or Climbing Stairs ambulation difficulty, requires equipment   Dressing/Bathing Difficulty no   Toileting issues no   Doing Errands Independently Difficulty (such as shopping) yes   Fall history within last six months no   General Information   Onset of Illness/Injury or Date of Surgery 06/28/21   Referring Physician Dr Marcus Pichardo   Patient/Family Therapy Goal Statement (OT) Return to home   Additional Occupational Profile Info/Pertinent History of Current Problem Moderate complexity   Performance Patterns (Routines, Roles, Habits) Patient reports and demonstrates IND with self cares.  She is aware of having some cogntive deficits and memory issues.  The pt stated enjoys social participation activities.  IND with BADLs., ambulates with FWW.  Family assists with some higher functional tasks, due to her cognitve decline.   General Observations and Info Per EPIC: Khalida Rouse is a 81 year old female admitted on 6/26/2021. She has a h/o dementia,  a fib and on cardizem and bb. She had significantly elevated INR last month leading to " bleeding issues and subsequently anticoagulation was stopped. She has been admitted with worsening sob and swelling in legs b/l from Nursing home over last 2 days. She is sob with activity and at rest. She was found to have acute hypoxic respiratory failure secondary to acute CHF  OT observed this date; no increased SOB with minimal activity and no distress.   Cognitive Status Examination   Orientation Status person;place   Affect/Mental Status (Cognitive) WNL   Follows Commands WNL   Safety Deficit minimal deficit   Memory Deficit moderate deficit   Attention Deficit minimal deficit   Executive Function Deficit judgment   Cognitive Status Comments Cogntive decline history   Visual Perception   Visual Impairment/Limitations WNL   Pain Assessment   Patient Currently in Pain No   Posture   Posture not impaired   Range of Motion Comprehensive   General Range of Motion no range of motion deficits identified   Strength Comprehensive (MMT)   General Manual Muscle Testing (MMT) Assessment no strength deficits identified   Coordination   Upper Extremity Coordination No deficits were identified   Transfers   Transfers sit-stand transfer   Sit-Stand Transfer   Sit-Stand [place] (Transfers) set up;supervision;contact guard   Balance   Balance Assessment sit to stand balance;standing balance: static   Sit-to-Stand Balance fair balance   Standing Balance: Static fair balance   Balance Comments adaptive aide recommended   Activities of Daily Living   BADL Assessment/Intervention lower body dressing;feeding   Lower Body Dressing Assessment/Training   [place] Level (Lower Body Dressing) independent;supervision   Eating/Self Feeding   [place] Level (Feeding) independent   Instrumental Activities of Daily Living (IADL)   Previous Responsibilities meal prep;housekeeping   IADL Comments Daughter and facility staff assists with most higher functional tasks; medications, laundry, shopping   Clinical Impression   Criteria  for Skilled Therapeutic Interventions Met (OT) yes   OT Diagnosis Decreased endurance for daily activitiesw with BADL/IADLs.   OT Problem List-Impairments impacting ADL activity tolerance impaired;balance;cognition   Assessment of Occupational Performance 5 or more Performance Deficits   Identified Performance Deficits bathing, meal prep/clean up, housekeeping , laundry, medicine and financial management, driving   Planned Therapy Interventions (OT) ADL retraining;home program guidelines   Clinical Decision Making Complexity (OT) moderate complexity   Therapy Frequency (OT) Daily   Predicted Duration of Therapy 2 days   Comment-Clinical Impression The patient appears to be improving with her ability to participate with BADL without increased SOB.  She remains on 2L O2 at this time.  She is able to provide history of previous functional level, for OT evaluation.   May benefit from energy conservation techniques training, in simple one step directions.   OT Discharge Planning    OT Discharge Recommendation (DC Rec) Home with assist  (Return to previous living situation and assistance)   OT Rationale for DC Rec The patient reports receiving assistance for daughter/family and facitlity staff for higher functional activities: shopping, laundry, medication mgmt.  Patient does have some cognitve skill decline and return to previous living situation will help her with orientation and safety for functional tasks.   OT Brief overview of current status  IND with self feeding, dressing, grooming and toilet management.  Supervision for safety while bathing recommended.  CGA for safety with functional trasnfers; sit to stand to sit and short distance ambulation.   Total Evaluation Time (Minutes)   Total Evaluation Time (Minutes) 15       Thank you for the OT referral,    JACKY Vnicent/L  [facility]  [phone]  [email or web address]

## 2021-06-28 NOTE — PROGRESS NOTES
Care Management Discharge Note    Discharge Date: 06/28/21     Discharge Disposition: Transitional Care - Return to [facility] (Main Phone: [phone] Admissions Phone: [phone] Fax: [phone]) TCU.    Discharge Services: None    Discharge DME: Oxygen    Discharge Transportation: family or friend will provide with O2    Private pay costs discussed: Not applicable    PAS Confirmation Code:    Patient/family educated on Medicare website which has current facility and service quality ratings: no    Education Provided on the Discharge Plan:       Persons Notified of Discharge Plans: DaughterLeah    Patient/Family in Agreement with the Plan: yes    Handoff Referral Completed: Yes    Additional Information:  Patient discharging back to [facility] (Main Phone: [phone] Admissions Phone: [phone] Fax: [phone]) TCU today.  Writer spoke with patient's daughterLeah.  Leah will transport and bring patient's portable O2 tank.  Leah will transport around Noon. Updated Gabino at [facility] with discharge time.    SALVADOR Chopra  [facility] [phone]/ [facility] [phone]  Care Management

## 2021-06-29 NOTE — PROGRESS NOTES
Face to Face and Medical Necessity Statement for DME Provider visit    Demographic Information on Khalida Rouse:  Gender: female  : 1940  [address]  [facility] [address]  [phone] (home)     Medical Record: [id number]  Social Security Number: [id number]  Primary Care Provider: Mazin Larsen  Insurance: Payor: MEDICARE / Plan: MEDICARE / Product Type: Medicare /     HPI:   Khalida Rouse is a 81 year old  (1940), who is being seen today for a face [facility]; medical necessity statement for DME included. This patient requires the following:  DME Ordered and Medical Necessity Statement     Wheelchair Documentation  Size: 18 x 16  Corresponding cushion: Yes: seat and back cushion  Standard foot rests: Yes  Elevating leg rests: No  Arm rests: Yes: cushion  Lap tray: No  Dose the patient use oxygen? No at times may need it but does not need a mcwilliams  Is the patient able to propel wheelchair? Yes  And is there someone who can? - family  1. The patient has mobility limitations that impairs their ability to participate in one or more mobility related activities: Toileting, Grooming and Bathing.  The wheelchair is suitable and necessary for use in the patient's home.  2. The patient's mobility limitations cannot be safely resolved by using a cane/walker:Yes    Reason why a cane or walker will not meet the patient's needs. (ie: balance, tolerance, level of assistance) balance, level of assist  3. The patients home has adequate access to use a manual wheelchair:Yes  4. The use of a manual wheelchair on a regular basis will improve the patients ability to participate in mobility related ADL's at home:Yes  5. The patient is willing to use a manual wheelchair at home:Yes  6. The patient has adequate upper body strength and the mental capability to safely use a manual wheelchair and/or has a caregiver that is able to assist: Yes  7. Does the patient have a lower  extremity injury or edema?Yes     Reason for Type of Wheelchair  Patient weight: 156 lbs  Light Weight Wheelchair: Patient is unable to self-propel a standard wheelchair in the home but can self propel a light weight wheelchair.    Pt needing above DME with expected length of need of 99 indefinitely - lifetime due to medical necessity associated with following diagnosis:     Atrial fibrillation with RVR (H)  Hypoxemia  History of pneumonia  History of GI bleed  Pulmonary hypertension (H)  Anemia due to blood loss, acute  Dementia without behavioral disturbance, unspecified dementia type (H)  Low back pain, unspecified back pain laterality, unspecified chronicity, unspecified whether sciatica present  Generalized muscle weakness  Slow transit constipation      PMH   has a past medical history of Aortic valve stenosis, Atrial flutter (H), Cardiomyopathy (H), Cognitive changes - SLUMS 16/30 (4/13/2021), Severe aortic stenosis (4/7/2014), and Shoulder fracture, left (3/22/15).   SLUMS on 5/19/21 was 7/30  BIMS 8/15    ROS:Limited secondary to cognitive impairment but today pt reports no chest pain and no acute SOB    EXAM  Vitals: /62   Pulse 65   Temp 97.5  F (36.4  C)   Resp 20   Wt 70.8 kg (156 lb)   SpO2 (!) 83%   BMI 26.78 kg/m  ;BMI= Body mass index is 26.78 kg/m .  GENERAL APPEARANCE:  Alert, in no distress, cooperative, confused  EYES:  EOM, conjunctivae, lids, pupils and irises normal, wearing glasses, following NP as moving around room  NECK:  No adenopathy,masses or thyromegaly  RESP:  has been having oxygen at 2L/min and currently not on.  no acute distress.  congestion bilateral bases.    CV:  Palpation and auscultation of heart done , heart rate regular and controlled.  ACE wraps on legs +3 edema.    ABDOMEN:  normal bowel sounds, soft, nontender, no hepatosplenomegaly or other masses, no guarding or rebound  :    continent of urine  M/S:   Gait and station abnormal propels self in  wheelchair, staff help with transfers but limited.  fall risk  SKIN:  pink, warm and dry.  no open wounds  PSYCH:  oriented to person, family with place and time vague, memory impaired , affect and mood normal    ASSESSMENT/PLAN:  1. Atrial fibrillation with RVR (H)    2. Hypoxemia    3. History of pneumonia    4. History of GI bleed    5. Pulmonary hypertension (H)    6. Anemia due to blood loss, acute    7. Dementia without behavioral disturbance, unspecified dementia type (H)    8. Low back pain, unspecified back pain laterality, unspecified chronicity, unspecified whether sciatica present    9. Generalized muscle weakness    10. Slow transit constipation      Khalida would benefit from a 18 x16 wheelchair for lifetime use as she has general muscle weakness and low back pain.  Her other diagnoses above limit her for endurance and balance that she is able to safely propel the wheelchair herself.  Her new home will have adequate space for her maneuver the wheelchair through doors.  If she is not able to propel her wheelchair there are family members that can.  The wheelchair will allow her to be independent with moving around her apartment and facility safely where using another devise would limit her for endurance and balance if used alone.  Orders:  1. Facility staff/TC to contact DME company to get their order form for provider to fill out    ELECTRONICALLY SIGNED BY GINNY CERTIFIED PROVIDER:  DARLENE Gates CNP   NPI: [id number]  [facility]  [address] [place] [id number]

## 2021-06-29 NOTE — PROGRESS NOTES
Face to Face and Medical Necessity Statement for DME Provider visit    Demographic Information on Khalida Rouse:  Gender: female  : 1940  [address]  [facility] [address]  [phone] (home)     Medical Record: [id number]  Social Security Number: [id number]  Primary Care Provider: Mazin Larsen  Insurance: Payor: MEDICARE / Plan: MEDICARE / Product Type: Medicare /     HPI:   Khalida Rouse is a 81 year old  (1940), who is being seen today for a face to face provider visit at [facility]; medical necessity statement for DME included. This patient requires the following:  DME Ordered and Medical Necessity Statement     Khalida would benefit a semi-electric hospital bed for lifetime () as she has chronic health issues that include hypoxia, bilateral lower extremity edema, general weakness and low back pain.  Having an electric hospital bed would allow her to adjust the height for ease of surface to surface transfers, adjust to help with pain in her lower back, adjust the head for immediate ease of breathing as she has hypoxia.  hKalida would not be able to achieve this with a regular bed.    The patient does  require positioning of the body in ways not feasible with an ordinary bed due to a medical condition that is expected to last at least 1 month due to chronic hypoxia, low back pain, general weakness and assist with mobility.   The patient does  require, for the alleviation of pain, postioning of the body in ways not feasible with an ordinary bed.   The patient does  require the head of bed elevated more than 30* most of the time due to CHF, chronic pulmonary disease or aspiration.  The patient does not require traction that can only be attached to a hospital bed.  The patient does  require a bed height different than a fixed height hospital bed to permit tranfers to wheelchair or standing position.   The patient does  require frequent or immediate changes in body position due to  low back pain and hypoxia.       Pt needing above DME with expected length of need of 99 life time due to medical necessity associated with following diagnosis:     Atrial fibrillation with RVR (H)  Hypoxemia  History of pneumonia  History of GI bleed  Pulmonary hypertension (H)  Anemia due to blood loss, acute  Dementia without behavioral disturbance, unspecified dementia type (H)  Low back pain, unspecified back pain laterality, unspecified chronicity, unspecified whether sciatica present  Generalized muscle weakness  Slow transit constipation      PMH   has a past medical history of Aortic valve stenosis, Atrial flutter (H), Cardiomyopathy (H), Cognitive changes - SLUMS 16/30 (4/13/2021), Severe aortic stenosis (4/7/2014), and Shoulder fracture, left (3/22/15).    ROS:Limited secondary to cognitive impairment but today pt reports no chest pain and no acute shortness of breath.    EXAM  Vitals: /62   Pulse 65   Temp 97.5  F (36.4  C)   Resp 20   Wt 70.8 kg (156 lb)   SpO2 (!) 83%   BMI 26.78 kg/m  ;BMI= Body mass index is 26.78 kg/m .  GENERAL APPEARANCE:  Alert, in no distress, cooperative, confused  EYES:  EOM, conjunctivae, lids, pupils and irises normal, wearing glasses, following NP as moving around room  NECK:  No adenopathy,masses or thyromegaly  RESP:  has been having oxygen at 2L/min and currently not on.  no acute distress.  congestion bilateral bases.    CV:  Palpation and auscultation of heart done , heart rate regular and controlled.  ACE wraps on legs +3 edema.    ABDOMEN:  normal bowel sounds, soft, nontender, no hepatosplenomegaly or other masses, no guarding or rebound  :    continent of urine  M/S:   Gait and station abnormal propels self in wheelchair, staff help with transfers but limited.  fall risk  SKIN:  pink, warm and dry.  no open wounds  PSYCH:  oriented to person, family with place and time vague, memory impaired , affect and mood normal    ASSESSMENT/PLAN:  1. Atrial  fibrillation with RVR (H)    2. Hypoxemia    3. History of pneumonia    4. History of GI bleed    5. Pulmonary hypertension (H)    6. Anemia due to blood loss, acute    7. Dementia without behavioral disturbance, unspecified dementia type (H)    8. Low back pain, unspecified back pain laterality, unspecified chronicity, unspecified whether sciatica present    9. Generalized muscle weakness    10. Slow transit constipation      Khalida is able to move to a less restricted environment and to achieve independence a semi-electric hospital bed would be required for height adjustment for transfers, turning and repositioning as well as head of the bed elevation for hypoxia.      Orders:  1. Facility staff/TC to contact DME company to get their order form for provider to fill out    ELECTRONICALLY SIGNED BY GINNY CERTIFIED PROVIDER:  DARLENE Gates CNP   NPI: [id number]  [facility]  [address] [place] [id number]

## 2021-06-30 ENCOUNTER — NURSING HOME VISIT (OUTPATIENT)
Dept: GERIATRICS | Facility: CLINIC | Age: 81
End: 2021-06-30
Payer: MEDICARE

## 2021-06-30 VITALS
TEMPERATURE: 96.3 F | BODY MASS INDEX: 24.17 KG/M2 | WEIGHT: 141.6 LBS | OXYGEN SATURATION: 94 % | DIASTOLIC BLOOD PRESSURE: 67 MMHG | HEIGHT: 64 IN | HEART RATE: 72 BPM | SYSTOLIC BLOOD PRESSURE: 108 MMHG | RESPIRATION RATE: 16 BRPM

## 2021-06-30 VITALS
RESPIRATION RATE: 26 BRPM | BODY MASS INDEX: 20.72 KG/M2 | HEART RATE: 97 BPM | WEIGHT: 120.7 LBS | TEMPERATURE: 97.8 F | DIASTOLIC BLOOD PRESSURE: 66 MMHG | SYSTOLIC BLOOD PRESSURE: 99 MMHG | OXYGEN SATURATION: 92 %

## 2021-06-30 DIAGNOSIS — D62 ANEMIA DUE TO BLOOD LOSS, ACUTE: ICD-10-CM

## 2021-06-30 DIAGNOSIS — I27.20 PULMONARY HYPERTENSION (H): Primary | ICD-10-CM

## 2021-06-30 DIAGNOSIS — F03.90 DEMENTIA WITHOUT BEHAVIORAL DISTURBANCE, UNSPECIFIED DEMENTIA TYPE: ICD-10-CM

## 2021-06-30 DIAGNOSIS — R09.02 HYPOXEMIA: ICD-10-CM

## 2021-06-30 DIAGNOSIS — I48.11 LONGSTANDING PERSISTENT ATRIAL FIBRILLATION (H): ICD-10-CM

## 2021-06-30 PROCEDURE — 99309 SBSQ NF CARE MODERATE MDM 30: CPT | Performed by: NURSE PRACTITIONER

## 2021-06-30 ASSESSMENT — MIFFLIN-ST. JEOR: SCORE: 1087.29

## 2021-06-30 NOTE — PROGRESS NOTES
[facility]  PRIMARY CARE PROVIDER AND CLINIC:  DARLENE Gates CNP, [address] / [facility] [address]  Chief Complaint   Patient presents with     Hospital F/U     [facility] Medical Record Number:  [id number]  Place of Service where encounter took place:  [facility] ([facility]) [[id number]]    Khalida Rouse  is a 81 year old  (1940), admitted to the above facility from  [facility]. Hospital stay 6/26/21 through 6/28/21..  Admitted to this facility for  rehab, medical management and nursing care.    HPI:    HPI information obtained from: facility chart records, facility staff, patient report and [facility] chart review.   Brief Summary of Hospital Course: Patient was at the TCU for therapy and monitoring.  Developed acute onset of shortness of breath and was treated with increased diuretics.  MEDICATION CHANGES: Lasix increased.  Back on oxygen.  Recommended follow-up: Cardiology in 2 weeks  Updates on Status Since Skilled nursing Admission: 6/30 prior to hospital stay was planning on discharging this week.  Now plans are to stay at the TCU for another couple weeks for rehab.  Will discharge to new assisted living ([facility]).  Not currently on oxygen.    CODE STATUS/ADVANCE DIRECTIVES DISCUSSION:   DNR only  Patient's living condition: lives alone  ALLERGIES: Xarelto [rivaroxaban] and Ace inhibitors  PAST MEDICAL HISTORY:  has a past medical history of Aortic valve stenosis, Atrial flutter (H), Cardiomyopathy (H), Cognitive changes - SLUMS 16/30 (4/13/2021), Severe aortic stenosis (4/7/2014), and Shoulder fracture, left (3/22/15).  PAST SURGICAL HISTORY:   has a past surgical history that includes ENT surgery; Replace valve aortic (4/10/2014); cataract iol, rt/lt (Right); and Esophagoscopy, gastroscopy, duodenoscopy (EGD), combined (N/A, 5/14/2021).  FAMILY HISTORY: family history includes Alzheimer Disease in her  sister; Dementia in her brother.  SOCIAL HISTORY:   reports that she has never smoked. She has never used smokeless tobacco. She reports that she does not drink alcohol or use drugs.    Post Discharge Medication Reconciliation Status: discharge medications reconciled, continue medications without change  Current Outpatient Medications   Medication Sig Dispense Refill     acetaminophen (ACETAMINOPHEN 8 HOUR) 650 MG CR tablet Take 650 mg by mouth 3 times daily       atenolol (TENORMIN) 50 MG tablet Take 50 mg by mouth 2 times daily       diltiazem ER (DILT-XR) 180 MG 24 hr capsule Take 180 mg by mouth 2 times daily        docusate sodium (COLACE) 100 MG tablet Take 100 mg by mouth daily as needed for constipation       furosemide (LASIX) 40 MG tablet Take 1 tablet (40 mg) by mouth daily 30 tablet 1     hypromellose (ARTIFICIAL TEARS) 0.5 % SOLN ophthalmic solution Place 1 drop into both eyes every hour as needed for dry eyes       magnesium citrate solution Take 100 mLs by mouth 3 times daily as needed for constipation or other        pantoprazole (PROTONIX) 40 MG EC tablet Take 1 tablet (40 mg) by mouth 2 times daily (before meals)       polyethylene glycol (MIRALAX) 17 g packet Take 1 packet by mouth daily as needed for constipation       ROS:  4 point ROS including Respiratory, CV, GI and , other than that noted in the HPI,  is negative    Vitals:  BP 99/66   Pulse 97   Temp 97.8  F (36.6  C)   Resp 26   Wt 54.7 kg (120 lb 11.2 oz)   SpO2 92%   BMI 20.72 kg/m    Exam:  GENERAL APPEARANCE:  Alert, in no distress  RESP:  respiratory effort and palpation of chest normal, no respiratory distress, lungs sounds crackles on left lower base. Diminished on right  CV:  Palpation and auscultation of heart done , regular rate and rhythm, no murmur, rub, or gallop, edema + 1 pitting LE  ABDOMEN:  normal bowel sounds, soft, nontender,   M/S:  Gait and station observed in wheelchair, no tenderness or swelling of the  joints   SKIN:  Inspection and palpation of skin and subcutaneous tissue at baseline  PSYCH:  insight and judgement, memory impaired and forgetful, affect and mood normal    ASSESSMENT/PLAN:  Pulmonary hypertension (H)  Hypoxemia  Sent back to the hospital last week for increase in shortness of breath.  Noted to have pleural effusions and fluid overload.  Diuresed with IV Lasix and discharged on p.o. Lasix.  Patient on room air today, no concerns with breathing.  Patient denies concerns    Longstanding persistent atrial fibrillation (H)  Rate controlled.  No longer on anticoagulation due to history of GI bleed.    Anemia due to blood loss, acute  Hemoglobin has been low stable.    Dementia without behavioral disturbance, unspecified dementia type (H)  Patient was living at home alone prior to hospital stay.  Plan is for her to discharge to assisted living in a couple weeks.  Continue PT/OT  Continue  following for discharge planning    Orders  Check bmp on Monday dx pulmonary htn.     Electronically signed by:  Fadumo Chun NP

## 2021-06-30 NOTE — PROGRESS NOTES
[place] [facility]  PRIMARY CARE PROVIDER AND CLINIC:  DARLENE Gates CNP, [address] / [facility] [address]  Chief Complaint   Patient presents with     Hospital F/U     [place] Medical Record Number:  [id number]  Place of Service where encounter took place:  [facility] [place] ([facility]) [[id number]]    Khalida Rouse  is a 81 year old  (1940), admitted to the above facility from  [facility]. Hospital stay 6/26/21 through 6/28/21..  Admitted to this facility for  rehab, medical management and nursing care.    HPI:    HPI information obtained from: facility staff, patient report and [facility] chart review.   Brief Summary of Hospital Course:  - Patient with PMH for A. fib/flutter, TAVR, dementia, compression fracture.   5/13/21 through 5/17/21: supratherapeutic INR resulted in GIB and hematuria, acute blood  Loss anemia, coumadin discontinued.   5/17-6/25: TCU stay for rehab.   6/26-6/28: hospitalized  With CHF exacerbation, acute hypoxic respiratory failure, right moderated pleural effusion. Treated with IV lasix, discharged on po lasix. Was still on 1L of O2 at the dismissal time      Today:  - CHF: Denies CP ,SOB, orthopnea, PND, bendepnea or legs swelling.   -Resp failure:  No cough, wheezing, or SOB reported. Does not use O2    ==========================    CODE STATUS/ADVANCE DIRECTIVES DISCUSSION:   DNR only  Patient's living condition: lives alone  ALLERGIES: Xarelto [rivaroxaban] and Ace inhibitors  PAST MEDICAL HISTORY:  has a past medical history of Aortic valve stenosis, Atrial flutter (H), Cardiomyopathy (H), Cognitive changes - SLUMS 16/30 (4/13/2021), Severe aortic stenosis (4/7/2014), and Shoulder fracture, left (3/22/15).  PAST SURGICAL HISTORY:   has a past surgical history that includes ENT surgery; Replace valve aortic (4/10/2014); cataract iol, rt/lt (Right); and Esophagoscopy, gastroscopy, duodenoscopy (EGD),  "combined (N/A, 5/14/2021).  FAMILY HISTORY: family history includes Alzheimer Disease in her sister; Dementia in her brother.  SOCIAL HISTORY:   reports that she has never smoked. She has never used smokeless tobacco. She reports that she does not drink alcohol or use drugs.    Post Discharge Medication Reconciliation Status: discharge medications reconciled and changed, per note/orders  Current Outpatient Medications   Medication Sig Dispense Refill     acetaminophen (ACETAMINOPHEN 8 HOUR) 650 MG CR tablet Take 650 mg by mouth 3 times daily       atenolol (TENORMIN) 50 MG tablet Take 50 mg by mouth 2 times daily       diltiazem ER (DILT-XR) 180 MG 24 hr capsule Take 180 mg by mouth 2 times daily        docusate sodium (COLACE) 100 MG tablet Take 100 mg by mouth daily as needed for constipation       furosemide (LASIX) 40 MG tablet Take 1 tablet (40 mg) by mouth daily 30 tablet 1     hypromellose (ARTIFICIAL TEARS) 0.5 % SOLN ophthalmic solution Place 1 drop into both eyes every hour as needed for dry eyes       magnesium citrate solution Take 100 mLs by mouth 3 times daily as needed for constipation or other        pantoprazole (PROTONIX) 40 MG EC tablet Take 1 tablet (40 mg) by mouth 2 times daily (before meals)       polyethylene glycol (MIRALAX) 17 g packet Take 1 packet by mouth daily as needed for constipation       ROS: very Limited secondary to cognitive impairment but today pt reports     Vitals:  /67   Pulse 72   Temp 96.3  F (35.7  C)   Resp 16   Ht 1.626 m (5' 4\")   Wt 64.2 kg (141 lb 9.6 oz)   SpO2 94%   BMI 24.31 kg/m    Exam:  GENERAL APPEARANCE:  in no distress, cooperative  ENT:  Mouth and posterior oropharynx normal, moist mucous membranes, oral mucosa moist, no lesion noted.   EYES:  EOMI, Pupil rounded and equal.  RESP:  lungs clear to auscultation   CV:  S1S2 audible, regular HR, no murmur appreciated.   ABDOMEN:  soft, NT/ND, BS audible. no mass appreciated on palpation.   M/S:   " no joint deformity noted on observation.   SKIN:  No rash.   NEURO:   No NFD appreciated on observation. Hand  5/5 b/l  PSYCH:  AAOx person only. Poor insight, judgement and memory, affect and mood normal      Lab/Diagnostic data: Reviewed in the chart and EHR.        ASSESSMENT/PLAN:  ---------------------------------  CHF, right sided  Severe biatrial enlargement  Right moderate Pleural Effusion  Severe Pulmonary HTN (H)  Recent acute respiratory failure  - EF 65-70% (April 2021)  - on lasix 40 mg daily.   - K 3.5 (6/28). Not on KCL supplement. K. Goal is above 4.Consider KCL 10 meq daily.   - will check BMP in one week      Chronic a-fibrillation (H):  Hx of supratherapeutic INR resulted in acute blood loss anemia, UGIB and hematuria (May 2021)  - off coumadin  - CVR, on atenolol and diltiazem    Hx of GIB 2/2 supratherapeutic INR  - EGD did not reveal ulceration, erosive gastritis or esophagitis. Still on Protonix 40 mg bid. Consider reducing dose to once daily.     Normocytic anemia:  baseline 14.3 (4/21/21), dropped to toni 8.1. now 9.9. improving. Not on supplement.     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 Chronic condition, ongoing decline expected.   -  Continue to provide redirection and reassurance as needed. Maintain safe living situation with goals focused on comfort.      Hx of spinal compression fx: analgesia optimal.     Frailty: Significant  Deficits requiring NH placement. Requiring extensive assistance from nursing. Up for meals only o/w spends the day resting in bed      Electronically signed by:  Claudia Molina MD

## 2021-06-30 NOTE — LETTER
6/30/2021        RE: Khalida Rouse  [facility]  [address] [place] [address]        [place] [facility]  PRIMARY CARE PROVIDER AND CLINIC:  DARLENE Gates [facility], [address] / [place] [address]  Chief Complaint   Patient presents with     Hospital F/U     [place] Medical Record Number:  [id number]  Place of Service where encounter took place:  [facility] ([facility]) [[id number]]    Khalida Rouse  is a 81 year old  (1940), admitted to the above facility from  [facility]. Hospital stay 6/26/21 through 6/28/21..  Admitted to this facility for  rehab, medical management and nursing care.    HPI:    HPI information obtained from: facility chart records, facility staff, patient report and [facility] chart review.   Brief Summary of Hospital Course: Patient was at the TCU for therapy and monitoring.  Developed acute onset of shortness of breath and was treated with increased diuretics.  MEDICATION CHANGES: Lasix increased.  Back on oxygen.  Recommended follow-up: Cardiology in 2 weeks  Updates on Status Since Skilled nursing Admission: 6/30 prior to hospital stay was planning on discharging this week.  Now plans are to stay at the [facility] for another couple weeks for rehab.  Will discharge to new assisted living ([facility]).  Not currently on oxygen.    CODE STATUS/ADVANCE DIRECTIVES DISCUSSION:   DNR only  Patient's living condition: lives alone  ALLERGIES: Xarelto [rivaroxaban] and Ace inhibitors  PAST MEDICAL HISTORY:  has a past medical history of Aortic valve stenosis, Atrial flutter (H), Cardiomyopathy (H), Cognitive changes - SLUMS 16/30 (4/13/2021), Severe aortic stenosis (4/7/2014), and Shoulder fracture, left (3/22/15).  PAST SURGICAL HISTORY:   has a past surgical history that includes ENT surgery; Replace valve aortic (4/10/2014); cataract iol, rt/lt (Right); and Esophagoscopy, gastroscopy, duodenoscopy  (EGD), combined (N/A, 5/14/2021).  FAMILY HISTORY: family history includes Alzheimer Disease in her sister; Dementia in her brother.  SOCIAL HISTORY:   reports that she has never smoked. She has never used smokeless tobacco. She reports that she does not drink alcohol or use drugs.    Post Discharge Medication Reconciliation Status: discharge medications reconciled, continue medications without change  Current Outpatient Medications   Medication Sig Dispense Refill     acetaminophen (ACETAMINOPHEN 8 HOUR) 650 MG CR tablet Take 650 mg by mouth 3 times daily       atenolol (TENORMIN) 50 MG tablet Take 50 mg by mouth 2 times daily       diltiazem ER (DILT-XR) 180 MG 24 hr capsule Take 180 mg by mouth 2 times daily        docusate sodium (COLACE) 100 MG tablet Take 100 mg by mouth daily as needed for constipation       furosemide (LASIX) 40 MG tablet Take 1 tablet (40 mg) by mouth daily 30 tablet 1     hypromellose (ARTIFICIAL TEARS) 0.5 % SOLN ophthalmic solution Place 1 drop into both eyes every hour as needed for dry eyes       magnesium citrate solution Take 100 mLs by mouth 3 times daily as needed for constipation or other        pantoprazole (PROTONIX) 40 MG EC tablet Take 1 tablet (40 mg) by mouth 2 times daily (before meals)       polyethylene glycol (MIRALAX) 17 g packet Take 1 packet by mouth daily as needed for constipation       ROS:  4 point ROS including Respiratory, CV, GI and , other than that noted in the HPI,  is negative    Vitals:  BP 99/66   Pulse 97   Temp 97.8  F (36.6  C)   Resp 26   Wt 54.7 kg (120 lb 11.2 oz)   SpO2 92%   BMI 20.72 kg/m    Exam:  GENERAL APPEARANCE:  Alert, in no distress  RESP:  respiratory effort and palpation of chest normal, no respiratory distress, lungs sounds crackles on left lower base. Diminished on right  CV:  Palpation and auscultation of heart done , regular rate and rhythm, no murmur, rub, or gallop, edema + 1 pitting LE  ABDOMEN:  normal bowel sounds,  soft, nontender,   M/S:  Gait and station observed in wheelchair, no tenderness or swelling of the joints   SKIN:  Inspection and palpation of skin and subcutaneous tissue at baseline  PSYCH:  insight and judgement, memory impaired and forgetful, affect and mood normal    ASSESSMENT/PLAN:  Pulmonary hypertension (H)  Hypoxemia  Sent back to the hospital last week for increase in shortness of breath.  Noted to have pleural effusions and fluid overload.  Diuresed with IV Lasix and discharged on p.o. Lasix.  Patient on room air today, no concerns with breathing.  Patient denies concerns    Longstanding persistent atrial fibrillation (H)  Rate controlled.  No longer on anticoagulation due to history of GI bleed.    Anemia due to blood loss, acute  Hemoglobin has been low stable.    Dementia without behavioral disturbance, unspecified dementia type (H)  Patient was living at home alone prior to hospital stay.  Plan is for her to discharge to assisted living in a couple weeks.  Continue PT/OT  Continue  following for discharge planning    Orders  Check bmp on Monday dx pulmonary htn.     Electronically signed by:  Fadumo Chun NP           Sincerely,        Fadumo Chun NP

## 2021-07-01 ENCOUNTER — NURSING HOME VISIT (OUTPATIENT)
Dept: GERIATRICS | Facility: CLINIC | Age: 81
End: 2021-07-01
Payer: MEDICARE

## 2021-07-01 DIAGNOSIS — Z87.19 HISTORY OF GI BLEED: ICD-10-CM

## 2021-07-01 DIAGNOSIS — F03.90 DEMENTIA WITHOUT BEHAVIORAL DISTURBANCE, UNSPECIFIED DEMENTIA TYPE: ICD-10-CM

## 2021-07-01 DIAGNOSIS — S32.010S COMPRESSION FRACTURE OF L1 VERTEBRA, SEQUELA: ICD-10-CM

## 2021-07-01 DIAGNOSIS — J90 PLEURAL EFFUSION: ICD-10-CM

## 2021-07-01 DIAGNOSIS — I48.11 LONGSTANDING PERSISTENT ATRIAL FIBRILLATION (H): ICD-10-CM

## 2021-07-01 DIAGNOSIS — I27.20 PULMONARY HYPERTENSION (H): ICD-10-CM

## 2021-07-01 DIAGNOSIS — I50.812 CHRONIC RIGHT-SIDED CHF (CONGESTIVE HEART FAILURE) (H): Primary | ICD-10-CM

## 2021-07-01 PROCEDURE — 99305 1ST NF CARE MODERATE MDM 35: CPT | Performed by: FAMILY MEDICINE

## 2021-07-01 NOTE — LETTER
7/1/2021        RE: Khalida Rouse  [facility]  [address] [place] [address]        [place] [facility]  PRIMARY CARE PROVIDER AND CLINIC:  DARLENE Gates [facility], [address] / [place] [address]  Chief Complaint   Patient presents with     Hospital F/U     [place] Medical Record Number:  [id number]  Place of Service where encounter took place:  [facility] [place] ([facility]) [[id number]]    Khalida Rouse  is a 81 year old  (1940), admitted to the above facility from  [facility]. Hospital stay 6/26/21 through 6/28/21..  Admitted to this facility for  rehab, medical management and nursing care.    HPI:    HPI information obtained from: facility staff, patient report and [facility] chart review.   Brief Summary of Hospital Course:  - Patient with PMH for A. fib/flutter, TAVR, dementia, compression fracture.   5/13/21 through 5/17/21: supratherapeutic INR resulted in GIB and hematuria, acute blood  Loss anemia, coumadin discontinued.   5/17-6/25: TCU stay for rehab.   6/26-6/28: hospitalized  With CHF exacerbation, acute hypoxic respiratory failure, right moderated pleural effusion. Treated with IV lasix, discharged on po lasix. Was still on 1L of O2 at the dismissal time      Today:  - CHF: Denies CP ,SOB, orthopnea, PND, bendepnea or legs swelling.   -Resp failure:  No cough, wheezing, or SOB reported. Does not use O2    ==========================    CODE STATUS/ADVANCE DIRECTIVES DISCUSSION:   DNR only  Patient's living condition: lives alone  ALLERGIES: Xarelto [rivaroxaban] and Ace inhibitors  PAST MEDICAL HISTORY:  has a past medical history of Aortic valve stenosis, Atrial flutter (H), Cardiomyopathy (H), Cognitive changes - SLUMS 16/30 (4/13/2021), Severe aortic stenosis (4/7/2014), and Shoulder fracture, left (3/22/15).  PAST SURGICAL HISTORY:   has a past surgical history that includes ENT surgery; Replace valve  "aortic (4/10/2014); cataract iol, rt/lt (Right); and Esophagoscopy, gastroscopy, duodenoscopy (EGD), combined (N/A, 5/14/2021).  FAMILY HISTORY: family history includes Alzheimer Disease in her sister; Dementia in her brother.  SOCIAL HISTORY:   reports that she has never smoked. She has never used smokeless tobacco. She reports that she does not drink alcohol or use drugs.    Post Discharge Medication Reconciliation Status: discharge medications reconciled and changed, per note/orders  Current Outpatient Medications   Medication Sig Dispense Refill     acetaminophen (ACETAMINOPHEN 8 HOUR) 650 MG CR tablet Take 650 mg by mouth 3 times daily       atenolol (TENORMIN) 50 MG tablet Take 50 mg by mouth 2 times daily       diltiazem ER (DILT-XR) 180 MG 24 hr capsule Take 180 mg by mouth 2 times daily        docusate sodium (COLACE) 100 MG tablet Take 100 mg by mouth daily as needed for constipation       furosemide (LASIX) 40 MG tablet Take 1 tablet (40 mg) by mouth daily 30 tablet 1     hypromellose (ARTIFICIAL TEARS) 0.5 % SOLN ophthalmic solution Place 1 drop into both eyes every hour as needed for dry eyes       magnesium citrate solution Take 100 mLs by mouth 3 times daily as needed for constipation or other        pantoprazole (PROTONIX) 40 MG EC tablet Take 1 tablet (40 mg) by mouth 2 times daily (before meals)       polyethylene glycol (MIRALAX) 17 g packet Take 1 packet by mouth daily as needed for constipation       ROS: very Limited secondary to cognitive impairment but today pt reports     Vitals:  /67   Pulse 72   Temp 96.3  F (35.7  C)   Resp 16   Ht 1.626 m (5' 4\")   Wt 64.2 kg (141 lb 9.6 oz)   SpO2 94%   BMI 24.31 kg/m    Exam:  GENERAL APPEARANCE:  in no distress, cooperative  ENT:  Mouth and posterior oropharynx normal, moist mucous membranes, oral mucosa moist, no lesion noted.   EYES:  EOMI, Pupil rounded and equal.  RESP:  lungs clear to auscultation   CV:  S1S2 audible, regular HR, no " murmur appreciated.   ABDOMEN:  soft, NT/ND, BS audible. no mass appreciated on palpation.   M/S:   no joint deformity noted on observation.   SKIN:  No rash.   NEURO:   No NFD appreciated on observation. Hand  5/5 b/l  PSYCH:  AAOx person only. Poor insight, judgement and memory, affect and mood normal      Lab/Diagnostic data: Reviewed in the chart and EHR.        ASSESSMENT/PLAN:  ---------------------------------  CHF, right sided  Severe biatrial enlargement  Right moderate Pleural Effusion  Severe Pulmonary HTN (H)  Recent acute respiratory failure  - EF 65-70% (April 2021)  - on lasix 40 mg daily.   - K 3.5 (6/28). Not on KCL supplement. K. Goal is above 4.Consider KCL 10 meq daily.   - will check BMP in one week      Chronic a-fibrillation (H):  Hx of supratherapeutic INR resulted in acute blood loss anemia, UGIB and hematuria (May 2021)  - off coumadin  - CVR, on atenolol and diltiazem    Hx of GIB 2/2 supratherapeutic INR  - EGD did not reveal ulceration, erosive gastritis or esophagitis. Still on Protonix 40 mg bid. Consider reducing dose to once daily.     Normocytic anemia:  baseline 14.3 (4/21/21), dropped to toni 8.1. now 9.9. improving. Not on supplement.     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 Chronic condition, ongoing decline expected.   -  Continue to provide redirection and reassurance as needed. Maintain safe living situation with goals focused on comfort.      Hx of spinal compression fx: analgesia optimal.     Frailty: Significant  Deficits requiring NH placement. Requiring extensive assistance from nursing. Up for meals only o/w spends the day resting in bed      Electronically signed by:  Claudia Molina MD         Sincerely,        Claudia Molina MD

## 2021-07-01 NOTE — DISCHARGE SUMMARY
[facility]  Hospitalist Discharge Summary      Date of Admission:  6/26/2021  Date of Discharge:  6/28/2021  1:45 PM  Discharging Provider: Marcus Pichardo MD      Discharge Diagnoses                    Acute onset sob : 2/2 acute CHF       Acute hypoxic respiratory failure : on 1- 2 liters of oxygen       Acute diastolic CHF : s/p iv diuresis and changed to oral lasix.       Pleural effusion, right moderate : s/p  iv diuresis       HTN, Essential : on atenolol 50 mg bid, cardizem 180 mg bid       A fib, h/o : on atenolol, cardizem, discontinued anticoagulation due to Gi bleed last month       S/p AVR : discontinued anticoagulation due to Gi bleed last month       GERD : on PPI       Dementia, h/o : monitor neuro status       Anemia, chronic : stable.       H/o backache and spinal #         Follow-ups Needed After Discharge   Follow-up Appointments     Follow Up and recommended labs and tests      Follow up with senior care physician.  The following labs/tests are   recommended: BMP    Follow up with Cardiology       As advised in  1-2 weeks  .         {Additional follow-up instructions/to-do's for PCP    :BMP    Unresulted Labs Ordered in the Past 30 Days of this Admission     No orders found from 5/27/2021 to 6/27/2021.      These results will be followed up by pcp    Discharge Disposition   Discharged to short-term care facility  Condition at discharge: Stable        Hospital Course     Khalida Rouse is a 81 year old female admitted on 6/26/2021. She has a h/o dementia,  a fib and on cardizem and bb. She had significantly elevated INR last month leading to bleeding issues and subsequently anticoagulation was stopped. She was admitted with worsening sob and swelling in legs b/l from Nursing home over last 2 days. She is sob with activity and at rest. She was found to have acute hypoxic respiratory failure secondary to acute CHF. Started on  iv diuresis and trend troponin and were  flat. Patient has shown significant clinical improvement at this time. She  will be discharged  To TCU on oral lasix. Patient is still needing a liter of oxygen which can be weaned off at the TCU. She will f/u with PCP and cardiology team in a week.          Consultations This Hospital Stay   CORE CLINIC EVALUATION IP CONSULT  OCCUPATIONAL THERAPY ADULT IP CONSULT  NUTRITION SERVICES ADULT IP CONSULT  CARE MANAGEMENT / SOCIAL WORK IP CONSULT  PHYSICAL THERAPY ADULT IP CONSULT  OCCUPATIONAL THERAPY ADULT IP CONSULT    Code Status   No CPR- Do NOT Intubate    Time Spent on this Encounter   I, Marcus Pichardo MD, personally saw the patient today and spent greater than 30 minutes discharging this patient.       Marcus Pichardo MD  [address] SURGICAL  [address]   [place] [address]  Phone: [phone]  ______________________________________________________________________    Physical Exam   Vital Signs:                    Weight: 145 lbs 9.6 oz  GENERAL: The patient is not in any acute distressed. Awake and alert.  HEENT: Nonicteric sclerae, PERRLA, EOMI. Oropharynx clear. Moist mucous membranes. Conjunctivae appear well perfused.  HEART: Regular rate and rhythm without murmurs.  LUNGS: Clear to auscultation bilaterally. No wheezing or crackles.  ABDOMEN: Soft, positive bowel sounds, nontender.  SKIN: No rash, no excessive bruising, petechiae, or purpura.  EXTREMITIES : no rashes, b/l  swelling in legs present.  NEUROLOGIC: AxO x 3.  Cranial nerves II-XII intact without motor/sensory deficit.             Primary Care Physician   Hazel Eckert    Discharge Orders      **Basic metabolic panel FUTURE anytime     CARDIOLOGY EVAL ADULT REFERRAL      Care Coordination Referral      General info for SNF    Length of Stay Estimate: Short Term Care: Estimated # of Days <30  Condition at Discharge: Stable  Level of care:skilled   Rehabilitation Potential: Fair  Admission H&P remains  valid and up-to-date: Yes  Recent Chemotherapy: N/A  Use Nursing Home Standing Orders: Yes     Mantoux instructions    Give two-step Mantoux (PPD) Per Facility Policy Yes     Reason for your hospital stay    sob     Follow Up and recommended labs and tests    Follow up with detention physician.  The following labs/tests are recommended: BMP    Follow up with Cardiology       As advised in  1-2 weeks  .     Activity - Up with nursing assistance     No CPR- Do NOT Intubate     Physical Therapy Adult Consult    Evaluate and treat as clinically indicated.    Reason:  deconditioning     Occupational Therapy Adult Consult    Evaluate and treat as clinically indicated.    Reason:  Deconditioning and confusion     Oxygen Adult/Peds    Oxygen Documentation:   I certify that this patient, Khalida Rouse has been under my care (or a nurse practitioner or physican's assistant working with me). This is the face-to-face encounter for oxygen medical necessity.      Khalida Rouse is now in a chronic stable state and continues to require supplemental oxygen. Patient has continued oxygen desaturation due to Chronic Heart Failure I50.    Alternative treatment(s) tried or considered and deemed clinically infective for treatment of Chronic Heart Failure I50 include diuresis  .  If portability is ordered, is the patient mobile within the home? yes    **Patients who qualify for home O2 coverage under the CMS guidelines require ABG tests or O2 sat readings obtained closest to, but no earlier than 2 days prior to the discharge, as evidence of the need for home oxygen therapy. Testing must be performed while patient is in the chronic stable state. See notes for O2 sats.**     Advance Diet as Tolerated    Follow this diet upon discharge: Orders Placed This Encounter      Snacks/Supplements Adult: Ensure Enlive; Between Meals      2 Gram Sodium Diet Other - please comment       Significant Results and Procedures   Most Recent 3  CBC's:  Recent Labs   Lab Test 06/26/21  1335 05/26/21  0756 05/17/21  0557 05/16/21  0623   WBC 5.2 6.3  --  13.8*   HGB 9.9* 9.6* 8.4* 8.9*   MCV 86 90  --  87    298  --  239     Most Recent 3 BMP's:  Recent Labs   Lab Test 06/28/21  0647 06/27/21  1430 06/27/21  0609 06/26/21  1335     --  142 142   POTASSIUM 3.5 3.8 3.3* 4.2   CHLORIDE 104  --  105 110*   CO2 35*  --  35* 29   BUN 18  --  14 19   CR 0.70  --  0.69 0.66   ANIONGAP 3  --  2* 3   JOSEFINA 9.1  --  9.0 8.6   *  --  81 96       Discharge Medications   Discharge Medication List as of 6/28/2021 12:13 PM      CONTINUE these medications which have CHANGED    Details   furosemide (LASIX) 40 MG tablet Take 1 tablet (40 mg) by mouth daily, Disp-30 tablet, R-1, Transitional         CONTINUE these medications which have NOT CHANGED    Details   acetaminophen (ACETAMINOPHEN 8 HOUR) 650 MG CR tablet Take 650 mg by mouth 3 times daily, Historical      atenolol (TENORMIN) 50 MG tablet Take 50 mg by mouth 2 times daily, Historical      diltiazem ER (DILT-XR) 180 MG 24 hr capsule Take 180 mg by mouth 2 times daily , Historical      pantoprazole (PROTONIX) 40 MG EC tablet Take 1 tablet (40 mg) by mouth 2 times daily (before meals), Transitional      docusate sodium (COLACE) 100 MG tablet Take 100 mg by mouth daily as needed for constipation, Historical      hypromellose (ARTIFICIAL TEARS) 0.5 % SOLN ophthalmic solution Place 1 drop into both eyes every hour as needed for dry eyes, Historical      magnesium citrate solution Take 100 mLs by mouth 3 times daily as needed for constipation or other , Historical      polyethylene glycol (MIRALAX) 17 g packet Take 1 packet by mouth daily as needed for constipation, Historical           Allergies   Allergies   Allergen Reactions     Xarelto [Rivaroxaban] Diarrhea     Ace Inhibitors Cough

## 2021-07-02 NOTE — PLAN OF CARE
Khalida ARMAAN Desiraealfonso  Gender: female  : 1940  [place] [id number] [place] [address]  [phone] (home)     Medical Record: [id number]  Pharmacy:    [facility] - [place], [place] - [address]  [facility] - [place], [place] - [address]  [facility] - [place], [place] - [address]  Primary Care Provider: Hazel Eckert    Parent's names are: Data Unavailable (mother) and Data Unavailable (father).      [facility]  2021     Patient discharged to [facility] for TCU; lives at [facility]. No callback attempt. Angi Constantino RN on 2021 at 4:13 PM

## 2021-07-05 ENCOUNTER — NURSING HOME VISIT (OUTPATIENT)
Dept: GERIATRICS | Facility: CLINIC | Age: 81
End: 2021-07-05
Payer: MEDICARE

## 2021-07-05 ENCOUNTER — HOSPITAL LABORATORY (OUTPATIENT)
Dept: NURSING HOME | Facility: OTHER | Age: 81
End: 2021-07-05

## 2021-07-05 VITALS
RESPIRATION RATE: 18 BRPM | WEIGHT: 140.6 LBS | OXYGEN SATURATION: 89 % | HEIGHT: 64 IN | BODY MASS INDEX: 24.01 KG/M2 | DIASTOLIC BLOOD PRESSURE: 73 MMHG | SYSTOLIC BLOOD PRESSURE: 123 MMHG | HEART RATE: 88 BPM | TEMPERATURE: 98.3 F

## 2021-07-05 DIAGNOSIS — D62 ANEMIA DUE TO BLOOD LOSS, ACUTE: ICD-10-CM

## 2021-07-05 DIAGNOSIS — I27.20 PULMONARY HYPERTENSION (H): Primary | ICD-10-CM

## 2021-07-05 DIAGNOSIS — F03.90 DEMENTIA WITHOUT BEHAVIORAL DISTURBANCE, UNSPECIFIED DEMENTIA TYPE: ICD-10-CM

## 2021-07-05 DIAGNOSIS — I48.11 LONGSTANDING PERSISTENT ATRIAL FIBRILLATION (H): ICD-10-CM

## 2021-07-05 DIAGNOSIS — R09.02 HYPOXEMIA: ICD-10-CM

## 2021-07-05 LAB
ANION GAP SERPL CALCULATED.3IONS-SCNC: 3 MMOL/L (ref 3–14)
BUN SERPL-MCNC: 18 MG/DL (ref 7–30)
CALCIUM SERPL-MCNC: 9.2 MG/DL (ref 8.5–10.1)
CHLORIDE SERPL-SCNC: 109 MMOL/L (ref 94–109)
CO2 SERPL-SCNC: 31 MMOL/L (ref 20–32)
CREAT SERPL-MCNC: 0.72 MG/DL (ref 0.52–1.04)
GFR SERPL CREATININE-BSD FRML MDRD: 77 ML/MIN/{1.73_M2}
GLUCOSE SERPL-MCNC: 82 MG/DL (ref 70–99)
POTASSIUM SERPL-SCNC: 3.8 MMOL/L (ref 3.4–5.3)
SODIUM SERPL-SCNC: 143 MMOL/L (ref 133–144)

## 2021-07-05 PROCEDURE — 99309 SBSQ NF CARE MODERATE MDM 30: CPT | Performed by: NURSE PRACTITIONER

## 2021-07-05 ASSESSMENT — MIFFLIN-ST. JEOR: SCORE: 1082.76

## 2021-07-05 NOTE — LETTER
"    7/5/2021        RE: Khalida Rouse  [facility]  [address] [place] [address]        [place] [facility]  PRIMARY CARE PROVIDER AND CLINIC:  DARLENE Gates [facility], [address] / [place] [address]  Chief Complaint   Patient presents with     RECHECK     [place] Medical Record Number:  [id number]  Place of Service where encounter took place:  [facility] ([facility]) [[id number]]    Khalida Rouse  is a 81 year old  (1940), admitted to the above facility from  [facility]. Hospital stay 6/26/21 through 6/28/21..  Admitted to this facility for  rehab, medical management and nursing care.    HPI:    HPI information obtained from: facility chart records, facility staff, patient report and [facility] chart review.   Brief Summary of Hospital Course: Patient was at the U for therapy and monitoring.  Developed acute onset of shortness of breath and was treated with increased diuretics.  MEDICATION CHANGES: Lasix increased.  Back on oxygen.  Recommended follow-up: Cardiology in 2 weeks  Updates on Status Since Skilled nursing Admission: 6/30 prior to hospital stay was planning on discharging this week.  Now plans are to stay at the U for another couple weeks for rehab.  Will discharge to [facility] assisted living ([facility]  Not currently on oxygen. Check bmp on Monday dx pulmonary htn.  7/1 MD visit - recommends increasing potassium. And reduction of Protonix to once daily.     CODE STATUS/ADVANCE DIRECTIVES DISCUSSION:   DNR only  Patient's living condition: lives alone - will discharge to AL.    ROS:  4 point ROS including Respiratory, CV, GI and , other than that noted in the HPI,  is negative    Vitals:  /73   Pulse 88   Temp 98.3  F (36.8  C)   Resp 18   Ht 1.626 m (5' 4\")   Wt 63.8 kg (140 lb 9.6 oz)   SpO2 (!) 89%   BMI 24.13 kg/m    Exam:  GENERAL APPEARANCE:  Alert, in no distress  RESP:  respiratory " effort and palpation of chest normal, no respiratory distress, lungs sounds crackles on left lower base. Diminished on right. Not on oxygen.   CV:  Palpation and auscultation of heart done , regular rate and rhythm, no murmur, rub, or gallop, edema + 1 pitting LE  ABDOMEN:  normal bowel sounds, soft, nontender,   M/S:  Gait and station observed in wheelchair, no tenderness or swelling of the joints   SKIN:  Inspection and palpation of skin and subcutaneous tissue at baseline  PSYCH:  insight and judgement, memory impaired and forgetful, affect and mood normal    ASSESSMENT/PLAN:  Pulmonary hypertension (H)  Hypoxemia  Sent back to the hospital last week for increase in shortness of breath.  Noted to have pleural effusions and fluid overload.  Diuresed with IV Lasix and discharged on p.o. Lasix.    - continue lasix 40 mg po daily.     Longstanding persistent atrial fibrillation (H)  Rate controlled.  No longer on anticoagulation due to history of GI bleed.    Anemia due to blood loss, acute  Hemoglobin has been low stable. Follow PRN.    Dementia without behavioral disturbance, unspecified dementia type (H)  Patient was living at home alone prior to hospital stay.  Plan is for her to discharge to assisted living in a couple weeks.  Continue PT/OT  Continue  following for discharge planning    Orders  Potassium 10 meq po every day dx CHF  Check potassium and hgb on 7/14 dx CHF.     Electronically signed by:  Fadumo Chun NP           Sincerely,        Fadumo Chun NP

## 2021-07-05 NOTE — PROGRESS NOTES
"[place] [facility]  PRIMARY CARE PROVIDER AND CLINIC:  DARLENE Gates CNP, [address] / [facility] [address]  Chief Complaint   Patient presents with     RECHECK     [place] Medical Record Number:  [id number]  Place of Service where encounter took place:  [facility] [place] ([facility]) [[id number]]    Khalida Rouse  is a 81 year old  (1940), admitted to the above facility from  [facility]. Hospital stay 6/26/21 through 6/28/21..  Admitted to this facility for  rehab, medical management and nursing care.    HPI:    HPI information obtained from: facility chart records, facility staff, patient report and [facility] chart review.   Brief Summary of Hospital Course: Patient was at the TCU for therapy and monitoring.  Developed acute onset of shortness of breath and was treated with increased diuretics.  MEDICATION CHANGES: Lasix increased.  Back on oxygen.  Recommended follow-up: Cardiology in 2 weeks  Updates on Status Since Skilled nursing Admission: 6/30 prior to hospital stay was planning on discharging this week.  Now plans are to stay at the TCU for another couple weeks for rehab.  Will discharge to new assisted living ([facility]).  Not currently on oxygen. Check bmp on Monday dx pulmonary htn.  7/1 MD visit - recommends increasing potassium. And reduction of Protonix to once daily.     CODE STATUS/ADVANCE DIRECTIVES DISCUSSION:   DNR only  Patient's living condition: lives alone - will discharge to AL.    ROS:  4 point ROS including Respiratory, CV, GI and , other than that noted in the HPI,  is negative    Vitals:  /73   Pulse 88   Temp 98.3  F (36.8  C)   Resp 18   Ht 1.626 m (5' 4\")   Wt 63.8 kg (140 lb 9.6 oz)   SpO2 (!) 89%   BMI 24.13 kg/m    Exam:  GENERAL APPEARANCE:  Alert, in no distress  RESP:  respiratory effort and palpation of chest normal, no respiratory distress, lungs sounds crackles on left lower " base. Diminished on right. Not on oxygen.   CV:  Palpation and auscultation of heart done , regular rate and rhythm, no murmur, rub, or gallop, edema + 1 pitting LE  ABDOMEN:  normal bowel sounds, soft, nontender,   M/S:  Gait and station observed in wheelchair, no tenderness or swelling of the joints   SKIN:  Inspection and palpation of skin and subcutaneous tissue at baseline  PSYCH:  insight and judgement, memory impaired and forgetful, affect and mood normal    ASSESSMENT/PLAN:  Pulmonary hypertension (H)  Hypoxemia  Sent back to the hospital last week for increase in shortness of breath.  Noted to have pleural effusions and fluid overload.  Diuresed with IV Lasix and discharged on p.o. Lasix.    - continue lasix 40 mg po daily.     Longstanding persistent atrial fibrillation (H)  Rate controlled.  No longer on anticoagulation due to history of GI bleed.    Anemia due to blood loss, acute  Hemoglobin has been low stable. Follow PRN.    Dementia without behavioral disturbance, unspecified dementia type (H)  Patient was living at home alone prior to hospital stay.  Plan is for her to discharge to assisted living in a couple weeks.  Continue PT/OT  Continue  following for discharge planning    Orders  Potassium 10 meq po every day dx CHF  Check potassium and hgb on 7/14 dx CHF.     Electronically signed by:  Fadumo Chun NP

## 2021-07-12 ENCOUNTER — PATIENT OUTREACH (OUTPATIENT)
Dept: CARE COORDINATION | Facility: CLINIC | Age: 81
End: 2021-07-12

## 2021-07-12 NOTE — PROGRESS NOTES
Clinic Care Coordination Contact    Situation: Patient chart reviewed by care coordinator.    Background:Patient was hospitalized at [facility] from 5/13/2021 to 5/17/2021 with diagnosis of hypoxemia and low back pain and discharged to TCU.    Assessment: Patient continues to reside at TCU for rehab plans to discharge to [facility].  AL at discharge.    Plan/Recommendations: RNCC will continue to monitor for discharge every 2 to 3 weeks.      Pratibha OGDEN, RN, PHN, CCM  Primary Clinic Care Coordination    [facility]  Primary Care Clinics  [email or web address]   Office: [phone]  Employed by [facility]

## 2021-07-13 ENCOUNTER — LAB REQUISITION (OUTPATIENT)
Dept: LAB | Facility: CLINIC | Age: 81
End: 2021-07-13
Payer: MEDICARE

## 2021-07-13 DIAGNOSIS — I50.20 UNSPECIFIED SYSTOLIC (CONGESTIVE) HEART FAILURE (H): ICD-10-CM

## 2021-07-14 ENCOUNTER — DISCHARGE SUMMARY NURSING HOME (OUTPATIENT)
Dept: GERIATRICS | Facility: CLINIC | Age: 81
End: 2021-07-14
Payer: MEDICARE

## 2021-07-14 VITALS
HEIGHT: 64 IN | HEART RATE: 88 BPM | OXYGEN SATURATION: 96 % | RESPIRATION RATE: 18 BRPM | TEMPERATURE: 97.9 F | WEIGHT: 138.6 LBS | DIASTOLIC BLOOD PRESSURE: 70 MMHG | BODY MASS INDEX: 23.66 KG/M2 | SYSTOLIC BLOOD PRESSURE: 103 MMHG

## 2021-07-14 DIAGNOSIS — I48.11 LONGSTANDING PERSISTENT ATRIAL FIBRILLATION (H): ICD-10-CM

## 2021-07-14 DIAGNOSIS — Z87.19 HISTORY OF GI BLEED: ICD-10-CM

## 2021-07-14 DIAGNOSIS — D62 ANEMIA DUE TO BLOOD LOSS, ACUTE: ICD-10-CM

## 2021-07-14 DIAGNOSIS — F03.90 DEMENTIA WITHOUT BEHAVIORAL DISTURBANCE, UNSPECIFIED DEMENTIA TYPE: ICD-10-CM

## 2021-07-14 DIAGNOSIS — I50.812 CHRONIC RIGHT-SIDED CHF (CONGESTIVE HEART FAILURE) (H): ICD-10-CM

## 2021-07-14 DIAGNOSIS — I27.20 PULMONARY HYPERTENSION (H): Primary | ICD-10-CM

## 2021-07-14 LAB
ANION GAP SERPL CALCULATED.3IONS-SCNC: <1 MMOL/L (ref 3–14)
BUN SERPL-MCNC: 18 MG/DL (ref 7–30)
CALCIUM SERPL-MCNC: 8.8 MG/DL (ref 8.5–10.1)
CHLORIDE BLD-SCNC: 109 MMOL/L (ref 94–109)
CO2 SERPL-SCNC: 33 MMOL/L (ref 20–32)
CREAT SERPL-MCNC: 0.75 MG/DL (ref 0.52–1.04)
GFR SERPL CREATININE-BSD FRML MDRD: 74 ML/MIN/1.73M2
GLUCOSE BLD-MCNC: 82 MG/DL (ref 70–99)
HGB BLD-MCNC: 10.4 G/DL (ref 11.7–15.7)
POTASSIUM BLD-SCNC: 4.3 MMOL/L (ref 3.4–5.3)
SODIUM SERPL-SCNC: 142 MMOL/L (ref 133–144)

## 2021-07-14 PROCEDURE — 85018 HEMOGLOBIN: CPT | Mod: ORL | Performed by: NURSE PRACTITIONER

## 2021-07-14 PROCEDURE — 80048 BASIC METABOLIC PNL TOTAL CA: CPT | Mod: ORL | Performed by: NURSE PRACTITIONER

## 2021-07-14 PROCEDURE — 36415 COLL VENOUS BLD VENIPUNCTURE: CPT | Mod: ORL | Performed by: NURSE PRACTITIONER

## 2021-07-14 PROCEDURE — 99309 SBSQ NF CARE MODERATE MDM 30: CPT | Performed by: NURSE PRACTITIONER

## 2021-07-14 RX ORDER — POTASSIUM CHLORIDE 750 MG/1
20 TABLET, EXTENDED RELEASE ORAL DAILY
COMMUNITY

## 2021-07-14 ASSESSMENT — MIFFLIN-ST. JEOR: SCORE: 1073.69

## 2021-07-14 NOTE — LETTER
"    7/14/2021        RE: Khalida Rouse  [facility] ([place], [place])  [address] [place] [address]        [place] [facility]    Chief Complaint   Patient presents with     Discharge Summary Nursing Home       Place of Service where encounter took place:  [facility] [place] () [[id number]]    HPI:    Khalida Rouse is a 81 year old  (1940), who is being seen today for an episodic care visit.  HPI information obtained from: facility chart records, facility staff and patient report.Today's concern is:     Pulmonary hypertension (H)  Longstanding persistent atrial fibrillation (H)  Hypoxemia  Anemia due to blood loss, acute  Dementia without behavioral disturbance, unspecified dementia type (H)  Chronic right-sided CHF (congestive heart failure) (H)  History of GI bleed  Atrial fibrillation with RVR (H)    I was planning on seeing patient today for a discharge summary but plans have changed and patient still needs to find a place to live. The apartment at [facility] was no longer available.    Medications and recent labs reviewed.     ALLERGIES: Xarelto [rivaroxaban] and Ace inhibitors  Past Medical, Surgical, Family and Social History reviewed and updated in EPIC.  Med list reviewed in nursing home EHR    REVIEW OF SYSTEMS:  4 point ROS including Respiratory, CV, GI and , other than that noted in the HPI,  is negative    PHYSICAL EXAM:  /70   Pulse 88   Temp 97.9  F (36.6  C)   Resp 18   Ht 1.626 m (5' 4\")   Wt 62.9 kg (138 lb 9.6 oz)   SpO2 96%   BMI 23.79 kg/m    GENERAL APPEARANCE:  Alert, in no distress  RESP:  respiratory effort and palpation of chest normal, no respiratory distress, lungs sounds clear. On room air.   CV:  Palpation and auscultation of heart done,  Irregular rate and rhythm, edema none  ABDOMEN:  normal bowel sounds, soft, nontender,   M/S:  Gait and station observed in wheelchair, able to propel herself, no tenderness or swelling of the joints   SKIN:  " Inspection and palpation of skin and subcutaneous tissue at baseline  PSYCH:  insight and judgement, memory impaired, affect and mood normal     Assessment/Plan:  Pulmonary hypertension (H)  No longer needing oxygen. Continue with hear rate control and HF control.     Longstanding persistent atrial fibrillation (H)  Rate controled with diltiazem and atenolol. No longer on anticoagulation due to history of GI bleed.    Anemia due to blood loss, acute  History of GI bleed  Hemoglobin   Date Value Ref Range Status   07/14/2021 10.4 (L) 11.7 - 15.7 g/dL Final   06/26/2021 9.9 (L) 11.7 - 15.7 g/dL Final     Stable and improving. Monitor hemoglobin as needed.    Dementia without behavioral disturbance, unspecified dementia type (H)  Was planning on discharging to [facility] memory care unit on Friday.  notes in nursing home E HR that her room at the memory care unit was given to another. She will stay at the nursing home until another assisted living can be found for her. Most recent SLUMS was 13/30.  -Requires 24-hour care. Continue supportive care at the nursing home.    Chronic right-sided CHF (congestive heart failure) (H)  Stable and compensated. Continues on Lasix 40 mg once daily. Potassium 10 mEq once daily and atenolol.      Orders:  No changes    Electronically signed by  Fadumo Chun NP          Sincerely,        Fadumo Chun NP

## 2021-07-15 PROBLEM — I50.812 CHRONIC RIGHT-SIDED CHF (CONGESTIVE HEART FAILURE) (H): Status: ACTIVE | Noted: 2021-07-15

## 2021-07-15 PROBLEM — R09.02 HYPOXIA: Status: RESOLVED | Noted: 2021-06-26 | Resolved: 2021-07-15

## 2021-07-15 PROBLEM — R09.02 HYPOXEMIA: Status: RESOLVED | Noted: 2019-05-23 | Resolved: 2021-07-15

## 2021-07-15 PROBLEM — I27.29 OTHER SECONDARY PULMONARY HYPERTENSION (H): Status: RESOLVED | Noted: 2019-05-14 | Resolved: 2021-07-15

## 2021-07-15 PROBLEM — Z87.19 HISTORY OF GI BLEED: Status: ACTIVE | Noted: 2021-07-15

## 2021-07-15 PROBLEM — I48.11 LONGSTANDING PERSISTENT ATRIAL FIBRILLATION (H): Status: ACTIVE | Noted: 2021-07-15

## 2021-07-15 PROBLEM — I48.0 PAROXYSMAL ATRIAL FIBRILLATION (H): Status: RESOLVED | Noted: 2021-06-26 | Resolved: 2021-07-15

## 2021-07-15 PROBLEM — K92.2 GIB (GASTROINTESTINAL BLEEDING): Status: RESOLVED | Noted: 2021-05-13 | Resolved: 2021-07-15

## 2021-07-15 PROBLEM — D62 ANEMIA DUE TO BLOOD LOSS, ACUTE: Status: ACTIVE | Noted: 2021-07-15

## 2021-07-15 PROBLEM — F03.90 DEMENTIA WITHOUT BEHAVIORAL DISTURBANCE, UNSPECIFIED DEMENTIA TYPE: Status: ACTIVE | Noted: 2021-07-15

## 2021-07-15 PROBLEM — R41.89 COGNITIVE CHANGES: Status: RESOLVED | Noted: 2021-04-13 | Resolved: 2021-07-15

## 2021-07-15 PROBLEM — K35.30 ACUTE APPENDICITIS WITH LOCALIZED PERITONITIS, WITHOUT PERFORATION, ABSCESS, OR GANGRENE: Status: RESOLVED | Noted: 2021-04-12 | Resolved: 2021-07-15

## 2021-07-15 NOTE — PROGRESS NOTES
"[place] [facility]    Chief Complaint   Patient presents with     Discharge Summary Nursing Home       Place of Service where encounter took place:  [facility] () [[id number]]    HPI:    Khalida Rouse is a 81 year old  (1940), who is being seen today for an episodic care visit.  HPI information obtained from: facility chart records, facility staff and patient report.Today's concern is:     Pulmonary hypertension (H)  Longstanding persistent atrial fibrillation (H)  Hypoxemia  Anemia due to blood loss, acute  Dementia without behavioral disturbance, unspecified dementia type (H)  Chronic right-sided CHF (congestive heart failure) (H)  History of GI bleed  Atrial fibrillation with RVR (H)    I was planning on seeing patient today for a discharge summary but plans have changed and patient still needs to find a place to live. The apartment at [facility] was no longer available.    Medications and recent labs reviewed.     ALLERGIES: Xarelto [rivaroxaban] and Ace inhibitors  Past Medical, Surgical, Family and Social History reviewed and updated in EPIC.  Med list reviewed in nursing home EHR    REVIEW OF SYSTEMS:  4 point ROS including Respiratory, CV, GI and , other than that noted in the HPI,  is negative    PHYSICAL EXAM:  /70   Pulse 88   Temp 97.9  F (36.6  C)   Resp 18   Ht 1.626 m (5' 4\")   Wt 62.9 kg (138 lb 9.6 oz)   SpO2 96%   BMI 23.79 kg/m    GENERAL APPEARANCE:  Alert, in no distress  RESP:  respiratory effort and palpation of chest normal, no respiratory distress, lungs sounds clear. On room air.   CV:  Palpation and auscultation of heart done,  Irregular rate and rhythm, edema none  ABDOMEN:  normal bowel sounds, soft, nontender,   M/S:  Gait and station observed in wheelchair, able to propel herself, no tenderness or swelling of the joints   SKIN:  Inspection and palpation of skin and subcutaneous tissue at baseline  PSYCH:  insight and judgement, " memory impaired, affect and mood normal     Assessment/Plan:  Pulmonary hypertension (H)  No longer needing oxygen. Continue with hear rate control and HF control.     Longstanding persistent atrial fibrillation (H)  Rate controled with diltiazem and atenolol. No longer on anticoagulation due to history of GI bleed.    Anemia due to blood loss, acute  History of GI bleed  Hemoglobin   Date Value Ref Range Status   07/14/2021 10.4 (L) 11.7 - 15.7 g/dL Final   06/26/2021 9.9 (L) 11.7 - 15.7 g/dL Final     Stable and improving. Monitor hemoglobin as needed.    Dementia without behavioral disturbance, unspecified dementia type (H)  Was planning on discharging to [facility] memory care unit on Friday.  notes in nursing home E HR that her room at the memory care unit was given to another. She will stay at the nursing home until another assisted living can be found for her. Most recent SLUMS was 13/30.  -Requires 24-hour care. Continue supportive care at the nursing home.    Chronic right-sided CHF (congestive heart failure) (H)  Stable and compensated. Continues on Lasix 40 mg once daily. Potassium 10 mEq once daily and atenolol.      Orders:  No changes    Electronically signed by  Fadumo Chun NP

## 2021-07-29 ENCOUNTER — PATIENT OUTREACH (OUTPATIENT)
Dept: CARE COORDINATION | Facility: CLINIC | Age: 81
End: 2021-07-29

## 2021-07-29 NOTE — PROGRESS NOTES
Clinic Care Coordination Contact    Situation: Patient chart reviewed by care coordinator.    Background: Patient was hospitalized at [facility] from 5/13/2021 to 5/17/2021 with diagnosis of hypoxemia and low back pain and discharged to TCU.    Assessment: Patient was discharged from TCU to [facility] in a 24-hour supervision/care.    Plan/Recommendations: No further monitoring by care coordination patient is in supervised memory care unit.    Pratibha OGDEN, RN, PHN, CCM  Primary Clinic Care Coordination    [facility]  Primary Care Clinics  [email or web address]   Office: [phone]  Employed by [facility]

## 2021-09-09 ENCOUNTER — NURSING HOME VISIT (OUTPATIENT)
Dept: GERIATRICS | Facility: CLINIC | Age: 81
End: 2021-09-09
Payer: MEDICARE

## 2021-09-09 VITALS
RESPIRATION RATE: 18 BRPM | OXYGEN SATURATION: 93 % | TEMPERATURE: 97.9 F | DIASTOLIC BLOOD PRESSURE: 76 MMHG | HEIGHT: 64 IN | SYSTOLIC BLOOD PRESSURE: 126 MMHG | BODY MASS INDEX: 23.03 KG/M2 | HEART RATE: 68 BPM | WEIGHT: 134.9 LBS

## 2021-09-09 DIAGNOSIS — Z87.19 HISTORY OF GI BLEED: ICD-10-CM

## 2021-09-09 DIAGNOSIS — I27.20 PULMONARY HYPERTENSION (H): Primary | ICD-10-CM

## 2021-09-09 DIAGNOSIS — I48.11 LONGSTANDING PERSISTENT ATRIAL FIBRILLATION (H): ICD-10-CM

## 2021-09-09 DIAGNOSIS — I50.812 CHRONIC RIGHT-SIDED CHF (CONGESTIVE HEART FAILURE) (H): ICD-10-CM

## 2021-09-09 DIAGNOSIS — F03.90 DEMENTIA WITHOUT BEHAVIORAL DISTURBANCE, UNSPECIFIED DEMENTIA TYPE: ICD-10-CM

## 2021-09-09 PROCEDURE — 99309 SBSQ NF CARE MODERATE MDM 30: CPT | Performed by: NURSE PRACTITIONER

## 2021-09-09 ASSESSMENT — MIFFLIN-ST. JEOR: SCORE: 1056.9

## 2021-09-09 NOTE — LETTER
9/9/2021        RE: Khalida Rouse  [facility]  [address] [place] [address]        [facility]    Chief Complaint   Patient presents with     intermediate Regulatory     Place of Service where encounter took place:  [facility] () [[id number]]    HPI:    Khalida Rouse is a 81 year old  (1940), who is being seen today for a federally mandated E/M visit.  HPI information obtained from: facility chart records, facility staff and patient report. Today's concerns are:     Pulmonary hypertension (H)  Longstanding persistent atrial fibrillation (H)  Dementia without behavioral disturbance, unspecified dementia type (H)  Chronic right-sided CHF (congestive heart failure) (H)  History of GI bleed    Nursing concerns: None  Nutrition: No concerns, 4 ounce house supplement twice daily  PHQ-9: 0  BIMS: Unable  Function: Able to ambulate with staff in the [facility] and in her room.    ALLERGIES: Xarelto [rivaroxaban] and Ace inhibitors  PAST MEDICAL HISTORY:  has a past medical history of Aortic valve stenosis, Atrial flutter (H), Cardiomyopathy (H), Cognitive changes - SLUMS 16/30 (4/13/2021), Severe aortic stenosis (4/7/2014), and Shoulder fracture, left (3/22/15).  PAST SURGICAL HISTORY:  has a past surgical history that includes ENT surgery; Replace valve aortic (4/10/2014); cataract iol, rt/lt (Right); and Esophagoscopy, gastroscopy, duodenoscopy (EGD), combined (N/A, 5/14/2021).  FAMILY HISTORY: family history includes Alzheimer Disease in her sister; Dementia in her brother.  SOCIAL HISTORY:  reports that she has never smoked. She has never used smokeless tobacco. She reports that she does not drink alcohol and does not use drugs.    Patient Active Problem List   Diagnosis Code     S/P AVR (aortic valve replacement) Z95.2     Advanced directives, counseling/discussion Z71.89     Osteoporosis M81.0     Pulmonary hypertension (H) I27.20     Longstanding persistent  "atrial fibrillation (H) I48.11     Dementia without behavioral disturbance, unspecified dementia type (H) F03.90     Chronic right-sided CHF (congestive heart failure) (H) I50.812     History of GI bleed Z87.19       MEDICATIONS:  Current Outpatient Medications   Medication Sig Dispense Refill     acetaminophen (ACETAMINOPHEN 8 HOUR) 650 MG CR tablet Take 650 mg by mouth 3 times daily       atenolol (TENORMIN) 50 MG tablet Take 50 mg by mouth 2 times daily       diltiazem ER (DILT-XR) 180 MG 24 hr capsule Take 180 mg by mouth 2 times daily        furosemide (LASIX) 40 MG tablet Take 40 mg by mouth 2 times daily       hypromellose (ARTIFICIAL TEARS) 0.5 % SOLN ophthalmic solution Place 1 drop into both eyes every hour as needed for dry eyes       pantoprazole (PROTONIX) 20 MG EC tablet Take 40 mg by mouth daily       potassium chloride ER (KLOR-CON M) 10 MEQ CR tablet Take 20 mEq by mouth daily         Information reviewed:  Medications, vital signs, orders, care plan, and nursing notes.    ROS:  Limited secondary to cognitive impairment but today pt reports abdominal swelling.    Exam:  Vitals: /76   Pulse 68   Temp 97.9  F (36.6  C)   Resp 18   Ht 1.626 m (5' 4\")   Wt 61.2 kg (134 lb 14.4 oz)   SpO2 93%   BMI 23.16 kg/m    BMI= Body mass index is 23.16 kg/m .  GENERAL APPEARANCE:  Alert, in no distress  RESP:  respiratory effort and palpation of chest normal, no respiratory distress, lungs sounds clear  CV:  Palpation and auscultation of heart done , regular rate and rhythm, no murmur, rub, or gallop, edema no peripheral edema  ABDOMEN:  normal bowel sounds, firm, nontender,   M/S:  Gait and station observed in wheelchair, no tenderness or swelling of the joints   SKIN:  Inspection at baseline  PSYCH:  insight and judgement, memory impaired, affect and mood normal    Lab/Diagnostic data: reviewed in Epic    ASSESSMENT/PLAN  Pulmonary hypertension (H)  Chronic right-sided CHF (congestive heart failure) " (H)  Managed with diltiazem, atenolol and Lasix.  Follows with cardiology.   Wt Readings from Last 4 Encounters:   09/09/21 61.2 kg (134 lb 14.4 oz)   07/14/21 62.9 kg (138 lb 9.6 oz)   07/05/21 63.8 kg (140 lb 9.6 oz)   06/30/21 64.2 kg (141 lb 9.6 oz)   Weight and peripheral edema have trended down.  Patient complains of swelling in her abdomen that is worse in the evening and is better at night.  Abdomen is firm.    -Will increase Lasix to twice daily and recheck BMP  - will also increase potassium supplements  Longstanding persistent atrial fibrillation (H)  Rate controlled with atenolol and diltiazem.  No longer on anticoagulation secondary to GI bleed.    Dementia without behavioral disturbance, unspecified dementia type (H)  Resides in nursing home.  Requires cues and assistance for cares.  -Progressive decline expected  -Continue supportive care at the nursing home    History of GI bleed  Currently on Protonix 40 mg p.o. twice daily.  Denies heartburn and indigestion.  Has been on this regimen for several months.  Will decrease dosing and follow-up in 2 weeks    Orders:  Decrease Protonix to 40 mg p.o. every day x2 weeks then decrease Protonix to 20 mg p.o. every day  Increase furosemide to 40 mg p.o. twice daily diagnosis CHF  Increase potassium chloride 20 mEq p.o. every day diagnosis hypokalemia  Check BMP on 9/22/2021 diagnosis CHF    Electronically signed by:  Fadumo Chun NP            Sincerely,        Fadumo Chun NP

## 2021-09-10 PROBLEM — D62 ANEMIA DUE TO BLOOD LOSS, ACUTE: Status: RESOLVED | Noted: 2021-07-15 | Resolved: 2021-09-10

## 2021-09-10 RX ORDER — PANTOPRAZOLE SODIUM 20 MG/1
20 TABLET, DELAYED RELEASE ORAL DAILY
COMMUNITY
End: 2022-01-01

## 2021-09-10 RX ORDER — FUROSEMIDE 40 MG
40 TABLET ORAL 2 TIMES DAILY
COMMUNITY
End: 2022-01-01

## 2021-09-10 NOTE — PROGRESS NOTES
[facility]    Chief Complaint   Patient presents with     care home Regulatory     Place of Service where encounter took place:  [facility] () [[id number]]    HPI:    Khalida Rouse is a 81 year old  (1940), who is being seen today for a federally mandated E/M visit.  HPI information obtained from: facility chart records, facility staff and patient report. Today's concerns are:     Pulmonary hypertension (H)  Longstanding persistent atrial fibrillation (H)  Dementia without behavioral disturbance, unspecified dementia type (H)  Chronic right-sided CHF (congestive heart failure) (H)  History of GI bleed    Nursing concerns: None  Nutrition: No concerns, 4 ounce house supplement twice daily  PHQ-9: 0  BIMS: Unable  Function: Able to ambulate with staff in the [facility] and in her room.    ALLERGIES: Xarelto [rivaroxaban] and Ace inhibitors  PAST MEDICAL HISTORY:  has a past medical history of Aortic valve stenosis, Atrial flutter (H), Cardiomyopathy (H), Cognitive changes - SLUMS 16/30 (4/13/2021), Severe aortic stenosis (4/7/2014), and Shoulder fracture, left (3/22/15).  PAST SURGICAL HISTORY:  has a past surgical history that includes ENT surgery; Replace valve aortic (4/10/2014); cataract iol, rt/lt (Right); and Esophagoscopy, gastroscopy, duodenoscopy (EGD), combined (N/A, 5/14/2021).  FAMILY HISTORY: family history includes Alzheimer Disease in her sister; Dementia in her brother.  SOCIAL HISTORY:  reports that she has never smoked. She has never used smokeless tobacco. She reports that she does not drink alcohol and does not use drugs.    Patient Active Problem List   Diagnosis Code     S/P AVR (aortic valve replacement) Z95.2     Advanced directives, counseling/discussion Z71.89     Osteoporosis M81.0     Pulmonary hypertension (H) I27.20     Longstanding persistent atrial fibrillation (H) I48.11     Dementia without behavioral disturbance, unspecified dementia type (H)  "F03.90     Chronic right-sided CHF (congestive heart failure) (H) I50.812     History of GI bleed Z87.19       MEDICATIONS:  Current Outpatient Medications   Medication Sig Dispense Refill     acetaminophen (ACETAMINOPHEN 8 HOUR) 650 MG CR tablet Take 650 mg by mouth 3 times daily       atenolol (TENORMIN) 50 MG tablet Take 50 mg by mouth 2 times daily       diltiazem ER (DILT-XR) 180 MG 24 hr capsule Take 180 mg by mouth 2 times daily        furosemide (LASIX) 40 MG tablet Take 40 mg by mouth 2 times daily       hypromellose (ARTIFICIAL TEARS) 0.5 % SOLN ophthalmic solution Place 1 drop into both eyes every hour as needed for dry eyes       pantoprazole (PROTONIX) 20 MG EC tablet Take 40 mg by mouth daily       potassium chloride ER (KLOR-CON M) 10 MEQ CR tablet Take 20 mEq by mouth daily         Information reviewed:  Medications, vital signs, orders, care plan, and nursing notes.    ROS:  Limited secondary to cognitive impairment but today pt reports abdominal swelling.    Exam:  Vitals: /76   Pulse 68   Temp 97.9  F (36.6  C)   Resp 18   Ht 1.626 m (5' 4\")   Wt 61.2 kg (134 lb 14.4 oz)   SpO2 93%   BMI 23.16 kg/m    BMI= Body mass index is 23.16 kg/m .  GENERAL APPEARANCE:  Alert, in no distress  RESP:  respiratory effort and palpation of chest normal, no respiratory distress, lungs sounds clear  CV:  Palpation and auscultation of heart done , regular rate and rhythm, no murmur, rub, or gallop, edema no peripheral edema  ABDOMEN:  normal bowel sounds, firm, nontender,   M/S:  Gait and station observed in wheelchair, no tenderness or swelling of the joints   SKIN:  Inspection at baseline  PSYCH:  insight and judgement, memory impaired, affect and mood normal    Lab/Diagnostic data: reviewed in Epic    ASSESSMENT/PLAN  Pulmonary hypertension (H)  Chronic right-sided CHF (congestive heart failure) (H)  Managed with diltiazem, atenolol and Lasix.  Follows with cardiology.   Wt Readings from Last 4 " Encounters:   09/09/21 61.2 kg (134 lb 14.4 oz)   07/14/21 62.9 kg (138 lb 9.6 oz)   07/05/21 63.8 kg (140 lb 9.6 oz)   06/30/21 64.2 kg (141 lb 9.6 oz)   Weight and peripheral edema have trended down.  Patient complains of swelling in her abdomen that is worse in the evening and is better at night.  Abdomen is firm.    -Will increase Lasix to twice daily and recheck BMP  - will also increase potassium supplements  Longstanding persistent atrial fibrillation (H)  Rate controlled with atenolol and diltiazem.  No longer on anticoagulation secondary to GI bleed.    Dementia without behavioral disturbance, unspecified dementia type (H)  Resides in nursing home.  Requires cues and assistance for cares.  -Progressive decline expected  -Continue supportive care at the nursing home    History of GI bleed  Currently on Protonix 40 mg p.o. twice daily.  Denies heartburn and indigestion.  Has been on this regimen for several months.  Will decrease dosing and follow-up in 2 weeks    Orders:  Decrease Protonix to 40 mg p.o. every day x2 weeks then decrease Protonix to 20 mg p.o. every day  Increase furosemide to 40 mg p.o. twice daily diagnosis CHF  Increase potassium chloride 20 mEq p.o. every day diagnosis hypokalemia  Check BMP on 9/22/2021 diagnosis CHF    Electronically signed by:  Fadumo Chun NP

## 2021-09-20 ENCOUNTER — NURSING HOME VISIT (OUTPATIENT)
Dept: GERIATRICS | Facility: CLINIC | Age: 81
End: 2021-09-20
Payer: MEDICARE

## 2021-09-20 VITALS
TEMPERATURE: 97.1 F | HEART RATE: 83 BPM | BODY MASS INDEX: 22.96 KG/M2 | OXYGEN SATURATION: 91 % | RESPIRATION RATE: 16 BRPM | DIASTOLIC BLOOD PRESSURE: 76 MMHG | SYSTOLIC BLOOD PRESSURE: 139 MMHG | WEIGHT: 134.5 LBS | HEIGHT: 64 IN

## 2021-09-20 DIAGNOSIS — I50.812 CHRONIC RIGHT-SIDED CHF (CONGESTIVE HEART FAILURE) (H): ICD-10-CM

## 2021-09-20 DIAGNOSIS — L02.92 BOIL: ICD-10-CM

## 2021-09-20 DIAGNOSIS — I27.20 PULMONARY HYPERTENSION (H): Primary | ICD-10-CM

## 2021-09-20 PROCEDURE — 99309 SBSQ NF CARE MODERATE MDM 30: CPT | Performed by: NURSE PRACTITIONER

## 2021-09-20 ASSESSMENT — MIFFLIN-ST. JEOR: SCORE: 1055.09

## 2021-09-20 NOTE — PROGRESS NOTES
"[facility]ERIATRIC [facility]    Chief Complaint   Patient presents with     Nursing [place] Acute       Place of Service where encounter took place:  [facility] () [[id number]]    HPI:    Khalida Rouse is a 81 year old  (1940), who is being seen today for an episodic care visit.  HPI information obtained from: facility chart records, facility staff and patient report.Today's concern is:     Pulmonary hypertension (H)  Chronic right-sided CHF (congestive heart failure) (H)  Boil    ALLERGIES: Xarelto [rivaroxaban] and Ace inhibitors  Past Medical, Surgical, Family and Social History reviewed and updated in EPIC.  Med list reviewed in nursing home EHR    REVIEW OF SYSTEMS:  4 point ROS including Respiratory, CV, GI and , other than that noted in the HPI,  is negative    PHYSICAL EXAM:  /76   Pulse 83   Temp 97.1  F (36.2  C)   Resp 16   Ht 1.626 m (5' 4\")   Wt 61 kg (134 lb 8 oz)   SpO2 91%   BMI 23.09 kg/m     Wt Readings from Last 4 Encounters:   09/20/21 61 kg (134 lb 8 oz)   09/09/21 61.2 kg (134 lb 14.4 oz)   07/14/21 62.9 kg (138 lb 9.6 oz)   07/05/21 63.8 kg (140 lb 9.6 oz)      GENERAL APPEARANCE:  Alert, in no distress  RESP:  respiratory effort and palpation of chest normal, no respiratory distress, lungs sounds clear  CV:  Palpation and auscultation of heart done , regular rate and rhythm, no murmur, rub, or gallop, edema no peripheral edema.   ABDOMEN:  normal bowel sounds, soft, nontender,   M/S:  Gait and station observed in wheelchair  SKIN:  Inspection and palpation of skin and subcutaneous tissue at baseline  PSYCH:  insight and judgement, memory impaired, affect and mood normal     Assessment/Plan:  Pulmonary hypertension (H)  Chronic right-sided CHF (congestive heart failure) (H)  Lasix increased 2 weeks ago. Was planning on seeing patient on Thursday to follow up on the change but nursing requested visit for boil. Abdominal swelling has improved and her weight " has been stable.  -no changes.  - labs pending on Wednesday.     Boil  Boil present for a few days per patient. Recently broke open due to rubbing on bra strap.   - moist warm compress for 15 minutes BID. Cover with guaze for protection.       Electronically signed by  Fadumo Chun NP

## 2021-09-20 NOTE — LETTER
"    9/20/2021        RE: Khalida Rouse  [facility]  [address] [place] [address]        [facility]    Chief Complaint   Patient presents with     Nursing Home Acute       Place of Service where encounter took place:  [facility] () [[id number]]    HPI:    Khalida Rouse is a 81 year old  (1940), who is being seen today for an episodic care visit.  HPI information obtained from: facility chart records, facility staff and patient report.Today's concern is:     Pulmonary hypertension (H)  Chronic right-sided CHF (congestive heart failure) (H)  Boil    ALLERGIES: Xarelto [rivaroxaban] and Ace inhibitors  Past Medical, Surgical, Family and Social History reviewed and updated in EPIC.  Med list reviewed in nursing home EHR    REVIEW OF SYSTEMS:  4 point ROS including Respiratory, CV, GI and , other than that noted in the HPI,  is negative    PHYSICAL EXAM:  /76   Pulse 83   Temp 97.1  F (36.2  C)   Resp 16   Ht 1.626 m (5' 4\")   Wt 61 kg (134 lb 8 oz)   SpO2 91%   BMI 23.09 kg/m     Wt Readings from Last 4 Encounters:   09/20/21 61 kg (134 lb 8 oz)   09/09/21 61.2 kg (134 lb 14.4 oz)   07/14/21 62.9 kg (138 lb 9.6 oz)   07/05/21 63.8 kg (140 lb 9.6 oz)      GENERAL APPEARANCE:  Alert, in no distress  RESP:  respiratory effort and palpation of chest normal, no respiratory distress, lungs sounds clear  CV:  Palpation and auscultation of heart done , regular rate and rhythm, no murmur, rub, or gallop, edema no peripheral edema.   ABDOMEN:  normal bowel sounds, soft, nontender,   M/S:  Gait and station observed in wheelchair  SKIN:  Inspection and palpation of skin and subcutaneous tissue at baseline  PSYCH:  insight and judgement, memory impaired, affect and mood normal     Assessment/Plan:  Pulmonary hypertension (H)  Chronic right-sided CHF (congestive heart failure) (H)  Lasix increased 2 weeks ago. Was planning on seeing patient on Thursday to follow " up on the change but nursing requested visit for boil. Abdominal swelling has improved and her weight has been stable.  -no changes.  - labs pending on Wednesday.     Boil  Boil present for a few days per patient. Recently broke open due to rubbing on bra strap.   - moist warm compress for 15 minutes BID. Cover with guaze for protection.       Electronically signed by  Fadumo Chun NP          Sincerely,        Fadumo Chun NP

## 2021-09-21 ENCOUNTER — LAB REQUISITION (OUTPATIENT)
Dept: LAB | Facility: CLINIC | Age: 81
End: 2021-09-21
Payer: MEDICARE

## 2021-09-21 DIAGNOSIS — I50.20 UNSPECIFIED SYSTOLIC (CONGESTIVE) HEART FAILURE (H): ICD-10-CM

## 2021-09-22 LAB
ANION GAP SERPL CALCULATED.3IONS-SCNC: 3 MMOL/L (ref 3–14)
BUN SERPL-MCNC: 21 MG/DL (ref 7–30)
CALCIUM SERPL-MCNC: 9 MG/DL (ref 8.5–10.1)
CHLORIDE BLD-SCNC: 105 MMOL/L (ref 94–109)
CO2 SERPL-SCNC: 31 MMOL/L (ref 20–32)
CREAT SERPL-MCNC: 0.8 MG/DL (ref 0.52–1.04)
GFR SERPL CREATININE-BSD FRML MDRD: 69 ML/MIN/1.73M2
GLUCOSE BLD-MCNC: 100 MG/DL (ref 70–99)
POTASSIUM BLD-SCNC: 3.8 MMOL/L (ref 3.4–5.3)
SODIUM SERPL-SCNC: 139 MMOL/L (ref 133–144)

## 2021-09-22 PROCEDURE — P9604 ONE-WAY ALLOW PRORATED TRIP: HCPCS | Mod: ORL | Performed by: FAMILY MEDICINE

## 2021-09-22 PROCEDURE — 80048 BASIC METABOLIC PNL TOTAL CA: CPT | Mod: ORL | Performed by: FAMILY MEDICINE

## 2021-09-22 PROCEDURE — 36415 COLL VENOUS BLD VENIPUNCTURE: CPT | Mod: ORL | Performed by: FAMILY MEDICINE

## 2021-10-09 NOTE — MR AVS SNAPSHOT
Khalida Rouse   4/11/2017 1:00 PM   Anticoagulation Therapy Visit    Description:  77 year old female   Provider:  [facility]   Department:  [facility]           INR as of 4/11/2017     Today's INR 1.6!      Anticoagulation Summary as of 4/11/2017     INR goal 2.0-3.0   Today's INR 1.6!   Full instructions 2.5 mg on Mon; 5 mg all other days   Next INR check 4/25/2017    Indications   Long-term (current) use of anticoagulants [Z79.01] [Z79.01]  Atrial fibrillation with RVR (H) [I48.91]  S/P AVR (aortic valve replacement) [Z95.2]         Your next Anticoagulation Clinic appointment(s)     Apr 25, 2017  1:00 PM CDT   [facility]agulation Visit with [facility]   [facility]    [address] [place] [id number]   [phone]              Contact Numbers     Clinic Number:         April 2017 Details    Sun Mon Tue Wed Thu Fri Sat           1                 2               3               4               5               6               7               8                 9               10               11      5 mg   See details      12      5 mg         13      5 mg         14      5 mg         15      5 mg           16      5 mg         17      2.5 mg         18      5 mg         19      5 mg         20      5 mg         21      5 mg         22      5 mg           23      5 mg         24      2.5 mg         25            26               27               28               29                 30                      Date Details   04/11 This INR check       Date of next INR:  4/25/2017         How to take your warfarin dose     To take:  2.5 mg Take 1 of the 2.5 mg tablets.    To take:  5 mg Take 2 of the 2.5 mg tablets.           
negative...

## 2021-11-17 ENCOUNTER — TELEPHONE (OUTPATIENT)
Dept: GERIATRICS | Facility: CLINIC | Age: 81
End: 2021-11-17
Payer: COMMERCIAL

## 2021-11-17 NOTE — TELEPHONE ENCOUNTER
[facility] Nurse Triage Telephone Note    Provider: Fadumo Chun NP  Facility: [facility] Facility Type:  [facility]    Caller: Janey  Call Back Number: [phone]    Allergies:    Allergies   Allergen Reactions     Xarelto [Rivaroxaban] Diarrhea     Ace Inhibitors Cough        Reason for call: Nurse called to report that patient has orders for oxygen 1-2 liters to keep sats above 90%.  Patient has not used the oxygen for a month now due to sats being above 90% on RA.  Facility would like orders discontinued.     Verbal Order/Direction given by Provider: Okay to discontinue oxygen order.     Provider giving Order:  Fadumo Chun NP    Verbal Order given to: Janey Blue RN

## 2021-11-24 ENCOUNTER — VIRTUAL VISIT (OUTPATIENT)
Dept: GERIATRICS | Facility: CLINIC | Age: 81
End: 2021-11-24
Payer: COMMERCIAL

## 2021-11-24 VITALS
BODY MASS INDEX: 23.6 KG/M2 | TEMPERATURE: 96.6 F | RESPIRATION RATE: 16 BRPM | DIASTOLIC BLOOD PRESSURE: 70 MMHG | SYSTOLIC BLOOD PRESSURE: 110 MMHG | WEIGHT: 138.2 LBS | HEART RATE: 70 BPM | OXYGEN SATURATION: 94 % | HEIGHT: 64 IN

## 2021-11-24 DIAGNOSIS — J90 PLEURAL EFFUSION: ICD-10-CM

## 2021-11-24 DIAGNOSIS — R10.84 ABDOMINAL PAIN, GENERALIZED: ICD-10-CM

## 2021-11-24 DIAGNOSIS — I48.20 CHRONIC ATRIAL FIBRILLATION (H): ICD-10-CM

## 2021-11-24 DIAGNOSIS — F03.90 DEMENTIA WITHOUT BEHAVIORAL DISTURBANCE, UNSPECIFIED DEMENTIA TYPE: ICD-10-CM

## 2021-11-24 DIAGNOSIS — Z87.19 HISTORY OF GI BLEED: ICD-10-CM

## 2021-11-24 DIAGNOSIS — I27.20 PULMONARY HYPERTENSION (H): ICD-10-CM

## 2021-11-24 DIAGNOSIS — I50.812 CHRONIC RIGHT-SIDED CHF (CONGESTIVE HEART FAILURE) (H): Primary | ICD-10-CM

## 2021-11-24 PROCEDURE — 99309 SBSQ NF CARE MODERATE MDM 30: CPT | Performed by: FAMILY MEDICINE

## 2021-11-24 ASSESSMENT — MIFFLIN-ST. JEOR: SCORE: 1071.87

## 2021-11-24 NOTE — LETTER
"    11/24/2021        RE: Khalida Rouse  [facility]  [address] [place] [address]        [facility] Regulatory   Khalida Rouse is being evaluated via a billable video visit.   The patient has been notified of following:  \"This video visit will be conducted via a call between you and your provider. We have found that certain health care needs can be provided without the need for an in-person physical exam.  This service lets us provide the care you need with a video conversation. If during the course of the call the provider feels a video visit is not appropriate, you will not be charged for this service.\"   The provider has received verbal consent for a Video Visit from the patient or first contact? Yes  Patient or facility staff would like the video invitation sent by: N/A   Video Start Time: 13:12  [facility] Medical Record Number:  [id number]  Place of Location at the time of visit: [facility] [place]    Chief Complaint   Patient presents with     half-way Regulatory     Video Visit     HPI:  Khalida Rouse  is a 81 year old (1940), who is being seen today for a Regulatory visit.  HPI information obtained from: facility chart records, facility staff, patient report and [facility] Epic chart review.     Brief Summary of Hospital Course:  - Patient with PMH for A. fib/flutter, TAVR, dementia, compression fracture.   5/13/21 through 5/17/21: supratherapeutic INR resulted in GIB and hematuria, acute blood  Loss anemia, coumadin discontinued.   5/17-6/25: TCU stay for rehab.   6/26-6/28: hospitalized  With CHF exacerbation, acute hypoxic respiratory failure, right moderated pleural effusion. Treated with IV lasix, discharged on po lasix. Was still on 1L of O2 at the dismissal time        Today's concern is:  - CHF: reports heart is fine. Denies GREGG, and the head side is slightly elevated,  Puts head on one pillow.     - Resp failure: reports breathing is fine. RN reports Resident has " "been off O2 for a long time.     - RN reports abdomen gets harder, started on senna and miralax, has BM every day, and BS active, abd xray ordered for Friday, no nausea. Pt reports appetite is ok. Denies swelling in the legs more than usual. endorse has been having this for the last three weeks. Reports the stool is not hard.     =======================    Past Medical and Surgical History reviewed in Epic today.  MEDICATIONS:  Current Outpatient Medications   Medication Sig Dispense Refill     acetaminophen (ACETAMINOPHEN 8 HOUR) 650 MG CR tablet Take 650 mg by mouth 3 times daily       atenolol (TENORMIN) 50 MG tablet Take 50 mg by mouth 2 times daily       diltiazem ER (DILT-XR) 180 MG 24 hr capsule Take 180 mg by mouth 2 times daily        furosemide (LASIX) 40 MG tablet Take 40 mg by mouth 2 times daily       hypromellose (ARTIFICIAL TEARS) 0.5 % SOLN ophthalmic solution Place 1 drop into both eyes every hour as needed for dry eyes       pantoprazole (PROTONIX) 20 MG EC tablet Take 40 mg by mouth daily       potassium chloride ER (KLOR-CON M) 10 MEQ CR tablet Take 20 mEq by mouth daily       REVIEW OF SYSTEMS: 4 point ROS including Respiratory, CV, GI and , other than that noted in the HPI,  is negative  Objective: /70   Pulse 70   Temp (!) 96.6  F (35.9  C)   Resp 16   Ht 1.626 m (5' 4\")   Wt 62.7 kg (138 lb 3.2 oz)   SpO2 94%   BMI 23.72 kg/m    Limited visit exam done given COVID-19 precautions.   GENERAL APPEARANCE:  in no distress  RESP:  unlabored breathing  M/S:   no joint deformity noted  SKIN:  no rash noted  NEURO:   no purposeful movement in upper and lower extremities  PSYCH:  affect and mood normal    Labs: Reviewed in the chart and EHR.        ASSESSMENT/PLAN:  -----------------------------  CHF, right sided  Severe biatrial enlargement  Right moderate Pleural Effusion  Severe Pulmonary HTN (H)  - EF 65-70% (April 2021)  - on lasix 40 mg bid  - K 3.8 (9/22). On KCL supplement. K. " Goal is above 4  - compensated        Chronic a-fibrillation (H):  Hx of supratherapeutic INR resulted in acute blood loss anemia, UGIB and hematuria (May 2021)  - off coumadin  - CVR, on atenolol and diltiazem      Abdomina pain, non specific  Hx of GIB 2/2 supratherapeutic INR  - EGD did not reveal ulceration, erosive gastritis or esophagitis.  Was on Protonix 40 mg bid, changed to once daily.   - will have xray on 11/26. 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 Chronic condition, ongoing decline expected.   -  Continue to provide redirection and reassurance as needed. Maintain safe living situation with goals focused on comfort.        Hx of spinal compression fx: analgesia optimal.      Frailty: Significant  Deficits requiring NH placement. Requiring extensive assistance from nursing. Up for meals only o/w spends the day resting in bed        Electronically signed by:  Claudia Molina MD     Video-Visit Details  Type of service:  Video Visit  Video End Time (time video stopped):13:19  Distant Location (provider location):  [facility]               Sincerely,        Claudia Molina MD

## 2021-11-24 NOTE — PROGRESS NOTES
"[place] [facility] Regulatory   Khalida Rouse is being evaluated via a billable video visit.   The patient has been notified of following:  \"This video visit will be conducted via a call between you and your provider. We have found that certain health care needs can be provided without the need for an in-person physical exam.  This service lets us provide the care you need with a video conversation. If during the course of the call the provider feels a video visit is not appropriate, you will not be charged for this service.\"   The provider has received verbal consent for a Video Visit from the patient or first contact? Yes  Patient or facility staff would like the video invitation sent by: N/A   Video Start Time: 13:12  [facility] Medical Record Number:  [id number]  Place of Location at the time of visit: [facility]   Chief Complaint   Patient presents with     retirement Regulatory     Video Visit     HPI:  Khalida Rouse  is a 81 year old (1940), who is being seen today for a Regulatory visit.  HPI information obtained from: facility chart records, facility staff, patient report and [facility] chart review.     Brief Summary of Hospital Course:  - Patient with PMH for A. fib/flutter, TAVR, dementia, compression fracture.   5/13/21 through 5/17/21: supratherapeutic INR resulted in GIB and hematuria, acute blood  Loss anemia, coumadin discontinued.   5/17-6/25: TCU stay for rehab.   6/26-6/28: hospitalized  With CHF exacerbation, acute hypoxic respiratory failure, right moderated pleural effusion. Treated with IV lasix, discharged on po lasix. Was still on 1L of O2 at the dismissal time        Today's concern is:  - CHF: reports heart is fine. Denies GREGG, and the head side is slightly elevated,  Puts head on one pillow.     - Resp failure: reports breathing is fine. RN reports Resident has been off O2 for a long time.     - RN reports abdomen gets harder, started on senna and miralax, has BM " "every day, and BS active, abd xray ordered for Friday, no nausea. Pt reports appetite is ok. Denies swelling in the legs more than usual. endorse has been having this for the last three weeks. Reports the stool is not hard.     =======================    Past Medical and Surgical History reviewed in Epic today.  MEDICATIONS:  Current Outpatient Medications   Medication Sig Dispense Refill     acetaminophen (ACETAMINOPHEN 8 HOUR) 650 MG CR tablet Take 650 mg by mouth 3 times daily       atenolol (TENORMIN) 50 MG tablet Take 50 mg by mouth 2 times daily       diltiazem ER (DILT-XR) 180 MG 24 hr capsule Take 180 mg by mouth 2 times daily        furosemide (LASIX) 40 MG tablet Take 40 mg by mouth 2 times daily       hypromellose (ARTIFICIAL TEARS) 0.5 % SOLN ophthalmic solution Place 1 drop into both eyes every hour as needed for dry eyes       pantoprazole (PROTONIX) 20 MG EC tablet Take 40 mg by mouth daily       potassium chloride ER (KLOR-CON M) 10 MEQ CR tablet Take 20 mEq by mouth daily       REVIEW OF SYSTEMS: 4 point ROS including Respiratory, CV, GI and , other than that noted in the HPI,  is negative  Objective: /70   Pulse 70   Temp (!) 96.6  F (35.9  C)   Resp 16   Ht 1.626 m (5' 4\")   Wt 62.7 kg (138 lb 3.2 oz)   SpO2 94%   BMI 23.72 kg/m    Limited visit exam done given COVID-19 precautions.   GENERAL APPEARANCE:  in no distress  RESP:  unlabored breathing  M/S:   no joint deformity noted  SKIN:  no rash noted  NEURO:   no purposeful movement in upper and lower extremities  PSYCH:  affect and mood normal    Labs: Reviewed in the chart and EHR.        ASSESSMENT/PLAN:  -----------------------------  CHF, right sided  Severe biatrial enlargement  Right moderate Pleural Effusion  Severe Pulmonary HTN (H)  - EF 65-70% (April 2021)  - on lasix 40 mg bid  - K 3.8 (9/22). On KCL supplement. K. Goal is above 4  - compensated        Chronic a-fibrillation (H):  Hx of supratherapeutic INR resulted in " acute blood loss anemia, UGIB and hematuria (May 2021)  - off coumadin  - CVR, on atenolol and diltiazem      Abdomina pain, non specific  Hx of GIB 2/2 supratherapeutic INR  - EGD did not reveal ulceration, erosive gastritis or esophagitis.  Was on Protonix 40 mg bid, changed to once daily.   - will have xray on 11/26. 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 Chronic condition, ongoing decline expected.   -  Continue to provide redirection and reassurance as needed. Maintain safe living situation with goals focused on comfort.        Hx of spinal compression fx: analgesia optimal.      Frailty: Significant  Deficits requiring NH placement. Requiring extensive assistance from nursing. Up for meals only o/w spends the day resting in bed        Electronically signed by:  Claudia Molina MD     Video-Visit Details  Type of service:  Video Visit  Video End Time (time video stopped):13:19  Distant Location (provider location):  [place] [facility]

## 2021-11-29 ENCOUNTER — TELEPHONE (OUTPATIENT)
Dept: GERIATRICS | Facility: CLINIC | Age: 81
End: 2021-11-29
Payer: COMMERCIAL

## 2021-11-29 NOTE — TELEPHONE ENCOUNTER
[facility] Nurse Triage Telephone Note    Provider: Fadumo Chun NP  Facility: [facility] Facility Type:  C    Caller: Milly  Call Back Number: [phone]    Allergies:    Allergies   Allergen Reactions     Xarelto [Rivaroxaban] Diarrhea     Ace Inhibitors Cough        Reason for call: Nurse reporting that a med error occurred.  Patient is supposed to get Lasix 40mg BID, however it was discovered that patient has received Lasix 80mg BID for at least 2 days.  Other notable meds:  Potassium 20meq daily, Diltiazem ER 180mg BID, Atenolol 50mg BID.      Verbal Order/Direction given by Provider: Correct the error and continue to monitor.      Provider giving Order:  Fadumo Chun NP    Verbal Order given to: Milly Gibson RN

## 2021-12-01 ENCOUNTER — LAB REQUISITION (OUTPATIENT)
Dept: LAB | Facility: CLINIC | Age: 81
End: 2021-12-01
Payer: COMMERCIAL

## 2021-12-01 DIAGNOSIS — I48.20 CHRONIC ATRIAL FIBRILLATION, UNSPECIFIED (H): ICD-10-CM

## 2021-12-01 LAB
ANION GAP SERPL CALCULATED.3IONS-SCNC: 6 MMOL/L (ref 3–14)
BUN SERPL-MCNC: 15 MG/DL (ref 7–30)
CALCIUM SERPL-MCNC: 9.1 MG/DL (ref 8.5–10.1)
CHLORIDE BLD-SCNC: 108 MMOL/L (ref 94–109)
CO2 SERPL-SCNC: 27 MMOL/L (ref 20–32)
CREAT SERPL-MCNC: 0.58 MG/DL (ref 0.52–1.04)
GFR SERPL CREATININE-BSD FRML MDRD: 86 ML/MIN/1.73M2
GLUCOSE BLD-MCNC: 90 MG/DL (ref 70–99)
POTASSIUM BLD-SCNC: 3.8 MMOL/L (ref 3.4–5.3)
SODIUM SERPL-SCNC: 141 MMOL/L (ref 133–144)

## 2021-12-01 PROCEDURE — 36415 COLL VENOUS BLD VENIPUNCTURE: CPT | Mod: ORL | Performed by: NURSE PRACTITIONER

## 2021-12-01 PROCEDURE — 80048 BASIC METABOLIC PNL TOTAL CA: CPT | Mod: ORL | Performed by: NURSE PRACTITIONER

## 2021-12-01 PROCEDURE — P9604 ONE-WAY ALLOW PRORATED TRIP: HCPCS | Mod: ORL | Performed by: NURSE PRACTITIONER

## 2021-12-07 ENCOUNTER — TELEPHONE (OUTPATIENT)
Dept: GERIATRICS | Facility: CLINIC | Age: 81
End: 2021-12-07
Payer: COMMERCIAL

## 2021-12-08 NOTE — TELEPHONE ENCOUNTER
Page RE: left eye deviating and can't see; Hx afib, dementia, CHF, not on anticoagulants, call back # was not right, attempt to contact; left eye earlier this am was deviating to L side, patch placed over eye, VS stable, AOx3, VS stable, pupil response R 3+ L eye sluggish, artificial tears given to lubricate, now still deviated and patient states vision no longer intact in L eye, otherwise neuro physical exam is intact, no s/s CVA, denies pain, recent SLUMS 7/30, no distress, patient is OK.  -staff to contact family RE: status update  -OK send to ER if family requests

## 2021-12-14 ENCOUNTER — TELEPHONE (OUTPATIENT)
Dept: GERIATRICS | Facility: CLINIC | Age: 81
End: 2021-12-14
Payer: COMMERCIAL

## 2021-12-14 DIAGNOSIS — Z97.0 PROSTHETIC EYE GLOBE: Primary | ICD-10-CM

## 2021-12-14 NOTE — TELEPHONE ENCOUNTER
[place] [facility] TELEPHONE ENCOUNTER    Khalida Rouse is a 81 year old  (1940), Nurse called today to report: patient with artificial eye that has been deviating to the left. They are wondering about care of the eye.     ASSESSMENT/PLAN:  Prosthetic eye globe  Order written in the chart for nurse to contact family and figure out who was following her for the care of the artificial eye. Get directions for caring for the eye from them and also any recommendations for follow up.     Fadumo Chun NP

## 2022-01-01 ENCOUNTER — TELEPHONE (OUTPATIENT)
Dept: GERIATRICS | Facility: CLINIC | Age: 82
End: 2022-01-01

## 2022-01-01 ENCOUNTER — NURSING HOME VISIT (OUTPATIENT)
Dept: GERIATRICS | Facility: CLINIC | Age: 82
End: 2022-01-01
Payer: COMMERCIAL

## 2022-01-01 ENCOUNTER — LAB REQUISITION (OUTPATIENT)
Dept: LAB | Facility: CLINIC | Age: 82
End: 2022-01-01
Payer: COMMERCIAL

## 2022-01-01 VITALS
WEIGHT: 131 LBS | SYSTOLIC BLOOD PRESSURE: 110 MMHG | RESPIRATION RATE: 18 BRPM | BODY MASS INDEX: 24.11 KG/M2 | DIASTOLIC BLOOD PRESSURE: 70 MMHG | HEART RATE: 66 BPM | HEIGHT: 62 IN | OXYGEN SATURATION: 94 % | TEMPERATURE: 98.5 F

## 2022-01-01 VITALS
SYSTOLIC BLOOD PRESSURE: 120 MMHG | WEIGHT: 142.8 LBS | HEART RATE: 62 BPM | OXYGEN SATURATION: 92 % | BODY MASS INDEX: 26.28 KG/M2 | DIASTOLIC BLOOD PRESSURE: 62 MMHG | HEIGHT: 62 IN | RESPIRATION RATE: 17 BRPM | TEMPERATURE: 98.2 F

## 2022-01-01 VITALS
HEIGHT: 62 IN | DIASTOLIC BLOOD PRESSURE: 81 MMHG | TEMPERATURE: 98 F | OXYGEN SATURATION: 91 % | HEART RATE: 77 BPM | WEIGHT: 136.1 LBS | RESPIRATION RATE: 16 BRPM | SYSTOLIC BLOOD PRESSURE: 128 MMHG | BODY MASS INDEX: 25.04 KG/M2

## 2022-01-01 VITALS
DIASTOLIC BLOOD PRESSURE: 88 MMHG | OXYGEN SATURATION: 92 % | HEIGHT: 62 IN | BODY MASS INDEX: 25.91 KG/M2 | RESPIRATION RATE: 17 BRPM | HEART RATE: 96 BPM | SYSTOLIC BLOOD PRESSURE: 106 MMHG | WEIGHT: 140.8 LBS | TEMPERATURE: 98.5 F

## 2022-01-01 VITALS — BODY MASS INDEX: 25.3 KG/M2 | TEMPERATURE: 96.7 F | OXYGEN SATURATION: 92 % | WEIGHT: 137.5 LBS | HEIGHT: 62 IN

## 2022-01-01 VITALS
BODY MASS INDEX: 25.4 KG/M2 | SYSTOLIC BLOOD PRESSURE: 108 MMHG | WEIGHT: 138 LBS | HEART RATE: 76 BPM | RESPIRATION RATE: 14 BRPM | HEIGHT: 62 IN | DIASTOLIC BLOOD PRESSURE: 67 MMHG | TEMPERATURE: 98.3 F | OXYGEN SATURATION: 91 %

## 2022-01-01 VITALS
HEIGHT: 62 IN | BODY MASS INDEX: 25.91 KG/M2 | HEART RATE: 58 BPM | DIASTOLIC BLOOD PRESSURE: 58 MMHG | WEIGHT: 140.8 LBS | RESPIRATION RATE: 18 BRPM | SYSTOLIC BLOOD PRESSURE: 112 MMHG | TEMPERATURE: 97 F | OXYGEN SATURATION: 94 %

## 2022-01-01 VITALS
HEART RATE: 67 BPM | SYSTOLIC BLOOD PRESSURE: 125 MMHG | RESPIRATION RATE: 18 BRPM | OXYGEN SATURATION: 92 % | BODY MASS INDEX: 25.35 KG/M2 | TEMPERATURE: 98.9 F | WEIGHT: 138.6 LBS | DIASTOLIC BLOOD PRESSURE: 78 MMHG

## 2022-01-01 DIAGNOSIS — K59.01 SLOW TRANSIT CONSTIPATION: ICD-10-CM

## 2022-01-01 DIAGNOSIS — I50.812 CHRONIC RIGHT-SIDED CHF (CONGESTIVE HEART FAILURE) (H): Primary | ICD-10-CM

## 2022-01-01 DIAGNOSIS — Z00.00 ANNUAL PHYSICAL EXAM: Primary | ICD-10-CM

## 2022-01-01 DIAGNOSIS — I50.20 UNSPECIFIED SYSTOLIC (CONGESTIVE) HEART FAILURE (H): ICD-10-CM

## 2022-01-01 DIAGNOSIS — R41.89 COGNITIVE IMPAIRMENT: ICD-10-CM

## 2022-01-01 DIAGNOSIS — Z95.2 S/P AVR (AORTIC VALVE REPLACEMENT): ICD-10-CM

## 2022-01-01 DIAGNOSIS — G30.1 LATE ONSET ALZHEIMER'S DEMENTIA WITH BEHAVIORAL DISTURBANCE (H): ICD-10-CM

## 2022-01-01 DIAGNOSIS — I48.11 LONGSTANDING PERSISTENT ATRIAL FIBRILLATION (H): ICD-10-CM

## 2022-01-01 DIAGNOSIS — Z87.19 HISTORY OF GI BLEED: ICD-10-CM

## 2022-01-01 DIAGNOSIS — I50.812 CHRONIC RIGHT-SIDED CHF (CONGESTIVE HEART FAILURE) (H): ICD-10-CM

## 2022-01-01 DIAGNOSIS — F03.90 DEMENTIA WITHOUT BEHAVIORAL DISTURBANCE, UNSPECIFIED DEMENTIA TYPE: ICD-10-CM

## 2022-01-01 DIAGNOSIS — I48.20 CHRONIC ATRIAL FIBRILLATION (H): ICD-10-CM

## 2022-01-01 DIAGNOSIS — F22 DELUSIONAL DISORDERS (H): ICD-10-CM

## 2022-01-01 DIAGNOSIS — M80.88XG OSTEOPOROTIC COMPRESSION FRACTURE OF SPINE, WITH DELAYED HEALING, SUBSEQUENT ENCOUNTER: ICD-10-CM

## 2022-01-01 DIAGNOSIS — J90 PLEURAL EFFUSION: ICD-10-CM

## 2022-01-01 DIAGNOSIS — S32.010S COMPRESSION FRACTURE OF L1 VERTEBRA, SEQUELA: ICD-10-CM

## 2022-01-01 DIAGNOSIS — I27.20 PULMONARY HYPERTENSION (H): ICD-10-CM

## 2022-01-01 DIAGNOSIS — F03.918 UNSPECIFIED DEMENTIA WITH BEHAVIORAL DISTURBANCE: ICD-10-CM

## 2022-01-01 DIAGNOSIS — F03.90 DEMENTIA WITHOUT BEHAVIORAL DISTURBANCE (H): Primary | ICD-10-CM

## 2022-01-01 DIAGNOSIS — F03.90 DEMENTIA WITHOUT BEHAVIORAL DISTURBANCE, UNSPECIFIED DEMENTIA TYPE: Primary | ICD-10-CM

## 2022-01-01 DIAGNOSIS — F02.818 LATE ONSET ALZHEIMER'S DEMENTIA WITH BEHAVIORAL DISTURBANCE (H): ICD-10-CM

## 2022-01-01 DIAGNOSIS — Z13.6 HYPERTENSION SCREEN: ICD-10-CM

## 2022-01-01 DIAGNOSIS — I51.7 ATRIAL ENLARGEMENT, BILATERAL: ICD-10-CM

## 2022-01-01 LAB
ALBUMIN SERPL-MCNC: 3.6 G/DL (ref 3.4–5)
ALP SERPL-CCNC: 80 U/L (ref 40–150)
ALT SERPL W P-5'-P-CCNC: 19 U/L (ref 0–50)
ANION GAP SERPL CALCULATED.3IONS-SCNC: 1 MMOL/L (ref 3–14)
ANION GAP SERPL CALCULATED.3IONS-SCNC: 4 MMOL/L (ref 3–14)
ANION GAP SERPL CALCULATED.3IONS-SCNC: 5 MMOL/L (ref 3–14)
AST SERPL W P-5'-P-CCNC: 24 U/L (ref 0–45)
BILIRUB SERPL-MCNC: 0.6 MG/DL (ref 0.2–1.3)
BUN SERPL-MCNC: 19 MG/DL (ref 7–30)
BUN SERPL-MCNC: 22 MG/DL (ref 7–30)
BUN SERPL-MCNC: 26 MG/DL (ref 7–30)
CALCIUM SERPL-MCNC: 9 MG/DL (ref 8.5–10.1)
CALCIUM SERPL-MCNC: 9.4 MG/DL (ref 8.5–10.1)
CALCIUM SERPL-MCNC: 9.4 MG/DL (ref 8.5–10.1)
CHLORIDE BLD-SCNC: 105 MMOL/L (ref 94–109)
CHLORIDE BLD-SCNC: 105 MMOL/L (ref 94–109)
CHLORIDE BLD-SCNC: 106 MMOL/L (ref 94–109)
CO2 SERPL-SCNC: 30 MMOL/L (ref 20–32)
CO2 SERPL-SCNC: 30 MMOL/L (ref 20–32)
CO2 SERPL-SCNC: 32 MMOL/L (ref 20–32)
CREAT SERPL-MCNC: 0.68 MG/DL (ref 0.52–1.04)
CREAT SERPL-MCNC: 0.69 MG/DL (ref 0.52–1.04)
CREAT SERPL-MCNC: 0.71 MG/DL (ref 0.52–1.04)
ERYTHROCYTE [DISTWIDTH] IN BLOOD BY AUTOMATED COUNT: 13.3 % (ref 10–15)
GFR SERPL CREATININE-BSD FRML MDRD: 84 ML/MIN/1.73M2
GFR SERPL CREATININE-BSD FRML MDRD: 86 ML/MIN/1.73M2
GFR SERPL CREATININE-BSD FRML MDRD: 86 ML/MIN/1.73M2
GLUCOSE BLD-MCNC: 84 MG/DL (ref 70–99)
GLUCOSE BLD-MCNC: 85 MG/DL (ref 70–99)
GLUCOSE BLD-MCNC: 92 MG/DL (ref 70–99)
HCT VFR BLD AUTO: 41.5 % (ref 35–47)
HGB BLD-MCNC: 13.5 G/DL (ref 11.7–15.7)
MCH RBC QN AUTO: 29.5 PG (ref 26.5–33)
MCHC RBC AUTO-ENTMCNC: 32.5 G/DL (ref 31.5–36.5)
MCV RBC AUTO: 91 FL (ref 78–100)
PLATELET # BLD AUTO: 154 10E3/UL (ref 150–450)
POTASSIUM BLD-SCNC: 4.1 MMOL/L (ref 3.4–5.3)
POTASSIUM BLD-SCNC: 4.4 MMOL/L (ref 3.4–5.3)
POTASSIUM BLD-SCNC: 4.7 MMOL/L (ref 3.4–5.3)
PROT SERPL-MCNC: 7.3 G/DL (ref 6.8–8.8)
RBC # BLD AUTO: 4.58 10E6/UL (ref 3.8–5.2)
SODIUM SERPL-SCNC: 139 MMOL/L (ref 133–144)
SODIUM SERPL-SCNC: 139 MMOL/L (ref 133–144)
SODIUM SERPL-SCNC: 140 MMOL/L (ref 133–144)
VALPROATE SERPL-MCNC: 42.2 UG/ML
WBC # BLD AUTO: 4.4 10E3/UL (ref 4–11)

## 2022-01-01 PROCEDURE — P9604 ONE-WAY ALLOW PRORATED TRIP: HCPCS | Mod: ORL | Performed by: FAMILY MEDICINE

## 2022-01-01 PROCEDURE — P9604 ONE-WAY ALLOW PRORATED TRIP: HCPCS | Mod: ORL | Performed by: NURSE PRACTITIONER

## 2022-01-01 PROCEDURE — 99310 SBSQ NF CARE HIGH MDM 45: CPT | Performed by: FAMILY MEDICINE

## 2022-01-01 PROCEDURE — 85027 COMPLETE CBC AUTOMATED: CPT | Mod: ORL | Performed by: NURSE PRACTITIONER

## 2022-01-01 PROCEDURE — 99309 SBSQ NF CARE MODERATE MDM 30: CPT | Performed by: FAMILY MEDICINE

## 2022-01-01 PROCEDURE — 80048 BASIC METABOLIC PNL TOTAL CA: CPT | Mod: ORL | Performed by: FAMILY MEDICINE

## 2022-01-01 PROCEDURE — 36415 COLL VENOUS BLD VENIPUNCTURE: CPT | Mod: ORL | Performed by: NURSE PRACTITIONER

## 2022-01-01 PROCEDURE — 80164 ASSAY DIPROPYLACETIC ACD TOT: CPT | Mod: ORL | Performed by: FAMILY MEDICINE

## 2022-01-01 PROCEDURE — 80053 COMPREHEN METABOLIC PANEL: CPT | Mod: ORL | Performed by: NURSE PRACTITIONER

## 2022-01-01 PROCEDURE — 99318 PR ANNUAL NURSING FAC ASSESSMNT, STABLE: CPT | Performed by: NURSE PRACTITIONER

## 2022-01-01 PROCEDURE — 99308 SBSQ NF CARE LOW MDM 20: CPT | Performed by: NURSE PRACTITIONER

## 2022-01-01 PROCEDURE — 36415 COLL VENOUS BLD VENIPUNCTURE: CPT | Mod: ORL | Performed by: FAMILY MEDICINE

## 2022-01-01 RX ORDER — FUROSEMIDE 40 MG
TABLET ORAL
Start: 2022-01-01

## 2022-01-01 RX ORDER — QUETIAPINE FUMARATE 25 MG/1
25 TABLET, FILM COATED ORAL DAILY
Start: 2022-01-01

## 2022-01-01 RX ORDER — PANTOPRAZOLE SODIUM 20 MG/1
TABLET, DELAYED RELEASE ORAL
Start: 2022-01-01 | End: 2022-01-01

## 2022-01-01 RX ORDER — DIVALPROEX SODIUM 125 MG/1
250 CAPSULE, COATED PELLETS ORAL 2 TIMES DAILY
COMMUNITY
Start: 2022-01-01

## 2022-01-01 ASSESSMENT — MIFFLIN-ST. JEOR: SCORE: 1036.95

## 2022-01-05 ENCOUNTER — TELEPHONE (OUTPATIENT)
Dept: GERIATRICS | Facility: CLINIC | Age: 82
End: 2022-01-05
Payer: COMMERCIAL

## 2022-01-05 NOTE — TELEPHONE ENCOUNTER
[facility] Triage Nurse Telephone Encounter    Provider: Fadumo Chun NP  Facility: [facility] Facility Type:  LTC    Caller: Harmony       Allergies:    Allergies   Allergen Reactions     Xarelto [Rivaroxaban] Diarrhea     Ace Inhibitors Cough        Reason for call:   Pt wt today 141.6 and going back 139.3, 138.4, 139.5, 138.5.   On lasix 40mg bid and KCL 20meq every day, no cough, no sob, LS clear, no edema noted.       Verbal Order/Direction given by Provider:   Additional 40 mg po every day x 5 days   Plus additional 20 k po every day x 5 days       Provider giving Order:  Fadumo Chun NP    Verbal Order given to: Harmony Orozco RN

## 2022-01-06 ENCOUNTER — NURSING HOME VISIT (OUTPATIENT)
Dept: GERIATRICS | Facility: CLINIC | Age: 82
End: 2022-01-06
Payer: COMMERCIAL

## 2022-01-06 VITALS
HEART RATE: 88 BPM | RESPIRATION RATE: 18 BRPM | OXYGEN SATURATION: 92 % | HEIGHT: 62 IN | SYSTOLIC BLOOD PRESSURE: 120 MMHG | WEIGHT: 141.6 LBS | BODY MASS INDEX: 26.06 KG/M2 | TEMPERATURE: 98 F | DIASTOLIC BLOOD PRESSURE: 80 MMHG

## 2022-01-06 DIAGNOSIS — I50.812 CHRONIC RIGHT-SIDED CHF (CONGESTIVE HEART FAILURE) (H): Primary | ICD-10-CM

## 2022-01-06 DIAGNOSIS — I48.11 LONGSTANDING PERSISTENT ATRIAL FIBRILLATION (H): ICD-10-CM

## 2022-01-06 DIAGNOSIS — F03.90 DEMENTIA WITHOUT BEHAVIORAL DISTURBANCE, UNSPECIFIED DEMENTIA TYPE: ICD-10-CM

## 2022-01-06 DIAGNOSIS — K59.01 SLOW TRANSIT CONSTIPATION: ICD-10-CM

## 2022-01-06 DIAGNOSIS — I27.20 PULMONARY HYPERTENSION (H): ICD-10-CM

## 2022-01-06 PROCEDURE — 99309 SBSQ NF CARE MODERATE MDM 30: CPT | Performed by: NURSE PRACTITIONER

## 2022-01-06 ASSESSMENT — MIFFLIN-ST. JEOR: SCORE: 1055.54

## 2022-01-06 NOTE — LETTER
1/6/2022        RE: Khalida Rouse  [address]  [facility] [address]        [facility]    Chief Complaint   Patient presents with     care home Regulatory     Place of Service where encounter took place:  [facility] [place] () [[id number]]    HPI:    Khalida Rouse is a 81 year old  (1940), who is being seen today for a federally mandated E/M visit.  HPI information obtained from: facility chart records, facility staff and patient report. Today's concerns are:     Chronic right-sided CHF (congestive heart failure) (H)  Pulmonary hypertension (H)  Dementia without behavioral disturbance, unspecified dementia type (H)  Longstanding persistent atrial fibrillation (H)  Slow transit constipation    Nursing concerns: None  Nutrition: No concerns, 4 ounce house supplement twice daily  PHQ-9: 0  BIMS: Unable  Function: Able to ambulate with staff in the [facility] and in her room.    ALLERGIES: Xarelto [rivaroxaban] and Ace inhibitors  PAST MEDICAL HISTORY:  has a past medical history of Aortic valve stenosis, Atrial flutter (H), Cardiomyopathy (H), Cognitive changes - SLUMS 16/30 (4/13/2021), Severe aortic stenosis (4/7/2014), and Shoulder fracture, left (3/22/15).  PAST SURGICAL HISTORY:  has a past surgical history that includes ENT surgery; Replace valve aortic (4/10/2014); cataract iol, rt/lt (Right); and Esophagoscopy, gastroscopy, duodenoscopy (EGD), combined (N/A, 5/14/2021).  FAMILY HISTORY: family history includes Alzheimer Disease in her sister; Dementia in her brother.  SOCIAL HISTORY:  reports that she has never smoked. She has never used smokeless tobacco. She reports that she does not drink alcohol and does not use drugs.    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     Slow transit constipation K59.01       MEDICATIONS:  Current Outpatient Medications   Medication Sig Dispense Refill     acetaminophen (ACETAMINOPHEN 8 HOUR) 650 MG CR tablet Take 650 mg by mouth 3 times daily       atenolol (TENORMIN) 50 MG tablet Take 50 mg by mouth 2 times daily       diltiazem ER (DILT-XR) 180 MG 24 hr capsule Take 180 mg by mouth 2 times daily        furosemide (LASIX) 40 MG tablet Take 40 mg by mouth 2 times daily       pantoprazole (PROTONIX) 20 MG EC tablet Take 20 mg by mouth daily       polyethylene glycol (MIRALAX) 17 g packet Take 1 packet by mouth daily       potassium chloride ER (KLOR-CON M) 10 MEQ CR tablet Take 20 mEq by mouth daily       senna-docusate (SENOKOT-S/PERICOLACE) 8.6-50 MG tablet Take 1 tablet by mouth daily       hypromellose (ARTIFICIAL TEARS) 0.5 % SOLN ophthalmic solution Place 1 drop into both eyes every hour as needed for dry eyes         Information reviewed:  Medications, vital signs, orders, care plan, and nursing notes.    ROS:  Limited secondary to cognitive impairment but today pt reports abdomen is firm.     Exam:  Vitals: /80   Pulse 88   Temp 98  F (36.7  C)   Resp 18   Ht 1.575 m (5' 2\")   Wt 64.2 kg (141 lb 9.6 oz)   SpO2 92%   BMI 25.90 kg/m    BMI= Body mass index is 25.9 kg/m .  GENERAL APPEARANCE:  Alert, in no distress  RESP:  respiratory effort and palpation of chest normal, no respiratory distress, lungs sounds clear  CV:  Palpation and auscultation of heart done , regular rate and rhythm, no murmur, rub, or gallop, edema no peripheral edema  ABDOMEN:  normal bowel sounds, firm, nontender,   M/S:  Gait and station observed in wheelchair, no tenderness or swelling of the joints   SKIN:  Inspection at " baseline  PSYCH:  insight and judgement, memory impaired, affect and mood normal    Lab/Diagnostic data: reviewed in Epic    ASSESSMENT/PLAN  Pulmonary hypertension (H)  Chronic right-sided CHF (congestive heart failure) (H)  Managed with diltiazem, atenolol and Lasix.  Follows with cardiology. Yesterday she had weight gain and lasix was increased for a few days. No respiratory distress or edema.   Wt Readings from Last 4 Encounters:   01/06/22 64.2 kg (141 lb 9.6 oz)   11/24/21 62.7 kg (138 lb 3.2 oz)   09/20/21 61 kg (134 lb 8 oz)   09/09/21 61.2 kg (134 lb 14.4 oz)   - continue current plan of care    Longstanding persistent atrial fibrillation (H)  Rate controlled with atenolol and diltiazem.  No longer on anticoagulation secondary to GI bleed.    Dementia without behavioral disturbance, unspecified dementia type (H)  Resides in nursing home.  Requires cues and assistance for cares.  -Progressive decline expected  -Continue supportive care at the nursing home    Slow transit constipation  Stable on senna and miralax. Continues to complain of abdominal bloating but no pain or other symptoms. Last bm was recorded 2 days ago. Patient may take her self to the bathroom too. Recommend no interventions. Ok for patient to get second opinion.     Orders:  Check cbc, bmp dx CHF     Electronically signed by:  Fadumo Chun NP            Sincerely,        Fadumo Chun NP

## 2022-01-06 NOTE — PROGRESS NOTES
[facility]    Chief Complaint   Patient presents with     long term Regulatory     Place of Service where encounter took place:  [facility] () [[id number]]    HPI:    Khalida Rouse is a 81 year old  (1940), who is being seen today for a federally mandated E/M visit.  HPI information obtained from: facility chart records, facility staff and patient report. Today's concerns are:     Chronic right-sided CHF (congestive heart failure) (H)  Pulmonary hypertension (H)  Dementia without behavioral disturbance, unspecified dementia type (H)  Longstanding persistent atrial fibrillation (H)  Slow transit constipation    Nursing concerns: None  Nutrition: No concerns, 4 ounce house supplement twice daily  PHQ-9: 0  BIMS: Unable  Function: Able to ambulate with staff in the [facility] and in her room.    ALLERGIES: Xarelto [rivaroxaban] and Ace inhibitors  PAST MEDICAL HISTORY:  has a past medical history of Aortic valve stenosis, Atrial flutter (H), Cardiomyopathy (H), Cognitive changes - SLUMS 16/30 (4/13/2021), Severe aortic stenosis (4/7/2014), and Shoulder fracture, left (3/22/15).  PAST SURGICAL HISTORY:  has a past surgical history that includes ENT surgery; Replace valve aortic (4/10/2014); cataract iol, rt/lt (Right); and Esophagoscopy, gastroscopy, duodenoscopy (EGD), combined (N/A, 5/14/2021).  FAMILY HISTORY: family history includes Alzheimer Disease in her sister; Dementia in her brother.  SOCIAL HISTORY:  reports that she has never smoked. She has never used smokeless tobacco. She reports that she does not drink alcohol and does not use drugs.    Patient Active Problem List   Diagnosis Code     S/P AVR (aortic valve replacement) Z95.2     Advanced directives, counseling/discussion Z71.89     Osteoporosis M81.0     Pulmonary hypertension (H) I27.20     Longstanding persistent atrial fibrillation (H) I48.11     Dementia without behavioral disturbance, unspecified dementia  "type (H) F03.90     Chronic right-sided CHF (congestive heart failure) (H) I50.812     History of GI bleed Z87.19     Prosthetic eye globe Z97.0     Slow transit constipation K59.01       MEDICATIONS:  Current Outpatient Medications   Medication Sig Dispense Refill     acetaminophen (ACETAMINOPHEN 8 HOUR) 650 MG CR tablet Take 650 mg by mouth 3 times daily       atenolol (TENORMIN) 50 MG tablet Take 50 mg by mouth 2 times daily       diltiazem ER (DILT-XR) 180 MG 24 hr capsule Take 180 mg by mouth 2 times daily        furosemide (LASIX) 40 MG tablet Take 40 mg by mouth 2 times daily       pantoprazole (PROTONIX) 20 MG EC tablet Take 20 mg by mouth daily       polyethylene glycol (MIRALAX) 17 g packet Take 1 packet by mouth daily       potassium chloride ER (KLOR-CON M) 10 MEQ CR tablet Take 20 mEq by mouth daily       senna-docusate (SENOKOT-S/PERICOLACE) 8.6-50 MG tablet Take 1 tablet by mouth daily       hypromellose (ARTIFICIAL TEARS) 0.5 % SOLN ophthalmic solution Place 1 drop into both eyes every hour as needed for dry eyes         Information reviewed:  Medications, vital signs, orders, care plan, and nursing notes.    ROS:  Limited secondary to cognitive impairment but today pt reports abdomen is firm.     Exam:  Vitals: /80   Pulse 88   Temp 98  F (36.7  C)   Resp 18   Ht 1.575 m (5' 2\")   Wt 64.2 kg (141 lb 9.6 oz)   SpO2 92%   BMI 25.90 kg/m    BMI= Body mass index is 25.9 kg/m .  GENERAL APPEARANCE:  Alert, in no distress  RESP:  respiratory effort and palpation of chest normal, no respiratory distress, lungs sounds clear  CV:  Palpation and auscultation of heart done , regular rate and rhythm, no murmur, rub, or gallop, edema no peripheral edema  ABDOMEN:  normal bowel sounds, firm, nontender,   M/S:  Gait and station observed in wheelchair, no tenderness or swelling of the joints   SKIN:  Inspection at baseline  PSYCH:  insight and judgement, memory impaired, affect and mood " normal    Lab/Diagnostic data: reviewed in Epic    ASSESSMENT/PLAN  Pulmonary hypertension (H)  Chronic right-sided CHF (congestive heart failure) (H)  Managed with diltiazem, atenolol and Lasix.  Follows with cardiology. Yesterday she had weight gain and lasix was increased for a few days. No respiratory distress or edema.   Wt Readings from Last 4 Encounters:   01/06/22 64.2 kg (141 lb 9.6 oz)   11/24/21 62.7 kg (138 lb 3.2 oz)   09/20/21 61 kg (134 lb 8 oz)   09/09/21 61.2 kg (134 lb 14.4 oz)   - continue current plan of care    Longstanding persistent atrial fibrillation (H)  Rate controlled with atenolol and diltiazem.  No longer on anticoagulation secondary to GI bleed.    Dementia without behavioral disturbance, unspecified dementia type (H)  Resides in nursing home.  Requires cues and assistance for cares.  -Progressive decline expected  -Continue supportive care at the nursing home    Slow transit constipation  Stable on senna and miralax. Continues to complain of abdominal bloating but no pain or other symptoms. Last bm was recorded 2 days ago. Patient may take her self to the bathroom too. Recommend no interventions. Ok for patient to get second opinion.     Orders:  Check cbc, bmp dx CHF     Electronically signed by:  Fadumo Chun NP

## 2022-01-07 ENCOUNTER — LAB REQUISITION (OUTPATIENT)
Dept: LAB | Facility: CLINIC | Age: 82
End: 2022-01-07
Payer: COMMERCIAL

## 2022-01-07 DIAGNOSIS — I50.20 UNSPECIFIED SYSTOLIC (CONGESTIVE) HEART FAILURE (H): ICD-10-CM

## 2022-01-07 PROBLEM — K59.01 SLOW TRANSIT CONSTIPATION: Status: ACTIVE | Noted: 2022-01-07

## 2022-01-07 RX ORDER — AMOXICILLIN 250 MG
1 CAPSULE ORAL DAILY
COMMUNITY
End: 2022-01-14

## 2022-01-07 RX ORDER — POLYETHYLENE GLYCOL 3350 17 G/17G
1 POWDER, FOR SOLUTION ORAL DAILY
COMMUNITY

## 2022-01-10 LAB
ANION GAP SERPL CALCULATED.3IONS-SCNC: 3 MMOL/L (ref 3–14)
BUN SERPL-MCNC: 18 MG/DL (ref 7–30)
CALCIUM SERPL-MCNC: 9.3 MG/DL (ref 8.5–10.1)
CHLORIDE BLD-SCNC: 104 MMOL/L (ref 94–109)
CO2 SERPL-SCNC: 32 MMOL/L (ref 20–32)
CREAT SERPL-MCNC: 0.71 MG/DL (ref 0.52–1.04)
ERYTHROCYTE [DISTWIDTH] IN BLOOD BY AUTOMATED COUNT: 13.5 % (ref 10–15)
GFR SERPL CREATININE-BSD FRML MDRD: 84 ML/MIN/1.73M2
GLUCOSE BLD-MCNC: 83 MG/DL (ref 70–99)
HCT VFR BLD AUTO: 40.2 % (ref 35–47)
HGB BLD-MCNC: 12.9 G/DL (ref 11.7–15.7)
MCH RBC QN AUTO: 29.3 PG (ref 26.5–33)
MCHC RBC AUTO-ENTMCNC: 32.1 G/DL (ref 31.5–36.5)
MCV RBC AUTO: 91 FL (ref 78–100)
PLATELET # BLD AUTO: 261 10E3/UL (ref 150–450)
POTASSIUM BLD-SCNC: 4.6 MMOL/L (ref 3.4–5.3)
RBC # BLD AUTO: 4.4 10E6/UL (ref 3.8–5.2)
SODIUM SERPL-SCNC: 139 MMOL/L (ref 133–144)
WBC # BLD AUTO: 6.7 10E3/UL (ref 4–11)

## 2022-01-10 PROCEDURE — 80048 BASIC METABOLIC PNL TOTAL CA: CPT | Mod: ORL | Performed by: NURSE PRACTITIONER

## 2022-01-10 PROCEDURE — 85027 COMPLETE CBC AUTOMATED: CPT | Mod: ORL | Performed by: NURSE PRACTITIONER

## 2022-01-10 PROCEDURE — 36415 COLL VENOUS BLD VENIPUNCTURE: CPT | Mod: ORL | Performed by: NURSE PRACTITIONER

## 2022-01-14 ENCOUNTER — OFFICE VISIT (OUTPATIENT)
Dept: INTERNAL MEDICINE | Facility: CLINIC | Age: 82
End: 2022-01-14
Payer: COMMERCIAL

## 2022-01-14 VITALS
HEART RATE: 80 BPM | OXYGEN SATURATION: 95 % | SYSTOLIC BLOOD PRESSURE: 128 MMHG | TEMPERATURE: 97.4 F | WEIGHT: 137 LBS | RESPIRATION RATE: 16 BRPM | DIASTOLIC BLOOD PRESSURE: 74 MMHG | BODY MASS INDEX: 25.06 KG/M2

## 2022-01-14 DIAGNOSIS — Z95.2 S/P AVR (AORTIC VALVE REPLACEMENT): ICD-10-CM

## 2022-01-14 DIAGNOSIS — K59.01 SLOW TRANSIT CONSTIPATION: ICD-10-CM

## 2022-01-14 DIAGNOSIS — F03.90 DEMENTIA WITHOUT BEHAVIORAL DISTURBANCE, UNSPECIFIED DEMENTIA TYPE: Primary | ICD-10-CM

## 2022-01-14 DIAGNOSIS — I48.11 LONGSTANDING PERSISTENT ATRIAL FIBRILLATION (H): ICD-10-CM

## 2022-01-14 PROCEDURE — 99214 OFFICE O/P EST MOD 30 MIN: CPT | Performed by: INTERNAL MEDICINE

## 2022-01-14 ASSESSMENT — PAIN SCALES - GENERAL: PAINLEVEL: NO PAIN (0)

## 2022-01-14 NOTE — PROGRESS NOTES
Sandra Gaxiola is a 81 year old who presents for the following health issues     HPI     Chief Complaint   Patient presents with     Gastric Problem     bloated feeling and feels hot at times, going on for a few weeks        EMR reviewed including:             Complaint, History of Chief Complaint, Corresponding Review of Systems, and Complaint Specific Physical Examination.    #1   Complains of abdominal bloating.  Denies diarrhea or constipation.  Denies nausea or vomiting.  Has been taking senna for chronic constipation.        Exam:   GI: Abdomen is soft, without rebound, guarding or tenderness. Bowel sounds are slightly exaggerated.. No renal bruits are heard.   Constitutional: The patient appears to be in no acute distress. The patient appears to be adequately hydrated. No acute respiratory or hemodynamic distress is noted at this time.      #2   Follow-up on longstanding atrial fibrillation.  No longer on anticoagulation due to increased risk of falling.  No history of prior stroke or embolic event.  Denies symptoms of congestive heart failure at this time.        Exam:   HEART: Irregularly irregular without rubs, clicks, gallops, or murmurs. PMI is nondisplaced. Upstrokes are brisk. S1,S2 are heard.   LUNGS: clear bilaterally, airflow is brisk, no intercostal retraction or stridor is noted. No coughing is noted during visit.      #3   Progressive dementia  Pleasant but confused.  Totally unable to care for self.  Is able to maintain simple ADLs with very strict supervision.        Exam:   NEURO: Pt is alert and appropriate. No neurologic lateralization is noted. Cranial nerves 2-12 are intact. Peripheral sensory and motor function are grossly normal.         Patient has been interviewed, applicable history and applied review of systems have been performed.    Vital Signs:   /74   Pulse 80   Temp 97.4  F (36.3  C) (Temporal)   Resp 16   Wt 62.1 kg (137 lb)   SpO2 95%   BMI 25.06 kg/m         Decision Making    Problem and Complexity     1. Dementia without behavioral disturbance, unspecified dementia type (H)  Support as needed. Patient has excellent family support at this time.    2. Slow transit constipation  Recommend concentrating on the MiraLAX and limiting the senna as this is colon irritating.    3. S/P AVR (aortic valve replacement)  Bioprosthetic valve in place. No need for anticoagulation    4. Longstanding persistent atrial fibrillation (H)  Risk of anticoagulation far outweighs benefit. Patient previously had acute GI bleed and nearly exsanguinated.                                FOLLOW UP   I have asked the patient to make an appointment for followup with me in 4 months or as needed        I have carefully explained the diagnosis and treatment options to the patient.  The patient has displayed an understanding of the above, and all subsequent questions were answered.      DO MARIA D Sabillon    Portions of this note were produced using [employer]  Although every attempt at real-time proof reading has been made, occasional grammar/syntax errors may have been missed.

## 2022-01-24 NOTE — LETTER
"    1/24/2022        RE: Khalida Rouse  [address]  [facility] [address]        [facility]    Chief Complaint   Patient presents with     RECHECK     HPI:  Khalida Rouse is a 81 year old  (1940), who is being seen today for an episodic care visit at: [facility] () [[id number]]. Today's concern is:      Dementia without behavioral disturbance, unspecified dementia type (H)  Slow transit constipation     Nurse from weekend reports increased paranoia. Blocking her doorway and worried about another resident who was yelling. Today she is doing well, without problems. There is another note that patient is having loose stools. Patient does not always tell staff when she is having loose stools and frequently has constipation.     Allergies, and PMH/PSH reviewed in EPIC today.  REVIEW OF SYSTEMS:  4 point ROS including Respiratory, CV, GI and , other than that noted in the HPI,  is negative    Objective:   Temp (!) 96.7  F (35.9  C)   Ht 1.575 m (5' 2\")   Wt 62.4 kg (137 lb 8 oz)   SpO2 92%   BMI 25.15 kg/m    General: alert. In no distress. Oriented to person and place. Inconsistent with situation.     Assessment/Plan:     Dementia without behavioral disturbance, unspecified dementia type (H)  Slow transit constipation     BMP next lab day  Hold bowel medications if having loose stools.     Electronically signed by: Fadumo Chun NP             Sincerely,        Fadumo Chun NP      "

## 2022-01-25 NOTE — PROGRESS NOTES
"[facility] GERIATRIC SERVICES    Chief Complaint   Patient presents with     RECHECK     HPI:  Khalida Rouse is a 81 year old  (1940), who is being seen today for an episodic care visit at: [facility] () [[id number]]. Today's concern is:      Dementia without behavioral disturbance, unspecified dementia type (H)  Slow transit constipation     Nurse from weekend reports increased paranoia. Blocking her doorway and worried about another resident who was yelling. Today she is doing well, without problems. There is another note that patient is having loose stools. Patient does not always tell staff when she is having loose stools and frequently has constipation.     Allergies, and PMH/PSH reviewed in EPIC today.  REVIEW OF SYSTEMS:  4 point ROS including Respiratory, CV, GI and , other than that noted in the HPI,  is negative    Objective:   Temp (!) 96.7  F (35.9  C)   Ht 1.575 m (5' 2\")   Wt 62.4 kg (137 lb 8 oz)   SpO2 92%   BMI 25.15 kg/m    General: alert. In no distress. Oriented to person and place. Inconsistent with situation.     Assessment/Plan:     Dementia without behavioral disturbance, unspecified dementia type (H)  Slow transit constipation     BMP next lab day  Hold bowel medications if having loose stools.     Electronically signed by: Fadumo Chun NP       "

## 2022-03-03 NOTE — PROGRESS NOTES
"[place] GERIATRIC SERVICES  Chief Complaint   Patient presents with     snf Regulatory     [place] Medical Record Number:  [id number]  Place of Service where encounter took place:  [facility] () [[id number]]    HPI:    Khalida Rouse  is 82 year old (1940), who is being seen today for a federally mandated E/M visit.  HPI information obtained from: facility chart records, facility staff, patient report and [facility] chart review.     Today's concerns are:  -  - Resident seen and examined.   - Reports doing fine. Denies chest pain or SOB.   ---------------------------------  - - Past Medical, social, family histories, medications, and allergies reviewed and updated  - Medications reviewed: in the chart and EHR.   - Case Management:   I have reviewed the care plan and MDS and do agree with the plan. Patient's desire to return to the community is not present.  Information reviewed:  Medications, vital signs, orders, and nursing notes.    MEDICATIONS:  Current Outpatient Medications   Medication Sig Dispense Refill     acetaminophen (ACETAMINOPHEN 8 HOUR) 650 MG CR tablet Take 650 mg by mouth 3 times daily       atenolol (TENORMIN) 50 MG tablet Take 50 mg by mouth 2 times daily       diltiazem ER (DILT-XR) 180 MG 24 hr capsule Take 180 mg by mouth 2 times daily        furosemide (LASIX) 40 MG tablet Take 40 mg by mouth 2 times daily       hypromellose (ARTIFICIAL TEARS) 0.5 % SOLN ophthalmic solution Place 1 drop into both eyes every hour as needed for dry eyes       pantoprazole (PROTONIX) 20 MG EC tablet Take 20 mg by mouth daily       polyethylene glycol (MIRALAX) 17 g packet Take 1 packet by mouth daily       potassium chloride ER (KLOR-CON M) 10 MEQ CR tablet Take 20 mEq by mouth daily         ROS:  Limited secondary to cognitive impairment but today pt reports- see HPI    Vitals:  /70   Pulse 66   Temp 98.5  F (36.9  C)   Resp 18   Ht 1.575 m (5' 2\")   Wt 59.4 kg " (131 lb)   SpO2 94%   BMI 23.96 kg/m    Body mass index is 23.96 kg/m .  Exam:  GENERAL APPEARANCE:  in no distress, cooperative  RESP:  lungs clear to auscultation   CV:  S1S2 audible, irregular HR, no murmur appreciated.   ABDOMEN:  soft, NT/ND, BS audible. no mass appreciated on palpation.   M/S:   no joint deformity noted on observation.   SKIN:  No rash.   NEURO:   No NFD appreciated on observation. Hand  5/5 b/l  PSYCH:  AAOx person only. Poor insight, judgement and memory, affect and mood normal    Lab/Diagnostic data:  Reviewed in the chart and EHR.      ASSESSMENT/PLAN  ----------------------------  CHF, right sided  Severe biatrial enlargement  Right moderate Pleural Effusion  Severe Pulmonary HTN (H)  Hx of TAVR  - EF 65-70% (April 2021)  - on lasix 40 mg bid. Appears compensated  x    Chronic a-fibrillation (H):  Hx of supratherapeutic INR resulted in acute blood loss anemia, UGIB and hematuria (May 2021)  - EGD did not reveal ulceration, erosive gastritis or esophagitis.  Was on Protonix 40 mg bid, changed to once daily.   - off coumadin. 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 Chronic condition, ongoing decline expected.   -  Continue to provide redirection and reassurance as needed. Maintain safe living situation with goals focused on comfort.     Hx of spinal compression fx: analgesia optimal.      Frailty: Significant  Deficits requiring NH placement. Requiring extensive assistance from nursing. Up for meals only o/w spends the day resting in bed      Electronically signed by:  Claudia Molina MD

## 2022-03-03 NOTE — LETTER
3/3/2022        RE: Khalida Rouse  [address] [facility] [address]        [place] GERIATRIC SERVICES  Chief Complaint   Patient presents with     senior living Regulatory     [place] Medical Record Number:  [id number]  Place of Service where encounter took place:  [facility] () [[id number]]    HPI:    Khalida Rouse  is 82 year old (1940), who is being seen today for a federally mandated E/M visit.  HPI information obtained from: facility chart records, facility staff, patient report and [facility] chart review.     Today's concerns are:  -  - Resident seen and examined.   - Reports doing fine. Denies chest pain or SOB.   ---------------------------------  - - Past Medical, social, family histories, medications, and allergies reviewed and updated  - Medications reviewed: in the chart and EHR.   - Case Management:   I have reviewed the care plan and MDS and do agree with the plan. Patient's desire to return to the community is not present.  Information reviewed:  Medications, vital signs, orders, and nursing notes.    MEDICATIONS:  Current Outpatient Medications   Medication Sig Dispense Refill     acetaminophen (ACETAMINOPHEN 8 HOUR) 650 MG CR tablet Take 650 mg by mouth 3 times daily       atenolol (TENORMIN) 50 MG tablet Take 50 mg by mouth 2 times daily       diltiazem ER (DILT-XR) 180 MG 24 hr capsule Take 180 mg by mouth 2 times daily        furosemide (LASIX) 40 MG tablet Take 40 mg by mouth 2 times daily       hypromellose (ARTIFICIAL TEARS) 0.5 % SOLN ophthalmic solution Place 1 drop into both eyes every hour as needed for dry eyes       pantoprazole (PROTONIX) 20 MG EC tablet Take 20 mg by mouth daily       polyethylene glycol (MIRALAX) 17 g packet Take 1 packet by mouth daily       potassium chloride ER (KLOR-CON M) 10 MEQ CR tablet Take 20 mEq by mouth daily         ROS:  Limited secondary to cognitive impairment but today pt reports- see HPI    Vitals:  /70   " Pulse 66   Temp 98.5  F (36.9  C)   Resp 18   Ht 1.575 m (5' 2\")   Wt 59.4 kg (131 lb)   SpO2 94%   BMI 23.96 kg/m    Body mass index is 23.96 kg/m .  Exam:  GENERAL APPEARANCE:  in no distress, cooperative  RESP:  lungs clear to auscultation   CV:  S1S2 audible, irregular HR, no murmur appreciated.   ABDOMEN:  soft, NT/ND, BS audible. no mass appreciated on palpation.   M/S:   no joint deformity noted on observation.   SKIN:  No rash.   NEURO:   No NFD appreciated on observation. Hand  5/5 b/l  PSYCH:  AAOx person only. Poor insight, judgement and memory, affect and mood normal    Lab/Diagnostic data:  Reviewed in the chart and EHR.      ASSESSMENT/PLAN  ----------------------------  CHF, right sided  Severe biatrial enlargement  Right moderate Pleural Effusion  Severe Pulmonary HTN (H)  Hx of TAVR  - EF 65-70% (April 2021)  - on lasix 40 mg bid. Appears compensated  x    Chronic a-fibrillation (H):  Hx of supratherapeutic INR resulted in acute blood loss anemia, UGIB and hematuria (May 2021)  - EGD did not reveal ulceration, erosive gastritis or esophagitis.  Was on Protonix 40 mg bid, changed to once daily.   - off coumadin. 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 Chronic condition, ongoing decline expected.   -  Continue to provide redirection and reassurance as needed. Maintain safe living situation with goals focused on comfort.     Hx of spinal compression fx: analgesia optimal.      Frailty: Significant  Deficits requiring NH placement. Requiring extensive assistance from nursing. Up for meals only o/w spends the day resting in bed      Electronically signed by:  Claudia Molina MD        Sincerely,        Claudia Molina MD      "

## 2022-03-08 NOTE — TELEPHONE ENCOUNTER
----- Message from Sharon Rojas PA-C sent at 5/14/2021 11:52 AM CDT -----  Regarding: HFU, CT and cysto  Pt needs CT Urogram in 3-4 weeks to eval for underlying renal cell carcinoma as well as cysto, HFU. 1st avail MD     Okay to refill Lipitor.

## 2022-05-11 NOTE — PROGRESS NOTES
[facility] GERIATRICS  Chief Complaint   Patient presents with     Annual Comprehensive Nursing Home     [place] Medical Record Number:  [id number]  Place of Service where encounter took place:  [facility] () [[id number]]    HPI:    Khalida Rouse  is a 82 year old  (1940), who is being seen today for an annual comprehensive visit. HPI information obtained from:     (Z00.00) Annual physical exam  (primary encounter diagnosis)  (I50.812) Chronic right-sided CHF (congestive heart failure) (H)  (I48.11) Longstanding persistent atrial fibrillation (H)  (Z95.2) S/P AVR (aortic valve replacement)  (I27.20) Pulmonary hypertension (H)  (F03.90) Dementia without behavioral disturbance, unspecified dementia type (H)  (Z87.19) History of GI bleed  (Z13.6) Hypertension screen      ALLERGIES: Xarelto [rivaroxaban] and Ace inhibitors  PAST MEDICAL HISTORY:   Past Medical History:   Diagnosis Date     Aortic valve stenosis      Atrial flutter (H)      Cardiomyopathy (H)      Cognitive changes - SLUMS 16/30 4/13/2021     Severe aortic stenosis 4/7/2014     Shoulder fracture, left 3/22/15      PAST SURGICAL HISTORY:  has a past surgical history that includes ENT surgery; Replace valve aortic (4/10/2014); cataract iol, rt/lt (Right); and Esophagoscopy, gastroscopy, duodenoscopy (EGD), combined (N/A, 5/14/2021).  IMMUNIZATIONS:  Immunization History   Administered Date(s) Administered     Td (Adult), Adsorbed 07/05/2017     Above immunizations pulled from [place] [employer]. MIIC and facility records also reconciled. Outstanding information sent to  to update [facility]  Future immunizations are not needed at this point as all recommended immunizations are up to date.       Current Outpatient Medications:      acetaminophen (ACETAMINOPHEN 8 HOUR) 650 MG CR tablet, Take 650 mg by mouth 3 times daily, Disp: , Rfl:      atenolol (TENORMIN) 50 MG tablet, Take 50 mg by mouth 2 times daily,  "Disp: , Rfl:      diltiazem ER (DILT-XR) 180 MG 24 hr capsule, Take 180 mg by mouth 2 times daily , Disp: , Rfl:      furosemide (LASIX) 40 MG tablet, Take 40 mg by mouth 2 times daily, Disp: , Rfl:      hypromellose (ARTIFICIAL TEARS) 0.5 % SOLN ophthalmic solution, Place 1 drop into both eyes every hour as needed for dry eyes, Disp: , Rfl:      pantoprazole (PROTONIX) 20 MG EC tablet, Take 20 mg by mouth daily, Disp: , Rfl:      polyethylene glycol (MIRALAX) 17 g packet, Take 1 packet by mouth daily, Disp: , Rfl:      potassium chloride ER (KLOR-CON M) 10 MEQ CR tablet, Take 20 mEq by mouth daily, Disp: , Rfl:      Case Management:  I have reviewed the facility/SNF care plan/MDS, including the falls risk, nutrition and pain screening. I also reviewed the current immunizations, and preventive care.. Future cancer screening is not clinically indicated secondary to age/goals of care Patient's desire to return to the community is not present. Current Level of Care is appropriate.    Advance Directive Discussion:    I reviewed the current advanced directives as reflected in EPIC, the POLST and the facility chart, and verified the congruency of orders. I contacted the first party daughter Gardenia and discussed the plan of Care.  I did review the advance directives with the resident. Patient's goal is pain control and comfort.    Team Discussion:  I communicated with the appropriate disciplines involved with the Plan of Care:   Nursing    Information reviewed:  Medications, vital signs, orders, and nursing notes.    ROS:  10 point ROS of systems including Constitutional, Eyes, Respiratory, Cardiovascular, Gastroenterology, Genitourinary, Integumentary, Musculoskeletal, Psychiatric were all negative except for pertinent positives noted in my HPI.    Vitals:  /67   Pulse 76   Temp 98.3  F (36.8  C)   Resp 14   Ht 1.575 m (5' 2\")   Wt 62.6 kg (138 lb)   SpO2 91%   BMI 25.24 kg/m   Body mass index is 25.24 " kg/m .  Exam:  GENERAL APPEARANCE:  Alert, in no distress  EYES:  EOM normal, conjunctiva and lids normal  RESP:  lungs clear to auscultation , no respiratory distress  CV:  irregular, no extra heart sounds  ABDOMEN:  normal bowel sounds, soft, nontender, no hepatosplenomegaly or other masses  M/S:   no gross deformities or joint swelling  SKIN:  no rash  NEURO:   alert, clear speech, NFD on observation  PSYCH:  oriented to self and time, memory impaired , affect and mood normal     Lab/Diagnostic data:     Most Recent 3 CBC's:Recent Labs   Lab Test 01/10/22  0745 07/14/21  0755 06/26/21  1335 05/26/21  0756   WBC 6.7  --  5.2 6.3   HGB 12.9 10.4* 9.9* 9.6*   MCV 91  --  86 90     --  189 298     Most Recent 3 BMP's:Recent Labs   Lab Test 01/10/22  0745 12/01/21  0738 09/22/21  0710    141 139   POTASSIUM 4.6 3.8 3.8   CHLORIDE 104 108 105   CO2 32 27 31   BUN 18 15 21   CR 0.71 0.58 0.80   ANIONGAP 3 6 3   JOSEFINA 9.3 9.1 9.0   GLC 83 90 100*       ASSESSMENT/PLAN    (Z00.00) Annual physical exam  (primary encounter diagnosis)      (I50.812) Chronic right-sided CHF (congestive heart failure) (H)  Plan:   -Compensated, appears euvolemic today. BP and weight stable.  Last echo completed April 2021 EF 65-70% with noted severe pulmonary hypertension. Continue lasix. Followed by cardiology    (I48.11) Longstanding persistent atrial fibrillation (H)  Plan:   -Rate controlled.  Previously was on Coumadin and had acute GI bleed at which point Coumadin was stopped given risks versus benefits. Continue atenolol and diltiazem     (Z95.2) S/P AVR (aortic valve replacement)  Plan:   -Status post bioprosthetic valve 2014.  Not on anticoagulation    (I27.20) Pulmonary hypertension (H)  Plan:   -As noted above, severe pulmonary HTN.  Continue lasix    (F03.90) Dementia without behavioral disturbance, unspecified dementia type (H)  Plan:   -Expect continued decline.  Provide safe environment.  Requires extensive assistance  from nursing staff for ADLs. Continue Depakote and Seroquel. Check CMP and CBC 5/16    (Z87.19) History of GI bleed  Plan:   -Underwent upper endoscopy May 2021 with normal esophagus and gastric body.  Noted to have patchy gastritis with no specific lesion.  Suspected diffuse mucosal bleeding from supratherapeutic INR.  Continue pantoprazole    (Z13.6) Hypertension screen  Plan:   -Based on JNC-8 goals,  patients age of 82 year old, no presence of diabetes or CKD, and goals of care goal BP is <150/90 mm Hg. Patient is stable with current plan of care and routine assessment..        Electronically signed by:  Amos Vivas, CNP

## 2022-05-12 NOTE — LETTER
5/12/2022        RE: Khalida Rouse  [address] [facility] [address]        [facility] GERIATRICS  Chief Complaint   Patient presents with     Annual Comprehensive Nursing Home     [place] Medical Record Number:  [id number]  Place of Service where encounter took place:  [facility] [[id number]]    HPI:    Khalida Rouse  is a 82 year old  (1940), who is being seen today for an annual comprehensive visit. HPI information obtained from:     (Z00.00) Annual physical exam  (primary encounter diagnosis)  (I50.812) Chronic right-sided CHF (congestive heart failure) (H)  (I48.11) Longstanding persistent atrial fibrillation (H)  (Z95.2) S/P AVR (aortic valve replacement)  (I27.20) Pulmonary hypertension (H)  (F03.90) Dementia without behavioral disturbance, unspecified dementia type (H)  (Z87.19) History of GI bleed  (Z13.6) Hypertension screen      ALLERGIES: Xarelto [rivaroxaban] and Ace inhibitors  PAST MEDICAL HISTORY:   Past Medical History:   Diagnosis Date     Aortic valve stenosis      Atrial flutter (H)      Cardiomyopathy (H)      Cognitive changes - SLUMS 16/30 4/13/2021     Severe aortic stenosis 4/7/2014     Shoulder fracture, left 3/22/15      PAST SURGICAL HISTORY:  has a past surgical history that includes ENT surgery; Replace valve aortic (4/10/2014); cataract iol, rt/lt (Right); and Esophagoscopy, gastroscopy, duodenoscopy (EGD), combined (N/A, 5/14/2021).  IMMUNIZATIONS:  Immunization History   Administered Date(s) Administered     Td (Adult), Adsorbed 07/05/2017     Above immunizations pulled from [email or web address] [email or web address]. MIIC and facility records also reconciled. Outstanding information sent to  to update [email or web address]  Future immunizations are not needed at this point as all recommended immunizations are up to date.       Current Outpatient Medications:      acetaminophen (ACETAMINOPHEN 8 HOUR) 650 MG CR tablet, Take 650 mg by mouth 3 times daily,  "Disp: , Rfl:      atenolol (TENORMIN) 50 MG tablet, Take 50 mg by mouth 2 times daily, Disp: , Rfl:      diltiazem ER (DILT-XR) 180 MG 24 hr capsule, Take 180 mg by mouth 2 times daily , Disp: , Rfl:      furosemide (LASIX) 40 MG tablet, Take 40 mg by mouth 2 times daily, Disp: , Rfl:      hypromellose (ARTIFICIAL TEARS) 0.5 % SOLN ophthalmic solution, Place 1 drop into both eyes every hour as needed for dry eyes, Disp: , Rfl:      pantoprazole (PROTONIX) 20 MG EC tablet, Take 20 mg by mouth daily, Disp: , Rfl:      polyethylene glycol (MIRALAX) 17 g packet, Take 1 packet by mouth daily, Disp: , Rfl:      potassium chloride ER (KLOR-CON M) 10 MEQ CR tablet, Take 20 mEq by mouth daily, Disp: , Rfl:      Case Management:  I have reviewed the facility/SNF care plan/MDS, including the falls risk, nutrition and pain screening. I also reviewed the current immunizations, and preventive care.. Future cancer screening is not clinically indicated secondary to age/goals of care Patient's desire to return to the community is not present. Current Level of Care is appropriate.    Advance Directive Discussion:    I reviewed the current advanced directives as reflected in EPIC, the POLST and the facility chart, and verified the congruency of orders. I contacted the first party daughter Gardenia and discussed the plan of Care.  I did review the advance directives with the resident. Patient's goal is pain control and comfort.    Team Discussion:  I communicated with the appropriate disciplines involved with the Plan of Care:   Nursing    Information reviewed:  Medications, vital signs, orders, and nursing notes.    ROS:  10 point ROS of systems including Constitutional, Eyes, Respiratory, Cardiovascular, Gastroenterology, Genitourinary, Integumentary, Musculoskeletal, Psychiatric were all negative except for pertinent positives noted in my HPI.    Vitals:  /67   Pulse 76   Temp 98.3  F (36.8  C)   Resp 14   Ht 1.575 m (5' 2\") "   Wt 62.6 kg (138 lb)   SpO2 91%   BMI 25.24 kg/m   Body mass index is 25.24 kg/m .  Exam:  GENERAL APPEARANCE:  Alert, in no distress  EYES:  EOM normal, conjunctiva and lids normal  RESP:  lungs clear to auscultation , no respiratory distress  CV:  irregular, no extra heart sounds  ABDOMEN:  normal bowel sounds, soft, nontender, no hepatosplenomegaly or other masses  M/S:   no gross deformities or joint swelling  SKIN:  no rash  NEURO:   alert, clear speech, NFD on observation  PSYCH:  oriented to self and time, memory impaired , affect and mood normal     Lab/Diagnostic data:     Most Recent 3 CBC's:Recent Labs   Lab Test 01/10/22  0745 07/14/21  0755 06/26/21  1335 05/26/21  0756   WBC 6.7  --  5.2 6.3   HGB 12.9 10.4* 9.9* 9.6*   MCV 91  --  86 90     --  189 298     Most Recent 3 BMP's:Recent Labs   Lab Test 01/10/22  0745 12/01/21  0738 09/22/21  0710    141 139   POTASSIUM 4.6 3.8 3.8   CHLORIDE 104 108 105   CO2 32 27 31   BUN 18 15 21   CR 0.71 0.58 0.80   ANIONGAP 3 6 3   JOSEFINA 9.3 9.1 9.0   GLC 83 90 100*       ASSESSMENT/PLAN    (Z00.00) Annual physical exam  (primary encounter diagnosis)      (I50.812) Chronic right-sided CHF (congestive heart failure) (H)  Plan:   -Compensated, appears euvolemic today. BP and weight stable.  Last echo completed April 2021 EF 65-70% with noted severe pulmonary hypertension. Continue lasix. Followed by cardiology    (I48.11) Longstanding persistent atrial fibrillation (H)  Plan:   -Rate controlled.  Previously was on Coumadin and had acute GI bleed at which point Coumadin was stopped given risks versus benefits. Continue atenolol and diltiazem     (Z95.2) S/P AVR (aortic valve replacement)  Plan:   -Status post bioprosthetic valve 2014.  Not on anticoagulation    (I27.20) Pulmonary hypertension (H)  Plan:   -As noted above, severe pulmonary HTN.  Continue lasix    (F03.90) Dementia without behavioral disturbance, unspecified dementia type (H)  Plan:    -Expect continued decline.  Provide safe environment.  Requires extensive assistance from nursing staff for ADLs. Continue Depakote and Seroquel. Check CMP and CBC 5/16    (Z87.19) History of GI bleed  Plan:   -Underwent upper endoscopy May 2021 with normal esophagus and gastric body.  Noted to have patchy gastritis with no specific lesion.  Suspected diffuse mucosal bleeding from supratherapeutic INR.  Continue pantoprazole    (Z13.6) Hypertension screen  Plan:   -Based on JNC-8 goals,  patients age of 82 year old, no presence of diabetes or CKD, and goals of care goal BP is <150/90 mm Hg. Patient is stable with current plan of care and routine assessment..        Electronically signed by:  Amos Vivas CNP              Sincerely,        Amos Vivas, CNP

## 2022-07-05 NOTE — PROGRESS NOTES
[place] [facility]  Chief Complaint   Patient presents with     long-term Regulatory     [place] [facility] Record Number:  [id number]  Place of Service where encounter took place:  [facility] () [[id number]]    HPI:    Khalida Rouse  is 82 year old (1940), who is being seen today for a federally mandated E/M visit.  HPI information obtained from: facility chart records, facility staff, patient report and [facility] Epic chart review.       Today's concerns are:   - Resident seen and examined, chart reviewed and discussed with the nursing staff.   - CHF: Denies CP ,SOB, orthopnea, PND, bendepnea or legs swelling.   - Afib: denies palpitation  - Dementia: RN reports no behavioral concern.   --------------------------------  - Past Medical, social, family histories, medications, and allergies reviewed and updated  - Medications reviewed: in the chart and EHR.   - Case Management:   I have reviewed the care plan and MDS and do agree with the plan. Patient's desire to return to the community is not present.  Information reviewed:  Medications, vital signs, orders, and nursing notes.    MEDICATIONS:  Current Outpatient Medications   Medication Sig Dispense Refill     acetaminophen (ACETAMINOPHEN 8 HOUR) 650 MG CR tablet Take 650 mg by mouth 3 times daily       atenolol (TENORMIN) 50 MG tablet Take 50 mg by mouth 2 times daily       diltiazem ER (DILT-XR) 180 MG 24 hr capsule Take 180 mg by mouth 2 times daily        divalproex sodium delayed-release (DEPAKOTE SPRINKLE) 125 MG DR capsule Take 125 mg by mouth 2 times daily       furosemide (LASIX) 40 MG tablet Take 40 mg by mouth 2 times daily       pantoprazole (PROTONIX) 20 MG EC tablet Take 20 mg by mouth daily       polyethylene glycol (MIRALAX) 17 g packet Take 1 packet by mouth daily       potassium chloride ER (KLOR-CON M) 10 MEQ CR tablet Take 20 mEq by mouth daily         ROS: Limited secondary to cognitive impairment but today  "pt reports     Vitals:  /88   Pulse 96   Temp 98.5  F (36.9  C)   Resp 17   Ht 1.575 m (5' 2\")   Wt 63.9 kg (140 lb 12.8 oz)   SpO2 92%   BMI 25.75 kg/m    Body mass index is 25.75 kg/m .  Exam:  GENERAL APPEARANCE:  in no distress, cooperative  RESP:  lungs clear to auscultation   CV:  S1S2 audible, irregular HR, no murmur appreciated.   ABDOMEN:  soft, NT/ND, BS audible. no mass appreciated on palpation.   M/S:   no joint deformity noted on observation.   SKIN:  No rash.   NEURO:   No NFD appreciated on observation  PSYCH: AAOx person, city, but not time.  poor insight, judgement and memory. affect and mood normal    Lab/Diagnostic data: Reviewed in the chart and EHR.        ASSESSMENT/PLAN  --------------------------------  CHF, right sided  Severe biatrial enlargement  Right moderate Pleural Effusion  Severe Pulmonary HTN (H)  Hx of TAVR  - EF 65-70% (April 2021)  - on lasix 40 mg bid. Appears compensated.   - Will reduce pm dose to 20 mg. Monitor for CHF si/sx exacerbation  - on KCL 20 meq, last K level 4.1 (May 2022). Goal is to keep it b/w 4-5 given cardiac disease. Will rechecked BMP 3-4 weeks.        Chronic a-fibrillation (H):  Hx of supratherapeutic INR resulted in acute blood loss anemia, UGIB and hematuria (May 2021)  -  off coumadin due to anemia.   - Hb/HCT wnl.   -  CVR, on atenolol and diltiazem. Continue.   -  EGD in the did not reveal ulceration, erosive gastritis or esophagitis.  Was on Protonix 40 mg bid, changed to once daily, then to 20 mg daily.   - Will reduce pantoprazole to 20 mg every other day x 14 days then stop.      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   - Behaviors wise has been stable. Will start slow GDR to stop.   - changed to 25 mg Mo/Wd/Fr and 12.5 mg AOD x  2 weeks, then 12.5 mg daily x 2 weeks, then 12.5 mg every other day x2 weeks then stop.  - after that could GDR " Depakote.   - Continue to anticipate needs. Chronic condition, ongoing decline expected.   -  Continue to provide redirection and reassurance as needed. Maintain safe living situation with goals focused on comfort.     Hx of spinal compression fx: analgesia optimal. Continue current regimen.     Frailty: Significant  Deficits requiring NH placement. Requiring extensive assistance from nursing. Up for meals only o/w spends the day resting in bed      ORDER:  - reduce pantoprazole 20 mg daily to every other day x 14 days.   - reduce lasix 40 mg bid to 40 mg in am and 20 mg at noon  - changed Seroquel 25 mg daily to 25 mg Mo/Wd/Fr and 12.5 mg AOD x 2 weeks, then 12.5 mg daily x 2 weeks, then 12.5 mg every other day x2 weeks then stop  - BMP in 3 weeks      Electronically signed by:  Claudia Molina MD

## 2022-07-05 NOTE — LETTER
7/5/2022        RE: Khalida Rouse  [address] [facility] [address]        [place] [facility]  Chief Complaint   Patient presents with     prison Regulatory     [place] [facility] Record Number:  [id number]  Place of Service where encounter took place:  [facility] () [[id number]]    HPI:    Khalida Rouse  is 82 year old (1940), who is being seen today for a federally mandated E/M visit.  HPI information obtained from: facility chart records, facility staff, patient report and [facility] chart review.       Today's concerns are:   - Resident seen and examined, chart reviewed and discussed with the nursing staff.   - CHF: Denies CP ,SOB, orthopnea, PND, bendepnea or legs swelling.   - Afib: denies palpitation  - Dementia: RN reports no behavioral concern.   --------------------------------  - Past Medical, social, family histories, medications, and allergies reviewed and updated  - Medications reviewed: in the chart and EHR.   - Case Management:   I have reviewed the care plan and MDS and do agree with the plan. Patient's desire to return to the community is not present.  Information reviewed:  Medications, vital signs, orders, and nursing notes.    MEDICATIONS:  Current Outpatient Medications   Medication Sig Dispense Refill     acetaminophen (ACETAMINOPHEN 8 HOUR) 650 MG CR tablet Take 650 mg by mouth 3 times daily       atenolol (TENORMIN) 50 MG tablet Take 50 mg by mouth 2 times daily       diltiazem ER (DILT-XR) 180 MG 24 hr capsule Take 180 mg by mouth 2 times daily        divalproex sodium delayed-release (DEPAKOTE SPRINKLE) 125 MG DR capsule Take 125 mg by mouth 2 times daily       furosemide (LASIX) 40 MG tablet Take 40 mg by mouth 2 times daily       pantoprazole (PROTONIX) 20 MG EC tablet Take 20 mg by mouth daily       polyethylene glycol (MIRALAX) 17 g packet Take 1 packet by mouth daily       potassium chloride ER (KLOR-CON M) 10 MEQ CR tablet Take 20  "mEq by mouth daily         ROS: Limited secondary to cognitive impairment but today pt reports     Vitals:  /88   Pulse 96   Temp 98.5  F (36.9  C)   Resp 17   Ht 1.575 m (5' 2\")   Wt 63.9 kg (140 lb 12.8 oz)   SpO2 92%   BMI 25.75 kg/m    Body mass index is 25.75 kg/m .  Exam:  GENERAL APPEARANCE:  in no distress, cooperative  RESP:  lungs clear to auscultation   CV:  S1S2 audible, irregular HR, no murmur appreciated.   ABDOMEN:  soft, NT/ND, BS audible. no mass appreciated on palpation.   M/S:   no joint deformity noted on observation.   SKIN:  No rash.   NEURO:   No NFD appreciated on observation  PSYCH: AAOx person, city, but not time.  poor insight, judgement and memory. affect and mood normal    Lab/Diagnostic data: Reviewed in the chart and EHR.        ASSESSMENT/PLAN  --------------------------------  CHF, right sided  Severe biatrial enlargement  Right moderate Pleural Effusion  Severe Pulmonary HTN (H)  Hx of TAVR  - EF 65-70% (April 2021)  - on lasix 40 mg bid. Appears compensated.   - Will reduce pm dose to 20 mg. Monitor for CHF si/sx exacerbation  - on KCL 20 meq, last K level 4.1 (May 2022). Goal is to keep it b/w 4-5 given cardiac disease. Will rechecked BMP 3-4 weeks.        Chronic a-fibrillation (H):  Hx of supratherapeutic INR resulted in acute blood loss anemia, UGIB and hematuria (May 2021)  -  off coumadin due to anemia.   - Hb/HCT wnl.   -  CVR, on atenolol and diltiazem. Continue.   -  EGD in the did not reveal ulceration, erosive gastritis or esophagitis.  Was on Protonix 40 mg bid, changed to once daily, then to 20 mg daily.   - Will reduce pantoprazole to 20 mg every other day x 14 days then stop.      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   - Behaviors wise has been stable. Will start slow GDR to stop.   - changed to 25 mg Mo/Wd/Fr and 12.5 mg AOD x  2 weeks, then 12.5 mg daily x 2 " weeks, then 12.5 mg every other day x2 weeks then stop.  - after that could GDR Depakote.   - Continue to anticipate needs. Chronic condition, ongoing decline expected.   -  Continue to provide redirection and reassurance as needed. Maintain safe living situation with goals focused on comfort.     Hx of spinal compression fx: analgesia optimal. Continue current regimen.     Frailty: Significant  Deficits requiring NH placement. Requiring extensive assistance from nursing. Up for meals only o/w spends the day resting in bed      ORDER:  - reduce pantoprazole 20 mg daily to every other day x 14 days.   - reduce lasix 40 mg bid to 40 mg in am and 20 mg at noon  - changed Seroquel 25 mg daily to 25 mg Mo/Wd/Fr and 12.5 mg AOD x 2 weeks, then 12.5 mg daily x 2 weeks, then 12.5 mg every other day x2 weeks then stop  - BMP in 3 weeks      Electronically signed by:  Claudia Molina MD        Sincerely,        Claudia Molina MD

## 2022-09-08 NOTE — LETTER
9/8/2022        RE: Khalida Rouse  [address] [facility] [address]          [facility]    Facility:   [facility] () [[id number]]   Code Status: DNR/DNI      CHIEF COMPLAINT/REASON FOR VISIT:  Chief Complaint   Patient presents with     FVP Care Coordination - Regulatory       HISTORY:      HPI: Khalida is a 82 year old female who does currently reside in long-term care room [facility] and who I was asked to visit with today secondary to a regulatory review of chronic medical conditions.  She does have some chronic medical conditions and overall seems to be doing pretty well at this time.  Her last hospitalization was in May 2021 secondary to acute blood loss anemia from a history of acute GI bleed along with a history of A. fib, pulmonary hypertension, AVR, as well as having a history of cognitive impairment.  Very nice visit today.  She has been normotensive and afebrile and also on room air.  She is on 2 g sodium diet.  For pain she is on scheduled Tylenol 650 mg 3 times daily had a chance to go over her medication record she like to decrease the Tylenol to twice daily and the charge nurse is also aware.  Otherwise she is on Depakote for behaviors.  For the congestive failure she is on furosemide 40 and 20 and for delusions is on Seroquel.  Otherwise had a pleasant visit today as we discussed all of her concerns.  According to nursing notes she did have a visit with family at the end of August.  They are also doing some reassurance of her other chronic medical conditions and redirecting.  Her systolic blood pressures ranging 109-145 her weight 138 pounds in comparison to September 1 141 pounds.    Past Medical History:   Diagnosis Date     Aortic valve stenosis      Atrial flutter (H)      Cardiomyopathy (H)      Cognitive changes - SLUMS 16/30 4/13/2021     Severe aortic stenosis 4/7/2014     Shoulder fracture, left 3/22/15            Family History   Problem Relation Age of  Onset     Dementia Brother      Alzheimer Disease Sister       Social History     Socioeconomic History     Marital status:    Tobacco Use     Smoking status: Never Smoker     Smokeless tobacco: Never Used   Substance and Sexual Activity     Alcohol use: No     Alcohol/week: 0.0 standard drinks     Drug use: No     Sexual activity: Not Currently     Partners: Male   Other Topics Concern     Caffeine Concern No     Special Diet No     Exercise No     Comment: walking        REVIEW OF SYSTEM:  She currently denies any new symptoms of fever cough or cold sore throat postnasal drip shortness of breath dyspnea chest pain dizziness vertigo nausea vomiting diarrhea dysuria frequency urgency    PHYSICAL EXAM:   Pleasant female in no acute distress.  Head is normocephalic.  Conjunctiva is pink and sclera is clear.  Neck is supple without adenopathy.  Lung sounds are clear throughout.  Cardiovascular S1-S2 regular rate and rhythm and no lower extremity edema.  Gastrointestinal soft and nontender with positive bowel sounds.  Musculoskeletal denies pain to her major joints.  Psychiatric: Pleasant affect.    Current Outpatient Medications:      acetaminophen (ACETAMINOPHEN 8 HOUR) 650 MG CR tablet, Take 650 mg by mouth 3 times daily, Disp: , Rfl:      atenolol (TENORMIN) 50 MG tablet, Take 50 mg by mouth 2 times daily, Disp: , Rfl:      diltiazem ER (DILT-XR) 180 MG 24 hr capsule, Take 180 mg by mouth 2 times daily , Disp: , Rfl:      divalproex sodium delayed-release (DEPAKOTE SPRINKLE) 125 MG DR capsule, Take 125 mg by mouth 2 times daily, Disp: , Rfl:      furosemide (LASIX) 40 MG tablet, 40 mg in am and 20 mg at noon, Disp: , Rfl:      pantoprazole (PROTONIX) 20 MG EC tablet, Change to QOD x 14 days then stop, Disp: , Rfl:      polyethylene glycol (MIRALAX) 17 g packet, Take 1 packet by mouth daily, Disp: , Rfl:      potassium chloride ER (KLOR-CON M) 10 MEQ CR tablet, Take 20 mEq by mouth daily, Disp: , Rfl:       QUEtiapine (SEROQUEL) 25 MG tablet, Take 1 tablet (25 mg) by mouth daily, Disp: , Rfl:     /78   Pulse 67   Temp 98.9  F (37.2  C)   Resp 18   Wt 62.9 kg (138 lb 9.6 oz)   SpO2 92%   BMI 25.35 kg/m        LABS:   Last Comprehensive Metabolic Panel:  Sodium   Date Value Ref Range Status   05/16/2022 139 133 - 144 mmol/L Final   07/05/2021 143 133 - 144 mmol/L Final     Potassium   Date Value Ref Range Status   05/16/2022 4.1 3.4 - 5.3 mmol/L Final   07/05/2021 3.8 3.4 - 5.3 mmol/L Final     Chloride   Date Value Ref Range Status   05/16/2022 105 94 - 109 mmol/L Final   07/05/2021 109 94 - 109 mmol/L Final     Carbon Dioxide   Date Value Ref Range Status   07/05/2021 31 20 - 32 mmol/L Final     Carbon Dioxide (CO2)   Date Value Ref Range Status   05/16/2022 30 20 - 32 mmol/L Final     Anion Gap   Date Value Ref Range Status   05/16/2022 4 3 - 14 mmol/L Final   07/05/2021 3 3 - 14 mmol/L Final     Glucose   Date Value Ref Range Status   05/16/2022 92 70 - 99 mg/dL Final   07/05/2021 82 70 - 99 mg/dL Final     Urea Nitrogen   Date Value Ref Range Status   05/16/2022 26 7 - 30 mg/dL Final   07/05/2021 18 7 - 30 mg/dL Final     Creatinine   Date Value Ref Range Status   05/16/2022 0.71 0.52 - 1.04 mg/dL Final   07/05/2021 0.72 0.52 - 1.04 mg/dL Final     GFR Estimate   Date Value Ref Range Status   05/16/2022 84 >60 mL/min/1.73m2 Final     Comment:     Effective December 21, 2021 eGFRcr in adults is calculated using the 2021 CKD-EPI creatinine equation which includes age and gender (Sudhakar vieyra al., NEJ, DOI: 10.1056/HOHIlp4014394)   07/05/2021 77 >60 mL/min/[1.73_m2] Final     Comment:     Non  GFR Calc  Starting 12/18/2018, serum creatinine based estimated GFR (eGFR) will be   calculated using the Chronic Kidney Disease Epidemiology Collaboration   (CKD-EPI) equation.       Calcium   Date Value Ref Range Status   05/16/2022 9.0 8.5 - 10.1 mg/dL Final   07/05/2021 9.2 8.5 - 10.1 mg/dL Final      CBC RESULTS: Recent Labs   Lab Test 05/16/22  0744   WBC 4.4   RBC 4.58   HGB 13.5   HCT 41.5   MCV 91   MCH 29.5   MCHC 32.5   RDW 13.3        Last Comprehensive Metabolic Panel:  Sodium   Date Value Ref Range Status   05/16/2022 139 133 - 144 mmol/L Final   07/05/2021 143 133 - 144 mmol/L Final     Potassium   Date Value Ref Range Status   05/16/2022 4.1 3.4 - 5.3 mmol/L Final   07/05/2021 3.8 3.4 - 5.3 mmol/L Final     Chloride   Date Value Ref Range Status   05/16/2022 105 94 - 109 mmol/L Final   07/05/2021 109 94 - 109 mmol/L Final     Carbon Dioxide   Date Value Ref Range Status   07/05/2021 31 20 - 32 mmol/L Final     Carbon Dioxide (CO2)   Date Value Ref Range Status   05/16/2022 30 20 - 32 mmol/L Final     Anion Gap   Date Value Ref Range Status   05/16/2022 4 3 - 14 mmol/L Final   07/05/2021 3 3 - 14 mmol/L Final     Glucose   Date Value Ref Range Status   05/16/2022 92 70 - 99 mg/dL Final   07/05/2021 82 70 - 99 mg/dL Final     Urea Nitrogen   Date Value Ref Range Status   05/16/2022 26 7 - 30 mg/dL Final   07/05/2021 18 7 - 30 mg/dL Final     Creatinine   Date Value Ref Range Status   05/16/2022 0.71 0.52 - 1.04 mg/dL Final   07/05/2021 0.72 0.52 - 1.04 mg/dL Final     GFR Estimate   Date Value Ref Range Status   05/16/2022 84 >60 mL/min/1.73m2 Final     Comment:     Effective December 21, 2021 eGFRcr in adults is calculated using the 2021 CKD-EPI creatinine equation which includes age and gender (Sudhakar vieyra al., NEJ, DOI: 10.1056/QKNKwx0517316)   07/05/2021 77 >60 mL/min/[1.73_m2] Final     Comment:     Non  GFR Calc  Starting 12/18/2018, serum creatinine based estimated GFR (eGFR) will be   calculated using the Chronic Kidney Disease Epidemiology Collaboration   (CKD-EPI) equation.       Calcium   Date Value Ref Range Status   05/16/2022 9.0 8.5 - 10.1 mg/dL Final   07/05/2021 9.2 8.5 - 10.1 mg/dL Final     Bilirubin Total   Date Value Ref Range Status   05/16/2022 0.6 0.2 -  1.3 mg/dL Final   06/26/2021 0.6 0.2 - 1.3 mg/dL Final     Alkaline Phosphatase   Date Value Ref Range Status   05/16/2022 80 40 - 150 U/L Final   06/26/2021 80 40 - 150 U/L Final     ALT   Date Value Ref Range Status   05/16/2022 19 0 - 50 U/L Final   06/26/2021 11 0 - 50 U/L Final     AST   Date Value Ref Range Status   05/16/2022 24 0 - 45 U/L Final   06/26/2021 12 0 - 45 U/L Final                 ASSESSMENT:    (F03.90) Dementia without behavioral disturbance, unspecified dementia type (H)  (primary encounter diagnosis)  Comment: Doing well  Plan: Staff are doing a good job redirecting her    (I27.20) Pulmonary hypertension (H)  Comment: Currently asymptomatic  Plan: Is on furosemide    (Z95.2) S/P AVR (aortic valve replacement)  Comment: No current issues  Plan: Continue to manage and follow    (I50.812) Chronic right-sided CHF (congestive heart failure) (H)  Comment: Currently asymptomatic  Plan: Continue to manage and follow      Case Management:  I have reviewed the facility/SNF care plan/MDS which was done Today, including the falls risk, nutrition and pain screening. I also reviewed the current immunizations, and preventive care.. Future cancer screening is not clinically indicated secondary to age/goals of care.   Patient's desire to return to the community is not assessible due to cognitive impairment.    Information reviewed:  Medications, vital signs, orders, and nursing notes.  PLAN:    She did have a number of laboratory studies done in May.  We will change her Tylenol to twice daily rather than 3 times daily.  Otherwise currently in a wheelchair.  She currently seems to be doing well.  Staff are doing a good job with her and she looks very well cared for.  Staff and patient did not have any other questions.    Electronically signed by: Ganga Castanon NP            Sincerely,        Ganga Castanon NP

## 2022-09-08 NOTE — PROGRESS NOTES
[facility] GERIATRIC SERVICES    Facility:   [facility] () [[id number]]   Code Status: DNR/DNI      CHIEF COMPLAINT/REASON FOR VISIT:  Chief Complaint   Patient presents with     FVP Care Coordination - Regulatory       HISTORY:      HPI: Khalida is a 82 year old female who does currently reside in long-term care room 212 and who I was asked to visit with today secondary to a regulatory review of chronic medical conditions.  She does have some chronic medical conditions and overall seems to be doing pretty well at this time.  Her last hospitalization was in May 2021 secondary to acute blood loss anemia from a history of acute GI bleed along with a history of A. fib, pulmonary hypertension, AVR, as well as having a history of cognitive impairment.  Very nice visit today.  She has been normotensive and afebrile and also on room air.  She is on 2 g sodium diet.  For pain she is on scheduled Tylenol 650 mg 3 times daily had a chance to go over her medication record she like to decrease the Tylenol to twice daily and the charge nurse is also aware.  Otherwise she is on Depakote for behaviors.  For the congestive failure she is on furosemide 40 and 20 and for delusions is on Seroquel.  Otherwise had a pleasant visit today as we discussed all of her concerns.  According to nursing notes she did have a visit with family at the end of August.  They are also doing some reassurance of her other chronic medical conditions and redirecting.  Her systolic blood pressures ranging 109-145 her weight 138 pounds in comparison to September 1 141 pounds.    Past Medical History:   Diagnosis Date     Aortic valve stenosis      Atrial flutter (H)      Cardiomyopathy (H)      Cognitive changes - SLUMS 16/30 4/13/2021     Severe aortic stenosis 4/7/2014     Shoulder fracture, left 3/22/15            Family History   Problem Relation Age of Onset     Dementia Brother      Alzheimer Disease Sister       Social History      Socioeconomic History     Marital status:    Tobacco Use     Smoking status: Never Smoker     Smokeless tobacco: Never Used   Substance and Sexual Activity     Alcohol use: No     Alcohol/week: 0.0 standard drinks     Drug use: No     Sexual activity: Not Currently     Partners: Male   Other Topics Concern     Caffeine Concern No     Special Diet No     Exercise No     Comment: walking        REVIEW OF SYSTEM:  She currently denies any new symptoms of fever cough or cold sore throat postnasal drip shortness of breath dyspnea chest pain dizziness vertigo nausea vomiting diarrhea dysuria frequency urgency    PHYSICAL EXAM:   Pleasant female in no acute distress.  Head is normocephalic.  Conjunctiva is pink and sclera is clear.  Neck is supple without adenopathy.  Lung sounds are clear throughout.  Cardiovascular S1-S2 regular rate and rhythm and no lower extremity edema.  Gastrointestinal soft and nontender with positive bowel sounds.  Musculoskeletal denies pain to her major joints.  Psychiatric: Pleasant affect.    Current Outpatient Medications:      acetaminophen (ACETAMINOPHEN 8 HOUR) 650 MG CR tablet, Take 650 mg by mouth 3 times daily, Disp: , Rfl:      atenolol (TENORMIN) 50 MG tablet, Take 50 mg by mouth 2 times daily, Disp: , Rfl:      diltiazem ER (DILT-XR) 180 MG 24 hr capsule, Take 180 mg by mouth 2 times daily , Disp: , Rfl:      divalproex sodium delayed-release (DEPAKOTE SPRINKLE) 125 MG DR capsule, Take 125 mg by mouth 2 times daily, Disp: , Rfl:      furosemide (LASIX) 40 MG tablet, 40 mg in am and 20 mg at noon, Disp: , Rfl:      pantoprazole (PROTONIX) 20 MG EC tablet, Change to QOD x 14 days then stop, Disp: , Rfl:      polyethylene glycol (MIRALAX) 17 g packet, Take 1 packet by mouth daily, Disp: , Rfl:      potassium chloride ER (KLOR-CON M) 10 MEQ CR tablet, Take 20 mEq by mouth daily, Disp: , Rfl:      QUEtiapine (SEROQUEL) 25 MG tablet, Take 1 tablet (25 mg) by mouth daily, Disp: ,  Rfl:     /78   Pulse 67   Temp 98.9  F (37.2  C)   Resp 18   Wt 62.9 kg (138 lb 9.6 oz)   SpO2 92%   BMI 25.35 kg/m        LABS:   Last Comprehensive Metabolic Panel:  Sodium   Date Value Ref Range Status   05/16/2022 139 133 - 144 mmol/L Final   07/05/2021 143 133 - 144 mmol/L Final     Potassium   Date Value Ref Range Status   05/16/2022 4.1 3.4 - 5.3 mmol/L Final   07/05/2021 3.8 3.4 - 5.3 mmol/L Final     Chloride   Date Value Ref Range Status   05/16/2022 105 94 - 109 mmol/L Final   07/05/2021 109 94 - 109 mmol/L Final     Carbon Dioxide   Date Value Ref Range Status   07/05/2021 31 20 - 32 mmol/L Final     Carbon Dioxide (CO2)   Date Value Ref Range Status   05/16/2022 30 20 - 32 mmol/L Final     Anion Gap   Date Value Ref Range Status   05/16/2022 4 3 - 14 mmol/L Final   07/05/2021 3 3 - 14 mmol/L Final     Glucose   Date Value Ref Range Status   05/16/2022 92 70 - 99 mg/dL Final   07/05/2021 82 70 - 99 mg/dL Final     Urea Nitrogen   Date Value Ref Range Status   05/16/2022 26 7 - 30 mg/dL Final   07/05/2021 18 7 - 30 mg/dL Final     Creatinine   Date Value Ref Range Status   05/16/2022 0.71 0.52 - 1.04 mg/dL Final   07/05/2021 0.72 0.52 - 1.04 mg/dL Final     GFR Estimate   Date Value Ref Range Status   05/16/2022 84 >60 mL/min/1.73m2 Final     Comment:     Effective December 21, 2021 eGFRcr in adults is calculated using the 2021 CKD-EPI creatinine equation which includes age and gender (Sudhakar vieyra al., NEJM, DOI: 10.1056/AZPCfl7886836)   07/05/2021 77 >60 mL/min/[1.73_m2] Final     Comment:     Non  GFR Calc  Starting 12/18/2018, serum creatinine based estimated GFR (eGFR) will be   calculated using the Chronic Kidney Disease Epidemiology Collaboration   (CKD-EPI) equation.       Calcium   Date Value Ref Range Status   05/16/2022 9.0 8.5 - 10.1 mg/dL Final   07/05/2021 9.2 8.5 - 10.1 mg/dL Final     CBC RESULTS: Recent Labs   Lab Test 05/16/22  0744   WBC 4.4   RBC 4.58   HGB 13.5    HCT 41.5   MCV 91   MCH 29.5   MCHC 32.5   RDW 13.3        Last Comprehensive Metabolic Panel:  Sodium   Date Value Ref Range Status   05/16/2022 139 133 - 144 mmol/L Final   07/05/2021 143 133 - 144 mmol/L Final     Potassium   Date Value Ref Range Status   05/16/2022 4.1 3.4 - 5.3 mmol/L Final   07/05/2021 3.8 3.4 - 5.3 mmol/L Final     Chloride   Date Value Ref Range Status   05/16/2022 105 94 - 109 mmol/L Final   07/05/2021 109 94 - 109 mmol/L Final     Carbon Dioxide   Date Value Ref Range Status   07/05/2021 31 20 - 32 mmol/L Final     Carbon Dioxide (CO2)   Date Value Ref Range Status   05/16/2022 30 20 - 32 mmol/L Final     Anion Gap   Date Value Ref Range Status   05/16/2022 4 3 - 14 mmol/L Final   07/05/2021 3 3 - 14 mmol/L Final     Glucose   Date Value Ref Range Status   05/16/2022 92 70 - 99 mg/dL Final   07/05/2021 82 70 - 99 mg/dL Final     Urea Nitrogen   Date Value Ref Range Status   05/16/2022 26 7 - 30 mg/dL Final   07/05/2021 18 7 - 30 mg/dL Final     Creatinine   Date Value Ref Range Status   05/16/2022 0.71 0.52 - 1.04 mg/dL Final   07/05/2021 0.72 0.52 - 1.04 mg/dL Final     GFR Estimate   Date Value Ref Range Status   05/16/2022 84 >60 mL/min/1.73m2 Final     Comment:     Effective December 21, 2021 eGFRcr in adults is calculated using the 2021 CKD-EPI creatinine equation which includes age and gender (Sudhakar vieyra al., NEJM, DOI: 10.1056/JXCMzr3661649)   07/05/2021 77 >60 mL/min/[1.73_m2] Final     Comment:     Non  GFR Calc  Starting 12/18/2018, serum creatinine based estimated GFR (eGFR) will be   calculated using the Chronic Kidney Disease Epidemiology Collaboration   (CKD-EPI) equation.       Calcium   Date Value Ref Range Status   05/16/2022 9.0 8.5 - 10.1 mg/dL Final   07/05/2021 9.2 8.5 - 10.1 mg/dL Final     Bilirubin Total   Date Value Ref Range Status   05/16/2022 0.6 0.2 - 1.3 mg/dL Final   06/26/2021 0.6 0.2 - 1.3 mg/dL Final     Alkaline Phosphatase   Date  Value Ref Range Status   05/16/2022 80 40 - 150 U/L Final   06/26/2021 80 40 - 150 U/L Final     ALT   Date Value Ref Range Status   05/16/2022 19 0 - 50 U/L Final   06/26/2021 11 0 - 50 U/L Final     AST   Date Value Ref Range Status   05/16/2022 24 0 - 45 U/L Final   06/26/2021 12 0 - 45 U/L Final                 ASSESSMENT:    (F03.90) Dementia without behavioral disturbance, unspecified dementia type (H)  (primary encounter diagnosis)  Comment: Doing well  Plan: Staff are doing a good job redirecting her    (I27.20) Pulmonary hypertension (H)  Comment: Currently asymptomatic  Plan: Is on furosemide    (Z95.2) S/P AVR (aortic valve replacement)  Comment: No current issues  Plan: Continue to manage and follow    (I50.812) Chronic right-sided CHF (congestive heart failure) (H)  Comment: Currently asymptomatic  Plan: Continue to manage and follow      Case Management:  I have reviewed the facility/SNF care plan/MDS which was done Today, including the falls risk, nutrition and pain screening. I also reviewed the current immunizations, and preventive care.. Future cancer screening is not clinically indicated secondary to age/goals of care.   Patient's desire to return to the community is not assessible due to cognitive impairment.    Information reviewed:  Medications, vital signs, orders, and nursing notes.  PLAN:    She did have a number of laboratory studies done in May.  We will change her Tylenol to twice daily rather than 3 times daily.  Otherwise currently in a wheelchair.  She currently seems to be doing well.  Staff are doing a good job with her and she looks very well cared for.  Staff and patient did not have any other questions.    Electronically signed by: Ganga Castanon NP

## 2022-09-19 NOTE — PROGRESS NOTES
[facility] GERIATRIC SERVICES    Facility:   [facility] AND REHAB [facility] () [[id number]]   Code Status: DNR/DNI      CHIEF COMPLAINT/REASON FOR VISIT:  Chief Complaint   Patient presents with     FVP Care Coordination - Regulatory       HISTORY:      HPI: Khalida is a 82 year old female who does currently reside in room 212 and who I was asked to follow-up with once again secondary to review her chronic medical conditions.  Staff feel that she is doing well but they wanted just to make sure that she is on the right medications and that looking over her blood pressures as well as her nutrition.  Her systolic blood pressures ranging 120-130 and her weight 142 pounds in comparison to September 8 140 pounds.  Not sure if she is gained 2 pounds since last week.  She states her appetite to be good.  She can have extra nutrient supplements.  She is on MiraLAX for her bowels.  No CHF problems and her furosemide's at 40 and 20 she is also on atenolol and diltiazem.  For mood she is on Depakote 125 mg twice daily and for chronic pain is on scheduled Tylenol twice daily.  For moods as well she is on Seroquel 12.5 mg and staff wanted to make sure that she seems to be doing fine on the Seroquel since the initial start date was last week.  Overall she seems to have improved with the Seroquel.    Past Medical History:   Diagnosis Date     Aortic valve stenosis      Atrial flutter (H)      Cardiomyopathy (H)      Cognitive changes - SLUMS 16/30 4/13/2021     Severe aortic stenosis 4/7/2014     Shoulder fracture, left 3/22/15            Family History   Problem Relation Age of Onset     Dementia Brother      Alzheimer Disease Sister       Social History     Socioeconomic History     Marital status:    Tobacco Use     Smoking status: Never Smoker     Smokeless tobacco: Never Used   Substance and Sexual Activity     Alcohol use: No     Alcohol/week: 0.0 standard drinks     Drug use: No     Sexual activity: Not Currently      "Partners: Male   Other Topics Concern     Caffeine Concern No     Special Diet No     Exercise No     Comment: walking      No new changes to review of systems or physical exam since our last visit on September 8.  Her moods have been stable.  REVIEW OF SYSTEM:  She currently denies any new symptoms of fever cough or cold sore throat postnasal drip shortness of breath dyspnea chest pain dizziness vertigo nausea vomiting diarrhea dysuria frequency urgency    PHYSICAL EXAM:   Pleasant female in no acute distress.  Head is normocephalic.  Conjunctiva is pink and sclera is clear.  Neck is supple without adenopathy.  Lung sounds are clear throughout.  Cardiovascular S1-S2 regular rate and rhythm and no lower extremity edema.  Gastrointestinal soft and nontender with positive bowel sounds.  Musculoskeletal denies pain to her major joints.  Psychiatric: Pleasant affect.       Current Outpatient Medications:      acetaminophen (ACETAMINOPHEN 8 HOUR) 650 MG CR tablet, Take 650 mg by mouth 3 times daily, Disp: , Rfl:      atenolol (TENORMIN) 50 MG tablet, Take 50 mg by mouth 2 times daily, Disp: , Rfl:      diltiazem ER (DILT-XR) 180 MG 24 hr capsule, Take 180 mg by mouth 2 times daily , Disp: , Rfl:      divalproex sodium delayed-release (DEPAKOTE SPRINKLE) 125 MG DR capsule, Take 125 mg by mouth 2 times daily, Disp: , Rfl:      furosemide (LASIX) 40 MG tablet, 40 mg in am and 20 mg at noon, Disp: , Rfl:      pantoprazole (PROTONIX) 20 MG EC tablet, Change to QOD x 14 days then stop, Disp: , Rfl:      polyethylene glycol (MIRALAX) 17 g packet, Take 1 packet by mouth daily, Disp: , Rfl:      potassium chloride ER (KLOR-CON M) 10 MEQ CR tablet, Take 20 mEq by mouth daily, Disp: , Rfl:      QUEtiapine (SEROQUEL) 25 MG tablet, Take 1 tablet (25 mg) by mouth daily, Disp: , Rfl:        /62   Pulse 62   Temp 98.2  F (36.8  C)   Resp 17   Ht 1.575 m (5' 2\")   Wt 64.8 kg (142 lb 12.8 oz)   SpO2 92%   BMI 26.12 kg/m  "     LABS:   TSH   Date Value Ref Range Status   04/24/2020 1.46 0.40 - 4.00 mU/L Final     T4 Free   Date Value Ref Range Status   03/13/2009 1.36 0.70 - 1.85 ng/dL Final     Last Comprehensive Metabolic Panel:  Sodium   Date Value Ref Range Status   09/09/2022 139 133 - 144 mmol/L Final   07/05/2021 143 133 - 144 mmol/L Final     Potassium   Date Value Ref Range Status   09/09/2022 4.7 3.4 - 5.3 mmol/L Final   07/05/2021 3.8 3.4 - 5.3 mmol/L Final     Chloride   Date Value Ref Range Status   09/09/2022 106 94 - 109 mmol/L Final   07/05/2021 109 94 - 109 mmol/L Final     Carbon Dioxide   Date Value Ref Range Status   07/05/2021 31 20 - 32 mmol/L Final     Carbon Dioxide (CO2)   Date Value Ref Range Status   09/09/2022 32 20 - 32 mmol/L Final     Anion Gap   Date Value Ref Range Status   09/09/2022 1 (L) 3 - 14 mmol/L Final   07/05/2021 3 3 - 14 mmol/L Final     Glucose   Date Value Ref Range Status   09/09/2022 84 70 - 99 mg/dL Final   07/05/2021 82 70 - 99 mg/dL Final     Urea Nitrogen   Date Value Ref Range Status   09/09/2022 19 7 - 30 mg/dL Final   07/05/2021 18 7 - 30 mg/dL Final     Creatinine   Date Value Ref Range Status   09/09/2022 0.68 0.52 - 1.04 mg/dL Final   07/05/2021 0.72 0.52 - 1.04 mg/dL Final     GFR Estimate   Date Value Ref Range Status   09/09/2022 86 >60 mL/min/1.73m2 Final     Comment:     Effective December 21, 2021 eGFRcr in adults is calculated using the 2021 CKD-EPI creatinine equation which includes age and gender (Sudhakar vieyra al., NEJM, DOI: 10.1056/KMBTre8404780)   07/05/2021 77 >60 mL/min/[1.73_m2] Final     Comment:     Non  GFR Calc  Starting 12/18/2018, serum creatinine based estimated GFR (eGFR) will be   calculated using the Chronic Kidney Disease Epidemiology Collaboration   (CKD-EPI) equation.       Calcium   Date Value Ref Range Status   09/09/2022 9.4 8.5 - 10.1 mg/dL Final   07/05/2021 9.2 8.5 - 10.1 mg/dL Final       CBC RESULTS: Recent Labs   Lab Test  05/16/22  0744   WBC 4.4   RBC 4.58   HGB 13.5   HCT 41.5   MCV 91   MCH 29.5   MCHC 32.5   RDW 13.3              ASSESSMENT:    Encounter Diagnoses   Name Primary?     Chronic right-sided CHF (congestive heart failure) (H) Yes     S/P AVR (aortic valve replacement)      Pulmonary hypertension (H)      Dementia without behavioral disturbance, unspecified dementia type (H)        PLAN:    She recently did have a BMP and see results above.  She is due for a Depakote level so we will obtain that.  Otherwise she does not appear to be in any pain.  Her moods today are stable.  Is on Seroquel and Depakote.  We certainly could increase the Seroquel a bit more if we had to otherwise the patient did not have any questions or concerns and staff felt satisfied that we had a chance to talk with her.        Electronically signed by: Ganga Castanon NP

## 2022-09-19 NOTE — LETTER
9/19/2022        RE: Khalida Rouse  [address] [facility] [address]        [facility] GERIATRIC SERVICES    Facility:   [facility] () [[id number]]   Code Status: DNR/DNI      CHIEF COMPLAINT/REASON FOR VISIT:  Chief Complaint   Patient presents with     FVP Care Coordination - Regulatory       HISTORY:      HPI: Khalida is a 82 year old female who does currently reside in room 212 and who I was asked to follow-up with once again secondary to review her chronic medical conditions.  Staff feel that she is doing well but they wanted just to make sure that she is on the right medications and that looking over her blood pressures as well as her nutrition.  Her systolic blood pressures ranging 120-130 and her weight 142 pounds in comparison to September 8 140 pounds.  Not sure if she is gained 2 pounds since last week.  She states her appetite to be good.  She can have extra nutrient supplements.  She is on MiraLAX for her bowels.  No CHF problems and her furosemide's at 40 and 20 she is also on atenolol and diltiazem.  For mood she is on Depakote 125 mg twice daily and for chronic pain is on scheduled Tylenol twice daily.  For moods as well she is on Seroquel 12.5 mg and staff wanted to make sure that she seems to be doing fine on the Seroquel since the initial start date was last week.  Overall she seems to have improved with the Seroquel.    Past Medical History:   Diagnosis Date     Aortic valve stenosis      Atrial flutter (H)      Cardiomyopathy (H)      Cognitive changes - SLUMS 16/30 4/13/2021     Severe aortic stenosis 4/7/2014     Shoulder fracture, left 3/22/15            Family History   Problem Relation Age of Onset     Dementia Brother      Alzheimer Disease Sister       Social History     Socioeconomic History     Marital status:    Tobacco Use     Smoking status: Never Smoker     Smokeless tobacco: Never Used   Substance and Sexual Activity     Alcohol use: No      Alcohol/week: 0.0 standard drinks     Drug use: No     Sexual activity: Not Currently     Partners: Male   Other Topics Concern     Caffeine Concern No     Special Diet No     Exercise No     Comment: walking      No new changes to review of systems or physical exam since our last visit on September 8.  Her moods have been stable.  REVIEW OF SYSTEM:  She currently denies any new symptoms of fever cough or cold sore throat postnasal drip shortness of breath dyspnea chest pain dizziness vertigo nausea vomiting diarrhea dysuria frequency urgency    PHYSICAL EXAM:   Pleasant female in no acute distress.  Head is normocephalic.  Conjunctiva is pink and sclera is clear.  Neck is supple without adenopathy.  Lung sounds are clear throughout.  Cardiovascular S1-S2 regular rate and rhythm and no lower extremity edema.  Gastrointestinal soft and nontender with positive bowel sounds.  Musculoskeletal denies pain to her major joints.  Psychiatric: Pleasant affect.       Current Outpatient Medications:      acetaminophen (ACETAMINOPHEN 8 HOUR) 650 MG CR tablet, Take 650 mg by mouth 3 times daily, Disp: , Rfl:      atenolol (TENORMIN) 50 MG tablet, Take 50 mg by mouth 2 times daily, Disp: , Rfl:      diltiazem ER (DILT-XR) 180 MG 24 hr capsule, Take 180 mg by mouth 2 times daily , Disp: , Rfl:      divalproex sodium delayed-release (DEPAKOTE SPRINKLE) 125 MG DR capsule, Take 125 mg by mouth 2 times daily, Disp: , Rfl:      furosemide (LASIX) 40 MG tablet, 40 mg in am and 20 mg at noon, Disp: , Rfl:      pantoprazole (PROTONIX) 20 MG EC tablet, Change to QOD x 14 days then stop, Disp: , Rfl:      polyethylene glycol (MIRALAX) 17 g packet, Take 1 packet by mouth daily, Disp: , Rfl:      potassium chloride ER (KLOR-CON M) 10 MEQ CR tablet, Take 20 mEq by mouth daily, Disp: , Rfl:      QUEtiapine (SEROQUEL) 25 MG tablet, Take 1 tablet (25 mg) by mouth daily, Disp: , Rfl:        /62   Pulse 62   Temp 98.2  F (36.8  C)   Resp  "17   Ht 1.575 m (5' 2\")   Wt 64.8 kg (142 lb 12.8 oz)   SpO2 92%   BMI 26.12 kg/m      LABS:   TSH   Date Value Ref Range Status   04/24/2020 1.46 0.40 - 4.00 mU/L Final     T4 Free   Date Value Ref Range Status   03/13/2009 1.36 0.70 - 1.85 ng/dL Final     Last Comprehensive Metabolic Panel:  Sodium   Date Value Ref Range Status   09/09/2022 139 133 - 144 mmol/L Final   07/05/2021 143 133 - 144 mmol/L Final     Potassium   Date Value Ref Range Status   09/09/2022 4.7 3.4 - 5.3 mmol/L Final   07/05/2021 3.8 3.4 - 5.3 mmol/L Final     Chloride   Date Value Ref Range Status   09/09/2022 106 94 - 109 mmol/L Final   07/05/2021 109 94 - 109 mmol/L Final     Carbon Dioxide   Date Value Ref Range Status   07/05/2021 31 20 - 32 mmol/L Final     Carbon Dioxide (CO2)   Date Value Ref Range Status   09/09/2022 32 20 - 32 mmol/L Final     Anion Gap   Date Value Ref Range Status   09/09/2022 1 (L) 3 - 14 mmol/L Final   07/05/2021 3 3 - 14 mmol/L Final     Glucose   Date Value Ref Range Status   09/09/2022 84 70 - 99 mg/dL Final   07/05/2021 82 70 - 99 mg/dL Final     Urea Nitrogen   Date Value Ref Range Status   09/09/2022 19 7 - 30 mg/dL Final   07/05/2021 18 7 - 30 mg/dL Final     Creatinine   Date Value Ref Range Status   09/09/2022 0.68 0.52 - 1.04 mg/dL Final   07/05/2021 0.72 0.52 - 1.04 mg/dL Final     GFR Estimate   Date Value Ref Range Status   09/09/2022 86 >60 mL/min/1.73m2 Final     Comment:     Effective December 21, 2021 eGFRcr in adults is calculated using the 2021 CKD-EPI creatinine equation which includes age and gender (Sudhakar vieyra al., NEJM, DOI: 10.1056/SXCKog1752315)   07/05/2021 77 >60 mL/min/[1.73_m2] Final     Comment:     Non  GFR Calc  Starting 12/18/2018, serum creatinine based estimated GFR (eGFR) will be   calculated using the Chronic Kidney Disease Epidemiology Collaboration   (CKD-EPI) equation.       Calcium   Date Value Ref Range Status   09/09/2022 9.4 8.5 - 10.1 mg/dL Final "   07/05/2021 9.2 8.5 - 10.1 mg/dL Final       CBC RESULTS: Recent Labs   Lab Test 05/16/22  0744   WBC 4.4   RBC 4.58   HGB 13.5   HCT 41.5   MCV 91   MCH 29.5   MCHC 32.5   RDW 13.3              ASSESSMENT:    Encounter Diagnoses   Name Primary?     Chronic right-sided CHF (congestive heart failure) (H) Yes     S/P AVR (aortic valve replacement)      Pulmonary hypertension (H)      Dementia without behavioral disturbance, unspecified dementia type (H)        PLAN:    She recently did have a BMP and see results above.  She is due for a Depakote level so we will obtain that.  Otherwise she does not appear to be in any pain.  Her moods today are stable.  Is on Seroquel and Depakote.  We certainly could increase the Seroquel a bit more if we had to otherwise the patient did not have any questions or concerns and staff felt satisfied that we had a chance to talk with her.        Electronically signed by: Ganga Castanon NP          Sincerely,        Ganga Castanon NP

## 2022-10-03 NOTE — PROGRESS NOTES
[facility] GERIATRIC SERVICES    Facility:   [facility] AND REH[facility] () [[id number]]   Code Status: DNR/DNI      CHIEF COMPLAINT/REASON FOR VISIT:  Chief Complaint   Patient presents with     RECHECK       HISTORY:      HPI: Khalida is a 82 year old female who currently resides in the dementia unit room 212 and who I was asked to visit with today secondary to various behaviors.  According to nursing notes she does have hallucinations and paranoia also does refuse various walks.  Her systolic blood pressures ranging 105-139 her weight 141 pounds.  Had a chance to go over her medications.  She is on Depakote 125 mg twice daily now increase that to 250 mg twice daily.  For CHF is on furosemide 40 and 20 we will now decrease that to 20 mg twice daily and she is on potassium 20 mEq daily her last BMP was the beginning of September and see results below.  Her appetite is good she is on 2 g sodium diet.  Denies any discomfort.  For the pain she is on scheduled Tylenol twice daily.    Past Medical History:   Diagnosis Date     Aortic valve stenosis      Atrial flutter (H)      Cardiomyopathy (H)      Cognitive changes - SLUMS 16/30 4/13/2021     Severe aortic stenosis 4/7/2014     Shoulder fracture, left 3/22/15            Family History   Problem Relation Age of Onset     Dementia Brother      Alzheimer Disease Sister       Social History     Socioeconomic History     Marital status:    Tobacco Use     Smoking status: Never Smoker     Smokeless tobacco: Never Used   Substance and Sexual Activity     Alcohol use: No     Alcohol/week: 0.0 standard drinks     Drug use: No     Sexual activity: Not Currently     Partners: Male   Other Topics Concern     Caffeine Concern No     Special Diet No     Exercise No     Comment: walking      No new changes to the review of systems or physical exam since our last visit on September 19  REVIEW OF SYSTEM:  She currently denies any new symptoms of fever cough or cold sore throat  postnasal drip shortness of breath dyspnea chest pain dizziness vertigo nausea vomiting diarrhea dysuria frequency urgency    PHYSICAL EXAM:   Pleasant female in no acute distress.  Head is normocephalic.  Neck is supple without adenopathy.  Lung sounds are clear throughout.  Cardiovascular S1-S2 regular rate and rhythm and no lower extremity edema.  Gastrointestinal soft and nontender with positive bowel sounds.  Musculoskeletal denies pain to her major joints.  Psychiatric: Pleasant affect.       Current Outpatient Medications:      acetaminophen (ACETAMINOPHEN 8 HOUR) 650 MG CR tablet, Take 650 mg by mouth 3 times daily, Disp: , Rfl:      atenolol (TENORMIN) 50 MG tablet, Take 50 mg by mouth 2 times daily, Disp: , Rfl:      diltiazem ER (DILT-XR) 180 MG 24 hr capsule, Take 180 mg by mouth 2 times daily , Disp: , Rfl:      divalproex sodium delayed-release (DEPAKOTE SPRINKLE) 125 MG DR capsule, Take 250 mg by mouth 2 times daily, Disp: , Rfl:      furosemide (LASIX) 40 MG tablet, 40 mg in am and 20 mg at noon (Patient taking differently: Take 20 mg by mouth 2 times daily), Disp: , Rfl:      polyethylene glycol (MIRALAX) 17 g packet, Take 1 packet by mouth daily, Disp: , Rfl:      potassium chloride ER (KLOR-CON M) 10 MEQ CR tablet, Take 20 mEq by mouth daily, Disp: , Rfl:      QUEtiapine (SEROQUEL) 25 MG tablet, Take 1 tablet (25 mg) by mouth daily, Disp: , Rfl:     There were no vitals taken for this visit.    LABS:   Last Comprehensive Metabolic Panel:  Sodium   Date Value Ref Range Status   09/09/2022 139 133 - 144 mmol/L Final   07/05/2021 143 133 - 144 mmol/L Final     Potassium   Date Value Ref Range Status   09/09/2022 4.7 3.4 - 5.3 mmol/L Final   07/05/2021 3.8 3.4 - 5.3 mmol/L Final     Chloride   Date Value Ref Range Status   09/09/2022 106 94 - 109 mmol/L Final   07/05/2021 109 94 - 109 mmol/L Final     Carbon Dioxide   Date Value Ref Range Status   07/05/2021 31 20 - 32 mmol/L Final     Carbon Dioxide  (CO2)   Date Value Ref Range Status   09/09/2022 32 20 - 32 mmol/L Final     Anion Gap   Date Value Ref Range Status   09/09/2022 1 (L) 3 - 14 mmol/L Final   07/05/2021 3 3 - 14 mmol/L Final     Glucose   Date Value Ref Range Status   09/09/2022 84 70 - 99 mg/dL Final   07/05/2021 82 70 - 99 mg/dL Final     Urea Nitrogen   Date Value Ref Range Status   09/09/2022 19 7 - 30 mg/dL Final   07/05/2021 18 7 - 30 mg/dL Final     Creatinine   Date Value Ref Range Status   09/09/2022 0.68 0.52 - 1.04 mg/dL Final   07/05/2021 0.72 0.52 - 1.04 mg/dL Final     GFR Estimate   Date Value Ref Range Status   09/09/2022 86 >60 mL/min/1.73m2 Final     Comment:     Effective December 21, 2021 eGFRcr in adults is calculated using the 2021 CKD-EPI creatinine equation which includes age and gender (Sudhakar vieyra al., NEJ, DOI: 10.1056/BPZUym4884698)   07/05/2021 77 >60 mL/min/[1.73_m2] Final     Comment:     Non  GFR Calc  Starting 12/18/2018, serum creatinine based estimated GFR (eGFR) will be   calculated using the Chronic Kidney Disease Epidemiology Collaboration   (CKD-EPI) equation.       Calcium   Date Value Ref Range Status   09/09/2022 9.4 8.5 - 10.1 mg/dL Final   07/05/2021 9.2 8.5 - 10.1 mg/dL Final           ASSESSMENT:    Encounter Diagnoses   Name Primary?     Chronic right-sided CHF (congestive heart failure) (H) Yes     S/P AVR (aortic valve replacement)      Pulmonary hypertension (H)      Cognitive impairment        PLAN:    Increasing the Depakote 250 mg twice daily.  Also decrease the furosemide to 20 mg twice daily and keep potassium at 20 mEq.  Continue to monitor her chronic medical conditions as well as her overall status and the tolerance of increasing the Depakote.  The patient was appreciative of the visit she was very kind today and did not have any other questions.        Electronically signed by: Ganga Castanon NP

## 2022-11-01 NOTE — LETTER
11/1/2022        RE: Khalida Rouse  [address]  [facility] [address]        [facility]IAT[facility]  Chief Complaint   Patient presents with     long term Regulatory     [facility] Medical Record Number:  [id number]  Place of Service where encounter took place:  [facility] () [[id number]]    HPI:    Khalida Rouse  is 82 year old (1940), who is being seen today for a federally mandated E/M visit.  HPI information obtained from: facility chart records, facility staff, patient report and [facility] chart review.     Recent updates:  10/3: Depakote increased to 250 mg bid.   Lasix reduced from 40 mg in am and 20 mg at noon to 20 mg bid.       Today's concerns are:   - Resident seen and examined, chart reviewed and discussed with the nursing staff.     - CHF: Denies CP ,SOB, orthopnea, PND, bendepnea or legs swelling.     - Afib: denies palpitation    - Dementia: RN reports no behavioral concern.   --------------------------------  - Past Medical, social, family histories, medications, and allergies reviewed and updated  - Medications reviewed: in the chart and EHR.   - Case Management:   I have reviewed the care plan and MDS and do agree with the plan. Patient's desire to return to the community is not present.  Information reviewed:  Medications, vital signs, orders, and nursing notes.    MEDICATIONS:  Current Outpatient Medications   Medication Sig Dispense Refill     acetaminophen (ACETAMINOPHEN 8 HOUR) 650 MG CR tablet Take 650 mg by mouth 3 times daily       atenolol (TENORMIN) 50 MG tablet Take 50 mg by mouth 2 times daily       diltiazem ER (DILT-XR) 180 MG 24 hr capsule Take 180 mg by mouth 2 times daily        divalproex sodium delayed-release (DEPAKOTE SPRINKLE) 125 MG DR capsule Take 250 mg by mouth 2 times daily       furosemide (LASIX) 40 MG tablet 40 mg in am and 20 mg at noon (Patient taking differently: Take 20 mg by mouth 2 times daily)       polyethylene  "glycol (MIRALAX) 17 g packet Take 1 packet by mouth daily       potassium chloride ER (KLOR-CON M) 10 MEQ CR tablet Take 20 mEq by mouth daily       QUEtiapine (SEROQUEL) 25 MG tablet Take 1 tablet (25 mg) by mouth daily         ROS: Limited secondary to cognitive impairment but today pt reports     Vitals:  /58   Pulse 58   Temp 97  F (36.1  C)   Resp 18   Ht 1.575 m (5' 2\")   Wt 63.9 kg (140 lb 12.8 oz)   SpO2 94%   BMI 25.75 kg/m    Body mass index is 25.75 kg/m .  Exam: done today 11/1/22  GENERAL APPEARANCE:  in no distress, cooperative  RESP:  lungs clear to auscultation   CV:  S1S2 audible, irregular HR, no murmur appreciated.   ABDOMEN:  soft, NT/ND, BS audible. no mass appreciated on palpation.   M/S:   no joint deformity noted on observation.   SKIN:  No rash.   NEURO:   No NFD appreciated on observation  PSYCH: AAOx person on. Verbal, speech clear. Poor insight, judgement and memory. affect and mood normal    Lab/Diagnostic data: Reviewed in the chart and EHR.        ASSESSMENT/PLAN  --------------------------------  CHF, right sided (H)  Severe biatrial enlargement  Right moderate Pleural Effusion  Severe Pulmonary HTN (H)  Hx of TAVR  - EF 65-70% (April 2021)  - Lasix 40 mg reduced in July to 40/20, then again reduced to 20 mg bid (10/4). Appears compensated. Continue to monitor for CHF si/sx exacerbation  - on KCL 20 meq, last K level 4.7 (Sept 2022). Goal is to keep it b/w 4-5 given cardiac disease. Given Lasix reduced, consider checking K.          Chronic a-fibrillation (H):  Hx of supratherapeutic INR resulted in acute blood loss anemia, UGIB and hematuria (May 2021)  -  off coumadin due to anemia.   -  CVR, on atenolol and diltiazem.  appears stable. HR on average in lower 60's. Consider reducing diltiazem to 120 mg.   - Pantoprazole GDRed in July due to lack of indication. Doing fine.   - continues to be at risk for stroke.       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 Increases mortality rate due to cardiovascular of infectious event.   - on Depakote, and recently increased to 250 mg bid. Appears stable. Continue  - routine lab.   - Continue to anticipate needs. Chronic condition, ongoing decline expected.   -  Continue to provide redirection and reassurance as needed. Maintain safe living situation with goals focused on comfort.       Hx of spinal compression fx: analgesia optimal. Continue current regimen.     Frailty: Significant  Deficits requiring NH placement. Requiring extensive assistance from nursing. Up for meals only o/w spends the day resting in bed        Electronically signed by:  Claudia Molina MD        Sincerely,        Claudia Molina MD

## 2022-11-01 NOTE — PROGRESS NOTES
[facility]  Chief Complaint   Patient presents with     intermediate Regulatory     [facility] Medical Record Number:  [id number]  Place of Service where encounter took place:  [facility] () [[id number]]    HPI:    Khalida Rouse  is 82 year old (1940), who is being seen today for a federally mandated E/M visit.  HPI information obtained from: facility chart records, facility staff, patient report and [facility] chart review.     Recent updates:  10/3: Depakote increased to 250 mg bid.   Lasix reduced from 40 mg in am and 20 mg at noon to 20 mg bid.       Today's concerns are:   - Resident seen and examined, chart reviewed and discussed with the nursing staff.     - CHF: Denies CP ,SOB, orthopnea, PND, bendepnea or legs swelling.     - Afib: denies palpitation    - Dementia: RN reports no behavioral concern.   --------------------------------  - Past Medical, social, family histories, medications, and allergies reviewed and updated  - Medications reviewed: in the chart and EHR.   - Case Management:   I have reviewed the care plan and MDS and do agree with the plan. Patient's desire to return to the community is not present.  Information reviewed:  Medications, vital signs, orders, and nursing notes.    MEDICATIONS:  Current Outpatient Medications   Medication Sig Dispense Refill     acetaminophen (ACETAMINOPHEN 8 HOUR) 650 MG CR tablet Take 650 mg by mouth 3 times daily       atenolol (TENORMIN) 50 MG tablet Take 50 mg by mouth 2 times daily       diltiazem ER (DILT-XR) 180 MG 24 hr capsule Take 180 mg by mouth 2 times daily        divalproex sodium delayed-release (DEPAKOTE SPRINKLE) 125 MG DR capsule Take 250 mg by mouth 2 times daily       furosemide (LASIX) 40 MG tablet 40 mg in am and 20 mg at noon (Patient taking differently: Take 20 mg by mouth 2 times daily)       polyethylene glycol (MIRALAX) 17 g packet Take 1 packet by mouth daily       potassium chloride ER  "(KLOR-CON M) 10 MEQ CR tablet Take 20 mEq by mouth daily       QUEtiapine (SEROQUEL) 25 MG tablet Take 1 tablet (25 mg) by mouth daily         ROS: Limited secondary to cognitive impairment but today pt reports     Vitals:  /58   Pulse 58   Temp 97  F (36.1  C)   Resp 18   Ht 1.575 m (5' 2\")   Wt 63.9 kg (140 lb 12.8 oz)   SpO2 94%   BMI 25.75 kg/m    Body mass index is 25.75 kg/m .  Exam: done today 11/1/22  GENERAL APPEARANCE:  in no distress, cooperative  RESP:  lungs clear to auscultation   CV:  S1S2 audible, irregular HR, no murmur appreciated.   ABDOMEN:  soft, NT/ND, BS audible. no mass appreciated on palpation.   M/S:   no joint deformity noted on observation.   SKIN:  No rash.   NEURO:   No NFD appreciated on observation  PSYCH: AAOx person on. Verbal, speech clear. Poor insight, judgement and memory. affect and mood normal    Lab/Diagnostic data: Reviewed in the chart and EHR.        ASSESSMENT/PLAN  --------------------------------  CHF, right sided (H)  Severe biatrial enlargement  Right moderate Pleural Effusion  Severe Pulmonary HTN (H)  Hx of TAVR  - EF 65-70% (April 2021)  - Lasix 40 mg reduced in July to 40/20, then again reduced to 20 mg bid (10/4). Appears compensated. Continue to monitor for CHF si/sx exacerbation  - on KCL 20 meq, last K level 4.7 (Sept 2022). Goal is to keep it b/w 4-5 given cardiac disease. Given Lasix reduced, consider checking K.          Chronic a-fibrillation (H):  Hx of supratherapeutic INR resulted in acute blood loss anemia, UGIB and hematuria (May 2021)  -  off coumadin due to anemia.   -  CVR, on atenolol and diltiazem.  appears stable. HR on average in lower 60's. Consider reducing diltiazem to 120 mg.   - Pantoprazole GDRed in July due to lack of indication. Doing fine.   - continues to be at risk for stroke.       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 Increases mortality rate due to cardiovascular of " infectious event.   - on Depakote, and recently increased to 250 mg bid. Appears stable. Continue  - routine lab.   - Continue to anticipate needs. Chronic condition, ongoing decline expected.   -  Continue to provide redirection and reassurance as needed. Maintain safe living situation with goals focused on comfort.       Hx of spinal compression fx: analgesia optimal. Continue current regimen.     Frailty: Significant  Deficits requiring NH placement. Requiring extensive assistance from nursing. Up for meals only o/w spends the day resting in bed        Electronically signed by:  Claudia Molina MD

## 2022-11-21 NOTE — TELEPHONE ENCOUNTER
[facility] Triage Nurse Telephone Encounter    Provider: KYM Alvarez  Facility: [facility] Facility Type:  LTC    Caller: Leigh Ann  Call Back Number: [phone]    Allergies:    Allergies   Allergen Reactions     Xarelto [Rivaroxaban] Diarrhea     Ace Inhibitors Cough        Reason for call: Nurse is calling on behalf of pharmacy recommendations to check patient's potassium level due to a decrease in Lasix dose.  Currently receiving Lasix 20mg BID and potassium 20meq daily.      Verbal Order/Direction given by Provider: Check BMP and Depakote level on 11/23/22.      Provider giving Order:  KYM Alvarez    Verbal Order given to: Leigh Ann Gibson RN

## 2022-12-15 NOTE — TELEPHONE ENCOUNTER
Covid+ 12/13, symptoms congestion, cough, afebrile, on seroquel and depakote, pharm rec done.  -hold seroquel x8 days  -monitor BP's BID  -paxlovid BID x5 days  -mucinex 600mg BID x8  -dexamethasone 6mg BID x10 days

## 2022-12-22 NOTE — LETTER
12/22/2022        RE: Khalida Rouse  [address] [facility] [address]        [facility]    Facility:   [facility] () [[id number]]   Code Status: DNR/DNI      CHIEF COMPLAINT/REASON FOR VISIT:  Chief Complaint   Patient presents with     RECHECK       HISTORY:      HPI: Khalida is a 82 year old female who currently resides in the dementia unit room 212 and who I was asked to visit with today secondary to multiple chronic medical conditions including her recent positive COVID test with only a cough and who has been afebrile.  No antivirals she was given Mucinex and dexamethasone which will end on December 26.  Also the Seroquel has been put on hold.  She has not had any significant hallucinations.  According to nursing notes she has been doing well she does not complain of any particular complaints.  She does know that she even has it.  She has a history of hypertension to which she is on atenolol diltiazem Lasix and for chronic pain is on scheduled Tylenol 650 mg twice daily and for mood is on Depakote 250 mg twice daily.      Past Medical History:   Diagnosis Date     Aortic valve stenosis      Atrial flutter (H)      Cardiomyopathy (H)      Cognitive changes - SLUMS 16/30 4/13/2021     Severe aortic stenosis 4/7/2014     Shoulder fracture, left 3/22/15            Family History   Problem Relation Age of Onset     Dementia Brother      Alzheimer Disease Sister       Social History     Socioeconomic History     Marital status:    Tobacco Use     Smoking status: Never     Smokeless tobacco: Never   Substance and Sexual Activity     Alcohol use: No     Alcohol/week: 0.0 standard drinks     Drug use: No     Sexual activity: Not Currently     Partners: Male   Other Topics Concern     Caffeine Concern No     Special Diet No     Exercise No     Comment: walking      No new changes from the review of systems or physical exam since our last visit October 3 however did have  "fairly asymptomatic COVID  REVIEW OF SYSTEM:  She currently denies any new symptoms of fever cough or cold sore throat postnasal drip shortness of breath dyspnea chest pain dizziness vertigo nausea vomiting diarrhea dysuria frequency urgency    PHYSICAL EXAM:   Pleasant female in no acute distress.  Head is normocephalic.  Neck is supple without adenopathy.  Lung sounds are clear throughout.  Cardiovascular S1-S2 regular rate and rhythm and no lower extremity edema.  Gastrointestinal soft and nontender with positive bowel sounds.  Musculoskeletal denies pain to her major joints.  Psychiatric: Pleasant affect.    Current Outpatient Medications:      acetaminophen (TYLENOL) 650 MG CR tablet, Take 650 mg by mouth 2 times daily, Disp: , Rfl:      atenolol (TENORMIN) 50 MG tablet, Take 50 mg by mouth 2 times daily, Disp: , Rfl:      diltiazem ER (DILT-XR) 180 MG 24 hr capsule, Take 180 mg by mouth 2 times daily , Disp: , Rfl:      divalproex sodium delayed-release (DEPAKOTE SPRINKLE) 125 MG DR capsule, Take 250 mg by mouth 2 times daily, Disp: , Rfl:      furosemide (LASIX) 40 MG tablet, 40 mg in am and 20 mg at noon (Patient taking differently: Take 20 mg by mouth daily), Disp: , Rfl:      polyethylene glycol (MIRALAX) 17 g packet, Take 1 packet by mouth daily, Disp: , Rfl:      potassium chloride ER (KLOR-CON M) 10 MEQ CR tablet, Take 20 mEq by mouth daily, Disp: , Rfl:      QUEtiapine (SEROQUEL) 25 MG tablet, Take 1 tablet (25 mg) by mouth daily, Disp: , Rfl:        /81   Pulse 77   Temp 98  F (36.7  C)   Resp 16   Ht 1.575 m (5' 2\")   Wt 61.7 kg (136 lb 1.6 oz)   SpO2 91%   BMI 24.89 kg/m      LABS:   Last Comprehensive Metabolic Panel:  Sodium   Date Value Ref Range Status   11/23/2022 140 133 - 144 mmol/L Final   07/05/2021 143 133 - 144 mmol/L Final     Potassium   Date Value Ref Range Status   11/23/2022 4.4 3.4 - 5.3 mmol/L Final   07/05/2021 3.8 3.4 - 5.3 mmol/L Final     Chloride   Date Value Ref " Range Status   11/23/2022 105 94 - 109 mmol/L Final   07/05/2021 109 94 - 109 mmol/L Final     Carbon Dioxide   Date Value Ref Range Status   07/05/2021 31 20 - 32 mmol/L Final     Carbon Dioxide (CO2)   Date Value Ref Range Status   11/23/2022 30 20 - 32 mmol/L Final     Anion Gap   Date Value Ref Range Status   11/23/2022 5 3 - 14 mmol/L Final   07/05/2021 3 3 - 14 mmol/L Final     Glucose   Date Value Ref Range Status   11/23/2022 85 70 - 99 mg/dL Final   07/05/2021 82 70 - 99 mg/dL Final     Urea Nitrogen   Date Value Ref Range Status   11/23/2022 22 7 - 30 mg/dL Final   07/05/2021 18 7 - 30 mg/dL Final     Creatinine   Date Value Ref Range Status   11/23/2022 0.69 0.52 - 1.04 mg/dL Final   07/05/2021 0.72 0.52 - 1.04 mg/dL Final     GFR Estimate   Date Value Ref Range Status   11/23/2022 86 >60 mL/min/1.73m2 Final     Comment:     Effective December 21, 2021 eGFRcr in adults is calculated using the 2021 CKD-EPI creatinine equation which includes age and gender (Sudhakar et al., NEJ, DOI: 10.1056/NHHYqc8155777)   07/05/2021 77 >60 mL/min/[1.73_m2] Final     Comment:     Non  GFR Calc  Starting 12/18/2018, serum creatinine based estimated GFR (eGFR) will be   calculated using the Chronic Kidney Disease Epidemiology Collaboration   (CKD-EPI) equation.       Calcium   Date Value Ref Range Status   11/23/2022 9.4 8.5 - 10.1 mg/dL Final   07/05/2021 9.2 8.5 - 10.1 mg/dL Final     CBC RESULTS: Recent Labs   Lab Test 05/16/22  0744   WBC 4.4   RBC 4.58   HGB 13.5   HCT 41.5   MCV 91   MCH 29.5   MCHC 32.5   RDW 13.3        November Depakote level 42      ASSESSMENT:    Encounter Diagnoses   Name Primary?     Dementia without behavioral disturbance, unspecified dementia type Yes     Chronic right-sided CHF (congestive heart failure) (H)      Pulmonary hypertension (H)      Longstanding persistent atrial fibrillation (H)        PLAN:    She is on the dementia floor.  No CHF or hypertension issues.   Seems to be doing fine overall.  No cough or cold symptoms.  Recently was tested positive for COVID but she is asymptomatic.  She feels comfortable.  Currently in a wheelchair.  Continue to follow she is also been off the Seroquel now from the 16th to 24 December 12.5 mg twice a day without any problems we will see how this goes and perhaps it can now be discontinued.  Staff feel that she is doing well.        Electronically signed by: Ganga Castanon NP          Sincerely,        Ganga Castanon NP

## 2022-12-22 NOTE — PROGRESS NOTES
[facility] GERIATRIC SERVICES    Facility:   [facility] AND REHAB [facility] () [[id number]]   Code Status: DNR/DNI      CHIEF COMPLAINT/REASON FOR VISIT:  Chief Complaint   Patient presents with     RECHECK       HISTORY:      HPI: Khalida is a 82 year old female who currently resides in the dementia unit room 212 and who I was asked to visit with today secondary to multiple chronic medical conditions including her recent positive COVID test with only a cough and who has been afebrile.  No antivirals she was given Mucinex and dexamethasone which will end on December 26.  Also the Seroquel has been put on hold.  She has not had any significant hallucinations.  According to nursing notes she has been doing well she does not complain of any particular complaints.  She does know that she even has it.  She has a history of hypertension to which she is on atenolol diltiazem Lasix and for chronic pain is on scheduled Tylenol 650 mg twice daily and for mood is on Depakote 250 mg twice daily.      Past Medical History:   Diagnosis Date     Aortic valve stenosis      Atrial flutter (H)      Cardiomyopathy (H)      Cognitive changes - SLUMS 16/30 4/13/2021     Severe aortic stenosis 4/7/2014     Shoulder fracture, left 3/22/15            Family History   Problem Relation Age of Onset     Dementia Brother      Alzheimer Disease Sister       Social History     Socioeconomic History     Marital status:    Tobacco Use     Smoking status: Never     Smokeless tobacco: Never   Substance and Sexual Activity     Alcohol use: No     Alcohol/week: 0.0 standard drinks     Drug use: No     Sexual activity: Not Currently     Partners: Male   Other Topics Concern     Caffeine Concern No     Special Diet No     Exercise No     Comment: walking      No new changes from the review of systems or physical exam since our last visit October 3 however did have fairly asymptomatic COVID  REVIEW OF SYSTEM:  She currently denies any new symptoms of  "fever cough or cold sore throat postnasal drip shortness of breath dyspnea chest pain dizziness vertigo nausea vomiting diarrhea dysuria frequency urgency    PHYSICAL EXAM:   Pleasant female in no acute distress.  Head is normocephalic.  Neck is supple without adenopathy.  Lung sounds are clear throughout.  Cardiovascular S1-S2 regular rate and rhythm and no lower extremity edema.  Gastrointestinal soft and nontender with positive bowel sounds.  Musculoskeletal denies pain to her major joints.  Psychiatric: Pleasant affect.    Current Outpatient Medications:      acetaminophen (TYLENOL) 650 MG CR tablet, Take 650 mg by mouth 2 times daily, Disp: , Rfl:      atenolol (TENORMIN) 50 MG tablet, Take 50 mg by mouth 2 times daily, Disp: , Rfl:      diltiazem ER (DILT-XR) 180 MG 24 hr capsule, Take 180 mg by mouth 2 times daily , Disp: , Rfl:      divalproex sodium delayed-release (DEPAKOTE SPRINKLE) 125 MG DR capsule, Take 250 mg by mouth 2 times daily, Disp: , Rfl:      furosemide (LASIX) 40 MG tablet, 40 mg in am and 20 mg at noon (Patient taking differently: Take 20 mg by mouth daily), Disp: , Rfl:      polyethylene glycol (MIRALAX) 17 g packet, Take 1 packet by mouth daily, Disp: , Rfl:      potassium chloride ER (KLOR-CON M) 10 MEQ CR tablet, Take 20 mEq by mouth daily, Disp: , Rfl:      QUEtiapine (SEROQUEL) 25 MG tablet, Take 1 tablet (25 mg) by mouth daily, Disp: , Rfl:        /81   Pulse 77   Temp 98  F (36.7  C)   Resp 16   Ht 1.575 m (5' 2\")   Wt 61.7 kg (136 lb 1.6 oz)   SpO2 91%   BMI 24.89 kg/m      LABS:   Last Comprehensive Metabolic Panel:  Sodium   Date Value Ref Range Status   11/23/2022 140 133 - 144 mmol/L Final   07/05/2021 143 133 - 144 mmol/L Final     Potassium   Date Value Ref Range Status   11/23/2022 4.4 3.4 - 5.3 mmol/L Final   07/05/2021 3.8 3.4 - 5.3 mmol/L Final     Chloride   Date Value Ref Range Status   11/23/2022 105 94 - 109 mmol/L Final   07/05/2021 109 94 - 109 mmol/L " Final     Carbon Dioxide   Date Value Ref Range Status   07/05/2021 31 20 - 32 mmol/L Final     Carbon Dioxide (CO2)   Date Value Ref Range Status   11/23/2022 30 20 - 32 mmol/L Final     Anion Gap   Date Value Ref Range Status   11/23/2022 5 3 - 14 mmol/L Final   07/05/2021 3 3 - 14 mmol/L Final     Glucose   Date Value Ref Range Status   11/23/2022 85 70 - 99 mg/dL Final   07/05/2021 82 70 - 99 mg/dL Final     Urea Nitrogen   Date Value Ref Range Status   11/23/2022 22 7 - 30 mg/dL Final   07/05/2021 18 7 - 30 mg/dL Final     Creatinine   Date Value Ref Range Status   11/23/2022 0.69 0.52 - 1.04 mg/dL Final   07/05/2021 0.72 0.52 - 1.04 mg/dL Final     GFR Estimate   Date Value Ref Range Status   11/23/2022 86 >60 mL/min/1.73m2 Final     Comment:     Effective December 21, 2021 eGFRcr in adults is calculated using the 2021 CKD-EPI creatinine equation which includes age and gender (Sudhakar et al., NEJM, DOI: 10.1056/HHCNlv3457592)   07/05/2021 77 >60 mL/min/[1.73_m2] Final     Comment:     Non  GFR Calc  Starting 12/18/2018, serum creatinine based estimated GFR (eGFR) will be   calculated using the Chronic Kidney Disease Epidemiology Collaboration   (CKD-EPI) equation.       Calcium   Date Value Ref Range Status   11/23/2022 9.4 8.5 - 10.1 mg/dL Final   07/05/2021 9.2 8.5 - 10.1 mg/dL Final     CBC RESULTS: Recent Labs   Lab Test 05/16/22  0744   WBC 4.4   RBC 4.58   HGB 13.5   HCT 41.5   MCV 91   MCH 29.5   MCHC 32.5   RDW 13.3        November [facility] level 42      ASSESSMENT:    Encounter Diagnoses   Name Primary?     Dementia without behavioral disturbance, unspecified dementia type Yes     Chronic right-sided CHF (congestive heart failure) (H)      Pulmonary hypertension (H)      Longstanding persistent atrial fibrillation (H)        PLAN:    She is on the dementia floor.  No CHF or hypertension issues.  Seems to be doing fine overall.  No cough or cold symptoms.  Recently was tested  positive for COVID but she is asymptomatic.  She feels comfortable.  Currently in a wheelchair.  Continue to follow she is also been off the Seroquel now from the 16th to 24 December 12.5 mg twice a day without any problems we will see how this goes and perhaps it can now be discontinued.  Staff feel that she is doing well.        Electronically signed by: Ganga Castanon NP

## 2022-12-29 NOTE — TELEPHONE ENCOUNTER
[facility] Triage Nurse Telephone Encounter    Provider: KYM Alvarez  Facility: [facility] Type:  LTC    Caller: Milady   Call Back Number: [phone]    Allergies:    Allergies   Allergen Reactions     Xarelto [Rivaroxaban] Diarrhea     Ace Inhibitors Cough        Reason for call: Nursing is calling to report that the patient has really declined in the last week, possibly having a stroke, having more trouble feeding, confused, incontinent and needing a stand lift at this time.  Family has noted that the patient has been having word counting as well.  Family would like a hospice consult.    Verbal Order/Direction given by Provider: Hospice consult eval and treat    Provider giving Order:  KYM Alvarez    Verbal Order given to: Milady Orozco RN

## 2023-01-01 ENCOUNTER — TELEPHONE (OUTPATIENT)
Dept: GERIATRICS | Facility: CLINIC | Age: 83
End: 2023-01-01

## 2023-01-01 ENCOUNTER — NURSING HOME VISIT (OUTPATIENT)
Dept: GERIATRICS | Facility: CLINIC | Age: 83
End: 2023-01-01
Payer: COMMERCIAL

## 2023-01-01 ENCOUNTER — DOCUMENTATION ONLY (OUTPATIENT)
Dept: GERIATRICS | Facility: CLINIC | Age: 83
End: 2023-01-01

## 2023-01-01 VITALS
SYSTOLIC BLOOD PRESSURE: 124 MMHG | HEIGHT: 58 IN | BODY MASS INDEX: 26.51 KG/M2 | DIASTOLIC BLOOD PRESSURE: 76 MMHG | OXYGEN SATURATION: 99 % | TEMPERATURE: 97.8 F | HEART RATE: 71 BPM | WEIGHT: 126.3 LBS | RESPIRATION RATE: 20 BRPM

## 2023-01-01 DIAGNOSIS — F03.90 DEMENTIA WITHOUT BEHAVIORAL DISTURBANCE (H): ICD-10-CM

## 2023-01-01 DIAGNOSIS — R62.7 ADULT FAILURE TO THRIVE: Primary | ICD-10-CM

## 2023-01-01 DIAGNOSIS — I50.812 CHRONIC RIGHT-SIDED CHF (CONGESTIVE HEART FAILURE) (H): ICD-10-CM

## 2023-01-01 DIAGNOSIS — I27.20 PULMONARY HYPERTENSION (H): ICD-10-CM

## 2023-01-01 PROCEDURE — 99310 SBSQ NF CARE HIGH MDM 45: CPT | Performed by: FAMILY MEDICINE

## 2023-01-01 RX ORDER — DILTIAZEM HCL 60 MG
60 TABLET ORAL EVERY MORNING
COMMUNITY
Start: 2023-01-01

## 2023-01-12 NOTE — PROGRESS NOTES
[facility] GERIATRIC SERVICES    Facility:   [facility] () [[id number]]   Code Status: DNR/DNI      CHIEF COMPLAINT/REASON FOR VISIT:  Chief Complaint   Patient presents with     FVP Care Coordination - Regulatory       HISTORY:      HPI: Khalida is a 82 year old female who currently resides in the memory care unit long-term care room 212 and who all I was asked to visit with today secondary to a regulatory review of chronic medical conditions.  She currently is on hospice.  In looking over her medication and chart her systolic blood pressures less than 120 decreasing the diltiazem 180 mg daily rather than twice daily continue with the atenolol 50 mg twice daily the heart rate her pulse has been in the 60-90 range.  He also discontinue the 20 mg of furosemide at noontime and keep the 20 mg in the morning.  She is drinking fluids and eating some but not as much as she used to.  Her weight 126 pounds in comparison to December 31 at 130 pounds.  According nurses notes she is on hospice she is in her wheelchair.  Currently today at her visit she is in her bed.  Had a chance to talk to the charge nurse as well.  Also in talking with the charge nurse on hoping they will get a hold of the hospice team and I think that she needs to be on some pain medications at this time.    Past Medical History:   Diagnosis Date     Aortic valve stenosis      Atrial flutter (H)      Cardiomyopathy (H)      Cognitive changes - SLUMS 16/30 4/13/2021     Severe aortic stenosis 4/7/2014     Shoulder fracture, left 3/22/15            Family History   Problem Relation Age of Onset     Dementia Brother      Alzheimer Disease Sister       Social History     Socioeconomic History     Marital status:    Tobacco Use     Smoking status: Never     Smokeless tobacco: Never   Substance and Sexual Activity     Alcohol use: No     Alcohol/week: 0.0 standard drinks     Drug use: No     Sexual activity: Not Currently     Partners:  "Male   Other Topics Concern     Caffeine Concern No     Special Diet No     Exercise No     Comment: walking        REVIEW OF SYSTEM:  According to staff there are no new colds flus chills fever coughing shortness of breath dyspnea wheezing obvious chest pain edema CHF problems nausea vomiting diarrhea dysuria frequency urgency    PHYSICAL EXAM:   no acute distress.  Head is normocephalic.  Neck is supple without adenopathy.  Lung sounds are clear throughout.  Cardiovascular S1-S2 regular rate and rhythm and no lower extremity edema.  Gastrointestinal soft and nontender with positive bowel sounds.  Musculoskeletal appears to be comfortable..  Psychiatric: Dementia.    Current Outpatient Medications:      acetaminophen (TYLENOL) 650 MG CR tablet, Take 650 mg by mouth 2 times daily, Disp: , Rfl:      atenolol (TENORMIN) 50 MG tablet, Take 50 mg by mouth 2 times daily, Disp: , Rfl:      diltiazem ER (DILT-XR) 180 MG 24 hr capsule, Take 180 mg by mouth daily, Disp: , Rfl:      divalproex sodium delayed-release (DEPAKOTE SPRINKLE) 125 MG DR capsule, Take 250 mg by mouth 2 times daily, Disp: , Rfl:      furosemide (LASIX) 40 MG tablet, 40 mg in am and 20 mg at noon (Patient taking differently: Take 20 mg by mouth daily), Disp: , Rfl:      polyethylene glycol (MIRALAX) 17 g packet, Take 1 packet by mouth daily, Disp: , Rfl:      potassium chloride ER (KLOR-CON M) 10 MEQ CR tablet, Take 20 mEq by mouth daily, Disp: , Rfl:      QUEtiapine (SEROQUEL) 25 MG tablet, Take 1 tablet (25 mg) by mouth daily (Patient taking differently: Take 12.5 mg by mouth 2 times daily), Disp: , Rfl:     /76   Pulse 71   Temp 97.8  F (36.6  C)   Resp 20   Ht 1.473 m (4' 10\")   Wt 57.3 kg (126 lb 4.8 oz)   SpO2 99%   BMI 26.40 kg/m        LABS:   Last Comprehensive Metabolic Panel:  Sodium   Date Value Ref Range Status   11/23/2022 140 133 - 144 mmol/L Final   07/05/2021 143 133 - 144 mmol/L Final     Potassium   Date Value Ref Range " Status   11/23/2022 4.4 3.4 - 5.3 mmol/L Final   07/05/2021 3.8 3.4 - 5.3 mmol/L Final     Chloride   Date Value Ref Range Status   11/23/2022 105 94 - 109 mmol/L Final   07/05/2021 109 94 - 109 mmol/L Final     Carbon Dioxide   Date Value Ref Range Status   07/05/2021 31 20 - 32 mmol/L Final     Carbon Dioxide (CO2)   Date Value Ref Range Status   11/23/2022 30 20 - 32 mmol/L Final     Anion Gap   Date Value Ref Range Status   11/23/2022 5 3 - 14 mmol/L Final   07/05/2021 3 3 - 14 mmol/L Final     Glucose   Date Value Ref Range Status   11/23/2022 85 70 - 99 mg/dL Final   07/05/2021 82 70 - 99 mg/dL Final     Urea Nitrogen   Date Value Ref Range Status   11/23/2022 22 7 - 30 mg/dL Final   07/05/2021 18 7 - 30 mg/dL Final     Creatinine   Date Value Ref Range Status   11/23/2022 0.69 0.52 - 1.04 mg/dL Final   07/05/2021 0.72 0.52 - 1.04 mg/dL Final     GFR Estimate   Date Value Ref Range Status   11/23/2022 86 >60 mL/min/1.73m2 Final     Comment:     Effective December 21, 2021 eGFRcr in adults is calculated using the 2021 CKD-EPI creatinine equation which includes age and gender (Sudhakar vieyra al., NEJ, DOI: 10.1056/WQVHij0713462)   07/05/2021 77 >60 mL/min/[1.73_m2] Final     Comment:     Non  GFR Calc  Starting 12/18/2018, serum creatinine based estimated GFR (eGFR) will be   calculated using the Chronic Kidney Disease Epidemiology Collaboration   (CKD-EPI) equation.       Calcium   Date Value Ref Range Status   11/23/2022 9.4 8.5 - 10.1 mg/dL Final   07/05/2021 9.2 8.5 - 10.1 mg/dL Final         ASSESSMENT:    (R62.7) Adult failure to thrive  (primary encounter diagnosis)  Comment: Currently on hospice  Plan: Staff will get a hold of hospice to see if they can start some pain medications    (I50.812) Chronic right-sided CHF (congestive heart failure) (H)  Comment: Stable  Plan: Discontinue Lasix 20 mg at noontime also decrease diltiazem 180 mg once a day rather than twice daily    (I27.20) Pulmonary  hypertension (H)  Comment: Stable  Plan: Continue to monitor    (F03.90) Dementia without behavioral disturbance, unspecified dementia type  Comment: Stable  Plan: Currently on hospice.  Is on Seroquel and Depakote      Case Management:  I have reviewed the facility/SNF care plan/MDS which was done Today, including the falls risk, nutrition and pain screening. I also reviewed the current immunizations, and preventive care.. Future cancer screening is not clinically indicated secondary to age/goals of care.   Patient's desire to return to the community is not assessible due to cognitive impairment.    Information reviewed:  Medications, vital signs, orders, and nursing notes.  PLAN:    She is currently on hospice.  The team will go ahead and get a hold of the hospice team to find out if they can start some pain medications I would agree with that at this point.  Very little intake has lost a significant amount of weight.  She still swallowing her medications.  Staff will keep me updated for any other concerns or questions    Electronically signed by: Ganga Castanon NP

## 2023-01-12 NOTE — LETTER
1/12/2023        RE: Khalida Rouse  [address] [facility] [address]          [facility] GERIATRIC SERVICES    Facility:   [facility] AND REH[facility] () [[id number]]   Code Status: DNR/DNI      CHIEF COMPLAINT/REASON FOR VISIT:  Chief Complaint   Patient presents with     FVP Care Coordination - Regulatory       HISTORY:      HPI: Khalida is a 82 year old female who currently resides in the memory care unit long-term care room 212 and who all I was asked to visit with today secondary to a regulatory review of chronic medical conditions.  She currently is on hospice.  In looking over her medication and chart her systolic blood pressures less than 120 decreasing the diltiazem 180 mg daily rather than twice daily continue with the atenolol 50 mg twice daily the heart rate her pulse has been in the 60-90 range.  He also discontinue the 20 mg of furosemide at noontime and keep the 20 mg in the morning.  She is drinking fluids and eating some but not as much as she used to.  Her weight 126 pounds in comparison to December 31 at 130 pounds.  According nurses notes she is on hospice she is in her wheelchair.  Currently today at her visit she is in her bed.  Had a chance to talk to the charge nurse as well.  Also in talking with the charge nurse on hoping they will get a hold of the hospice team and I think that she needs to be on some pain medications at this time.    Past Medical History:   Diagnosis Date     Aortic valve stenosis      Atrial flutter (H)      Cardiomyopathy (H)      Cognitive changes - SLUMS 16/30 4/13/2021     Severe aortic stenosis 4/7/2014     Shoulder fracture, left 3/22/15            Family History   Problem Relation Age of Onset     Dementia Brother      Alzheimer Disease Sister       Social History     Socioeconomic History     Marital status:    Tobacco Use     Smoking status: Never     Smokeless tobacco: Never   Substance and Sexual Activity     Alcohol use: No     Alcohol/week:  "0.0 standard drinks     Drug use: No     Sexual activity: Not Currently     Partners: Male   Other Topics Concern     Caffeine Concern No     Special Diet No     Exercise No     Comment: walking        REVIEW OF SYSTEM:  According to staff there are no new colds flus chills fever coughing shortness of breath dyspnea wheezing obvious chest pain edema CHF problems nausea vomiting diarrhea dysuria frequency urgency    PHYSICAL EXAM:   no acute distress.  Head is normocephalic.  Neck is supple without adenopathy.  Lung sounds are clear throughout.  Cardiovascular S1-S2 regular rate and rhythm and no lower extremity edema.  Gastrointestinal soft and nontender with positive bowel sounds.  Musculoskeletal appears to be comfortable..  Psychiatric: Dementia.    Current Outpatient Medications:      acetaminophen (TYLENOL) 650 MG CR tablet, Take 650 mg by mouth 2 times daily, Disp: , Rfl:      atenolol (TENORMIN) 50 MG tablet, Take 50 mg by mouth 2 times daily, Disp: , Rfl:      diltiazem ER (DILT-XR) 180 MG 24 hr capsule, Take 180 mg by mouth daily, Disp: , Rfl:      divalproex sodium delayed-release (DEPAKOTE SPRINKLE) 125 MG DR capsule, Take 250 mg by mouth 2 times daily, Disp: , Rfl:      furosemide (LASIX) 40 MG tablet, 40 mg in am and 20 mg at noon (Patient taking differently: Take 20 mg by mouth daily), Disp: , Rfl:      polyethylene glycol (MIRALAX) 17 g packet, Take 1 packet by mouth daily, Disp: , Rfl:      potassium chloride ER (KLOR-CON M) 10 MEQ CR tablet, Take 20 mEq by mouth daily, Disp: , Rfl:      QUEtiapine (SEROQUEL) 25 MG tablet, Take 1 tablet (25 mg) by mouth daily (Patient taking differently: Take 12.5 mg by mouth 2 times daily), Disp: , Rfl:     /76   Pulse 71   Temp 97.8  F (36.6  C)   Resp 20   Ht 1.473 m (4' 10\")   Wt 57.3 kg (126 lb 4.8 oz)   SpO2 99%   BMI 26.40 kg/m        LABS:   Last Comprehensive Metabolic Panel:  Sodium   Date Value Ref Range Status   11/23/2022 140 133 - 144 " mmol/L Final   07/05/2021 143 133 - 144 mmol/L Final     Potassium   Date Value Ref Range Status   11/23/2022 4.4 3.4 - 5.3 mmol/L Final   07/05/2021 3.8 3.4 - 5.3 mmol/L Final     Chloride   Date Value Ref Range Status   11/23/2022 105 94 - 109 mmol/L Final   07/05/2021 109 94 - 109 mmol/L Final     Carbon Dioxide   Date Value Ref Range Status   07/05/2021 31 20 - 32 mmol/L Final     Carbon Dioxide (CO2)   Date Value Ref Range Status   11/23/2022 30 20 - 32 mmol/L Final     Anion Gap   Date Value Ref Range Status   11/23/2022 5 3 - 14 mmol/L Final   07/05/2021 3 3 - 14 mmol/L Final     Glucose   Date Value Ref Range Status   11/23/2022 85 70 - 99 mg/dL Final   07/05/2021 82 70 - 99 mg/dL Final     Urea Nitrogen   Date Value Ref Range Status   11/23/2022 22 7 - 30 mg/dL Final   07/05/2021 18 7 - 30 mg/dL Final     Creatinine   Date Value Ref Range Status   11/23/2022 0.69 0.52 - 1.04 mg/dL Final   07/05/2021 0.72 0.52 - 1.04 mg/dL Final     GFR Estimate   Date Value Ref Range Status   11/23/2022 86 >60 mL/min/1.73m2 Final     Comment:     Effective December 21, 2021 eGFRcr in adults is calculated using the 2021 CKD-EPI creatinine equation which includes age and gender (Sudhakar vieyra al., NE, DOI: 10.1056/RWIRns8623575)   07/05/2021 77 >60 mL/min/[1.73_m2] Final     Comment:     Non  GFR Calc  Starting 12/18/2018, serum creatinine based estimated GFR (eGFR) will be   calculated using the Chronic Kidney Disease Epidemiology Collaboration   (CKD-EPI) equation.       Calcium   Date Value Ref Range Status   11/23/2022 9.4 8.5 - 10.1 mg/dL Final   07/05/2021 9.2 8.5 - 10.1 mg/dL Final         ASSESSMENT:    (R62.7) Adult failure to thrive  (primary encounter diagnosis)  Comment: Currently on hospice  Plan: Staff will get a hold of hospice to see if they can start some pain medications    (I50.812) Chronic right-sided CHF (congestive heart failure) (H)  Comment: Stable  Plan: Discontinue Lasix 20 mg at  noontime also decrease diltiazem 180 mg once a day rather than twice daily    (I27.20) Pulmonary hypertension (H)  Comment: Stable  Plan: Continue to monitor    (F03.90) Dementia without behavioral disturbance, unspecified dementia type  Comment: Stable  Plan: Currently on hospice.  Is on Seroquel and Depakote      Case Management:  I have reviewed the facility/SNF care plan/MDS which was done Today, including the falls risk, nutrition and pain screening. I also reviewed the current immunizations, and preventive care.. Future cancer screening is not clinically indicated secondary to age/goals of care.   Patient's desire to return to the community is not assessible due to cognitive impairment.    Information reviewed:  Medications, vital signs, orders, and nursing notes.  PLAN:    She is currently on hospice.  The team will go ahead and get a hold of the hospice team to find out if they can start some pain medications I would agree with that at this point.  Very little intake has lost a significant amount of weight.  She still swallowing her medications.  Staff will keep me updated for any other concerns or questions    Electronically signed by: Ganga Castanon NP            Sincerely,        Ganga Castanon NP

## 2023-01-13 NOTE — TELEPHONE ENCOUNTER
[facility] Geriatrics Triage Nurse Telephone Encounter    Provider: KYM Alvarez  Facility: [facility] Facility Type:  LTC    Caller: Mariah  Call Back Number: [phone]    Allergies:    Allergies   Allergen Reactions     Xarelto [Rivaroxaban] Diarrhea     Ace Inhibitors Cough        Reason for call: Nurse is calling to clarify Diltiazem orders.      Verbal Order/Direction given by Provider: Decrease Diltiazem to the regular release Diltiazem 60mg Q AM.      Provider giving Order:  KYM Alvarez    Verbal Order given to: Mariah Gibson RN

## 2023-01-16 NOTE — PROGRESS NOTES
Geriatrics Notification of Patient Death    Provider: KYM Alvarez  Place of death: [facility] Type:  LTC    Caller: Ganga   Date of Death: 1/15/23    Patient was on Hospice care: Yes; please explain: -  [facility] provider: Yes; please explain: 1/12/23        Diya Blue RN

## 2023-05-01 NOTE — PLAN OF CARE
VSS. Afebrile. C/o pain/discomfort to back. PRN tylenol given per MAR which has been effective. Pt remains on 4L O2/nc. Denies any SOB or chest pain. LS diminished throughout. 1+ BLE edema remains. Purwick in place draining large amounts of clear yellow urine. No complaints at this time.   done

## 2023-06-26 NOTE — TELEPHONE ENCOUNTER
Spoke with patient and informed of message below. Patient understood and new appointment was made.     Jessica Butler MA    Cheek Interpolation Flap Text: A decision was made to reconstruct the defect utilizing an interpolation axial flap and a staged reconstruction.  A telfa template was made of the defect.  This telfa template was then used to outline the Cheek Interpolation flap.  The donor area for the pedicle flap was then injected with anesthesia.  The flap was excised through the skin and subcutaneous tissue down to the layer of the underlying musculature.  The interpolation flap was carefully excised within this deep plane to maintain its blood supply.  The edges of the donor site were undermined.   The donor site was closed in a primary fashion.  The pedicle was then rotated into position and sutured.  Once the tube was sutured into place, adequate blood supply was confirmed with blanching and refill.  The pedicle was then wrapped with xeroform gauze and dressed appropriately with a telfa and gauze bandage to ensure continued blood supply and protect the attached pedicle.

## 2024-04-19 NOTE — PROGRESS NOTES
S-(situation): Patient arrives to room 252 from Ed @ 1330    B-(background): pneumonia    A-(assessment)  T 99.6, LS coarse Right , 2 LO2 n/c sat's low 90's.  Pt denies pain, no skin issues, up with SBA.       R-(recommendations): Orders reviewed with pt/ daughter. Will monitor patient per MD orders.     Inpatient nursing criteria listed below were met:    Health care directives status obtained and documented: eys  SCD's Documented: other  Skin issues/needs documented:none  Isolation needs addressed, if appropriate: droplet  Fall Prevention: Care plan updated, Education given and documented  yes  MRSA swab completed for patient 55 years and older (exclude CHERELLE and TKA): yes  Care Plan initiated: yes  Education Assessment documented:yes  Education Documented (Pre-existing chronic infection such as, MRSA/VRE need education on admission):yes  Admission Medication Reconciliation completed: erika  New medication patient education completed and documented (Possible Side Effects of Common Medications handout):yes  Home medications if not able to send immediately home with family stored here: n/a  Reminder note placed in discharge instructions: n/a  Discharge planning review completed (admission navigator) yes         concrete/Linear

## 2025-01-23 NOTE — PROGRESS NOTES
[facility]    Hospitalist Progress Note    Brief Summary:  This is a 81-year-old female with history of atrial fibrillation/flutter, status post aortic valve replacement with tissue valve, dementia, on chronic anticoagulation with Coumadin, history of recent compression fracture, initially presented to the ER at [facility] with complaint of weakness fatigue lightheadedness.,  Melena and bright red blood per rectum and hematuria.  She was found to have INR more than 10 and hemoglobin of 8.3 down from her baseline of 14.  She was given vitamin K, Kcentra and transfused 2 units of packed RBCs and transferred to [facility] for further management.    Assessment & Plan        Acute blood loss anemia, severe  Her baseline hemoglobin is 14, she dropped down to 8.3.  With active bleeding per rectally, melena as well as hematochezia, hematuria  On admission.   This is  secondary to supratherapeutic INR.  Hemoglobin has been stable around 9.2 in last 24 hrs. .  No further GI bleeding, her hematuria has been improving as well. Anemia stable. Hemodynamically stable. No active bleeding check Hb as needed or daily basis now.     Supratherapeutic INR, resolved  - received IV vit K and Kcentra at [facility]  -INR 1.28 this morning. Continue to hold coumadin and discontinue on discharge   Discussed with the patient pros and cons of coumadin after all consideration he decided to discontinue the coumadin at this time      Acute GI bleed  Reports several days of melanic, tarry stools. Hematochezia for last 2-3 days by patient report.  No sign of active bleeding at this time.  Gastroenterologist is consulted and following  EGD done shows no active bleeding or source, had patchy moderate erythematous mucosa at gastric antrum.   Esophagus and Duodenum was normal. Most likely diffuse mucosal bleeding because of supra theapeutic INR now resolved. Continue Protonix 40 mg bid orally        Gross  Spoke with patient via telehealth.  Pt has significant PTSD driving long distances.  We will write letter for work.  I also recommend referral to psychiatry due to symptoms.  Order in system.  Xanax refilled.  Discussed risk and benefits.    Hematuria  Severe urinary retention, resolved  Status post three-way Dupree catheter placement.  Monitor urine output right away.  She was started on CBI on admission, urine is now mostly clear.   CBI was discontinue yesterday AM but gross hematuria reoccur, she is on CBI and return is mostly clear.  Will wean her off CBI today. Restart if gross hematuria.  Appreciate input from the Urology, plan for CT urogram and cysto in 3-4 weeks as out patient.   CBI is discontinued at this time.  Continue to monitor       Atrial fibrillation with RVR  Chronic anticoagulation with Coumadin  Patient has chronic A. fib, she is on Cardizem as well as atenolol at home.  She was on IV Cardizem drip at now stopped.   Continue oral Atenolol 50 mg bid  and Cardizem 180 daily. PRN Cardizem drip if HR persistently elevated 120 or more  Patient has dementia, she lives by herself, history of compression fracture, and now with GI bleeding and hematuria.  She is high risk for anticoagulation.  Discussed with the patient's son at the bedside who agree and would like to stop the Coumadin at this time.       Severe Pulmonary Hypertension  Right sided pressures severely elevated on most recent TTE.  - monitor     S/p tissue AVR  -Stable at this time.     Recent appendicitis managed with antibiotics  No surgery offered due to underlying health conditions. Completed course of Augmentin     Possible vaginal bleeding  - after resolution of bleeding if vaginal bleeding continues, can have OBGYN consult  - monitor for now     Moderate Cognitive impairment  Delirium   - delirium precautions    Compression fracture  As per history, patient's son that patient has compression fracture lower back.  Complains of some pain lower back, started on oxycodone 5 mg every 4 hours as needed  And apply lidocaine patches to her back, continue IV fentanyl for severe pain.  I will schedule tylenol 1000 mg every 6 hrs for better pain control now.       Advance diet to  Regular diet  IV fluids discontinue now  Wean off CBI, continue manual irrigation if needed.   Lidocaine patch to the back  PT and OT to start.   hemodynamically stable [place] [facility] [place]     Discussed with patient, patient son and the nursing staff taking care of the patient today         Dupree Catheter: in place, indication:    DVT Prophylaxis: Pneumatic Compression Devices  Code Status: No CPR- Do NOT Intubate    Disposition: Expected discharge in 1 to 2 days once stable.      Discussed with patient, patient's son at bedside and the nursing staff to include the patient.    Total time spent today is 30 minutes of critical care time which include chart review history taking physical examination formulation of the plan counseling and coordination of care    Gavin Marrero MD  Text Page  (7am - 6pm)    Interval History   Patient feeling much better this morning, up to the chair, had worsening hematuria yesterday after stopping the CBI, back on CBI at this time, no nausea or vomiting, feeling hungry, denies any chest pain or SOB.       No other significant event overnight.    -Data reviewed today: I reviewed all new labs and imaging results over the last 24 hours. I personally reviewed no images or EKG's today.    Physical Exam   Temp: 98.2  F (36.8  C) Temp src: Oral BP: 100/61 Pulse: 130   Resp: (!) 33 SpO2: (!) 89 % O2 Device: Nasal cannula Oxygen Delivery: 2 LPM  Vitals:    05/13/21 2100 05/14/21 0600 05/15/21 0630   Weight: 65.5 kg (144 lb 6.4 oz) 65.5 kg (144 lb 6.4 oz) 65.4 kg (144 lb 2.9 oz)     Vital Signs with Ranges  Temp:  [98  F (36.7  C)-98.3  F (36.8  C)] 98.2  F (36.8  C)  Pulse:  [] 130  Resp:  [9-38] 33  BP: ()/(48-89) 100/61  SpO2:  [89 %-100 %] 89 %  I/O last 3 completed shifts:  In: 244.91 [P.O.:100; I.V.:144.91]  Out: 7420 [Urine:7420]    Constitutional: fatigued  Eyes: Lids and lashes normal, pupils equal, round and reactive to light, extra ocular muscles  intact, sclera clear, conjunctiva normal  Respiratory: No increased work of breathing, good air exchange, clear to auscultation bilaterally, no crackles or wheezing  Cardiovascular: irregularly irregular rhythm and variable S1 and S2, no murmur  GI: No scars, normal bowel sounds, soft, non-distended, non-tender, no masses palpated, no hepatosplenomegally  Skin: no bruising or bleeding  Musculoskeletal: no lower extremity pitting edema present  Neurologic: no focal deficit.     Medications     - MEDICATION INSTRUCTIONS -       - MEDICATION INSTRUCTIONS -         atenolol  50 mg Oral BID     diltiazem ER COATED BEADS  180 mg Oral BID     pantoprazole  40 mg Oral BID AC     sodium chloride (PF)  3 mL Intracatheter Q8H       Data   Recent Labs   Lab 05/15/21  0616 05/15/21  0008 05/14/21  1831 05/14/21  0531 05/14/21  0531 05/13/21  2100 05/13/21  2100 05/13/21  1108 05/13/21  1108   WBC 10.2  --   --   --  14.6*  --  17.0*  --  15.7*   HGB 9.2* 8.8* 9.2*   < > 9.1*   < > 10.5*   < > 8.1*   MCV 88  --   --   --  86  --  86  --  87     --   --   --  219  --  260  --  246   INR 1.28*  --   --   --  1.16*  --  1.10   < > >10.00*     --   --   --  138  --  136  --  134   POTASSIUM 4.5  --   --   --  4.2  --  3.9  --  4.3   CHLORIDE 109  --   --   --  106  --  103  --  102   CO2 30  --   --   --  28  --  27  --  27   BUN 30  --   --   --  30  --  35*  --  40*   CR 0.84  --   --   --  0.92  --  1.05*  --  1.22*   ANIONGAP 2*  --   --   --  4  --  6  --  5   JOSEFINA 9.2  --   --   --  8.4*  --  8.8  --  9.0   GLC 78  --   --   --  106*  --  129*  --  125*   ALBUMIN 2.2*  --   --   --   --   --  2.6*  --  2.6*   PROTTOTAL  --   --   --   --   --   --  6.7*  --  6.5*   BILITOTAL  --   --   --   --   --   --  1.9*  --  0.8   ALKPHOS  --   --   --   --   --   --  89  --  83   ALT  --   --   --   --   --   --  22  --  21   AST  --   --   --   --   --   --  28  --  27    < > = values in this interval not displayed.       No  results found for this or any previous visit (from the past 24 hour(s)).

## 2025-02-15 NOTE — TELEPHONE ENCOUNTER
Panel Management Review      Patient has the following on her problem list:     Hypertension   Last three blood pressure readings:  BP Readings from Last 3 Encounters:   09/24/19 (!) 148/86   09/21/19 134/87   09/17/19 132/62     Blood pressure: FAILED    HTN Guidelines:  Less than 140/90      Composite cancer screening  Chart review shows that this patient is due/due soon for the following None  Summary:    Patient is due/failing the following:   BP CHECK    Action needed:   Patient needs nurse only appointment.    Type of outreach:    Phone, left message for patient to call back.     Questions for provider review:    None                                                                                                                                    Casie Tubbs CMA (AAMA)       Chart routed to Care Team .           [place]